# Patient Record
Sex: MALE | Race: WHITE | NOT HISPANIC OR LATINO | URBAN - METROPOLITAN AREA
[De-identification: names, ages, dates, MRNs, and addresses within clinical notes are randomized per-mention and may not be internally consistent; named-entity substitution may affect disease eponyms.]

---

## 2017-09-06 ENCOUNTER — EMERGENCY (EMERGENCY)
Facility: HOSPITAL | Age: 67
LOS: 1 days | Discharge: HOME | End: 2017-09-06
Attending: EMERGENCY MEDICINE | Admitting: EMERGENCY MEDICINE
Payer: MEDICAID

## 2017-09-06 VITALS
HEART RATE: 77 BPM | RESPIRATION RATE: 18 BRPM | SYSTOLIC BLOOD PRESSURE: 145 MMHG | DIASTOLIC BLOOD PRESSURE: 84 MMHG | TEMPERATURE: 98 F | OXYGEN SATURATION: 97 %

## 2017-09-06 DIAGNOSIS — Z88.0 ALLERGY STATUS TO PENICILLIN: ICD-10-CM

## 2017-09-06 DIAGNOSIS — Z88.2 ALLERGY STATUS TO SULFONAMIDES: ICD-10-CM

## 2017-09-06 DIAGNOSIS — R21 RASH AND OTHER NONSPECIFIC SKIN ERUPTION: ICD-10-CM

## 2017-09-06 DIAGNOSIS — L29.9 PRURITUS, UNSPECIFIED: ICD-10-CM

## 2017-09-06 DIAGNOSIS — E11.9 TYPE 2 DIABETES MELLITUS WITHOUT COMPLICATIONS: ICD-10-CM

## 2017-09-06 DIAGNOSIS — I10 ESSENTIAL (PRIMARY) HYPERTENSION: ICD-10-CM

## 2017-09-06 DIAGNOSIS — B20 HUMAN IMMUNODEFICIENCY VIRUS [HIV] DISEASE: ICD-10-CM

## 2017-09-06 PROCEDURE — 99283 EMERGENCY DEPT VISIT LOW MDM: CPT

## 2017-09-06 RX ORDER — FLUCONAZOLE 150 MG/1
1 TABLET ORAL
Qty: 3 | Refills: 0 | OUTPATIENT
Start: 2017-09-06 | End: 2017-09-27

## 2017-09-06 RX ORDER — DIPHENHYDRAMINE HCL 50 MG
1 CAPSULE ORAL
Qty: 20 | Refills: 0
Start: 2017-09-06 | End: 2017-09-12

## 2017-09-06 RX ORDER — FLUCONAZOLE 150 MG/1
1 TABLET ORAL
Qty: 3 | Refills: 0
Start: 2017-09-06 | End: 2017-09-28

## 2017-09-06 RX ORDER — KETOCONAZOLE 20 MG/G
1 AEROSOL, FOAM TOPICAL
Qty: 1 | Refills: 0
Start: 2017-09-06 | End: 2017-09-21

## 2017-09-06 RX ORDER — DIPHENHYDRAMINE HCL 50 MG
1 CAPSULE ORAL
Qty: 20 | Refills: 0 | OUTPATIENT
Start: 2017-09-06 | End: 2017-09-11

## 2017-09-06 RX ORDER — KETOCONAZOLE 20 MG/G
1 AEROSOL, FOAM TOPICAL
Qty: 1 | Refills: 0 | OUTPATIENT
Start: 2017-09-06 | End: 2017-09-20

## 2017-09-06 NOTE — ED ADULT TRIAGE NOTE - CHIEF COMPLAINT QUOTE
Pt has rash on head for a year, rash on his chest 3 months. States he took medication for hepatitis a YEAR ago and feels it might be from that.

## 2017-09-06 NOTE — ED PROVIDER NOTE - MEDICAL DECISION MAKING DETAILS
Will treat symptomatically for itching and also for inflammatory component vs infectious. Topical antifungal cream, oral antibiotics, prednisone for 4 days. Follow up with dermatology.

## 2017-09-06 NOTE — ED PROVIDER NOTE - SKIN, MLM
well demarcated erythemas rash over the left side of the neck radiating towards the chest. erythematous, dry, well demarcated rash from the occipital region extending down to the left lateral neck with some excoriated areas.

## 2017-09-06 NOTE — ED PROVIDER NOTE - OBJECTIVE STATEMENT
66 yo M with a hx of Hep C, DM, HTN, HIV presents with rash on the left side of the neck radiating toward the chest. Itchiness towards the night. States he has tried an ammonia cream, but has given no relief.  No fever, no chills. States he may have had fungal infection in the past. Has not seen dermatologist yet.

## 2018-11-02 NOTE — ED PROVIDER NOTE - CPE EDP MUSC NORM
Consent: Written consent was obtained and risks were reviewed including but not limited to scarring, infection, bleeding, scabbing, incomplete removal, nerve damage and allergy to anesthesia. X Size Of Lesion In Cm: 0 Render Post-Care Instructions In Note?: yes Bill [id number] For Specimen Handling/Conveyance To Laboratory?: no Cryotherapy Text: The wound bed was treated with cryotherapy after the biopsy was performed. Electrodesiccation And Curettage Text: The wound bed was treated with electrodesiccation and curettage after the biopsy was performed. Silver Nitrate Text: The wound bed was treated with silver nitrate after the biopsy was performed. Post-Care Instructions: I reviewed with the patient in detail post-care instructions. Patient is to keep the biopsy site dry overnight, and then apply bacitracin twice daily until healed. Patient may apply hydrogen peroxide soaks to remove any crusting. Anesthesia Volume In Cc: 0.5 Biopsy Method: Dermablade Electrodesiccation Text: The wound bed was treated with electrodesiccation after the biopsy was performed. Type Of Destruction Used: Curettage Anesthesia Type: 1% lidocaine with epinephrine and a 1:10 solution of 8.4% sodium bicarbonate Biopsy Type: H and E Hemostasis: Drysol Wound Care: Vaseline Notification Instructions: Patient will be notified of biopsy results. However, patient instructed to call the office if not contacted within 2 weeks. Depth Of Biopsy: dermis Dressing: bandage Curettage Text: The wound bed was treated with curettage after the biopsy was performed. Billing Type: Third-Party Bill Lab: [id number] Detail Level: Detailed Lab Facility: [id number] normal...

## 2019-05-09 ENCOUNTER — EMERGENCY (EMERGENCY)
Facility: HOSPITAL | Age: 69
LOS: 1 days | Discharge: HOME | End: 2019-05-09
Admitting: EMERGENCY MEDICINE
Payer: MEDICARE

## 2019-05-09 VITALS
RESPIRATION RATE: 18 BRPM | OXYGEN SATURATION: 98 % | HEART RATE: 36 BPM | DIASTOLIC BLOOD PRESSURE: 91 MMHG | SYSTOLIC BLOOD PRESSURE: 157 MMHG | TEMPERATURE: 99 F

## 2019-05-09 VITALS
SYSTOLIC BLOOD PRESSURE: 148 MMHG | RESPIRATION RATE: 18 BRPM | HEART RATE: 68 BPM | DIASTOLIC BLOOD PRESSURE: 88 MMHG | TEMPERATURE: 98 F | OXYGEN SATURATION: 99 %

## 2019-05-09 PROCEDURE — 93010 ELECTROCARDIOGRAM REPORT: CPT

## 2019-05-09 PROCEDURE — 99284 EMERGENCY DEPT VISIT MOD MDM: CPT | Mod: 25

## 2019-05-09 RX ORDER — NEOMYCIN/POLYMYXIN B/HYDROCORT
4 SUSPENSION, DROPS(FINAL DOSAGE FORM)(ML) OTIC (EAR) ONCE
Refills: 0 | Status: COMPLETED | OUTPATIENT
Start: 2019-05-09 | End: 2019-05-09

## 2019-05-09 RX ORDER — IBUPROFEN 200 MG
600 TABLET ORAL ONCE
Refills: 0 | Status: COMPLETED | OUTPATIENT
Start: 2019-05-09 | End: 2019-05-09

## 2019-05-09 RX ADMIN — Medication 600 MILLIGRAM(S): at 02:36

## 2019-05-09 RX ADMIN — Medication 4 DROP(S): at 02:39

## 2019-05-09 NOTE — ED PROVIDER NOTE - OBJECTIVE STATEMENT
69 year old male with h/o HIV, DM, HTN, presents with right ear pain x two weeks. worsening today. no swelling around ear. patient reports no recent swimming. no changes in hearing.  Denies sore throat, cough, SOB, CP, palpitations, wheezing, abdominal pain, N/V/D/C, change in urinary/bowel function, dysuria, hematuria, flank pain, malaise, rash, HA, and dizziness.  No recent travel or sick contact noted.

## 2019-05-09 NOTE — ED ADULT NURSE NOTE - CHPI ED NUR SYMPTOMS NEG
no bleeding gums/no loss of consciousness/no chills/no weakness/no fever/no syncope/no numbness/no nausea/no vomiting

## 2019-05-09 NOTE — ED PROVIDER NOTE - NS ED ROS FT
· CONSTITUTIONAL: no fever and no chills.  - HEENT: +ear pain  · CARDIOVASCULAR: normal rate and rhythm, no chest pain and no edema.  · RESPIRATORY: no chest pain, no cough, and no shortness of breath.  · GASTROINTESTINAL: no abdominal pain, no bloating, no constipation, no diarrhea, no nausea and no vomiting.  · MUSCULOSKELETAL: no back pain, no musculoskeletal pain, no neck pain, and no weakness.  · SKIN: no abrasions, no jaundice, no lesions, no pruritis, and no rashes.  · NEURO: no loss of consciousness, no gait abnormality, no headache, no sensory deficits, and no weakness.  · PSYCHIATRIC: no known mental health issues.

## 2019-05-09 NOTE — ED ADULT NURSE NOTE - OBJECTIVE STATEMENT
68 y/o male presents to ED c/o R ear pain x2 weeks. AOx3, hx of HTN and HIV. States has been taking ear wax solution without relief. Able to ambulate with steady gait, comes to ED for further eval. Denies tinnitus, CP, fevers, chills, dizziness, changes in vision.

## 2019-05-09 NOTE — ED PROVIDER NOTE - PHYSICAL EXAMINATION
VITAL SIGNS: I have reviewed nursing notes and confirm.  CONSTITUTIONAL: Well-developed; well-nourished; in no acute distress.  SKIN: Skin is warm and dry, no acute rash.  HEAD: Normocephalic; atraumatic.  EYES: PERRL, EOM intact; conjunctiva and sclera clear.  EARS:  right: no mastoid tenderness, no swelling, no cerumen impaction, canal with erythema and swelling, TM normal  left: canal and TM normal  NECK: Supple; non tender.  CARD: S1, S2 normal; no murmurs, gallops, or rubs. Regular rate and rhythm.  RESP: No wheezes, rales or rhonchi.  ABD: Normal bowel sounds; soft; non-distended; non-tender;  no palpable or pulsating mass; no hepatosplenomegaly.  EXT: Normal ROM. No clubbing, cyanosis or edema.  NEURO: Alert, oriented. Grossly unremarkable.  PSYCH: Cooperative, appropriate.

## 2019-05-09 NOTE — ED ADULT TRIAGE NOTE - CHIEF COMPLAINT QUOTE
patient c/o right ear pain for 2 weeks. louise states he has been told before he has irregular heart beat

## 2019-05-09 NOTE — ED PROVIDER NOTE - NSFOLLOWUPINSTRUCTIONS_ED_ALL_ED_FT
Use drops 4 drops in right ear three times a day. motrin with food for pain. return for worsening or changing symptoms

## 2019-05-10 ENCOUNTER — EMERGENCY (EMERGENCY)
Facility: HOSPITAL | Age: 69
LOS: 1 days | Discharge: HOME | End: 2019-05-10
Attending: EMERGENCY MEDICINE | Admitting: EMERGENCY MEDICINE
Payer: MEDICARE

## 2019-05-10 VITALS
TEMPERATURE: 98 F | RESPIRATION RATE: 16 BRPM | OXYGEN SATURATION: 97 % | SYSTOLIC BLOOD PRESSURE: 180 MMHG | DIASTOLIC BLOOD PRESSURE: 81 MMHG | HEART RATE: 45 BPM

## 2019-05-10 VITALS
HEART RATE: 60 BPM | TEMPERATURE: 98 F | OXYGEN SATURATION: 97 % | RESPIRATION RATE: 16 BRPM | SYSTOLIC BLOOD PRESSURE: 135 MMHG | DIASTOLIC BLOOD PRESSURE: 80 MMHG

## 2019-05-10 DIAGNOSIS — Z88.0 ALLERGY STATUS TO PENICILLIN: ICD-10-CM

## 2019-05-10 DIAGNOSIS — H66.91 OTITIS MEDIA, UNSPECIFIED, RIGHT EAR: ICD-10-CM

## 2019-05-10 DIAGNOSIS — H92.01 OTALGIA, RIGHT EAR: ICD-10-CM

## 2019-05-10 DIAGNOSIS — H60.501 UNSPECIFIED ACUTE NONINFECTIVE OTITIS EXTERNA, RIGHT EAR: ICD-10-CM

## 2019-05-10 DIAGNOSIS — I10 ESSENTIAL (PRIMARY) HYPERTENSION: ICD-10-CM

## 2019-05-10 DIAGNOSIS — E11.9 TYPE 2 DIABETES MELLITUS WITHOUT COMPLICATIONS: ICD-10-CM

## 2019-05-10 DIAGNOSIS — Z88.2 ALLERGY STATUS TO SULFONAMIDES: ICD-10-CM

## 2019-05-10 DIAGNOSIS — Z79.2 LONG TERM (CURRENT) USE OF ANTIBIOTICS: ICD-10-CM

## 2019-05-10 DIAGNOSIS — Z79.899 OTHER LONG TERM (CURRENT) DRUG THERAPY: ICD-10-CM

## 2019-05-10 DIAGNOSIS — B20 HUMAN IMMUNODEFICIENCY VIRUS [HIV] DISEASE: ICD-10-CM

## 2019-05-10 PROCEDURE — 99284 EMERGENCY DEPT VISIT MOD MDM: CPT

## 2019-05-10 PROCEDURE — 70480 CT ORBIT/EAR/FOSSA W/O DYE: CPT | Mod: 26

## 2019-05-10 RX ORDER — ACETAMINOPHEN 500 MG
2 TABLET ORAL
Qty: 60 | Refills: 0
Start: 2019-05-10

## 2019-05-10 RX ORDER — SACCHAROMYCES BOULARDII 250 MG
1 POWDER IN PACKET (EA) ORAL
Qty: 30 | Refills: 0
Start: 2019-05-10 | End: 2019-06-08

## 2019-05-10 RX ORDER — TRAMADOL HYDROCHLORIDE 50 MG/1
50 TABLET ORAL ONCE
Refills: 0 | Status: DISCONTINUED | OUTPATIENT
Start: 2019-05-10 | End: 2019-05-10

## 2019-05-10 RX ORDER — TRAMADOL HYDROCHLORIDE 50 MG/1
1 TABLET ORAL
Qty: 16 | Refills: 0
Start: 2019-05-10 | End: 2019-05-13

## 2019-05-10 RX ORDER — CIPROFLOXACIN LACTATE 400MG/40ML
750 VIAL (ML) INTRAVENOUS ONCE
Refills: 0 | Status: DISCONTINUED | OUTPATIENT
Start: 2019-05-10 | End: 2019-05-10

## 2019-05-10 RX ORDER — IBUPROFEN 200 MG
600 TABLET ORAL ONCE
Refills: 0 | Status: COMPLETED | OUTPATIENT
Start: 2019-05-10 | End: 2019-05-10

## 2019-05-10 RX ORDER — IBUPROFEN 200 MG
1 TABLET ORAL
Qty: 30 | Refills: 0
Start: 2019-05-10

## 2019-05-10 RX ADMIN — TRAMADOL HYDROCHLORIDE 50 MILLIGRAM(S): 50 TABLET ORAL at 10:19

## 2019-05-10 RX ADMIN — Medication 600 MILLIGRAM(S): at 10:19

## 2019-05-10 NOTE — ED PROVIDER NOTE - CONSTITUTIONAL, MLM
normal... Well appearing, well nourished, awake, alert, oriented to person, place, time/situation. Uncomfortable and moaning in agony holding his right ear.

## 2019-05-10 NOTE — ED PROVIDER NOTE - CARE PROVIDER_API CALL
Rosana Carnes)  Otolaryngology  [address] [place] [address]  Phone: [phone]  Fax: [phone]  Follow Up Time:     Alton Spaulding)  Ophthalmology; Pediatrics  [address] [place] [address]  Phone: [phone]  Fax: [phone]  Follow Up Time:

## 2019-05-10 NOTE — ED PROVIDER NOTE - OBJECTIVE STATEMENT
68 y/o Male with PMHx of HIV(Normal CD4 and undetected VL), DM, and HTN, presents to the ED c/o severe right ear pain starting last week. Reports worsening pain in the last hours. Pt was seen overnight and dx with Otitis externa and Rx antimicrobial ear drops to take TID. Pt notes he did not taken any pain medication. Denies fever and chills.  No other complaints.

## 2019-05-10 NOTE — ED PROVIDER NOTE - NSFOLLOWUPINSTRUCTIONS_ED_ALL_ED_FT
Please see you PMD Monday for a follow-up.  You should also see an ENT MD within a week.    You seem to have an inner and canal infection of the right hear. The left ear seems Ok to me.     There also might be some foreign material in the R ear canal. Once the swelling goes down this will need to be reassessed. It is too hard to tell today due to the swelling.     Please return for increased pain, fever or any worrisome symptoms. Take probiotics with antibiotics.    [facility] ([facility]) and [facility] both have a large number of ENT MDs.

## 2019-05-10 NOTE — ED PROVIDER NOTE - CLINICAL SUMMARY MEDICAL DECISION MAKING FREE TEXT BOX
Pt presents with apparent Otitis externa. Will give PO Levaquin , pain medication and check CT of temporal bones given severe degrees of pain. Pt presents with apparent Otitis externa. Will give PO Levaquin , pain medication and check CT of temporal bones given severe degree of pain.

## 2019-05-10 NOTE — ED PROVIDER NOTE - PROGRESS NOTE DETAILS
Patient's pain is much better, CT looks like R OM, L side ear dn look like OE clinically. Will have him take PO Levaquin and con to use drops. He has no hx of ear FB. will have him see his MD Monday. Will also give EMT nos for fu. canal open enough that wick is not needed.

## 2019-05-10 NOTE — ED PROVIDER NOTE - CARE PLAN
Principal Discharge DX:	Acute otitis media, unspecified otitis media type  Secondary Diagnosis:	Acute otitis externa, unspecified laterality, unspecified type

## 2019-05-10 NOTE — ED PROVIDER NOTE - ENMT, MLM
Airway patent. Right Ear: external canal is dermatitis, not erythematous, no exudates, difficult visual of TM, pearly white structure within canal, no mastoid tenderness. ?TM vs FB vs others.

## 2019-05-10 NOTE — ED ADULT NURSE NOTE - NSIMPLEMENTINTERV_GEN_ALL_ED
Implemented All Universal Safety Interventions:  [place] to call system. Call bell, personal items and telephone within reach. Instruct patient to call for assistance. Room bathroom lighting operational. Non-slip footwear when patient is off stretcher. Physically safe environment: no spills, clutter or unnecessary equipment. Stretcher in lowest position, wheels locked, appropriate side rails in place.

## 2019-05-13 DIAGNOSIS — Z88.0 ALLERGY STATUS TO PENICILLIN: ICD-10-CM

## 2019-05-13 DIAGNOSIS — H60.91 UNSPECIFIED OTITIS EXTERNA, RIGHT EAR: ICD-10-CM

## 2019-05-13 DIAGNOSIS — Z88.2 ALLERGY STATUS TO SULFONAMIDES: ICD-10-CM

## 2019-05-13 DIAGNOSIS — Z79.899 OTHER LONG TERM (CURRENT) DRUG THERAPY: ICD-10-CM

## 2019-05-13 DIAGNOSIS — H92.01 OTALGIA, RIGHT EAR: ICD-10-CM

## 2019-05-13 DIAGNOSIS — I10 ESSENTIAL (PRIMARY) HYPERTENSION: ICD-10-CM

## 2019-05-13 DIAGNOSIS — E11.9 TYPE 2 DIABETES MELLITUS WITHOUT COMPLICATIONS: ICD-10-CM

## 2019-05-13 DIAGNOSIS — Z79.2 LONG TERM (CURRENT) USE OF ANTIBIOTICS: ICD-10-CM

## 2019-05-16 ENCOUNTER — EMERGENCY (EMERGENCY)
Facility: HOSPITAL | Age: 69
LOS: 1 days | Discharge: HOME | End: 2019-05-16
Attending: EMERGENCY MEDICINE | Admitting: EMERGENCY MEDICINE
Payer: MEDICARE

## 2019-05-16 VITALS
TEMPERATURE: 99 F | RESPIRATION RATE: 17 BRPM | DIASTOLIC BLOOD PRESSURE: 76 MMHG | HEART RATE: 61 BPM | WEIGHT: 175.05 LBS | SYSTOLIC BLOOD PRESSURE: 125 MMHG

## 2019-05-16 VITALS
SYSTOLIC BLOOD PRESSURE: 112 MMHG | OXYGEN SATURATION: 99 % | HEART RATE: 62 BPM | RESPIRATION RATE: 16 BRPM | DIASTOLIC BLOOD PRESSURE: 51 MMHG | TEMPERATURE: 98 F

## 2019-05-16 DIAGNOSIS — Z88.2 ALLERGY STATUS TO SULFONAMIDES: ICD-10-CM

## 2019-05-16 DIAGNOSIS — Z76.0 ENCOUNTER FOR ISSUE OF REPEAT PRESCRIPTION: ICD-10-CM

## 2019-05-16 DIAGNOSIS — H66.92 OTITIS MEDIA, UNSPECIFIED, LEFT EAR: ICD-10-CM

## 2019-05-16 DIAGNOSIS — Z79.1 LONG TERM (CURRENT) USE OF NON-STEROIDAL ANTI-INFLAMMATORIES (NSAID): ICD-10-CM

## 2019-05-16 DIAGNOSIS — Z79.891 LONG TERM (CURRENT) USE OF OPIATE ANALGESIC: ICD-10-CM

## 2019-05-16 DIAGNOSIS — Z79.2 LONG TERM (CURRENT) USE OF ANTIBIOTICS: ICD-10-CM

## 2019-05-16 DIAGNOSIS — Z79.899 OTHER LONG TERM (CURRENT) DRUG THERAPY: ICD-10-CM

## 2019-05-16 DIAGNOSIS — Z88.0 ALLERGY STATUS TO PENICILLIN: ICD-10-CM

## 2019-05-16 PROCEDURE — 99283 EMERGENCY DEPT VISIT LOW MDM: CPT

## 2019-05-16 RX ORDER — CIPROFLOXACIN LACTATE 400MG/40ML
1 VIAL (ML) INTRAVENOUS
Qty: 14 | Refills: 0
Start: 2019-05-16 | End: 2019-05-22

## 2019-05-16 NOTE — ED PROVIDER NOTE - CLINICAL SUMMARY MEDICAL DECISION MAKING FREE TEXT BOX
Med refill requested for his ear infection, uses cipro eardrop at home but his Cirpo PO dose incompleted as it went missing, requesting for the refill. No systemic sx. No sx of maligant otitis media or externa. Med refill requested for his ear infection, uses cipro eardrop at home but his Cirpo PO dose incompleted as it went missing, requesting for the refill. No systemic sx. No sx of maligant otitis media or externa. ENT f/u needed.

## 2019-05-16 NOTE — ED ADULT TRIAGE NOTE - CHIEF COMPLAINT QUOTE
Pt requesting medication refill for ciprofloxacin. Pt states he was prescribed Cipro 750mg on 05/13/19 for right ear infection. Pt states "I took one pill but the bottle is empty. I don't know where the rest went". PT denies any ear pain at this time.

## 2019-05-16 NOTE — ED ADULT NURSE NOTE - NSIMPLEMENTINTERV_GEN_ALL_ED
Implemented All Universal Safety Interventions:  [place] to call system. Call bell, personal items and telephone within reach. Instruct patient to call for assistance. Room bathroom lighting operational. Non-slip footwear when patient is off stretcher. Physically safe environment: no spills, clutter or unnecessary equipment. Stretcher in lowest position, wheels locked, appropriate side rails in place.

## 2019-05-16 NOTE — ED PROVIDER NOTE - OBJECTIVE STATEMENT
69M hx of HIV, Hep C presenting with refill of Cipro 750mg BID x 7d for ear infection. Hx of pen allergy with lip swelling, was Rx for Cipro for ear infection. Per patient, he took only a dose and the rest of his meds went missing. Still endorsed to rt. ear pain and wanted to get the meds refilled. Otherwise, denies any CP, back pain or abd pain. No GI/. No fever endorsed, no abnormal discharge from ear, no loss of hearing. No pain with pulling the ear. Pt has ear drops/ Cipro at home but still wanted a PO med.

## 2019-05-16 NOTE — ED PROVIDER NOTE - NSFOLLOWUPINSTRUCTIONS_ED_ALL_ED_FT
-  your medication from the pharmacy.  - Take medication with Yogurt/ Probiotics to prevent diarrhea.   - Return to the ED with any worsening of the symptoms. - See an ENT specialist today. The walk-in clinic address is     [facility] and [facility]  Address:   [address] [place] [address] ,  [id number]    - Take medication with Yogurt/ Probiotics to prevent diarrhea.   - Return to the ED with any worsening of the symptoms.

## 2019-05-16 NOTE — ED PROVIDER NOTE - ATTENDING CONTRIBUTION TO CARE
69 y.o. male with hx HIV with c/o R ear pain, prev on cipro and requesting refill.  On exam T TM appears to be bulging into the canal, ?perforation, no fluid, normal external canal, no drainage or bleeding from canal. Recc referral to ENT for further management, no refill of meds given, no sign of infection on exam.

## 2019-07-14 ENCOUNTER — EMERGENCY (EMERGENCY)
Facility: HOSPITAL | Age: 69
LOS: 1 days | Discharge: HOME | End: 2019-07-14
Attending: EMERGENCY MEDICINE | Admitting: EMERGENCY MEDICINE
Payer: MEDICARE

## 2019-07-14 VITALS
OXYGEN SATURATION: 96 % | DIASTOLIC BLOOD PRESSURE: 96 MMHG | SYSTOLIC BLOOD PRESSURE: 147 MMHG | TEMPERATURE: 98 F | HEIGHT: 72 IN | HEART RATE: 81 BPM | RESPIRATION RATE: 15 BRPM | WEIGHT: 164.91 LBS

## 2019-07-14 DIAGNOSIS — K52.9 NONINFECTIVE GASTROENTERITIS AND COLITIS, UNSPECIFIED: ICD-10-CM

## 2019-07-14 DIAGNOSIS — R19.7 DIARRHEA, UNSPECIFIED: ICD-10-CM

## 2019-07-14 LAB — GLUCOSE BLDC GLUCOMTR-MCNC: 133 MG/DL — HIGH (ref 70–99)

## 2019-07-14 PROCEDURE — 99283 EMERGENCY DEPT VISIT LOW MDM: CPT

## 2019-07-14 RX ORDER — LOPERAMIDE HCL 2 MG
2 TABLET ORAL ONCE
Refills: 0 | Status: COMPLETED | OUTPATIENT
Start: 2019-07-14 | End: 2019-07-14

## 2019-07-14 RX ADMIN — Medication 2 MILLIGRAM(S): at 23:48

## 2019-07-14 NOTE — ED PROVIDER NOTE - OBJECTIVE STATEMENT
69 yom pw diarrhea.  pt states he's had chronic diarrhea for years, and takes loperamide daily, prescribed by his PMD but decided to not to take any for the last 2-3 days.  pt states he also had [employer] prior to discontinuing the loperamide.  when asked why, pt states he just wanted to step out of his comfort zone.  pt states vomited once today but has been tolerating PO since.  denies pain.  no fc.  vomiting resolved.  hx of hernia repair.  pt requesting loperamide in ED.  denies depression/si/hi/hallucination.

## 2019-07-14 NOTE — ED PROVIDER NOTE - PMH
Chronic diarrhea    DM (diabetes mellitus)    HIV (human immunodeficiency virus infection)    HTN (hypertension)

## 2019-07-14 NOTE — ED PROVIDER NOTE - CLINICAL SUMMARY MEDICAL DECISION MAKING FREE TEXT BOX
chronic diarrhea, did not take his prescribed meds, requesting loperamide, denies pain, tolerating PO, clinically not c/w sbo, chronic diarrhea, did not take his prescribed meds, requesting loperamide, denies pain, tolerating PO, clinically not c/w sbo, POC wnl.

## 2019-07-14 NOTE — ED PROVIDER NOTE - PHYSICAL EXAMINATION
CON: ao x 3, HENMT: clear oropharynx, soft neck, HEAD: atraumatic, GI: +BS, soft, nontender, no rebound, no guarding, SKIN: no rash, MSK: no deformities,

## 2019-07-14 NOTE — ED PROVIDER NOTE - NSFOLLOWUPINSTRUCTIONS_ED_ALL_ED_FT
Follow up with your primary care doctor   Take your current medications as prescribed  Return immediately for any new or worsening symptoms or any new concerns

## 2019-07-14 NOTE — ED ADULT NURSE NOTE - NSIMPLEMENTINTERV_GEN_ALL_ED
Implemented All Universal Safety Interventions:  [place] to call system. Call bell, personal items and telephone within reach. Instruct patient to call for assistance. Room bathroom lighting operational. Non-slip footwear when patient is off stretcher. Physically safe environment: no spills, clutter or unnecessary equipment. Stretcher in lowest position, wheels locked, appropriate side rails in place.

## 2019-07-14 NOTE — ED ADULT TRIAGE NOTE - CHIEF COMPLAINT QUOTE
Pt states "I think I have food poisoning." Pt reports nausea, vomiting and diarrhea X 1. Denies abdominal pain.

## 2019-07-16 ENCOUNTER — EMERGENCY (EMERGENCY)
Facility: HOSPITAL | Age: 69
LOS: 1 days | Discharge: HOME | End: 2019-07-16
Admitting: EMERGENCY MEDICINE

## 2019-07-16 PROBLEM — K52.9 NONINFECTIVE GASTROENTERITIS AND COLITIS, UNSPECIFIED: Chronic | Status: ACTIVE | Noted: 2019-07-14

## 2019-07-19 ENCOUNTER — INPATIENT (INPATIENT)
Facility: HOSPITAL | Age: 69
LOS: 0 days | Discharge: HOME | DRG: 303 | End: 2019-07-20
Attending: INTERNAL MEDICINE | Admitting: INTERNAL MEDICINE
Payer: MEDICARE

## 2019-07-19 VITALS
TEMPERATURE: 98 F | OXYGEN SATURATION: 100 % | DIASTOLIC BLOOD PRESSURE: 71 MMHG | SYSTOLIC BLOOD PRESSURE: 108 MMHG | RESPIRATION RATE: 18 BRPM | WEIGHT: 167.99 LBS | HEART RATE: 68 BPM | HEIGHT: 72 IN

## 2019-07-19 DIAGNOSIS — I25.10 ATHEROSCLEROTIC HEART DISEASE OF NATIVE CORONARY ARTERY WITHOUT ANGINA PECTORIS: ICD-10-CM

## 2019-07-19 DIAGNOSIS — I10 ESSENTIAL (PRIMARY) HYPERTENSION: ICD-10-CM

## 2019-07-19 DIAGNOSIS — B20 HUMAN IMMUNODEFICIENCY VIRUS [HIV] DISEASE: ICD-10-CM

## 2019-07-19 DIAGNOSIS — D69.6 THROMBOCYTOPENIA, UNSPECIFIED: ICD-10-CM

## 2019-07-19 LAB
ALBUMIN SERPL ELPH-MCNC: 3.9 G/DL — SIGNIFICANT CHANGE UP (ref 3.3–5)
ALP SERPL-CCNC: 55 U/L — SIGNIFICANT CHANGE UP (ref 40–120)
ALT FLD-CCNC: 15 U/L — SIGNIFICANT CHANGE UP (ref 10–45)
ANION GAP SERPL CALC-SCNC: 10 MMOL/L — SIGNIFICANT CHANGE UP (ref 5–17)
APTT BLD: 31.1 SEC — SIGNIFICANT CHANGE UP (ref 27.5–36.3)
AST SERPL-CCNC: 22 U/L — SIGNIFICANT CHANGE UP (ref 10–40)
BILIRUB SERPL-MCNC: 0.2 MG/DL — SIGNIFICANT CHANGE UP (ref 0.2–1.2)
BUN SERPL-MCNC: 29 MG/DL — HIGH (ref 7–23)
CALCIUM SERPL-MCNC: 9.2 MG/DL — SIGNIFICANT CHANGE UP (ref 8.4–10.5)
CHLORIDE SERPL-SCNC: 102 MMOL/L — SIGNIFICANT CHANGE UP (ref 96–108)
CHOLEST SERPL-MCNC: 121 MG/DL — SIGNIFICANT CHANGE UP (ref 10–199)
CK MB CFR SERPL CALC: 3.2 NG/ML — SIGNIFICANT CHANGE UP (ref 0–6.7)
CK MB CFR SERPL CALC: 3.6 NG/ML — SIGNIFICANT CHANGE UP (ref 0–6.7)
CK SERPL-CCNC: 94 U/L — SIGNIFICANT CHANGE UP (ref 30–200)
CK SERPL-CCNC: 96 U/L — SIGNIFICANT CHANGE UP (ref 30–200)
CLOSURE TME COLL+EPINEP BLD: 43 K/UL — LOW (ref 150–400)
CO2 SERPL-SCNC: 25 MMOL/L — SIGNIFICANT CHANGE UP (ref 22–31)
CREAT SERPL-MCNC: 1.05 MG/DL — SIGNIFICANT CHANGE UP (ref 0.5–1.3)
GLUCOSE SERPL-MCNC: 156 MG/DL — HIGH (ref 70–99)
HBA1C BLD-MCNC: 5.8 % — HIGH (ref 4–5.6)
HCT VFR BLD CALC: 40.3 % — SIGNIFICANT CHANGE UP (ref 39–50)
HDLC SERPL-MCNC: 42 MG/DL — SIGNIFICANT CHANGE UP
HGB BLD-MCNC: 13.8 G/DL — SIGNIFICANT CHANGE UP (ref 13–17)
INR BLD: 1.01 — SIGNIFICANT CHANGE UP (ref 0.88–1.16)
LIPID PNL WITH DIRECT LDL SERPL: 57 MG/DL — SIGNIFICANT CHANGE UP
MAGNESIUM SERPL-MCNC: 1.8 MG/DL — SIGNIFICANT CHANGE UP (ref 1.6–2.6)
MCHC RBC-ENTMCNC: 32.5 PG — SIGNIFICANT CHANGE UP (ref 27–34)
MCHC RBC-ENTMCNC: 34.2 GM/DL — SIGNIFICANT CHANGE UP (ref 32–36)
MCV RBC AUTO: 94.8 FL — SIGNIFICANT CHANGE UP (ref 80–100)
NRBC # BLD: 0 /100 WBCS — SIGNIFICANT CHANGE UP (ref 0–0)
PCP SPEC-MCNC: SIGNIFICANT CHANGE UP
PLATELET # BLD AUTO: 53 K/UL — LOW (ref 150–400)
POTASSIUM SERPL-MCNC: 4.3 MMOL/L — SIGNIFICANT CHANGE UP (ref 3.5–5.3)
POTASSIUM SERPL-SCNC: 4.3 MMOL/L — SIGNIFICANT CHANGE UP (ref 3.5–5.3)
PROT SERPL-MCNC: 7.6 G/DL — SIGNIFICANT CHANGE UP (ref 6–8.3)
PROTHROM AB SERPL-ACNC: 11.4 SEC — SIGNIFICANT CHANGE UP (ref 10–12.9)
RBC # BLD: 4.25 M/UL — SIGNIFICANT CHANGE UP (ref 4.2–5.8)
RBC # FLD: 11.9 % — SIGNIFICANT CHANGE UP (ref 10.3–14.5)
SODIUM SERPL-SCNC: 137 MMOL/L — SIGNIFICANT CHANGE UP (ref 135–145)
TOTAL CHOLESTEROL/HDL RATIO MEASUREMENT: 2.9 RATIO — LOW (ref 3.4–9.6)
TRIGL SERPL-MCNC: 111 MG/DL — SIGNIFICANT CHANGE UP (ref 10–149)
TROPONIN T SERPL-MCNC: <0.01 NG/ML — SIGNIFICANT CHANGE UP (ref 0–0.01)
WBC # BLD: 3.35 K/UL — LOW (ref 3.8–10.5)
WBC # FLD AUTO: 3.35 K/UL — LOW (ref 3.8–10.5)

## 2019-07-19 PROCEDURE — 93010 ELECTROCARDIOGRAM REPORT: CPT

## 2019-07-19 PROCEDURE — 99285 EMERGENCY DEPT VISIT HI MDM: CPT | Mod: 25

## 2019-07-19 PROCEDURE — 71045 X-RAY EXAM CHEST 1 VIEW: CPT | Mod: 26

## 2019-07-19 PROCEDURE — 75574 CT ANGIO HRT W/3D IMAGE: CPT | Mod: 26

## 2019-07-19 PROCEDURE — 99221 1ST HOSP IP/OBS SF/LOW 40: CPT | Mod: AI

## 2019-07-19 RX ORDER — ESCITALOPRAM OXALATE 10 MG/1
5 TABLET, FILM COATED ORAL DAILY
Refills: 0 | Status: DISCONTINUED | OUTPATIENT
Start: 2019-07-19 | End: 2019-07-20

## 2019-07-19 RX ORDER — SODIUM CHLORIDE 9 MG/ML
1000 INJECTION INTRAMUSCULAR; INTRAVENOUS; SUBCUTANEOUS
Refills: 0 | Status: DISCONTINUED | OUTPATIENT
Start: 2019-07-19 | End: 2019-07-19

## 2019-07-19 RX ORDER — METOPROLOL TARTRATE 50 MG
25 TABLET ORAL ONCE
Refills: 0 | Status: COMPLETED | OUTPATIENT
Start: 2019-07-19 | End: 2019-07-19

## 2019-07-19 RX ORDER — ASPIRIN/CALCIUM CARB/MAGNESIUM 324 MG
162 TABLET ORAL ONCE
Refills: 0 | Status: COMPLETED | OUTPATIENT
Start: 2019-07-19 | End: 2019-07-19

## 2019-07-19 RX ORDER — EMTRICITABINE AND TENOFOVIR DISOPROXIL FUMARATE 200; 300 MG/1; MG/1
1 TABLET, FILM COATED ORAL DAILY
Refills: 0 | Status: DISCONTINUED | OUTPATIENT
Start: 2019-07-19 | End: 2019-07-20

## 2019-07-19 RX ORDER — ASPIRIN/CALCIUM CARB/MAGNESIUM 324 MG
81 TABLET ORAL DAILY
Refills: 0 | Status: DISCONTINUED | OUTPATIENT
Start: 2019-07-20 | End: 2019-07-20

## 2019-07-19 RX ORDER — MAGNESIUM SULFATE 500 MG/ML
1 VIAL (ML) INJECTION ONCE
Refills: 0 | Status: COMPLETED | OUTPATIENT
Start: 2019-07-19 | End: 2019-07-19

## 2019-07-19 RX ADMIN — EMTRICITABINE AND TENOFOVIR DISOPROXIL FUMARATE 1 TABLET(S): 200; 300 TABLET, FILM COATED ORAL at 22:45

## 2019-07-19 RX ADMIN — Medication 162 MILLIGRAM(S): at 19:33

## 2019-07-19 RX ADMIN — ESCITALOPRAM OXALATE 5 MILLIGRAM(S): 10 TABLET, FILM COATED ORAL at 22:45

## 2019-07-19 RX ADMIN — Medication 80 MILLIGRAM(S): at 23:35

## 2019-07-19 RX ADMIN — Medication 25 MILLIGRAM(S): at 10:36

## 2019-07-19 RX ADMIN — Medication 100 GRAM(S): at 19:33

## 2019-07-19 RX ADMIN — SODIUM CHLORIDE 75 MILLILITER(S): 9 INJECTION INTRAMUSCULAR; INTRAVENOUS; SUBCUTANEOUS at 17:37

## 2019-07-19 NOTE — H&P ADULT - HISTORY OF PRESENT ILLNESS
70 y/o M, poor historian, former ETOH abuser (quit 25 years ago), former smoker (in his 20's), occasional Cocaine and Marijuana user (states last used "months ago") with PMHx of HTN, DM, HIV (unknown CD4, VL undetectable), Hep C (treated rola/ Harvoni 3-4 years ago), Prostate CA s/p radiation therapy, and Depression who presented to [facility] ED on 7/19/19 c/o 70 y/o M, Shellfish allergy (tolerated CCTA w/ contrast without allergic reaction), poor historian, former ETOH abuser (quit 25 years ago), former smoker (in his 20's), occasional Cocaine and Marijuana user (states last used "months ago") with PMHx of HTN, DM, HIV (unknown CD4, VL undetectable), Hep C (treated w/ Harvoni 3-4 years ago), Prostate CA s/p radiation therapy, and Depression who presented to [facility] ED on 7/19/19 c/o sudden onset, non-radiating, mid-sternal chest pressure, described as if "someone was sitting on his chest" with associated SOB, dizziness, and nausea which [place]. Pt states he has had a similar sensation like this before, but never as intense. Pt states the chest pressure sensation has subsided, but worried him enough to seek medical attention. Pt denies any recent cardiac w/u. He denies HA, syncope, decreased exercise tolerance, SOB, palpitations, fevers, chills, cough, dysuria, LE edema, or any other symptoms at this time. On arrival to ED, VS noted as T 98.1, HR 68, /71, RR 18, SpO2 100% on RA. Labs significant for Trops negative x 1, PLT 53. EKG showed SR @ 64BPM with T flattening in III and aVF, Q waves in V1-V2. CXR negative for acute process. CCTA revealed 1. The calcium score is severe at 1323 Agatston units, which is at the 90th percentile, adjusted for age, gender and race. 2.  Moderate to severe stenosis of mid LAD followed by an additional moderate stenosis 3.  Moderate OM2 stenosis 4.  Less than 50% stenosis of LM. 5.  Non obstructive coronary disease elsewhere. 6.  The LVEF appears mildly to moderately reduced with hypokinesis of the mid to apical septum. 7.  Dilated ascending aorta measuring 4.4cm x 4.3cm at the level of the main pulmonary artery. In ED, pt given Metoprolol Tartrate 25mg PO x 1. Pt is now admitted to [facility] for r/o ACS.

## 2019-07-19 NOTE — ED PROVIDER NOTE - CLINICAL SUMMARY MEDICAL DECISION MAKING FREE TEXT BOX
70 y/o M with HIV, former brief smoker, presents to the ED with concerning story of left sided chest pain and ACS. Patient currently without symptoms and looks well. Normal exam. EKG shows non-specific abnormalities. Pt is hemodynamically stable. Do not suspect PE or dissection. Plan for labs, troponin, CXR, EKG, and obtain CTA of coronary arteries to r/o obstructive coronary disease.

## 2019-07-19 NOTE — ED PROVIDER NOTE - PMH
Chronic diarrhea    DM (diabetes mellitus)    Hepatitis C    HIV (human immunodeficiency virus infection)    HTN (hypertension)

## 2019-07-19 NOTE — ED ADULT NURSE NOTE - CHIEF COMPLAINT QUOTE
[place] [employer] [place], resolved prior to arrival to ED.  EKG in progress.  Pt states "It felt like my heart was pounding."  PT denies SOB, Fevers, N/V/D, and Dizziness.  EMS admin 162 mg PO ASA.  Pt endorses Hx of HIV

## 2019-07-19 NOTE — H&P ADULT - NSHPLABSRESULTS_GEN_ALL_CORE
13.8   3.35  )-----------( 53       ( 19 Jul 2019 10:35 )             40.3   07-19    137  |  102  |  29<H>  ----------------------------<  156<H>  4.3   |  25  |  1.05    Ca    9.2      19 Jul 2019 10:35  Mg     1.8     07-19    TPro  7.6  /  Alb  3.9  /  TBili  0.2  /  DBili  x   /  AST  22  /  ALT  15  /  AlkPhos  55  07-19  CARDIAC MARKERS ( 19 Jul 2019 18:22 )  x     / <0.01 ng/mL / 96 U/L / x     / 3.6 ng/mL  CARDIAC MARKERS ( 19 Jul 2019 10:35 )  x     / <0.01 ng/mL / x     / x     / x

## 2019-07-19 NOTE — ED ADULT NURSE NOTE - CHPI ED NUR SYMPTOMS NEG
no nausea/no syncope/no vomiting/no fever/no congestion/no chest pain/no diaphoresis/no dizziness/no back pain

## 2019-07-19 NOTE — H&P ADULT - NSHPSOCIALHISTORY_GEN_ALL_CORE
Former tobacco smoker (in his 20s), former ETOH abuser (quit 25 years ago), recreational Cocaine and Marijuana user last used "months ago"

## 2019-07-19 NOTE — H&P ADULT - PROBLEM SELECTOR PLAN 1
Currently CP free and HD stable  - EKG w/ T flattening in III and aVF, Q waves in V1-V2  - Trop negative x 2, f/u 10PM Trops  - Echo ordered, f/u results  - Hold initiation of BB pending UTox results  - Continue Enalapril 2.5mg QD and ASA 81mg QD  - Pt consented for cardiac cath  - Heme consulted to evaluate thrombocytopenia prior to initiation of DAPT Currently CP free and HD stable  - EKG w/ T flattening in III and aVF, Q waves in V1-V2  - Trop negative x 2, f/u 10PM Trops  - Echo ordered, f/u results  - Hold initiation of BB pending UTox results  - Continue Enalapril 2.5mg QD and ASA 81mg QD. Started Pravastatin 80mg QHS  - Pt consented for cardiac cath  - Heme consulted to evaluate thrombocytopenia prior to initiation of DAPT

## 2019-07-19 NOTE — ED PROVIDER NOTE - OBJECTIVE STATEMENT
68 y/o M former brief smoker in 20's with PMHx of HIV and hepatitis C but no known cardiac history presents to the ED with complaints of acute onset of pressure-like feeling with non-radiating left sided chest discomfort and pain. Pt reports that the symptoms began at rest, 1hr PTA to ED while sitting on the train. Symptoms lasted 20-30 minutes, but resolved on their own. Pt reports feeling sweaty, and has had similar chest pain in the past, but never this severe. Denies lower extremity edema, calf pain, pleurisy, cough, fever, or SOB. Pt currently has no symptoms in the ED, and was given aspirin PTA.

## 2019-07-19 NOTE — H&P ADULT - NSICDXPASTMEDICALHX_GEN_ALL_CORE_FT
PAST MEDICAL HISTORY:  Chronic diarrhea     DM (diabetes mellitus)     Hepatitis C     HIV (human immunodeficiency virus infection)     HTN (hypertension)

## 2019-07-19 NOTE — ED ADULT NURSE NOTE - NSIMPLEMENTINTERV_GEN_ALL_ED
Implemented All Universal Safety Interventions:  [place] to call system. Call bell, personal items and telephone within reach. Instruct patient to call for assistance. Room bathroom lighting operational. Non-slip footwear when patient is off stretcher. Physically safe environment: no spills, clutter or unnecessary equipment. Stretcher in lowest position, wheels locked, appropriate side rails in place.

## 2019-07-19 NOTE — H&P ADULT - ASSESSMENT
70 y/o M, Shellfish allergy (tolerated CCTA w/ contrast without allergic reaction), poor historian, former ETOH abuser (quit 25 years ago), former smoker (in his 20's), occasional Cocaine and Marijuana user (states last used "months ago") with PMHx of HTN, DM, HIV (unknown CD4, VL undetectable), Hep C (treated w/ Harvoni 3-4 years ago), Prostate CA s/p radiation therapy, and Depression who presented to [facility] ED on 7/19/19 c/o sudden onset, non-radiating, mid-sternal chest pressure, described as if "someone was sitting on his chest" with associated SOB, dizziness, and nausea which [place]. Pt is now admitted to [facility] for r/o ACS.

## 2019-07-19 NOTE — H&P ADULT - BREASTS
Refill request received from pharmacy for glucagon injection and folic acid. Refill sent to the pharmacy. PSR:     Please call patient. She was suppose to be seen for a follow up around 9/29/17. Patient cancelled appts in between and was rescheduled for 6/5/18. Patient should be scheduled with SUE next available. not examined

## 2019-07-19 NOTE — ED ADULT NURSE NOTE - NSFALLRSKHARMRISK_ED_ALL_ED
Called patient, left message to call back to remind her to get her FLP and UA when she comes in for Endo appointment.  
Patient is overdue for UA and FLP ordered at [facility] with Dr. Stevenson. Has upcoming appointment 4/18/19 with endo.  
no

## 2019-07-19 NOTE — ED PROVIDER NOTE - MUSCULOSKELETAL, MLM
Spine appears normal, range of motion is not limited, no muscle or joint tenderness. No lower extremity edema or calf tenderness. Symmetric pulses present.

## 2019-07-19 NOTE — H&P ADULT - PROBLEM SELECTOR PLAN 2
PLT 53 on admission, previously 117 in 2016  - Heme consulted, f/u recs  - Abdominal US ordered, f/u recs  - B12, Folate, Platelet (Blue Top), COAGs, CD4, Viral Load ordered, f/u results

## 2019-07-19 NOTE — ED ADULT TRIAGE NOTE - CHIEF COMPLAINT QUOTE
Pt BIBA from [facility], resolved prior to arrival to ED.  EKG in progress.  Pt states "It felt like my heart was pounding."  PT denies SOB, Fevers, N/V/D, and Dizziness. [place], resolved prior to arrival to ED.  EKG in progress.  Pt states "It felt like my heart was pounding."  PT denies SOB, Fevers, N/V/D, and Dizziness.  EMS admin 162 mg PO ASA.  Pt endorses Hx of HIV

## 2019-07-19 NOTE — ED ADULT NURSE NOTE - OBJECTIVE STATEMENT
pt received sitting in bed, A&O x 3. hx HTN, DM, HIV, past smoker. pt endorses Lt sided chest pain with associated SOB and nausea this morning. Pt states symptoms have resolved prior to arrival. Pt describes pain as "pressure". Denies n/v/d,fever at this time. Pt states he is compliant with daily medications. Pt in NAD at this time, will continue to monitor.

## 2019-07-20 ENCOUNTER — TRANSCRIPTION ENCOUNTER (OUTPATIENT)
Age: 69
End: 2019-07-20

## 2019-07-20 VITALS — DIASTOLIC BLOOD PRESSURE: 70 MMHG | HEART RATE: 60 BPM | SYSTOLIC BLOOD PRESSURE: 130 MMHG | RESPIRATION RATE: 18 BRPM

## 2019-07-20 LAB
ANION GAP SERPL CALC-SCNC: 11 MMOL/L — SIGNIFICANT CHANGE UP (ref 5–17)
BASOPHILS # BLD AUTO: 0.03 K/UL — SIGNIFICANT CHANGE UP (ref 0–0.2)
BASOPHILS NFR BLD AUTO: 0.9 % — SIGNIFICANT CHANGE UP (ref 0–2)
BUN SERPL-MCNC: 26 MG/DL — HIGH (ref 7–23)
CALCIUM SERPL-MCNC: 9.1 MG/DL — SIGNIFICANT CHANGE UP (ref 8.4–10.5)
CHLORIDE SERPL-SCNC: 102 MMOL/L — SIGNIFICANT CHANGE UP (ref 96–108)
CO2 SERPL-SCNC: 24 MMOL/L — SIGNIFICANT CHANGE UP (ref 22–31)
CREAT SERPL-MCNC: 1.08 MG/DL — SIGNIFICANT CHANGE UP (ref 0.5–1.3)
EOSINOPHIL # BLD AUTO: 0.05 K/UL — SIGNIFICANT CHANGE UP (ref 0–0.5)
EOSINOPHIL NFR BLD AUTO: 1.7 % — SIGNIFICANT CHANGE UP (ref 0–6)
FOLATE SERPL-MCNC: 8.8 NG/ML — SIGNIFICANT CHANGE UP
GLUCOSE SERPL-MCNC: 160 MG/DL — HIGH (ref 70–99)
HCT VFR BLD CALC: 39.9 % — SIGNIFICANT CHANGE UP (ref 39–50)
HCV AB S/CO SERPL IA: 9.16 S/CO — SIGNIFICANT CHANGE UP
HCV AB SERPL-IMP: REACTIVE
HGB BLD-MCNC: 13.5 G/DL — SIGNIFICANT CHANGE UP (ref 13–17)
LYMPHOCYTES # BLD AUTO: 0.59 K/UL — LOW (ref 1–3.3)
LYMPHOCYTES # BLD AUTO: 20.2 % — SIGNIFICANT CHANGE UP (ref 13–44)
MAGNESIUM SERPL-MCNC: 2 MG/DL — SIGNIFICANT CHANGE UP (ref 1.6–2.6)
MCHC RBC-ENTMCNC: 32.4 PG — SIGNIFICANT CHANGE UP (ref 27–34)
MCHC RBC-ENTMCNC: 33.8 GM/DL — SIGNIFICANT CHANGE UP (ref 32–36)
MCV RBC AUTO: 95.7 FL — SIGNIFICANT CHANGE UP (ref 80–100)
MONOCYTES # BLD AUTO: 0.23 K/UL — SIGNIFICANT CHANGE UP (ref 0–0.9)
MONOCYTES NFR BLD AUTO: 7.9 % — SIGNIFICANT CHANGE UP (ref 2–14)
NEUTROPHILS # BLD AUTO: 2 K/UL — SIGNIFICANT CHANGE UP (ref 1.8–7.4)
NEUTROPHILS NFR BLD AUTO: 68.4 % — SIGNIFICANT CHANGE UP (ref 43–77)
PLATELET # BLD AUTO: 60 K/UL — LOW (ref 150–400)
POTASSIUM SERPL-MCNC: 4.2 MMOL/L — SIGNIFICANT CHANGE UP (ref 3.5–5.3)
POTASSIUM SERPL-SCNC: 4.2 MMOL/L — SIGNIFICANT CHANGE UP (ref 3.5–5.3)
RBC # BLD: 4.17 M/UL — LOW (ref 4.2–5.8)
RBC # FLD: 11.9 % — SIGNIFICANT CHANGE UP (ref 10.3–14.5)
SODIUM SERPL-SCNC: 137 MMOL/L — SIGNIFICANT CHANGE UP (ref 135–145)
VIT B12 SERPL-MCNC: >2000 PG/ML — HIGH (ref 232–1245)
WBC # BLD: 2.92 K/UL — LOW (ref 3.8–10.5)
WBC # FLD AUTO: 2.92 K/UL — LOW (ref 3.8–10.5)

## 2019-07-20 PROCEDURE — 87536 HIV-1 QUANT&REVRSE TRNSCRPJ: CPT

## 2019-07-20 PROCEDURE — 84484 ASSAY OF TROPONIN QUANT: CPT

## 2019-07-20 PROCEDURE — 86038 ANTINUCLEAR ANTIBODIES: CPT

## 2019-07-20 PROCEDURE — 80053 COMPREHEN METABOLIC PANEL: CPT

## 2019-07-20 PROCEDURE — 82607 VITAMIN B-12: CPT

## 2019-07-20 PROCEDURE — 80061 LIPID PANEL: CPT

## 2019-07-20 PROCEDURE — 82553 CREATINE MB FRACTION: CPT

## 2019-07-20 PROCEDURE — 85025 COMPLETE CBC W/AUTO DIFF WBC: CPT

## 2019-07-20 PROCEDURE — 93306 TTE W/DOPPLER COMPLETE: CPT

## 2019-07-20 PROCEDURE — 83036 HEMOGLOBIN GLYCOSYLATED A1C: CPT

## 2019-07-20 PROCEDURE — 85730 THROMBOPLASTIN TIME PARTIAL: CPT

## 2019-07-20 PROCEDURE — 80048 BASIC METABOLIC PNL TOTAL CA: CPT

## 2019-07-20 PROCEDURE — 85027 COMPLETE CBC AUTOMATED: CPT

## 2019-07-20 PROCEDURE — 75574 CT ANGIO HRT W/3D IMAGE: CPT

## 2019-07-20 PROCEDURE — 80307 DRUG TEST PRSMV CHEM ANLYZR: CPT

## 2019-07-20 PROCEDURE — 82746 ASSAY OF FOLIC ACID SERUM: CPT

## 2019-07-20 PROCEDURE — 93005 ELECTROCARDIOGRAM TRACING: CPT

## 2019-07-20 PROCEDURE — 86803 HEPATITIS C AB TEST: CPT

## 2019-07-20 PROCEDURE — 82550 ASSAY OF CK (CPK): CPT

## 2019-07-20 PROCEDURE — 99222 1ST HOSP IP/OBS MODERATE 55: CPT

## 2019-07-20 PROCEDURE — 93306 TTE W/DOPPLER COMPLETE: CPT | Mod: 26

## 2019-07-20 PROCEDURE — 99285 EMERGENCY DEPT VISIT HI MDM: CPT | Mod: 25

## 2019-07-20 PROCEDURE — 71045 X-RAY EXAM CHEST 1 VIEW: CPT

## 2019-07-20 PROCEDURE — 83735 ASSAY OF MAGNESIUM: CPT

## 2019-07-20 PROCEDURE — 87521 HEPATITIS C PROBE&RVRS TRNSC: CPT

## 2019-07-20 PROCEDURE — 36415 COLL VENOUS BLD VENIPUNCTURE: CPT

## 2019-07-20 PROCEDURE — 85610 PROTHROMBIN TIME: CPT

## 2019-07-20 RX ORDER — ASPIRIN/CALCIUM CARB/MAGNESIUM 324 MG
1 TABLET ORAL
Qty: 30 | Refills: 3
Start: 2019-07-20 | End: 2019-11-16

## 2019-07-20 RX ORDER — AMLODIPINE BESYLATE 2.5 MG/1
1 TABLET ORAL
Qty: 30 | Refills: 3
Start: 2019-07-20 | End: 2019-11-16

## 2019-07-20 RX ORDER — ISOSORBIDE MONONITRATE 60 MG/1
1 TABLET, EXTENDED RELEASE ORAL
Qty: 30 | Refills: 3
Start: 2019-07-20 | End: 2019-11-16

## 2019-07-20 RX ORDER — AMLODIPINE BESYLATE 2.5 MG/1
1 TABLET ORAL
Qty: 30 | Refills: 3
Start: 2019-07-20 | End: 2022-01-22

## 2019-07-20 RX ORDER — ISOSORBIDE MONONITRATE 60 MG/1
1 TABLET, EXTENDED RELEASE ORAL
Qty: 30 | Refills: 3
Start: 2019-07-20 | End: 2022-01-22

## 2019-07-20 RX ADMIN — Medication 2.5 MILLIGRAM(S): at 07:11

## 2019-07-20 RX ADMIN — EMTRICITABINE AND TENOFOVIR DISOPROXIL FUMARATE 1 TABLET(S): 200; 300 TABLET, FILM COATED ORAL at 12:25

## 2019-07-20 RX ADMIN — ESCITALOPRAM OXALATE 5 MILLIGRAM(S): 10 TABLET, FILM COATED ORAL at 12:25

## 2019-07-20 RX ADMIN — Medication 81 MILLIGRAM(S): at 12:26

## 2019-07-20 NOTE — DISCHARGE NOTE PROVIDER - CARE PROVIDER_API CALL
Joseph Ibarra)  Cardiovascular Disease  [facility], [address] [place] [address]  Phone: [phone]  Fax: [phone]  Follow Up Time:     Ronna Gay)  Medicine  [address] [place] [address], [address] [place] [address]  Phone: [phone]  Fax: [phone]  Follow Up Time:

## 2019-07-20 NOTE — CONSULT NOTE ADULT - SUBJECTIVE AND OBJECTIVE BOX
Hematology Oncology Consult Note (Dr. Villaseñor)    70 y/o M, Shellfish allergy (tolerated CCTA w/ contrast without allergic reaction), poor historian, former ETOH abuser (quit 25 years ago), former smoker (in his 20's), occasional Cocaine and Marijuana user (states last used "months ago") with PMHx of HTN, DM, HIV (unknown CD4, VL undetectable), Hep C (treated w/ Harvoni 3-4 years ago), Prostate CA s/p radiation therapy, and Depression who presented to [facility] ED on 7/19/19 c/o sudden onset, non-radiating, mid-sternal chest pressure, described as if "someone was sitting on his chest" with associated SOB, dizziness, and nausea which occurred while [place] subway train [place] this AM. Pt states he has had a similar sensation like this before, but never as intense. Pt states the chest pressure sensation has subsided, but worried him enough to seek medical attention. Pt denies any recent cardiac w/u. He denies HA, syncope, decreased exercise tolerance, SOB, palpitations, fevers, chills, cough, dysuria, LE edema, or any other symptoms at this time. On arrival to ED, VS noted as T 98.1, HR 68, /71, RR 18, SpO2 100% on RA. Labs significant for Trops negative x 1, PLT 53. EKG showed SR @ 64BPM with T flattening in III and aVF, Q waves in V1-V2. CXR negative for acute process. CCTA revealed 1. The calcium score is severe at 1323 Agatston units, which is at the 90th percentile, adjusted for age, gender and race. 2.  Moderate to severe stenosis of mid LAD followed by an additional moderate stenosis 3.  Moderate OM2 stenosis 4.  Less than 50% stenosis of LM. 5.  Non obstructive coronary disease elsewhere. 6.  The LVEF appears mildly to moderately reduced with hypokinesis of the mid to apical septum. 7.  Dilated ascending aorta measuring 4.4cm x 4.3cm at the level of the main pulmonary artery. In ED, pt given Metoprolol Tartrate 25mg PO x 1. Pt is now admitted to [facility] for r/o ACS.    PAST MEDICAL & SURGICAL HISTORY:  Hepatitis C  Chronic diarrhea  HTN (hypertension)  DM (diabetes mellitus)  HIV (human immunodeficiency virus infection)  No significant past surgical history      Allergies: Penicillin, Sulfa drugs       Medications:  aspirin enteric coated 81 milliGRAM(s) Oral daily  MEDICATIONS  (STANDING):  aspirin enteric coated 81 milliGRAM(s) Oral daily  emtricitabine 200 mG/tenofovir alafenamide 25 mG (DESCOVY) Tablet 1 Tablet(s) Oral daily  enalapril 2.5 milliGRAM(s) Oral daily  escitalopram 5 milliGRAM(s) Oral daily  pravastatin 80 milliGRAM(s) Oral at bedtime      Social History:   Social History (marital status, living situation, occupation, tobacco use, alcohol and drug use, and sexual history): Former tobacco smoker (in his 20s), former ETOH abuser (quit 25 years ago), recreational Cocaine and Marijuana user last used "months ago"	    FAMILY HISTORY:  No pertinent family history in first degree relatives      PHYSICAL EXAM:    T(F): 97.3 (07-20-19 @ 09:54), Max: 98.1 (07-19-19 @ 18:28)  HR: 60 (07-20-19 @ 12:26) (55 - 82)  BP: 130/70 (07-20-19 @ 12:26) (108/71 - 145/70)  RR: 18 (07-20-19 @ 12:26) (18 - 19)  SpO2: 98% (07-20-19 @ 08:30) (98% - 100%)  Wt(kg): --    Daily Height in cm: 182.88 (19 Jul 2019 18:28)    Daily     GEN: NAD   HEENT: AT/NC, EOMI, MM  NECK: Supple  CVS: +S1S2   LUNG: CTA B/L   GI: +BS, NT, ND  EXT: no c/c/e  NEURO: aaox3    Labs:                          13.5   2.92  )-----------( 60       ( 20 Jul 2019 07:07 )             39.9     CBC Full  -  ( 20 Jul 2019 07:07 )  WBC Count : 2.92 K/uL  RBC Count : 4.17 M/uL  Hemoglobin : 13.5 g/dL  Hematocrit : 39.9 %  Platelet Count - Automated : 60 K/uL  Mean Cell Volume : 95.7 fl  Mean Cell Hemoglobin : 32.4 pg  Mean Cell Hemoglobin Concentration : 33.8 gm/dL  Auto Neutrophil # : 2.00 K/uL  Auto Lymphocyte # : 0.59 K/uL  Auto Monocyte # : 0.23 K/uL  Auto Eosinophil # : 0.05 K/uL  Auto Basophil # : 0.03 K/uL  Auto Neutrophil % : 68.4 %  Auto Lymphocyte % : 20.2 %  Auto Monocyte % : 7.9 %  Auto Eosinophil % : 1.7 %  Auto Basophil % : 0.9 %    PT/INR - ( 19 Jul 2019 23:06 )   PT: 11.4 sec;   INR: 1.01          PTT - ( 19 Jul 2019 23:06 )  PTT:31.1 sec    07-20    137  |  102  |  26<H>  ----------------------------<  160<H>  4.2   |  24  |  1.08    Ca    9.1      20 Jul 2019 07:07  Mg     2.0     07-20    TPro  7.6  /  Alb  3.9  /  TBili  0.2  /  DBili  x   /  AST  22  /  ALT  15  /  AlkPhos  55  07-19 7/19 CXR  History: Chest pain shortness of breath    Impression:    Lungs clear. No infiltrate or pleural effusion. No pneumothorax.   Unremarkable cardiac silhouette. No acute bone abnormality.      7/20 TTE    Interpretation Summary  1. Normal left and right ventricular size and systolic function.2. No   significant valvular disease.  Procedure Details    7/19 CT Angio Cardiac    Impression:   1.  The calcium score is severe at 1323  Agatston units, which is at the   90th percentile, adjusted for age, gender and race.  2.  Moderate to severe stenosis of mid LAD followed by an additional   moderate stenosis   3.  Moderate OM2 stenosis   4.  Less than 50% stenosis of LM.  5.  Non obstructive coronary disease elsewhere.  6.  The LVEF appears mildly to moderately reduced with hypokinesis of the   mid to apical  septum.  7.  Dilated ascending aorta measuring 4.4cm x 4.3cm at the level of the   main pulmonary artery.  Please see separate radiology report for non-coronary findings.

## 2019-07-20 NOTE — CONSULT NOTE ADULT - ASSESSMENT
70 y/o M poor historian, former ETOH abuser and former smoker (in his 20's), occasional Cocaine and Marijuana user w/ Hx of HIV (unknown CD4, VL undetectable), Hep C (treated w/ Harvoni 3-4 years ago), Prostate CA s/p radiation therapy, and Depression, presented to ED w/ chest pain found to have worsening thrombocytopenia.     #Thrombocytopenia (+leukopenia)     -pt w/ acute on chronic low pt, previously noted thrombocytopenia in 2016  -will recommend obtaining CD4/VL levels and Hep C viral load  -patient denies any new changes in meds, HAART medications w/o significant risk of thrombocytopenia  -pt not on hep product or antibiotics  -complete nutritional w/u including B12/Folate along w/ autoimmune/inflammatory including ESTEBAN, ESR etc   -blue top - neg for pseudothrombocytopenia  -would obtain abdominal US to assess for liver disease and splenic consumption   -peripheral smear reviewed, few large platelets seen which may be consistent with a ITP - likely secondary given patients comorbidities   -close CBC f/u as outpatient and outpatient heme f/u, especially if on AC or Antiplt, would hold if plts <50K    to d/w Dr. Huerta

## 2019-07-20 NOTE — DISCHARGE NOTE PROVIDER - NSDCCPCAREPLAN_GEN_ALL_CORE_FT
PRINCIPAL DISCHARGE DIAGNOSIS  Diagnosis: CAD (coronary artery disease)  Assessment and Plan of Treatment: You came in to the hospital with chest pain. Your EKG and blood work showed no signs of  heart attack. The CT scan of your coronary arteries showed some signs of bloackages in the blood vessels of your heart. YOU MUST SCHEDULE AN APPOINTMENT TO SEE DR BEAR WITHIN 1 WEEK OF DISCHARGE TO SCHEDULE AN APPOINTMENT TO HAVE A CARDIAC ANGIOGRAM DONE TO DEFINITIVELY DIAGNOSE AND TREAT ANY POTENTIAL BLOCKAGES. YOU MUST STOP USING COCAINE! COCAINE WILL CAUSE YOU TO HAVE A HEART ATTACK AND DIE! We have started you on Aspirin 81mg daily, Pravastatin 80mg daily each night, Amlodipine 2.5mg daily, and Imdur 30mg daily to help treat this condition. PLEASE RETURN IMMEDIATELY TO THE EMERGENCY ROOM FOR ANY RECURRENT OR WORSENING CHEST PAIN!      SECONDARY DISCHARGE DIAGNOSES  Diagnosis: Thrombocytopenia  Assessment and Plan of Treatment: You have a low platelet count and must schedule an appointment to see Dr. Gay within 1-2 weeks for further workup of this condition.    Diagnosis: HTN (hypertension)  Assessment and Plan of Treatment: You have a history of elevated blood pressure and you should continue your blood pressure medications as prescribed.    Diagnosis: HIV (human immunodeficiency virus infection)  Assessment and Plan of Treatment: Please continue your medications as prescribed.

## 2019-07-20 NOTE — DISCHARGE NOTE NURSING/CASE MANAGEMENT/SOCIAL WORK - NSDCDPATPORTLINK_GEN_ALL_CORE
You can access the [facility] Patient Portal, offered by [facility], by registering with the following website: [email or web address]

## 2019-07-22 LAB
4/8 RATIO: 0.68 RATIO — LOW (ref 0.86–4.14)
ABS CD8: 311 /UL — SIGNIFICANT CHANGE UP (ref 90–775)
CD16+CD56+ CELLS NFR BLD: 8 % — SIGNIFICANT CHANGE UP (ref 7–27)
CD16+CD56+ CELLS NFR SPEC: 47 /UL — LOW (ref 80–426)
CD19 BLASTS SPEC-ACNC: 52 /UL — SIGNIFICANT CHANGE UP (ref 32–326)
CD19 BLASTS SPEC-ACNC: 8 % — SIGNIFICANT CHANGE UP (ref 4–18)
CD3 BLASTS SPEC-ACNC: 524 /UL — SIGNIFICANT CHANGE UP (ref 396–2024)
CD3 BLASTS SPEC-ACNC: 81 % — SIGNIFICANT CHANGE UP (ref 58–84)
CD4 %: 32 % — SIGNIFICANT CHANGE UP (ref 30–56)
CD8 %: 47 % — HIGH (ref 11–43)
HIV-1 VIRAL LOAD RESULT: ABNORMAL
HIV1 RNA # SERPL NAA+PROBE: SIGNIFICANT CHANGE UP
HIV1 RNA SER-IMP: SIGNIFICANT CHANGE UP
HIV1 RNA SERPL NAA+PROBE-ACNC: ABNORMAL
HIV1 RNA SERPL NAA+PROBE-LOG#: 4.79 — SIGNIFICANT CHANGE UP
T-CELL CD4 SUBSET PNL BLD: 211 /UL — LOW (ref 325–1251)

## 2019-07-23 LAB — ANA TITR SER: NEGATIVE — SIGNIFICANT CHANGE UP

## 2019-07-25 DIAGNOSIS — E11.9 TYPE 2 DIABETES MELLITUS WITHOUT COMPLICATIONS: ICD-10-CM

## 2019-07-25 DIAGNOSIS — Z85.46 PERSONAL HISTORY OF MALIGNANT NEOPLASM OF PROSTATE: ICD-10-CM

## 2019-07-25 DIAGNOSIS — I25.10 ATHEROSCLEROTIC HEART DISEASE OF NATIVE CORONARY ARTERY WITHOUT ANGINA PECTORIS: ICD-10-CM

## 2019-07-25 DIAGNOSIS — F32.9 MAJOR DEPRESSIVE DISORDER, SINGLE EPISODE, UNSPECIFIED: ICD-10-CM

## 2019-07-25 DIAGNOSIS — Z21 ASYMPTOMATIC HUMAN IMMUNODEFICIENCY VIRUS [HIV] INFECTION STATUS: ICD-10-CM

## 2019-07-25 DIAGNOSIS — F14.90 COCAINE USE, UNSPECIFIED, UNCOMPLICATED: ICD-10-CM

## 2019-07-25 DIAGNOSIS — D69.6 THROMBOCYTOPENIA, UNSPECIFIED: ICD-10-CM

## 2019-07-25 DIAGNOSIS — Z86.19 PERSONAL HISTORY OF OTHER INFECTIOUS AND PARASITIC DISEASES: ICD-10-CM

## 2019-07-25 DIAGNOSIS — I10 ESSENTIAL (PRIMARY) HYPERTENSION: ICD-10-CM

## 2019-07-25 DIAGNOSIS — R07.9 CHEST PAIN, UNSPECIFIED: ICD-10-CM

## 2019-11-29 ENCOUNTER — EMERGENCY (EMERGENCY)
Facility: HOSPITAL | Age: 69
LOS: 1 days | Discharge: HOME | End: 2019-11-29
Attending: EMERGENCY MEDICINE | Admitting: EMERGENCY MEDICINE
Payer: MEDICARE

## 2019-11-29 VITALS
OXYGEN SATURATION: 100 % | RESPIRATION RATE: 17 BRPM | DIASTOLIC BLOOD PRESSURE: 92 MMHG | WEIGHT: 160.06 LBS | HEART RATE: 85 BPM | SYSTOLIC BLOOD PRESSURE: 156 MMHG | TEMPERATURE: 98 F | HEIGHT: 72 IN

## 2019-11-29 VITALS
RESPIRATION RATE: 16 BRPM | HEART RATE: 65 BPM | SYSTOLIC BLOOD PRESSURE: 136 MMHG | DIASTOLIC BLOOD PRESSURE: 78 MMHG | OXYGEN SATURATION: 98 %

## 2019-11-29 PROBLEM — B19.20 UNSPECIFIED VIRAL HEPATITIS C WITHOUT HEPATIC COMA: Chronic | Status: ACTIVE | Noted: 2019-07-19

## 2019-11-29 PROCEDURE — 93010 ELECTROCARDIOGRAM REPORT: CPT

## 2019-11-29 PROCEDURE — 99284 EMERGENCY DEPT VISIT MOD MDM: CPT

## 2019-11-29 RX ORDER — TRAMADOL HYDROCHLORIDE 50 MG/1
1 TABLET ORAL
Qty: 12 | Refills: 0
Start: 2019-11-29

## 2019-11-29 RX ORDER — TRAMADOL HYDROCHLORIDE 50 MG/1
50 TABLET ORAL ONCE
Refills: 0 | Status: DISCONTINUED | OUTPATIENT
Start: 2019-11-29 | End: 2019-11-29

## 2019-11-29 RX ADMIN — TRAMADOL HYDROCHLORIDE 50 MILLIGRAM(S): 50 TABLET ORAL at 09:20

## 2019-11-29 NOTE — ED PROVIDER NOTE - CLINICAL SUMMARY MEDICAL DECISION MAKING FREE TEXT BOX
Patients exam is reassuring.  No cold extremity and good/storng pulses - equal b/l.  No clinical e/o stent failure.  Given w/u was done just yesterday and no acute changes in sxs we will not repeat w/u.  Will treat pain.

## 2019-11-29 NOTE — ED ADULT NURSE NOTE - OBJECTIVE STATEMENT
reports full workup at [facility] yesterday for arterial occlusive disease in lower extremities c/o continued left foot pain

## 2019-11-29 NOTE — ED ADULT TRIAGE NOTE - CHIEF COMPLAINT QUOTE
patient states he had multiple stent placed in april and has been having discomfort in location of stent in his leg.

## 2019-11-29 NOTE — ED PROVIDER NOTE - PATIENT PORTAL LINK FT
You can access the FollowMyHealth Patient Portal offered by [facility] by registering at the following website: [email or web address]. By joining [email or web address]’s FollowMyHealth portal, you will also be able to view your health information using other applications (apps) compatible with our system.

## 2019-11-29 NOTE — ED PROVIDER NOTE - NSFOLLOWUPINSTRUCTIONS_ED_ALL_ED_FT
-PLEASE FOLLOW-UP WITH YOUR VASCULAR DOCTOR ON MONDAY. .    -PLEASE GO TO YOUR PHARMACY TO FILL YOUR PRESCRIPTIONS.  PLEASE START TODAY AND USE AS DIRECTED.    -PLEASE RETURN TO THE ER IMMEDIATELY OR CALL 911 FOR ANY HIGH FEVER, TROUBLE BREATHING, VOMITING, SEVERE PAIN, OR ANY OTHER CONCERNS.

## 2019-11-29 NOTE — ED ADULT NURSE NOTE - TEMPLATE LIST FOR HEAD TO TOE ASSESSMENT
Gastrointestinal Esophagogastroduodenoscopy (EGD)/ Endoscopic Ultrasound(EUS)- Upper Exam Discharge Instructions    1. Call Dr. Ben Arias  for any problems or questions. 2. Contact the doctor's office for follow up appointment as directed. 3. Medication may cause drowsiness for several hours, therefore, do not drive or operate machinery for remainder of the day. 4. No alcohol today. 5. Ordinarily, you may resume regular diet and activity after exam unless otherwise specified by your physician. 6. For mild soreness in your throat you may use Cepacol throat lozenges or warm  salt-water gargles as needed. Any additional instructions: Take sucralfate for 2 weeks. Repeat EGD with EMR in 4 weeks. Dr. Ben Arias office will call you to schedule this. Instructions given to Yonny Oreillyks and other family members.
General

## 2019-11-29 NOTE — ED PROVIDER NOTE - OBJECTIVE STATEMENT
69 y o male with PMHx of HIV (undetectable, on HAART), prostate CA (in remission), borderline diabetes, HTN, HLD, and hx of peripheral arterial stents in bilateral LE and RUE, presents to ED with c/o ongoing LLE pain since his surgery in April 2019. States he has followed up with the his vascular surgeon but has not found a solution for the pain. Pt was at [facility] ED yesterday for similar complaints. States he was given medicine for the pain, had US and CT imaging done before being discharged. No acute changes in pain. Denies fevers, chills, edema, numbness, tingling, chest pain, SOB, or other sx. 69 y o male with PMHx of HIV (undetectable, on HAART), prostate CA (in remission), borderline diabetes, HTN, HLD, and hx of peripheral arterial stents in bilateral LE and RUE, presents to ED with c/o ongoing LLE pain since his surgery in April 2019. States he has followed up with the his vascular surgeon but has not found a solution for the pain. Pt was at [facility] ED yesterday for similar complaints. States he was given medicine for the pain, had US and CT imaging done before being discharged. No acute changes in pain. Denies fevers, chills, edema, numbness, tingling, chest pain, SOB, or other sx.    As per [facility] radiology results negative.

## 2019-12-03 DIAGNOSIS — F17.210 NICOTINE DEPENDENCE, CIGARETTES, UNCOMPLICATED: ICD-10-CM

## 2019-12-03 DIAGNOSIS — Z91.018 ALLERGY TO OTHER FOODS: ICD-10-CM

## 2019-12-03 DIAGNOSIS — Z79.899 OTHER LONG TERM (CURRENT) DRUG THERAPY: ICD-10-CM

## 2019-12-03 DIAGNOSIS — I10 ESSENTIAL (PRIMARY) HYPERTENSION: ICD-10-CM

## 2019-12-03 DIAGNOSIS — M79.662 PAIN IN LEFT LOWER LEG: ICD-10-CM

## 2019-12-03 DIAGNOSIS — Z88.0 ALLERGY STATUS TO PENICILLIN: ICD-10-CM

## 2019-12-03 DIAGNOSIS — Z88.2 ALLERGY STATUS TO SULFONAMIDES: ICD-10-CM

## 2020-05-01 ENCOUNTER — OUTPATIENT (OUTPATIENT)
Dept: OUTPATIENT SERVICES | Facility: HOSPITAL | Age: 70
LOS: 1 days | End: 2020-05-01
Payer: MEDICARE

## 2020-05-01 PROCEDURE — G9001: CPT

## 2020-05-28 DIAGNOSIS — Z71.89 OTHER SPECIFIED COUNSELING: ICD-10-CM

## 2021-07-03 ENCOUNTER — EMERGENCY (EMERGENCY)
Facility: HOSPITAL | Age: 71
LOS: 1 days | Discharge: HOME | End: 2021-07-03
Attending: EMERGENCY MEDICINE | Admitting: EMERGENCY MEDICINE
Payer: MEDICARE

## 2021-07-03 VITALS
HEART RATE: 76 BPM | TEMPERATURE: 98 F | DIASTOLIC BLOOD PRESSURE: 115 MMHG | WEIGHT: 179.9 LBS | SYSTOLIC BLOOD PRESSURE: 165 MMHG | OXYGEN SATURATION: 100 % | RESPIRATION RATE: 22 BRPM | HEIGHT: 72 IN

## 2021-07-03 VITALS
HEART RATE: 81 BPM | OXYGEN SATURATION: 96 % | SYSTOLIC BLOOD PRESSURE: 176 MMHG | RESPIRATION RATE: 18 BRPM | DIASTOLIC BLOOD PRESSURE: 97 MMHG | TEMPERATURE: 98 F

## 2021-07-03 DIAGNOSIS — E78.5 HYPERLIPIDEMIA, UNSPECIFIED: ICD-10-CM

## 2021-07-03 DIAGNOSIS — Z21 ASYMPTOMATIC HUMAN IMMUNODEFICIENCY VIRUS [HIV] INFECTION STATUS: ICD-10-CM

## 2021-07-03 DIAGNOSIS — Z91.013 ALLERGY TO SEAFOOD: ICD-10-CM

## 2021-07-03 DIAGNOSIS — C61 MALIGNANT NEOPLASM OF PROSTATE: ICD-10-CM

## 2021-07-03 DIAGNOSIS — Z86.16 PERSONAL HISTORY OF COVID-19: ICD-10-CM

## 2021-07-03 DIAGNOSIS — R05 COUGH: ICD-10-CM

## 2021-07-03 DIAGNOSIS — E11.51 TYPE 2 DIABETES MELLITUS WITH DIABETIC PERIPHERAL ANGIOPATHY WITHOUT GANGRENE: ICD-10-CM

## 2021-07-03 DIAGNOSIS — I10 ESSENTIAL (PRIMARY) HYPERTENSION: ICD-10-CM

## 2021-07-03 DIAGNOSIS — R06.02 SHORTNESS OF BREATH: ICD-10-CM

## 2021-07-03 DIAGNOSIS — F11.10 OPIOID ABUSE, UNCOMPLICATED: ICD-10-CM

## 2021-07-03 DIAGNOSIS — Z88.0 ALLERGY STATUS TO PENICILLIN: ICD-10-CM

## 2021-07-03 DIAGNOSIS — Z91.018 ALLERGY TO OTHER FOODS: ICD-10-CM

## 2021-07-03 DIAGNOSIS — Z86.19 PERSONAL HISTORY OF OTHER INFECTIOUS AND PARASITIC DISEASES: ICD-10-CM

## 2021-07-03 DIAGNOSIS — Z88.2 ALLERGY STATUS TO SULFONAMIDES: ICD-10-CM

## 2021-07-03 LAB
ALBUMIN SERPL ELPH-MCNC: 4.2 G/DL — SIGNIFICANT CHANGE UP (ref 3.3–5)
ALP SERPL-CCNC: 60 U/L — SIGNIFICANT CHANGE UP (ref 40–120)
ALT FLD-CCNC: 15 U/L — SIGNIFICANT CHANGE UP (ref 10–45)
ANION GAP SERPL CALC-SCNC: 12 MMOL/L — SIGNIFICANT CHANGE UP (ref 5–17)
APTT BLD: 33 SEC — SIGNIFICANT CHANGE UP (ref 27.5–35.5)
AST SERPL-CCNC: 25 U/L — SIGNIFICANT CHANGE UP (ref 10–40)
BASE EXCESS BLDV CALC-SCNC: 3 MMOL/L — SIGNIFICANT CHANGE UP (ref -2–3)
BASOPHILS # BLD AUTO: 0.01 K/UL — SIGNIFICANT CHANGE UP (ref 0–0.2)
BASOPHILS NFR BLD AUTO: 0.3 % — SIGNIFICANT CHANGE UP (ref 0–2)
BILIRUB SERPL-MCNC: 0.4 MG/DL — SIGNIFICANT CHANGE UP (ref 0.2–1.2)
BUN SERPL-MCNC: 17 MG/DL — SIGNIFICANT CHANGE UP (ref 7–23)
CA-I SERPL-SCNC: 1.17 MMOL/L — SIGNIFICANT CHANGE UP (ref 1.15–1.33)
CALCIUM SERPL-MCNC: 9.4 MG/DL — SIGNIFICANT CHANGE UP (ref 8.4–10.5)
CHLORIDE SERPL-SCNC: 102 MMOL/L — SIGNIFICANT CHANGE UP (ref 96–108)
CK SERPL-CCNC: 173 U/L — SIGNIFICANT CHANGE UP (ref 30–200)
CO2 BLDV-SCNC: 30.5 MMOL/L — HIGH (ref 22–26)
CO2 SERPL-SCNC: 24 MMOL/L — SIGNIFICANT CHANGE UP (ref 22–31)
CREAT SERPL-MCNC: 1.09 MG/DL — SIGNIFICANT CHANGE UP (ref 0.5–1.3)
EOSINOPHIL # BLD AUTO: 0.1 K/UL — SIGNIFICANT CHANGE UP (ref 0–0.5)
EOSINOPHIL NFR BLD AUTO: 2.8 % — SIGNIFICANT CHANGE UP (ref 0–6)
GAS PNL BLDV: 136 MMOL/L — SIGNIFICANT CHANGE UP (ref 136–145)
GAS PNL BLDV: SIGNIFICANT CHANGE UP
GAS PNL BLDV: SIGNIFICANT CHANGE UP
GLUCOSE SERPL-MCNC: 113 MG/DL — HIGH (ref 70–99)
HCO3 BLDV-SCNC: 29 MMOL/L — SIGNIFICANT CHANGE UP (ref 22–29)
HCT VFR BLD CALC: 39 % — SIGNIFICANT CHANGE UP (ref 39–50)
HGB BLD-MCNC: 12.7 G/DL — LOW (ref 13–17)
IMM GRANULOCYTES NFR BLD AUTO: 0.3 % — SIGNIFICANT CHANGE UP (ref 0–1.5)
INR BLD: 1.02 — SIGNIFICANT CHANGE UP (ref 0.88–1.16)
LYMPHOCYTES # BLD AUTO: 0.84 K/UL — LOW (ref 1–3.3)
LYMPHOCYTES # BLD AUTO: 23.3 % — SIGNIFICANT CHANGE UP (ref 13–44)
MCHC RBC-ENTMCNC: 29.1 PG — SIGNIFICANT CHANGE UP (ref 27–34)
MCHC RBC-ENTMCNC: 32.6 GM/DL — SIGNIFICANT CHANGE UP (ref 32–36)
MCV RBC AUTO: 89.2 FL — SIGNIFICANT CHANGE UP (ref 80–100)
MONOCYTES # BLD AUTO: 0.28 K/UL — SIGNIFICANT CHANGE UP (ref 0–0.9)
MONOCYTES NFR BLD AUTO: 7.8 % — SIGNIFICANT CHANGE UP (ref 2–14)
NEUTROPHILS # BLD AUTO: 2.37 K/UL — SIGNIFICANT CHANGE UP (ref 1.8–7.4)
NEUTROPHILS NFR BLD AUTO: 65.5 % — SIGNIFICANT CHANGE UP (ref 43–77)
NRBC # BLD: 0 /100 WBCS — SIGNIFICANT CHANGE UP (ref 0–0)
NT-PROBNP SERPL-SCNC: 1744 PG/ML — HIGH (ref 0–300)
PCO2 BLDV: 49 MMHG — SIGNIFICANT CHANGE UP (ref 42–55)
PH BLDV: 7.38 — SIGNIFICANT CHANGE UP (ref 7.32–7.43)
PLATELET # BLD AUTO: 124 K/UL — LOW (ref 150–400)
PO2 BLDV: 47 MMHG — SIGNIFICANT CHANGE UP
POTASSIUM BLDV-SCNC: 4.2 MMOL/L — SIGNIFICANT CHANGE UP (ref 3.5–5.1)
POTASSIUM SERPL-MCNC: 4.3 MMOL/L — SIGNIFICANT CHANGE UP (ref 3.5–5.3)
POTASSIUM SERPL-SCNC: 4.3 MMOL/L — SIGNIFICANT CHANGE UP (ref 3.5–5.3)
PROT SERPL-MCNC: 7.5 G/DL — SIGNIFICANT CHANGE UP (ref 6–8.3)
PROTHROM AB SERPL-ACNC: 12.2 SEC — SIGNIFICANT CHANGE UP (ref 10.6–13.6)
RBC # BLD: 4.37 M/UL — SIGNIFICANT CHANGE UP (ref 4.2–5.8)
RBC # FLD: 13.2 % — SIGNIFICANT CHANGE UP (ref 10.3–14.5)
SAO2 % BLDV: 79.3 % — SIGNIFICANT CHANGE UP
SODIUM SERPL-SCNC: 138 MMOL/L — SIGNIFICANT CHANGE UP (ref 135–145)
TROPONIN T SERPL-MCNC: 0.01 NG/ML — SIGNIFICANT CHANGE UP (ref 0–0.01)
WBC # BLD: 3.61 K/UL — LOW (ref 3.8–10.5)
WBC # FLD AUTO: 3.61 K/UL — LOW (ref 3.8–10.5)

## 2021-07-03 PROCEDURE — 85610 PROTHROMBIN TIME: CPT

## 2021-07-03 PROCEDURE — 82803 BLOOD GASES ANY COMBINATION: CPT

## 2021-07-03 PROCEDURE — 84484 ASSAY OF TROPONIN QUANT: CPT

## 2021-07-03 PROCEDURE — 36415 COLL VENOUS BLD VENIPUNCTURE: CPT

## 2021-07-03 PROCEDURE — 71275 CT ANGIOGRAPHY CHEST: CPT | Mod: 26,MG

## 2021-07-03 PROCEDURE — 80053 COMPREHEN METABOLIC PANEL: CPT

## 2021-07-03 PROCEDURE — 84132 ASSAY OF SERUM POTASSIUM: CPT

## 2021-07-03 PROCEDURE — G1004: CPT

## 2021-07-03 PROCEDURE — 99284 EMERGENCY DEPT VISIT MOD MDM: CPT | Mod: 25

## 2021-07-03 PROCEDURE — 93010 ELECTROCARDIOGRAM REPORT: CPT

## 2021-07-03 PROCEDURE — 93005 ELECTROCARDIOGRAM TRACING: CPT

## 2021-07-03 PROCEDURE — 94640 AIRWAY INHALATION TREATMENT: CPT

## 2021-07-03 PROCEDURE — 99284 EMERGENCY DEPT VISIT MOD MDM: CPT

## 2021-07-03 PROCEDURE — 84295 ASSAY OF SERUM SODIUM: CPT

## 2021-07-03 PROCEDURE — 82550 ASSAY OF CK (CPK): CPT

## 2021-07-03 PROCEDURE — 85730 THROMBOPLASTIN TIME PARTIAL: CPT

## 2021-07-03 PROCEDURE — 82330 ASSAY OF CALCIUM: CPT

## 2021-07-03 PROCEDURE — 71275 CT ANGIOGRAPHY CHEST: CPT

## 2021-07-03 PROCEDURE — 83880 ASSAY OF NATRIURETIC PEPTIDE: CPT

## 2021-07-03 PROCEDURE — 85025 COMPLETE CBC W/AUTO DIFF WBC: CPT

## 2021-07-03 RX ORDER — IPRATROPIUM/ALBUTEROL SULFATE 18-103MCG
3 AEROSOL WITH ADAPTER (GRAM) INHALATION ONCE
Refills: 0 | Status: COMPLETED | OUTPATIENT
Start: 2021-07-03 | End: 2021-07-03

## 2021-07-03 RX ADMIN — Medication 3 MILLILITER(S): at 15:57

## 2021-07-03 NOTE — ED PROVIDER NOTE - CLINICAL SUMMARY MEDICAL DECISION MAKING FREE TEXT BOX
71M PMH HIV (on HAART, unknown CD4, states last VL UD), HCV, prostate CA (remission), HTN, HLD, cocaine abuse, borderline DM, PAD w/ b/l LE and RUE stent, p/w SOB. Pt states he was feeling like usual self. ~1hr PTA pt was walking in store and began feeling SOB and decreased energy. Still feels SOB but improved. Has hx of similar intermittent episodes. Minimal cough x1mo. COVID+ ~6mos ago, did not require hospitalization, now s/p moderna x2. Also hx of cocaine abuse, currently in outpt rehab, last use 110d ago. Does use daily MJ - last smoked this morning, had no symptoms afterwards but states that it felt different than usual and is concerned it may have been laced w/ something. No other systemic symptoms. Very mild triage tachypnea resolved, other vitals wnl, exam as above.   ddx: unclear etiology. Possible PE vs. infectious vs. aspect of RAD/pneumonitis vs. less likely atypical ACS.   EKG, Labs, CTA, trial neb, reassess.

## 2021-07-03 NOTE — ED PROVIDER NOTE - OBJECTIVE STATEMENT
71M PMH HIV (on HAART, unknown CD4, states last VL UD), HCV, prostate CA (remission), HTN, HLD, cocaine abuse, borderline DM, PAD w/ b/l LE and RUE stent, p/w SOB. Pt states he was feeling like usual self. ~1hr PTA pt was walking in store and began feeling SOB and decreased energy. Still feels SOB but improved. Has hx of similar intermittent episodes. Minimal cough x1mo. COVID+ ~6mos ago, did not require hospitalization, now s/p moderna x2. Also hx of cocaine abuse, currently in outpt rehab, last use 110d ago. Does use daily MJ - last smoked this morning, had no symptoms afterwards but states that it felt different than usual and is concerned it may have been laced w/ something. No other systemic symptoms.   Denies associated CP, nausea, vomiting, diarrhea, lightheaded, diaphoresis, palpitations, rhinorrhea, black stool, bloody stool, LE pain, LE swelling, focal weakness/numbness, recent travel/immobilization, abd pain, urinary complaints, f/c. Unknown prior cardiac w/u.   Pt unsure of all his meds. On asa, unknown if other AC.

## 2021-07-03 NOTE — ED PROVIDER NOTE - PROGRESS NOTE DETAILS
Klepfish: wbc 3.6, hgb 12.7, bnp 1744. Other labs grossly wnl. pt now asymptomatic. Clinically no significant fluid overload (laying flat w/o any issues, no LE edema). CT pending.   Likely outpt f/u if no significant findings. Klepfish: CTA w/ no acute pathology, does show increasing size of aneurysm. asymptomatic. Clinically no indication for further emergent ED workup or hospitalization at this time. Comfortable for dc, outpt f/u.

## 2021-07-03 NOTE — ED ADULT NURSE NOTE - CHIEF COMPLAINT QUOTE
70 y/o male BIBEMS for evaluation of SOB today s/p smoking some "marijuana that might have been laced" Pt reports cardiac "stents" HIV, HCV, prostate CA.

## 2021-07-03 NOTE — ED PROVIDER NOTE - PATIENT PORTAL LINK FT
You can access the FollowMyHealth Patient Portal offered by [facility] by registering at the following website: [email or web address]. By joining [employer]’s FollowMyHealth portal, you will also be able to view your health information using other applications (apps) compatible with our system.

## 2021-07-03 NOTE — ED PROVIDER NOTE - PHYSICAL EXAMINATION
normal RLE DP pulse, decreased LLE DP pulse. pt unsure of his baseline. both LE feel same temp and appear well perfused.  good air entry b/l but has scattered coarse breath sounds

## 2021-07-03 NOTE — ED PROVIDER NOTE - NSFOLLOWUPINSTRUCTIONS_ED_ALL_ED_FT
Can take tylenol 650mg every 6hrs as needed for mild pain.    Return for fevers, persistent vomit, uncontrolled pain, worsening breathing, worsening lightheaded.    Follow up with primary doctor within 1-2 days.     Shortness of breath    Shortness of breath (dyspnea) means you have trouble breathing and could indicate a medical problem. Causes include lung disease, heart disease, low amount of red blood cells (anemia), poor physical fitness, being overweight, smoking, etc. Your health care provider today may not be able to find a cause for your shortness of breath after your exam. In this case, it is important to have a follow-up exam with your primary care physician as instructed. If medicines were prescribed, take them as directed for the full length of time directed. Refrain from tobacco products.    SEEK IMMEDIATE MEDICAL CARE IF YOU HAVE ANY OF THE FOLLOWING SYMPTOMS: worsening shortness of breath, chest pain, back pain, abdominal pain, fever, coughing up blood, lightheadedness/dizziness. Can take tylenol 650mg every 6hrs as needed for mild pain.    Return for fevers, persistent vomit, uncontrolled pain, worsening breathing, worsening lightheaded.    Follow up with primary doctor within 1-2 days.     Follow up with vascular surgeon for your aneurysm. Can call [phone] to schedule appointment.     Shortness of breath    Shortness of breath (dyspnea) means you have trouble breathing and could indicate a medical problem. Causes include lung disease, heart disease, low amount of red blood cells (anemia), poor physical fitness, being overweight, smoking, etc. Your health care provider today may not be able to find a cause for your shortness of breath after your exam. In this case, it is important to have a follow-up exam with your primary care physician as instructed. If medicines were prescribed, take them as directed for the full length of time directed. Refrain from tobacco products.    SEEK IMMEDIATE MEDICAL CARE IF YOU HAVE ANY OF THE FOLLOWING SYMPTOMS: worsening shortness of breath, chest pain, back pain, abdominal pain, fever, coughing up blood, lightheadedness/dizziness.

## 2021-07-03 NOTE — ED ADULT TRIAGE NOTE - CHIEF COMPLAINT QUOTE
72 y/o male BIBEMS for evaluation of SOB today s/p smoking some "marijuana that might have been laced" Pt reports cardiac "stents" HIV, HCV, prostate CA.

## 2021-07-03 NOTE — ED ADULT NURSE NOTE - OBJECTIVE STATEMENT
pt c/o of SOB x 2 hours ago. pt denies any chest pain or dizziness. pt stated that he smoked marijuana before this happened. pt in NAD. pt c/o of SOB x 2 hours ago. pt denies any chest pain or dizziness. pt stated that he smoked marijuana before this happened. pt in NAD. pt also admits taking cocaine PTA.

## 2021-07-18 ENCOUNTER — EMERGENCY (EMERGENCY)
Facility: HOSPITAL | Age: 71
LOS: 1 days | Discharge: HOME | End: 2021-07-18
Attending: EMERGENCY MEDICINE | Admitting: EMERGENCY MEDICINE
Payer: MEDICARE

## 2021-07-18 VITALS
RESPIRATION RATE: 17 BRPM | TEMPERATURE: 99 F | SYSTOLIC BLOOD PRESSURE: 169 MMHG | HEIGHT: 72 IN | DIASTOLIC BLOOD PRESSURE: 75 MMHG | HEART RATE: 88 BPM | OXYGEN SATURATION: 98 %

## 2021-07-18 DIAGNOSIS — R06.00 DYSPNEA, UNSPECIFIED: ICD-10-CM

## 2021-07-18 DIAGNOSIS — Z79.82 LONG TERM (CURRENT) USE OF ASPIRIN: ICD-10-CM

## 2021-07-18 DIAGNOSIS — R06.02 SHORTNESS OF BREATH: ICD-10-CM

## 2021-07-18 DIAGNOSIS — Z85.46 PERSONAL HISTORY OF MALIGNANT NEOPLASM OF PROSTATE: ICD-10-CM

## 2021-07-18 DIAGNOSIS — Z21 ASYMPTOMATIC HUMAN IMMUNODEFICIENCY VIRUS [HIV] INFECTION STATUS: ICD-10-CM

## 2021-07-18 DIAGNOSIS — R05 COUGH: ICD-10-CM

## 2021-07-18 DIAGNOSIS — Z88.0 ALLERGY STATUS TO PENICILLIN: ICD-10-CM

## 2021-07-18 DIAGNOSIS — E11.9 TYPE 2 DIABETES MELLITUS WITHOUT COMPLICATIONS: ICD-10-CM

## 2021-07-18 DIAGNOSIS — I10 ESSENTIAL (PRIMARY) HYPERTENSION: ICD-10-CM

## 2021-07-18 DIAGNOSIS — Z91.018 ALLERGY TO OTHER FOODS: ICD-10-CM

## 2021-07-18 DIAGNOSIS — Z91.013 ALLERGY TO SEAFOOD: ICD-10-CM

## 2021-07-18 DIAGNOSIS — Z86.19 PERSONAL HISTORY OF OTHER INFECTIOUS AND PARASITIC DISEASES: ICD-10-CM

## 2021-07-18 DIAGNOSIS — Z88.2 ALLERGY STATUS TO SULFONAMIDES: ICD-10-CM

## 2021-07-18 LAB
ALBUMIN SERPL ELPH-MCNC: 3.8 G/DL — SIGNIFICANT CHANGE UP (ref 3.3–5)
ALP SERPL-CCNC: 53 U/L — SIGNIFICANT CHANGE UP (ref 40–120)
ALT FLD-CCNC: 14 U/L — SIGNIFICANT CHANGE UP (ref 10–45)
AMPHET UR-MCNC: NEGATIVE — SIGNIFICANT CHANGE UP
ANION GAP SERPL CALC-SCNC: 7 MMOL/L — SIGNIFICANT CHANGE UP (ref 5–17)
APPEARANCE UR: CLEAR — SIGNIFICANT CHANGE UP
APTT BLD: 31.6 SEC — SIGNIFICANT CHANGE UP (ref 27.5–35.5)
AST SERPL-CCNC: 19 U/L — SIGNIFICANT CHANGE UP (ref 10–40)
BACTERIA # UR AUTO: PRESENT /HPF
BARBITURATES UR SCN-MCNC: NEGATIVE — SIGNIFICANT CHANGE UP
BASOPHILS # BLD AUTO: 0 K/UL — SIGNIFICANT CHANGE UP (ref 0–0.2)
BASOPHILS NFR BLD AUTO: 0 % — SIGNIFICANT CHANGE UP (ref 0–2)
BENZODIAZ UR-MCNC: NEGATIVE — SIGNIFICANT CHANGE UP
BILIRUB SERPL-MCNC: 0.2 MG/DL — SIGNIFICANT CHANGE UP (ref 0.2–1.2)
BILIRUB UR-MCNC: NEGATIVE — SIGNIFICANT CHANGE UP
BUN SERPL-MCNC: 24 MG/DL — HIGH (ref 7–23)
CALCIUM SERPL-MCNC: 9 MG/DL — SIGNIFICANT CHANGE UP (ref 8.4–10.5)
CHLORIDE SERPL-SCNC: 101 MMOL/L — SIGNIFICANT CHANGE UP (ref 96–108)
CO2 SERPL-SCNC: 28 MMOL/L — SIGNIFICANT CHANGE UP (ref 22–31)
COCAINE METAB.OTHER UR-MCNC: NEGATIVE — SIGNIFICANT CHANGE UP
COLOR SPEC: YELLOW — SIGNIFICANT CHANGE UP
CREAT SERPL-MCNC: 1.31 MG/DL — HIGH (ref 0.5–1.3)
DIFF PNL FLD: ABNORMAL
EOSINOPHIL # BLD AUTO: 0.06 K/UL — SIGNIFICANT CHANGE UP (ref 0–0.5)
EOSINOPHIL NFR BLD AUTO: 1.7 % — SIGNIFICANT CHANGE UP (ref 0–6)
EPI CELLS # UR: SIGNIFICANT CHANGE UP /HPF (ref 0–5)
GIANT PLATELETS BLD QL SMEAR: PRESENT — SIGNIFICANT CHANGE UP
GLUCOSE SERPL-MCNC: 234 MG/DL — HIGH (ref 70–99)
GLUCOSE UR QL: NEGATIVE — SIGNIFICANT CHANGE UP
HCT VFR BLD CALC: 37.3 % — LOW (ref 39–50)
HGB BLD-MCNC: 12.2 G/DL — LOW (ref 13–17)
INR BLD: 1.01 — SIGNIFICANT CHANGE UP (ref 0.88–1.16)
KETONES UR-MCNC: NEGATIVE — SIGNIFICANT CHANGE UP
LEUKOCYTE ESTERASE UR-ACNC: NEGATIVE — SIGNIFICANT CHANGE UP
LYMPHOCYTES # BLD AUTO: 0.37 K/UL — LOW (ref 1–3.3)
LYMPHOCYTES # BLD AUTO: 11.4 % — LOW (ref 13–44)
MANUAL SMEAR VERIFICATION: SIGNIFICANT CHANGE UP
MCHC RBC-ENTMCNC: 29.6 PG — SIGNIFICANT CHANGE UP (ref 27–34)
MCHC RBC-ENTMCNC: 32.7 GM/DL — SIGNIFICANT CHANGE UP (ref 32–36)
MCV RBC AUTO: 90.5 FL — SIGNIFICANT CHANGE UP (ref 80–100)
METHADONE UR-MCNC: NEGATIVE — SIGNIFICANT CHANGE UP
MONOCYTES # BLD AUTO: 0.17 K/UL — SIGNIFICANT CHANGE UP (ref 0–0.9)
MONOCYTES NFR BLD AUTO: 5.3 % — SIGNIFICANT CHANGE UP (ref 2–14)
NEUTROPHILS # BLD AUTO: 2.65 K/UL — SIGNIFICANT CHANGE UP (ref 1.8–7.4)
NEUTROPHILS NFR BLD AUTO: 81.6 % — HIGH (ref 43–77)
NITRITE UR-MCNC: NEGATIVE — SIGNIFICANT CHANGE UP
OPIATES UR-MCNC: NEGATIVE — SIGNIFICANT CHANGE UP
OVALOCYTES BLD QL SMEAR: SLIGHT — SIGNIFICANT CHANGE UP
PCP SPEC-MCNC: SIGNIFICANT CHANGE UP
PCP UR-MCNC: NEGATIVE — SIGNIFICANT CHANGE UP
PH UR: 6 — SIGNIFICANT CHANGE UP (ref 5–8)
PLAT MORPH BLD: NORMAL — SIGNIFICANT CHANGE UP
PLATELET # BLD AUTO: 85 K/UL — LOW (ref 150–400)
POTASSIUM SERPL-MCNC: 3.9 MMOL/L — SIGNIFICANT CHANGE UP (ref 3.5–5.3)
POTASSIUM SERPL-SCNC: 3.9 MMOL/L — SIGNIFICANT CHANGE UP (ref 3.5–5.3)
PROT SERPL-MCNC: 6.8 G/DL — SIGNIFICANT CHANGE UP (ref 6–8.3)
PROT UR-MCNC: ABNORMAL MG/DL
PROTHROM AB SERPL-ACNC: 12.1 SEC — SIGNIFICANT CHANGE UP (ref 10.6–13.6)
RBC # BLD: 4.12 M/UL — LOW (ref 4.2–5.8)
RBC # FLD: 13.5 % — SIGNIFICANT CHANGE UP (ref 10.3–14.5)
RBC BLD AUTO: ABNORMAL
RBC CASTS # UR COMP ASSIST: < 5 /HPF — SIGNIFICANT CHANGE UP
SODIUM SERPL-SCNC: 136 MMOL/L — SIGNIFICANT CHANGE UP (ref 135–145)
SP GR SPEC: 1.02 — SIGNIFICANT CHANGE UP (ref 1–1.03)
THC UR QL: NEGATIVE — SIGNIFICANT CHANGE UP
TROPONIN T SERPL-MCNC: 0.01 NG/ML — SIGNIFICANT CHANGE UP (ref 0–0.01)
TSH SERPL-MCNC: 3.36 UIU/ML — SIGNIFICANT CHANGE UP (ref 0.27–4.2)
UROBILINOGEN FLD QL: 0.2 E.U./DL — SIGNIFICANT CHANGE UP
WBC # BLD: 3.25 K/UL — LOW (ref 3.8–10.5)
WBC # FLD AUTO: 3.25 K/UL — LOW (ref 3.8–10.5)
WBC UR QL: < 5 /HPF — SIGNIFICANT CHANGE UP

## 2021-07-18 PROCEDURE — 84484 ASSAY OF TROPONIN QUANT: CPT

## 2021-07-18 PROCEDURE — 93010 ELECTROCARDIOGRAM REPORT: CPT

## 2021-07-18 PROCEDURE — 81001 URINALYSIS AUTO W/SCOPE: CPT

## 2021-07-18 PROCEDURE — 71046 X-RAY EXAM CHEST 2 VIEWS: CPT | Mod: 26

## 2021-07-18 PROCEDURE — 99284 EMERGENCY DEPT VISIT MOD MDM: CPT | Mod: 25

## 2021-07-18 PROCEDURE — 36415 COLL VENOUS BLD VENIPUNCTURE: CPT

## 2021-07-18 PROCEDURE — 85610 PROTHROMBIN TIME: CPT

## 2021-07-18 PROCEDURE — 93005 ELECTROCARDIOGRAM TRACING: CPT

## 2021-07-18 PROCEDURE — 80053 COMPREHEN METABOLIC PANEL: CPT

## 2021-07-18 PROCEDURE — 99285 EMERGENCY DEPT VISIT HI MDM: CPT | Mod: 25

## 2021-07-18 PROCEDURE — 85025 COMPLETE CBC W/AUTO DIFF WBC: CPT

## 2021-07-18 PROCEDURE — 80307 DRUG TEST PRSMV CHEM ANLYZR: CPT

## 2021-07-18 PROCEDURE — 85730 THROMBOPLASTIN TIME PARTIAL: CPT

## 2021-07-18 PROCEDURE — 84443 ASSAY THYROID STIM HORMONE: CPT

## 2021-07-18 PROCEDURE — 83880 ASSAY OF NATRIURETIC PEPTIDE: CPT

## 2021-07-18 PROCEDURE — 71046 X-RAY EXAM CHEST 2 VIEWS: CPT

## 2021-07-18 RX ORDER — SODIUM CHLORIDE 9 MG/ML
500 INJECTION INTRAMUSCULAR; INTRAVENOUS; SUBCUTANEOUS ONCE
Refills: 0 | Status: COMPLETED | OUTPATIENT
Start: 2021-07-18 | End: 2021-07-18

## 2021-07-18 RX ADMIN — SODIUM CHLORIDE 500 MILLILITER(S): 9 INJECTION INTRAMUSCULAR; INTRAVENOUS; SUBCUTANEOUS at 06:12

## 2021-07-18 NOTE — ED ADULT NURSE NOTE - OBJECTIVE STATEMENT
71Y Male A&0X3 C/O ROSADO "for many weeks". Pt reports SOB after walking one block. Enforces Chest pain and dizziness when changing position to quickly and bending over and dry cough. Reports Being admitted to the hospital 3 weeks ago for an aneurism and being discharged without a surgical procedure with instructions to see "a specialist". Pt has not yet seen provider do to being unable to get an appointment. Pt hs hx of cocaine abuse denies using today, HTN, HLD, HIV taking medications, and Prostate CA in remission post radiation treatment. Denies N/V/D, orthopnea (sleeps with one pillow at night), fever, chills, night sweats, nor bladder complaints. Ekg performed with NS. Pt noted to be hypertensive. Per Pt "I am annoyed when I can't do things like tie my shoes and it makes my blood pressure go up. I take all my medications". Pt unsure of Blood pressure medication taken at home. 71Y Male A&0X3 C/O ROSADO "for many weeks". Pt reports SOB after walking one block. Enforces Chest pain and dizziness when changing position to quickly and bending over. Dry cough starting an hour before arriving to the ED. Reports Being at [facility] 3 weeks ago for an aneurism and being discharged without a surgical procedure with instructions to see "a specialist". Pt has not yet seen provider do to being unable to get an appointment. Pt hs hx of cocaine abuse denies using today, HTN, HLD, HIV taking medications, and Prostate CA in remission post radiation treatment. Denies N/V/D, orthopnea (sleeps with one pillow at night), fever, chills, night sweats, nor bladder complaints. Ekg performed with NS. Pt noted to be hypertensive. Per Pt "I am annoyed when I can't do things like tie my shoes and it makes my blood pressure go up. I take all my medications". Pt unsure of Blood pressure medication taken at home.

## 2021-07-18 NOTE — ED PROVIDER NOTE - CLINICAL SUMMARY MEDICAL DECISION MAKING FREE TEXT BOX
SOB this morning, no resp distress, no hypoxia, no tachycardia, no tachypnea, speaking in full sentences, lungs clear. no chset pain currently, doubt dissection, doubt PE. c/o feeling weak  -check labs, ekg  -ivf  -CXR

## 2021-07-18 NOTE — ED PROVIDER NOTE - PATIENT PORTAL LINK FT
You can access the FollowMyHealth Patient Portal offered by [facility] by registering at the following website: [email or web address]. By joining [employer]’s FollowMyHealth portal, you will also be able to view your health information using other applications (apps) compatible with our system.

## 2021-07-18 NOTE — ED PROVIDER NOTE - NSFOLLOWUPINSTRUCTIONS_ED_ALL_ED_FT
Dyspnea    WHAT YOU NEED TO KNOW:    Dyspnea is breathing difficulty or discomfort. You may have labored, painful, or shallow breathing. You may feel breathless or short of breath. Dyspnea can occur during rest or with activity. You may have dyspnea for a short time, or it might become chronic. Dyspnea is often a symptom of a disease or condition.    DISCHARGE INSTRUCTIONS:    Return to the emergency department if:   •Your signs and symptoms are the same or worse within 24 hours of treatment.     •You have shaking chills or a fever over 102°F.     •You have new pain, pressure, or tightness in your chest.     •You have a new or worse cough or wheezing, or you cough up blood.    •You feel like you cannot get enough air.    •The skin over your ribs or on your neck sinks in when you breathe.     •You have a severe headache with vomiting and abdominal pain.     •You feel confused or dizzy.    Contact your healthcare provider or specialist if:   •You have questions or concerns about your condition or care.    Medicines:    •Medicines may be used to treat the cause of your dyspnea. Medicines may reduce swelling in your airway or decrease extra fluid from around your heart or lungs. Other medicines may be used to decrease anxiety and help you feel calm and relaxed.    •Take your medicine as directed. Contact your healthcare provider if you think your medicine is not helping or if you have side effects. Tell him or her if you are allergic to any medicine. Keep a list of the medicines, vitamins, and herbs you take. Include the amounts, and when and why you take them. Bring the list or the pill bottles to follow-up visits. Carry your medicine list with you in case of an emergency.    Manage long-term dyspnea:   •Create an action plan. You and your healthcare provider can work together to create a plan for how to handle episodes of dyspnea. The plan can include daily activities, treatment changes, and what to do if you have severe breathing problems.    •Lean forward on your elbows when you sit. This helps your lungs expand and may make it easier to breathe.    •Use pursed-lip breathing any time you feel short of breath. Breathe in through your nose and then slowly breathe out through your mouth with your lips slightly puckered. It should take you twice as long to breathe out as it did to breathe in.    •Do not smoke. Nicotine and other chemicals in cigarettes and cigars can cause lung damage and make it harder to breathe. Ask your healthcare provider for information if you currently smoke and need help to quit. E-cigarettes or smokeless tobacco still contain nicotine. Talk to your healthcare provider before you use these products.     •Reach or maintain a healthy weight. Your healthcare provider can help you create a safe weight loss plan if you are overweight.    •Exercise as directed. Exercise can help your lungs work more easily. Exercise can also help you lose weight if needed. Try to get at least 30 minutes of exercise most days of the week. Your healthcare provider can help you create an exercise plan that is safe for you.    Follow up with your healthcare provider or specialist as directed: Write down your questions so you remember to ask them during your visits.

## 2021-07-18 NOTE — ED ADULT NURSE NOTE - NSIMPLEMENTINTERV_GEN_ALL_ED
Implemented All Fall Risk Interventions:  [place] to call system. Call bell, personal items and telephone within reach. Instruct patient to call for assistance. Room bathroom lighting operational. Non-slip footwear when patient is off stretcher. Physically safe environment: no spills, clutter or unnecessary equipment. Stretcher in lowest position, wheels locked, appropriate side rails in place. Provide visual cue, wrist band, yellow gown, etc. Monitor gait and stability. Monitor for mental status changes and reorient to person, place, and time. Review medications for side effects contributing to fall risk. Reinforce activity limits and safety measures with patient and family.

## 2021-07-18 NOTE — ED PROVIDER NOTE - QRS
CT: Diverticulosis coli  Possible segmental perisigmoid fat stranding may be due to acute diverticulitis, although evaluation is limited    · Surgical soft diet  · LR discontinued  · Continue Cipro 400mg p o  q12h and metronidazole 500mg p o  q8H  · Trend vitals, WBCs, and exam findings/symptoms  · Leukocytisis fluctuating 23 on 2/1 to 18 on 2/2 24 5 on 2/3, 25 5 on 2/4  · Appears to change in sync with hgb and platelets  · Patient tolerated diet advancement well 92

## 2021-07-18 NOTE — ED PROVIDER NOTE - EKG ADDITIONAL QUESTION - PERFORMED INDEPENDENT VISUALIZATION
abnormality. He is Covid positive, not hypoxic on his baseline nasal cannula. His respiratory status is stable and baseline here. His traumatic work-up is unremarkable for any acute process. He is found to have stable and chronic compression fractures of C7, T1, and T2 these do not look changed based on radiology report. Also the patient is found to have worsening metastatic disease in his ischium, it appears he does have a history of metastatic cancer. I attempted to call the patient's family and emergency contacts, I did not receive a response and was unable to leave a voice message. I did give instructions to the ECF on discharge papers regarding the patient's chronic findings on CT. Otherwise I feel he is appropriate for discharge back to ECF. He is told to return if he has any worsening symptoms. All diagnostic, treatment, and disposition decisions were made by myself in conjunction with the advanced practice provider. For all further details of the patient's emergency department visit, please see the advanced practice provider's documentation. Comment: Please note this report has been produced using speech recognition software and may contain errors related to that system including errors in grammar, punctuation, and spelling, as well as words and phrases that may be inappropriate. If there are any questions or concerns please feel free to contact the dictating provider for clarification.          [facility]  11/30/20 7642 Yes

## 2021-07-18 NOTE — ED ADULT TRIAGE NOTE - SPO2 (%)
Hospitalist Admission NoteNAME: Brett Wilson. :  1959 MRN:  [id number] Date/Time:  12/3/2018 6:29 AM 
 
Patient PCP: None 
______________________________________________________________________ Given the patient's current clinical presentation, I have a high level of concern for decompensation if discharged from the emergency department. Complex decision making was performed, which includes reviewing the patient's available past medical records, laboratory results, and x-ray films. My assessment of this patient's clinical condition and my plan of care is as follows. Assessment / Plan: 
Colitis with Hematochezia, Abdominal Pain and leukocytosis Admit CLD for now IVF 
GI consult PPIs IV Levaquin and Flagyl Could be ischemic vs infectious Check stool studies if able to PRN Iv fentanyl for pain for now Monitor H&H 
CT A/P with Proximal infectious versus inflammatory colitis. No abscess. HTN (hypertension) Claims to be taken off meds in the past 
Will place PRN nitropaste Elevated BP could be due to pain and stress Monitor, add meds PRN Smoker Counseled to quit Pt deferred nicotine patch at this time Code Status: Full Code Surrogate Decision Maker: Wife DVT Prophylaxis: SCDs GI Prophylaxis: not indicated Baseline: functional  
  
Subjective: CHIEF COMPLAINT: abdominal pain and blood in stool HISTORY OF PRESENT ILLNESS:    
Barron Carrillo is a 61 y.o.  male who presents with abdominal pain and noticing blood in stool. As per patient, he is been noticing blood in stool for past couple of weeks and for past couple of days he started to have abdominal pain. Abdominal pain is in lower belly radiating to back, constant, 7/10 in intensity, cramping in nature. Pt also reported alternating contipation and diarrhea for past 2 weeks. Pt denies any fever, chills, nausea, vomiting, chest pain, cough or shortness of breath. In ED pt noted to have colitis on CT scan. We were asked to admit for work up and evaluation of the above problems. Past Medical History:  
Diagnosis Date  
 HTN (hypertension) 12/3/2018  Ill-defined condition   
 degenerative disc disease Past Surgical History:  
Procedure Laterality Date  HX GI    
 galbladder removal  
 
 
Social History Tobacco Use  Smoking status: Current Every Day Smoker Packs/day: 1.00  Smokeless tobacco: Never Used Substance Use Topics  Alcohol use: Yes Comment: rarely Family history: positive for HTN in the family Allergies Allergen Reactions  Aspirin Other (comments) Nose bleeds  Morphine Other (comments) Agitation Prior to Admission medications Not on File REVIEW OF SYSTEMS:    
I am not able to complete the review of systems because: The patient is intubated and sedated The patient has altered mental status due to his acute medical problems The patient has baseline aphasia from prior stroke(s) The patient has baseline dementia and is not reliable historian The patient is in acute medical distress and unable to provide information Total of 12 systems reviewed as follows:   
   POSITIVE= underlined text  Negative = text not underlined General:  fever, chills, sweats, generalized weakness, weight loss/gain,  
   loss of appetite Eyes:    blurred vision, eye pain, loss of vision, double vision ENT:    rhinorrhea, pharyngitis Respiratory:   cough, sputum production, SOB, YANG, wheezing, pleuritic pain  
Cardiology:   chest pain, palpitations, orthopnea, PND, edema, syncope Gastrointestinal:  abdominal pain , N/V, diarrhea, dysphagia, constipation, bleeding Genitourinary:  frequency, urgency, dysuria, hematuria, incontinence Muskuloskeletal :  arthralgia, myalgia, back pain Hematology:  easy bruising, nose or gum bleeding, lymphadenopathy Dermatological: rash, ulceration, pruritis, color change / jaundice Endocrine:   hot flashes or polydipsia Neurological:  headache, dizziness, confusion, focal weakness, paresthesia, Speech difficulties, memory loss, gait difficulty Psychological: Feelings of anxiety, depression, agitation Objective: VITALS:   
Visit Vitals BP (!) 142/99 Pulse 73 Temp 97.9 °F (36.6 °C) Resp 16 Ht 5' 11\" (1.803 m) Wt 96.6 kg (212 lb 15.4 oz) SpO2 90% BMI 29.70 kg/m² PHYSICAL EXAM: 
 
 
_______________________________________________________________________ Care Plan discussed with: 
  Comments Patient y Family RN y   
Care Manager Consultant:  [facility] ED physician  
_______________________________________________________________________ Expected  Disposition:  
Home with Family y HH/PT/OT/RN   
SNF/LTC   
[facility]   
________________________________________________________________________ TOTAL TIME:  55 Minutes Critical Care Provided     Minutes non procedure based Comments  
 y Reviewed previous records 98 >50% of visit spent in counseling and coordination of care y Discussion with patient and questions answered 
  
 
________________________________________________________________________ Signed: Virginia Sim MD 
 
Procedures: see electronic medical records for all procedures/Xrays and details which were not copied into this note but were reviewed prior to creation of Plan. LAB DATA REVIEWED:   
Recent Results (from the past 24 hour(s)) CBC WITH AUTOMATED DIFF Collection Time: 12/02/18  9:23 PM  
Result Value Ref Range WBC 13.7 (H) 4.1 - 11.1 K/uL  
 RBC 5.41 4.10 - 5.70 M/uL  
 HGB 16.6 12.1 - 17.0 g/dL HCT 50.0 36.6 - 50.3 % MCV 92.4 80.0 - 99.0 FL  
 MCH 30.7 26.0 - 34.0 PG  
 MCHC 33.2 30.0 - 36.5 g/dL  
 RDW 13.9 11.5 - 14.5 % PLATELET 760 128 - 669 K/uL MPV 9.9 8.9 - 12.9 FL  
 NRBC 0.0 0  WBC ABSOLUTE NRBC 0.00 0.00 - 0.01 K/uL NEUTROPHILS 65 32 - 75 % LYMPHOCYTES 22 12 - 49 % MONOCYTES 8 5 - 13 % EOSINOPHILS 4 0 - 7 % BASOPHILS 1 0 - 1 % IMMATURE GRANULOCYTES 0 0.0 - 0.5 % ABS. NEUTROPHILS 8.9 (H) 1.8 - 8.0 K/UL  
 ABS. LYMPHOCYTES 3.1 0.8 - 3.5 K/UL  
 ABS. MONOCYTES 1.1 (H) 0.0 - 1.0 K/UL  
 ABS. EOSINOPHILS 0.6 (H) 0.0 - 0.4 K/UL  
 ABS. BASOPHILS 0.1 0.0 - 0.1 K/UL  
 ABS. IMM. GRANS. 0.0 0.00 - 0.04 K/UL  
 DF AUTOMATED METABOLIC PANEL, COMPREHENSIVE Collection Time: 12/02/18  9:23 PM  
Result Value Ref Range Sodium 140 136 - 145 mmol/L Potassium 4.1 3.5 - 5.1 mmol/L Chloride 106 97 - 108 mmol/L  
 CO2 28 21 - 32 mmol/L Anion gap 6 5 - 15 mmol/L Glucose 89 65 - 100 mg/dL BUN 15 6 - 20 MG/DL Creatinine 1.02 0.70 - 1.30 MG/DL  
 BUN/Creatinine ratio 15 12 - 20 GFR est AA >60 >60 ml/min/1.73m2 GFR est non-AA >60 >60 ml/min/1.73m2 Calcium 8.8 8.5 - 10.1 MG/DL Bilirubin, total 0.5 0.2 - 1.0 MG/DL  
 ALT (SGPT) 31 12 - 78 U/L  
 AST (SGOT) 18 15 - 37 U/L Alk.  phosphatase 67 45 - 117 U/L  
 Protein, total 7.5 6.4 - 8.2 g/dL Albumin 3.7 3.5 - 5.0 g/dL Globulin 3.8 2.0 - 4.0 g/dL A-G Ratio 1.0 (L) 1.1 - 2.2 TYPE & SCREEN Collection Time: 12/02/18  9:23 PM  
Result Value Ref Range Crossmatch Expiration 12/05/2018 ABO/Rh(D) O POSITIVE Antibody screen NEG PROTHROMBIN TIME + INR Collection Time: 12/03/18  3:47 AM  
Result Value Ref Range INR 1.0 0.9 - 1.1 Prothrombin time 10.2 9.0 - 11.1 sec PTT Collection Time: 12/03/18  3:47 AM  
Result Value Ref Range aPTT 39.4 (H) 22.1 - 32.0 sec  
 aPTT, therapeutic range     58.0 - 77.0 SECS  
OCCULT BLOOD, STOOL Collection Time: 12/03/18  4:55 AM  
Result Value Ref Range Occult blood, stool POSITIVE (A) NEG    
LACTIC ACID Collection Time: 12/03/18  5:20 AM  
Result Value Ref Range  Lactic acid 0.5 0.4 - 2.0 MMOL/L

## 2021-07-18 NOTE — ED PROVIDER NOTE - OBJECTIVE STATEMENT
71M hx HIV (on HAART, states VL undetectable), hep C, htn, dm, prostates CA, c/o shortness of breath. some cough, no phlegm. pt states started about an hour prior to arrival.  states feeling tired.  pt was in ED 7/3 for similar symptoms. had CT of the chest which showed thoracic aortic aneurysm 4.7cm. pt states was told to f/u but he is having trouble getting an appointment.  no chest pain currently.

## 2021-07-28 ENCOUNTER — INPATIENT (INPATIENT)
Facility: HOSPITAL | Age: 71
LOS: 0 days | Discharge: HOME | DRG: 287 | End: 2021-07-29
Attending: INTERNAL MEDICINE | Admitting: INTERNAL MEDICINE
Payer: MEDICARE

## 2021-07-28 VITALS
OXYGEN SATURATION: 96 % | SYSTOLIC BLOOD PRESSURE: 177 MMHG | RESPIRATION RATE: 18 BRPM | TEMPERATURE: 98 F | HEIGHT: 72 IN | DIASTOLIC BLOOD PRESSURE: 102 MMHG | HEART RATE: 68 BPM | WEIGHT: 179.9 LBS

## 2021-07-28 DIAGNOSIS — I10 ESSENTIAL (PRIMARY) HYPERTENSION: ICD-10-CM

## 2021-07-28 DIAGNOSIS — Z95.9 PRESENCE OF CARDIAC AND VASCULAR IMPLANT AND GRAFT, UNSPECIFIED: Chronic | ICD-10-CM

## 2021-07-28 DIAGNOSIS — R07.9 CHEST PAIN, UNSPECIFIED: ICD-10-CM

## 2021-07-28 DIAGNOSIS — E11.9 TYPE 2 DIABETES MELLITUS WITHOUT COMPLICATIONS: ICD-10-CM

## 2021-07-28 DIAGNOSIS — D69.6 THROMBOCYTOPENIA, UNSPECIFIED: ICD-10-CM

## 2021-07-28 DIAGNOSIS — B20 HUMAN IMMUNODEFICIENCY VIRUS [HIV] DISEASE: ICD-10-CM

## 2021-07-28 DIAGNOSIS — Z29.9 ENCOUNTER FOR PROPHYLACTIC MEASURES, UNSPECIFIED: ICD-10-CM

## 2021-07-28 LAB
A1C WITH ESTIMATED AVERAGE GLUCOSE RESULT: 6.7 % — HIGH (ref 4–5.6)
AMPHET UR-MCNC: NEGATIVE — SIGNIFICANT CHANGE UP
ANION GAP SERPL CALC-SCNC: 12 MMOL/L — SIGNIFICANT CHANGE UP (ref 5–17)
APTT BLD: 30.1 SEC — SIGNIFICANT CHANGE UP (ref 27.5–35.5)
BARBITURATES UR SCN-MCNC: NEGATIVE — SIGNIFICANT CHANGE UP
BASOPHILS # BLD AUTO: 0.01 K/UL — SIGNIFICANT CHANGE UP (ref 0–0.2)
BASOPHILS NFR BLD AUTO: 0.2 % — SIGNIFICANT CHANGE UP (ref 0–2)
BENZODIAZ UR-MCNC: NEGATIVE — SIGNIFICANT CHANGE UP
BUN SERPL-MCNC: 16 MG/DL — SIGNIFICANT CHANGE UP (ref 7–23)
CALCIUM SERPL-MCNC: 9.2 MG/DL — SIGNIFICANT CHANGE UP (ref 8.4–10.5)
CHLORIDE SERPL-SCNC: 106 MMOL/L — SIGNIFICANT CHANGE UP (ref 96–108)
CHOLEST SERPL-MCNC: 115 MG/DL — SIGNIFICANT CHANGE UP
CO2 SERPL-SCNC: 23 MMOL/L — SIGNIFICANT CHANGE UP (ref 22–31)
COCAINE METAB.OTHER UR-MCNC: POSITIVE
CREAT SERPL-MCNC: 1.21 MG/DL — SIGNIFICANT CHANGE UP (ref 0.5–1.3)
EOSINOPHIL # BLD AUTO: 0.16 K/UL — SIGNIFICANT CHANGE UP (ref 0–0.5)
EOSINOPHIL NFR BLD AUTO: 3.1 % — SIGNIFICANT CHANGE UP (ref 0–6)
ESTIMATED AVERAGE GLUCOSE: 146 MG/DL — HIGH (ref 68–114)
GLUCOSE SERPL-MCNC: 161 MG/DL — HIGH (ref 70–99)
HCT VFR BLD CALC: 37.5 % — LOW (ref 39–50)
HDLC SERPL-MCNC: 44 MG/DL — SIGNIFICANT CHANGE UP
HGB BLD-MCNC: 12.9 G/DL — LOW (ref 13–17)
IMM GRANULOCYTES NFR BLD AUTO: 0.2 % — SIGNIFICANT CHANGE UP (ref 0–1.5)
INR BLD: 1.03 — SIGNIFICANT CHANGE UP (ref 0.88–1.16)
LIPID PNL WITH DIRECT LDL SERPL: 48 MG/DL — SIGNIFICANT CHANGE UP
LYMPHOCYTES # BLD AUTO: 1.01 K/UL — SIGNIFICANT CHANGE UP (ref 1–3.3)
LYMPHOCYTES # BLD AUTO: 19.3 % — SIGNIFICANT CHANGE UP (ref 13–44)
MCHC RBC-ENTMCNC: 30.1 PG — SIGNIFICANT CHANGE UP (ref 27–34)
MCHC RBC-ENTMCNC: 34.4 GM/DL — SIGNIFICANT CHANGE UP (ref 32–36)
MCV RBC AUTO: 87.4 FL — SIGNIFICANT CHANGE UP (ref 80–100)
METHADONE UR-MCNC: NEGATIVE — SIGNIFICANT CHANGE UP
MONOCYTES # BLD AUTO: 0.34 K/UL — SIGNIFICANT CHANGE UP (ref 0–0.9)
MONOCYTES NFR BLD AUTO: 6.5 % — SIGNIFICANT CHANGE UP (ref 2–14)
NEUTROPHILS # BLD AUTO: 3.7 K/UL — SIGNIFICANT CHANGE UP (ref 1.8–7.4)
NEUTROPHILS NFR BLD AUTO: 70.7 % — SIGNIFICANT CHANGE UP (ref 43–77)
NON HDL CHOLESTEROL: 71 MG/DL — SIGNIFICANT CHANGE UP
NRBC # BLD: 0 /100 WBCS — SIGNIFICANT CHANGE UP (ref 0–0)
OPIATES UR-MCNC: NEGATIVE — SIGNIFICANT CHANGE UP
PCP SPEC-MCNC: SIGNIFICANT CHANGE UP
PCP UR-MCNC: NEGATIVE — SIGNIFICANT CHANGE UP
PLATELET # BLD AUTO: 104 K/UL — LOW (ref 150–400)
POTASSIUM SERPL-MCNC: 3.6 MMOL/L — SIGNIFICANT CHANGE UP (ref 3.5–5.3)
POTASSIUM SERPL-SCNC: 3.6 MMOL/L — SIGNIFICANT CHANGE UP (ref 3.5–5.3)
PROTHROM AB SERPL-ACNC: 12.3 SEC — SIGNIFICANT CHANGE UP (ref 10.6–13.6)
RBC # BLD: 4.29 M/UL — SIGNIFICANT CHANGE UP (ref 4.2–5.8)
RBC # FLD: 13.6 % — SIGNIFICANT CHANGE UP (ref 10.3–14.5)
SARS-COV-2 RNA SPEC QL NAA+PROBE: NEGATIVE — SIGNIFICANT CHANGE UP
SODIUM SERPL-SCNC: 141 MMOL/L — SIGNIFICANT CHANGE UP (ref 135–145)
THC UR QL: NEGATIVE — SIGNIFICANT CHANGE UP
TRIGL SERPL-MCNC: 113 MG/DL — SIGNIFICANT CHANGE UP
TROPONIN T SERPL-MCNC: 0.01 NG/ML — SIGNIFICANT CHANGE UP (ref 0–0.01)
TROPONIN T SERPL-MCNC: <0.01 NG/ML — SIGNIFICANT CHANGE UP (ref 0–0.01)
WBC # BLD: 5.23 K/UL — SIGNIFICANT CHANGE UP (ref 3.8–10.5)
WBC # FLD AUTO: 5.23 K/UL — SIGNIFICANT CHANGE UP (ref 3.8–10.5)

## 2021-07-28 PROCEDURE — 99152 MOD SED SAME PHYS/QHP 5/>YRS: CPT

## 2021-07-28 PROCEDURE — 71045 X-RAY EXAM CHEST 1 VIEW: CPT | Mod: 26,59

## 2021-07-28 PROCEDURE — 99221 1ST HOSP IP/OBS SF/LOW 40: CPT

## 2021-07-28 PROCEDURE — 93010 ELECTROCARDIOGRAM REPORT: CPT | Mod: 59

## 2021-07-28 PROCEDURE — 93458 L HRT ARTERY/VENTRICLE ANGIO: CPT | Mod: 26

## 2021-07-28 PROCEDURE — 99223 1ST HOSP IP/OBS HIGH 75: CPT

## 2021-07-28 PROCEDURE — 93010 ELECTROCARDIOGRAM REPORT: CPT

## 2021-07-28 PROCEDURE — 71045 X-RAY EXAM CHEST 1 VIEW: CPT | Mod: 26

## 2021-07-28 PROCEDURE — 93306 TTE W/DOPPLER COMPLETE: CPT | Mod: 26

## 2021-07-28 PROCEDURE — 99291 CRITICAL CARE FIRST HOUR: CPT | Mod: 25

## 2021-07-28 RX ORDER — LANOLIN ALCOHOL/MO/W.PET/CERES
3 CREAM (GRAM) TOPICAL AT BEDTIME
Refills: 0 | Status: DISCONTINUED | OUTPATIENT
Start: 2021-07-28 | End: 2021-07-28

## 2021-07-28 RX ORDER — AMLODIPINE BESYLATE 2.5 MG/1
2.5 TABLET ORAL DAILY
Refills: 0 | Status: DISCONTINUED | OUTPATIENT
Start: 2021-07-29 | End: 2021-07-29

## 2021-07-28 RX ORDER — ATORVASTATIN CALCIUM 80 MG/1
20 TABLET, FILM COATED ORAL AT BEDTIME
Refills: 0 | Status: DISCONTINUED | OUTPATIENT
Start: 2021-07-28 | End: 2021-07-29

## 2021-07-28 RX ORDER — ESCITALOPRAM OXALATE 10 MG/1
5 TABLET, FILM COATED ORAL DAILY
Refills: 0 | Status: DISCONTINUED | OUTPATIENT
Start: 2021-07-28 | End: 2021-07-29

## 2021-07-28 RX ORDER — ASPIRIN/CALCIUM CARB/MAGNESIUM 324 MG
325 TABLET ORAL ONCE
Refills: 0 | Status: COMPLETED | OUTPATIENT
Start: 2021-07-28 | End: 2021-07-28

## 2021-07-28 RX ORDER — SODIUM CHLORIDE 9 MG/ML
500 INJECTION INTRAMUSCULAR; INTRAVENOUS; SUBCUTANEOUS
Refills: 0 | Status: DISCONTINUED | OUTPATIENT
Start: 2021-07-28 | End: 2021-07-29

## 2021-07-28 RX ORDER — SODIUM CHLORIDE 9 MG/ML
1000 INJECTION INTRAMUSCULAR; INTRAVENOUS; SUBCUTANEOUS
Refills: 0 | Status: DISCONTINUED | OUTPATIENT
Start: 2021-07-28 | End: 2021-07-28

## 2021-07-28 RX ORDER — EMTRICITABINE AND TENOFOVIR DISOPROXIL FUMARATE 200; 300 MG/1; MG/1
1 TABLET, FILM COATED ORAL DAILY
Refills: 0 | Status: DISCONTINUED | OUTPATIENT
Start: 2021-07-28 | End: 2021-07-29

## 2021-07-28 RX ORDER — CHLORHEXIDINE GLUCONATE 213 G/1000ML
1 SOLUTION TOPICAL ONCE
Refills: 0 | Status: DISCONTINUED | OUTPATIENT
Start: 2021-07-28 | End: 2021-07-29

## 2021-07-28 RX ORDER — ISOSORBIDE MONONITRATE 60 MG/1
30 TABLET, EXTENDED RELEASE ORAL ONCE
Refills: 0 | Status: COMPLETED | OUTPATIENT
Start: 2021-07-28 | End: 2021-07-28

## 2021-07-28 RX ORDER — DOLUTEGRAVIR SODIUM 25 MG/1
50 TABLET, FILM COATED ORAL DAILY
Refills: 0 | Status: DISCONTINUED | OUTPATIENT
Start: 2021-07-28 | End: 2021-07-29

## 2021-07-28 RX ORDER — AMLODIPINE BESYLATE 2.5 MG/1
2.5 TABLET ORAL ONCE
Refills: 0 | Status: COMPLETED | OUTPATIENT
Start: 2021-07-28 | End: 2021-07-28

## 2021-07-28 RX ORDER — LANOLIN ALCOHOL/MO/W.PET/CERES
5 CREAM (GRAM) TOPICAL AT BEDTIME
Refills: 0 | Status: DISCONTINUED | OUTPATIENT
Start: 2021-07-28 | End: 2021-07-29

## 2021-07-28 RX ORDER — ASPIRIN/CALCIUM CARB/MAGNESIUM 324 MG
81 TABLET ORAL DAILY
Refills: 0 | Status: DISCONTINUED | OUTPATIENT
Start: 2021-07-29 | End: 2021-07-29

## 2021-07-28 RX ORDER — ARIPIPRAZOLE 15 MG/1
2 TABLET ORAL DAILY
Refills: 0 | Status: DISCONTINUED | OUTPATIENT
Start: 2021-07-28 | End: 2021-07-29

## 2021-07-28 RX ORDER — ISOSORBIDE MONONITRATE 60 MG/1
30 TABLET, EXTENDED RELEASE ORAL DAILY
Refills: 0 | Status: DISCONTINUED | OUTPATIENT
Start: 2021-07-29 | End: 2021-07-29

## 2021-07-28 RX ORDER — NITROGLYCERIN 6.5 MG
0.4 CAPSULE, EXTENDED RELEASE ORAL
Refills: 0 | Status: DISCONTINUED | OUTPATIENT
Start: 2021-07-28 | End: 2021-07-29

## 2021-07-28 RX ORDER — POTASSIUM CHLORIDE 20 MEQ
40 PACKET (EA) ORAL ONCE
Refills: 0 | Status: COMPLETED | OUTPATIENT
Start: 2021-07-28 | End: 2021-07-28

## 2021-07-28 RX ORDER — NITROGLYCERIN 6.5 MG
1 CAPSULE, EXTENDED RELEASE ORAL ONCE
Refills: 0 | Status: COMPLETED | OUTPATIENT
Start: 2021-07-28 | End: 2021-07-28

## 2021-07-28 RX ADMIN — Medication 2.5 MILLIGRAM(S): at 08:03

## 2021-07-28 RX ADMIN — ARIPIPRAZOLE 2 MILLIGRAM(S): 15 TABLET ORAL at 11:51

## 2021-07-28 RX ADMIN — Medication 40 MILLIEQUIVALENT(S): at 13:05

## 2021-07-28 RX ADMIN — AMLODIPINE BESYLATE 2.5 MILLIGRAM(S): 2.5 TABLET ORAL at 08:02

## 2021-07-28 RX ADMIN — ISOSORBIDE MONONITRATE 30 MILLIGRAM(S): 60 TABLET, EXTENDED RELEASE ORAL at 08:03

## 2021-07-28 RX ADMIN — SODIUM CHLORIDE 75 MILLILITER(S): 9 INJECTION INTRAMUSCULAR; INTRAVENOUS; SUBCUTANEOUS at 21:23

## 2021-07-28 RX ADMIN — ISOSORBIDE MONONITRATE 30 MILLIGRAM(S): 60 TABLET, EXTENDED RELEASE ORAL at 08:02

## 2021-07-28 RX ADMIN — ATORVASTATIN CALCIUM 20 MILLIGRAM(S): 80 TABLET, FILM COATED ORAL at 22:23

## 2021-07-28 RX ADMIN — Medication 1 INCH(S): at 08:26

## 2021-07-28 RX ADMIN — Medication 325 MILLIGRAM(S): at 07:49

## 2021-07-28 RX ADMIN — Medication 5 MILLIGRAM(S): at 22:59

## 2021-07-28 RX ADMIN — EMTRICITABINE AND TENOFOVIR DISOPROXIL FUMARATE 1 TABLET(S): 200; 300 TABLET, FILM COATED ORAL at 11:50

## 2021-07-28 RX ADMIN — ESCITALOPRAM OXALATE 5 MILLIGRAM(S): 10 TABLET, FILM COATED ORAL at 11:50

## 2021-07-28 RX ADMIN — Medication 1 INCH(S): at 21:04

## 2021-07-28 RX ADMIN — Medication 0.4 MILLIGRAM(S): at 07:58

## 2021-07-28 RX ADMIN — DOLUTEGRAVIR SODIUM 50 MILLIGRAM(S): 25 TABLET, FILM COATED ORAL at 11:51

## 2021-07-28 RX ADMIN — SODIUM CHLORIDE 75 MILLILITER(S): 9 INJECTION INTRAMUSCULAR; INTRAVENOUS; SUBCUTANEOUS at 13:04

## 2021-07-28 NOTE — ED PROVIDER NOTE - PMH
CAD (coronary artery disease)    Chronic diarrhea    DM (diabetes mellitus)    Hepatitis C    HIV (human immunodeficiency virus infection)    HTN (hypertension)    PVD (peripheral vascular disease)    Thoracic aortic aneurysm

## 2021-07-28 NOTE — ED PROVIDER NOTE - OBJECTIVE STATEMENT
72 yo male h/o htn, dm, prostate ca, hiv (undetectable vl), pvd s/p stents bilat le, cad based on 2019 cta cardiac (1.  The calcium score is severe at 1323  Agatston units, which is at the 90th percentile, adjusted for age, gender and race. 2.  Moderate to severe stenosis of mid LAD followed by an additional moderate stenosis 3.  Moderate OM2 stenosis 4.  Less than 50% stenosis of LM. 5.  Non obstructive coronary disease elsewhere.  6.  The LVEF appears mildly to moderately reduced with hypokinesis of the mid to apical  septum.  7.  Dilated ascending aorta measuring 4.4cm x 4.3cm at the level of the main pulmonary artery.), TAA (4.7 cm on recent cta pe 7/3/21, prev 4.5 cm on cta 2019) c/o near syncope, high bp 180's, "feeling awful," cp and sob.  CP pressure like, now 3/10.  Sx started 2 h pta while pt was resting. + recent exertional cp/sob.  No recent stress or cardiac eval w his cardiologist, Dr Fontanez.  No radiation.  No abd pain, n/v.  + dry cough, no uri sx, no fever.  No le edema, h/o pe/dvt.  Pt denies ha.  Pt reports + cocaine abuse, last use 2 d ago, states he has similar cp several days after cocaine use in the past.

## 2021-07-28 NOTE — CONSULT NOTE ADULT - SUBJECTIVE AND OBJECTIVE BOX
Requesting Physician: Dr. Gomez     HISTORY OF PRESENT ILLNESS:   70 y/o M, Poor historian, former Vet, w/ a PMHx of current cocaine abuser, current Marijuana user, known CAD (LM, LAD and OM1 Dz on prior CCTA), HFrEF (EF 36% ECHO 7/28/21), known PAD (B/L LE and RUE stents), occasional ETOH user, HTN, DM, HIV (unknown CD4, VL undetectable), Hep C (s/p treatment), prostate CA (s/p radiation therapy), and depression who presented to the [facility] ED 7/28/21 with near syncope episode today around 5AM, with associated intermittent, mild substernal, non-radiating CP, lasting several minutes, resolves after ASA. Pt endorses CP with cocaine use. Last used on Monday. Pt with recent ED evaluations this month complaining of SOB/fatigue with subsequent discharges. This admission, pt found to have +Utox for cocaine. Pt loaded with ASA 325mg x 1, given his home Amlodipine 2.5mg PO, Enalapril 2.5mg PO, Imdur 30mg PO, NTG SL x 1 and Nitropaste 1" x 1. Pt is now admitted to 5U for ischemic work-up and cardiac cath with Dr. Casey. CTA PE protocol chest completed on admission revealed ascending aortic aneurysmal dilation of 4.7cm (increased from 4.3 on previous scan 07/2019). CT surgery consulted for evaluation. Patient denies SOB, wheezing, n/v/d/c, fevers or chills.    PAST MEDICAL & SURGICAL HISTORY:  HIV (human immunodeficiency virus infection)  DM (diabetes mellitus)  HTN (hypertension)  Chronic diarrhea  Hepatitis C  PVD (peripheral vascular disease)  CAD (coronary artery disease)  Thoracic aortic aneurysm (S/P arterial stent)    MEDICATIONS  (STANDING):  ARIPiprazole 2 milliGRAM(s) Oral daily  atorvastatin 20 milliGRAM(s) Oral at bedtime  chlorhexidine 4% Liquid 1 Application(s) Topical once  dolutegravir 50 milliGRAM(s) Oral daily  emtricitabine 200 mG/tenofovir alafenamide 25 mG (DESCOVY) Tablet 1 Tablet(s) Oral daily  escitalopram 5 milliGRAM(s) Oral daily    MEDICATIONS  (PRN):  nitroglycerin     SubLingual 0.4 milliGRAM(s) SubLingual every 5 minutes PRN Chest Pain    Allergies    Peaches (Rash)  penicillins (Rash)  Seafood (Rash)  strawberry (Rash)  sulfa drugs (Rash)    Intolerances    SOCIAL HISTORY:  Current cocaine and marijuana user. Occasional ETOH use.   Lives in stressful environment with roommate in [employer]    FAMILY HISTORY:  No pertinent family history in first degree relatives    Review of Systems:  CONSTITUTIONAL: Denies fevers / chills, sweats, fatigue, weight loss, weight gain                                       NEURO:  Denies parathesias, seizures, syncope, confusion                                                                                  EYES:  Denies blurry vision, discharge, pain, loss of vision                                                                                    ENMT:  Denies difficulty hearing, vertigo, dysphagia, epistaxis, recent dental work                                       CV:  Denies chest pain, palpitations, ROSADO, orthopnea                                                                                           RESPIRATORY:  Denies wWheezing, SOB, cough / sputum, hemoptysis                                                               GI:  Denies nausea, vomiting, diarrhea, constipation, melena                                                                          : Denies hematuria, dysuria, urgency, incontinence                                                                                          MUSKULOSKELETAL:  Denies arthritis, joint swelling, muscle weakness                                                             SKIN/BREAST:  Denies rash, itching, hair loss, masses                                                                                              PSYCH:  Denies depression, anxiety, suicidal ideation                                                                                                HEME/LYMPH:  Denies bruises easily, enlarged lymph nodes, tender lymph nodes                                          ENDOCRINE:  Denies cold intolerance, heat intolerance, polydipsia                                                                      Vital Signs Last 24 Hrs  T(C): 36.7 (28 Jul 2021 16:34), Max: 36.8 (28 Jul 2021 06:38)  T(F): 98 (28 Jul 2021 16:34), Max: 98.3 (28 Jul 2021 06:38)  HR: 73 (28 Jul 2021 16:34) (56 - 75)  BP: 148/81 (28 Jul 2021 16:34) (113/77 - 177/102)  BP(mean): 127 (28 Jul 2021 09:14) (127 - 127)  RR: 18 (28 Jul 2021 16:34) (16 - 18)  SpO2: 97% (28 Jul 2021 16:34) (94% - 100%)    Physical Exam (Need 8)  CONSTITUTIONAL:                                                                          WNL  NEURO:                                                                                             WNL                      EYES:                                                                                                  WNL  ENMT:                                                                                                WNL  CV:                                                                                                      WNL  RESPIRATORY:                                                                                  WNL  GI:                                                                                                       WNL  : MORALES + / -                                                                                 WNL  MUSKULOSKELETAL:                                                                       WNL  SKIN / BREAST:                                                                                 WNL                                                          LABS:                        12.9   5.23  )-----------( 104      ( 28 Jul 2021 07:38 )             37.5     07-28    141  |  106  |  16  ----------------------------<  161<H>  3.6   |  23  |  1.21    Ca    9.2      28 Jul 2021 07:38      PT/INR - ( 28 Jul 2021 09:01 )   PT: 12.3 sec;   INR: 1.03          PTT - ( 28 Jul 2021 09:01 )  PTT:30.1 sec    CARDIAC MARKERS ( 28 Jul 2021 12:25 )  x     / 0.01 ng/mL / x     / x     / x      CARDIAC MARKERS ( 28 Jul 2021 07:38 )  x     / <0.01 ng/mL / x     / x     / x        RADIOLOGY & ADDITIONAL STUDIES:  < from: CT Angio Chest PE Protocol w/ IV Cont (07.03.21 @ 17:32) >   There is aneurysmal dilatation of the ascending thoracic aorta which measures 4.7 cm at the level of the main pulmonary artery, previously measuring 4.3 cm in 7/2019.   < end of copied text > Requesting Physician: Dr. Gomez     HISTORY OF PRESENT ILLNESS:   72 y/o M, Poor historian, former Vet, w/ a PMHx of current cocaine abuser, current Marijuana user, known CAD (LM, LAD and OM1 Dz on prior CCTA), HFrEF (EF 36% ECHO 7/28/21), known PAD (B/L LE and RUE stents), occasional ETOH user, HTN, DM, HIV (unknown CD4, VL undetectable), Hep C (s/p treatment), prostate CA (s/p radiation therapy), and depression who presented to the [facility] ED 7/28/21 with near syncope episode today around 5AM, with associated intermittent, mild substernal, non-radiating CP, lasting several minutes, resolves after ASA. Pt endorses CP with cocaine use. Last used on Monday.  Additionally states his chest pain is brought on by exertion and states he walks "at least 5 miles a day". Pt with recent ED evaluations this month complaining of SOB/fatigue with subsequent discharges. This admission, pt found to have +Utox for cocaine. Pt loaded with ASA 325mg x 1, given his home Amlodipine 2.5mg PO, Enalapril 2.5mg PO, Imdur 30mg PO, NTG SL x 1 and Nitropaste 1" x 1. Pt is now admitted to 5U for ischemic work-up and cardiac cath with Dr. Casey. CTA PE protocol chest completed on admission revealed ascending aortic aneurysmal dilation of 4.7cm (increased from 4.3 on previous scan 07/2019). CT surgery consulted for evaluation. Patient denies SOB, wheezing, n/v/d/c, fevers or chills.    PAST MEDICAL & SURGICAL HISTORY:  HIV (human immunodeficiency virus infection)  DM (diabetes mellitus)  HTN (hypertension)  Chronic diarrhea  Hepatitis C  PVD (peripheral vascular disease)  CAD (coronary artery disease)  Thoracic aortic aneurysm (S/P arterial stent)    MEDICATIONS  (STANDING):  ARIPiprazole 2 milliGRAM(s) Oral daily  atorvastatin 20 milliGRAM(s) Oral at bedtime  chlorhexidine 4% Liquid 1 Application(s) Topical once  dolutegravir 50 milliGRAM(s) Oral daily  emtricitabine 200 mG/tenofovir alafenamide 25 mG (DESCOVY) Tablet 1 Tablet(s) Oral daily  escitalopram 5 milliGRAM(s) Oral daily    MEDICATIONS  (PRN):  nitroglycerin     SubLingual 0.4 milliGRAM(s) SubLingual every 5 minutes PRN Chest Pain    Allergies    Peaches (Rash)  penicillins (Rash)  Seafood (Rash)  strawberry (Rash)  sulfa drugs (Rash)    Intolerances    SOCIAL HISTORY:  Current cocaine and marijuana user. Occasional ETOH use.   Lives in stressful environment with roommate in [employer]    FAMILY HISTORY:  No pertinent family history in first degree relatives    Review of Systems:  CONSTITUTIONAL: Denies fevers / chills, sweats, fatigue, weight loss, weight gain                                       NEURO:  Denies parathesias, seizures, syncope, confusion                                                                                  EYES:  Denies blurry vision, discharge, pain, loss of vision                                                                                    ENMT:  Denies difficulty hearing, vertigo, dysphagia, epistaxis, recent dental work                                       CV:  Denies chest pain, palpitations, ROSADO, orthopnea                                                                                           RESPIRATORY:  Denies wWheezing, SOB, cough / sputum, hemoptysis                                                               GI:  Denies nausea, vomiting, diarrhea, constipation, melena                                                                          : Denies hematuria, dysuria, urgency, incontinence                                                                                          MUSKULOSKELETAL:  Denies arthritis, joint swelling, muscle weakness                                                             SKIN/BREAST:  Denies rash, itching, hair loss, masses                                                                                              PSYCH:  Denies depression, anxiety, suicidal ideation                                                                                                HEME/LYMPH:  Denies bruises easily, enlarged lymph nodes, tender lymph nodes                                          ENDOCRINE:  Denies cold intolerance, heat intolerance, polydipsia                                                                      Vital Signs Last 24 Hrs  T(C): 36.7 (28 Jul 2021 16:34), Max: 36.8 (28 Jul 2021 06:38)  T(F): 98 (28 Jul 2021 16:34), Max: 98.3 (28 Jul 2021 06:38)  HR: 73 (28 Jul 2021 16:34) (56 - 75)  BP: 148/81 (28 Jul 2021 16:34) (113/77 - 177/102)  BP(mean): 127 (28 Jul 2021 09:14) (127 - 127)  RR: 18 (28 Jul 2021 16:34) (16 - 18)  SpO2: 97% (28 Jul 2021 16:34) (94% - 100%)    PHYSICAL EXAM:  General: well appearing sitting up in bed in NAD   Neurological: AOx3. Motor skills grossly intact  Cardiovascular: Normal S1/S2. Regular rate/rhythm. + murmurs  Respiratory: Lungs CTA bilaterally. b/l fine crackles heard at the bases equally   Gastrointestinal: +BS in all 4 quadrants. Non-distended. Soft. Non-tender  Extremities: Strength 5/5 b/l upper/lower extremities. Sensation grossly intact upper/lower extremities. No edema. No calf tenderness.  Vascular: Radial 2+bilaterally, DP 2+ b/l  Incision Sites: radial site 2/2 to cath with radial band in place                                                           LABS:                        12.9   5.23  )-----------( 104      ( 28 Jul 2021 07:38 )             37.5     07-28    141  |  106  |  16  ----------------------------<  161<H>  3.6   |  23  |  1.21    Ca    9.2      28 Jul 2021 07:38      PT/INR - ( 28 Jul 2021 09:01 )   PT: 12.3 sec;   INR: 1.03     PTT - ( 28 Jul 2021 09:01 )  PTT:30.1 sec    CARDIAC MARKERS ( 28 Jul 2021 12:25 )  x     / 0.01 ng/mL / x     / x     / x      CARDIAC MARKERS ( 28 Jul 2021 07:38 )  x     / <0.01 ng/mL / x     / x     / x        RADIOLOGY & ADDITIONAL STUDIES:  < from: CT Angio Chest PE Protocol w/ IV Cont (07.03.21 @ 17:32) >   There is aneurysmal dilatation of the ascending thoracic aorta which measures 4.7 cm at the level of the main pulmonary artery, previously measuring 4.3 cm in 7/2019.   < end of copied text >

## 2021-07-28 NOTE — H&P ADULT - PROBLEM SELECTOR PLAN 6
F: Oral intake  E: Replete electrolytes as needed for K<4 and Mg<2  N: NPO    DVT PPX: held in setting of Catheterization    Dispo: Pending clinical progression    Case discussed with Dr Gomez

## 2021-07-28 NOTE — CONSULT NOTE ADULT - ASSESSMENT
70 y/o M, Poor historian, former Vet, w/ a PMHx of current cocaine abuser, current Marijuana user, known CAD (LM, LAD and OM1 Dz on prior CCTA), HFrEF (EF 36% ECHO 7/28/21), known PAD (B/L LE and RUE stents), occasional ETOH user, HTN, DM, HIV (unknown CD4, VL undetectable), Hep C (s/p treatment), prostate CA (s/p radiation therapy), and depression who presented to the [facility] ED 7/28/21 with near syncope episode today around 5AM, with associated intermittent, mild substernal, non-radiating CP, lasting several minutes, resolves after ASA. Pt endorses CP with cocaine use. Last used on Monday. Pt with recent ED evaluations this month complaining of SOB/fatigue with subsequent discharges. This admission, pt found to have +Utox for cocaine. Pt loaded with ASA 325mg x 1, given his home Amlodipine 2.5mg PO, Enalapril 2.5mg PO, Imdur 30mg PO, NTG SL x 1 and Nitropaste 1" x 1. Pt is now admitted to 5U for ischemic work-up and cardiac cath with Dr. Casey. CTA PE protocol chest completed on admission revealed ascending aortic aneurysmal dilation of 4.7cm (increased from 4.3 on previous scan 07/2019). CT surgery consulted for evaluation.    Plan:  Problem 1: Increasing ascending aortic aneurysm  -Increased in size from 4.3cm to 4.7cm on CT today  -Surveillance CT scans recommended and at a minimum outpatient follow-up  -Case to be discussed with Dr. Butts early in morning tomorrow     Problem 2: CAD  -pt admitted to , negative troponins  -Cocaine abuser, needs cocaine cessation counseling    -currently undergoing cardiac cath with cardiology team  -care per primary team    Problem 3: HTN  -resume home medications when appropriate  -care per primary team    Problem 4: HFrEF  -resume HF medications as per primary team  -care per primary team    Case to be discussed in full with Dr. Butts in AM. CT surgery will continue to follow and give further recommendations within 24 hours.  70 y/o M, Poor historian, former Vet, w/ a PMHx of current cocaine abuser, current Marijuana user, known CAD (LM, LAD and OM1 Dz on prior CCTA), HFrEF (EF 36% ECHO 7/28/21), known PAD (B/L LE and RUE stents), occasional ETOH user, HTN, DM, HIV (unknown CD4, VL undetectable), Hep C (s/p treatment), prostate CA (s/p radiation therapy), and depression who presented to the [facility] ED 7/28/21 with near syncope episode today around 5AM, with associated intermittent, mild substernal, non-radiating CP, lasting several minutes, resolves after ASA. Pt endorses CP with cocaine use. Additionally states his chest pain is brought on by exertion and states he walks "at least 5 miles a day".  Last used on Monday. Pt with recent ED evaluations this month complaining of SOB/fatigue with subsequent discharges. This admission, pt found to have +Utox for cocaine. Pt loaded with ASA 325mg x 1, given his home Amlodipine 2.5mg PO, Enalapril 2.5mg PO, Imdur 30mg PO, NTG SL x 1 and Nitropaste 1" x 1. Pt is now admitted to 5U for ischemic work-up and cardiac cath with Dr. Casey. CTA PE protocol chest completed on admission revealed ascending aortic aneurysmal dilation of 4.7cm (increased from 4.3 on previous scan 07/2019). CT surgery consulted for evaluation.    Plan:  Problem 1: Increasing ascending aortic aneurysm  -Increased in size from 4.3cm to 4.7cm on CT today  -Surveillance CT scans recommended and at a minimum outpatient follow-up  -Case to be discussed with Dr. Butts early in morning tomorrow     Problem 2: CAD  -pt admitted to , negative troponins  -Cocaine abuser, needs cocaine cessation counseling    -currently undergoing cardiac cath with cardiology team  -care per primary team    Problem 3: HTN  -resume home medications when appropriate  -care per primary team    Problem 4: HFrEF  -resume HF medications as per primary team  -care per primary team    Case to be discussed in full with Dr. Butts in AM. CT surgery will continue to follow and give further recommendations within 24 hours.  70 y/o M, Poor historian, former Vet, w/ a PMHx of current cocaine abuser, current Marijuana user, known CAD (LM, LAD and OM1 Dz on prior CCTA), HFrEF (EF 36% ECHO 7/28/21), known PAD (B/L LE and RUE stents), occasional ETOH user, HTN, DM, HIV (unknown CD4, VL undetectable), Hep C (s/p treatment), prostate CA (s/p radiation therapy), and depression who presented to the [facility] ED 7/28/21 with near syncope episode today around 5AM, with associated intermittent, mild substernal, non-radiating CP, lasting several minutes, resolves after ASA. Pt endorses CP with cocaine use. Additionally states his chest pain is brought on by exertion and states he walks "at least 5 miles a day".  Last used on Monday. Pt with recent ED evaluations this month complaining of SOB/fatigue with subsequent discharges. This admission, pt found to have +Utox for cocaine. Pt loaded with ASA 325mg x 1, given his home Amlodipine 2.5mg PO, Enalapril 2.5mg PO, Imdur 30mg PO, NTG SL x 1 and Nitropaste 1" x 1. Pt is now admitted to 5U for ischemic work-up and cardiac cath with Dr. Casey. CTA PE protocol chest completed on admission revealed ascending aortic aneurysmal dilation of 4.7cm (increased from 4.3 on previous scan 07/2019). CT surgery consulted for evaluation.    Plan:  Problem 1: Increasing ascending aortic aneurysm  -Increased in size from 4.3cm(2019) to 4.7cm on CT today  -Surveillance CT scans recommended with outpatient follow-up  - Please have patient follow up on 8/18/2021 at 1045am    [address] [place] [address] [place] [address], [address] [facility]    [phone]    Problem 2: CAD  -pt admitted to , negative troponins  -Cocaine abuser, needs cocaine cessation counseling       - please educate patient on risk of aortic dissection with cocaine use  -Cath completed yesterday with non obstructive CAD  -care per primary team    Problem 3: HTN  -resume home medications when appropriate  -care per primary team    Problem 4: HFrEF  -resume HF medications as per primary team  -care per primary team

## 2021-07-28 NOTE — H&P ADULT - NSHPSOCIALHISTORY_GEN_ALL_CORE
Lives in Hostel, shares room with someone who is giving him a lot of stress  Pt with no children, has a sister and bestfriend who help him out Lives in Hostel, shares room with someone who is giving him a lot of stress  Pt with no children, has a sister and best friend who help him out

## 2021-07-28 NOTE — CONSULT NOTE ADULT - SUBJECTIVE AND OBJECTIVE BOX
Hematology Consult Note    HPI:  70 y/o M, Poor historian, former Vet, current frequent Cocaine Abuser, frequent Marijuana user, Shellfish allergy (tolerated CCTA w/ contrast without allergic reaction), Known CAD with LM, LAD and OM1 Dz on prior CCTA), known PAD with B/L LE and RUE stent former, Occasional ETOH user, PMHx of HTN, DM, HIV (unknown CD4, VL undetectable), Hep C (treated w/ Harvoni 3-4 years ago), Prostate CA s/p radiation therapy, and Depression, last admitted in July 2019, found with abnormal CCTA revealing moderate LM and severe LAD disease, however, Cardiac Angiogram deferred in the setting of Thrombocytopenia.   Pt presenting to the [facility] ED 7/28/21 with c/o  near syncope episode today at around 5AM, with associated intermittent,  mild substernal, non-radiating CP, lasting several minutes, resolves after ASA. Pt states that he normally get these intermittent CP a few days after cocaine use, last used on Monday.  Of note: Pt was seen in ED on 7/3/21 and 7/18/21 with c/o SOB and fatigue, evaluated and discharge both times from the ED.    On arrival to ED, VS noted as T: 98.3  HR 68  /62 180 RR 18, SpO2 96% on RA. Labs significant for Trops negative x 1,  (previously 85 on admit 7/18/21) . EKG showed SR @ 76 bpm, occasional PVC's. Old Septal Infarct, no acute ischemic changes.  CXR negative for acute process.  UTOX Positive for Cocaine.  CCTA 7/2019:  Ca2+ 1323.  Moderate to severe stenosis of mid LAD followed by an additional moderate stenosis.    Moderate OM2 stenosis. Less than 50% stenosis of LM. Non-obstructive coronary disease elsewhere.  LVEF appears mildly to moderately reduced with hypokinesis of the mid to apical septum.  Dilated ascending aorta measuring 4.4cm x 4.3cm at the level of the main pulmonary artery.   ECHO 7/2019:  Normal LV and RV size and systolic function. No regional WMA. EF 56%. No significant Valvulopathies. No significant valvular disease. No Pum HTN, EF 56% (see full report).  In ED, pt loaded with ASA 325mg x 1, given his home Amlodipine 2.5mg PO, Enalapril 2.5mg PO, Imdur 30mg PO, NTG SL x 1 and Nitropaste 1" x 1.   Pt is now admitted to [email or web address] for ischemic work-up and is scheduled for left heart catheterization today with Dr. Casey.    (28 Jul 2021 09:14)    Hematology consulted for Thrombocytopenia.     Allergies    Peaches (Rash)  penicillins (Rash)  Seafood (Rash)  strawberry (Rash)  sulfa drugs (Rash)    Intolerances        MEDICATIONS  (STANDING):  ARIPiprazole 2 milliGRAM(s) Oral daily  atorvastatin 20 milliGRAM(s) Oral at bedtime  chlorhexidine 4% Liquid 1 Application(s) Topical once  dolutegravir 50 milliGRAM(s) Oral daily  emtricitabine 200 mG/tenofovir alafenamide 25 mG (DESCOVY) Tablet 1 Tablet(s) Oral daily  escitalopram 5 milliGRAM(s) Oral daily    MEDICATIONS  (PRN):  nitroglycerin     SubLingual 0.4 milliGRAM(s) SubLingual every 5 minutes PRN Chest Pain      PAST MEDICAL & SURGICAL HISTORY:  HIV (human immunodeficiency virus infection)    DM (diabetes mellitus)    HTN (hypertension)    Chronic diarrhea    Hepatitis C    PVD (peripheral vascular disease)    CAD (coronary artery disease)    Thoracic aortic aneurysm    S/P arterial stent  bilat LE        FAMILY HISTORY:  No pertinent family history in first degree relatives        SOCIAL HISTORY: No EtOH, no tobacco    REVIEW OF SYSTEMS:    CONSTITUTIONAL: No weakness, fevers or chills  EYES/ENT: No visual changes;  No vertigo or throat pain   NECK: No pain or stiffness  RESPIRATORY: No cough, wheezing, hemoptysis;  shortness of breath +  CARDIOVASCULAR: No chest pain or palpitations, Chest pressure +  GASTROINTESTINAL: No abdominal or epigastric pain. No nausea, vomiting, or hematemesis; No diarrhea or constipation. No melena or hematochezia.  GENITOURINARY: No dysuria, frequency or hematuria  NEUROLOGICAL: No numbness or weakness  SKIN: No itching, burning, rashes, or lesions   All other review of systems is negative unless indicated above.    Height (cm): 182.9 (07-28 @ 12:19)  Weight (kg): 81.6 (07-28 @ 12:19)  BMI (kg/m2): 24.4 (07-28 @ 12:19)  BSA (m2): 2.04 (07-28 @ 12:19)    T(F): 98 (07-28-21 @ 16:34), Max: 98.3 (07-28-21 @ 06:38)  HR: 73 (07-28-21 @ 16:34)  BP: 148/81 (07-28-21 @ 16:34)  RR: 18 (07-28-21 @ 16:34)  SpO2: 97% (07-28-21 @ 16:34)  Wt(kg): --    GENERAL: NAD, well-developed  HEAD:  Atraumatic, Normocephalic  EYES: EOMI  NECK: Supple  CHEST/LUNG: Non labored breathing  HEART: Regular rate and rhythm; S1S2  ABDOMEN: Soft, Nontender, Nondistended  EXTREMITIES:  No clubbing, cyanosis  NEUROLOGY: non-focal  SKIN: No rashes or lesions                          12.9   5.23  )-----------( 104      ( 28 Jul 2021 07:38 )             37.5       07-28    141  |  106  |  16  ----------------------------<  161<H>  3.6   |  23  |  1.21    Ca    9.2      28 Jul 2021 07:38

## 2021-07-28 NOTE — ED PROVIDER NOTE - PROGRESS NOTE DETAILS
ER Physician:  Carmen Director  CHEST XRAY INTERPRETATION: lungs clear, heart shadow normal, bony structures intact similar to 7/18/21 Pt w neg trop.  Pt felt improvement after ntg, now 0/10, ntp ordered.  Pt discussed w pa and Dr Gomez for admission; pt's cardiologist Dr Fontanez paged to discuss. No reply from Dr Fontanez; will admit to hospitalist cardiology. Per cardiology pa, pt for cath today.  She is coming to eval pt and decide meds prior to cath.

## 2021-07-28 NOTE — H&P ADULT - NSHPLABSRESULTS_GEN_ALL_CORE
12.9   5.23  )-----------( 104      ( 28 Jul 2021 07:38 )             37.5       07-28    141  |  106  |  16  ----------------------------<  161<H>  3.6   |  23  |  1.21    Ca    9.2      28 Jul 2021 07:38        PT/INR - ( 28 Jul 2021 09:01 )   PT: 12.3 sec;   INR: 1.03          PTT - ( 28 Jul 2021 09:01 )  PTT:30.1 sec    CARDIAC MARKERS ( 28 Jul 2021 07:38 )  x     / <0.01 ng/mL / x     / x     / x                EKG: 12.9   5.23  )-----------( 104      ( 28 Jul 2021 07:38 )             37.5       07-28    141  |  106  |  16  ----------------------------<  161<H>  3.6   |  23  |  1.21    Ca    9.2      28 Jul 2021 07:38        PT/INR - ( 28 Jul 2021 09:01 )   PT: 12.3 sec;   INR: 1.03          PTT - ( 28 Jul 2021 09:01 )  PTT:30.1 sec    CARDIAC MARKERS ( 28 Jul 2021 07:38 )  x     / <0.01 ng/mL / x     / x     / x

## 2021-07-28 NOTE — H&P ADULT - TIME BILLING
71M  current Cocaine Abuse, Marijuana abuse, Shellfish allergy (has tolerated prior contrast imaging without allergic reaction), Known CAD with moderate LM, severe LAD and OM1 Dz on prior CCTA 7/2019), known PAD with B/L LE and RUE stent, HTN, DM, HIV (on HAART), Hep C (s/p Harvoni tx), Prostate CA s/p radiation tx, who presents with near syncope and unstable angina.   Patient currently CP free with nitro paste in place. Exam stable with clear lungs, RRR, no MGR, no ROSMERY, A&Ox4. EKG 7/28 NSR with PVC, SMI of IA  Plan for:  s/p ASA load, DAPT load per IC team  Max dose atorva 20qHS due to descovy use  HIV HAART meds continued, HIV team alerted NTD as patient stable on regimen  Home amlodipine 2.5 daily, Imdur 30 daily, Enalapril 2.5 daily   Order HbA1c, TFTs, and FLP  Obtain TTE for cardiac structural assessment  CTSx aortic team consult given progressive thoracic aortic aneurysm  Hematology consulted, no acute issues will see electively on 7/29  Patient to be referred to cardiac rehab upon discharge for cardiac conditioning  Details discussed with the patient and cardiac care team  Yung Easley M.D.  Cardiology Attending

## 2021-07-28 NOTE — H&P ADULT - ASSESSMENT
EKG:					  ASA _____				Mallampati class: _________	            Anginal Class: _________    Sedation Plan:   ? None   ? Moderate    ?  Deep    ?  General Anesthesia   Patient Is Suitable Candidate For Sedation?     ? Yes   ? No   ? Not Applicable     Risks & benefits of procedure and sedation and risks and benefits for the alternative therapy have been explained to the patient in layman’s terms including but not limited to: allergic reaction, bleeding, infection, arrhythmia, respiratory compromise, renal and vascular compromise, limb damage, MI, CVA, emergent CABG/Vascular Surgery and death. Informed consent obtained and in chart.     72 y/o M, Poor historian, former Vet, current frequent Cocaine Abuser, frequent Marijuana user, Shellfish allergy (tolerated CCTA w/ contrast without allergic reaction), Known CAD with LM, LAD and OM1 Dz on prior CCTA), known PAD with B/L LE and RUE stent former, Occasional ETOH user, PMHx of HTN, DM, HIV (unknown CD4, VL undetectable), Hep C (treated w/ Harvoni 3-4 years ago), Prostate CA s/p radiation therapy, and Depression, last admitted in July 2019, found with abnormal CCTA revealing moderate LM and severe LAD disease, however, Cardiac Angiogram deferred in the setting of Thrombocytopenia.   Pt presenting to [facility] ED 7/28/21 with c/o  near syncope episode and Class III Anginal symptoms. Trops negative x 1 set, EKG non-ischemic. CXR unremarkable.   Pt with no further near syncopal episodes since admit, CP completely resolved since administration of ASA 325mg and his home medications.   Given pt's risk factors, current CCS Class III Anginal, and previous abnormal CCTA, pt is recommended for cardiac catheterization with possible intervention if clinically indicated with Dr. Casey.     EKG: SR @ 64bpm, occas PVC's old septal infarct, no acute ischemia seen  				  ASA:  III			Mallampati class: II	            Anginal Class: 3    Sedation Plan:  Moderate      Patient Is Suitable Candidate For Sedation: No     Risks & benefits of procedure and sedation and risks and benefits for the alternative therapy have been explained to the patient in layman’s terms including but not limited to: allergic reaction, bleeding, infection, arrhythmia, respiratory compromise, renal and vascular compromise, limb damage, MI, CVA, emergent CABG/Vascular Surgery and death. Informed consent obtained and in chart.

## 2021-07-28 NOTE — ED ADULT NURSE REASSESSMENT NOTE - NS ED NURSE REASSESS COMMENT FT1
received pt from RN. pt alert and oriented x3, respirations equal and unlabored. denies any pain at this time. continues to be on cardiac monitor.

## 2021-07-28 NOTE — H&P ADULT - PROBLEM SELECTOR PLAN 5
HIV (human immunodeficiency virus infection).  Plan:   CD4 count unknown, VL undetectable  c/w  Descovy and Tivicay as prescribed HIV (human immunodeficiency virus infection).  Plan:   -CD4 count unknown, VL undetectable  -c/w  Descovy and Tivicay as prescribed  -HIV team made aware, no need to see pt as he is currently on medications. HIV (human immunodeficiency virus infection).  Plan:   -CD4 count unknown, VL undetectable  -c/w  Descovy and Tivicay as prescribed  -HIV team made aware, no need to see pt as he is currently on medications.    #History of Hep C  -s/p Harvoni per pt, continue to monitor plts

## 2021-07-28 NOTE — H&P ADULT - ATTENDING COMMENTS
71M  current Cocaine Abuse, Marijuana abuse, Shellfish allergy (has tolerated prior contrast imaging without allergic reaction), Known CAD with moderate LM, severe LAD and OM1 Dz on prior CCTA 7/2019), known PAD with B/L LE and RUE stent, Thrombocytopenia, HTN, DM, HIV (on HAART), Hep C (s/p Harvoni tx), Prostate CA s/p radiation tx, who presents with near syncope and unstable angina in context of unrevascularized CAD.   Patient currently CP free with nitro paste in place. Exam stable with clear lungs, RRR, no MGR, no ROSMERY, A&Ox4. EKG 7/28 NSR with PVC, SMI of IA  Plan for:  s/p ASA load, DAPT load per IC team  Max dose atorva 20qHS due to descovy use  HIV HAART meds continued, HIV team alerted NTD as patient stable on regimen  Home amlodipine 2.5 daily, Imdur 30 daily, Enalapril 2.5 daily   Order HbA1c, TFTs, and FLP  Obtain TTE for cardiac structural assessment  CTSx aortic team consult given progressive thoracic aortic aneurysm  Hematology consulted, no acute issues will see electively on 7/29  Patient to be referred to cardiac rehab upon discharge for cardiac conditioning  Details discussed with the patient and cardiac care team  Yung Easley M.D.  Cardiology Attending 71M  current Cocaine Abuse, Marijuana abuse, Shellfish allergy (has tolerated prior contrast imaging without allergic reaction), Known CAD with moderate LM, severe LAD and OM1 Dz on prior CCTA 7/2019), known PAD with B/L LE and RUE stent, Thrombocytopenia, HTN, DM, HIV (on HAART), Hep C (s/p Harvoni tx), Prostate CA s/p radiation tx, who presents with near syncope and unstable angina in context of unrevascularized CAD.   Patient currently CP free with nitro paste in place. Exam stable with clear lungs, RRR, no MGR, no ROSMERY, A&Ox4. EKG 7/28 NSR with PVC, SMI of IA  Plan for:  s/p ASA load, DAPT load per IC team  Nitro paste in place for CP relief  Max dose atorva 20qHS due to descovy use  HIV HAART meds continued, HIV team alerted NTD as patient stable on regimen  Home amlodipine 2.5 daily, Imdur 30 daily, Enalapril 2.5 daily   Order HbA1c, TFTs, and FLP  Obtain TTE for cardiac structural assessment  CTSx aortic team consult given progressive thoracic aortic aneurysm  Hematology consulted, no acute issues will see electively on 7/29  Nutrition consult for cardiac diet reinforcement  SW consult for drug cessation resources  Patient to be referred to cardiac rehab upon discharge for cardiac conditioning  Details discussed with the patient and cardiac care team  Yung Easley M.D.  Cardiology Attending

## 2021-07-28 NOTE — ED PROVIDER NOTE - CLINICAL SUMMARY MEDICAL DECISION MAKING FREE TEXT BOX
Pt c/o near syncope, cp, sob x ~ 2 h today.  EKG w/o stemi.  Sx concerning for acs, poss htn urgency (bp 160's on my exam but reports higher w ems 180's), ? cocaine related vasospasm, no pe risks, + c/o dry cough w/o fever, lung cta - low concern for pna.  Pt w taa w slight increased size from 2019 but no c/o radiation, low concern for taa related cp/sob.  Plan labs, cxr, ekg, monitor; pt given his home bp meds (did not take this am); reassess.

## 2021-07-28 NOTE — H&P ADULT - PROBLEM SELECTOR PLAN 1
Pt presenting to EDwith c/o near syncope and CP this AM.  No further syncopal episodes since admit. CP resolved after home medications given.    -Currently CP free and HD stable, VSS  - EKG with SR @ 64 bpm, occasional PVC, old septal infarct  - Trop negative x 1, f/u Trops 12N   - Obtain ECHO for EF and structural  -Loaded with ASA 325mg PO x 1 in ED.  Given home medications:  Enalapril 2.5mg QD, Amlodipine 2.5mg, Imdur 30mg daily, NTG SL x1 and Nitropaste 1”     - c/w home meds as above.  Hold initiation of BB, Utox positive for cocaine   - c/w Pravastatin 80mg QHS  - Scheduled for LHC today with Dr. Casey.   Precath/Consented.  NPO. NS @ 75cc x 4 hours  - Heme consulted to evaluate thrombocytopenia prior to initiation of DAPT. Pt presenting to EDwith c/o near syncope and CP this AM.  No further syncopal episodes since admit. CP resolved after home medications given.    -Currently CP free and HD stable, VSS  -EKG with SR @ 64 bpm, occasional PVC, old septal infarct  -Trop negative x 1, f/u Trops 12N   -Obtain ECHO for EF and structural  -Loaded with ASA 325mg PO x 1 in ED.  Given home medications:  Enalapril 2.5mg QD, Amlodipine 2.5mg, Imdur 30mg daily, NTG SL x1 and Nitropaste 1”     -c/w home meds as above.  Hold initiation of BB, Utox positive for cocaine   -c/w Pravastatin 80mg QHS  -Scheduled for LHC today with Dr. Casey.   Precath/Consented.  NPO. NS @ 75cc x 4 hours  -Heme consulted to evaluate thrombocytopenia prior to initiation of DAPT. Pt presenting to EDwith c/o near syncope and CP this AM.  No further syncopal episodes since admit. CP resolved after home medications given.    -Currently CP free and HD stable, VSS  -EKG with SR @ 64 bpm, occasional PVC, old septal infarct  -Trop negative x 1, f/u Trops 12N   -Obtain ECHO for EF and structural  -Loaded with ASA 325mg PO x 1 in ED.  Given home medications:  Enalapril 2.5mg QD, Amlodipine 2.5mg, Imdur 30mg daily, NTG SL x1 and Nitropaste 1”     -c/w home meds as above.  Hold initiation of BB, Utox positive for cocaine   -on home Pravastatin 80mg QHS, non-formulary, no supply per Pharmacy, will try to bring in home med for pharmacy verification.   -Scheduled for LHC today with Dr. Casey.   Precath/Consented.  NPO. NS @ 75cc x 4 hours  -Heme consulted to evaluate thrombocytopenia prior to initiation of DAPT.

## 2021-07-28 NOTE — H&P ADULT - PROBLEM SELECTOR PLAN 3
Elevated /102 on admit  -now improved after antihypertensives given, now with /71, Asx  - Continue Enalapril 2.5mg QD, l 2.5mg QD, Amlodipine 2.5mg, Imdur 30mg daily, no BB given positive UTOX.

## 2021-07-28 NOTE — H&P ADULT - NSICDXPASTMEDICALHX_GEN_ALL_CORE_FT
PAST MEDICAL HISTORY:  CAD (coronary artery disease)     Chronic diarrhea     DM (diabetes mellitus)     Hepatitis C     HIV (human immunodeficiency virus infection)     HTN (hypertension)     PVD (peripheral vascular disease)     Thoracic aortic aneurysm

## 2021-07-28 NOTE — H&P ADULT - PROBLEM SELECTOR PLAN 2
Pt with known thrombocytopenia, likely 2/2 to Hep C  -Plts 104 on admission, previously 85 (7/18/21), 127 (7/3/21), as low 53 in 2019  -Hematology service consulted for DAPT therapy. F/U recs Pt with known Chronic thrombocytopenia, likely 2/2 to Hep C  -Plts 104 on admission, previously 85 (7/18/21), 127 (7/3/21), as low 53 in 2019  -Hematology service consulted, per  Dr. Snyder, plts appears stable, pt is cleared per Heme for DAPT. Will evaluate pt post cath.

## 2021-07-28 NOTE — CONSULT NOTE ADULT - ASSESSMENT
70 y/o M, Poor historian, former Vet, current frequent Cocaine Abuser, frequent Marijuana user, Shellfish allergy (tolerated CCTA w/ contrast without allergic reaction), Known CAD with LM, LAD and OM1 Dz on prior CCTA), known PAD with B/L LE and RUE stent former, Occasional ETOH user, PMHx of HTN, DM, HIV (unknown CD4, VL undetectable), Hep C (treated w/ Harvoni 3-4 years ago), Prostate CA s/p radiation therapy, and Depression, last admitted in July 2019, found with abnormal CCTA revealing moderate LM and severe LAD disease, however, Cardiac Angiogram deferred in the setting of Thrombocytopenia.   Pt presenting to the [facility] ED 7/28/21 with c/o  near syncope episode today at around 5AM, with associated intermittent,  mild substernal, non-radiating CP, lasting several minutes, resolves after ASA. Pt states that he normally get these intermittent CP a few days after cocaine use, last used on Monday.      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 No contraindication for DAPT with current counts.   - Patient found to have reduced EF on TTE in ED and is scheduled for Cardiac cath tonight      To JORGE/w Dr. Villaseñor

## 2021-07-28 NOTE — H&P ADULT - HISTORY OF PRESENT ILLNESS
Skeleton    70 y/o M, Poor historian, current frequent Cocaine Abuser, Marijuana user, Shellfish allergy (tolerated CCTA w/ contrast without allergic reaction), Known CAD with LM, LAD and OM1 Dz on prior CCTA), known PAD with B/L LE and RUE stent former, ETOH abuser (quit 25 years ago), with PMHx of HTN, DM, HIV (unknown CD4, VL undetectable), Hep C (treated w/ Harvoni 3-4 years ago), Prostate CA s/p radiation therapy, and Depression, last admitted in July 2019, found with abnormal CCTA revealing moderate LM and severe LAD disease, however, Cardiac Angiogram deferred in the setting to Thrombocytopenia.   Pt presenting to the [facility] ED 7/28/21 with c/o  near syncope episode, CP and SOB, sudden onset, non-radiating, mid-sternal chest pressure for 2 hours. Pt endorses similar CP in the past after cocaine use.  Think his BP high 180's.  Of note: Pt was seen in ED on 7/3/21 and 7/18/21 with c/o SOB and fatigue, evaluated and discharge both times from the ED.    On arrival to ED, VS noted as T: 98.3  HR 68  /62 180 RR 18, SpO2 96% on RA. Labs significant for Trops negative x 1,  (previously 85 on admit 7/18/21) . EKG showed SR @ 76 bpm, occasional PVC's.  ?? Old Septal Infarct.  CXR negative for acute process.   CCTA 7/2019:  Ca2+ 1323.  Moderate to severe stenosis of mid LAD followed by an additional moderate stenosis.    Moderate OM2 stenosis. Less than 50% stenosis of LM. Non-obstructive coronary disease elsewhere.  LVEF appears mildly to moderately reduced with hypokinesis of the mid to apical septum.  Dilated ascending aorta measuring 4.4cm x 4.3cm at the level of the main pulmonary artery.   ECHO 7/2019:  Normal LV and RV size and systolic function. No regional WMA. EF 56%. No significant Valvulopathies. No significant valvular disease. No Pum HTN, EF 56% (see full report)    In ED, pt loaded with ASA 325mg x 1, given his home Amlodipine 2.5mg PO, Enalapril 2.5mg PO, Imdur 30mg PO, NTG SL x 1 and Nitropaste 1" x 1.   Pt is now admitted to 5 [facility] for ischemic work-up and is scheduled for left heart catheterization today with Dr. Casey   72 y/o M, Poor historian, former Vet, current frequent Cocaine Abuser, frequent Marijuana user, Shellfish allergy (tolerated CCTA w/ contrast without allergic reaction), Known CAD with LM, LAD and OM1 Dz on prior CCTA), known PAD with B/L LE and RUE stent former, Occasional ETOH user, PMHx of HTN, DM, HIV (unknown CD4, VL undetectable), Hep C (treated w/ Harvoni 3-4 years ago), Prostate CA s/p radiation therapy, and Depression, last admitted in July 2019, found with abnormal CCTA revealing moderate LM and severe LAD disease, however, Cardiac Angiogram deferred in the setting of Thrombocytopenia.   Pt presenting to the [facility] ED 7/28/21 with c/o  near syncope episode today at around 5AM, he also has is now c/o intermittent,  mild substernal, non-radiating CP, last several minutes that resolves on its own.  Pt states that he get these intermittent CP a few days after cocaine use, last used on Monday.  180's.  Of note: Pt was seen in ED on 7/3/21 and 7/18/21 with c/o SOB and fatigue, evaluated and discharge both times from the ED.    On arrival to ED, VS noted as T: 98.3  HR 68  /62 180 RR 18, SpO2 96% on RA. Labs significant for Trops negative x 1,  (previously 85 on admit 7/18/21) . EKG showed SR @ 76 bpm, occasional PVC's.  ?? Old Septal Infarct.  CXR negative for acute process.  UTOX Positive for Cocaine.  CCTA 7/2019:  Ca2+ 1323.  Moderate to severe stenosis of mid LAD followed by an additional moderate stenosis.    Moderate OM2 stenosis. Less than 50% stenosis of LM. Non-obstructive coronary disease elsewhere.  LVEF appears mildly to moderately reduced with hypokinesis of the mid to apical septum.  Dilated ascending aorta measuring 4.4cm x 4.3cm at the level of the main pulmonary artery.   ECHO 7/2019:  Normal LV and RV size and systolic function. No regional WMA. EF 56%. No significant Valvulopathies. No significant valvular disease. No Pum HTN, EF 56% (see full report).  In ED, pt loaded with ASA 325mg x 1, given his home Amlodipine 2.5mg PO, Enalapril 2.5mg PO, Imdur 30mg PO, NTG SL x 1 and Nitropaste 1" x 1.   Pt is now admitted to [facility] for ischemic work-up and is scheduled for left heart catheterization today with Dr. Casey.    72 y/o M, Poor historian, former Vet, current frequent Cocaine Abuser, frequent Marijuana user, Shellfish allergy (tolerated CCTA w/ contrast without allergic reaction), Known CAD with LM, LAD and OM1 Dz on prior CCTA), known PAD with B/L LE and RUE stent former, Occasional ETOH user, PMHx of HTN, DM, HIV (unknown CD4, VL undetectable), Hep C (treated w/ Harvoni 3-4 years ago), Prostate CA s/p radiation therapy, and Depression, last admitted in July 2019, found with abnormal CCTA revealing moderate LM and severe LAD disease, however, Cardiac Angiogram deferred in the setting of Thrombocytopenia.   Pt presenting to the [facility] ED 7/28/21 with c/o  near syncope episode today at around 5AM, with associated intermittent,  mild substernal, non-radiating CP, lasting several minutes, resolves after ASA. Pt states that he normally get these intermittent CP a few days after cocaine use, last used on Monday.  Of note: Pt was seen in ED on 7/3/21 and 7/18/21 with c/o SOB and fatigue, evaluated and discharge both times from the ED.    On arrival to ED, VS noted as T: 98.3  HR 68  /62 180 RR 18, SpO2 96% on RA. Labs significant for Trops negative x 1,  (previously 85 on admit 7/18/21) . EKG showed SR @ 76 bpm, occasional PVC's. Old Septal Infarct, no acute ischemic changes.  CXR negative for acute process.  UTOX Positive for Cocaine.  CCTA 7/2019:  Ca2+ 1323.  Moderate to severe stenosis of mid LAD followed by an additional moderate stenosis.    Moderate OM2 stenosis. Less than 50% stenosis of LM. Non-obstructive coronary disease elsewhere.  LVEF appears mildly to moderately reduced with hypokinesis of the mid to apical septum.  Dilated ascending aorta measuring 4.4cm x 4.3cm at the level of the main pulmonary artery.   ECHO 7/2019:  Normal LV and RV size and systolic function. No regional WMA. EF 56%. No significant Valvulopathies. No significant valvular disease. No Pum HTN, EF 56% (see full report).  In ED, pt loaded with ASA 325mg x 1, given his home Amlodipine 2.5mg PO, Enalapril 2.5mg PO, Imdur 30mg PO, NTG SL x 1 and Nitropaste 1" x 1.   Pt is now admitted to [facility] for ischemic work-up and is scheduled for left heart catheterization today with Dr. Casey.    72 y/o M, Poor historian, former Vet, current frequent Cocaine Abuser, frequent Marijuana user, Shellfish allergy (tolerated CCTA w/ contrast without allergic reaction), Known CAD with LM, LAD and OM1 Dz on prior CCTA), newly diagnosed systolic CHF (EF 36% ECHO 7/28/21), known PAD with B/L LE and RUE stent former, Occasional ETOH user, PMHx of HTN, DM, HIV (unknown CD4, VL undetectable), Hep C (treated w/ Harvoni 3-4 years ago), Prostate CA s/p radiation therapy, and Depression, last admitted in July 2019, found with abnormal CCTA revealing moderate LM and severe LAD disease, however, Cardiac Angiogram deferred in the setting of Thrombocytopenia.   Pt presenting to the [facility] ED 7/28/21 with c/o  near syncope episode today at around 5AM, with associated intermittent,  mild substernal, non-radiating CP, lasting several minutes, resolves after ASA. Pt states that he normally get these intermittent CP a few days after cocaine use, last used on Monday.  Of note: Pt was seen in ED on 7/3/21 and 7/18/21 with c/o SOB and fatigue, evaluated and discharge both times from the ED.    On arrival to ED, VS noted as T: 98.3  HR 68  /62 180 RR 18, SpO2 96% on RA. Labs significant for Trops negative x 1,  (previously 85 on admit 7/18/21) . EKG showed SR @ 76 bpm, occasional PVC's. Old Septal Infarct, no acute ischemic changes.  CXR negative for acute process.  UTOX Positive for Cocaine.  CCTA 7/2019:  Ca2+ 1323.  Moderate to severe stenosis of mid LAD followed by an additional moderate stenosis.    Moderate OM2 stenosis. Less than 50% stenosis of LM. Non-obstructive coronary disease elsewhere.  LVEF appears mildly to moderately reduced with hypokinesis of the mid to apical septum.  Dilated ascending aorta measuring 4.4cm x 4.3cm at the level of the main pulmonary artery.   ECHO 7/2019:  Normal LV and RV size and systolic function. No regional WMA. EF 56%. No significant Valvulopathies. No significant valvular disease. No Pum HTN, EF 56% (see full report).  In ED, pt loaded with ASA 325mg x 1, given his home Amlodipine 2.5mg PO, Enalapril 2.5mg PO, Imdur 30mg PO, NTG SL x 1 and Nitropaste 1" x 1.   Pt is now admitted to 5 [facility] for ischemic work-up and is scheduled for left heart catheterization today with Dr. Casey.

## 2021-07-29 ENCOUNTER — TRANSCRIPTION ENCOUNTER (OUTPATIENT)
Age: 71
End: 2021-07-29

## 2021-07-29 VITALS — TEMPERATURE: 98 F

## 2021-07-29 PROBLEM — I25.10 ATHEROSCLEROTIC HEART DISEASE OF NATIVE CORONARY ARTERY WITHOUT ANGINA PECTORIS: Chronic | Status: ACTIVE | Noted: 2021-07-28

## 2021-07-29 PROBLEM — I71.2 THORACIC AORTIC ANEURYSM, WITHOUT RUPTURE: Chronic | Status: ACTIVE | Noted: 2021-07-28

## 2021-07-29 PROBLEM — Z00.00 ENCOUNTER FOR PREVENTIVE HEALTH EXAMINATION: Status: ACTIVE | Noted: 2021-07-29

## 2021-07-29 PROBLEM — I73.9 PERIPHERAL VASCULAR DISEASE, UNSPECIFIED: Chronic | Status: ACTIVE | Noted: 2021-07-28

## 2021-07-29 LAB
ANION GAP SERPL CALC-SCNC: 11 MMOL/L — SIGNIFICANT CHANGE UP (ref 5–17)
BUN SERPL-MCNC: 17 MG/DL — SIGNIFICANT CHANGE UP (ref 7–23)
CALCIUM SERPL-MCNC: 9.1 MG/DL — SIGNIFICANT CHANGE UP (ref 8.4–10.5)
CHLORIDE SERPL-SCNC: 108 MMOL/L — SIGNIFICANT CHANGE UP (ref 96–108)
CO2 SERPL-SCNC: 22 MMOL/L — SIGNIFICANT CHANGE UP (ref 22–31)
COVID-19 SPIKE DOMAIN AB INTERP: POSITIVE
COVID-19 SPIKE DOMAIN ANTIBODY RESULT: >250 U/ML — HIGH
CREAT SERPL-MCNC: 1.33 MG/DL — HIGH (ref 0.5–1.3)
GLUCOSE SERPL-MCNC: 129 MG/DL — HIGH (ref 70–99)
HCT VFR BLD CALC: 37.8 % — LOW (ref 39–50)
HGB BLD-MCNC: 12.5 G/DL — LOW (ref 13–17)
MAGNESIUM SERPL-MCNC: 1.5 MG/DL — LOW (ref 1.6–2.6)
MCHC RBC-ENTMCNC: 29.3 PG — SIGNIFICANT CHANGE UP (ref 27–34)
MCHC RBC-ENTMCNC: 33.1 GM/DL — SIGNIFICANT CHANGE UP (ref 32–36)
MCV RBC AUTO: 88.7 FL — SIGNIFICANT CHANGE UP (ref 80–100)
NRBC # BLD: 0 /100 WBCS — SIGNIFICANT CHANGE UP (ref 0–0)
PLATELET # BLD AUTO: 112 K/UL — LOW (ref 150–400)
POTASSIUM SERPL-MCNC: 4 MMOL/L — SIGNIFICANT CHANGE UP (ref 3.5–5.3)
POTASSIUM SERPL-SCNC: 4 MMOL/L — SIGNIFICANT CHANGE UP (ref 3.5–5.3)
RBC # BLD: 4.26 M/UL — SIGNIFICANT CHANGE UP (ref 4.2–5.8)
RBC # FLD: 13.7 % — SIGNIFICANT CHANGE UP (ref 10.3–14.5)
SARS-COV-2 IGG+IGM SERPL QL IA: >250 U/ML — HIGH
SARS-COV-2 IGG+IGM SERPL QL IA: POSITIVE
SODIUM SERPL-SCNC: 141 MMOL/L — SIGNIFICANT CHANGE UP (ref 135–145)
WBC # BLD: 4.62 K/UL — SIGNIFICANT CHANGE UP (ref 3.8–10.5)
WBC # FLD AUTO: 4.62 K/UL — SIGNIFICANT CHANGE UP (ref 3.8–10.5)

## 2021-07-29 PROCEDURE — 71045 X-RAY EXAM CHEST 1 VIEW: CPT

## 2021-07-29 PROCEDURE — 80061 LIPID PANEL: CPT

## 2021-07-29 PROCEDURE — 93005 ELECTROCARDIOGRAM TRACING: CPT

## 2021-07-29 PROCEDURE — 99223 1ST HOSP IP/OBS HIGH 75: CPT

## 2021-07-29 PROCEDURE — C1769: CPT

## 2021-07-29 PROCEDURE — 86769 SARS-COV-2 COVID-19 ANTIBODY: CPT

## 2021-07-29 PROCEDURE — 85025 COMPLETE CBC W/AUTO DIFF WBC: CPT

## 2021-07-29 PROCEDURE — 36415 COLL VENOUS BLD VENIPUNCTURE: CPT

## 2021-07-29 PROCEDURE — 99239 HOSP IP/OBS DSCHRG MGMT >30: CPT

## 2021-07-29 PROCEDURE — 85610 PROTHROMBIN TIME: CPT

## 2021-07-29 PROCEDURE — 80307 DRUG TEST PRSMV CHEM ANLYZR: CPT

## 2021-07-29 PROCEDURE — 83036 HEMOGLOBIN GLYCOSYLATED A1C: CPT

## 2021-07-29 PROCEDURE — 84484 ASSAY OF TROPONIN QUANT: CPT

## 2021-07-29 PROCEDURE — 85730 THROMBOPLASTIN TIME PARTIAL: CPT

## 2021-07-29 PROCEDURE — 99291 CRITICAL CARE FIRST HOUR: CPT

## 2021-07-29 PROCEDURE — 83735 ASSAY OF MAGNESIUM: CPT

## 2021-07-29 PROCEDURE — 85027 COMPLETE CBC AUTOMATED: CPT

## 2021-07-29 PROCEDURE — 93306 TTE W/DOPPLER COMPLETE: CPT

## 2021-07-29 PROCEDURE — 87635 SARS-COV-2 COVID-19 AMP PRB: CPT

## 2021-07-29 PROCEDURE — 80048 BASIC METABOLIC PNL TOTAL CA: CPT

## 2021-07-29 PROCEDURE — 83880 ASSAY OF NATRIURETIC PEPTIDE: CPT

## 2021-07-29 PROCEDURE — C1887: CPT

## 2021-07-29 PROCEDURE — C1894: CPT

## 2021-07-29 RX ORDER — GLUCAGON INJECTION, SOLUTION 0.5 MG/.1ML
1 INJECTION, SOLUTION SUBCUTANEOUS ONCE
Refills: 0 | Status: DISCONTINUED | OUTPATIENT
Start: 2021-07-29 | End: 2021-07-29

## 2021-07-29 RX ORDER — ARIPIPRAZOLE 15 MG/1
0 TABLET ORAL
Qty: 0 | Refills: 0 | DISCHARGE

## 2021-07-29 RX ORDER — INSULIN LISPRO 100/ML
VIAL (ML) SUBCUTANEOUS
Refills: 0 | Status: DISCONTINUED | OUTPATIENT
Start: 2021-07-29 | End: 2021-07-29

## 2021-07-29 RX ORDER — METOPROLOL TARTRATE 50 MG
1 TABLET ORAL
Qty: 30 | Refills: 0
Start: 2021-07-29 | End: 2021-08-27

## 2021-07-29 RX ORDER — DOLUTEGRAVIR SODIUM 25 MG/1
1 TABLET, FILM COATED ORAL
Qty: 0 | Refills: 0 | DISCHARGE
Start: 2021-07-29

## 2021-07-29 RX ORDER — METOPROLOL TARTRATE 50 MG
25 TABLET ORAL DAILY
Refills: 0 | Status: DISCONTINUED | OUTPATIENT
Start: 2021-07-29 | End: 2021-07-29

## 2021-07-29 RX ORDER — SODIUM CHLORIDE 9 MG/ML
1000 INJECTION, SOLUTION INTRAVENOUS
Refills: 0 | Status: DISCONTINUED | OUTPATIENT
Start: 2021-07-29 | End: 2021-07-29

## 2021-07-29 RX ORDER — METOPROLOL TARTRATE 50 MG
1 TABLET ORAL
Qty: 30 | Refills: 0
Start: 2021-07-29 | End: 2021-10-24

## 2021-07-29 RX ORDER — DOLUTEGRAVIR SODIUM 25 MG/1
0 TABLET, FILM COATED ORAL
Qty: 0 | Refills: 0 | DISCHARGE

## 2021-07-29 RX ORDER — DEXTROSE 50 % IN WATER 50 %
25 SYRINGE (ML) INTRAVENOUS ONCE
Refills: 0 | Status: DISCONTINUED | OUTPATIENT
Start: 2021-07-29 | End: 2021-07-29

## 2021-07-29 RX ORDER — MAGNESIUM SULFATE 500 MG/ML
2 VIAL (ML) INJECTION ONCE
Refills: 0 | Status: COMPLETED | OUTPATIENT
Start: 2021-07-29 | End: 2021-07-29

## 2021-07-29 RX ORDER — DEXTROSE 50 % IN WATER 50 %
12.5 SYRINGE (ML) INTRAVENOUS ONCE
Refills: 0 | Status: DISCONTINUED | OUTPATIENT
Start: 2021-07-29 | End: 2021-07-29

## 2021-07-29 RX ORDER — DEXTROSE 50 % IN WATER 50 %
15 SYRINGE (ML) INTRAVENOUS ONCE
Refills: 0 | Status: DISCONTINUED | OUTPATIENT
Start: 2021-07-29 | End: 2021-07-29

## 2021-07-29 RX ORDER — MAGNESIUM OXIDE 400 MG ORAL TABLET 241.3 MG
400 TABLET ORAL ONCE
Refills: 0 | Status: COMPLETED | OUTPATIENT
Start: 2021-07-29 | End: 2021-07-29

## 2021-07-29 RX ADMIN — ISOSORBIDE MONONITRATE 30 MILLIGRAM(S): 60 TABLET, EXTENDED RELEASE ORAL at 10:22

## 2021-07-29 RX ADMIN — EMTRICITABINE AND TENOFOVIR DISOPROXIL FUMARATE 1 TABLET(S): 200; 300 TABLET, FILM COATED ORAL at 10:22

## 2021-07-29 RX ADMIN — AMLODIPINE BESYLATE 2.5 MILLIGRAM(S): 2.5 TABLET ORAL at 06:25

## 2021-07-29 RX ADMIN — ESCITALOPRAM OXALATE 5 MILLIGRAM(S): 10 TABLET, FILM COATED ORAL at 10:22

## 2021-07-29 RX ADMIN — ARIPIPRAZOLE 2 MILLIGRAM(S): 15 TABLET ORAL at 10:22

## 2021-07-29 RX ADMIN — Medication 25 MILLIGRAM(S): at 09:08

## 2021-07-29 RX ADMIN — Medication 50 GRAM(S): at 09:08

## 2021-07-29 RX ADMIN — Medication 81 MILLIGRAM(S): at 10:23

## 2021-07-29 RX ADMIN — MAGNESIUM OXIDE 400 MG ORAL TABLET 400 MILLIGRAM(S): 241.3 TABLET ORAL at 09:08

## 2021-07-29 RX ADMIN — Medication 2.5 MILLIGRAM(S): at 06:25

## 2021-07-29 RX ADMIN — DOLUTEGRAVIR SODIUM 50 MILLIGRAM(S): 25 TABLET, FILM COATED ORAL at 10:23

## 2021-07-29 NOTE — DIETITIAN INITIAL EVALUATION ADULT. - ADD RECOMMEND
1. Monitor PO intake/appetite, GI distress, diet tolerance, labs, weights.  2. Honor pt food preferences as able.  3. MVI. 4. RD to remain available for additional nutrition interventions as needed.

## 2021-07-29 NOTE — DIETITIAN INITIAL EVALUATION ADULT. - PERSON TAUGHT/METHOD
Attempted CHF diet education - pt stated understanding however ? How Much to retain./verbal instruction/patient instructed/teach back - (Patient repeats in own words)

## 2021-07-29 NOTE — DISCHARGE NOTE PROVIDER - NSDCCPCAREPLAN_GEN_ALL_CORE_FT
PRINCIPAL DISCHARGE DIAGNOSIS  Diagnosis: Chest pain  Assessment and Plan of Treatment: - You currently use cocaine. Cocaine can cause vasoconstriction (narrowing of the arteries) and enhance clot formation. It is associated with cardiovascular disease and can increase your risk of having a heart attack. It can also cause chest pain, anxiety, shortness of breath and palpitations.   -  You came to the hospital with complaints of chest pain. Blood work was drawn and an EKG was performed and you DID NOT have a heart attack. You underwent a cardiac cath for which you were sen to have nonobstructive coronary artery disease.  In other words, you have some blood vessels that are blocked, but are not significant enough for intervention to be done. However, this places you at risk for heart disease.   - You are to continue Aspirin 81mg daily and Pravastatin 80mg.   - IT IS IMPORTANT THAT YOU STOP USING COCAINE AS IT PLACES AT YOU FOR INCREASED RISK FOR HEART DISEASE.   - Do NOT drive or operate hazardous machinery for 24 hours. Limit your physical activity for 24-48 hours. Do NOT engage in sports, heavy work or heavy lifting for 72 hours.   - You MAY shower BUT no TUB BATHS, HOT TUBS OR SWIMMING FOR 5 DAYS  - Your procedure was done through your right wrist. If you observe flank bleeding from the puncture site, it is an emergency. Please put direct pressure on the site and go directly to the ER. Bleeding under the skin may also occur and a small "black and blue" may be expected. If the area appears to be expanding or swelling around the puncture site, apply manual compression and go immediately to the nearest ER. If your arm/hand becomes cool or blue and/or you are unable to move it, this must be treated as an emergency, go directly to the nearest ER. Look for signs of infection in the wrist: fever, red streaking of the arm, obvious pus formation and pain.      SECONDARY DISCHARGE DIAGNOSES  Diagnosis: Hypertension  Assessment and Plan of Treatment:      PRINCIPAL DISCHARGE DIAGNOSIS  Diagnosis: Chest pain  Assessment and Plan of Treatment: - You currently use cocaine. Cocaine can cause vasoconstriction (narrowing of the arteries) and enhance clot formation. It is associated with cardiovascular disease and can increase your risk of having a heart attack. It can also cause chest pain, anxiety, shortness of breath and palpitations.   -  You came to the hospital with complaints of chest pain. Blood work was drawn and an EKG was performed and you DID NOT have a heart attack. You underwent a cardiac cath for which you were sen to have nonobstructive coronary artery disease.  In other words, you have some blood vessels that are blocked, but are not significant enough for intervention to be done. However, this places you at risk for heart disease.   - You are to continue Aspirin 81mg daily and Pravastatin 80mg.   - IT IS IMPORTANT THAT YOU STOP USING COCAINE AS IT PLACES AT YOU FOR INCREASED RISK FOR HEART DISEASE.   - Do NOT drive or operate hazardous machinery for 24 hours. Limit your physical activity for 24-48 hours. Do NOT engage in sports, heavy work or heavy lifting for 72 hours.   - You MAY shower BUT no TUB BATHS, HOT TUBS OR SWIMMING FOR 5 DAYS  - Your procedure was done through your right wrist. If you observe flank bleeding from the puncture site, it is an emergency. Please put direct pressure on the site and go directly to the ER. Bleeding under the skin may also occur and a small "black and blue" may be expected. If the area appears to be expanding or swelling around the puncture site, apply manual compression and go immediately to the nearest ER. If your arm/hand becomes cool or blue and/or you are unable to move it, this must be treated as an emergency, go directly to the nearest ER. Look for signs of infection in the wrist: fever, red streaking of the arm, obvious pus formation and pain.      SECONDARY DISCHARGE DIAGNOSES  Diagnosis: Hypertension  Assessment and Plan of Treatment: - You have a known history of hypertension. Hypertension, commonly called high blood pressure, is when the force of blood pumping through your arteries is too strong. Hypertension forces your heart to work harder to pump blood. Your arteries may become narrow or stiff. Having untreated or uncontrolled hypertension for a long period of time can cause heart attack, stroke, kidney disease, and other problems.   - Your kidney function was slightly elevated for which you are to STOP your home Enalapril as it can further worsen your kidney function and START Metoprolol Succinate 25mg daily. Please continue Imdur 30mg and Amlodipine 2.5mg daily.        PRINCIPAL DISCHARGE DIAGNOSIS  Diagnosis: Chest pain  Assessment and Plan of Treatment: - You currently use cocaine. Cocaine can cause vasoconstriction (narrowing of the arteries) and enhance clot formation. It is associated with cardiovascular disease and can increase your risk of having a heart attack. It can also cause chest pain, anxiety, shortness of breath and palpitations.   -  You came to the hospital with complaints of chest pain. Blood work was drawn and an EKG was performed and you DID NOT have a heart attack. You underwent a cardiac cath for which you were sen to have nonobstructive coronary artery disease.  In other words, you have some blood vessels that are blocked, but are not significant enough for intervention to be done. However, this places you at risk for heart disease.   - You are to continue Aspirin 81mg daily and Pravastatin 80mg.   - IT IS IMPORTANT THAT YOU STOP USING COCAINE AS IT PLACES AT YOU FOR INCREASED RISK FOR HEART DISEASE.   - Do NOT drive or operate hazardous machinery for 24 hours. Limit your physical activity for 24-48 hours. Do NOT engage in sports, heavy work or heavy lifting for 72 hours.   - You MAY shower BUT no TUB BATHS, HOT TUBS OR SWIMMING FOR 5 DAYS  - Your procedure was done through your right wrist. If you observe flank bleeding from the puncture site, it is an emergency. Please put direct pressure on the site and go directly to the ER. Bleeding under the skin may also occur and a small "black and blue" may be expected. If the area appears to be expanding or swelling around the puncture site, apply manual compression and go immediately to the nearest ER. If your arm/hand becomes cool or blue and/or you are unable to move it, this must be treated as an emergency, go directly to the nearest ER. Look for signs of infection in the wrist: fever, red streaking of the arm, obvious pus formation and pain.      SECONDARY DISCHARGE DIAGNOSES  Diagnosis: Hypertension  Assessment and Plan of Treatment: - You have a known history of hypertension. Hypertension, commonly called high blood pressure, is when the force of blood pumping through your arteries is too strong. Hypertension forces your heart to work harder to pump blood. Your arteries may become narrow or stiff. Having untreated or uncontrolled hypertension for a long period of time can cause heart attack, stroke, kidney disease, and other problems.   - Your kidney function was slightly elevated for which you are to STOP your home Enalapril as it can further worsen your kidney function and START Metoprolol Succinate 25mg daily. Please continue Imdur 30mg and Amlodipine 2.5mg daily.       Diagnosis: Aneurysm, ascending aorta  Assessment and Plan of Treatment: - You have a known history of an aortic aneurysm. An aneurysm is a bulge that forms in the wall of an artery. It happens when the artery wall weakens. Aneurysms anywhere in the body are dangerous because they can rupture and cause massive internal bleeding. An ascending aortic aneurysm is especially serious. A rupture in this part of the body can be life-threatening.  - Your aneurysm is stable, however it has INCREASED in size for which follow up CT scans are recommended.   - Please follow up with Dr. Butts on 8/18/21 at 1045AM.        PRINCIPAL DISCHARGE DIAGNOSIS  Diagnosis: Chest pain  Assessment and Plan of Treatment: - You currently use cocaine. Cocaine can cause vasoconstriction (narrowing of the arteries) and enhance clot formation. It is associated with cardiovascular disease and can increase your risk of having a heart attack. It can also cause chest pain, anxiety, shortness of breath and palpitations.   -  You came to the hospital with complaints of chest pain. Blood work was drawn and an EKG was performed and you DID NOT have a heart attack. You underwent a cardiac cath for which you were sen to have nonobstructive coronary artery disease.  In other words, you have some blood vessels that are blocked, but are not significant enough for intervention to be done. However, this places you at risk for heart disease.   - You are to continue Aspirin 81mg daily and Pravastatin 80mg.   - IT IS IMPORTANT THAT YOU STOP USING COCAINE AS IT PLACES AT YOU FOR INCREASED RISK FOR HEART DISEASE.   - Do NOT drive or operate hazardous machinery for 24 hours. Limit your physical activity for 24-48 hours. Do NOT engage in sports, heavy work or heavy lifting for 72 hours.   - You MAY shower BUT no TUB BATHS, HOT TUBS OR SWIMMING FOR 5 DAYS  - Your procedure was done through your right wrist. If you observe flank bleeding from the puncture site, it is an emergency. Please put direct pressure on the site and go directly to the ER. Bleeding under the skin may also occur and a small "black and blue" may be expected. If the area appears to be expanding or swelling around the puncture site, apply manual compression and go immediately to the nearest ER. If your arm/hand becomes cool or blue and/or you are unable to move it, this must be treated as an emergency, go directly to the nearest ER. Look for signs of infection in the wrist: fever, red streaking of the arm, obvious pus formation and pain.      SECONDARY DISCHARGE DIAGNOSES  Diagnosis: Hypertension  Assessment and Plan of Treatment: - You have a known history of hypertension. Hypertension, commonly called high blood pressure, is when the force of blood pumping through your arteries is too strong. Hypertension forces your heart to work harder to pump blood. Your arteries may become narrow or stiff. Having untreated or uncontrolled hypertension for a long period of time can cause heart attack, stroke, kidney disease, and other problems.   -You are to START Metoprolol Succinate 25mg daily. Please continue Imdur 30mg, Vasotec 2.5mg  and Amlodipine 2.5mg daily.       Diagnosis: Aneurysm, ascending aorta  Assessment and Plan of Treatment: - You have a known history of an aortic aneurysm. An aneurysm is a bulge that forms in the wall of an artery. It happens when the artery wall weakens. Aneurysms anywhere in the body are dangerous because they can rupture and cause massive internal bleeding. An ascending aortic aneurysm is especially serious. A rupture in this part of the body can be life-threatening.  - Your aneurysm is stable, however it has INCREASED in size for which follow up CT scans are recommended.   - Please follow up with Dr. Butts on 8/18/21 at 1045AM.

## 2021-07-29 NOTE — DISCHARGE NOTE PROVIDER - NSDCMRMEDTOKEN_GEN_ALL_CORE_FT
Abilify 2 mg oral tablet: orally once a day  amLODIPine 2.5 mg oral tablet: 1 tab(s) orally once a day   aspirin 81 mg oral delayed release tablet: 1 tab(s) orally once a day  Descovy 200 mg-25 mg oral tablet: 1 tab(s) orally once a day  isosorbide mononitrate 30 mg oral tablet, extended release: 1 tab(s) orally once a day   Lexapro 5 mg oral tablet:   pravastatin 80 mg oral tablet: 1 tab(s) orally once a day (at bedtime)  Tivicay 50 mg oral tablet: 1 tab(s) orally once a day  traMADol 50 mg oral tablet: 1 tab(s) orally every 6 hours, As Needed - for moderate pain MDD:4   Abilify 2 mg oral tablet: orally once a day  amLODIPine 2.5 mg oral tablet: 1 tab(s) orally once a day   aspirin 81 mg oral delayed release tablet: 1 tab(s) orally once a day  Descovy 200 mg-25 mg oral tablet: 1 tab(s) orally once a day  isosorbide mononitrate 30 mg oral tablet, extended release: 1 tab(s) orally once a day   Lexapro 5 mg oral tablet:   metoprolol succinate 25 mg oral tablet, extended release: 1 tab(s) orally once a day  pravastatin 80 mg oral tablet: 1 tab(s) orally once a day (at bedtime)  Tivicay 50 mg oral tablet: 1 tab(s) orally once a day  traMADol 50 mg oral tablet: 1 tab(s) orally every 6 hours, As Needed - for moderate pain MDD:4   Abilify 2 mg oral tablet: orally once a day  amLODIPine 2.5 mg oral tablet: 1 tab(s) orally once a day   aspirin 81 mg oral delayed release tablet: 1 tab(s) orally once a day  Descovy 200 mg-25 mg oral tablet: 1 tab(s) orally once a day  isosorbide mononitrate 30 mg oral tablet, extended release: 1 tab(s) orally once a day   Lexapro 5 mg oral tablet:   metoprolol succinate 25 mg oral tablet, extended release: 1 tab(s) orally once a day  pravastatin 80 mg oral tablet: 1 tab(s) orally once a day (at bedtime)  Tivicay 50 mg oral tablet: 1 tab(s) orally once a day  traMADol 50 mg oral tablet: 1 tab(s) orally every 6 hours, As Needed - for moderate pain MDD:4  Vasotec 2.5 mg oral tablet: 1 tab(s) orally once a day

## 2021-07-29 NOTE — DISCHARGE NOTE PROVIDER - NSDCFUADDINST_GEN_ALL_CORE_FT
- Do NOT drive or operate hazardous machinery for 24 hours. Limit your physical activity for 24-48 hours. Do NOT engage in sports, heavy work or heavy lifting for 72 hours.   - You MAY shower BUT no TUB BATHS, HOT TUBS OR SWIMMING FOR 5 DAYS  - Your procedure was done through your right wrist. If you observe flank bleeding from the puncture site, it is an emergency. Please put direct pressure on the site and go directly to the ER. Bleeding under the skin may also occur and a small "black and blue" may be expected. If the area appears to be expanding or swelling around the puncture site, apply manual compression and go immediately to the nearest ER. If your arm/hand becomes cool or blue and/or you are unable to move it, this must be treated as an emergency, go directly to the nearest ER. Look for signs of infection in the wrist: fever, red streaking of the arm, obvious pus formation and pain.

## 2021-07-29 NOTE — DISCHARGE NOTE NURSING/CASE MANAGEMENT/SOCIAL WORK - PATIENT PORTAL LINK FT
You can access the FollowMyHealth Patient Portal offered by [facility] by registering at the following website: [email or web address]. By joining [employer]’s FollowMyHealth portal, you will also be able to view your health information using other applications (apps) compatible with our system.

## 2021-07-29 NOTE — DIETITIAN INITIAL EVALUATION ADULT. - PROBLEM SELECTOR PLAN 2
Pt with known Chronic thrombocytopenia, likely 2/2 to Hep C  -Plts 104 on admission, previously 85 (7/18/21), 127 (7/3/21), as low 53 in 2019  -Hematology service consulted, per  Dr. Snyder, plts appears stable, pt is cleared per Heme for DAPT. Will evaluate pt post cath.

## 2021-07-29 NOTE — DIETITIAN INITIAL EVALUATION ADULT. - OTHER CALCULATIONS
current body wt used for energy calculations as pt falls within % IBW; adjusted for age, CHF/HIV - fluids per team

## 2021-07-29 NOTE — DIETITIAN INITIAL EVALUATION ADULT. - PROBLEM SELECTOR PLAN 1
Pt presenting to EDwith c/o near syncope and CP this AM.  No further syncopal episodes since admit. CP resolved after home medications given.    -Currently CP free and HD stable, VSS  -EKG with SR @ 64 bpm, occasional PVC, old septal infarct  -Trop negative x 1, f/u Trops 12N   -Obtain ECHO for EF and structural  -Loaded with ASA 325mg PO x 1 in ED.  Given home medications:  Enalapril 2.5mg QD, Amlodipine 2.5mg, Imdur 30mg daily, NTG SL x1 and Nitropaste 1”     -c/w home meds as above.  Hold initiation of BB, Utox positive for cocaine   -on home Pravastatin 80mg QHS, non-formulary, no supply per Pharmacy, will try to bring in home med for pharmacy verification.   -Scheduled for LHC today with Dr. Casey.   Precath/Consented.  NPO. NS @ 75cc x 4 hours  -Heme consulted to evaluate thrombocytopenia prior to initiation of DAPT.

## 2021-07-29 NOTE — DISCHARGE NOTE PROVIDER - CARE PROVIDERS DIRECT ADDRESSES
,[email or web address],DirectAddress_Unknown ,DirectAddress_Unknown,DirectAddress_Unknown

## 2021-07-29 NOTE — DISCHARGE NOTE PROVIDER - PROVIDER TOKENS
PROVIDER:[TOKEN:[[id number]],FOLLOWUP:[1 month]] PROVIDER:[TOKEN:[[id number]],FOLLOWUP:[1 month]],FREE:[LAST:[CT Surgery],PHONE:[phone]],FAX:[(   )    -],ADDRESS:[[address] [place] [address]],SCHEDULEDAPPT:[08/18/2021],SCHEDULEDAPPTTIME:[10:45 AM]] FREE:[LAST:[CT Surgery],PHONE:[phone]:[(   )    -],ADDRESS:[[address]  [address] [place] [address]],SCHEDULEDAPPT:[08/18/2021],SCHEDULEDAPPTTIME:[10:45 AM]],PROVIDER:[TOKEN:[[id number]],FOLLOWUP:[1 week]]

## 2021-07-29 NOTE — DIETITIAN INITIAL EVALUATION ADULT. - OTHER INFO
70 y/o M, current frequent Cocaine Abuser, frequent Marijuana user, CAD with LM, LAD and OM1 Dz, known PAD with B/L LE and RUE stent former, Occasional ETOH user, HTN, DM, HIV (unknown CD4, VL undetectable), Hep C, Prostate CA s/p radiation therapy, and Depression, last admitted in July 2019, found with abnormal CCTA revealing moderate LM and severe LAD disease, however, Cardiac Angiogram deferred in the setting of Thrombocytopenia.  Pt presenting 7/28 with c/o  near syncope episode and Class III Anginal symptoms. Trops negative x 1 set, EKG non-ischemic. CXR unremarkable. Given risk factors, current CCS Class III Anginal, and previous abnormal CCTA, pt is recommended for cardiac catheterization with possible intervention if clinically indicated. CTA PE protocol chest completed on admission revealed ascending aortic aneurysmal dilation of 4.7cm (increased from 4.3 on previous scan 07/2019). CT surgery consulted for evaluation.    Pt visited on 5UR. Noted as poor historian and providing much conflicting info. Unclear wt hx, reports  pounds + daily wts and being wt stable within 1-2pounds. However also reports 20 pound wt loss 2/2 changing diet to eat just 1meal/day which he feels is healthy. Assume not accurate as pt reports eating family size bag of chips and cheezits often. Also likes cheese. Pt seems to be aware to some extent these foods are high in sodium. Question if pt lost 20 pounds and since has been stable. No overt s/s of wasting/loss noted. Will cont to monitor wts. No issues chewing/swallowing. Allergic to seafood/shellfish, peaches and strawberries. No pain. No edema/pressure ulcers noted at this time, Kishor 11. +BM7/28.   Please see Below for RD Recs, paged team.

## 2021-07-29 NOTE — DISCHARGE NOTE PROVIDER - HOSPITAL COURSE
72 y/o M, Poor historian, former Vet, current frequent Cocaine Abuser, frequent Marijuana user, Shellfish allergy (tolerated CCTA w/ contrast without allergic reaction), Known CAD with LM, LAD and OM1 Dz on prior CCTA), newly diagnosed systolic CHF (EF 36% ECHO 7/28/21), known PAD with B/L LE and RUE stent former, Occasional ETOH user, PMHx of HTN, DM, HIV (unknown CD4, VL undetectable), Hep C (treated w/ Harvoni 3-4 years ago), Prostate CA s/p radiation therapy, and Depression, last admitted in July 2019, found with abnormal CCTA revealing moderate LM and severe LAD disease, however, Cardiac Angiogram deferred in the setting of Thrombocytopenia. presented to ED 7/28/21 w/near syncope a/w mid-sternal CP. Of note: Pt was seen in ED on 7/3/21 and 7/18/21 with c/o SOB and fatigue, evaluated and discharge both times from the ED.    IN ED, VS: T: 98.3  HR 68  /62 180 RR 18, SpO2 96% on RA. Labs s/f trop neg x 1.   (previously 85 on admit 7/18/21) . EKG showed SR @ 76 bpm, occasional PVC's. Old Septal Infarct, no acute ischemic changes.  CXR negative for acute process.  UTOX Positive for Cocaine.  CCTA 7/2019:  Ca2+ 1323.  Moderate to severe stenosis of mid LAD followed by an additional moderate stenosis.    Moderate OM2 stenosis. Less than 50% stenosis of LM. Non-obstructive coronary disease elsewhere.  LVEF appears mildly to moderately reduced with hypokinesis of the mid to apical septum.  Dilated ascending aorta measuring 4.4cm x 4.3cm at the level of the main pulmonary artery.   ECHO 7/2019:  Normal LV and RV size and systolic function. No regional WMA. EF 56%. No significant Valvulopathies. No significant valvular disease. No Pum HTN, EF 56% (see full report).  In ED, pt loaded with ASA 325mg x 1, given his home Amlodipine 2.5mg PO, Enalapril 2.5mg PO, Imdur 30mg PO, NTG SL x 1 and Nitropaste 1" x 1.   Admitted to [facility] for ischemic work-up and is scheduled for left heart catheterization with Dr. Casey.     Patient now s/p Cardiac cath 7/28/21: mLAD 30%, LCx mild atherosclerosis, RCA mild atheroscloerosis.    Pt seen and examined at bedside this morning. Pt comfortable, denies any CP, SOB, dizziness, or palpitations. VSS, right radial access sites stable, no bleeding or hematoma noted, pulse 2 + b/l. AM labs stable. Patient has been given appropriate discharge instructions including medication regimen, access site management and follow up. Prescriptions have been e-prescribed to patient's preferred pharmacy. Follow up with Dr. Ibarra within one month of discharge from the hospital.     DC Meds:    70 y/o M, Poor historian, former Vet, current frequent Cocaine Abuser, frequent Marijuana user, Shellfish allergy (tolerated CCTA w/ contrast without allergic reaction), Known CAD with LM, LAD and OM1 Dz on prior CCTA), newly diagnosed systolic CHF (EF 36% ECHO 7/28/21), known PAD with B/L LE and RUE stent former, Occasional ETOH user, PMHx of HTN, DM, HIV (unknown CD4, VL undetectable), Hep C (treated w/ Harvoni 3-4 years ago), Prostate CA s/p radiation therapy, and Depression, last admitted in July 2019, found with abnormal CCTA revealing moderate LM and severe LAD disease, however, Cardiac Angiogram deferred in the setting of Thrombocytopenia. presented to ED 7/28/21 w/near syncope a/w mid-sternal CP. Of note: Pt was seen in ED on 7/3/21 and 7/18/21 with c/o SOB and fatigue, evaluated and discharge both times from the ED.    IN ED, VS: T: 98.3  HR 68  /62 180 RR 18, SpO2 96% on RA. Labs s/f trop neg x 1.   (previously 85 on admit 7/18/21) . EKG showed SR @ 76 bpm, occasional PVC's. Old Septal Infarct, no acute ischemic changes.  CXR negative for acute process.  UTOX Positive for Cocaine.  CCTA 7/2019:  Ca2+ 1323.  Moderate to severe stenosis of mid LAD followed by an additional moderate stenosis.    Moderate OM2 stenosis. Less than 50% stenosis of LM. Non-obstructive coronary disease elsewhere.  LVEF appears mildly to moderately reduced with hypokinesis of the mid to apical septum.  Dilated ascending aorta measuring 4.4cm x 4.3cm at the level of the main pulmonary artery.   ECHO 7/2019:  Normal LV and RV size and systolic function. No regional WMA. EF 56%. No significant Valvulopathies. No significant valvular disease. No Pum HTN, EF 56% (see full report).  In ED, pt loaded with ASA 325mg x 1, given his home Amlodipine 2.5mg PO, Enalapril 2.5mg PO, Imdur 30mg PO, NTG SL x 1 and Nitropaste 1" x 1.   Admitted to [facility] for ischemic work-up and is scheduled for left heart catheterization with Dr. Casey.     Patient now s/p Cardiac cath 7/28/21: mLAD 30%, LCx mild atherosclerosis, RCA mild atheroscloerosis.    Pt seen and examined at bedside this morning. Pt comfortable, denies any CP, SOB, dizziness, or palpitations. VSS, right radial access sites stable, no bleeding or hematoma noted, pulse 2 + b/l. AM labs stable. Patient has been given appropriate discharge instructions including medication regimen, access site management and follow up. Prescriptions have been e-prescribed to patient's preferred pharmacy. Follow up with Dr. Ibarra within one month of discharge from the hospital.     DC Meds:     - You have a known history of hypertension. Hypertension, commonly called high blood pressure, is when the force of blood pumping through your arteries is too strong. Hypertension forces your heart to work harder to pump blood. Your arteries may become narrow or stiff. Having untreated or uncontrolled hypertension for a long period of time can cause heart attack, stroke, kidney disease, and other problems.   - Your kidney function was slightly elevated for which you are to STOP your home Enalapril as it can further worsen your kidney function and START Metoprolol       Be sure to follow a low salt diet. If you have been prescribed antihypertensive medications to control your blood pressure, be sure to take them every day as prescribed and do not miss any doses, the medications do not work if they are not taken consistently. Follow up with your Primary Care Doctor and have your Blood Pressure checked. 72 y/o M, Poor historian, former Vet, current frequent Cocaine Abuser, frequent Marijuana user, Shellfish allergy (tolerated CCTA w/ contrast without allergic reaction), Known CAD with LM, LAD and OM1 Dz on prior CCTA), newly diagnosed systolic CHF (EF 36% ECHO 7/28/21), known PAD with B/L LE and RUE stent former, Occasional ETOH user, PMHx of HTN, DM, HIV (unknown CD4, VL undetectable), Hep C (treated w/ Harvoni 3-4 years ago), Prostate CA s/p radiation therapy, and Depression, last admitted in July 2019, found with abnormal CCTA revealing moderate LM and severe LAD disease, however, Cardiac Angiogram deferred in the setting of Thrombocytopenia. presented to ED 7/28/21 w/near syncope a/w mid-sternal CP. Of note: Pt was seen in ED on 7/3/21 and 7/18/21 with c/o SOB and fatigue, evaluated and discharge both times from the ED.    IN ED, VS: T: 98.3  HR 68  /62 180 RR 18, SpO2 96% on RA. Labs s/f trop neg x 1.   (previously 85 on admit 7/18/21) . EKG showed SR @ 76 bpm, occasional PVC's. Old Septal Infarct, no acute ischemic changes.  CXR negative for acute process.  UTOX Positive for Cocaine.  CCTA 7/2019:  Ca2+ 1323.  Moderate to severe stenosis of mid LAD followed by an additional moderate stenosis.    Moderate OM2 stenosis. Less than 50% stenosis of LM. Non-obstructive coronary disease elsewhere.  LVEF appears mildly to moderately reduced with hypokinesis of the mid to apical septum.  Dilated ascending aorta measuring 4.4cm x 4.3cm at the level of the main pulmonary artery.   ECHO 7/2019:  Normal LV and RV size and systolic function. No regional WMA. EF 56%. No significant Valvulopathies. No significant valvular disease. No Pum HTN, EF 56% (see full report).  In ED, pt loaded with ASA 325mg x 1, given his home Amlodipine 2.5mg PO, Enalapril 2.5mg PO, Imdur 30mg PO, NTG SL x 1 and Nitropaste 1" x 1.   Admitted to [facility] for ischemic work-up and is scheduled for left heart catheterization with Dr. Casey.     Patient now s/p Cardiac cath 7/28/21: mLAD 30%, LCx mild atherosclerosis, RCA mild atheroscloerosis.    Pt seen and examined at bedside this morning. Pt comfortable, denies any CP, SOB, dizziness, or palpitations. VSS, right radial access sites stable, no bleeding or hematoma noted, pulse 2 + b/l. AM labs stable. Patient has been given appropriate discharge instructions including medication regimen, access site management and follow up. Prescriptions have been e-prescribed to patient's preferred pharmacy. Follow up with Dr. Ibarra within one month of discharge from the hospital.     DC Meds:        70 y/o M, Poor historian, former Vet, current frequent Cocaine Abuser, frequent Marijuana user, Shellfish allergy (tolerated CCTA w/ contrast without allergic reaction), Known CAD with LM, LAD and OM1 Dz on prior CCTA), newly diagnosed systolic CHF (EF 36% ECHO 7/28/21), known PAD with B/L LE and RUE stent former, Occasional ETOH user, PMHx of HTN, DM, HIV (unknown CD4, VL undetectable), Hep C (treated w/ Harvoni 3-4 years ago), Prostate CA s/p radiation therapy, and Depression, last admitted in July 2019, found with abnormal CCTA revealing moderate LM and severe LAD disease, however, Cardiac Angiogram deferred in the setting of Thrombocytopenia. presented to ED 7/28/21 w/near syncope a/w mid-sternal CP. Of note: Pt was seen in ED on 7/3/21 and 7/18/21 with c/o SOB and fatigue, evaluated and discharge both times from the ED.    IN ED, VS: T: 98.3  HR 68  /62 180 RR 18, SpO2 96% on RA. Labs s/f trop neg x 1.   (previously 85 on admit 7/18/21) . EKG showed SR @ 76 bpm, occasional PVC's. Old Septal Infarct, no acute ischemic changes.  CXR negative for acute process.  UTOX Positive for Cocaine.  CCTA 7/2019:  Ca2+ 1323.  Moderate to severe stenosis of mid LAD followed by an additional moderate stenosis.    Moderate OM2 stenosis. Less than 50% stenosis of LM. Non-obstructive coronary disease elsewhere.  LVEF appears mildly to moderately reduced with hypokinesis of the mid to apical septum.  Dilated ascending aorta measuring 4.4cm x 4.3cm at the level of the main pulmonary artery.   ECHO 7/2019:  Normal LV and RV size and systolic function. No regional WMA. EF 56%. No significant Valvulopathies. No significant valvular disease. No Pum HTN, EF 56% (see full report).  In ED, pt loaded with ASA 325mg x 1, given his home Amlodipine 2.5mg PO, Enalapril 2.5mg PO, Imdur 30mg PO, NTG SL x 1 and Nitropaste 1" x 1.   Admitted to [facility] for ischemic work-up and is scheduled for left heart catheterization with Dr. Casey.     Patient now s/p Cardiac cath 7/28/21: mLAD 30%, LCx mild atherosclerosis, RCA mild atheroscloerosis.    Pt seen and examined at bedside this morning. Pt comfortable, denies any CP, SOB, dizziness, or palpitations. VSS, right radial access sites stable, no bleeding or hematoma noted, pulse 2 + b/l. AM labs stable. Patient has been given appropriate discharge instructions including medication regimen, access site management and follow up. Prescriptions have been e-prescribed to patient's preferred pharmacy. Follow up with Dr. Ibarra within one month of discharge from the hospital.     DC Meds:     Amlodipine 2.5mg  ASA 81mg  Imdur 30mg  Metoprolol Succinate 25mg  Pravastatin 80mg 70 y/o M, Poor historian, former Vet, current frequent Cocaine Abuser, frequent Marijuana user, Shellfish allergy (tolerated CCTA w/ contrast without allergic reaction), Known CAD with LM, LAD and OM1 Dz on prior CCTA), newly diagnosed systolic CHF (EF 36% ECHO 7/28/21), known PAD with B/L LE and RUE stent former, Occasional ETOH user, PMHx of HTN, DM, HIV (unknown CD4, VL undetectable), Hep C (treated w/ Harvoni 3-4 years ago), Prostate CA s/p radiation therapy, and Depression, last admitted in July 2019, found with abnormal CCTA revealing moderate LM and severe LAD disease, however, Cardiac Angiogram deferred in the setting of Thrombocytopenia. presented to ED 7/28/21 w/near syncope a/w mid-sternal CP. Of note: Pt was seen in ED on 7/3/21 and 7/18/21 with c/o SOB and fatigue, evaluated and discharge both times from the ED.    IN ED, VS: T: 98.3  HR 68  /62 180 RR 18, SpO2 96% on RA. Labs s/f trop neg x 1.   (previously 85 on admit 7/18/21) . EKG showed SR @ 76 bpm, occasional PVC's. Old Septal Infarct, no acute ischemic changes.  CXR negative for acute process.  UTOX Positive for Cocaine.  CCTA 7/2019:  Ca2+ 1323.  Moderate to severe stenosis of mid LAD followed by an additional moderate stenosis.    Moderate OM2 stenosis. Less than 50% stenosis of LM. Non-obstructive coronary disease elsewhere.  LVEF appears mildly to moderately reduced with hypokinesis of the mid to apical septum.  Dilated ascending aorta measuring 4.4cm x 4.3cm at the level of the main pulmonary artery.   ECHO 7/2019:  Normal LV and RV size and systolic function. No regional WMA. EF 56%. No significant Valvulopathies. No significant valvular disease. No Pum HTN, EF 56% (see full report).  In ED, pt loaded with ASA 325mg x 1, given his home Amlodipine 2.5mg PO, Enalapril 2.5mg PO, Imdur 30mg PO, NTG SL x 1 and Nitropaste 1" x 1.   Admitted to [facility] for ischemic work-up and is scheduled for left heart catheterization with Dr. Casey.     Patient now s/p Cardiac cath 7/28/21: mLAD 30%, LCx mild atherosclerosis, RCA mild atheroscloerosis.    Pt seen and examined at bedside this morning. Pt comfortable, denies any CP, SOB, dizziness, or palpitations. VSS, right radial access sites stable, no bleeding or hematoma noted, pulse 2 + b/l. AM labs stable. Patient has been given appropriate discharge instructions including medication regimen, access site management and follow up. Prescriptions have been e-prescribed to patient's preferred pharmacy. Follow up with Dr. Ibarra within one month of discharge from the hospital.     DC Meds:     Amlodipine 2.5mg  ASA 81mg  Imdur 30mg  Metoprolol Succinate 25mg  Pravastatin 80mg    70 y/o M, Poor historian, former Vet, current frequent Cocaine Abuser, frequent Marijuana user, Shellfish allergy (tolerated CCTA w/ contrast without allergic reaction), Known CAD with LM, LAD and OM1 Dz on prior CCTA), newly diagnosed systolic CHF (EF 36% ECHO 7/28/21), known PAD with B/L LE and RUE stent former, Occasional ETOH user, PMHx of HTN, DM, HIV (unknown CD4, VL undetectable), Hep C (treated w/ Harvoni 3-4 years ago), Prostate CA s/p radiation therapy, and Depression, last admitted in July 2019, found with abnormal CCTA revealing moderate LM and severe LAD disease, however, Cardiac Angiogram deferred in the setting of Thrombocytopenia. presented to ED 7/28/21 w/near syncope a/w mid-sternal CP. Of note: Pt was seen in ED on 7/3/21 and 7/18/21 with c/o SOB and fatigue, evaluated and discharge both times from the ED.    IN ED, VS: T: 98.3  HR 68  /62 180 RR 18, SpO2 96% on RA. Labs s/f trop neg x 1.   (previously 85 on admit 7/18/21) . EKG showed SR @ 76 bpm, occasional PVC's. Old Septal Infarct, no acute ischemic changes.  CXR negative for acute process.  UTOX Positive for Cocaine.  CCTA 7/2019:  Ca2+ 1323.  Moderate to severe stenosis of mid LAD followed by an additional moderate stenosis.    Moderate OM2 stenosis. Less than 50% stenosis of LM. Non-obstructive coronary disease elsewhere.  LVEF appears mildly to moderately reduced with hypokinesis of the mid to apical septum.  Dilated ascending aorta measuring 4.4cm x 4.3cm at the level of the main pulmonary artery.   ECHO 7/2019:  Normal LV and RV size and systolic function. No regional WMA. EF 56%. No significant Valvulopathies. No significant valvular disease. No Pum HTN, EF 56% (see full report).  In ED, pt loaded with ASA 325mg x 1, given his home Amlodipine 2.5mg PO, Enalapril 2.5mg PO, Imdur 30mg PO, NTG SL x 1 and Nitropaste 1" x 1.   Admitted to [facility] for ischemic work-up and is scheduled for left heart catheterization with Dr. Casey.     Patient now s/p Cardiac cath 7/28/21: mLAD 30%, LCx mild atherosclerosis, RCA mild atheroscloerosis.    Pt seen and examined at bedside this morning. Pt comfortable, denies any CP, SOB, dizziness, or palpitations. VSS, right radial access sites stable, no bleeding or hematoma noted, pulse 2 + b/l. AM labs stable. Patient has been given appropriate discharge instructions including medication regimen, access site management and follow up. Prescriptions have been e-prescribed to patient's preferred pharmacy. As per patient will follow up with Dr. Fontanez within a week of discharge from the hospital.     Amlodipine 2.5mg  ASA 81mg  Imdur 30mg  Metoprolol Succinate 25mg  Pravastatin 80mg   Vasotec 2.5mg

## 2021-07-29 NOTE — DIETITIAN INITIAL EVALUATION ADULT. - PERTINENT MEDS FT
dolutegravir  emtricitabine 200 mG/tenofovir alafenamide 25 mG (DESCOVY) Tablet  nacl 0.9%  corrective sliding scale insulin   norvasc  IMDUR   TOPROL XL  Lipitor   Lexapro

## 2021-07-29 NOTE — DISCHARGE NOTE PROVIDER - CARE PROVIDER_API CALL
Joseph Ibarra)  Cardiovascular Disease  [address] [facility], [address] [place] [address]  Phone: [phone]  Fax: [phone]  Follow Up Time: 1 month   Joseph Ibarra)  Cardiovascular Disease  [address] [facility], 3rd [place] [address]  Phone: [phone]  Fax: [phone]  Follow Up Time: 1 month    CT Surgery,   [address]  [address] [place] [address]  Phone: [phone]  Fax: (   )    -  Scheduled Appointment: 08/18/2021 10:45 AM   CT Surgery,   [address] [place] [address]-  [address] [place] [address], [address] [place] [address]  Phone: [phone]  Fax: (   )    -  Scheduled Appointment: 08/18/2021 10:45 AM    JONATHAN STANLEY  Interventional Cardiology  [address] [place] [address] [address] [place] [address]  Phone: [phone]  Fax: [phone]  Follow Up Time: 1 week

## 2021-07-29 NOTE — DIETITIAN INITIAL EVALUATION ADULT. - PROBLEM SELECTOR PLAN 5
HIV (human immunodeficiency virus infection).  Plan:   -CD4 count unknown, VL undetectable  -c/w  Descovy and Tivicay as prescribed  -HIV team made aware, no need to see pt as he is currently on medications.    #History of Hep C  -s/p Harvoni per pt, continue to monitor plts

## 2021-08-03 DIAGNOSIS — Z87.891 PERSONAL HISTORY OF NICOTINE DEPENDENCE: ICD-10-CM

## 2021-08-03 DIAGNOSIS — Z88.2 ALLERGY STATUS TO SULFONAMIDES: ICD-10-CM

## 2021-08-03 DIAGNOSIS — I50.22 CHRONIC SYSTOLIC (CONGESTIVE) HEART FAILURE: ICD-10-CM

## 2021-08-03 DIAGNOSIS — Z92.3 PERSONAL HISTORY OF IRRADIATION: ICD-10-CM

## 2021-08-03 DIAGNOSIS — D69.59 OTHER SECONDARY THROMBOCYTOPENIA: ICD-10-CM

## 2021-08-03 DIAGNOSIS — Z79.899 OTHER LONG TERM (CURRENT) DRUG THERAPY: ICD-10-CM

## 2021-08-03 DIAGNOSIS — F32.9 MAJOR DEPRESSIVE DISORDER, SINGLE EPISODE, UNSPECIFIED: ICD-10-CM

## 2021-08-03 DIAGNOSIS — Z86.19 PERSONAL HISTORY OF OTHER INFECTIOUS AND PARASITIC DISEASES: ICD-10-CM

## 2021-08-03 DIAGNOSIS — I24.9 ACUTE ISCHEMIC HEART DISEASE, UNSPECIFIED: ICD-10-CM

## 2021-08-03 DIAGNOSIS — Z95.820 PERIPHERAL VASCULAR ANGIOPLASTY STATUS WITH IMPLANTS AND GRAFTS: ICD-10-CM

## 2021-08-03 DIAGNOSIS — I11.0 HYPERTENSIVE HEART DISEASE WITH HEART FAILURE: ICD-10-CM

## 2021-08-03 DIAGNOSIS — Z88.0 ALLERGY STATUS TO PENICILLIN: ICD-10-CM

## 2021-08-03 DIAGNOSIS — Z21 ASYMPTOMATIC HUMAN IMMUNODEFICIENCY VIRUS [HIV] INFECTION STATUS: ICD-10-CM

## 2021-08-03 DIAGNOSIS — Z91.018 ALLERGY TO OTHER FOODS: ICD-10-CM

## 2021-08-03 DIAGNOSIS — Z79.82 LONG TERM (CURRENT) USE OF ASPIRIN: ICD-10-CM

## 2021-08-03 DIAGNOSIS — I71.2 THORACIC AORTIC ANEURYSM, WITHOUT RUPTURE: ICD-10-CM

## 2021-08-03 DIAGNOSIS — I25.110 ATHEROSCLEROTIC HEART DISEASE OF NATIVE CORONARY ARTERY WITH UNSTABLE ANGINA PECTORIS: ICD-10-CM

## 2021-08-03 DIAGNOSIS — F14.10 COCAINE ABUSE, UNCOMPLICATED: ICD-10-CM

## 2021-08-03 DIAGNOSIS — Z91.013 ALLERGY TO SEAFOOD: ICD-10-CM

## 2021-08-03 DIAGNOSIS — E11.51 TYPE 2 DIABETES MELLITUS WITH DIABETIC PERIPHERAL ANGIOPATHY WITHOUT GANGRENE: ICD-10-CM

## 2021-08-03 DIAGNOSIS — Z85.46 PERSONAL HISTORY OF MALIGNANT NEOPLASM OF PROSTATE: ICD-10-CM

## 2021-08-12 PROBLEM — I71.2 ANEURYSM, ASCENDING AORTA: Status: ACTIVE | Noted: 2021-08-12

## 2021-08-12 PROBLEM — Z87.898 HISTORY OF MARIJUANA USE: Status: ACTIVE | Noted: 2021-08-12

## 2021-08-12 PROBLEM — Z86.59 HISTORY OF DEPRESSION: Status: RESOLVED | Noted: 2021-08-12 | Resolved: 2021-08-12

## 2021-08-12 PROBLEM — Z86.39 HISTORY OF DIABETES MELLITUS: Status: RESOLVED | Noted: 2021-08-12 | Resolved: 2021-08-12

## 2021-08-12 PROBLEM — B20 HIV (HUMAN IMMUNODEFICIENCY VIRUS INFECTION): Status: RESOLVED | Noted: 2021-08-12 | Resolved: 2021-08-12

## 2021-08-12 PROBLEM — I25.10 NONOBSTRUCTIVE ATHEROSCLEROSIS OF CORONARY ARTERY: Status: RESOLVED | Noted: 2021-08-12 | Resolved: 2021-08-12

## 2021-08-12 PROBLEM — I50.20 SYSTOLIC CHF: Status: ACTIVE | Noted: 2021-08-12

## 2021-08-12 PROBLEM — Z86.79 HISTORY OF PERIPHERAL ARTERIAL DISEASE: Status: RESOLVED | Noted: 2021-08-12 | Resolved: 2021-08-12

## 2021-08-12 PROBLEM — U07.1 COVID-19: Status: RESOLVED | Noted: 2021-08-12 | Resolved: 2021-08-12

## 2021-08-12 PROBLEM — Z85.46 HISTORY OF MALIGNANT NEOPLASM OF PROSTATE: Status: RESOLVED | Noted: 2021-08-12 | Resolved: 2021-08-12

## 2021-08-12 PROBLEM — Z86.19 HISTORY OF HEPATITIS C VIRUS INFECTION: Status: RESOLVED | Noted: 2021-08-12 | Resolved: 2021-08-12

## 2021-08-12 PROBLEM — F14.90 COCAINE USE: Status: ACTIVE | Noted: 2021-08-12

## 2021-08-12 RX ORDER — ISOSORBIDE MONONITRATE 30 MG/1
30 TABLET, EXTENDED RELEASE ORAL DAILY
Refills: 0 | Status: ACTIVE | COMMUNITY

## 2021-08-12 RX ORDER — ASPIRIN ENTERIC COATED TABLETS 81 MG 81 MG/1
81 TABLET, DELAYED RELEASE ORAL DAILY
Refills: 0 | Status: ACTIVE | COMMUNITY

## 2021-08-12 RX ORDER — EMTRICITABINE AND TENOFOVIR ALAFENAMIDE 200; 25 MG/1; MG/1
200-25 TABLET ORAL DAILY
Refills: 0 | Status: ACTIVE | COMMUNITY

## 2021-08-12 RX ORDER — METOPROLOL SUCCINATE 25 MG/1
25 TABLET, EXTENDED RELEASE ORAL DAILY
Refills: 0 | Status: ACTIVE | COMMUNITY

## 2021-08-12 RX ORDER — DOLUTEGRAVIR SODIUM 50 MG/1
50 TABLET, FILM COATED ORAL DAILY
Refills: 0 | Status: ACTIVE | COMMUNITY

## 2021-08-12 RX ORDER — AMLODIPINE BESYLATE 2.5 MG/1
2.5 TABLET ORAL DAILY
Refills: 0 | Status: ACTIVE | COMMUNITY

## 2021-08-12 RX ORDER — PRAVASTATIN SODIUM 80 MG/1
80 TABLET ORAL DAILY
Refills: 0 | Status: ACTIVE | COMMUNITY

## 2021-08-12 RX ORDER — ESCITALOPRAM OXALATE 5 MG/1
5 TABLET ORAL DAILY
Refills: 0 | Status: ACTIVE | COMMUNITY

## 2021-08-12 RX ORDER — ENALAPRIL MALEATE 2.5 MG/1
2.5 TABLET ORAL DAILY
Refills: 0 | Status: ACTIVE | COMMUNITY

## 2021-08-12 RX ORDER — TRAMADOL HYDROCHLORIDE 50 MG/1
50 TABLET, COATED ORAL EVERY 6 HOURS
Refills: 0 | Status: ACTIVE | COMMUNITY

## 2021-08-13 ENCOUNTER — EMERGENCY (EMERGENCY)
Facility: HOSPITAL | Age: 71
LOS: 1 days | Discharge: HOME | End: 2021-08-13
Attending: EMERGENCY MEDICINE | Admitting: EMERGENCY MEDICINE
Payer: MEDICARE

## 2021-08-13 VITALS
WEIGHT: 179.9 LBS | DIASTOLIC BLOOD PRESSURE: 81 MMHG | TEMPERATURE: 98 F | OXYGEN SATURATION: 98 % | HEIGHT: 72 IN | SYSTOLIC BLOOD PRESSURE: 130 MMHG | RESPIRATION RATE: 18 BRPM | HEART RATE: 67 BPM

## 2021-08-13 VITALS
SYSTOLIC BLOOD PRESSURE: 139 MMHG | HEART RATE: 64 BPM | DIASTOLIC BLOOD PRESSURE: 85 MMHG | RESPIRATION RATE: 16 BRPM | TEMPERATURE: 98 F | OXYGEN SATURATION: 99 %

## 2021-08-13 DIAGNOSIS — Z95.9 PRESENCE OF CARDIAC AND VASCULAR IMPLANT AND GRAFT, UNSPECIFIED: Chronic | ICD-10-CM

## 2021-08-13 DIAGNOSIS — Z86.19 PERSONAL HISTORY OF OTHER INFECTIOUS AND PARASITIC DISEASES: ICD-10-CM

## 2021-08-13 DIAGNOSIS — Z88.2 ALLERGY STATUS TO SULFONAMIDES: ICD-10-CM

## 2021-08-13 DIAGNOSIS — R53.1 WEAKNESS: ICD-10-CM

## 2021-08-13 DIAGNOSIS — Z21 ASYMPTOMATIC HUMAN IMMUNODEFICIENCY VIRUS [HIV] INFECTION STATUS: ICD-10-CM

## 2021-08-13 DIAGNOSIS — E11.9 TYPE 2 DIABETES MELLITUS WITHOUT COMPLICATIONS: ICD-10-CM

## 2021-08-13 DIAGNOSIS — Z88.0 ALLERGY STATUS TO PENICILLIN: ICD-10-CM

## 2021-08-13 DIAGNOSIS — Z91.013 ALLERGY TO SEAFOOD: ICD-10-CM

## 2021-08-13 DIAGNOSIS — Z91.018 ALLERGY TO OTHER FOODS: ICD-10-CM

## 2021-08-13 DIAGNOSIS — Z79.82 LONG TERM (CURRENT) USE OF ASPIRIN: ICD-10-CM

## 2021-08-13 DIAGNOSIS — I10 ESSENTIAL (PRIMARY) HYPERTENSION: ICD-10-CM

## 2021-08-13 DIAGNOSIS — Z87.898 PERSONAL HISTORY OF OTHER SPECIFIED CONDITIONS: ICD-10-CM

## 2021-08-13 DIAGNOSIS — Z95.9 PRESENCE OF CARDIAC AND VASCULAR IMPLANT AND GRAFT, UNSPECIFIED: ICD-10-CM

## 2021-08-13 DIAGNOSIS — C61 MALIGNANT NEOPLASM OF PROSTATE: ICD-10-CM

## 2021-08-13 DIAGNOSIS — F19.10 OTHER PSYCHOACTIVE SUBSTANCE ABUSE, UNCOMPLICATED: ICD-10-CM

## 2021-08-13 DIAGNOSIS — Z86.79 PERSONAL HISTORY OF OTHER DISEASES OF THE CIRCULATORY SYSTEM: ICD-10-CM

## 2021-08-13 DIAGNOSIS — Z95.5 PRESENCE OF CORONARY ANGIOPLASTY IMPLANT AND GRAFT: ICD-10-CM

## 2021-08-13 DIAGNOSIS — I25.10 ATHEROSCLEROTIC HEART DISEASE OF NATIVE CORONARY ARTERY WITHOUT ANGINA PECTORIS: ICD-10-CM

## 2021-08-13 LAB
ALBUMIN SERPL ELPH-MCNC: 3.9 G/DL — SIGNIFICANT CHANGE UP (ref 3.3–5)
ALP SERPL-CCNC: 57 U/L — SIGNIFICANT CHANGE UP (ref 40–120)
ALT FLD-CCNC: 23 U/L — SIGNIFICANT CHANGE UP (ref 10–45)
ANION GAP SERPL CALC-SCNC: 10 MMOL/L — SIGNIFICANT CHANGE UP (ref 5–17)
APTT BLD: 32 SEC — SIGNIFICANT CHANGE UP (ref 27.5–35.5)
AST SERPL-CCNC: 30 U/L — SIGNIFICANT CHANGE UP (ref 10–40)
BASOPHILS # BLD AUTO: 0.02 K/UL — SIGNIFICANT CHANGE UP (ref 0–0.2)
BASOPHILS NFR BLD AUTO: 0.5 % — SIGNIFICANT CHANGE UP (ref 0–2)
BILIRUB SERPL-MCNC: 0.5 MG/DL — SIGNIFICANT CHANGE UP (ref 0.2–1.2)
BUN SERPL-MCNC: 16 MG/DL — SIGNIFICANT CHANGE UP (ref 7–23)
CALCIUM SERPL-MCNC: 9.3 MG/DL — SIGNIFICANT CHANGE UP (ref 8.4–10.5)
CHLORIDE SERPL-SCNC: 105 MMOL/L — SIGNIFICANT CHANGE UP (ref 96–108)
CK MB CFR SERPL CALC: 8.7 NG/ML — HIGH (ref 0–6.7)
CK SERPL-CCNC: 270 U/L — HIGH (ref 30–200)
CO2 SERPL-SCNC: 25 MMOL/L — SIGNIFICANT CHANGE UP (ref 22–31)
CREAT SERPL-MCNC: 1.22 MG/DL — SIGNIFICANT CHANGE UP (ref 0.5–1.3)
EOSINOPHIL # BLD AUTO: 0.09 K/UL — SIGNIFICANT CHANGE UP (ref 0–0.5)
EOSINOPHIL NFR BLD AUTO: 2.3 % — SIGNIFICANT CHANGE UP (ref 0–6)
GLUCOSE SERPL-MCNC: 112 MG/DL — HIGH (ref 70–99)
HCT VFR BLD CALC: 37.5 % — LOW (ref 39–50)
HGB BLD-MCNC: 12.9 G/DL — LOW (ref 13–17)
IMM GRANULOCYTES NFR BLD AUTO: 0.3 % — SIGNIFICANT CHANGE UP (ref 0–1.5)
INR BLD: 1 — SIGNIFICANT CHANGE UP (ref 0.88–1.16)
LYMPHOCYTES # BLD AUTO: 0.9 K/UL — LOW (ref 1–3.3)
LYMPHOCYTES # BLD AUTO: 22.7 % — SIGNIFICANT CHANGE UP (ref 13–44)
MCHC RBC-ENTMCNC: 30.6 PG — SIGNIFICANT CHANGE UP (ref 27–34)
MCHC RBC-ENTMCNC: 34.4 GM/DL — SIGNIFICANT CHANGE UP (ref 32–36)
MCV RBC AUTO: 88.9 FL — SIGNIFICANT CHANGE UP (ref 80–100)
MONOCYTES # BLD AUTO: 0.44 K/UL — SIGNIFICANT CHANGE UP (ref 0–0.9)
MONOCYTES NFR BLD AUTO: 11.1 % — SIGNIFICANT CHANGE UP (ref 2–14)
NEUTROPHILS # BLD AUTO: 2.51 K/UL — SIGNIFICANT CHANGE UP (ref 1.8–7.4)
NEUTROPHILS NFR BLD AUTO: 63.1 % — SIGNIFICANT CHANGE UP (ref 43–77)
NRBC # BLD: 0 /100 WBCS — SIGNIFICANT CHANGE UP (ref 0–0)
PLATELET # BLD AUTO: 133 K/UL — LOW (ref 150–400)
POTASSIUM SERPL-MCNC: 3.7 MMOL/L — SIGNIFICANT CHANGE UP (ref 3.5–5.3)
POTASSIUM SERPL-SCNC: 3.7 MMOL/L — SIGNIFICANT CHANGE UP (ref 3.5–5.3)
PROT SERPL-MCNC: 7 G/DL — SIGNIFICANT CHANGE UP (ref 6–8.3)
PROTHROM AB SERPL-ACNC: 12 SEC — SIGNIFICANT CHANGE UP (ref 10.6–13.6)
RBC # BLD: 4.22 M/UL — SIGNIFICANT CHANGE UP (ref 4.2–5.8)
RBC # FLD: 13.4 % — SIGNIFICANT CHANGE UP (ref 10.3–14.5)
SARS-COV-2 RNA SPEC QL NAA+PROBE: SIGNIFICANT CHANGE UP
SODIUM SERPL-SCNC: 140 MMOL/L — SIGNIFICANT CHANGE UP (ref 135–145)
TROPONIN T SERPL-MCNC: 0.01 NG/ML — SIGNIFICANT CHANGE UP (ref 0–0.01)
WBC # BLD: 3.97 K/UL — SIGNIFICANT CHANGE UP (ref 3.8–10.5)
WBC # FLD AUTO: 3.97 K/UL — SIGNIFICANT CHANGE UP (ref 3.8–10.5)

## 2021-08-13 PROCEDURE — 82962 GLUCOSE BLOOD TEST: CPT

## 2021-08-13 PROCEDURE — 93010 ELECTROCARDIOGRAM REPORT: CPT

## 2021-08-13 PROCEDURE — 93005 ELECTROCARDIOGRAM TRACING: CPT

## 2021-08-13 PROCEDURE — 71045 X-RAY EXAM CHEST 1 VIEW: CPT | Mod: 26

## 2021-08-13 PROCEDURE — 80053 COMPREHEN METABOLIC PANEL: CPT

## 2021-08-13 PROCEDURE — 82553 CREATINE MB FRACTION: CPT

## 2021-08-13 PROCEDURE — 99285 EMERGENCY DEPT VISIT HI MDM: CPT

## 2021-08-13 PROCEDURE — 36415 COLL VENOUS BLD VENIPUNCTURE: CPT

## 2021-08-13 PROCEDURE — 82550 ASSAY OF CK (CPK): CPT

## 2021-08-13 PROCEDURE — 84484 ASSAY OF TROPONIN QUANT: CPT

## 2021-08-13 PROCEDURE — 99284 EMERGENCY DEPT VISIT MOD MDM: CPT | Mod: 25

## 2021-08-13 PROCEDURE — 87635 SARS-COV-2 COVID-19 AMP PRB: CPT

## 2021-08-13 PROCEDURE — 85610 PROTHROMBIN TIME: CPT

## 2021-08-13 PROCEDURE — 71045 X-RAY EXAM CHEST 1 VIEW: CPT

## 2021-08-13 PROCEDURE — 85730 THROMBOPLASTIN TIME PARTIAL: CPT

## 2021-08-13 PROCEDURE — 85025 COMPLETE CBC W/AUTO DIFF WBC: CPT

## 2021-08-13 RX ORDER — SODIUM CHLORIDE 9 MG/ML
500 INJECTION INTRAMUSCULAR; INTRAVENOUS; SUBCUTANEOUS ONCE
Refills: 0 | Status: COMPLETED | OUTPATIENT
Start: 2021-08-13 | End: 2021-08-13

## 2021-08-13 RX ADMIN — SODIUM CHLORIDE 500 MILLILITER(S): 9 INJECTION INTRAMUSCULAR; INTRAVENOUS; SUBCUTANEOUS at 08:59

## 2021-08-13 NOTE — ED PROVIDER NOTE - PHYSICAL EXAMINATION
GEN: Well appearing, well developed, awake, alert, oriented to person, place, time/situation and in no apparent distress. NTAF  ENT: Airway patent, Nasal mucosa clear. Mouth with normal mucosa.  EYES: Clear bilaterally. PERRL, EOMI  RESPIRATORY: Breathing comfortably with normal RR. No W/C/R, no hypoxia or resp distress.  CARDIAC: Regular rate and rhythm, no M/R/G  ABDOMEN: Soft, nontender, +bowel sounds, no rebound, rigidity, or guarding.  MSK: Range of motion is not limited, no deformities noted.  NEURO: Alert and oriented x 3. Cn 2-12 intact. Strength 5/5 and sensation intact in all 4 extremities. no pronator drift. FTN normal. Gait normal.   SKIN: Skin normal color for race, warm, dry and intact. No evidence of rash.  PSYCH: Alert and oriented to person, place, time/situation. normal mood and affect. no apparent risk to self or others.

## 2021-08-13 NOTE — ED PROVIDER NOTE - PATIENT PORTAL LINK FT
You can access the FollowMyHealth Patient Portal offered by [facility] by registering at the following website: [email or web address]. By joining [employer]’s FollowMyHealth portal, you will also be able to view your health information using other applications (apps) compatible with our system.

## 2021-08-13 NOTE — ED PROVIDER NOTE - NSFOLLOWUPINSTRUCTIONS_ED_ALL_ED_FT
Please follow up with your primary care physician. If you have any problem getting an appointment this week, please call the ED Referral Coordinator at [phone].  Return to the Emergency Department if you have any new or worsening symptoms, or for any other concerns. Please read below for further information.      Weakness    WHAT YOU NEED TO KNOW:    Weakness is a loss of muscle strength. It may be caused by brain, nerve, or muscle problems. Physical and mental conditions such as heart problems, pregnancy, dehydration, or depression may also cause weakness. Reactions to certain drugs can cause weakness. Parts of your body may become weak if you need to wear a cast or splint or have been on bed rest for a long time.    DISCHARGE INSTRUCTIONS:    Call 911 for any of the following:   •You have any of the following signs of a stroke: ?Numbness or drooping on one side of your face       ?Weakness in an arm or leg      ?Confusion or difficulty speaking      ?Dizziness, a severe headache, or vision loss      •You lose feeling in your weakened body area.      •You have electric shock-like feelings down your arms and legs when you flex or move your neck.      •You have sudden or increased trouble speaking, swallowing, or breathing.      Return to the emergency department if:   •You have severe pain in your back, arms, or legs that worsens.      •You have sudden or worsened muscle weakness or loss of movement.      •You are not able to control when you urinate or have a bowel movement.      Contact your healthcare provider if:   •You feel depressed or anxious.       •You have questions or concerns about your condition or care.       Manage weakness:   •Use assistive devices as directed. These help protect you from injury. Examples include a walker or cane. Have someone install handrails in your home. These will help you get out of a bathtub or stand up from a toilet. Use a shower chair so you can sit while you shower. Sit down on the toilet or another chair to dry off and put on your clothes. Get help going up and down stairs if your legs are weak.       •Go to physical or occupational therapy if directed. A physical therapist can teach you exercises to help strengthen weak muscles. An occupational therapist can show you ways to do your daily activities more easily. For example, light forks and spoons can be easier to use if you have hand weakness. You may also learn ways to organize your household items so you are not moving heavy items.      •Balance rest with exercise. Exercise can help increase your muscle strength and energy. Do not exercise for long periods at a time. Take breaks often to rest. Too much exercise can cause muscle strain or make you more tired. Ask your healthcare provider how much exercise is right for you.      •Eat a variety of healthy foods. Too much or too little food may cause weakness or tiredness. Ask your healthcare provider what a healthy amount of food is for you. Healthy foods include fruits, vegetables, whole-grain breads, low-fat dairy products, lean meats and fish, nuts, and cooked beans.      •Do not smoke. Nicotine and other chemicals in cigarettes and cigars can make your symptoms worse, and can cause lung damage. Ask your healthcare provider for information if you currently smoke and need help to quit. E-cigarettes or smokeless tobacco still contain nicotine. Talk to your healthcare provider before you use these products.       •Do not use caffeine, alcohol, or illegal drugs. These may cause muscle twitching, which could lead to worsened weakness.       Follow up with your healthcare provider as directed: Write down your questions so you remember to ask them during your visits.      Substance Abuse    Chemical dependency is an addiction to drugs or alcohol. It is characterized by the repeated behavior of seeking out and using drugs and alcohol despite harmful consequences to the health and safety of oneself and others. Using drugs in a manner that brought you to an Emergency Room suggests you may have an drug abuse problem. Seek help at a drug addiction center.    SEEK IMMEDIATE MEDICAL CARE IF YOU HAVE ANY OF THE FOLLOWING SYMPTOMS: chest pain, shortness of breath, change in mental status, thoughts about hurting killing yourself, thoughts about hurting or killing somebody else, hallucinations, or worsening depression.

## 2021-08-13 NOTE — ED ADULT NURSE NOTE - OBJECTIVE STATEMENT
pt presents to ED for fall and weakness.  pt states he had "trip and fall" on the sidewalk just prior to arrival.  pt denies head injury. pt states landed on hands.  pt denies injury from the fall.   pt also reports generalized weakness this morning.  states weakness started 1 hour prior to the fall.  pt reports using cocaine last night.   full ROM all extremities.

## 2021-08-13 NOTE — ED ADULT TRIAGE NOTE - CHIEF COMPLAINT QUOTE
BIBEMS c/o weakness and fall this morning. denies head injury or LOC. reports using cocaine last night. had moderna vaccine x 2. hx HIV, hep C, prostate CA, MI.

## 2021-08-13 NOTE — ED ADULT NURSE NOTE - NSIMPLEMENTINTERV_GEN_ALL_ED
Implemented All Fall Risk Interventions:  [place] to call system. Call bell, personal items and telephone within reach. Instruct patient to call for assistance. Room bathroom lighting operational. Non-slip footwear when patient is off stretcher. Physically safe environment: no spills, clutter or unnecessary equipment. Stretcher in lowest position, wheels locked, appropriate side rails in place. Provide visual cue, wrist band, yellow gown, etc. Monitor gait and stability. Monitor for mental status changes and reorient to person, place, and time. Review medications for side effects contributing to fall risk. Reinforce activity limits and safety measures with patient and family.

## 2021-08-13 NOTE — ED PROVIDER NOTE - OBJECTIVE STATEMENT
71M, Poor historian, former Vet, current frequent Cocaine Abuser, frequent Marijuana user, Shellfish allergy (tolerated CCTA w/ contrast without allergic reaction), Known CAD with LM, LAD and OM1 Dz on prior CCTA), known PAD with B/L LE and RUE stent former, Occasional ETOH user, PMHx of HTN, DM, HIV (unknown CD4, VL undetectable), Hep C (treated w/ Harvoni 3-4 years ago), Prostate CA s/p radiation therapy, and Depression who p/w c/o feeling generally weak with a mechanical fall, states he tripped and landed on outstretched hands, no head trauma, no LOC, no HA or neck pain, no dizziness, no cp/sob, no n/t/w in ext. Pt further states he feels alone and like he "can't meet anybody" and "nobody wants to socialize anymore" due to the pandemic. He denies SI/HI or hallucinations. Admits to doing cocaine bc it makes him "feel good."

## 2021-08-13 NOTE — ED PROVIDER NOTE - CLINICAL SUMMARY MEDICAL DECISION MAKING FREE TEXT BOX
71M, Poor historian, former Vet, current frequent Cocaine Abuser, frequent Marijuana user, Shellfish allergy (tolerated CCTA w/ contrast without allergic reaction), Known CAD with LM, LAD and OM1 Dz on prior CCTA), known PAD with B/L LE and RUE stent former, Occasional ETOH user, PMHx of HTN, DM, HIV (unknown CD4, VL undetectable), Hep C (treated w/ Harvoni 3-4 years ago), Prostate CA s/p radiation therapy, and Depression who p/w c/o feeling generally weak with a mechanical fall, states he tripped and landed on outstretched hands, no head trauma, no LOC, no HA or neck pain, no dizziness, no cp/sob, no n/t/w in ext. Pt further states he feels alone and like he "can't meet anybody" and "nobody wants to socialize anymore" due to the pandemic. He denies SI/HI or hallucinations. Admits to doing cocaine bc it makes him "feel good."    On exam, VSS, pt well-appearing with 5/5 strength and no focal neuro deficits. No evidence of trauma to palms or elsewhere, EKG no stemi, will check labs and hydrate. No indication for additional imaging at this time 71M, Poor historian, former Vet, current frequent Cocaine Abuser, frequent Marijuana user, Shellfish allergy (tolerated CCTA w/ contrast without allergic reaction), Known CAD with LM, LAD and OM1 Dz on prior CCTA), known PAD with B/L LE and RUE stent former, Occasional ETOH user, PMHx of HTN, DM, HIV (unknown CD4, VL undetectable), Hep C (treated w/ Harvoni 3-4 years ago), Prostate CA s/p radiation therapy, and Depression who p/w c/o feeling generally weak with a mechanical fall, states he tripped and landed on outstretched hands, no head trauma, no LOC, no HA or neck pain, no dizziness, no cp/sob, no n/t/w in ext. Pt further states he feels alone and like he "can't meet anybody" and "nobody wants to socialize anymore" due to the pandemic. He denies SI/HI or hallucinations. Admits to doing cocaine bc it makes him "feel good."    On exam, VSS, pt well-appearing with 5/5 strength and no focal neuro deficits. No evidence of trauma to palms or elsewhere, pt not suicidal or psychotic, no indiction for emergent psych eval/workup, EKG no stemi, will check labs and hydrate. No indication for additional imaging at this time. Pt hydrated and feels better. Labs with no emergent findings. Slight CK elevated likely due to mild dehydration and cocaine use. Pt advised to seek help for substance abuse issues, resources offered and return precautions discussed.   Stable for DC.

## 2021-08-18 ENCOUNTER — APPOINTMENT (OUTPATIENT)
Dept: CARDIOTHORACIC SURGERY | Facility: CLINIC | Age: 71
End: 2021-08-18

## 2021-08-18 DIAGNOSIS — Z86.79 PERSONAL HISTORY OF OTHER DISEASES OF THE CIRCULATORY SYSTEM: ICD-10-CM

## 2021-08-18 DIAGNOSIS — Z85.46 PERSONAL HISTORY OF MALIGNANT NEOPLASM OF PROSTATE: ICD-10-CM

## 2021-08-18 DIAGNOSIS — U07.1 COVID-19: ICD-10-CM

## 2021-08-18 DIAGNOSIS — I71.2 THORACIC AORTIC ANEURYSM, W/OUT RUPTURE: ICD-10-CM

## 2021-08-18 DIAGNOSIS — Z86.39 PERSONAL HISTORY OF OTHER ENDOCRINE, NUTRITIONAL AND METABOLIC DISEASE: ICD-10-CM

## 2021-08-18 DIAGNOSIS — Z86.59 PERSONAL HISTORY OF OTHER MENTAL AND BEHAVIORAL DISORDERS: ICD-10-CM

## 2021-08-18 DIAGNOSIS — Z87.898 PERSONAL HISTORY OF OTHER SPECIFIED CONDITIONS: ICD-10-CM

## 2021-08-18 DIAGNOSIS — Z86.19 PERSONAL HISTORY OF OTHER INFECTIOUS AND PARASITIC DISEASES: ICD-10-CM

## 2021-08-18 DIAGNOSIS — I25.10 ATHEROSCLEROTIC HEART DISEASE OF NATIVE CORONARY ARTERY W/OUT ANGINA PECTORIS: ICD-10-CM

## 2021-08-18 DIAGNOSIS — I50.20 UNSPECIFIED SYSTOLIC (CONGESTIVE) HEART FAILURE: ICD-10-CM

## 2021-08-18 DIAGNOSIS — F14.90 COCAINE USE, UNSPECIFIED, UNCOMPLICATED: ICD-10-CM

## 2021-08-18 DIAGNOSIS — B20 HUMAN IMMUNODEFICIENCY VIRUS [HIV] DISEASE: ICD-10-CM

## 2021-08-19 NOTE — HISTORY OF PRESENT ILLNESS
[FreeTextEntry1] : 71 year old male, current  cocaine and marijuana use with a past medical history of non-obstructive coronary artery disease, newly diagnosed systolic CHF (EF 36% ECHO 7/28/21), known PAD with B/L LE and RUE stent former, HTN, DM, HIV (unknown CD4, VL undetectable), Hep C (treated w/ Harvoni 3-4 years ago), Prostate CA s/p radiation therapy, and Depression, who presents for evaluation and management of an ascending aortic aneurysm. \par \par Patient was recently hospitalized on 7/28 for a near syncopal episode accompanied with chest pain.Cardiac cath revealed mLAD 30%, LCx mild artheroscleosis, RCA mild atherosclerosis. He was discharged home on 7/29/21. \par \par \par CTA chest 7/3/21: \par There is aneurysmal dilatation of the ascending thoracic aorta which measures 4.7 cm at the level of the main pulmonary artery, previously measuring 4.3 cm in 7/2019. Negative for stenosis of the proximal aspect of the great vessels. Evaluation of the osseous structures demonstrates no destructive lesion.\par \par \par Echocardiogram 7/28/21: \par \par  1. Moderate symmetric left ventricular hypertrophy.\par  2. Moderately reduced left ventricular systolic function.\par  3. Regional wall motion abnormalities consistent with ischemic heart disease.\par  4. Normal right ventricular size.\par  5. Reduced right ventricular systolic function.\par  6. Normal atria.\par  7. No significant valvular disease.\par  8. Dilated aortic root.\par  9. Dilated ascending aorta.\par 10. The aortic root measures 3.90 cm at level of the sinuses of Valsalva (normal 3.1-3.7 cm for men, 2.7-3.3 cm for women). The proximal ascending aorta measures 4.70 cm (normal 2.6-3.4 cm for men, 2.3-3.1 cm for women).\par 11. There was insufficient tricuspid regurgitation detected from which to calculate pulmonary artery systolic pressure.\par 12. No pericardial effusion.

## 2021-08-19 NOTE — DATA REVIEWED
[FreeTextEntry1] : \par Cardiac cath 7/28/21:  revealed mLAD 30%, LCx mild atherosclerosis, RCA mild atherosclerosis\par \par \par Echocardiogram 7/28/21: \par \par  1. Moderate symmetric left ventricular hypertrophy.\par  2. Moderately reduced left ventricular systolic function.\par  3. Regional wall motion abnormalities consistent with ischemic heart disease.\par  4. Normal right ventricular size.\par  5. Reduced right ventricular systolic function.\par  6. Normal atria.\par  7. No significant valvular disease.\par  8. Dilated aortic root.\par  9. Dilated ascending aorta.\par 10. The aortic root measures 3.90 cm at level of the sinuses of Valsalva (normal 3.1-3.7 cm for men, 2.7-3.3 cm for women). The proximal ascending aorta measures 4.70 cm (normal 2.6-3.4 cm for men, 2.3-3.1 cm for women).\par 11. There was insufficient tricuspid regurgitation detected from which to calculate pulmonary artery systolic pressure.\par 12. No pericardial effusion.\par \par CTA chest 7/3/21: \par \par Evaluation of the lower cervical region demonstrates the visualized thyroid gland is normal in appearance. There is bilateral gynecomastia. Top normal heart size. Coronary arterial calcification. Negative for calcification of the aortic valve. Negative for mitral annular calcification. Negative for intraluminal thrombus within the left atrial appendage. There no marcelino central or segmental pulmonary embolus with adequate opacification of the pulmonary artery and its segmental branches. Evaluation of the lung parenchyma demonstrates bibasilar atelectasis. No pulmonary consolidation or mass. Left vertebral artery arises directly from the aorta. Evaluation upper abdomen demonstrates suggestion of enlargement of the liver and spleen. Adrenal adenomatous hyperplasia. Small hiatal hernia. Distal esophagitis. There is aneurysmal dilatation of the ascending thoracic aorta which measures 4.7 cm at the level of the main pulmonary artery, previously measuring 4.3 cm in 7/2019. Negative for stenosis of the proximal aspect of the great vessels. Evaluation of the osseous structures demonstrates no destructive lesion.\par

## 2021-08-20 ENCOUNTER — EMERGENCY (EMERGENCY)
Facility: HOSPITAL | Age: 71
LOS: 1 days | Discharge: HOME | End: 2021-08-20
Attending: EMERGENCY MEDICINE | Admitting: EMERGENCY MEDICINE
Payer: MEDICARE

## 2021-08-20 VITALS
SYSTOLIC BLOOD PRESSURE: 170 MMHG | TEMPERATURE: 98 F | DIASTOLIC BLOOD PRESSURE: 98 MMHG | RESPIRATION RATE: 17 BRPM | HEART RATE: 62 BPM

## 2021-08-20 VITALS
HEIGHT: 72 IN | HEART RATE: 65 BPM | DIASTOLIC BLOOD PRESSURE: 91 MMHG | SYSTOLIC BLOOD PRESSURE: 171 MMHG | TEMPERATURE: 99 F | OXYGEN SATURATION: 96 % | RESPIRATION RATE: 18 BRPM | WEIGHT: 179.9 LBS

## 2021-08-20 DIAGNOSIS — Z91.018 ALLERGY TO OTHER FOODS: ICD-10-CM

## 2021-08-20 DIAGNOSIS — R06.02 SHORTNESS OF BREATH: ICD-10-CM

## 2021-08-20 DIAGNOSIS — E11.9 TYPE 2 DIABETES MELLITUS WITHOUT COMPLICATIONS: ICD-10-CM

## 2021-08-20 DIAGNOSIS — Z86.19 PERSONAL HISTORY OF OTHER INFECTIOUS AND PARASITIC DISEASES: ICD-10-CM

## 2021-08-20 DIAGNOSIS — M79.606 PAIN IN LEG, UNSPECIFIED: ICD-10-CM

## 2021-08-20 DIAGNOSIS — Z87.898 PERSONAL HISTORY OF OTHER SPECIFIED CONDITIONS: ICD-10-CM

## 2021-08-20 DIAGNOSIS — F17.200 NICOTINE DEPENDENCE, UNSPECIFIED, UNCOMPLICATED: ICD-10-CM

## 2021-08-20 DIAGNOSIS — Z86.79 PERSONAL HISTORY OF OTHER DISEASES OF THE CIRCULATORY SYSTEM: ICD-10-CM

## 2021-08-20 DIAGNOSIS — Z21 ASYMPTOMATIC HUMAN IMMUNODEFICIENCY VIRUS [HIV] INFECTION STATUS: ICD-10-CM

## 2021-08-20 DIAGNOSIS — Z88.2 ALLERGY STATUS TO SULFONAMIDES: ICD-10-CM

## 2021-08-20 DIAGNOSIS — I10 ESSENTIAL (PRIMARY) HYPERTENSION: ICD-10-CM

## 2021-08-20 DIAGNOSIS — Z88.0 ALLERGY STATUS TO PENICILLIN: ICD-10-CM

## 2021-08-20 DIAGNOSIS — Z91.013 ALLERGY TO SEAFOOD: ICD-10-CM

## 2021-08-20 DIAGNOSIS — E86.0 DEHYDRATION: ICD-10-CM

## 2021-08-20 DIAGNOSIS — Z85.46 PERSONAL HISTORY OF MALIGNANT NEOPLASM OF PROSTATE: ICD-10-CM

## 2021-08-20 DIAGNOSIS — Z95.9 PRESENCE OF CARDIAC AND VASCULAR IMPLANT AND GRAFT, UNSPECIFIED: Chronic | ICD-10-CM

## 2021-08-20 DIAGNOSIS — R79.89 OTHER SPECIFIED ABNORMAL FINDINGS OF BLOOD CHEMISTRY: ICD-10-CM

## 2021-08-20 DIAGNOSIS — Z79.82 LONG TERM (CURRENT) USE OF ASPIRIN: ICD-10-CM

## 2021-08-20 DIAGNOSIS — I25.10 ATHEROSCLEROTIC HEART DISEASE OF NATIVE CORONARY ARTERY WITHOUT ANGINA PECTORIS: ICD-10-CM

## 2021-08-20 LAB
ALBUMIN SERPL ELPH-MCNC: 3.6 G/DL — SIGNIFICANT CHANGE UP (ref 3.3–5)
ALP SERPL-CCNC: 55 U/L — SIGNIFICANT CHANGE UP (ref 40–120)
ALT FLD-CCNC: 18 U/L — SIGNIFICANT CHANGE UP (ref 10–45)
ANION GAP SERPL CALC-SCNC: 6 MMOL/L — SIGNIFICANT CHANGE UP (ref 5–17)
APPEARANCE UR: CLEAR — SIGNIFICANT CHANGE UP
AST SERPL-CCNC: 21 U/L — SIGNIFICANT CHANGE UP (ref 10–40)
BACTERIA # UR AUTO: PRESENT /HPF
BASE EXCESS BLDV CALC-SCNC: 1.2 MMOL/L — SIGNIFICANT CHANGE UP (ref -2–3)
BASOPHILS # BLD AUTO: 0.02 K/UL — SIGNIFICANT CHANGE UP (ref 0–0.2)
BASOPHILS NFR BLD AUTO: 0.6 % — SIGNIFICANT CHANGE UP (ref 0–2)
BILIRUB SERPL-MCNC: 0.2 MG/DL — SIGNIFICANT CHANGE UP (ref 0.2–1.2)
BILIRUB UR-MCNC: NEGATIVE — SIGNIFICANT CHANGE UP
BUN SERPL-MCNC: 24 MG/DL — HIGH (ref 7–23)
CALCIUM SERPL-MCNC: 8.8 MG/DL — SIGNIFICANT CHANGE UP (ref 8.4–10.5)
CHLORIDE SERPL-SCNC: 109 MMOL/L — HIGH (ref 96–108)
CO2 BLDV-SCNC: 30.4 MMOL/L — HIGH (ref 22–26)
CO2 SERPL-SCNC: 28 MMOL/L — SIGNIFICANT CHANGE UP (ref 22–31)
COLOR SPEC: YELLOW — SIGNIFICANT CHANGE UP
CREAT SERPL-MCNC: 1.38 MG/DL — HIGH (ref 0.5–1.3)
DIFF PNL FLD: ABNORMAL
EOSINOPHIL # BLD AUTO: 0.09 K/UL — SIGNIFICANT CHANGE UP (ref 0–0.5)
EOSINOPHIL NFR BLD AUTO: 2.9 % — SIGNIFICANT CHANGE UP (ref 0–6)
EPI CELLS # UR: SIGNIFICANT CHANGE UP /HPF (ref 0–5)
GAS PNL BLDV: SIGNIFICANT CHANGE UP
GLUCOSE SERPL-MCNC: 147 MG/DL — HIGH (ref 70–99)
GLUCOSE UR QL: NEGATIVE — SIGNIFICANT CHANGE UP
HCO3 BLDV-SCNC: 29 MMOL/L — SIGNIFICANT CHANGE UP (ref 22–29)
HCT VFR BLD CALC: 34.5 % — LOW (ref 39–50)
HGB BLD-MCNC: 11.6 G/DL — LOW (ref 13–17)
IMM GRANULOCYTES NFR BLD AUTO: 0.3 % — SIGNIFICANT CHANGE UP (ref 0–1.5)
KETONES UR-MCNC: NEGATIVE — SIGNIFICANT CHANGE UP
LEUKOCYTE ESTERASE UR-ACNC: NEGATIVE — SIGNIFICANT CHANGE UP
LYMPHOCYTES # BLD AUTO: 0.79 K/UL — LOW (ref 1–3.3)
LYMPHOCYTES # BLD AUTO: 25.4 % — SIGNIFICANT CHANGE UP (ref 13–44)
MAGNESIUM SERPL-MCNC: 1.8 MG/DL — SIGNIFICANT CHANGE UP (ref 1.6–2.6)
MCHC RBC-ENTMCNC: 30.7 PG — SIGNIFICANT CHANGE UP (ref 27–34)
MCHC RBC-ENTMCNC: 33.6 GM/DL — SIGNIFICANT CHANGE UP (ref 32–36)
MCV RBC AUTO: 91.3 FL — SIGNIFICANT CHANGE UP (ref 80–100)
MONOCYTES # BLD AUTO: 0.39 K/UL — SIGNIFICANT CHANGE UP (ref 0–0.9)
MONOCYTES NFR BLD AUTO: 12.5 % — SIGNIFICANT CHANGE UP (ref 2–14)
NEUTROPHILS # BLD AUTO: 1.81 K/UL — SIGNIFICANT CHANGE UP (ref 1.8–7.4)
NEUTROPHILS NFR BLD AUTO: 58.3 % — SIGNIFICANT CHANGE UP (ref 43–77)
NITRITE UR-MCNC: NEGATIVE — SIGNIFICANT CHANGE UP
NRBC # BLD: 0 /100 WBCS — SIGNIFICANT CHANGE UP (ref 0–0)
NT-PROBNP SERPL-SCNC: 1197 PG/ML — HIGH (ref 0–300)
PCO2 BLDV: 57 MMHG — HIGH (ref 42–55)
PH BLDV: 7.31 — LOW (ref 7.32–7.43)
PH UR: 6 — SIGNIFICANT CHANGE UP (ref 5–8)
PLATELET # BLD AUTO: 90 K/UL — LOW (ref 150–400)
PO2 BLDV: 58 MMHG — SIGNIFICANT CHANGE UP
POTASSIUM SERPL-MCNC: 4.2 MMOL/L — SIGNIFICANT CHANGE UP (ref 3.5–5.3)
POTASSIUM SERPL-SCNC: 4.2 MMOL/L — SIGNIFICANT CHANGE UP (ref 3.5–5.3)
PROT SERPL-MCNC: 6.6 G/DL — SIGNIFICANT CHANGE UP (ref 6–8.3)
PROT UR-MCNC: 30 MG/DL
RBC # BLD: 3.78 M/UL — LOW (ref 4.2–5.8)
RBC # FLD: 13.7 % — SIGNIFICANT CHANGE UP (ref 10.3–14.5)
RBC CASTS # UR COMP ASSIST: < 5 /HPF — SIGNIFICANT CHANGE UP
SAO2 % BLDV: 87.2 % — SIGNIFICANT CHANGE UP
SARS-COV-2 RNA SPEC QL NAA+PROBE: SIGNIFICANT CHANGE UP
SODIUM SERPL-SCNC: 143 MMOL/L — SIGNIFICANT CHANGE UP (ref 135–145)
SP GR SPEC: 1.02 — SIGNIFICANT CHANGE UP (ref 1–1.03)
TROPONIN T SERPL-MCNC: 0.01 NG/ML — SIGNIFICANT CHANGE UP (ref 0–0.01)
TROPONIN T SERPL-MCNC: 0.01 NG/ML — SIGNIFICANT CHANGE UP (ref 0–0.01)
UROBILINOGEN FLD QL: 0.2 E.U./DL — SIGNIFICANT CHANGE UP
WBC # BLD: 3.11 K/UL — LOW (ref 3.8–10.5)
WBC # FLD AUTO: 3.11 K/UL — LOW (ref 3.8–10.5)
WBC UR QL: < 5 /HPF — SIGNIFICANT CHANGE UP

## 2021-08-20 PROCEDURE — 82803 BLOOD GASES ANY COMBINATION: CPT

## 2021-08-20 PROCEDURE — 99285 EMERGENCY DEPT VISIT HI MDM: CPT

## 2021-08-20 PROCEDURE — 36415 COLL VENOUS BLD VENIPUNCTURE: CPT

## 2021-08-20 PROCEDURE — 71046 X-RAY EXAM CHEST 2 VIEWS: CPT | Mod: 26

## 2021-08-20 PROCEDURE — 93010 ELECTROCARDIOGRAM REPORT: CPT

## 2021-08-20 PROCEDURE — 80053 COMPREHEN METABOLIC PANEL: CPT

## 2021-08-20 PROCEDURE — 85025 COMPLETE CBC W/AUTO DIFF WBC: CPT

## 2021-08-20 PROCEDURE — 99284 EMERGENCY DEPT VISIT MOD MDM: CPT | Mod: 25

## 2021-08-20 PROCEDURE — 71046 X-RAY EXAM CHEST 2 VIEWS: CPT

## 2021-08-20 PROCEDURE — 83880 ASSAY OF NATRIURETIC PEPTIDE: CPT

## 2021-08-20 PROCEDURE — 81001 URINALYSIS AUTO W/SCOPE: CPT

## 2021-08-20 PROCEDURE — 93005 ELECTROCARDIOGRAM TRACING: CPT

## 2021-08-20 PROCEDURE — 87635 SARS-COV-2 COVID-19 AMP PRB: CPT

## 2021-08-20 PROCEDURE — 84484 ASSAY OF TROPONIN QUANT: CPT

## 2021-08-20 PROCEDURE — 83735 ASSAY OF MAGNESIUM: CPT

## 2021-08-20 RX ORDER — AMLODIPINE BESYLATE 2.5 MG/1
2.5 TABLET ORAL ONCE
Refills: 0 | Status: COMPLETED | OUTPATIENT
Start: 2021-08-20 | End: 2021-08-20

## 2021-08-20 RX ORDER — SODIUM CHLORIDE 9 MG/ML
1000 INJECTION INTRAMUSCULAR; INTRAVENOUS; SUBCUTANEOUS ONCE
Refills: 0 | Status: COMPLETED | OUTPATIENT
Start: 2021-08-20 | End: 2021-08-20

## 2021-08-20 RX ORDER — IBUPROFEN 200 MG
600 TABLET ORAL ONCE
Refills: 0 | Status: COMPLETED | OUTPATIENT
Start: 2021-08-20 | End: 2021-08-20

## 2021-08-20 RX ADMIN — SODIUM CHLORIDE 1000 MILLILITER(S): 9 INJECTION INTRAMUSCULAR; INTRAVENOUS; SUBCUTANEOUS at 04:03

## 2021-08-20 RX ADMIN — AMLODIPINE BESYLATE 2.5 MILLIGRAM(S): 2.5 TABLET ORAL at 05:37

## 2021-08-20 RX ADMIN — Medication 600 MILLIGRAM(S): at 02:52

## 2021-08-20 NOTE — ED PROVIDER NOTE - CARE PLAN
1 Principal Discharge DX:	Leg pain   Principal Discharge DX:	Leg pain  Secondary Diagnosis:	Dehydration

## 2021-08-20 NOTE — ED PROVIDER NOTE - CLINICAL SUMMARY MEDICAL DECISION MAKING FREE TEXT BOX
Patient presents to ED with concern for chronic lower extremity discomfort, requesting motrin.  Of note, triage note also describes SOB which patient states is chronic.  He is in no resp distress on arrival to ED, speaking comfortably with clear lungs on ausculation.  Labs, CXR reviewed and WNL.  Patient denies new extremity injury and is ambulating in ED without difficulty.  He is aware of all results as well as recommendation for prompt PCP Follow up.  Patient is advised to follow up with their PCP in 1-2 days without fail.  Patient instructed to return to ED immediately should their symptoms worsen or if there is any concern prior to the recommended PCP follow up.  Patient is aware of plan and verbalizes their understanding.  Will discharge home at this time. Patient presents to ED with concern for chronic lower extremity discomfort, requesting motrin.  Of note, triage note also describes SOB which patient states is chronic.  He is in no resp distress on arrival to ED, speaking comfortably with clear lungs on ausculation.  Labs, CXR reviewed.  Cr noted to be elevated from prior - suspect pre renal/dehydration.  Given clear costophrenic angles and no clinical concern for HF - small 500 cc bolus is given in ED (recent ECHO noted, no further fluid given).  Trop negative x2.  Patient well appearing and requesting discharge.  He is feeling improved following requested motrin.  Patient denies new extremity injury and is ambulating in ED without difficulty.  He is aware of all results as well as recommendation for prompt PCP Follow up.  Patient is advised to follow up with their PCP in 1-2 days without fail.  Patient instructed to return to ED immediately should their symptoms worsen or if there is any concern prior to the recommended PCP follow up.  Patient is aware of plan and verbalizes their understanding.  Will discharge home at this time. Patient presents to ED with concern for chronic lower extremity discomfort, requesting motrin.  Of note, triage note also describes SOB which patient states is chronic.  He is in no resp distress on arrival to ED, speaking comfortably with clear lungs on ausculation.  Labs, CXR reviewed.  Cr noted to be elevated from prior - suspect pre renal/dehydration.  Given clear costophrenic angles and no clinical concern for HF - small 250 cc bolus is given in ED (recent ECHO noted, no further fluid given).  Trop negative x2.  Patient well appearing and requesting discharge.  He is feeling improved following requested motrin.  Patient denies new extremity injury and is ambulating in ED without difficulty.  He is aware of all results as well as recommendation for prompt PCP Follow up.  Patient is advised to follow up with their PCP in 1-2 days without fail.  Patient instructed to return to ED immediately should their symptoms worsen or if there is any concern prior to the recommended PCP follow up.  Patient is aware of plan and verbalizes their understanding.  Will discharge home at this time.

## 2021-08-20 NOTE — ED PROVIDER NOTE - OBJECTIVE STATEMENT
71 year old male, poor historian, former Vet, current frequent Cocaine abuser, frequent Marijuana user, shellfish allergy (tolerated CCTA w/ contrast without allergic reaction), known CAD with LM, LAD and OM1 Dz on prior CCTA), newly diagnosed systolic CHF (EF 36% ECHO 7/28/21), known PAD with B/L LE and RUE stent former, occasional ETOH user, PMHx of HTN, DM, HIV (unknown CD4, VL undetectable), Hep C (treated w/ Harvoni 3-4 years ago), prostate CA s/p radiation therapy, and Depression presents to ED with concern for shortness of breath this evening. 71 year old male, poor historian, former Vet, current frequent Cocaine abuser, frequent Marijuana user, shellfish allergy (tolerated CCTA w/ contrast without allergic reaction), known CAD with LM, LAD and OM1 Dz on prior CCTA), newly diagnosed systolic CHF (EF 36% ECHO 7/28/21), known PAD with B/L LE and RUE stent former, occasional ETOH user, PMHx of HTN, DM, HIV (unknown CD4, VL undetectable), Hep C (treated w/ Harvoni 3-4 years ago), prostate CA s/p radiation therapy, and Depression presents to ED with request for motrin for his ongoing LE discomfort.  Patient states his "gait instability" is secondary to chronic lower extremity pain.  He notes he took motrin this morning, however is requesting a dose this evening.  Upon further questioning, given triage note of SOB - patient states his shortness of breath is chronic and when asked in triage if he had shortness of breath, he said yes.  Of note, patient is speaking comfortably in full sentences on arrival to ED and without increased work of breathing.  Patient denies fever, chills, chest pain, abdominal pain, nausea, emesis, changes to bowel movements, peripheral edema, rashes, known sick contacts or any new acute complaints or concerns outside of request for pain medication.  No new injury to extremities.

## 2021-08-20 NOTE — ED ADULT NURSE REASSESSMENT NOTE - NS ED NURSE REASSESS COMMENT FT1
pt is resting comfortably on the stretcher. denies SOB, fever, cough, or chest pain. pt is able to talk in full sentences, currently, eating sandwiches.

## 2021-08-20 NOTE — ED PROVIDER NOTE - PHYSICAL EXAMINATION
VITAL SIGNS: I have reviewed nursing notes and confirm.  CONSTITUTIONAL: Non toxic; in no acute distress.   SKIN:  Warm and dry, no acute rash.   HEAD:  Normocephalic, atraumatic.  EYES: PERRL.  EOM intact; conjunctiva and sclera clear.  ENT: No nasal discharge; airway clear.   NECK: Supple; non tender.  CARD: S1, S2 normal; no murmurs, gallops, or rubs. Regular rate and rhythm.   RESP:  Clear to auscultation b/l, no wheezes, rales or rhonchi.  Speaking comfortably in full sentences.    ABD: Normal bowel sounds; soft; non-distended; non-tender; no guarding/rebound.  EXT: Normal ROM. No clubbing, cyanosis or edema. 2+ pulses to b/l ue/le.  Normal gait.    NEURO: Alert, oriented, grossly unremarkable.  5/5 strength x 4 extremities against gravity and external force.  No drift x 4 extremities.  Sensation intact and symmetric x 4 extremities.  No facial asymmetry.    PSYCH: Cooperative, mood and affect appropriate.

## 2021-08-20 NOTE — ED ADULT TRIAGE NOTE - CHIEF COMPLAINT QUOTE
Pt presents ambulatory via EMS with c/o "SOB and difficulty walking" x 2 hours. Denies any chest pain.

## 2021-08-20 NOTE — ED ADULT NURSE NOTE - OBJECTIVE STATEMENT
received A&Ox3, ambulatory 71years old male pt with c/o of " I have pain in my lower leg and I have shortness of breathe." currently pt is able to talk in full sentences, no labored breathing noted. this is a chronic issue for the pt for hx of CHF. pt reports he felt cramps in the legs and requesting Motrin. no bilateral lower leg swelling noted. pt denies fever, cough, nausea, vomiting, diarrhea. no sweating. no chest pain. will continue to assess.

## 2021-08-20 NOTE — ED PROVIDER NOTE - NSFOLLOWUPINSTRUCTIONS_ED_ALL_ED_FT
Please follow up with your primary physician in 1-2 days for re evaluation.  Please return to ER immediately should your symptoms worsen or if you have any concern prior to this recommended follow up.          Leg Pain    WHAT YOU NEED TO KNOW:    Leg pain may be caused by a variety of health conditions. Your tests did not show any broken bones or blood clots.    DISCHARGE INSTRUCTIONS:    Return to the emergency department if:   •You have a fever.      •Your leg starts to swell.      •Your leg pain gets worse.      •You have numbness or tingling in your leg or toes.      •You cannot put any weight on or move your leg.      Contact your healthcare provider if:   •Your pain does not decrease, even after treatment.      •You have questions or concerns about your condition or care.      Medicines:   •NSAIDs, such as ibuprofen, help decrease swelling, pain, and fever. This medicine is available with or without a doctor's order. NSAIDs can cause stomach bleeding or kidney problems in certain people. If you take blood thinner medicine, always ask your healthcare provider if NSAIDs are safe for you. Always read the medicine label and follow directions.      •Take your medicine as directed. Contact your healthcare provider if you think your medicine is not helping or if you have side effects. Tell him of her if you are allergic to any medicine. Keep a list of the medicines, vitamins, and herbs you take. Include the amounts, and when and why you take them. Bring the list or the pill bottles to follow-up visits. Carry your medicine list with you in case of an emergency.      Follow up with your healthcare provider as directed: You may need more tests to find the cause of your leg pain. You may need to see an orthopedic specialist or a physical therapist. Write down your questions so you remember to ask them during your visits.    Manage your leg pain:   •Rest your injured leg so that it can heal. You may need an immobilizer, brace, or splint to limit the movement of your leg. You may need to avoid putting any weight on your leg for at least 48 hours. Return to normal activities as directed.      •Ice the injury for 20 minutes every 4 hours for up to 24 hours, or as directed. Use an ice pack, or put crushed ice in a plastic bag. Cover it with a towel to protect your skin. Ice helps prevent tissue damage and decreases swelling and pain.      •Elevate your injured leg above the level of your heart as often as you can. This will help decrease swelling and pain. If possible, prop your leg on pillows or blankets to keep the area elevated comfortably.       •Use assistive devices as directed. You may need to use a cane or crutches. Assistive devices help decrease pain and pressure on your leg when you walk. Ask your healthcare provider for more information about assistive devices and how to use them correctly.      •Maintain a healthy weight. Extra body weight can cause pressure and pain in your hip, knee, and ankle joints. Ask your healthcare provider how much you should weigh. Ask him to help you create a weight loss plan if you are overweight.         © Copyright [employer] 2021           back to top                          © Copyright [employer] 2021

## 2021-08-20 NOTE — ED PROVIDER NOTE - PATIENT PORTAL LINK FT
You can access the FollowMyHealth Patient Portal offered by [facility] by registering at the following website: [email or web address]. By joining [employer]’s FollowMyHealth portal, you will also be able to view your health information using other applications (apps) compatible with our system.

## 2021-08-20 NOTE — ED PROVIDER NOTE - NS ED ROS FT
Constitutional: No fever or chills.   Eyes: No pain, blurry vision, or discharge.  ENMT: No hearing changes, pain, discharge or infections. No neck pain or stiffness.  Cardiac: No chest pain or edema.   Respiratory: + Chronic SOB.  No cough or respiratory distress.   GI: No nausea, vomiting, diarrhea or abdominal pain.  : No dysuria, frequency or burning.  MS: + Chronic LE pain.  No muscle weakness, joint pain or back pain.  Neuro: No headache or weakness. No LOC.  Skin: No skin rash.   Endocrine: No history of thyroid disease or diabetes.  Except as documented in the HPI, all other systems are negative.

## 2021-08-25 ENCOUNTER — APPOINTMENT (OUTPATIENT)
Dept: CARDIOTHORACIC SURGERY | Facility: CLINIC | Age: 71
End: 2021-08-25

## 2021-08-25 ENCOUNTER — NON-APPOINTMENT (OUTPATIENT)
Age: 71
End: 2021-08-25

## 2021-09-07 ENCOUNTER — INPATIENT (INPATIENT)
Facility: HOSPITAL | Age: 71
LOS: 0 days | Discharge: LEFT BEFORE DISCHARGE | DRG: 92 | End: 2021-09-07
Attending: PSYCHIATRY & NEUROLOGY | Admitting: PSYCHIATRY & NEUROLOGY
Payer: MEDICARE

## 2021-09-07 VITALS
DIASTOLIC BLOOD PRESSURE: 103 MMHG | HEIGHT: 72 IN | TEMPERATURE: 98 F | HEART RATE: 82 BPM | SYSTOLIC BLOOD PRESSURE: 184 MMHG | RESPIRATION RATE: 17 BRPM | WEIGHT: 192.02 LBS | OXYGEN SATURATION: 96 %

## 2021-09-07 VITALS
OXYGEN SATURATION: 96 % | DIASTOLIC BLOOD PRESSURE: 92 MMHG | RESPIRATION RATE: 18 BRPM | SYSTOLIC BLOOD PRESSURE: 171 MMHG | HEART RATE: 68 BPM

## 2021-09-07 DIAGNOSIS — Z95.9 PRESENCE OF CARDIAC AND VASCULAR IMPLANT AND GRAFT, UNSPECIFIED: Chronic | ICD-10-CM

## 2021-09-07 LAB
A1C WITH ESTIMATED AVERAGE GLUCOSE RESULT: 6.6 % — HIGH (ref 4–5.6)
ALBUMIN SERPL ELPH-MCNC: 3.8 G/DL — SIGNIFICANT CHANGE UP (ref 3.3–5)
ALP SERPL-CCNC: 54 U/L — SIGNIFICANT CHANGE UP (ref 40–120)
ALT FLD-CCNC: 20 U/L — SIGNIFICANT CHANGE UP (ref 10–45)
AMPHET UR-MCNC: NEGATIVE — SIGNIFICANT CHANGE UP
ANION GAP SERPL CALC-SCNC: 9 MMOL/L — SIGNIFICANT CHANGE UP (ref 5–17)
APPEARANCE UR: CLEAR — SIGNIFICANT CHANGE UP
APTT BLD: 26.7 SEC — LOW (ref 27.5–35.5)
AST SERPL-CCNC: 27 U/L — SIGNIFICANT CHANGE UP (ref 10–40)
BACTERIA # UR AUTO: PRESENT /HPF
BARBITURATES UR SCN-MCNC: NEGATIVE — SIGNIFICANT CHANGE UP
BASE EXCESS BLDV CALC-SCNC: -1.3 MMOL/L — SIGNIFICANT CHANGE UP (ref -2–3)
BASOPHILS # BLD AUTO: 0.02 K/UL — SIGNIFICANT CHANGE UP (ref 0–0.2)
BASOPHILS NFR BLD AUTO: 0.5 % — SIGNIFICANT CHANGE UP (ref 0–2)
BENZODIAZ UR-MCNC: NEGATIVE — SIGNIFICANT CHANGE UP
BILIRUB SERPL-MCNC: 0.3 MG/DL — SIGNIFICANT CHANGE UP (ref 0.2–1.2)
BILIRUB UR-MCNC: NEGATIVE — SIGNIFICANT CHANGE UP
BUN SERPL-MCNC: 25 MG/DL — HIGH (ref 7–23)
CALCIUM SERPL-MCNC: 8.9 MG/DL — SIGNIFICANT CHANGE UP (ref 8.4–10.5)
CHLORIDE SERPL-SCNC: 105 MMOL/L — SIGNIFICANT CHANGE UP (ref 96–108)
CHOLEST SERPL-MCNC: 120 MG/DL — SIGNIFICANT CHANGE UP
CO2 BLDV-SCNC: 26.2 MMOL/L — HIGH (ref 22–26)
CO2 SERPL-SCNC: 24 MMOL/L — SIGNIFICANT CHANGE UP (ref 22–31)
COCAINE METAB.OTHER UR-MCNC: POSITIVE
COLOR SPEC: YELLOW — SIGNIFICANT CHANGE UP
COMMENT - URINE: SIGNIFICANT CHANGE UP
CREAT ?TM UR-MCNC: 87 MG/DL — SIGNIFICANT CHANGE UP
CREAT SERPL-MCNC: 1.27 MG/DL — SIGNIFICANT CHANGE UP (ref 0.5–1.3)
CREAT SERPL-MCNC: 1.53 MG/DL — HIGH (ref 0.5–1.3)
DIFF PNL FLD: ABNORMAL
EOSINOPHIL # BLD AUTO: 0.13 K/UL — SIGNIFICANT CHANGE UP (ref 0–0.5)
EOSINOPHIL NFR BLD AUTO: 3.2 % — SIGNIFICANT CHANGE UP (ref 0–6)
EPI CELLS # UR: ABNORMAL /HPF (ref 0–5)
ESTIMATED AVERAGE GLUCOSE: 143 MG/DL — HIGH (ref 68–114)
ETHANOL SERPL-MCNC: <10 MG/DL — SIGNIFICANT CHANGE UP (ref 0–10)
GLUCOSE SERPL-MCNC: 154 MG/DL — HIGH (ref 70–99)
GLUCOSE UR QL: NEGATIVE — SIGNIFICANT CHANGE UP
GRAN CASTS # UR COMP ASSIST: ABNORMAL /LPF
HCO3 BLDV-SCNC: 25 MMOL/L — SIGNIFICANT CHANGE UP (ref 22–29)
HCT VFR BLD CALC: 37.1 % — LOW (ref 39–50)
HDLC SERPL-MCNC: 46 MG/DL — SIGNIFICANT CHANGE UP
HGB BLD-MCNC: 12.8 G/DL — LOW (ref 13–17)
IMM GRANULOCYTES NFR BLD AUTO: 0.2 % — SIGNIFICANT CHANGE UP (ref 0–1.5)
INR BLD: 0.9 — SIGNIFICANT CHANGE UP (ref 0.88–1.16)
KETONES UR-MCNC: NEGATIVE — SIGNIFICANT CHANGE UP
LEUKOCYTE ESTERASE UR-ACNC: NEGATIVE — SIGNIFICANT CHANGE UP
LIPID PNL WITH DIRECT LDL SERPL: 57 MG/DL — SIGNIFICANT CHANGE UP
LYMPHOCYTES # BLD AUTO: 0.68 K/UL — LOW (ref 1–3.3)
LYMPHOCYTES # BLD AUTO: 17 % — SIGNIFICANT CHANGE UP (ref 13–44)
MCHC RBC-ENTMCNC: 31.2 PG — SIGNIFICANT CHANGE UP (ref 27–34)
MCHC RBC-ENTMCNC: 34.5 GM/DL — SIGNIFICANT CHANGE UP (ref 32–36)
MCV RBC AUTO: 90.5 FL — SIGNIFICANT CHANGE UP (ref 80–100)
METHADONE UR-MCNC: NEGATIVE — SIGNIFICANT CHANGE UP
MONOCYTES # BLD AUTO: 0.46 K/UL — SIGNIFICANT CHANGE UP (ref 0–0.9)
MONOCYTES NFR BLD AUTO: 11.5 % — SIGNIFICANT CHANGE UP (ref 2–14)
NEUTROPHILS # BLD AUTO: 2.71 K/UL — SIGNIFICANT CHANGE UP (ref 1.8–7.4)
NEUTROPHILS NFR BLD AUTO: 67.6 % — SIGNIFICANT CHANGE UP (ref 43–77)
NITRITE UR-MCNC: NEGATIVE — SIGNIFICANT CHANGE UP
NON HDL CHOLESTEROL: 74 MG/DL — SIGNIFICANT CHANGE UP
NRBC # BLD: 0 /100 WBCS — SIGNIFICANT CHANGE UP (ref 0–0)
OPIATES UR-MCNC: NEGATIVE — SIGNIFICANT CHANGE UP
PCO2 BLDV: 46 MMHG — SIGNIFICANT CHANGE UP (ref 42–55)
PCP SPEC-MCNC: SIGNIFICANT CHANGE UP
PCP UR-MCNC: NEGATIVE — SIGNIFICANT CHANGE UP
PH BLDV: 7.34 — SIGNIFICANT CHANGE UP (ref 7.32–7.43)
PH UR: 6.5 — SIGNIFICANT CHANGE UP (ref 5–8)
PLATELET # BLD AUTO: 115 K/UL — LOW (ref 150–400)
PO2 BLDV: 62 MMHG — HIGH (ref 25–45)
POTASSIUM SERPL-MCNC: 4.5 MMOL/L — SIGNIFICANT CHANGE UP (ref 3.5–5.3)
POTASSIUM SERPL-SCNC: 4.5 MMOL/L — SIGNIFICANT CHANGE UP (ref 3.5–5.3)
PROT SERPL-MCNC: 7.2 G/DL — SIGNIFICANT CHANGE UP (ref 6–8.3)
PROT UR-MCNC: 100 MG/DL
PROTHROM AB SERPL-ACNC: 10.9 SEC — SIGNIFICANT CHANGE UP (ref 10.6–13.6)
RBC # BLD: 4.1 M/UL — LOW (ref 4.2–5.8)
RBC # FLD: 13.5 % — SIGNIFICANT CHANGE UP (ref 10.3–14.5)
RBC CASTS # UR COMP ASSIST: < 5 /HPF — SIGNIFICANT CHANGE UP
SAO2 % BLDV: 92.1 % — HIGH (ref 67–88)
SARS-COV-2 RNA SPEC QL NAA+PROBE: NEGATIVE — SIGNIFICANT CHANGE UP
SODIUM SERPL-SCNC: 138 MMOL/L — SIGNIFICANT CHANGE UP (ref 135–145)
SODIUM SERPL-SCNC: 139 MMOL/L — SIGNIFICANT CHANGE UP (ref 135–145)
SODIUM UR-SCNC: 107 MMOL/L — SIGNIFICANT CHANGE UP
SP GR SPEC: 1.01 — SIGNIFICANT CHANGE UP (ref 1–1.03)
THC UR QL: NEGATIVE — SIGNIFICANT CHANGE UP
TRIGL SERPL-MCNC: 84 MG/DL — SIGNIFICANT CHANGE UP
TROPONIN T SERPL-MCNC: 0.01 NG/ML — SIGNIFICANT CHANGE UP (ref 0–0.01)
TSH SERPL-MCNC: 2.28 UIU/ML — SIGNIFICANT CHANGE UP (ref 0.27–4.2)
URATE CRY FLD QL MICRO: ABNORMAL /HPF
UROBILINOGEN FLD QL: 0.2 E.U./DL — SIGNIFICANT CHANGE UP
WBC # BLD: 4.01 K/UL — SIGNIFICANT CHANGE UP (ref 3.8–10.5)
WBC # FLD AUTO: 4.01 K/UL — SIGNIFICANT CHANGE UP (ref 3.8–10.5)
WBC UR QL: < 5 /HPF — SIGNIFICANT CHANGE UP

## 2021-09-07 PROCEDURE — 97161 PT EVAL LOW COMPLEX 20 MIN: CPT

## 2021-09-07 PROCEDURE — 94640 AIRWAY INHALATION TREATMENT: CPT

## 2021-09-07 PROCEDURE — 87635 SARS-COV-2 COVID-19 AMP PRB: CPT

## 2021-09-07 PROCEDURE — 82962 GLUCOSE BLOOD TEST: CPT

## 2021-09-07 PROCEDURE — 86360 T CELL ABSOLUTE COUNT/RATIO: CPT

## 2021-09-07 PROCEDURE — 70498 CT ANGIOGRAPHY NECK: CPT | Mod: MA

## 2021-09-07 PROCEDURE — 93010 ELECTROCARDIOGRAM REPORT: CPT

## 2021-09-07 PROCEDURE — 0042T: CPT

## 2021-09-07 PROCEDURE — 82803 BLOOD GASES ANY COMBINATION: CPT

## 2021-09-07 PROCEDURE — 80053 COMPREHEN METABOLIC PANEL: CPT

## 2021-09-07 PROCEDURE — 85610 PROTHROMBIN TIME: CPT

## 2021-09-07 PROCEDURE — 71045 X-RAY EXAM CHEST 1 VIEW: CPT | Mod: 26

## 2021-09-07 PROCEDURE — 70450 CT HEAD/BRAIN W/O DYE: CPT | Mod: 26,59,MA

## 2021-09-07 PROCEDURE — 86355 B CELLS TOTAL COUNT: CPT

## 2021-09-07 PROCEDURE — 81001 URINALYSIS AUTO W/SCOPE: CPT

## 2021-09-07 PROCEDURE — 84295 ASSAY OF SERUM SODIUM: CPT

## 2021-09-07 PROCEDURE — 99291 CRITICAL CARE FIRST HOUR: CPT

## 2021-09-07 PROCEDURE — 70496 CT ANGIOGRAPHY HEAD: CPT | Mod: MA

## 2021-09-07 PROCEDURE — 80061 LIPID PANEL: CPT

## 2021-09-07 PROCEDURE — 83036 HEMOGLOBIN GLYCOSYLATED A1C: CPT

## 2021-09-07 PROCEDURE — 82570 ASSAY OF URINE CREATININE: CPT

## 2021-09-07 PROCEDURE — 86357 NK CELLS TOTAL COUNT: CPT

## 2021-09-07 PROCEDURE — 86359 T CELLS TOTAL COUNT: CPT

## 2021-09-07 PROCEDURE — 80307 DRUG TEST PRSMV CHEM ANLYZR: CPT

## 2021-09-07 PROCEDURE — 87536 HIV-1 QUANT&REVRSE TRNSCRPJ: CPT

## 2021-09-07 PROCEDURE — 70498 CT ANGIOGRAPHY NECK: CPT | Mod: 26,MA

## 2021-09-07 PROCEDURE — 71045 X-RAY EXAM CHEST 1 VIEW: CPT

## 2021-09-07 PROCEDURE — 93005 ELECTROCARDIOGRAM TRACING: CPT

## 2021-09-07 PROCEDURE — 85025 COMPLETE CBC W/AUTO DIFF WBC: CPT

## 2021-09-07 PROCEDURE — 70450 CT HEAD/BRAIN W/O DYE: CPT | Mod: MA

## 2021-09-07 PROCEDURE — 70496 CT ANGIOGRAPHY HEAD: CPT | Mod: 26,MA

## 2021-09-07 PROCEDURE — 85730 THROMBOPLASTIN TIME PARTIAL: CPT

## 2021-09-07 PROCEDURE — 82565 ASSAY OF CREATININE: CPT

## 2021-09-07 PROCEDURE — G0378: CPT

## 2021-09-07 PROCEDURE — 84443 ASSAY THYROID STIM HORMONE: CPT

## 2021-09-07 PROCEDURE — 36415 COLL VENOUS BLD VENIPUNCTURE: CPT

## 2021-09-07 PROCEDURE — 92523 SPEECH SOUND LANG COMPREHEN: CPT

## 2021-09-07 PROCEDURE — 84484 ASSAY OF TROPONIN QUANT: CPT

## 2021-09-07 PROCEDURE — 99223 1ST HOSP IP/OBS HIGH 75: CPT

## 2021-09-07 PROCEDURE — 84300 ASSAY OF URINE SODIUM: CPT

## 2021-09-07 RX ORDER — ASPIRIN/CALCIUM CARB/MAGNESIUM 324 MG
81 TABLET ORAL DAILY
Refills: 0 | Status: DISCONTINUED | OUTPATIENT
Start: 2021-09-07 | End: 2021-09-07

## 2021-09-07 RX ORDER — ISOSORBIDE MONONITRATE 60 MG/1
30 TABLET, EXTENDED RELEASE ORAL DAILY
Refills: 0 | Status: DISCONTINUED | OUTPATIENT
Start: 2021-09-07 | End: 2021-09-07

## 2021-09-07 RX ORDER — METOPROLOL TARTRATE 50 MG
25 TABLET ORAL DAILY
Refills: 0 | Status: DISCONTINUED | OUTPATIENT
Start: 2021-09-07 | End: 2021-09-07

## 2021-09-07 RX ORDER — EMTRICITABINE AND TENOFOVIR DISOPROXIL FUMARATE 200; 300 MG/1; MG/1
1 TABLET, FILM COATED ORAL DAILY
Refills: 0 | Status: DISCONTINUED | OUTPATIENT
Start: 2021-09-07 | End: 2021-09-07

## 2021-09-07 RX ORDER — SODIUM CHLORIDE 9 MG/ML
1000 INJECTION INTRAMUSCULAR; INTRAVENOUS; SUBCUTANEOUS ONCE
Refills: 0 | Status: COMPLETED | OUTPATIENT
Start: 2021-09-07 | End: 2021-09-07

## 2021-09-07 RX ORDER — CLOPIDOGREL BISULFATE 75 MG/1
75 TABLET, FILM COATED ORAL DAILY
Refills: 0 | Status: DISCONTINUED | OUTPATIENT
Start: 2021-09-07 | End: 2021-09-07

## 2021-09-07 RX ORDER — IPRATROPIUM/ALBUTEROL SULFATE 18-103MCG
3 AEROSOL WITH ADAPTER (GRAM) INHALATION ONCE
Refills: 0 | Status: DISCONTINUED | OUTPATIENT
Start: 2021-09-07 | End: 2021-09-07

## 2021-09-07 RX ORDER — ENOXAPARIN SODIUM 100 MG/ML
40 INJECTION SUBCUTANEOUS EVERY 24 HOURS
Refills: 0 | Status: DISCONTINUED | OUTPATIENT
Start: 2021-09-07 | End: 2021-09-07

## 2021-09-07 RX ORDER — INFLUENZA VIRUS VACCINE 15; 15; 15; 15 UG/.5ML; UG/.5ML; UG/.5ML; UG/.5ML
0.7 SUSPENSION INTRAMUSCULAR ONCE
Refills: 0 | Status: DISCONTINUED | OUTPATIENT
Start: 2021-09-07 | End: 2021-09-07

## 2021-09-07 RX ORDER — ARIPIPRAZOLE 15 MG/1
2 TABLET ORAL DAILY
Refills: 0 | Status: DISCONTINUED | OUTPATIENT
Start: 2021-09-07 | End: 2021-09-07

## 2021-09-07 RX ORDER — IPRATROPIUM/ALBUTEROL SULFATE 18-103MCG
3 AEROSOL WITH ADAPTER (GRAM) INHALATION ONCE
Refills: 0 | Status: COMPLETED | OUTPATIENT
Start: 2021-09-07 | End: 2021-09-07

## 2021-09-07 RX ORDER — DOLUTEGRAVIR SODIUM 25 MG/1
50 TABLET, FILM COATED ORAL DAILY
Refills: 0 | Status: DISCONTINUED | OUTPATIENT
Start: 2021-09-07 | End: 2021-09-07

## 2021-09-07 RX ORDER — ESCITALOPRAM OXALATE 10 MG/1
5 TABLET, FILM COATED ORAL DAILY
Refills: 0 | Status: DISCONTINUED | OUTPATIENT
Start: 2021-09-07 | End: 2021-09-07

## 2021-09-07 RX ORDER — ATORVASTATIN CALCIUM 80 MG/1
80 TABLET, FILM COATED ORAL AT BEDTIME
Refills: 0 | Status: DISCONTINUED | OUTPATIENT
Start: 2021-09-07 | End: 2021-09-07

## 2021-09-07 RX ORDER — INFLUENZA VIRUS VACCINE 15; 15; 15; 15 UG/.5ML; UG/.5ML; UG/.5ML; UG/.5ML
0.5 SUSPENSION INTRAMUSCULAR ONCE
Refills: 0 | Status: DISCONTINUED | OUTPATIENT
Start: 2021-09-07 | End: 2021-09-07

## 2021-09-07 RX ADMIN — ESCITALOPRAM OXALATE 5 MILLIGRAM(S): 10 TABLET, FILM COATED ORAL at 13:02

## 2021-09-07 RX ADMIN — Medication 25 MILLIGRAM(S): at 06:26

## 2021-09-07 RX ADMIN — Medication 81 MILLIGRAM(S): at 06:05

## 2021-09-07 RX ADMIN — ARIPIPRAZOLE 2 MILLIGRAM(S): 15 TABLET ORAL at 13:02

## 2021-09-07 RX ADMIN — ISOSORBIDE MONONITRATE 30 MILLIGRAM(S): 60 TABLET, EXTENDED RELEASE ORAL at 13:05

## 2021-09-07 RX ADMIN — EMTRICITABINE AND TENOFOVIR DISOPROXIL FUMARATE 1 TABLET(S): 200; 300 TABLET, FILM COATED ORAL at 13:01

## 2021-09-07 RX ADMIN — CLOPIDOGREL BISULFATE 75 MILLIGRAM(S): 75 TABLET, FILM COATED ORAL at 06:05

## 2021-09-07 RX ADMIN — SODIUM CHLORIDE 250 MILLILITER(S): 9 INJECTION INTRAMUSCULAR; INTRAVENOUS; SUBCUTANEOUS at 13:37

## 2021-09-07 RX ADMIN — Medication 3 MILLILITER(S): at 19:12

## 2021-09-07 RX ADMIN — ENOXAPARIN SODIUM 40 MILLIGRAM(S): 100 INJECTION SUBCUTANEOUS at 06:26

## 2021-09-07 RX ADMIN — DOLUTEGRAVIR SODIUM 50 MILLIGRAM(S): 25 TABLET, FILM COATED ORAL at 13:02

## 2021-09-07 NOTE — ED ADULT NURSE REASSESSMENT NOTE - NS ED NURSE REASSESS COMMENT FT1
stroke PA called and states to administer ASA 81 mg and Plavix 75mg p.o. now, before pt. goes upstairs, times adjusted, pt. medicated prior to t/p to floor

## 2021-09-07 NOTE — ED ADULT NURSE NOTE - NSPATIENTFLAG_GEN_A_ER
INPATIENT CARDIOLOGY FOLLOW-UP    Name: Paula Stubbs    Age: 80 y.o. Date of Admission: 2/24/2021  4:31 AM    Date of Service: 3/8/2021    Chief Complaint: Follow-up for paroxysmal atrial fibrillation, solving acute superimposed upon chronic hypoxic respiratory secondary to COVID-19 pneumonia, acute superimposed upon chronic diastolic heart failure, hypertension, hypernatremia, rheumatoid arthritis, obstructive sleep apnea    Interim History: The patient presently remains compensated from a cardiovascular standpoint with no recurrent atrial arrhythmias and electrocardiographic evidence of stable conduction intervals following adjustment of her flecainide dosing. She remains extremely debilitated with persistent hypernatremia and an improving prerenal azotemia. Persistent systolic hypertension is noted. Review of Systems: The remainder of a complete multisystem review including consitutional, central nervous, respiratory, circulatory, gastrointestinal, genitourinary, endocrinologic, hematologic, musculoskeletal and psychiatric are negative.     Problem List:  Patient Active Problem List   Diagnosis    Elevated CA-125    Hiatal hernia    Hypothyroidism    Diverticular disease    Skin cancer of lip    Rheumatoid arthritis ([facility] [address]    Diastolic dysfunction    Paresthesia of lower extremity    Lumbar radicular pain    Hyponatremia    Anemia    Multiple closed fractures of ribs of both sides with routine healing    Thoracic spinal stenosis with mass at T6-T7    Thoracic spine tumor    Thoracic spine fracture (HCC)    Atrial fibrillation with RVR (HCC)    A-fib (HCC)    Obstructive sleep apnea syndrome    Essential hypertension    Adjustment disorder with anxious mood    Laceration of lower limb    Palpitations    Fatigue    Closed fracture of patella    Delayed gastric emptying    HAP (hospital-acquired pneumonia)    Pain in both lower legs    Bimalleolar fracture, left, closed, initial encounter    Osteoporosis    Non-pressure chronic ulcer of other part of right lower leg with fat layer exposed ([address]    Bimalleolar fracture    Patellar fracture    Acute respiratory failure with hypoxia (HCC)    COVID-19    Sore throat    Depression    Oral yeast infection    Acute on chronic respiratory failure with hypoxia (HCC)    History of 2019 novel coronavirus disease (COVID-19)    Mild protein-calorie malnutrition ([address]    Apathy    Encounter for hospice care discussion       Allergies:   Allergies   Allergen Reactions    Amoxicillin      GI ill effects       Current Medications:  Current Facility-Administered Medications   Medication Dose Route Frequency Provider Last Rate Last Admin    [Held by provider] furosemide (LASIX) tablet 20 mg  20 mg Oral Daily Aniya Nolan MD        flecainide (TAMBOCOR) tablet 75 mg  75 mg Oral BID Esther Nolasco MD   75 mg at 03/07/21 2025    metoprolol (LOPRESSOR) injection 5 mg  5 mg Intravenous Once Aniya Nolan MD        metoprolol tartrate (LOPRESSOR) tablet 50 mg  50 mg Oral BID Martha Walker MD   50 mg at 03/07/21 2028    predniSONE (DELTASONE) tablet 20 mg  20 mg Oral Daily Yasemin Underwood MD   20 mg at 03/07/21 9833    white petrolatum ointment   Topical BID Jessica Wagoner MD   Given at 03/07/21 1741    budesonide (PULMICORT) nebulizer suspension 500 mcg  500 mcg Nebulization BID Yasemin Underwood MD   500 mcg at 03/07/21 2000    Arformoterol Tartrate (BROVANA) nebulizer solution 15 mcg  15 mcg Nebulization BID Yasemin Underwood MD   15 mcg at 03/07/21 2000    polyethylene glycol (GLYCOLAX) packet 17 g  17 g Oral Daily Ankit Aragon MD   17 g at 03/06/21 0856    nystatin (MYCOSTATIN) 426898 UNIT/ML suspension 500,000 Units  5 mL Oral 4x Daily Charles Oscar APRN - CNP   500,000 Units at 03/07/21 2025    magic (miracle) mouthwash  5 mL Swish & Spit 4x Daily PRN FRAN Blanc - CNP   5 mL at 03/06/21 2155    triamcinolone acetonide (KENALOG) 0.1 % paste   Mouth/Throat TID Dali Valdez,    Given at 03/07/21 2025    albuterol sulfate  (90 Base) MCG/ACT inhaler 2 puff  2 puff Inhalation 4x Daily PRN Garo, DO        celecoxib (CELEBREX) capsule 200 mg  200 mg Oral Daily [facility], [facility]   200 mg at 03/07/21 0955    docusate sodium (COLACE) capsule 100 mg  100 mg Oral Daily [facility]   100 mg at 03/06/21 0755    DULoxetine (CYMBALTA) extended release capsule 40 mg  40 mg Oral Daily [facility], DO   40 mg at 03/07/21 0956    ferrous sulfate (IRON 325) tablet 325 mg  325 mg Oral Every Other Day [facility], DO   325 mg at 03/06/21 0755    cetirizine (ZYRTEC) tablet 10 mg  10 mg Oral Daily [facility]   10 mg at 36/40/15 4454    folic acid (FOLVITE) tablet 1 mg  1 mg Oral Daily [facility], DO   1 mg at 03/07/21 0745    ascorbic acid (VITAMIN C) tablet 500 mg  500 mg Oral Daily [facility]   500 mg at 03/07/21 0746    gabapentin (NEURONTIN) capsule 100 mg  100 mg Oral BID [facility], DO   100 mg at 03/07/21 2025    leucovorin calcium (WELLCOVORIN) tablet 25 mg  25 mg Oral Weekly [facility]   25 mg at 03/02/21 0816    levothyroxine (SYNTHROID) tablet 100 mcg  100 mcg Oral Daily [facility]   100 mcg at 03/08/21 0607    lidocaine 4 % external patch 1 patch  1 patch Transdermal Daily [facility], DO   1 patch at 03/04/21 0856    methotrexate (RHEUMATREX) chemo tablet 5 mg  5 mg Oral Weekly [facility], DO   5 mg at 03/01/21 1151    pantoprazole (PROTONIX) tablet 40 mg  40 mg Oral BID AC [facility] Pr[facility], DO   40 mg at 03/08/21 0607    rivaroxaban (XARELTO) tablet 15 mg  15 mg Oral Daily with breakfast [facility] Pr[facility]h, DO   15 mg at 03/07/21 0746    thiamine tablet 100 mg  100 mg Oral Daily [facility] DO Prasanth   100 mg at 03/07/21 0745    vitamin B-12 (CYANOCOBALAMIN) tablet 1,000 mcg  1,000 mcg Oral Daily [facility], DO   1,000 mcg at 03/07/21 0746    vitamin D (CHOLECALCIFEROL) tablet 2,000 Units  2,000 Units Oral Daily [facility], DO   2,000 Units at 03/07/21 0756    zinc sulfate (ZINCATE) capsule 50 mg  50 mg Oral Daily [facility], DO   50 mg at 03/07/21 0746    sodium chloride flush 0.9 % injection 10 mL  10 mL Intravenous 2 times per day [facility], DO   10 mL at 03/07/21 2026    sodium chloride flush 0.9 % injection 10 mL  10 mL Intravenous PRN [facility], DO   10 mL at 03/02/21 0017    acetaminophen (TYLENOL) tablet 650 mg  650 mg Oral Q6H PRN [facility], DO   650 mg at 02/28/21 9654    Or    acetaminophen (TYLENOL) suppository 650 mg  650 mg Rectal Q6H PRN [facility], DO        famotidine (PEPCID) tablet 20 mg  20 mg Oral Nightly [facility], DO   20 mg at 03/07/21 2025    valsartan (DIOVAN) tablet 160 mg  160 mg Oral Daily [facility], DO   160 mg at 03/07/21 0956    methotrexate (RHEUMATREX) chemo tablet 10 mg  10 mg Oral Weekly [facility], DO   10 mg at 03/01/21 2040    perflutren lipid microspheres (DEFINITY) injection 1.65 mg  1.5 mL Intravenous ONCE PRN Armin Grande MD             Physical Exam:  BP (!) 178/93   Pulse 67   Temp 97.7 °F (36.5 °C) (Oral)   Resp 18   Ht 5' (1.524 m)   Wt 140 lb 8 oz (63.7 kg)   SpO2 99%   BMI 27.44 kg/m²   Weight change: Wt Readings from Last 3 Encounters:   03/08/21 140 lb 8 oz (63.7 kg)   02/16/21 142 lb 14.4 oz (64.8 kg)   11/25/20 128 lb (58.1 kg)     The patient is awake, alert and in no discomfort or distress. He remains debilitated and chronically ill. No gross musculoskeletal deformity is present. No significant skin or nail changes are present. Gross examination of head, eyes, nose and throat are negative. Jugular venous pressure is normal and no carotid bruits are present.  Normal respiratory effort is noted with no accessory muscle usage present. Lung fields demonstrate slightly coarse breath sounds to ascultation. Cardiac examination is notable for a regular rate and rhythm with no palpable thrill. No gallop rhythm or cardiac murmur are identified. A benign abdominal examination is present with no masses or organomegaly. Intact pulses are present throughout all extremities and no peripheral edema is present. No focal neurologic deficits are present. Intake/Output:    Intake/Output Summary (Last 24 hours) at 3/8/2021 0739  Last data filed at 3/7/2021 1706  Gross per 24 hour   Intake 789 ml   Output 100 ml   Net 689 ml     No intake/output data recorded. Laboratory Tests:  Lab Results   Component Value Date    CREATININE 0.6 03/08/2021    BUN 33 (H) 03/08/2021     (H) 03/08/2021    K 4.4 03/08/2021     (H) 03/08/2021    CO2 28 03/08/2021     No results for input(s): CKTOTAL, CKMB in the last 72 hours.     Invalid input(s): TROPONONI  Lab Results   Component Value Date     (H) 06/03/2011     Lab Results   Component Value Date    WBC 14.4 03/08/2021    RBC 2.65 03/08/2021    HGB 8.6 03/08/2021    HCT 29.9 03/08/2021    .8 03/08/2021    MCH 32.5 03/08/2021    MCHC 28.8 03/08/2021    RDW 15.9 03/08/2021     03/08/2021    MPV 10.9 03/08/2021     Recent Labs     03/05/21  1630 03/07/21  1054 03/08/21  0625   ALKPHOS 88 76 75   ALT 20 17 17   AST 22 22 22   PROT 6.2* 5.5* 5.3*   BILITOT 0.4 0.3 0.3   LABALBU 3.3* 2.8* 2.8*     Lab Results   Component Value Date    MG 1.9 03/06/2021     Lab Results   Component Value Date    PROTIME 12.2 03/01/2021    PROTIME 15.8 05/12/2014    INR 1.1 03/01/2021     Lab Results   Component Value Date    TSH 4.870 01/06/2021     No components found for: CHLPL  Lab Results   Component Value Date    TRIG 140 10/22/2019    TRIG 129 06/10/2019    TRIG 82 03/22/2016     Lab Results   Component Value Date    HDL 44 10/22/2019    HDL 49 06/10/2019    HDL 53 03/22/2016     Lab Results   Component Value Date    LDLCALC 110 (H) 10/22/2019    LDLCALC 99 06/10/2019    LDLCALC 106 (H) 03/22/2016       Cardiac Tests:  ECG: A resting electrocardiogram reviewed at the time of evaluation demonstrates evidence of sinus rhythm with nonspecific ST changes and no significant change of conduction intervals  Telemetry findings reviewed: sinus rhythm, no new tachy/bradyarrhythmias overnight      ASSESSMENT / PLAN: On a clinical basis, the patient remains compensated from a cardiovascular standpoint and has maintained sinus rhythm with no change of conduction intervals following adjustment of her flecainide dosing. She remains debilitated with an improving prerenal azotemia and persistent mild hypernatremia following withholding of diuretics and with present evidence of systolic hypertension. Additional management will largely be deferred to primary care with ongoing arrhythmia monitoring and present plans of maintenance of her existing flecainide dosage with necessary follow-up with her primary cardiologist, Rosalio Barrow and electrophysiologist, Valeria Valdez to be arranged at the time of discharge. Note: This report was completed utilizing computer voice recognition software. Every effort has been made to ensure accuracy, however; inadvertent computerized transcription errors may be present. Ignacia Whitaker.  [facility], [address] [place] [address] [facility] (Discharge Huddle; Vulnerable Patient)

## 2021-09-07 NOTE — H&P ADULT - NSHPSOCIALHISTORY_GEN_ALL_CORE
Not alcoholic  Not a smoker  Smoke marijuana only  Cocaine abuse   Lives in a rehabilitation center for drug abuse rehabilitation

## 2021-09-07 NOTE — OCCUPATIONAL THERAPY INITIAL EVALUATION ADULT - DIAGNOSIS, OT EVAL
Pt presents with decreased standing balance impacting safe transfers and independence/safety with ADL's.

## 2021-09-07 NOTE — ED ADULT NURSE REASSESSMENT NOTE - NS ED NURSE REASSESS COMMENT FT1
initial admit orders received for admit to stroke service, stroke resident at bedside speaking with pt. regarding admit, pt. utd on current poc, with understanding verbalized

## 2021-09-07 NOTE — H&P ADULT - ASSESSMENT
This 71y old male, has multiple stroke risk factors. He presented with abnormal gait started more than 4.5 h before coming to ED. His imaging did not show a hemorrhagic or ischemic stroke, nor a large vessel occlusion. However, he still may have a posterior circulation stroke which is usually difficult to be seen on CT head.     Neuro  #CVA workup  - continue aspirin 81mg and Plavix 75mg daily  - continue atorvastatin 80mg daily  - q4hr stroke neuro checks and vitals  - obtain MRI Brain without contrast  - Stroke Code HCT Results: No bleed, no demarcated infarction   - Stroke Code CTA Results: Unremarkable examination with no significant stenosis, dissection, or occlusion.  - Stroke education  - PT/OT/Speech/Nutrition Evaluate and Treat.  - BP goal SBP < 220 DBP <110  - Aspiration/fall/swallowing precautions as appropriate    Cards  #HTN  - permissive hypertension, Goal -180  - hold home blood pressure medication for now  - obtain TTE with bubble  - Stroke Code EKG Results: no ST elevation     #HLD  - high dose statin as above in CVA  - LDL results: 48 on July 28, 2021    Pulm  - call provider if SPO2 < 94%    GI  #Nutrition/Fluids/Electrolytes   - replete K<4 and Mg <2  - Diet: regular   - IVF: none    Renal  - Daily   - I&O  - Oral hydration       Infectious Disease  - Stroke Code CXR results: No signs of pneumonia     Endocrine  #DM  - A1C results: 6.7 on July 28, 2021  - ISS    - TSH results: 3.36 on July 18, 2021    DVT Prophylaxis  - lovenox sq for DVT prophylaxis   - SCDs for DVT prophylaxis       IDR Goals: Goals reviewed at interdisciplinary rounds with case management, social work, physical therapy, occupational therapy, and speech language pathology.   Please see specific therapy  notes for in depth goals.       Discussed daily hospital plans and goals with patient     Discussed with Neurology Attending

## 2021-09-07 NOTE — ED PROVIDER NOTE - HIV CLINIC
Patient: Ely De La Garza    Procedure Summary     Date:  02/14/19 Room / Location:   [facility] / [facility]    Anesthesia Start:  0726 Anesthesia Stop:      Procedures:       BILATERAL THIGH  LIFT WITH LIPOSUCTION (Bilateral )      LIPOSUCTION ABDOMINAL (N/A Abdomen) Diagnosis:      Surgeon:  Enrique Leroy MD Provider:  Glen Galarza MD    Anesthesia Type:  general ASA Status:  3          Anesthesia Type: general  Last vitals  BP   126/70   Temp   97.1   Pulse   106   Resp   16     SpO2   92%     Post Anesthesia Care and Evaluation    Patient location during evaluation: PACU  Patient participation: complete - patient participated  Level of consciousness: awake  Pain score: 0  Pain management: adequate  Airway patency: patent  Anesthetic complications: No anesthetic complications  PONV Status: none  Cardiovascular status: acceptable and stable  Respiratory status: nasal cannula, unassisted, acceptable and spontaneous ventilation  Hydration status: acceptable       Yes

## 2021-09-07 NOTE — ED PROVIDER NOTE - PHYSICAL EXAMINATION
VITAL SIGNS: I have reviewed nursing notes and confirm.  CONSTITUTIONAL: Non toxic; in no acute distress.   SKIN:  Warm and dry, no acute rash.   HEAD:  Normocephalic, atraumatic.  EYES: PERRL.  EOM intact; conjunctiva and sclera clear.  ENT: No nasal discharge; airway clear.   NECK: Supple; non tender.  CARD: S1, S2 normal; no murmurs, gallops, or rubs. Regular rate and rhythm.   RESP:  Clear to auscultation b/l, no wheezes, rales or rhonchi.  ABD: Normal bowel sounds; soft; non-distended; non-tender; no guarding/rebound.  EXT: Normal ROM. No clubbing, cyanosis or edema. 2+ pulses to b/l ue/le.  NEURO: + Ambulatory with shuffling gait.  Alert, oriented, grossly unremarkable.  5/5 strength x 4 extremities against gravity and external force.  No drift x 4 extremities.  Sensation intact and symmetric x 4 extremities.  No facial asymmetry. PSYCH: Cooperative, mood and affect appropriate.

## 2021-09-07 NOTE — H&P ADULT - NSHPREVIEWOFSYSTEMS_GEN_ALL_CORE
Constitutional: No fever, weight loss or fatigue  Eyes: No eye pain, visual disturbances, or discharge  ENMT:  No difficulty hearing, tinnitus, vertigo; No sinus or throat pain  Neck: No pain or stiffness  Respiratory: No cough, wheezing, chills or hemoptysis  Cardiovascular: No chest pain, palpitations, shortness of breath, dizziness or leg swelling  Gastrointestinal: No abdominal pain. No nausea, vomiting or hematemesis; No diarrhea or constipation. Nohematochezia.  Genitourinary: No dysuria, frequency, hematuria or incontinence  Neurological: As per HPI  Skin: No itching, burning, rashes or lesions   Endocrine: No heat or cold intolerance; No hair loss  Musculoskeletal: No joint pain or swelling; No muscle, back or extremity pain  Psychiatric: No depression, anxiety, mood swings or difficulty sleeping  Heme/Lymph: No easy bruising or bleeding gums

## 2021-09-07 NOTE — SPEECH LANGUAGE PATHOLOGY EVALUATION - COMMENTS
Pt denied any changes to his speech-language. He explained, " I'm very aware of my speech. I give talks all the time. I'm in the process of taking [place] too. I pay close attention to their vernacular... It's very important that we communicate properly. There's too much miscommunication." No further f/u is warranted at this time. This service is s/o. Reconsult PRN.

## 2021-09-07 NOTE — CHART NOTE - NSCHARTNOTEFT_GEN_A_CORE
Patient removed IV and requesting to leave. Patient educated on the risks of leaving AMA. Patient verbalized understanding of risks and states "there is no way to convince me, I want to sleep in my bed - maybe I will be back tomorrow". Patient signed AMA forms and escorted to exit.

## 2021-09-07 NOTE — ED ADULT TRIAGE NOTE - ARRIVAL INFO ADDITIONAL COMMENTS
Patient reports dizziness while 'resting'. Patient reports that he was attempting to reach the hospital for additional concerns. Patient reports calling EMS because of "off-balance" with work.

## 2021-09-07 NOTE — DIETITIAN INITIAL EVALUATION ADULT. - ORAL INTAKE PTA/DIET HISTORY
reports following a low sodium, low sugar diet, monitoring CHO intake. Reports eating more vegetables.

## 2021-09-07 NOTE — OCCUPATIONAL THERAPY INITIAL EVALUATION ADULT - LIVES WITH, PROFILE
Pt reports being a rehab center alone with a shared bathroom. At baseline, Pt reports being ind with ADL's and not requiring the use of AE./alone

## 2021-09-07 NOTE — ED ADULT NURSE REASSESSMENT NOTE - NS ED NURSE REASSESS COMMENT FT1
pt. back from radiology, accompanied by RN and Neuro team, nad or change in condition noted, refer to stroke flowsheet

## 2021-09-07 NOTE — ED PROVIDER NOTE - NS ED ROS FT
Constitutional: No fever or chills.   Eyes: No pain, blurry vision, or discharge.  ENMT: No hearing changes, pain, discharge or infections. No neck pain or stiffness.  Cardiac: No chest pain, + chronic SOB, no edema.   Respiratory: No cough or respiratory distress. No hemoptysis. No history of asthma or RAD.  GI: No nausea, vomiting, diarrhea or abdominal pain.  : No dysuria, frequency or burning.  MS: No myalgia, muscle weakness, joint pain or back pain.  Neuro: + Unsteady gait.  No headache, ? left weakness. No LOC.  Skin: No skin rash.   Endocrine: No history of thyroid disease or diabetes.  Except as documented in the HPI, all other systems are negative.

## 2021-09-07 NOTE — CONSULT NOTE ADULT - ASSESSMENT
This 71y old male PMH CAD (LM, LAD, KYLE), CHFrEF (EF 36%), HIV, PAD, HTN, DM2, prostate CA s/p radiation, depression presented with abnormal gait started more than 4.5 h before coming to ED, concerning posterior CVA.    #CVA r/o  #HTN  #ascending aortic aneurysm  #HIV  #BRENNAN   #HLD  #DM  #depression   This 71y old male PMH CAD (LM, LAD, KYLE), CHFrEF (EF 36%), HIV, PAD, HTN, DM2, prostate CA s/p radiation, depression presented with abnormal gait started more than 4.5 h before coming to ED, concerning posterior CVA.    #CVA r/o  #HTN  #ascending aortic aneurysm  #HIV  #BRENNAN   #HLD  #DM  #depression    Recommendations:  -continue ASA, plavix  -continue home antidepressants  -given toprol this a.m. - recommend holding beta blocker in setting of +utox for cocaine  -obtain med rec from pharmacy to evaluate further BP meds -- avoid BB and continue imdur for now   -resume HIV meds   -f/u CD4 count and viral load  -f/u urine lytes; UA concerning +hematuria with underlying HIV may be component of nephritic syndrome; however, given baseline Cr 1.09 on prior admission likely prerenal; would f/u with urine lytes given EF 35% - encourage PO hydration; give fluids given per primary team, would recommend close monitoring of volume status   -will need outpatient CT surgery f/u for aortic aneurysm

## 2021-09-07 NOTE — DIETITIAN INITIAL EVALUATION ADULT. - OTHER INFO
72 y/o M, Poor historian, former Vet, current frequent Cocaine Abuser, frequent Marijuana user, Shellfish allergy (tolerated CCTA w/ contrast without allergic reaction), Known CAD with LM, LAD and OM1 Dz on prior CCTA), newly diagnosed systolic CHF (EF 36% ECHO 7/28/21), known PAD with B/L LE and RUE stent former, Occasional ETOH user, PMHx of HTN, DM, HIV (unknown CD4, VL undetectable), Hep C (treated w/ Harvoni 3-4 years ago), Prostate CA s/p radiation therapy, and Depression. presented with abnormal gait started more than 4.5 h before coming to ED. Adm for r/o stroke. CTH negative. CTA negative.    On assessment, pt see sitting up in chair in room. Pt reports UBW to be 180 lbs, current adm wt of 192 lbs, indicates wt change of 12 lbs/6.6%. Pt denied changes in appetite and PO intake PTA. At current adm, reports good PO, ate 75% of meal. He reports that this year he has been practicing mindful eating, and portion control. Reports eating till he is satiated and not until he is "overstuffed". Encouraged pt to continue following a low Na, CST CHO diet, and adherence to dietary modifications. No cultural, ethnic, Bahai food preferences noted. Confirmed food allergy to shellfish, peaches, strawberries, denies allergy to all seafoods, RD to make changes in EMR. Pt denies pain/discomfort at this time. Denied n/v/d/c. Reports last BM 9/7. RD to follow per protocol.

## 2021-09-07 NOTE — H&P ADULT - HISTORY OF PRESENT ILLNESS
70 y/o M, Poor historian, former Vet, current frequent Cocaine Abuser, frequent Marijuana user, Shellfish allergy (tolerated CCTA w/ contrast without allergic reaction), Known CAD with LM, LAD and OM1 Dz on prior CCTA), newly diagnosed systolic CHF (EF 36% ECHO 7/28/21), known PAD with B/L LE and RUE stent former, Occasional ETOH user, PMHx of HTN, DM, HIV (unknown CD4, VL undetectable), Hep C (treated w/ Harvoni 3-4 years ago), Prostate CA s/p radiation therapy, and Depression, last admitted in July 28, 2021 with a complain of near syncope episode   Today Mr. Francis presented to ED with feeling abnormal with shortness of breath, and his gait is wobbly. He went to sleep at midnight and woke up around 2am this morning with this feeling. (last known normal 12:00pm). On presentation he came by himself from his living place at a rehab center at [address] [place] [address]. He did not have nausea, vomiting, headache, blurry vision, double vision, difficulty speaking, or weakness (he mentioned some difficulty walking because of sciatica pain in the Lt leg).   According to him, he last used cocaine was two days ago  He did not use Marijuana recently

## 2021-09-07 NOTE — DIETITIAN INITIAL EVALUATION ADULT. - ADD RECOMMEND
1. Continue with current diet 2. Rec change to CST CHO diet pending POCT, BG q6hrs 3. Monitor %PO intake 4. Diet edu reinforcement prn 5. Monitor BMP, CBC, BG q6hrs, lytes, replete prn

## 2021-09-07 NOTE — PATIENT PROFILE ADULT - FALL HARM RISK TYPE OF ASSESSMENT
Telephone Encounter by Shameka Melo CMA at 10/26/18 08:26 AM     Author:  Shameka Melo CMA Service:  (none) Author Type:  Certified Medical Assistant     Filed:  10/26/18 08:28 AM Encounter Date:  10/25/2018 Status:  Signed     :  Shameka Melo CMA (Certified Medical Assistant)            Last Visit[HS1.1T]  4/3/18 upcoming 11/26/18[HS1.1M]    Last Refill[HS1.1T]  TRULICITY 0.75 MG/0.5ML SOPN 4 mL 3 2/7/2018  No   Sig: INJECT 0.75MG INTO THE SKIN ONCE WEEKLY[HS1.1C]            Medication has been pended in encounter for provider approval.[HS1.1T]        Revision History        User Key Date/Time User Provider Type Action    > HS1.1 10/26/18 08:28 AM Shameka Melo CMA Certified Medical Assistant Sign    C - Copied, M - Manual, T - Template            
admission

## 2021-09-07 NOTE — SPEECH LANGUAGE PATHOLOGY EVALUATION - SLP DIAGNOSIS
Pt p/w intact receptive and expressive language skills on the conversational level c/w self-reports of current baseline level of communication skills. No therapeutic intervention is required.

## 2021-09-07 NOTE — PHYSICAL THERAPY INITIAL EVALUATION ADULT - PERTINENT HX OF CURRENT PROBLEM, REHAB EVAL
This 71y old male, has multiple stroke risk factors. He presented with abnormal gait started more than 4.5 h before coming to ED. His imaging did not show a hemorrhagic or ischemic stroke, nor a large vessel occlusion. However, he still may have a posterior circulation stroke which is usually difficult to be seen on CT head. 71M presented to ED with feeling abnormal with shortness of breath, and his gait is wobbly. He went to sleep at midnight and woke up around 2am this morning with this feeling. (last known normal 12:00pm). Imaging did not show a hemorrhagic or ischemic stroke, nor a large vessel occlusion. However, he still may have a posterior circulation stroke which is usually difficult to be seen on CT head.

## 2021-09-07 NOTE — PROGRESS NOTE ADULT - SUBJECTIVE AND OBJECTIVE BOX
Neurology Stroke Progress Note    HPI:   72 y/o M, Poor historian, former Vet, current frequent Cocaine Abuser, frequent Marijuana user, Shellfish allergy (tolerated CCTA w/ contrast without allergic reaction), Known CAD with LM, LAD and OM1 Dz on prior CCTA), newly diagnosed systolic CHF (EF 36% ECHO 21), known PAD with B/L LE and RUE stent former, Occasional ETOH user, PMHx of HTN, DM, HIV (unknown CD4, VL undetectable), Hep C (treated w/ Harvoni 3-4 years ago), Prostate CA s/p radiation therapy, and Depression, last admitted in 2021 with a complain of near syncope episode   Today Mr. Francis presented to ED with feeling abnormal with shortness of breath, and his gait is wobbly. He went to sleep at midnight and woke up around 2am this morning with this feeling. (last known normal 12:00pm). On presentation he came by himself from his living place at a rehab center at [address] [place] [address]. He did not have nausea, vomiting, headache, blurry vision, double vision, difficulty speaking, or weakness (he mentioned some difficulty walking because of sciatica pain in the Lt leg).   According to him, he last used cocaine was two days ago  He did not use Marijuana recently    INTERVAL HPI/OVERNIGHT EVENTS:  Patient seen and examined at the bedside with attending. He is laying in bed, awake and AxOx3, without any complaints. No acute events over night. He mentioned he smoked cocaine yesterday and 2 days ago and 2 weeks ago. he never injected te cocaine     MEDICATIONS  (STANDING):  ARIPiprazole 2 milliGRAM(s) Oral daily  aspirin enteric coated 81 milliGRAM(s) Oral daily  atorvastatin 80 milliGRAM(s) Oral at bedtime  clopidogrel Tablet 75 milliGRAM(s) Oral daily  dolutegravir 50 milliGRAM(s) Oral daily  emtricitabine 200 mG/tenofovir alafenamide 25 mG (DESCOVY) Tablet 1 Tablet(s) Oral daily  enoxaparin Injectable 40 milliGRAM(s) SubCutaneous every 24 hours  escitalopram 5 milliGRAM(s) Oral daily  influenza  Vaccine (HIGH DOSE) 0.7 milliLiter(s) IntraMuscular once  isosorbide   mononitrate ER Tablet (IMDUR) 30 milliGRAM(s) Oral daily  metoprolol succinate ER 25 milliGRAM(s) Oral daily  sodium chloride 0.9% Bolus 1000 milliLiter(s) IV Bolus once    MEDICATIONS  (PRN):      Allergies    Peaches (Rash)  penicillins (Rash)  Seafood (Rash)  shellfish (Unknown)  strawberry (Rash)  sulfa drugs (Rash)    Intolerances        Vital Signs Last 24 Hrs  T(C): 36.7 (07 Sep 2021 09:34), Max: 36.7 (07 Sep 2021 04:37)  T(F): 98.1 (07 Sep 2021 09:34), Max: 98.1 (07 Sep 2021 04:37)  HR: 60 (07 Sep 2021 13:05) (60 - 87)  BP: 154/91 (07 Sep 2021 13:05) (151/96 - 184/103)  BP(mean): 116 (07 Sep 2021 13:05) (116 - 131)  RR: 18 (07 Sep 2021 13:05) (17 - 19)  SpO2: 97% (07 Sep 2021 13:05) (96% - 99%)    Constitutional:  Sitting comfortably in NAD.  Psychiatric: calm, normal affect, no overt anxiety or internal preoccupation  Ears, Nose, Throat: no abnormalities, mucus membranes moist  Neck: supple, no lymphadenopathy or nodules palpable  Cardiovascular: regular rate and rhythm, normal S1/S2, no murmurs   Chest: Clear to bases. 	  Abdomen: soft, non-tender, no hepatosplenomegaly   Extremities: no edema, clubbing or cyanosis  Skin: no rash or neurocutaneous signs     Cognitive:  Orientation, language, memory and knowledge screens intact.      Cranial Nerves:  II: Full to confrontation. III/IV/VI: PERRL EOMF No nystagmus  V1V2V3: Symmetric, VII: Face appears symmetric VIII: Normal to screening, IX/X: Palate Elevates Symmetrical  XI: Trapezius Symmetric  XII: Tongue midline  Motor:  Power: 5/5 throughout, tone: normal x 4 limbs, no tremor   Sensation:  Intact to light touch. Intact to pinprick/temperature and vibration.  Coordination/Gait:  Finger-nose-finger intact, normal rapid-alternating movements.  Fine motor normal with normal rapid finger taps and heel-toe tapping   wide based gait, tandem abnormal  Reflexes:  DTR: absent in all 4 limbs except the Lt knee +2, no clonus  Plantar responses: Down on the Rt and up on the Lt    NIHSS: 0    LABS:                        12.8   4.01  )-----------( 115      ( 07 Sep 2021 04:38 )             37.1         139  |  x   |  x   ----------------------------<  x   x    |  x   |  1.27    Ca    8.9      07 Sep 2021 04:38    TPro  7.2  /  Alb  3.8  /  TBili  0.3  /  DBili  x   /  AST  27  /  ALT  20  /  AlkPhos  54      PT/INR - ( 07 Sep 2021 04:38 )   PT: 10.9 sec;   INR: 0.90          PTT - ( 07 Sep 2021 04:38 )  PTT:26.7 sec  Urinalysis Basic - ( 07 Sep 2021 05:19 )    Color: Yellow / Appearance: Clear / S.015 / pH: x  Gluc: x / Ketone: NEGATIVE  / Bili: Negative / Urobili: 0.2 E.U./dL   Blood: x / Protein: 100 mg/dL / Nitrite: NEGATIVE   Leuk Esterase: NEGATIVE / RBC: < 5 /HPF / WBC < 5 /HPF   Sq Epi: x / Non Sq Epi: 5-10 /HPF / Bacteria: Present /HPF        RADIOLOGY & ADDITIONAL TESTS:  < from: CT Angio Neck w/ IV Cont (21 @ 04:52) >  IMPRESSION: Unremarkable examination with no significant stenosis, dissection, or occlusion.    < end of copied text >  < from: CT Perfusion w/ Maps w/ IV Cont (21 @ 04:46) >  FINDINGS: The CT perfusion study demonstrates no perfusion abnormality. There is no mismatch volume.    < end of copied text >

## 2021-09-07 NOTE — H&P ADULT - NSHPPHYSICALEXAM_GEN_ALL_CORE
General:  Constitutional:  Sitting comfortably in NAD.  Psychiatric: calm, normal affect, no overt anxiety or internal preoccupation  Ears, Nose, Throat: no abnormalities, mucus membranes moist  Neck: supple, no lymphadenopathy or nodules palpable  Cardiovascular: regular rate and rhythm, normal S1/S2, no murmurs   Chest: Clear to bases. 	  Abdomen: soft, non-tender, no hepatosplenomegaly   Extremities: no edema, clubbing or cyanosis  Skin: no rash or neurocutaneous signs     Cognitive:  Orientation, language, memory and knowledge screens intact.      Cranial Nerves:  II: Full to confrontation. III/IV/VI: PERRL EOMF No nystagmus  V1V2V3: Symmetric, VII: Face appears symmetric VIII: Normal to screening, IX/X: Palate Elevates Symmetrical  XI: Trapezius Symmetric  XII: Tongue midline  Motor:  Power: 5/5 throughout, tone: normal x 4 limbs, no tremor   Sensation:  Intact to light touch. Intact to pinprick/temperature and vibration.  Coordination/Gait:  Finger-nose-finger intact, normal rapid-alternating movements.  Fine motor normal with normal rapid finger taps and heel-toe tapping   wide based gait, tandem abnormal  Reflexes:  DTR: absent in all 4 limbs except the Lt knee +2, no clonus  Plantar responses: Down on the Rt and up on the Lt General:  Constitutional:  Sitting comfortably in NAD.  Psychiatric: calm, normal affect, no overt anxiety or internal preoccupation  Ears, Nose, Throat: no abnormalities, mucus membranes moist  Neck: supple, no lymphadenopathy or nodules palpable  Cardiovascular: regular rate and rhythm, normal S1/S2, no murmurs   Chest: Clear to bases. 	  Abdomen: soft, non-tender, no hepatosplenomegaly   Extremities: no edema, clubbing or cyanosis  Skin: no rash or neurocutaneous signs     Cognitive:  Orientation, language, memory and knowledge screens intact.      Cranial Nerves:  II: Full to confrontation. III/IV/VI: PERRL EOMF No nystagmus  V1V2V3: Symmetric, VII: Face appears symmetric VIII: Normal to screening, IX/X: Palate Elevates Symmetrical  XI: Trapezius Symmetric  XII: Tongue midline  Motor:  Power: 5/5 throughout, tone: normal x 4 limbs, no tremor   Sensation:  Intact to light touch. Intact to pinprick/temperature and vibration.  Coordination/Gait:  Finger-nose-finger intact, normal rapid-alternating movements.  Fine motor normal with normal rapid finger taps and heel-toe tapping   wide based gait, tandem abnormal  Reflexes:  DTR: absent in all 4 limbs except the Lt knee +2, no clonus  Plantar responses: Down on the Rt and up on the Lt    NIHSS: 0  pre hospital mRS: 0

## 2021-09-07 NOTE — PROGRESS NOTE ADULT - ASSESSMENT
This 71y old male, has multiple stroke risk factors. He presented with abnormal gait started more than 4.5 h before coming to ED. CTH did not show a hemorrhagic or ischemic stroke, nor a large vessel occlusion on CTP. However, he still may have a posterior circulation stroke which is usually difficult to be seen on CT head. ptn is admited under neurovascular for further evaluations. Pending MRI and TTE afterwards if indicated.     Neuro  #CVA workup  - continue aspirin 81mg and Plavix 75mg daily  - continue atorvastatin 80mg daily  - q4hr stroke neuro checks and vitals  - obtain MRI Brain without contrast  - Stroke Code HCT Results: No bleed, no demarcated infarction   - Stroke Code CTA Results: Unremarkable examination with no significant stenosis, dissection, or occlusion.  - Stroke education  - PT/OT/Speech/Nutrition Evaluate and Treat.  - BP goal SBP < 220 DBP <110  - Aspiration/fall/swallowing precautions as appropriate    Cards  #HTN  - permissive hypertension, Goal -180  - hold home blood pressure medication for now  - obtain TTE with bubble  - Stroke Code EKG Results: no ST elevation     #HLD  - high dose statin as above in CVA  - LDL results: 48 on July 28, 2021    Pulm  - call provider if SPO2 < 94%    GI  #Nutrition/Fluids/Electrolytes   - replete K<4 and Mg <2  - Diet: regular   - IVF: none    Renal  - Daily   - I&O  - Oral hydration       Infectious Disease  - Stroke Code CXR results: No signs of pneumonia     Endocrine  #DM  - A1C results: 6.7 on July 28, 2021  - ISS    - TSH results: 3.36 on July 18, 2021    DVT Prophylaxis  - lovenox sq for DVT prophylaxis   - SCDs for DVT prophylaxis       IDR Goals: Goals reviewed at interdisciplinary rounds with case management, social work, physical therapy, occupational therapy, and speech language pathology.   Please see specific therapy  notes for in depth goals.       Discussed daily hospital plans and goals with patient     Discussed with Neurology Attending     This 71y old male, has multiple stroke risk factors. He presented with abnormal gait started more than 4.5 h before coming to ED. CTH did not show a hemorrhagic or ischemic stroke, nor a large vessel occlusion on CTP. However, he still may have a posterior circulation stroke which is usually difficult to be seen on CT head. ptn is admited under neurovascular for further evaluations. Pending MRI and TTE afterwards if indicated. Labs also shows a possibility of underlying nephritic syndrome which we would be addressed.    Neuro  #CVA workup  - continue aspirin 81mg and Plavix 75mg daily  - continue atorvastatin 80mg daily  - q4hr stroke neuro checks and vitals  - obtain MRI Brain without contrast  - Stroke Code HCT Results: No bleed, no demarcated infarction   - Stroke Code CTA Results: Unremarkable examination with no significant stenosis, dissection, or occlusion.  - Stroke education  - PT/OT/Speech/Nutrition Evaluate and Treat.  - BP goal SBP < 220 DBP <110  - Aspiration/fall/swallowing precautions as appropriate    Cards  #HTN  - permissive hypertension, Goal -180  - hold home blood pressure medication for now  - obtain TTE with bubble  - Stroke Code EKG Results: no ST elevation     #HLD  - high dose statin as above in CVA  - LDL results: 48 on July 28, 2021    Pulm  - call provider if SPO2 < 94%    GI  #Nutrition/Fluids/Electrolytes   - replete K<4 and Mg <2  - Diet: regular   - IVF: 1L N/S once.     Renal, possible nephritic syndrome:   - Hematuria +.    - I&O BID.   - Oral hydration.   - IVF NS 1 L once.   - BUN 25, Crt 1.53.   - Outptn follow up with nephrologist.   - U/A, urine lytes Pending.     Infectious Disease  - Stroke Code CXR results: No signs of pneumonia   - HIV meds including Descovy and Dolutegravir restarted. He hasn't taken them for a week.   - [id number] and HIV viral load requested.     Endocrine  #DM  - A1C results: 6.7 on July 28, 2021  - ISS    - TSH results: 3.36 on July 18, 2021    DVT Prophylaxis  - Lovenox sq for DVT prophylaxis   - SCDs for DVT prophylaxis     Patient gave us verbal consent to discuss about his hospitalization with his rehab.   IDR Goals: Goals reviewed at interdisciplinary rounds with case management, social work, physical therapy, occupational therapy, and speech language pathology and Internal medicine.   Please see specific therapy  notes for in depth goals.       Discussed daily hospital plans and goals with patient   Discussed with Neurology Attending Dr. Guajardo.

## 2021-09-07 NOTE — ED PROVIDER NOTE - OBJECTIVE STATEMENT
71 year old male, poor historian, former Vet, current frequent Cocaine abuser, frequent Marijuana user, shellfish allergy (tolerated CCTA w/ contrast without allergic reaction), known CAD with LM, LAD and OM1 Dz on prior CCTA), newly diagnosed systolic CHF (EF 36% ECHO 7/28/21), known PAD with B/L LE and RUE stent former, occasional ETOH user, PMHx of HTN, DM, HIV (unknown CD4, VL undetectable), Hep C (treated w/ Harvoni 3-4 years ago), prostate CA s/p radiation therapy, and Depression presents to ED with concern for gait instability x 3 hours today.  Per prior charts, patient with chronic LE pain and has complained of instability to his gait due to this discomfort.  He is ambulatory on arrival to ED with assistance.  He also admits to ongoing SOB, chronic and unchanged per patient.  Patient denies associated fever, chills, headache, visual changes, chest pain, abdominal pain, nausea, emesis, changes to bowel movements, peripheral edema, calf pain/tenderness, recent travel, known sick contacts or any additional acute complaints or concerns at this time.

## 2021-09-07 NOTE — ED ADULT NURSE NOTE - OBJECTIVE STATEMENT
· HPI Objective Statement: 71 year old male, poor historian, former Vet, current frequent Cocaine abuser, frequent Marijuana user, shellfish allergy (tolerated CCTA w/ contrast without allergic reaction), known CAD with LM, LAD and OM1 Dz on prior CCTA), newly diagnosed systolic CHF (EF 36% ECHO 7/28/21), known PAD with B/L LE and RUE stent former, occasional ETOH user, PMHx of HTN, DM, HIV (unknown CD4, VL undetectable), Hep C (treated w/ Harvoni 3-4 years ago), prostate CA s/p radiation therapy, and Depression presents to ED with concern for gait instability x 3 hours today.  Per prior charts, patient with chronic LE pain and has complained of instability to his gait due to this discomfort.  He is ambulatory on arrival to ED with assistance.  He also admits to ongoing SOB, chronic and unchanged per patient.  Patient denies associated fever, chills, headache, visual changes, chest pain, abdominal pain, nausea, emesis, changes to bowel movements, peripheral edema, calf pain/tenderness, recent travel, known sick contacts or any additional acute complaints or concerns at this time.

## 2021-09-07 NOTE — ED PROVIDER NOTE - CLINICAL SUMMARY MEDICAL DECISION MAKING FREE TEXT BOX
Patient in ED w concern for unsteady gait that he states began 3 hours prior to arrival in ED.  Patient also notes ongoing shortness of breath, noted to be chronic.  Patient ambulatory in ED, though does have shuffling type gait.  Given patient difficult historian and gait changes, code stroke is called on patient's arrival to ED and stroke team in ED to evaluate. Patient in ED w concern for unsteady gait that he states began 3 hours prior to arrival in ED.  Patient also notes ongoing shortness of breath, noted to be chronic.  Patient ambulatory in ED, though does have shuffling type gait.  Given patient difficult historian and gait changes, code stroke is called on patient's arrival to ED and stroke team in ED to evaluate.      Update @ 0511 - Stroke team requesting admission to neuro team for close monitoring and further eval.  Stroke team to place orders, no further ED recs at this time.  Will admit to stroke team under Dr. Guajardo per neuro/stroke request.

## 2021-09-07 NOTE — OCCUPATIONAL THERAPY INITIAL EVALUATION ADULT - PERTINENT HX OF CURRENT PROBLEM, REHAB EVAL
71M presented to ED with feeling abnormal with shortness of breath, and his gait is wobbly. He went to sleep at midnight and woke up around 2am this morning with this feeling. (last known normal 12:00pm). Imaging did not show a hemorrhagic or ischemic stroke, nor a large vessel occlusion. However, he still may have a posterior circulation stroke which is usually difficult to be seen on CT head. Admitted to [facility] for further workup.

## 2021-09-07 NOTE — CONSULT NOTE ADULT - SUBJECTIVE AND OBJECTIVE BOX
Patient is a 71y old  Male who presents with a chief complaint of Unsteady gait (07 Sep 2021 13:11)      INTERVAL HPI/OVERNIGHT EVENTS:    Review of Systems: 12 point review of systems otherwise negative    MEDICATIONS  (STANDING):  ARIPiprazole 2 milliGRAM(s) Oral daily  aspirin enteric coated 81 milliGRAM(s) Oral daily  atorvastatin 80 milliGRAM(s) Oral at bedtime  clopidogrel Tablet 75 milliGRAM(s) Oral daily  dolutegravir 50 milliGRAM(s) Oral daily  emtricitabine 200 mG/tenofovir alafenamide 25 mG (DESCOVY) Tablet 1 Tablet(s) Oral daily  enoxaparin Injectable 40 milliGRAM(s) SubCutaneous every 24 hours  escitalopram 5 milliGRAM(s) Oral daily  influenza  Vaccine (HIGH DOSE) 0.7 milliLiter(s) IntraMuscular once  isosorbide   mononitrate ER Tablet (IMDUR) 30 milliGRAM(s) Oral daily  metoprolol succinate ER 25 milliGRAM(s) Oral daily    MEDICATIONS  (PRN):      Allergies    Peaches (Rash)  penicillins (Rash)  Seafood (Rash)  shellfish (Unknown)  strawberry (Rash)  sulfa drugs (Rash)    Intolerances          Vital Signs Last 24 Hrs  T(C): 36.7 (07 Sep 2021 09:34), Max: 36.7 (07 Sep 2021 04:37)  T(F): 98.1 (07 Sep 2021 09:34), Max: 98.1 (07 Sep 2021 04:37)  HR: 60 (07 Sep 2021 13:05) (60 - 87)  BP: 154/91 (07 Sep 2021 13:05) (151/96 - 184/103)  BP(mean): 116 (07 Sep 2021 13:05) (116 - 131)  RR: 18 (07 Sep 2021 13:05) (17 - 19)  SpO2: 97% (07 Sep 2021 13:05) (96% - 99%)    POCT Blood Glucose.: 148 mg/dL (07 Sep 2021 04:25)        Physical Exam:    Daily Height in cm: 182.88 (07 Sep 2021 04:37)    Daily   General:  Well appearing, NAD, not cachetic  HEENT:  Nonicteric, PERRLA  CV:  RRR, no murmur, no JVD  Lungs:  CTA B/L, no wheezes, rales, rhonchi  Abdomen:  Soft, non-tender, no distended, positive BS, no hepatosplenomegaly  Extremities:  2+ pulses, no c/c, no edema  Skin:  Warm and dry, no rashes  :  No kaufman  Neuro:  AAOx3, non-focal, CN II-XII grossly intact  No Restraints    LABS:                        12.8   4.01  )-----------( 115      ( 07 Sep 2021 04:38 )             37.1         139  |  x   |  x   ----------------------------<  x   x    |  x   |  1.27    Ca    8.9      07 Sep 2021 04:38    TPro  7.2  /  Alb  3.8  /  TBili  0.3  /  DBili  x   /  AST  27  /  ALT  20  /  AlkPhos  54  09-07    PT/INR - ( 07 Sep 2021 04:38 )   PT: 10.9 sec;   INR: 0.90          PTT - ( 07 Sep 2021 04:38 )  PTT:26.7 sec  Urinalysis Basic - ( 07 Sep 2021 05:19 )    Color: Yellow / Appearance: Clear / S.015 / pH: x  Gluc: x / Ketone: NEGATIVE  / Bili: Negative / Urobili: 0.2 E.U./dL   Blood: x / Protein: 100 mg/dL / Nitrite: NEGATIVE   Leuk Esterase: NEGATIVE / RBC: < 5 /HPF / WBC < 5 /HPF   Sq Epi: x / Non Sq Epi: 5-10 /HPF / Bacteria: Present /HPF          RADIOLOGY & ADDITIONAL TESTS:  reviewed        Patient is a 71y old  Male who presents with a chief complaint of Unsteady gait (07 Sep 2021 13:11)      HPI:    HPI:   70 y/o M, Poor historian, former Vet, current frequent Cocaine Abuser, frequent Marijuana user, Shellfish allergy (tolerated CCTA w/ contrast without allergic reaction), Known CAD with LM, LAD and OM1 Dz on prior CCTA), newly diagnosed systolic CHF (EF 36% ECHO 21), known PAD with B/L LE and RUE stent former, Occasional ETOH user, PMHx of HTN, DM, HIV (unknown CD4, VL undetectable), Hep C (treated w/ Harvoni 3-4 years ago), Prostate CA s/p radiation therapy, and Depression, last admitted in 2021 with a complain of near syncope episode   Today Mr. Francis presented to ED with feeling abnormal with shortness of breath, and his gait is wobbly. He went to sleep at midnight and woke up around 2am this morning with this feeling. (last known normal 12:00pm). On presentation he came by himself from his living place at a rehab center at [address] [place] [address]. He did not have nausea, vomiting, headache, blurry vision, double vision, difficulty speaking, or weakness (he mentioned some difficulty walking because of sciatica pain in the Lt leg).   According to him, he last used cocaine was two days ago  He did not use Marijuana recently.    Patient examined at bedside this morning - reports he feels "better". has been urinating normally. denies any chest pain/sob/fever/chills. Denies any dysuria/hematuria.    Review of Systems: 12 point review of systems otherwise negative    MEDICATIONS  (STANDING):  ARIPiprazole 2 milliGRAM(s) Oral daily  aspirin enteric coated 81 milliGRAM(s) Oral daily  atorvastatin 80 milliGRAM(s) Oral at bedtime  clopidogrel Tablet 75 milliGRAM(s) Oral daily  dolutegravir 50 milliGRAM(s) Oral daily  emtricitabine 200 mG/tenofovir alafenamide 25 mG (DESCOVY) Tablet 1 Tablet(s) Oral daily  enoxaparin Injectable 40 milliGRAM(s) SubCutaneous every 24 hours  escitalopram 5 milliGRAM(s) Oral daily  influenza  Vaccine (HIGH DOSE) 0.7 milliLiter(s) IntraMuscular once  isosorbide   mononitrate ER Tablet (IMDUR) 30 milliGRAM(s) Oral daily  metoprolol succinate ER 25 milliGRAM(s) Oral daily    MEDICATIONS  (PRN):      Allergies    Peaches (Rash)  penicillins (Rash)  Seafood (Rash)  shellfish (Unknown)  strawberry (Rash)  sulfa drugs (Rash)    Intolerances          Vital Signs Last 24 Hrs  T(C): 36.7 (07 Sep 2021 09:34), Max: 36.7 (07 Sep 2021 04:37)  T(F): 98.1 (07 Sep 2021 09:34), Max: 98.1 (07 Sep 2021 04:37)  HR: 60 (07 Sep 2021 13:05) (60 - 87)  BP: 154/91 (07 Sep 2021 13:05) (151/96 - 184/103)  BP(mean): 116 (07 Sep 2021 13:05) (116 - 131)  RR: 18 (07 Sep 2021 13:05) (17 - 19)  SpO2: 97% (07 Sep 2021 13:05) (96% - 99%)    POCT Blood Glucose.: 148 mg/dL (07 Sep 2021 04:25)        Physical Exam:    Daily Height in cm: 182.88 (07 Sep 2021 04:37)    Daily   General:  Well appearing, NAD, not cachetic  HEENT:  Nonicteric, PERRLA  CV:  RRR, no murmur, no JVD  Lungs:  CTA B/L, no wheezes, rales, rhonchi  Abdomen:  Soft, non-tender, no distended, positive BS, no hepatosplenomegaly  Extremities:  2+ pulses, no c/c, no edema  Skin:  Warm and dry, no rashes  :  No kaufman  Neuro:  AAOx3, non-focal, CN II-XII grossly intact  No Restraints    LABS:                        12.8   4.01  )-----------( 115      ( 07 Sep 2021 04:38 )             37.1     09-07    139  |  x   |  x   ----------------------------<  x   x    |  x   |  1.27    Ca    8.9      07 Sep 2021 04:38    TPro  7.2  /  Alb  3.8  /  TBili  0.3  /  DBili  x   /  AST  27  /  ALT  20  /  AlkPhos  54  09-07    PT/INR - ( 07 Sep 2021 04:38 )   PT: 10.9 sec;   INR: 0.90          PTT - ( 07 Sep 2021 04:38 )  PTT:26.7 sec  Urinalysis Basic - ( 07 Sep 2021 05:19 )    Color: Yellow / Appearance: Clear / S.015 / pH: x  Gluc: x / Ketone: NEGATIVE  / Bili: Negative / Urobili: 0.2 E.U./dL   Blood: x / Protein: 100 mg/dL / Nitrite: NEGATIVE   Leuk Esterase: NEGATIVE / RBC: < 5 /HPF / WBC < 5 /HPF   Sq Epi: x / Non Sq Epi: 5-10 /HPF / Bacteria: Present /HPF          RADIOLOGY & ADDITIONAL TESTS:  reviewed

## 2021-09-07 NOTE — ED PROVIDER NOTE - CARE PLAN
1 Principal Discharge DX:	Unsteady gait   Principal Discharge DX:	Unsteady gait  Secondary Diagnosis:	Acute renal insufficiency

## 2021-09-07 NOTE — DIETITIAN INITIAL EVALUATION ADULT. - OTHER CALCULATIONS
ABW used to calculate energy needs due to pt's current body weight within % IBW (107%). Needs adjusted for older age and wt. Increased needs 2/2 HIV+

## 2021-09-08 LAB
4/8 RATIO: 0.31 RATIO — LOW (ref 0.86–4.14)
ABS CD8: 303 /UL — SIGNIFICANT CHANGE UP (ref 90–775)
CD16+CD56+ CELLS NFR BLD: 11 % — SIGNIFICANT CHANGE UP (ref 7–27)
CD16+CD56+ CELLS NFR SPEC: 58 /UL — LOW (ref 80–426)
CD19 BLASTS SPEC-ACNC: 10 % — SIGNIFICANT CHANGE UP (ref 4–18)
CD19 BLASTS SPEC-ACNC: 54 /UL — SIGNIFICANT CHANGE UP (ref 32–326)
CD3 BLASTS SPEC-ACNC: 405 /UL — SIGNIFICANT CHANGE UP (ref 396–2024)
CD3 BLASTS SPEC-ACNC: 76 % — SIGNIFICANT CHANGE UP (ref 58–84)
CD4 %: 17 % — LOW (ref 30–56)
CD8 %: 55 % — HIGH (ref 11–43)
T-CELL CD4 SUBSET PNL BLD: 95 /UL — LOW (ref 325–1251)

## 2021-09-09 LAB
HIV-1 VIRAL LOAD RESULT: ABNORMAL
HIV1 RNA # SERPL NAA+PROBE: ABNORMAL COPIES/ML
HIV1 RNA SER-IMP: SIGNIFICANT CHANGE UP
HIV1 RNA SERPL NAA+PROBE-ACNC: ABNORMAL
HIV1 RNA SERPL NAA+PROBE-LOG#: ABNORMAL LG COP/ML

## 2021-09-16 NOTE — ED PROVIDER NOTE - SIGN-OUT TIME
Validate Previous Accession (Can Hide Previous Accession In Settings Tab): No Complex Repair And Split-Thickness Skin Graft Text: The defect edges were debeveled with a #15 scalpel blade.  The primary defect was closed partially with a complex linear closure.  Given the location of the defect, shape of the defect and the proximity to free margins a split thickness skin graft was deemed most appropriate to repair the remaining defect.  The graft was trimmed to fit the size of the remaining defect.  The graft was then placed in the primary defect, oriented appropriately, and sutured into place. Suturegard Body: The suture ends were repeatedly re-tightened and re-clamped to achieve the desired tissue expansion. Partial Purse String (Intermediate) Text: Given the location of the defect and the characteristics of the surrounding skin an intermediate purse string closure was deemed most appropriate.  Undermining was performed circumferentially around the surgical defect.  A purse string suture was then placed and tightened. Wound tension of the circular defect prevented complete closure of the wound. Render Post-Care Instructions In Note?: yes W Plasty Text: The lesion was extirpated to the level of the fat with a #15 scalpel blade.  Given the location of the defect, shape of the defect and the proximity to free margins a W-plasty was deemed most appropriate for repair.  Using a sterile surgical marker, the appropriate transposition arms of the W-plasty were drawn incorporating the defect and placing the expected incisions within the relaxed skin tension lines where possible.    The area thus outlined was incised deep to adipose tissue with a #15 scalpel blade.  The skin margins were undermined to an appropriate distance in all directions utilizing iris scissors.  The opposing transposition arms were then transposed into place in opposite direction and anchored with interrupted buried subcutaneous sutures. Star Wedge Flap Text: The defect edges were debeveled with a #15 scalpel blade.  Given the location of the defect, shape of the defect and the proximity to free margins a star wedge flap was deemed most appropriate.  Using a sterile surgical marker, an appropriate rotation flap was drawn incorporating the defect and placing the expected incisions within the relaxed skin tension lines where possible. The area thus outlined was incised deep to adipose tissue with a #15 scalpel blade.  The skin margins were undermined to an appropriate distance in all directions utilizing iris scissors. Complex Repair And Bilobe Flap Text: The defect edges were debeveled with a #15 scalpel blade.  The primary defect was closed partially with a complex linear closure.  Given the location of the remaining defect, shape of the defect and the proximity to free margins a bilobe flap was deemed most appropriate for complete closure of the defect.  Using a sterile surgical marker, an appropriate advancement flap was drawn incorporating the defect and placing the expected incisions within the relaxed skin tension lines where possible.    The area thus outlined was incised deep to adipose tissue with a #15 scalpel blade.  The skin margins were undermined to an appropriate distance in all directions utilizing iris scissors. Positioning (Leave Blank If You Do Not Want): The patient was placed in a comfortable position exposing the surgical site. Modified Advancement Flap Text: The defect edges were debeveled with a #15 scalpel blade.  Given the location of the defect, shape of the defect and the proximity to free margins a modified advancement flap was deemed most appropriate.  Using a sterile surgical marker, an appropriate advancement flap was drawn incorporating the defect and placing the expected incisions within the relaxed skin tension lines where possible.    The area thus outlined was incised deep to adipose tissue with a #15 scalpel blade.  The skin margins were undermined to an appropriate distance in all directions utilizing iris scissors. Complex Repair And Transposition Flap Text: The defect edges were debeveled with a #15 scalpel blade.  The primary defect was closed partially with a complex linear closure.  Given the location of the remaining defect, shape of the defect and the proximity to free margins a transposition flap was deemed most appropriate for complete closure of the defect.  Using a sterile surgical marker, an appropriate advancement flap was drawn incorporating the defect and placing the expected incisions within the relaxed skin tension lines where possible.    The area thus outlined was incised deep to adipose tissue with a #15 scalpel blade.  The skin margins were undermined to an appropriate distance in all directions utilizing iris scissors. Where Do You Want The Question To Include Opioid Counseling Located?: Case Summary Tab Complex Repair And Modified Advancement Flap Text: The defect edges were debeveled with a #15 scalpel blade.  The primary defect was closed partially with a complex linear closure.  Given the location of the remaining defect, shape of the defect and the proximity to free margins a modified advancement flap was deemed most appropriate for complete closure of the defect.  Using a sterile surgical marker, an appropriate advancement flap was drawn incorporating the defect and placing the expected incisions within the relaxed skin tension lines where possible.    The area thus outlined was incised deep to adipose tissue with a #15 scalpel blade.  The skin margins were undermined to an appropriate distance in all directions utilizing iris scissors. Primary Defect Width (In Cm): 0 Crescentic Advancement Flap Text: The defect edges were debeveled with a #15 scalpel blade.  Given the location of the defect and the proximity to free margins a crescentic advancement flap was deemed most appropriate.  Using a sterile surgical marker, the appropriate advancement flap was drawn incorporating the defect and placing the expected incisions within the relaxed skin tension lines where possible.    The area thus outlined was incised deep to adipose tissue with a #15 scalpel blade.  The skin margins were undermined to an appropriate distance in all directions utilizing iris scissors. Skin Substitute Text: The defect edges were debeveled with a #15 scalpel blade.  Given the location of the defect, shape of the defect and the proximity to free margins a skin substitute graft was deemed most appropriate.  The graft material was trimmed to fit the size of the defect. The graft was then placed in the primary defect and oriented appropriately. No Repair - Repaired With Adjacent Surgical Defect Text (Leave Blank If You Do Not Want): After the excision the defect was repaired concurrently with another surgical defect which was in close approximation. Partial Purse String (Simple) Text: Given the location of the defect and the characteristics of the surrounding skin a simple purse string closure was deemed most appropriate.  Undermining was performed circumferentially around the surgical defect.  A purse string suture was then placed and tightened. Wound tension of the circular defect prevented complete closure of the wound. Hatchet Flap Text: The defect edges were debeveled with a #15 scalpel blade.  Given the location of the defect, shape of the defect and the proximity to free margins a hatchet flap was deemed most appropriate.  Using a sterile surgical marker, an appropriate hatchet flap was drawn incorporating the defect and placing the expected incisions within the relaxed skin tension lines where possible.    The area thus outlined was incised deep to adipose tissue with a #15 scalpel blade.  The skin margins were undermined to an appropriate distance in all directions utilizing iris scissors. Mucosal Advancement Flap Text: Given the location of the defect, shape of the defect and the proximity to free margins a mucosal advancement flap was deemed most appropriate. Incisions were made with a 15 blade scalpel in the appropriate fashion along the cutaneous vermilion border and the mucosal lip. The remaining actinically damaged mucosal tissue was excised.  The mucosal advancement flap was then elevated to the gingival sulcus with care taken to preserve the neurovascular structures and advanced into the primary defect. Care was taken to ensure that precise realignment of the vermilion border was achieved. Graft Donor Site Bandage (Optional-Leave Blank If You Don't Want In Note): Steri-strips and a pressure bandage were applied to the donor site. Island Pedicle Flap-Requiring Vessel Identification Text: The defect edges were debeveled with a #15 scalpel blade.  Given the location of the defect, shape of the defect and the proximity to free margins an island pedicle advancement flap was deemed most appropriate.  Using a sterile surgical marker, an appropriate advancement flap was drawn, based on the axial vessel mentioned above, incorporating the defect, outlining the appropriate donor tissue and placing the expected incisions within the relaxed skin tension lines where possible.    The area thus outlined was incised deep to adipose tissue with a #15 scalpel blade.  The skin margins were undermined to an appropriate distance in all directions around the primary defect and laterally outward around the island pedicle utilizing iris scissors.  There was minimal undermining beneath the pedicle flap. Hemigard Postcare Instructions: The HEMIGARD strips are to remain completely dry for at least 5-7 days. Complex Repair And Double M Plasty Text: The defect edges were debeveled with a #15 scalpel blade.  The primary defect was closed partially with a complex linear closure.  Given the location of the remaining defect, shape of the defect and the proximity to free margins a double M plasty was deemed most appropriate for complete closure of the defect.  Using a sterile surgical marker, an appropriate advancement flap was drawn incorporating the defect and placing the expected incisions within the relaxed skin tension lines where possible.    The area thus outlined was incised deep to adipose tissue with a #15 scalpel blade.  The skin margins were undermined to an appropriate distance in all directions utilizing iris scissors. Epidermal Closure: simple interrupted Chonodrocutaneous Helical Advancement Flap Text: The defect edges were debeveled with a #15 scalpel blade.  Given the location of the defect and the proximity to free margins a chondrocutaneous helical advancement flap was deemed most appropriate.  Using a sterile surgical marker, the appropriate advancement flap was drawn incorporating the defect and placing the expected incisions within the relaxed skin tension lines where possible.    The area thus outlined was incised deep to adipose tissue with a #15 scalpel blade.  The skin margins were undermined to an appropriate distance in all directions utilizing iris scissors. Zygomaticofacial Flap Text: Given the location of the defect, shape of the defect and the proximity to free margins a zygomaticofacial flap was deemed most appropriate for repair.  Using a sterile surgical marker, the appropriate flap was drawn incorporating the defect and placing the expected incisions within the relaxed skin tension lines where possible. The area thus outlined was incised deep to adipose tissue with a #15 scalpel blade with preservation of a vascular pedicle.  The skin margins were undermined to an appropriate distance in all directions utilizing iris scissors.  The flap was then placed into the defect and anchored with interrupted buried subcutaneous sutures. Complex Repair And Rhombic Flap Text: The defect edges were debeveled with a #15 scalpel blade.  The primary defect was closed partially with a complex linear closure.  Given the location of the remaining defect, shape of the defect and the proximity to free margins a rhombic flap was deemed most appropriate for complete closure of the defect.  Using a sterile surgical marker, an appropriate advancement flap was drawn incorporating the defect and placing the expected incisions within the relaxed skin tension lines where possible.    The area thus outlined was incised deep to adipose tissue with a #15 scalpel blade.  The skin margins were undermined to an appropriate distance in all directions utilizing iris scissors. 18-Jul-2021 07:00 Ftsg Text: The defect edges were debeveled with a #15 scalpel blade.  Given the location of the defect, shape of the defect and the proximity to free margins a full thickness skin graft was deemed most appropriate.  Using a sterile surgical marker, the primary defect shape was transferred to the donor site. The area thus outlined was incised deep to adipose tissue with a #15 scalpel blade.  The harvested graft was then trimmed of adipose tissue until only dermis and epidermis was left.  The skin margins of the secondary defect were undermined to an appropriate distance in all directions utilizing iris scissors.  The secondary defect was closed with interrupted buried subcutaneous sutures.  The skin edges were then re-apposed with running  sutures.  The skin graft was then placed in the primary defect and oriented appropriately. H Plasty Text: Given the location of the defect, shape of the defect and the proximity to free margins a H-plasty was deemed most appropriate for repair.  Using a sterile surgical marker, the appropriate advancement arms of the H-plasty were drawn incorporating the defect and placing the expected incisions within the relaxed skin tension lines where possible. The area thus outlined was incised deep to adipose tissue with a #15 scalpel blade. The skin margins were undermined to an appropriate distance in all directions utilizing iris scissors.  The opposing advancement arms were then advanced into place in opposite direction and anchored with interrupted buried subcutaneous sutures. V-Y Flap Text: The defect edges were debeveled with a #15 scalpel blade.  Given the location of the defect, shape of the defect and the proximity to free margins a V-Y flap was deemed most appropriate.  Using a sterile surgical marker, an appropriate advancement flap was drawn incorporating the defect and placing the expected incisions within the relaxed skin tension lines where possible.    The area thus outlined was incised deep to adipose tissue with a #15 scalpel blade.  The skin margins were undermined to an appropriate distance in all directions utilizing iris scissors. Repair Type: Complex Dorsal Nasal Flap Text: The defect edges were debeveled with a #15 scalpel blade.  Given the location of the defect and the proximity to free margins a dorsal nasal flap was deemed most appropriate.  Using a sterile surgical marker, an appropriate dorsal nasal flap was drawn around the defect.    The area thus outlined was incised deep to adipose tissue with a #15 scalpel blade.  The skin margins were undermined to an appropriate distance in all directions utilizing iris scissors. Double O-Z Plasty Text: The defect edges were debeveled with a #15 scalpel blade.  Given the location of the defect, shape of the defect and the proximity to free margins a Double O-Z plasty (double transposition flap) was deemed most appropriate.  Using a sterile surgical marker, the appropriate transposition flaps were drawn incorporating the defect and placing the expected incisions within the relaxed skin tension lines where possible. The area thus outlined was incised deep to adipose tissue with a #15 scalpel blade.  The skin margins were undermined to an appropriate distance in all directions utilizing iris scissors.  Hemostasis was achieved with electrocautery.  The flaps were then transposed into place, one clockwise and the other counterclockwise, and anchored with interrupted buried subcutaneous sutures. O-Z Flap Text: The defect edges were debeveled with a #15 scalpel blade.  Given the location of the defect, shape of the defect and the proximity to free margins an O-Z flap was deemed most appropriate.  Using a sterile surgical marker, an appropriate transposition flap was drawn incorporating the defect and placing the expected incisions within the relaxed skin tension lines where possible. The area thus outlined was incised deep to adipose tissue with a #15 scalpel blade.  The skin margins were undermined to an appropriate distance in all directions utilizing iris scissors. Paramedian Forehead Flap Text: A decision was made to reconstruct the defect utilizing an interpolation axial flap and a staged reconstruction.  A telfa template was made of the defect.  This telfa template was then used to outline the paramedian forehead pedicle flap.  The donor area for the pedicle flap was then injected with anesthesia.  The flap was excised through the skin and subcutaneous tissue down to the layer of the underlying musculature.  The pedicle flap was carefully excised within this deep plane to maintain its blood supply.  The edges of the donor site were undermined.   The donor site was closed in a primary fashion.  The pedicle was then rotated into position and sutured.  Once the tube was sutured into place, adequate blood supply was confirmed with blanching and refill.  The pedicle was then wrapped with xeroform gauze and dressed appropriately with a telfa and gauze bandage to ensure continued blood supply and protect the attached pedicle. Complex Repair And O-L Flap Text: The defect edges were debeveled with a #15 scalpel blade.  The primary defect was closed partially with a complex linear closure.  Given the location of the remaining defect, shape of the defect and the proximity to free margins an O-L flap was deemed most appropriate for complete closure of the defect.  Using a sterile surgical marker, an appropriate flap was drawn incorporating the defect and placing the expected incisions within the relaxed skin tension lines where possible.    The area thus outlined was incised deep to adipose tissue with a #15 scalpel blade.  The skin margins were undermined to an appropriate distance in all directions utilizing iris scissors. Split-Thickness Skin Graft Text: The defect edges were debeveled with a #15 scalpel blade.  Given the location of the defect, shape of the defect and the proximity to free margins a split thickness skin graft was deemed most appropriate.  Using a sterile surgical marker, the primary defect shape was transferred to the donor site. The split thickness graft was then harvested.  The skin graft was then placed in the primary defect and oriented appropriately. Anesthesia Type: 1% lidocaine with epinephrine Pre-Excision Curettage Text (Leave Blank If You Do Not Want): Prior to drawing the surgical margin the visible lesion was removed with electrodesiccation and curettage to clearly define the lesion size. Helical Rim Advancement Flap Text: The defect edges were debeveled with a #15 blade scalpel.  Given the location of the defect and the proximity to free margins (helical rim) a double helical rim advancement flap was deemed most appropriate.  Using a sterile surgical marker, the appropriate advancement flaps were drawn incorporating the defect and placing the expected incisions between the helical rim and antihelix where possible.  The area thus outlined was incised through and through with a #15 scalpel blade.  With a skin hook and iris scissors, the flaps were gently and sharply undermined and freed up. Melolabial Interpolation Flap Text: A decision was made to reconstruct the defect utilizing an interpolation axial flap and a staged reconstruction.  A telfa template was made of the defect.  This telfa template was then used to outline the melolabial interpolation flap.  The donor area for the pedicle flap was then injected with anesthesia.  The flap was excised through the skin and subcutaneous tissue down to the layer of the underlying musculature.  The pedicle flap was carefully excised within this deep plane to maintain its blood supply.  The edges of the donor site were undermined.   The donor site was closed in a primary fashion.  The pedicle was then rotated into position and sutured.  Once the tube was sutured into place, adequate blood supply was confirmed with blanching and refill.  The pedicle was then wrapped with xeroform gauze and dressed appropriately with a telfa and gauze bandage to ensure continued blood supply and protect the attached pedicle. Hemigard Intro: Due to skin fragility and wound tension, it was decided to use HEMIGARD adhesive retention suture devices to permit a linear closure. The skin was cleaned and dried for a 6cm distance away from the wound. Excessive hair, if present, was removed to allow for adhesion. Fusiform Excision Additional Text (Leave Blank If You Do Not Want): The margin was drawn around the clinically apparent lesion.  A fusiform shape was then drawn on the skin incorporating the lesion and margins.  Incisions were then made along these lines to the appropriate tissue plane and the lesion was extirpated. Complex Repair And Ftsg Text: The defect edges were debeveled with a #15 scalpel blade.  The primary defect was closed partially with a complex linear closure.  Given the location of the defect, shape of the defect and the proximity to free margins a full thickness skin graft was deemed most appropriate to repair the remaining defect.  The graft was trimmed to fit the size of the remaining defect.  The graft was then placed in the primary defect, oriented appropriately, and sutured into place. Intermediate / Complex Repair - Final Wound Length In Cm: 3 Wound Care: Petrolatum Complex Repair And Dermal Autograft Text: The defect edges were debeveled with a #15 scalpel blade.  The primary defect was closed partially with a complex linear closure.  Given the location of the defect, shape of the defect and the proximity to free margins an dermal autograft was deemed most appropriate to repair the remaining defect.  The graft was trimmed to fit the size of the remaining defect.  The graft was then placed in the primary defect, oriented appropriately, and sutured into place. Cheek-To-Nose Interpolation Flap Text: A decision was made to reconstruct the defect utilizing an interpolation axial flap and a staged reconstruction.  A telfa template was made of the defect.  This telfa template was then used to outline the Cheek-To-Nose Interpolation flap.  The donor area for the pedicle flap was then injected with anesthesia.  The flap was excised through the skin and subcutaneous tissue down to the layer of the underlying musculature.  The interpolation flap was carefully excised within this deep plane to maintain its blood supply.  The edges of the donor site were undermined.   The donor site was closed in a primary fashion.  The pedicle was then rotated into position and sutured.  Once the tube was sutured into place, adequate blood supply was confirmed with blanching and refill.  The pedicle was then wrapped with xeroform gauze and dressed appropriately with a telfa and gauze bandage to ensure continued blood supply and protect the attached pedicle. Dermal Autograft Text: The defect edges were debeveled with a #15 scalpel blade.  Given the location of the defect, shape of the defect and the proximity to free margins a dermal autograft was deemed most appropriate.  Using a sterile surgical marker, the primary defect shape was transferred to the donor site. The area thus outlined was incised deep to adipose tissue with a #15 scalpel blade.  The harvested graft was then trimmed of adipose and epidermal tissue until only dermis was left.  The skin graft was then placed in the primary defect and oriented appropriately. Suturegard Intro: Intraoperative tissue expansion was performed, utilizing the SUTUREGARD device, in order to reduce wound tension. Transposition Flap Text: The defect edges were debeveled with a #15 scalpel blade.  Given the location of the defect and the proximity to free margins a transposition flap was deemed most appropriate.  Using a sterile surgical marker, an appropriate transposition flap was drawn incorporating the defect.    The area thus outlined was incised deep to adipose tissue with a #15 scalpel blade.  The skin margins were undermined to an appropriate distance in all directions utilizing iris scissors. Rhombic Flap Text: The defect edges were debeveled with a #15 scalpel blade.  Given the location of the defect and the proximity to free margins a rhombic flap was deemed most appropriate.  Using a sterile surgical marker, an appropriate rhombic flap was drawn incorporating the defect.    The area thus outlined was incised deep to adipose tissue with a #15 scalpel blade.  The skin margins were undermined to an appropriate distance in all directions utilizing iris scissors. Bilateral Helical Rim Advancement Flap Text: The defect edges were debeveled with a #15 blade scalpel.  Given the location of the defect and the proximity to free margins (helical rim) a bilateral helical rim advancement flap was deemed most appropriate.  Using a sterile surgical marker, the appropriate advancement flaps were drawn incorporating the defect and placing the expected incisions between the helical rim and antihelix where possible.  The area thus outlined was incised through and through with a #15 scalpel blade.  With a skin hook and iris scissors, the flaps were gently and sharply undermined and freed up. Trilobed Flap Text: The defect edges were debeveled with a #15 scalpel blade.  Given the location of the defect and the proximity to free margins a trilobed flap was deemed most appropriate.  Using a sterile surgical marker, an appropriate trilobed flap drawn around the defect.    The area thus outlined was incised deep to adipose tissue with a #15 scalpel blade.  The skin margins were undermined to an appropriate distance in all directions utilizing iris scissors. Peng Advancement Flap Text: The defect edges were debeveled with a #15 scalpel blade.  Given the location of the defect, shape of the defect and the proximity to free margins a Peng advancement flap was deemed most appropriate.  Using a sterile surgical marker, an appropriate advancement flap was drawn incorporating the defect and placing the expected incisions within the relaxed skin tension lines where possible. The area thus outlined was incised deep to adipose tissue with a #15 scalpel blade.  The skin margins were undermined to an appropriate distance in all directions utilizing iris scissors. Composite Graft Text: The defect edges were debeveled with a #15 scalpel blade.  Given the location of the defect, shape of the defect, the proximity to free margins and the fact the defect was full thickness a composite graft was deemed most appropriate.  The defect was outline and then transferred to the donor site.  A full thickness graft was then excised from the donor site. The graft was then placed in the primary defect, oriented appropriately and then sutured into place.  The secondary defect was then repaired using a primary closure. Complex Repair And W Plasty Text: The defect edges were debeveled with a #15 scalpel blade.  The primary defect was closed partially with a complex linear closure.  Given the location of the remaining defect, shape of the defect and the proximity to free margins a W plasty was deemed most appropriate for complete closure of the defect.  Using a sterile surgical marker, an appropriate advancement flap was drawn incorporating the defect and placing the expected incisions within the relaxed skin tension lines where possible.    The area thus outlined was incised deep to adipose tissue with a #15 scalpel blade.  The skin margins were undermined to an appropriate distance in all directions utilizing iris scissors. Mustarde Flap Text: The defect edges were debeveled with a #15 scalpel blade.  Given the size, depth and location of the defect and the proximity to free margins a Mustarde flap was deemed most appropriate.  Using a sterile surgical marker, an appropriate flap was drawn incorporating the defect. The area thus outlined was incised with a #15 scalpel blade.  The skin margins were undermined to an appropriate distance in all directions utilizing iris scissors. Anesthesia Volume In Cc: 6 Curvilinear Excision Additional Text (Leave Blank If You Do Not Want): The margin was drawn around the clinically apparent lesion.  A curvilinear shape was then drawn on the skin incorporating the lesion and margins.  Incisions were then made along these lines to the appropriate tissue plane and the lesion was extirpated. Rhomboid Transposition Flap Text: The defect edges were debeveled with a #15 scalpel blade.  Given the location of the defect and the proximity to free margins a rhomboid transposition flap was deemed most appropriate.  Using a sterile surgical marker, an appropriate rhomboid flap was drawn incorporating the defect.    The area thus outlined was incised deep to adipose tissue with a #15 scalpel blade.  The skin margins were undermined to an appropriate distance in all directions utilizing iris scissors. Bilobed Transposition Flap Text: The defect edges were debeveled with a #15 scalpel blade.  Given the location of the defect and the proximity to free margins a bilobed transposition flap was deemed most appropriate.  Using a sterile surgical marker, an appropriate bilobe flap drawn around the defect.    The area thus outlined was incised deep to adipose tissue with a #15 scalpel blade.  The skin margins were undermined to an appropriate distance in all directions utilizing iris scissors. Suturegard Retention Suture: 2-0 Nylon Alar Island Pedicle Flap Text: The defect edges were debeveled with a #15 scalpel blade.  Given the location of the defect, shape of the defect and the proximity to the alar rim an island pedicle advancement flap was deemed most appropriate.  Using a sterile surgical marker, an appropriate advancement flap was drawn incorporating the defect, outlining the appropriate donor tissue and placing the expected incisions within the nasal ala running parallel to the alar rim. The area thus outlined was incised with a #15 scalpel blade.  The skin margins were undermined minimally to an appropriate distance in all directions around the primary defect and laterally outward around the island pedicle utilizing iris scissors.  There was minimal undermining beneath the pedicle flap. Complex Repair And Double Advancement Flap Text: The defect edges were debeveled with a #15 scalpel blade.  The primary defect was closed partially with a complex linear closure.  Given the location of the remaining defect, shape of the defect and the proximity to free margins a double advancement flap was deemed most appropriate for complete closure of the defect.  Using a sterile surgical marker, an appropriate advancement flap was drawn incorporating the defect and placing the expected incisions within the relaxed skin tension lines where possible.    The area thus outlined was incised deep to adipose tissue with a #15 scalpel blade.  The skin margins were undermined to an appropriate distance in all directions utilizing iris scissors. Ear Star Wedge Flap Text: The defect edges were debeveled with a #15 blade scalpel.  Given the location of the defect and the proximity to free margins (helical rim) an ear star wedge flap was deemed most appropriate.  Using a sterile surgical marker, the appropriate flap was drawn incorporating the defect and placing the expected incisions between the helical rim and antihelix where possible.  The area thus outlined was incised through and through with a #15 scalpel blade. Burow's Advancement Flap Text: The defect edges were debeveled with a #15 scalpel blade.  Given the location of the defect and the proximity to free margins a Burow's advancement flap was deemed most appropriate.  Using a sterile surgical marker, the appropriate advancement flap was drawn incorporating the defect and placing the expected incisions within the relaxed skin tension lines where possible.    The area thus outlined was incised deep to adipose tissue with a #15 scalpel blade.  The skin margins were undermined to an appropriate distance in all directions utilizing iris scissors. Complex Repair And O-T Advancement Flap Text: The defect edges were debeveled with a #15 scalpel blade.  The primary defect was closed partially with a complex linear closure.  Given the location of the remaining defect, shape of the defect and the proximity to free margins an O-T advancement flap was deemed most appropriate for complete closure of the defect.  Using a sterile surgical marker, an appropriate advancement flap was drawn incorporating the defect and placing the expected incisions within the relaxed skin tension lines where possible.    The area thus outlined was incised deep to adipose tissue with a #15 scalpel blade.  The skin margins were undermined to an appropriate distance in all directions utilizing iris scissors. Mastoid Interpolation Flap Text: A decision was made to reconstruct the defect utilizing an interpolation axial flap and a staged reconstruction.  A telfa template was made of the defect.  This telfa template was then used to outline the mastoid interpolation flap.  The donor area for the pedicle flap was then injected with anesthesia.  The flap was excised through the skin and subcutaneous tissue down to the layer of the underlying musculature.  The pedicle flap was carefully excised within this deep plane to maintain its blood supply.  The edges of the donor site were undermined.   The donor site was closed in a primary fashion.  The pedicle was then rotated into position and sutured.  Once the tube was sutured into place, adequate blood supply was confirmed with blanching and refill.  The pedicle was then wrapped with xeroform gauze and dressed appropriately with a telfa and gauze bandage to ensure continued blood supply and protect the attached pedicle. Nasal Turnover Hinge Flap Text: The defect edges were debeveled with a #15 scalpel blade.  Given the size, depth, location of the defect and the defect being full thickness a nasal turnover hinge flap was deemed most appropriate.  Using a sterile surgical marker, an appropriate hinge flap was drawn incorporating the defect. The area thus outlined was incised with a #15 scalpel blade. The flap was designed to recreate the nasal mucosal lining and the alar rim. The skin margins were undermined to an appropriate distance in all directions utilizing iris scissors. Double M-Plasty Intermediate Repair Preamble Text (Leave Blank If You Do Not Want): Undermining was performed with blunt dissection. Undermining Type: Entire Wound Crescentic Complex Repair Preamble Text (Leave Blank If You Do Not Want): Extensive wide undermining was performed. Island Pedicle Flap With Canthal Suspension Text: The defect edges were debeveled with a #15 scalpel blade.  Given the location of the defect, shape of the defect and the proximity to free margins an island pedicle advancement flap was deemed most appropriate.  Using a sterile surgical marker, an appropriate advancement flap was drawn incorporating the defect, outlining the appropriate donor tissue and placing the expected incisions within the relaxed skin tension lines where possible. The area thus outlined was incised deep to adipose tissue with a #15 scalpel blade.  The skin margins were undermined to an appropriate distance in all directions around the primary defect and laterally outward around the island pedicle utilizing iris scissors.  There was minimal undermining beneath the pedicle flap. A suspension suture was placed in the canthal tendon to prevent tension and prevent ectropion. Muscle Hinge Flap Text: The defect edges were debeveled with a #15 scalpel blade.  Given the size, depth and location of the defect and the proximity to free margins a muscle hinge flap was deemed most appropriate.  Using a sterile surgical marker, an appropriate hinge flap was drawn incorporating the defect. The area thus outlined was incised with a #15 scalpel blade.  The skin margins were undermined to an appropriate distance in all directions utilizing iris scissors. Complex Repair And Burow's Graft Text: The defect edges were debeveled with a #15 scalpel blade.  The primary defect was closed partially with a complex linear closure.  Given the location of the defect, shape of the defect, the proximity to free margins and the presence of a standing cone deformity a Burow's graft was deemed most appropriate to repair the remaining defect.  The graft was trimmed to fit the size of the remaining defect.  The graft was then placed in the primary defect, oriented appropriately, and sutured into place. Suture Removal: 14 days Anesthesia Volume In Cc: 3.5 Nostril Rim Text: The closure involved the nostril rim. Scalpel Size: 15 blade Cartilage Graft Text: The defect edges were debeveled with a #15 scalpel blade.  Given the location of the defect, shape of the defect, the fact the defect involved a full thickness cartilage defect a cartilage graft was deemed most appropriate.  An appropriate donor site was identified, cleansed, and anesthetized. The cartilage graft was then harvested and transferred to the recipient site, oriented appropriately and then sutured into place.  The secondary defect was then repaired using a primary closure. Orbicularis Oris Muscle Flap Text: The defect edges were debeveled with a #15 scalpel blade.  Given that the defect affected the competency of the oral sphincter an orbicularis oris muscle flap was deemed most appropriate to restore this competency and normal muscle function.  Using a sterile surgical marker, an appropriate flap was drawn incorporating the defect. The area thus outlined was incised with a #15 scalpel blade. Excision Depth: adipose tissue Epidermal Sutures: 5-0 Nylon Purse String (Intermediate) Text: Given the location of the defect and the characteristics of the surrounding skin a purse string intermediate closure was deemed most appropriate.  Undermining was performed circumfirentially around the surgical defect.  A purse string suture was then placed and tightened. Double Island Pedicle Flap Text: The defect edges were debeveled with a #15 scalpel blade.  Given the location of the defect, shape of the defect and the proximity to free margins a double island pedicle advancement flap was deemed most appropriate.  Using a sterile surgical marker, an appropriate advancement flap was drawn incorporating the defect, outlining the appropriate donor tissue and placing the expected incisions within the relaxed skin tension lines where possible.    The area thus outlined was incised deep to adipose tissue with a #15 scalpel blade.  The skin margins were undermined to an appropriate distance in all directions around the primary defect and laterally outward around the island pedicle utilizing iris scissors.  There was minimal undermining beneath the pedicle flap. Billing Type: Third-Party Bill Banner Transposition Flap Text: The defect edges were debeveled with a #15 scalpel blade.  Given the location of the defect and the proximity to free margins a Banner transposition flap was deemed most appropriate.  Using a sterile surgical marker, an appropriate flap drawn around the defect. The area thus outlined was incised deep to adipose tissue with a #15 scalpel blade.  The skin margins were undermined to an appropriate distance in all directions utilizing iris scissors. Helical Rim Text: The closure involved the helical rim. Excision Method: Elliptical Path Notes (To The Dermatopathologist): Please check margins. Marked at the lateral tip. Saucerization Excision Additional Text (Leave Blank If You Do Not Want): The margin was drawn around the clinically apparent lesion.  Incisions were then made along these lines, in a tangential fashion, to the appropriate tissue plane and the lesion was extirpated. Double O-Z Flap Text: The defect edges were debeveled with a #15 scalpel blade.  Given the location of the defect, shape of the defect and the proximity to free margins a Double O-Z flap was deemed most appropriate.  Using a sterile surgical marker, an appropriate transposition flap was drawn incorporating the defect and placing the expected incisions within the relaxed skin tension lines where possible. The area thus outlined was incised deep to adipose tissue with a #15 scalpel blade.  The skin margins were undermined to an appropriate distance in all directions utilizing iris scissors. V-Y Plasty Text: The defect edges were debeveled with a #15 scalpel blade.  Given the location of the defect, shape of the defect and the proximity to free margins an V-Y advancement flap was deemed most appropriate.  Using a sterile surgical marker, an appropriate advancement flap was drawn incorporating the defect and placing the expected incisions within the relaxed skin tension lines where possible.    The area thus outlined was incised deep to adipose tissue with a #15 scalpel blade.  The skin margins were undermined to an appropriate distance in all directions utilizing iris scissors. Vermilion Border Text: The closure involved the vermilion border. Hemostasis: Electrocautery Burow's Graft Text: The defect edges were debeveled with a #15 scalpel blade.  Given the location of the defect, shape of the defect, the proximity to free margins and the presence of a standing cone deformity a Burow's skin graft was deemed most appropriate. The standing cone was removed and this tissue was then trimmed to the shape of the primary defect. The adipose tissue was also removed until only dermis and epidermis were left.  The skin margins of the secondary defect were undermined to an appropriate distance in all directions utilizing iris scissors.  The secondary defect was closed with interrupted buried subcutaneous sutures.  The skin edges were then re-apposed with running  sutures.  The skin graft was then placed in the primary defect and oriented appropriately. Estimated Blood Loss (Cc): minimal Complex Repair And V-Y Plasty Text: The defect edges were debeveled with a #15 scalpel blade.  The primary defect was closed partially with a complex linear closure.  Given the location of the remaining defect, shape of the defect and the proximity to free margins a V-Y plasty was deemed most appropriate for complete closure of the defect.  Using a sterile surgical marker, an appropriate advancement flap was drawn incorporating the defect and placing the expected incisions within the relaxed skin tension lines where possible.    The area thus outlined was incised deep to adipose tissue with a #15 scalpel blade.  The skin margins were undermined to an appropriate distance in all directions utilizing iris scissors. Distance Of Undermining In Cm (Required): 1.3 Retention Suture Bite Size: 3 mm Keystone Flap Text: The defect edges were debeveled with a #15 scalpel blade.  Given the location of the defect, shape of the defect a keystone flap was deemed most appropriate.  Using a sterile surgical marker, an appropriate keystone flap was drawn incorporating the defect, outlining the appropriate donor tissue and placing the expected incisions within the relaxed skin tension lines where possible. The area thus outlined was incised deep to adipose tissue with a #15 scalpel blade.  The skin margins were undermined to an appropriate distance in all directions around the primary defect and laterally outward around the flap utilizing iris scissors. Consent was obtained from the patient. The risks and benefits to therapy were discussed in detail. Specifically, the risks of infection, scarring, bleeding, prolonged wound healing, incomplete removal, allergy to anesthesia, nerve injury and recurrence were addressed. Prior to the procedure, the treatment site was clearly identified and confirmed by the patient. All components of Universal Protocol/PAUSE Rule completed. O-L Flap Text: The defect edges were debeveled with a #15 scalpel blade.  Given the location of the defect, shape of the defect and the proximity to free margins an O-L flap was deemed most appropriate.  Using a sterile surgical marker, an appropriate advancement flap was drawn incorporating the defect and placing the expected incisions within the relaxed skin tension lines where possible.    The area thus outlined was incised deep to adipose tissue with a #15 scalpel blade.  The skin margins were undermined to an appropriate distance in all directions utilizing iris scissors. Repair Performed By Another Provider Text (Leave Blank If You Do Not Want): After the tissue was excised the defect was repaired by another provider. Eye Clamp Note Details: An eye clamp was used during the procedure. Retention Suture Text: Retention sutures were placed to support the closure and prevent dehiscence. Advancement Flap (Double) Text: The defect edges were debeveled with a #15 scalpel blade.  Given the location of the defect and the proximity to free margins a double advancement flap was deemed most appropriate.  Using a sterile surgical marker, the appropriate advancement flaps were drawn incorporating the defect and placing the expected incisions within the relaxed skin tension lines where possible.    The area thus outlined was incised deep to adipose tissue with a #15 scalpel blade.  The skin margins were undermined to an appropriate distance in all directions utilizing iris scissors. Posterior Auricular Interpolation Flap Text: A decision was made to reconstruct the defect utilizing an interpolation axial flap and a staged reconstruction.  A telfa template was made of the defect.  This telfa template was then used to outline the posterior auricular interpolation flap.  The donor area for the pedicle flap was then injected with anesthesia.  The flap was excised through the skin and subcutaneous tissue down to the layer of the underlying musculature.  The pedicle flap was carefully excised within this deep plane to maintain its blood supply.  The edges of the donor site were undermined.   The donor site was closed in a primary fashion.  The pedicle was then rotated into position and sutured.  Once the tube was sutured into place, adequate blood supply was confirmed with blanching and refill.  The pedicle was then wrapped with xeroform gauze and dressed appropriately with a telfa and gauze bandage to ensure continued blood supply and protect the attached pedicle. Information: Selecting Yes will display possible errors in your note based on the variables you have selected. This validation is only offered as a suggestion for you. PLEASE NOTE THAT THE VALIDATION TEXT WILL BE REMOVED WHEN YOU FINALIZE YOUR NOTE. IF YOU WANT TO FAX A PRELIMINARY NOTE YOU WILL NEED TO TOGGLE THIS TO 'NO' IF YOU DO NOT WANT IT IN YOUR FAXED NOTE. Complex Repair And Dorsal Nasal Flap Text: The defect edges were debeveled with a #15 scalpel blade.  The primary defect was closed partially with a complex linear closure.  Given the location of the remaining defect, shape of the defect and the proximity to free margins a dorsal nasal flap was deemed most appropriate for complete closure of the defect.  Using a sterile surgical marker, an appropriate flap was drawn incorporating the defect and placing the expected incisions within the relaxed skin tension lines where possible.    The area thus outlined was incised deep to adipose tissue with a #15 scalpel blade.  The skin margins were undermined to an appropriate distance in all directions utilizing iris scissors. Advancement-Rotation Flap Text: The defect edges were debeveled with a #15 scalpel blade.  Given the location of the defect, shape of the defect and the proximity to free margins an advancement-rotation flap was deemed most appropriate.  Using a sterile surgical marker, an appropriate flap was drawn incorporating the defect and placing the expected incisions within the relaxed skin tension lines where possible. The area thus outlined was incised deep to adipose tissue with a #15 scalpel blade.  The skin margins were undermined to an appropriate distance in all directions utilizing iris scissors. Bilobed Flap Text: The defect edges were debeveled with a #15 scalpel blade.  Given the location of the defect and the proximity to free margins a bilobe flap was deemed most appropriate.  Using a sterile surgical marker, an appropriate bilobe flap drawn around the defect.    The area thus outlined was incised deep to adipose tissue with a #15 scalpel blade.  The skin margins were undermined to an appropriate distance in all directions utilizing iris scissors. Melolabial Transposition Flap Text: The defect edges were debeveled with a #15 scalpel blade.  Given the location of the defect and the proximity to free margins a melolabial flap was deemed most appropriate.  Using a sterile surgical marker, an appropriate melolabial transposition flap was drawn incorporating the defect.    The area thus outlined was incised deep to adipose tissue with a #15 scalpel blade.  The skin margins were undermined to an appropriate distance in all directions utilizing iris scissors. Perilesional Excision Additional Text (Leave Blank If You Do Not Want): The margin was drawn around the clinically apparent lesion. Incisions were then made along these lines to the appropriate tissue plane and the lesion was extirpated. Size Of Margin In Cm: 0.3 Complex Repair And Epidermal Autograft Text: The defect edges were debeveled with a #15 scalpel blade.  The primary defect was closed partially with a complex linear closure.  Given the location of the defect, shape of the defect and the proximity to free margins an epidermal autograft was deemed most appropriate to repair the remaining defect.  The graft was trimmed to fit the size of the remaining defect.  The graft was then placed in the primary defect, oriented appropriately, and sutured into place. Epidermal Autograft Text: The defect edges were debeveled with a #15 scalpel blade.  Given the location of the defect, shape of the defect and the proximity to free margins an epidermal autograft was deemed most appropriate.  Using a sterile surgical marker, the primary defect shape was transferred to the donor site. The epidermal graft was then harvested.  The skin graft was then placed in the primary defect and oriented appropriately. Purse String (Simple) Text: Given the location of the defect and the characteristics of the surrounding skin a purse string simple closure was deemed most appropriate.  Undermining was performed circumferentially around the surgical defect.  A purse string suture was then placed and tightened. Complex Repair And Xenograft Text: The defect edges were debeveled with a #15 scalpel blade.  The primary defect was closed partially with a complex linear closure.  Given the location of the defect, shape of the defect and the proximity to free margins a xenograft was deemed most appropriate to repair the remaining defect.  The graft was trimmed to fit the size of the remaining defect.  The graft was then placed in the primary defect, oriented appropriately, and sutured into place. Width Of Defect Perpendicular To Closure In Cm (Required): 1.1 O-T Advancement Flap Text: The defect edges were debeveled with a #15 scalpel blade.  Given the location of the defect, shape of the defect and the proximity to free margins an O-T advancement flap was deemed most appropriate.  Using a sterile surgical marker, an appropriate advancement flap was drawn incorporating the defect and placing the expected incisions within the relaxed skin tension lines where possible.    The area thus outlined was incised deep to adipose tissue with a #15 scalpel blade.  The skin margins were undermined to an appropriate distance in all directions utilizing iris scissors. Island Pedicle Flap Text: The defect edges were debeveled with a #15 scalpel blade.  Given the location of the defect, shape of the defect and the proximity to free margins an island pedicle advancement flap was deemed most appropriate.  Using a sterile surgical marker, an appropriate advancement flap was drawn incorporating the defect, outlining the appropriate donor tissue and placing the expected incisions within the relaxed skin tension lines where possible.    The area thus outlined was incised deep to adipose tissue with a #15 scalpel blade.  The skin margins were undermined to an appropriate distance in all directions around the primary defect and laterally outward around the island pedicle utilizing iris scissors.  There was minimal undermining beneath the pedicle flap. Lip Wedge Excision Repair Text: Given the location of the defect and the proximity to free margins a full thickness wedge repair was deemed most appropriate.  Using a sterile surgical marker, the appropriate repair was drawn incorporating the defect and placing the expected incisions perpendicular to the vermilion border.  The vermilion border was also meticulously outlined to ensure appropriate reapproximation during the repair.  The area thus outlined was incised through and through with a #15 scalpel blade.  The muscularis and dermis were reaproximated with deep sutures following hemostasis. Care was taken to realign the vermilion border before proceeding with the superficial closure.  Once the vermilion was realigned the superfical and mucosal closure was finished. Complex Repair And Melolabial Flap Text: The defect edges were debeveled with a #15 scalpel blade.  The primary defect was closed partially with a complex linear closure.  Given the location of the remaining defect, shape of the defect and the proximity to free margins a melolabial flap was deemed most appropriate for complete closure of the defect.  Using a sterile surgical marker, an appropriate advancement flap was drawn incorporating the defect and placing the expected incisions within the relaxed skin tension lines where possible.    The area thus outlined was incised deep to adipose tissue with a #15 scalpel blade.  The skin margins were undermined to an appropriate distance in all directions utilizing iris scissors. Complex Repair And Skin Substitute Graft Text: The defect edges were debeveled with a #15 scalpel blade.  The primary defect was closed partially with a complex linear closure.  Given the location of the remaining defect, shape of the defect and the proximity to free margins a skin substitute graft was deemed most appropriate to repair the remaining defect.  The graft was trimmed to fit the size of the remaining defect.  The graft was then placed in the primary defect, oriented appropriately, and sutured into place. Deep Sutures: 3-0 PGA Dressing: dry sterile dressing Complex Repair And Tissue Cultured Epidermal Autograft Text: The defect edges were debeveled with a #15 scalpel blade.  The primary defect was closed partially with a complex linear closure.  Given the location of the defect, shape of the defect and the proximity to free margins an tissue cultured epidermal autograft was deemed most appropriate to repair the remaining defect.  The graft was trimmed to fit the size of the remaining defect.  The graft was then placed in the primary defect, oriented appropriately, and sutured into place. Rotation Flap Text: The defect edges were debeveled with a #15 scalpel blade.  Given the location of the defect, shape of the defect and the proximity to free margins a rotation flap was deemed most appropriate.  Using a sterile surgical marker, an appropriate rotation flap was drawn incorporating the defect and placing the expected incisions within the relaxed skin tension lines where possible.    The area thus outlined was incised deep to adipose tissue with a #15 scalpel blade.  The skin margins were undermined to an appropriate distance in all directions utilizing iris scissors. Complex Repair And Single Advancement Flap Text: The defect edges were debeveled with a #15 scalpel blade.  The primary defect was closed partially with a complex linear closure.  Given the location of the remaining defect, shape of the defect and the proximity to free margins a single advancement flap was deemed most appropriate for complete closure of the defect.  Using a sterile surgical marker, an appropriate advancement flap was drawn incorporating the defect and placing the expected incisions within the relaxed skin tension lines where possible.    The area thus outlined was incised deep to adipose tissue with a #15 scalpel blade.  The skin margins were undermined to an appropriate distance in all directions utilizing iris scissors. X Size Of Lesion In Cm (Optional): 1 Complex Repair And M Plasty Text: The defect edges were debeveled with a #15 scalpel blade.  The primary defect was closed partially with a complex linear closure.  Given the location of the remaining defect, shape of the defect and the proximity to free margins an M plasty was deemed most appropriate for complete closure of the defect.  Using a sterile surgical marker, an appropriate advancement flap was drawn incorporating the defect and placing the expected incisions within the relaxed skin tension lines where possible.    The area thus outlined was incised deep to adipose tissue with a #15 scalpel blade.  The skin margins were undermined to an appropriate distance in all directions utilizing iris scissors. O-T Plasty Text: The defect edges were debeveled with a #15 scalpel blade.  Given the location of the defect, shape of the defect and the proximity to free margins an O-T plasty was deemed most appropriate.  Using a sterile surgical marker, an appropriate O-T plasty was drawn incorporating the defect and placing the expected incisions within the relaxed skin tension lines where possible.    The area thus outlined was incised deep to adipose tissue with a #15 scalpel blade.  The skin margins were undermined to an appropriate distance in all directions utilizing iris scissors. Detail Level: Detailed Xenograft Text: The defect edges were debeveled with a #15 scalpel blade.  Given the location of the defect, shape of the defect and the proximity to free margins a xenograft was deemed most appropriate.  The graft was then trimmed to fit the size of the defect.  The graft was then placed in the primary defect and oriented appropriately. Staged Advancement Flap Text: The defect edges were debeveled with a #15 scalpel blade.  Given the location of the defect, shape of the defect and the proximity to free margins a staged advancement flap was deemed most appropriate.  Using a sterile surgical marker, an appropriate advancement flap was drawn incorporating the defect and placing the expected incisions within the relaxed skin tension lines where possible. The area thus outlined was incised deep to adipose tissue with a #15 scalpel blade.  The skin margins were undermined to an appropriate distance in all directions utilizing iris scissors. Complex Repair And Z Plasty Text: The defect edges were debeveled with a #15 scalpel blade.  The primary defect was closed partially with a complex linear closure.  Given the location of the remaining defect, shape of the defect and the proximity to free margins a Z plasty was deemed most appropriate for complete closure of the defect.  Using a sterile surgical marker, an appropriate advancement flap was drawn incorporating the defect and placing the expected incisions within the relaxed skin tension lines where possible.    The area thus outlined was incised deep to adipose tissue with a #15 scalpel blade.  The skin margins were undermined to an appropriate distance in all directions utilizing iris scissors. Debridement Text: The wound edges were debrided prior to proceeding with the closure to facilitate wound healing. Mercedes Flap Text: The defect edges were debeveled with a #15 scalpel blade.  Given the location of the defect, shape of the defect and the proximity to free margins a Mercedes flap was deemed most appropriate.  Using a sterile surgical marker, an appropriate advancement flap was drawn incorporating the defect and placing the expected incisions within the relaxed skin tension lines where possible. The area thus outlined was incised deep to adipose tissue with a #15 scalpel blade.  The skin margins were undermined to an appropriate distance in all directions utilizing iris scissors. Surgeon (Optional): JAY JAY Home Suture Removal Text: Patient was provided a home suture removal kit and will remove their sutures at home.  If they have any questions or difficulties they will call the office. Bi-Rhombic Flap Text: The defect edges were debeveled with a #15 scalpel blade.  Given the location of the defect and the proximity to free margins a bi-rhombic flap was deemed most appropriate.  Using a sterile surgical marker, an appropriate rhombic flap was drawn incorporating the defect. The area thus outlined was incised deep to adipose tissue with a #15 scalpel blade.  The skin margins were undermined to an appropriate distance in all directions utilizing iris scissors. Complex Repair And A-T Advancement Flap Text: The defect edges were debeveled with a #15 scalpel blade.  The primary defect was closed partially with a complex linear closure.  Given the location of the remaining defect, shape of the defect and the proximity to free margins an A-T advancement flap was deemed most appropriate for complete closure of the defect.  Using a sterile surgical marker, an appropriate advancement flap was drawn incorporating the defect and placing the expected incisions within the relaxed skin tension lines where possible.    The area thus outlined was incised deep to adipose tissue with a #15 scalpel blade.  The skin margins were undermined to an appropriate distance in all directions utilizing iris scissors. Excisional Biopsy Additional Text (Leave Blank If You Do Not Want): The margin was drawn around the clinically apparent lesion. An elliptical shape was then drawn on the skin incorporating the lesion and margins.  Incisions were then made along these lines to the appropriate tissue plane and the lesion was extirpated. Elliptical Excision Additional Text (Leave Blank If You Do Not Want): The margin was drawn around the clinically apparent lesion.  An elliptical shape was then drawn on the skin incorporating the lesion and margins.  Incisions were then made along these lines to the appropriate tissue plane and the lesion was extirpated. Length To Time In Minutes Device Was In Place: 10 Interpolation Flap Text: A decision was made to reconstruct the defect utilizing an interpolation axial flap and a staged reconstruction.  A telfa template was made of the defect.  This telfa template was then used to outline the interpolation flap.  The donor area for the pedicle flap was then injected with anesthesia.  The flap was excised through the skin and subcutaneous tissue down to the layer of the underlying musculature.  The interpolation flap was carefully excised within this deep plane to maintain its blood supply.  The edges of the donor site were undermined.   The donor site was closed in a primary fashion.  The pedicle was then rotated into position and sutured.  Once the tube was sutured into place, adequate blood supply was confirmed with blanching and refill.  The pedicle was then wrapped with xeroform gauze and dressed appropriately with a telfa and gauze bandage to ensure continued blood supply and protect the attached pedicle. Size Of Lesion In Cm: 0.5 Nasalis-Muscle-Based Myocutaneous Island Pedicle Flap Text: Using a #15 blade, an incision was made around the donor flap to the level of the nasalis muscle. Wide lateral undermining was then performed in both the subcutaneous plane above the nasalis muscle, and in a submuscular plane just above periosteum. This allowed the formation of a free nasalis muscle axial pedicle (based on the angular artery) which was still attached to the actual cutaneous flap, increasing its mobility and vascular viability. Hemostasis was obtained with pinpoint electrocoagulation. The flap was mobilized into position and the pivotal anchor points positioned and stabilized with buried interrupted sutures. Subcutaneous and dermal tissues were closed in a multilayered fashion with sutures. Tissue redundancies were excised, and the epidermal edges were apposed without significant tension and sutured with sutures. Spiral Flap Text: The defect edges were debeveled with a #15 scalpel blade.  Given the location of the defect, shape of the defect and the proximity to free margins a spiral flap was deemed most appropriate.  Using a sterile surgical marker, an appropriate rotation flap was drawn incorporating the defect and placing the expected incisions within the relaxed skin tension lines where possible. The area thus outlined was incised deep to adipose tissue with a #15 scalpel blade.  The skin margins were undermined to an appropriate distance in all directions utilizing iris scissors. A-T Advancement Flap Text: The defect edges were debeveled with a #15 scalpel blade.  Given the location of the defect, shape of the defect and the proximity to free margins an A-T advancement flap was deemed most appropriate.  Using a sterile surgical marker, an appropriate advancement flap was drawn incorporating the defect and placing the expected incisions within the relaxed skin tension lines where possible.    The area thus outlined was incised deep to adipose tissue with a #15 scalpel blade.  The skin margins were undermined to an appropriate distance in all directions utilizing iris scissors. Cheek Interpolation Flap Text: A decision was made to reconstruct the defect utilizing an interpolation axial flap and a staged reconstruction.  A telfa template was made of the defect.  This telfa template was then used to outline the Cheek Interpolation flap.  The donor area for the pedicle flap was then injected with anesthesia.  The flap was excised through the skin and subcutaneous tissue down to the layer of the underlying musculature.  The interpolation flap was carefully excised within this deep plane to maintain its blood supply.  The edges of the donor site were undermined.   The donor site was closed in a primary fashion.  The pedicle was then rotated into position and sutured.  Once the tube was sutured into place, adequate blood supply was confirmed with blanching and refill.  The pedicle was then wrapped with xeroform gauze and dressed appropriately with a telfa and gauze bandage to ensure continued blood supply and protect the attached pedicle. Complex Repair And Rotation Flap Text: The defect edges were debeveled with a #15 scalpel blade.  The primary defect was closed partially with a complex linear closure.  Given the location of the remaining defect, shape of the defect and the proximity to free margins a rotation flap was deemed most appropriate for complete closure of the defect.  Using a sterile surgical marker, an appropriate advancement flap was drawn incorporating the defect and placing the expected incisions within the relaxed skin tension lines where possible.    The area thus outlined was incised deep to adipose tissue with a #15 scalpel blade.  The skin margins were undermined to an appropriate distance in all directions utilizing iris scissors. Z Plasty Text: The lesion was extirpated to the level of the fat with a #15 scalpel blade.  Given the location of the defect, shape of the defect and the proximity to free margins a Z-plasty was deemed most appropriate for repair.  Using a sterile surgical marker, the appropriate transposition arms of the Z-plasty were drawn incorporating the defect and placing the expected incisions within the relaxed skin tension lines where possible.    The area thus outlined was incised deep to adipose tissue with a #15 scalpel blade.  The skin margins were undermined to an appropriate distance in all directions utilizing iris scissors.  The opposing transposition arms were then transposed into place in opposite direction and anchored with interrupted buried subcutaneous sutures. Slit Excision Additional Text (Leave Blank If You Do Not Want): A linear line was drawn on the skin overlying the lesion. An incision was made slowly until the lesion was visualized.  Once visualized, the lesion was removed with blunt dissection. Post-Care Instructions: I reviewed with the patient in detail post-care instructions. Patient is not to engage in any heavy lifting, exercise, or swimming for the next 14 days. Should the patient develop any fevers, chills, bleeding, severe pain patient will contact the office immediately. O-Z Plasty Text: The defect edges were debeveled with a #15 scalpel blade.  Given the location of the defect, shape of the defect and the proximity to free margins an O-Z plasty (double transposition flap) was deemed most appropriate.  Using a sterile surgical marker, the appropriate transposition flaps were drawn incorporating the defect and placing the expected incisions within the relaxed skin tension lines where possible.    The area thus outlined was incised deep to adipose tissue with a #15 scalpel blade.  The skin margins were undermined to an appropriate distance in all directions utilizing iris scissors.  Hemostasis was achieved with electrocautery.  The flaps were then transposed into place, one clockwise and the other counterclockwise, and anchored with interrupted buried subcutaneous sutures. Tissue Cultured Epidermal Autograft Text: The defect edges were debeveled with a #15 scalpel blade.  Given the location of the defect, shape of the defect and the proximity to free margins a tissue cultured epidermal autograft was deemed most appropriate.  The graft was then trimmed to fit the size of the defect.  The graft was then placed in the primary defect and oriented appropriately. Advancement Flap (Single) Text: The defect edges were debeveled with a #15 scalpel blade.  Given the location of the defect and the proximity to free margins a single advancement flap was deemed most appropriate.  Using a sterile surgical marker, an appropriate advancement flap was drawn incorporating the defect and placing the expected incisions within the relaxed skin tension lines where possible.    The area thus outlined was incised deep to adipose tissue with a #15 scalpel blade.  The skin margins were undermined to an appropriate distance in all directions utilizing iris scissors.

## 2021-09-20 DIAGNOSIS — I50.22 CHRONIC SYSTOLIC (CONGESTIVE) HEART FAILURE: ICD-10-CM

## 2021-09-20 DIAGNOSIS — I11.0 HYPERTENSIVE HEART DISEASE WITH HEART FAILURE: ICD-10-CM

## 2021-09-20 DIAGNOSIS — Z86.19 PERSONAL HISTORY OF OTHER INFECTIOUS AND PARASITIC DISEASES: ICD-10-CM

## 2021-09-20 DIAGNOSIS — Z91.018 ALLERGY TO OTHER FOODS: ICD-10-CM

## 2021-09-20 DIAGNOSIS — F32.9 MAJOR DEPRESSIVE DISORDER, SINGLE EPISODE, UNSPECIFIED: ICD-10-CM

## 2021-09-20 DIAGNOSIS — Z92.3 PERSONAL HISTORY OF IRRADIATION: ICD-10-CM

## 2021-09-20 DIAGNOSIS — Z88.2 ALLERGY STATUS TO SULFONAMIDES: ICD-10-CM

## 2021-09-20 DIAGNOSIS — Z95.820 PERIPHERAL VASCULAR ANGIOPLASTY STATUS WITH IMPLANTS AND GRAFTS: ICD-10-CM

## 2021-09-20 DIAGNOSIS — I25.10 ATHEROSCLEROTIC HEART DISEASE OF NATIVE CORONARY ARTERY WITHOUT ANGINA PECTORIS: ICD-10-CM

## 2021-09-20 DIAGNOSIS — Z91.013 ALLERGY TO SEAFOOD: ICD-10-CM

## 2021-09-20 DIAGNOSIS — I70.209 UNSPECIFIED ATHEROSCLEROSIS OF NATIVE ARTERIES OF EXTREMITIES, UNSPECIFIED EXTREMITY: ICD-10-CM

## 2021-09-20 DIAGNOSIS — E78.5 HYPERLIPIDEMIA, UNSPECIFIED: ICD-10-CM

## 2021-09-20 DIAGNOSIS — Z21 ASYMPTOMATIC HUMAN IMMUNODEFICIENCY VIRUS [HIV] INFECTION STATUS: ICD-10-CM

## 2021-09-20 DIAGNOSIS — I71.4 ABDOMINAL AORTIC ANEURYSM, WITHOUT RUPTURE: ICD-10-CM

## 2021-09-20 DIAGNOSIS — Z79.899 OTHER LONG TERM (CURRENT) DRUG THERAPY: ICD-10-CM

## 2021-09-20 DIAGNOSIS — Z79.82 LONG TERM (CURRENT) USE OF ASPIRIN: ICD-10-CM

## 2021-09-20 DIAGNOSIS — R26.81 UNSTEADINESS ON FEET: ICD-10-CM

## 2021-09-20 DIAGNOSIS — F14.10 COCAINE ABUSE, UNCOMPLICATED: ICD-10-CM

## 2021-09-20 DIAGNOSIS — N17.9 ACUTE KIDNEY FAILURE, UNSPECIFIED: ICD-10-CM

## 2021-09-20 DIAGNOSIS — R31.9 HEMATURIA, UNSPECIFIED: ICD-10-CM

## 2021-09-20 DIAGNOSIS — F12.90 CANNABIS USE, UNSPECIFIED, UNCOMPLICATED: ICD-10-CM

## 2021-09-20 DIAGNOSIS — Z88.0 ALLERGY STATUS TO PENICILLIN: ICD-10-CM

## 2021-09-20 DIAGNOSIS — Z85.46 PERSONAL HISTORY OF MALIGNANT NEOPLASM OF PROSTATE: ICD-10-CM

## 2021-09-20 DIAGNOSIS — E11.51 TYPE 2 DIABETES MELLITUS WITH DIABETIC PERIPHERAL ANGIOPATHY WITHOUT GANGRENE: ICD-10-CM

## 2021-09-25 ENCOUNTER — INPATIENT (INPATIENT)
Facility: HOSPITAL | Age: 71
LOS: 1 days | Discharge: HOME | DRG: 312 | End: 2021-09-27
Attending: INTERNAL MEDICINE | Admitting: INTERNAL MEDICINE
Payer: MEDICARE

## 2021-09-25 VITALS
SYSTOLIC BLOOD PRESSURE: 168 MMHG | TEMPERATURE: 98 F | HEIGHT: 72 IN | WEIGHT: 179.9 LBS | RESPIRATION RATE: 20 BRPM | OXYGEN SATURATION: 96 % | HEART RATE: 88 BPM | DIASTOLIC BLOOD PRESSURE: 78 MMHG

## 2021-09-25 DIAGNOSIS — F32.9 MAJOR DEPRESSIVE DISORDER, SINGLE EPISODE, UNSPECIFIED: ICD-10-CM

## 2021-09-25 DIAGNOSIS — I10 ESSENTIAL (PRIMARY) HYPERTENSION: ICD-10-CM

## 2021-09-25 DIAGNOSIS — R55 SYNCOPE AND COLLAPSE: ICD-10-CM

## 2021-09-25 DIAGNOSIS — I50.22 CHRONIC SYSTOLIC (CONGESTIVE) HEART FAILURE: ICD-10-CM

## 2021-09-25 DIAGNOSIS — B19.20 UNSPECIFIED VIRAL HEPATITIS C WITHOUT HEPATIC COMA: ICD-10-CM

## 2021-09-25 DIAGNOSIS — E11.9 TYPE 2 DIABETES MELLITUS WITHOUT COMPLICATIONS: ICD-10-CM

## 2021-09-25 DIAGNOSIS — R93.2 ABNORMAL FINDINGS ON DIAGNOSTIC IMAGING OF LIVER AND BILIARY TRACT: ICD-10-CM

## 2021-09-25 DIAGNOSIS — Z95.9 PRESENCE OF CARDIAC AND VASCULAR IMPLANT AND GRAFT, UNSPECIFIED: Chronic | ICD-10-CM

## 2021-09-25 DIAGNOSIS — N18.9 CHRONIC KIDNEY DISEASE, UNSPECIFIED: ICD-10-CM

## 2021-09-25 DIAGNOSIS — I71.2 THORACIC AORTIC ANEURYSM, WITHOUT RUPTURE: ICD-10-CM

## 2021-09-25 DIAGNOSIS — K62.5 HEMORRHAGE OF ANUS AND RECTUM: ICD-10-CM

## 2021-09-25 DIAGNOSIS — E78.5 HYPERLIPIDEMIA, UNSPECIFIED: ICD-10-CM

## 2021-09-25 DIAGNOSIS — F19.10 OTHER PSYCHOACTIVE SUBSTANCE ABUSE, UNCOMPLICATED: ICD-10-CM

## 2021-09-25 DIAGNOSIS — B20 HUMAN IMMUNODEFICIENCY VIRUS [HIV] DISEASE: ICD-10-CM

## 2021-09-25 DIAGNOSIS — I25.10 ATHEROSCLEROTIC HEART DISEASE OF NATIVE CORONARY ARTERY WITHOUT ANGINA PECTORIS: ICD-10-CM

## 2021-09-25 LAB
ALBUMIN SERPL ELPH-MCNC: 4 G/DL — SIGNIFICANT CHANGE UP (ref 3.3–5)
ALP SERPL-CCNC: 63 U/L — SIGNIFICANT CHANGE UP (ref 40–120)
ALT FLD-CCNC: 22 U/L — SIGNIFICANT CHANGE UP (ref 10–45)
AMPHET UR-MCNC: NEGATIVE — SIGNIFICANT CHANGE UP
ANION GAP SERPL CALC-SCNC: 10 MMOL/L — SIGNIFICANT CHANGE UP (ref 5–17)
APTT BLD: 31.8 SEC — SIGNIFICANT CHANGE UP (ref 27.5–35.5)
AST SERPL-CCNC: 30 U/L — SIGNIFICANT CHANGE UP (ref 10–40)
BARBITURATES UR SCN-MCNC: NEGATIVE — SIGNIFICANT CHANGE UP
BASOPHILS # BLD AUTO: 0.01 K/UL — SIGNIFICANT CHANGE UP (ref 0–0.2)
BASOPHILS NFR BLD AUTO: 0.2 % — SIGNIFICANT CHANGE UP (ref 0–2)
BENZODIAZ UR-MCNC: NEGATIVE — SIGNIFICANT CHANGE UP
BILIRUB SERPL-MCNC: 0.3 MG/DL — SIGNIFICANT CHANGE UP (ref 0.2–1.2)
BLD GP AB SCN SERPL QL: NEGATIVE — SIGNIFICANT CHANGE UP
BUN SERPL-MCNC: 19 MG/DL — SIGNIFICANT CHANGE UP (ref 7–23)
CALCIUM SERPL-MCNC: 9.4 MG/DL — SIGNIFICANT CHANGE UP (ref 8.4–10.5)
CHLORIDE SERPL-SCNC: 104 MMOL/L — SIGNIFICANT CHANGE UP (ref 96–108)
CK MB CFR SERPL CALC: 7.6 NG/ML — HIGH (ref 0–6.7)
CK SERPL-CCNC: 210 U/L — HIGH (ref 30–200)
CO2 SERPL-SCNC: 26 MMOL/L — SIGNIFICANT CHANGE UP (ref 22–31)
COCAINE METAB.OTHER UR-MCNC: NEGATIVE — SIGNIFICANT CHANGE UP
CREAT SERPL-MCNC: 1.47 MG/DL — HIGH (ref 0.5–1.3)
EOSINOPHIL # BLD AUTO: 0.05 K/UL — SIGNIFICANT CHANGE UP (ref 0–0.5)
EOSINOPHIL NFR BLD AUTO: 1.2 % — SIGNIFICANT CHANGE UP (ref 0–6)
GLUCOSE SERPL-MCNC: 154 MG/DL — HIGH (ref 70–99)
HCT VFR BLD CALC: 37.1 % — LOW (ref 39–50)
HGB BLD-MCNC: 12.7 G/DL — LOW (ref 13–17)
IMM GRANULOCYTES NFR BLD AUTO: 0.2 % — SIGNIFICANT CHANGE UP (ref 0–1.5)
INR BLD: 0.95 — SIGNIFICANT CHANGE UP (ref 0.88–1.16)
LYMPHOCYTES # BLD AUTO: 0.73 K/UL — LOW (ref 1–3.3)
LYMPHOCYTES # BLD AUTO: 18.2 % — SIGNIFICANT CHANGE UP (ref 13–44)
MCHC RBC-ENTMCNC: 30.9 PG — SIGNIFICANT CHANGE UP (ref 27–34)
MCHC RBC-ENTMCNC: 34.2 GM/DL — SIGNIFICANT CHANGE UP (ref 32–36)
MCV RBC AUTO: 90.3 FL — SIGNIFICANT CHANGE UP (ref 80–100)
METHADONE UR-MCNC: NEGATIVE — SIGNIFICANT CHANGE UP
MONOCYTES # BLD AUTO: 0.25 K/UL — SIGNIFICANT CHANGE UP (ref 0–0.9)
MONOCYTES NFR BLD AUTO: 6.2 % — SIGNIFICANT CHANGE UP (ref 2–14)
NEUTROPHILS # BLD AUTO: 2.97 K/UL — SIGNIFICANT CHANGE UP (ref 1.8–7.4)
NEUTROPHILS NFR BLD AUTO: 74 % — SIGNIFICANT CHANGE UP (ref 43–77)
NRBC # BLD: 0 /100 WBCS — SIGNIFICANT CHANGE UP (ref 0–0)
OB PNL STL: NEGATIVE — SIGNIFICANT CHANGE UP
OPIATES UR-MCNC: NEGATIVE — SIGNIFICANT CHANGE UP
PCP SPEC-MCNC: SIGNIFICANT CHANGE UP
PCP UR-MCNC: NEGATIVE — SIGNIFICANT CHANGE UP
PLATELET # BLD AUTO: 127 K/UL — LOW (ref 150–400)
POTASSIUM SERPL-MCNC: 3.7 MMOL/L — SIGNIFICANT CHANGE UP (ref 3.5–5.3)
POTASSIUM SERPL-SCNC: 3.7 MMOL/L — SIGNIFICANT CHANGE UP (ref 3.5–5.3)
PROT SERPL-MCNC: 7.3 G/DL — SIGNIFICANT CHANGE UP (ref 6–8.3)
PROTHROM AB SERPL-ACNC: 11.4 SEC — SIGNIFICANT CHANGE UP (ref 10.6–13.6)
RBC # BLD: 4.11 M/UL — LOW (ref 4.2–5.8)
RBC # FLD: 12.9 % — SIGNIFICANT CHANGE UP (ref 10.3–14.5)
RH IG SCN BLD-IMP: POSITIVE — SIGNIFICANT CHANGE UP
SARS-COV-2 RNA SPEC QL NAA+PROBE: NEGATIVE — SIGNIFICANT CHANGE UP
SODIUM SERPL-SCNC: 140 MMOL/L — SIGNIFICANT CHANGE UP (ref 135–145)
THC UR QL: NEGATIVE — SIGNIFICANT CHANGE UP
TROPONIN T SERPL-MCNC: 0.01 NG/ML — SIGNIFICANT CHANGE UP (ref 0–0.01)
TROPONIN T SERPL-MCNC: 0.01 NG/ML — SIGNIFICANT CHANGE UP (ref 0–0.01)
WBC # BLD: 4.02 K/UL — SIGNIFICANT CHANGE UP (ref 3.8–10.5)
WBC # FLD AUTO: 4.02 K/UL — SIGNIFICANT CHANGE UP (ref 3.8–10.5)

## 2021-09-25 PROCEDURE — 71275 CT ANGIOGRAPHY CHEST: CPT | Mod: 26,MA

## 2021-09-25 PROCEDURE — 70450 CT HEAD/BRAIN W/O DYE: CPT | Mod: 26,MA

## 2021-09-25 PROCEDURE — 93010 ELECTROCARDIOGRAM REPORT: CPT | Mod: 76

## 2021-09-25 PROCEDURE — 74174 CTA ABD&PLVS W/CONTRAST: CPT | Mod: 26,MA

## 2021-09-25 PROCEDURE — 71045 X-RAY EXAM CHEST 1 VIEW: CPT | Mod: 26

## 2021-09-25 PROCEDURE — 99291 CRITICAL CARE FIRST HOUR: CPT

## 2021-09-25 RX ORDER — INFLUENZA VIRUS VACCINE 15; 15; 15; 15 UG/.5ML; UG/.5ML; UG/.5ML; UG/.5ML
0.7 SUSPENSION INTRAMUSCULAR ONCE
Refills: 0 | Status: DISCONTINUED | OUTPATIENT
Start: 2021-09-25 | End: 2021-09-27

## 2021-09-25 RX ORDER — DEXTROSE 50 % IN WATER 50 %
25 SYRINGE (ML) INTRAVENOUS ONCE
Refills: 0 | Status: DISCONTINUED | OUTPATIENT
Start: 2021-09-25 | End: 2021-09-27

## 2021-09-25 RX ORDER — AMLODIPINE BESYLATE 2.5 MG/1
1 TABLET ORAL
Qty: 0 | Refills: 0 | DISCHARGE

## 2021-09-25 RX ORDER — TRAMADOL HYDROCHLORIDE 50 MG/1
50 TABLET ORAL EVERY 6 HOURS
Refills: 0 | Status: DISCONTINUED | OUTPATIENT
Start: 2021-09-25 | End: 2021-09-27

## 2021-09-25 RX ORDER — DOLUTEGRAVIR SODIUM 25 MG/1
50 TABLET, FILM COATED ORAL DAILY
Refills: 0 | Status: DISCONTINUED | OUTPATIENT
Start: 2021-09-25 | End: 2021-09-27

## 2021-09-25 RX ORDER — SODIUM CHLORIDE 9 MG/ML
1000 INJECTION, SOLUTION INTRAVENOUS
Refills: 0 | Status: DISCONTINUED | OUTPATIENT
Start: 2021-09-25 | End: 2021-09-27

## 2021-09-25 RX ORDER — GLUCAGON INJECTION, SOLUTION 0.5 MG/.1ML
1 INJECTION, SOLUTION SUBCUTANEOUS ONCE
Refills: 0 | Status: DISCONTINUED | OUTPATIENT
Start: 2021-09-25 | End: 2021-09-27

## 2021-09-25 RX ORDER — ARIPIPRAZOLE 15 MG/1
2 TABLET ORAL DAILY
Refills: 0 | Status: DISCONTINUED | OUTPATIENT
Start: 2021-09-25 | End: 2021-09-27

## 2021-09-25 RX ORDER — DEXTROSE 50 % IN WATER 50 %
12.5 SYRINGE (ML) INTRAVENOUS ONCE
Refills: 0 | Status: DISCONTINUED | OUTPATIENT
Start: 2021-09-25 | End: 2021-09-27

## 2021-09-25 RX ORDER — HEPARIN SODIUM 5000 [USP'U]/ML
5000 INJECTION INTRAVENOUS; SUBCUTANEOUS EVERY 8 HOURS
Refills: 0 | Status: DISCONTINUED | OUTPATIENT
Start: 2021-09-25 | End: 2021-09-27

## 2021-09-25 RX ORDER — HYDRALAZINE HCL 50 MG
10 TABLET ORAL ONCE
Refills: 0 | Status: COMPLETED | OUTPATIENT
Start: 2021-09-25 | End: 2021-09-25

## 2021-09-25 RX ORDER — INFLUENZA VIRUS VACCINE 15; 15; 15; 15 UG/.5ML; UG/.5ML; UG/.5ML; UG/.5ML
0.5 SUSPENSION INTRAMUSCULAR ONCE
Refills: 0 | Status: DISCONTINUED | OUTPATIENT
Start: 2021-09-25 | End: 2021-09-25

## 2021-09-25 RX ORDER — DEXTROSE 50 % IN WATER 50 %
15 SYRINGE (ML) INTRAVENOUS ONCE
Refills: 0 | Status: DISCONTINUED | OUTPATIENT
Start: 2021-09-25 | End: 2021-09-27

## 2021-09-25 RX ORDER — SODIUM CHLORIDE 9 MG/ML
1000 INJECTION INTRAMUSCULAR; INTRAVENOUS; SUBCUTANEOUS ONCE
Refills: 0 | Status: COMPLETED | OUTPATIENT
Start: 2021-09-25 | End: 2021-09-25

## 2021-09-25 RX ORDER — ISOSORBIDE MONONITRATE 60 MG/1
30 TABLET, EXTENDED RELEASE ORAL ONCE
Refills: 0 | Status: COMPLETED | OUTPATIENT
Start: 2021-09-25 | End: 2021-09-25

## 2021-09-25 RX ORDER — ESCITALOPRAM OXALATE 10 MG/1
5 TABLET, FILM COATED ORAL DAILY
Refills: 0 | Status: DISCONTINUED | OUTPATIENT
Start: 2021-09-25 | End: 2021-09-27

## 2021-09-25 RX ORDER — ASPIRIN/CALCIUM CARB/MAGNESIUM 324 MG
81 TABLET ORAL DAILY
Refills: 0 | Status: DISCONTINUED | OUTPATIENT
Start: 2021-09-25 | End: 2021-09-27

## 2021-09-25 RX ORDER — AMLODIPINE BESYLATE 2.5 MG/1
2.5 TABLET ORAL DAILY
Refills: 0 | Status: DISCONTINUED | OUTPATIENT
Start: 2021-09-25 | End: 2021-09-27

## 2021-09-25 RX ORDER — EMTRICITABINE AND TENOFOVIR DISOPROXIL FUMARATE 200; 300 MG/1; MG/1
1 TABLET, FILM COATED ORAL DAILY
Refills: 0 | Status: DISCONTINUED | OUTPATIENT
Start: 2021-09-25 | End: 2021-09-27

## 2021-09-25 RX ORDER — ISOSORBIDE MONONITRATE 60 MG/1
30 TABLET, EXTENDED RELEASE ORAL DAILY
Refills: 0 | Status: DISCONTINUED | OUTPATIENT
Start: 2021-09-25 | End: 2021-09-27

## 2021-09-25 RX ORDER — ESCITALOPRAM OXALATE 10 MG/1
0 TABLET, FILM COATED ORAL
Qty: 0 | Refills: 0 | DISCHARGE

## 2021-09-25 RX ORDER — INSULIN LISPRO 100/ML
VIAL (ML) SUBCUTANEOUS
Refills: 0 | Status: DISCONTINUED | OUTPATIENT
Start: 2021-09-25 | End: 2021-09-27

## 2021-09-25 RX ORDER — AMLODIPINE BESYLATE 2.5 MG/1
2.5 TABLET ORAL ONCE
Refills: 0 | Status: COMPLETED | OUTPATIENT
Start: 2021-09-25 | End: 2021-09-25

## 2021-09-25 RX ORDER — METOPROLOL TARTRATE 50 MG
25 TABLET ORAL DAILY
Refills: 0 | Status: DISCONTINUED | OUTPATIENT
Start: 2021-09-25 | End: 2021-09-26

## 2021-09-25 RX ORDER — ASPIRIN/CALCIUM CARB/MAGNESIUM 324 MG
325 TABLET ORAL ONCE
Refills: 0 | Status: COMPLETED | OUTPATIENT
Start: 2021-09-25 | End: 2021-09-25

## 2021-09-25 RX ORDER — ISOSORBIDE MONONITRATE 60 MG/1
1 TABLET, EXTENDED RELEASE ORAL
Qty: 0 | Refills: 0 | DISCHARGE

## 2021-09-25 RX ADMIN — SODIUM CHLORIDE 1000 MILLILITER(S): 9 INJECTION INTRAMUSCULAR; INTRAVENOUS; SUBCUTANEOUS at 16:56

## 2021-09-25 RX ADMIN — ISOSORBIDE MONONITRATE 30 MILLIGRAM(S): 60 TABLET, EXTENDED RELEASE ORAL at 16:12

## 2021-09-25 RX ADMIN — AMLODIPINE BESYLATE 2.5 MILLIGRAM(S): 2.5 TABLET ORAL at 16:12

## 2021-09-25 RX ADMIN — Medication 325 MILLIGRAM(S): at 15:47

## 2021-09-25 RX ADMIN — Medication 10 MILLIGRAM(S): at 15:46

## 2021-09-25 NOTE — H&P ADULT - PROBLEM SELECTOR PLAN 8
Incidental finding on CTA Abd/pelvis: Abnormal appearance of the gallbladder, acute or chronic cholecystitis in the differential. Sonographic evaluation is considered. Mild pancreatic and biliary ductal dilatation, correlate with LFTs and if indicated with hepatobiliary/pancreas MRCP/MRI.  --afebrile without leukocytosis, LFTs within normal limits, will obtain RUQ US. Incidental finding on CTA Abd/pelvis: Abnormal appearance of the gallbladder, acute or chronic cholecystitis in the differential. Sonographic evaluation is considered. Mild pancreatic and biliary ductal dilatation, correlate with LFTs and if indicated with hepatobiliary/pancreas MRCP/MRI.  --afebrile without leukocytosis, LFTs within normal limits, tender to palpitation of RUQ.  --will keep NPO after midnight and obtain RUQ US.

## 2021-09-25 NOTE — ED ADULT TRIAGE NOTE - OTHER COMPLAINTS
biba from street, pt reports syncopal episode this morning while on toilet, pt reports some bright red blood in the stool, c/o mild abd pain.  denies sob, cp.

## 2021-09-25 NOTE — ED ADULT NURSE NOTE - OBJECTIVE STATEMENT
Pt presents to ED c/o syncope. States "its a full moon and the full moon never treats me well. I peed, pooped, threw up and passed out all at once". BIBEMS. 12 lead EKG done. Noted to have EKG changes, upgraded to  Director, at bedside. Continuous cardiac/pulse oximetry monitoring initiated. IV access obtained. Labs drawn, sent to lab. A&Ox4, speaking in clear full sentences.

## 2021-09-25 NOTE — H&P ADULT - MS EXT PE MLT D E PC
Anesthetic History     PONV          Review of Systems / Medical History  Patient summary reviewed and pertinent labs reviewed    Pulmonary        Sleep apnea: CPAP           Neuro/Psych   Within defined limits           Cardiovascular                  Exercise tolerance: >4 METS  Comments: Denies CP, SOB or changes in functional status  Nml stress in 2015   GI/Hepatic/Renal         Renal disease: stones       Endo/Other        Morbid obesity     Other Findings            Physical Exam    Airway  Mallampati: III  TM Distance: 4 - 6 cm  Neck ROM: normal range of motion   Mouth opening: Normal     Cardiovascular    Rhythm: regular  Rate: normal         Dental    Dentition: Caps/crowns and Bridges     Pulmonary  Breath sounds clear to auscultation               Abdominal  GI exam deferred       Other Findings            Anesthetic Plan    ASA: 2  Anesthesia type: general          Induction: Intravenous and RSI  Anesthetic plan and risks discussed with: Patient
normal/no pedal edema

## 2021-09-25 NOTE — H&P ADULT - PROBLEM SELECTOR PLAN 2
clinically euvolemic, will continue home medications: Enalapril 2.5mg PO daily, Imdur ER 30mg PO daily, strict I/Os and daily weights.    ***Recent Echo 7/28/21 revealed moderate LVH, moderate to severe LV systolic dysfunction, EF:35-40%. Hypokinesis of the mid anterolateral, basal anterolateral, basal anterior, mid anterior, basal anteroseptum and mid anteroseptum segments. Trace MR/TR. Aortic root is dilated measuring 3.90 cm. The proximal ascending aorta is dilated measuring 4.70 cm.

## 2021-09-25 NOTE — ED PROVIDER NOTE - PHYSICAL EXAMINATION
VITAL SIGNS: I have reviewed nursing notes and confirm.  CONSTITUTIONAL: Well-developed; well-nourished; in no acute distress.   SKIN:  warm and dry, no acute rash.   HEAD:  normocephalic, atraumatic.  EYES: PERRL, EOM intact; conjunctiva and sclera clear.  ENT: No nasal discharge; airway clear.   NECK: Supple; non tender.  CARD: S1, S2 normal; no murmurs, gallops, or rubs. Regular rate and rhythm.   RESP:  Clear to auscultation b/l, no wheezes, rales or rhonchi.  ABD: Normal bowel sounds; soft; non-distended; mild epigastric and luq ttp, cindy brown stool w/o brbpr  EXT: Normal ROM. No clubbing, cyanosis or edema. 2+ pulses to b/l ue/le. no ttp bilat le  NEURO: Alert, oriented, grossly unremarkable  PSYCH: Cooperative, mood and affect appropriate.

## 2021-09-25 NOTE — H&P ADULT - PROBLEM SELECTOR PLAN 11
-s/p Harvoni treatment ~4 yrs ago per pt, continue to monitor platelets. -s/p Harvoni treatment ~4 yrs ago per pt, continue to monitor platelets.    ***Depression--patient admits to feeling depressed/extremely lonely and is requesting to speak to psychiatrist this admission. Pt denies SI or HI ideations. Will continue Lexapro 5mg PO daily and consult psych in AM.

## 2021-09-25 NOTE — ED PROVIDER NOTE - CLINICAL SUMMARY MEDICAL DECISION MAKING FREE TEXT BOX
Pt c/o syncope after urination/defecation/emesis.  Pt also notes ongoing cp x months w/o sig change today but feels similar to cp prior to his mi.  Pt's ekg w st elevation v1-3 but similar to prior - ? acs vs chronic ekg changes from prior cardiac event.   BP high ? htn urgency.  No ha or neuro sx to suggest neuro related syncope.   Plan labs, cardiac monitor, stat card consult.  Pt also c/o brbpr w/o blood on cindy, no tachycardia or hypotension to suggest severe gi bleed. + h/o lgi but unsure why.  Plan labs, monitor for repeat episodes. Pt c/o syncope after urination/defecation/emesis.  Pt also notes ongoing cp x months w/o sig change today but feels similar to cp prior to his mi.  Pt's ekg w st elevation v1-3 but similar to prior - ? acs vs chronic ekg changes from prior cardiac event.   BP high ? htn urgency.  No ha or neuro sx to suggest neuro related syncope.  HPI suggestive of vagal.  Plan labs, cardiac monitor, stat card consult.  Pt also c/o brbpr w/o blood on cindy, no tachycardia or hypotension to suggest severe gi bleed. + h/o lgi but unsure why.  Plan labs, monitor for repeat episodes.

## 2021-09-25 NOTE — ED PROVIDER NOTE - PROGRESS NOTE DETAILS
Eval by cardiology - they are not concerned about stemi and feel ekg is unchanged; pt does not need stemi code.  Will cont to monitor, await troponin. Cardiology consulted [id number] for ekg w st elevation, syncope, h/o cad w mult risks but ekg similar to prior ekg w/o change on repeat so stemi code held until card eval.    On review of chart, pt w admit 7/21:   Cardiac cath 7/28/21: mLAD 30%, LCx mild atherosclerosis, RCA  mild atheroscloerosis.  CTA showed increased aortic aneurysmal dilation 4.3 -> 4.7, eval by CT surg w plan for surveillance ct's.  Cardiology in ed evaluating pt now.  Repeat ekg unchanged from initial. Trop neg.  Ct head neg.  BP improved after meds.  Hgb nl, guaiac neg. Cr elevated but had prior elevations. CTA chest/abd prelim read mp dissection, aortic aneurysm 4.7 unchanged from prior 7/21 cta, no pe, utox neg.  Cardiology paged to discuss results and dispo for syncopal episode. Trop neg.  Ct head neg.  BP improved after meds.  Hgb nl, guaiac neg. Cr elevated but had prior elevations. CTA chest/abd prelim read mp dissection, aortic aneurysm 4.7 unchanged from prior 7/21 cta, no pe, utox neg.  Cardiology paged to discuss results and dispo for syncopal episode. Pt signed out to Dr Ramirez pending final reads and cardiology eval/dispo. CT final w/o pe, dissection, + abnl gb findings but clinically afebrile, nl wbc, no c/o abd pain or ttp on exam, nl lft's so no current concern for cholecystitis.  Pt discussed w card fellow - will admit for cardiac monitoring and serial enzymes.

## 2021-09-25 NOTE — H&P ADULT - NSHPSOCIALHISTORY_GEN_ALL_CORE
Illicit drugs: admits to marijuana and Cocaine abuse   ETOH/Tobacco: denies Illicit drugs: admits to marijuana 1 week ago and Cocaine 3 days ago  ETOH: 3-4 iona's weekly  Tobacco: denies

## 2021-09-25 NOTE — H&P ADULT - PROBLEM SELECTOR PLAN 3
chest pain free, EKG revealed NSR@86BPM with nonspecific ST changes in V1-V3 (unchanged from prior EKG). Cardiac enzymes negative x 1 set. Will F/U CE@10PM.  --s/p recent diagnostic cath@[facility] 7/28/21 which revealed normal LM, 30% mLAD lesion, luminal irregularities of LCx/RCA.  --continue ASA/BB/Statin.

## 2021-09-25 NOTE — ED PROVIDER NOTE - NSFOLLOWUPINSTRUCTIONS_ED_ALL_ED_FT
Syncope  Rectal bleeding  Hypertension    Your blood pressure reading was high today; take your medication EVERY day.  Follow up with your pmd and cardiologist.    Return for chest pain, difficulty breathing, palpitations, severe headache, change in behavior, change in vision/speech/gait, numbness or weakness in extremities, vomiting, any other concerns - these could be due to poorly controlled, very high blood pressure.     You did not have evidence of blood in your stool today; return for increased bleeding, abdominal pain, fever, any other concerns.  Follow up with your gastroenterologist.    Syncope    Syncope is when you temporarily lose consciousness, also called fainting or passing out. It is caused by a sudden decrease in blood flow to the brain. Even though most causes of syncope are not dangerous, syncope can possibly be a sign of a serious medical problem. Signs that you may be about to faint include feeling dizzy, lightheaded, nausea, visual changes, or cold/clammy skin. Do not drive, operate heavy machinery, or play sports until your health care provider says it is okay.    SEEK IMMEDIATE MEDICAL CARE IF YOU HAVE ANY OF THE FOLLOWING SYMPTOMS: severe headache, pain in your chest/abdomen/back, bleeding from your mouth or rectum, palpitations, shortness of breath, pain with breathing, seizure, confusion, or trouble walking.    Hypertension    Hypertension, commonly called high blood pressure, is when the force of blood pumping through your arteries is too strong. Hypertension forces your heart to work harder to pump blood. Your arteries may become narrow or stiff. Having untreated or uncontrolled hypertension for a long period of time can cause heart attack, stroke, kidney disease, and other problems. If started on a medication, take exactly as prescribed by your health care professional. Maintain a healthy lifestyle and follow up with your primary care physician.    SEEK IMMEDIATE MEDICAL CARE IF YOU HAVE ANY OF THE FOLLOWING SYMPTOMS: severe headache, confusion, chest pain, abdominal pain, vomiting, or shortness of breath.      Rectal Bleeding    WHAT YOU NEED TO KNOW:    Rectal bleeding can be caused by constipation, hemorrhoids, or anal fissures. It may also be caused by polyps, tumors, or medical conditions, such as colitis or diverticulitis.    DISCHARGE INSTRUCTIONS:    Medicines:   •Pain medicine: You may be given medicine to take away or decrease pain. Do not wait until the pain is severe before you take your medicine.      •Iron supplement: Iron helps your body make more red blood cells.       •Steroids: This medicine decreases inflammation in your rectum. It may be applied as a cream, ointment, or lotion.      •Take your medicine as directed. Contact your healthcare provider if you think your medicine is not helping or if you have side effects. Tell him of her if you are allergic to any medicine. Keep a list of the medicines, vitamins, and herbs you take. Include the amounts, and when and why you take them. Bring the list or the pill bottles to follow-up visits. Carry your medicine list with you in case of an emergency.      Follow up with your doctor as directed: Write down your questions so you remember to ask them during your visits.     Drink liquids as directed: Ask your healthcare provider how much liquid to drink each day and which liquids are best for you. This will help prevent dehydration and constipation.    Contact your healthcare provider if:   •You have a fever.      •Your rectal bleeding stopped for a time, but has started again.       •You have nausea.      •You have cold, sweaty, pale skin.      •You have changes in your bowel movements, such as diarrhea.      •You have questions or concerns about your condition or care.      Return to the emergency department if:   •You are breathing faster than usual.      •You are dizzy, lightheaded, or feel faint.      •You are confused or cannot think clearly.      •You urinate less than usual or not at all.      •Your rectal bleeding is constant or heavy.      •You have severe abdominal pain or cramping.

## 2021-09-25 NOTE — ED ADULT NURSE NOTE - TEMPLATE LIST FOR HEAD TO TOE ASSESSMENT
How Severe Is Your Skin Lesion?: moderate
Have Your Skin Lesions Been Treated?: not been treated
Is This A New Presentation, Or A Follow-Up?: Skin Lesions
VIEW ALL

## 2021-09-25 NOTE — H&P ADULT - PROBLEM SELECTOR PLAN 7
patient reports an episode of BRBPR during BM this morning.  --hemodynamically stable, H/H 12.7/37.1 (unchanged from baseline).  --Fecal occult negative.  --will continue to monitor closely and maintain active type and screen. patient reports an episode of BRBPR during BM this morning. Admits to hx of hemorrhoids, most recent C-scope 3 yrs ago was normal.  --hemodynamically stable, H/H 12.7/37.1 (unchanged from baseline).  --Fecal occult negative.  --will continue to monitor closely and maintain active type and screen.

## 2021-09-25 NOTE — H&P ADULT - PROBLEM SELECTOR PLAN 5
BP 160s-170s/80s upon arrival to ED, received  Hydralazine 10mg IV x 1 dose, Amlodipine 2.5mg PO x 1 dose and Imdur ER 30mg PO x 1 dose.  --will continue HOME medications: Metoprolol Succinate 25mg PO daily, Lisinopril 2.5mg PO daily and Norvasc 2.5mg PO daily.

## 2021-09-25 NOTE — PATIENT PROFILE ADULT - THREATENED BY WHOM
Neighbor: Pt has filed a complaint with housing unit/  (Radha Duran) pt is scheduled to move in 2 weeks per report

## 2021-09-25 NOTE — ED PROVIDER NOTE - OBJECTIVE STATEMENT
70 yo male former [employer], current frequent Cocaine Abuser, frequent Marijuana user, Shellfish allergy (tolerated CCTA w/ contrast without allergic reaction), Known CAD with LM, LAD and OM1 Dz on prior CCTA), newly diagnosed systolic CHF (EF 36% ECHO 7/28/21), known PAD with B/L LE and RUE stent former, Occasional ETOH user, PMHx of HTN, DM, HIV (unknown CD4, VL undetectable), Hep C (treated w/ Harvoni 3-4 years ago), Prostate CA s/p radiation therapy, and Depression, last admitted in July 28, 2021 with a complain of near syncope episode w neg cath, cta w aortic aneurysm w slightly increased size being managed medically c/o syncope.  Pt states he started to feel overwhelmed in his small apt, went the bathroom to escape, urinated, defecated and vomited x 1 and then passed out.  Pt noted blood in bowl.  + h/o rectal bleeding but unsure of etiology.  Reports nl colonoscopy ~ 3 yr ago.  Pt takes asa, plavix.  Pt denies other bleeding.  Pt notes sscp w/o radiation and associated w some mild sob x months, no sig change today.  Pain similar to pain w his mi.  No le swelling.  No cough/uri sx.  No abd pain, rectal pain, diarrhea.  No ha, change in vision/speech/gait, numbness or weakness in ext.  Pt is a vague historian.  Last cocaine last wk, no recent etoh, no other drugs.  Pt reports not taking his bp meds x several days. 70 yo male former [employer], current frequent Cocaine Abuser, frequent Marijuana user, Shellfish allergy (tolerated CCTA w/ contrast without allergic reaction), Known CAD with LM, LAD and OM1 Dz on prior CCTA), newly diagnosed systolic CHF (EF 36% ECHO 7/28/21), known PAD with B/L LE and RUE stent former, Occasional ETOH user, PMHx of HTN, DM, HIV (unknown CD4, VL undetectable), Hep C (treated w/ Harvoni 3-4 years ago), Prostate CA s/p radiation therapy, and Depression, last admitted in July 28, 2021 with a complain of near syncope episode w neg cath, cta w aortic aneurysm w slightly increased size being managed medically c/o syncope.  Pt states he started to feel overwhelmed in his small apt when he woke at 6a, went the bathroom to escape, urinated, defecated and vomited x 1 and then passed out.  Pt noted blood in bowl.  + h/o rectal bleeding but unsure of etiology.  Reports nl colonoscopy ~ 3 yr ago.  Pt takes asa, plavix.  Pt denies other bleeding.  Pt notes sscp w/o radiation and associated w some mild sob x months, no sig change today.  Pain similar to pain w his mi.  No le swelling.  No cough/uri sx.  No abd pain, rectal pain, diarrhea.  No ha, change in vision/speech/gait, numbness or weakness in ext.  Pt is a vague historian.  Last cocaine last wk, no recent etoh, no other drugs.  Pt reports not taking his bp meds x several days.

## 2021-09-25 NOTE — H&P ADULT - HISTORY OF PRESENT ILLNESS
71 yr old M, former [employer], current cocaine/marijuanna/ETOH abuse, Shellfish allergy (tolerated CCTA w/ contrast without allergic reaction), PMHx of HTN, hyperlipidemia, DM, Stage III CKD (baseline Cr 1.3-1.5 noted on prior admissions), HIV, Hep C treated with Harvoni ~4 yrs ago, prostate CA s/p radiation, depression, nonobstructive CAD (by dx cath@[facility] 7/28/21 which revealed normal LM, 30% mLAD lesion, luminal irregularities of LCx/RCA),  HFrEF (EF:36% by Echo 7/28/21), known ascending aortic aneurysm PAD with  bilateral LE peripheral angioplasty, who presents to [facility] ED 9/25/21 s/p syncopal episode. Patient reports symptom onset was this morning while he was sitting on the toilet, experiencing mild abdominal pain while having a BM, noted bright red blood in the stool and shortly afterwards passed out. Patient denies any preceding N/V, diaphoresis, palpitations, dizziness, PND, orthopnea, LE edema, recent travel or sick contacts.     In ED, BP: 168/78, HR: 80s, RR:20, Temp: 98.2F, O2 sat: 96% RA. EKG revealed NSR@86BPM with nonspecific ST changes in V1-V3 (unchanged from prior EKG). CXR unremarkable. CT head without acute findings.    Labs notable for: Cardiac enzymes negative x 1 set, H/H 12.7/37.1, Platelets 127, K 3.7, BUN/Cr 19/1.47, Lipase and LFTs within normal limits. Stool occult negative. Utox unremarkable.      CT Angio Chest/Abd/Pelvis negative for PE/dissection, stable 4.7cm ascending aortic aneurysm. Abnormal appearance of the gallbladder, acute or chronic cholecystitis in the differential. Sonographic evaluation is considered. Mild pancreatic and biliary ductal dilatation, correlate with LFTs and if indicated with hepatobiliary/pancreas MRCP/MRI.    Patient treated with 1 liter IVF bolus, ASA 325mg PO x 1 dose, Hydralazine 10mg IV x 1 dose, Amlodipine 2.5mg PO x 1 dose and Imdur ER 30mg PO x 1 dose.    Patient currently stable, admitted to cardiac telemetry to R/O cardiac origin of syncope.      71 yr old M, former [employer], current cocaine/marijuanna/ETOH abuse, PMHx of HTN, hyperlipidemia, DM, Stage III CKD (baseline Cr 1.3-1.5 noted on prior admissions), HIV, Hep C treated with Harvoni ~4 yrs ago, prostate CA s/p radiation, depression, nonobstructive CAD (by dx cath@[facility] 7/28/21 which revealed normal LM, 30% mLAD lesion, luminal irregularities of LCx/RCA),  HFrEF (EF:36% by Echo 7/28/21), known ascending aortic aneurysm, PAD, recent admission to [facility] 9/7/21 with abnormal gait being worked up by neuro for CVA (CT head negative, recommended for MRI brain but left AMA) who now returns to [facility] ED 9/25/21 s/p syncopal episode. Patient reports symptom onset was this morning while he was sitting on the toilet, experiencing mild abdominal pain while having a BM, noted bright red blood in the stool and shortly afterwards passed out. Patient denies any preceding N/V, diaphoresis, palpitations, dizziness, PND, orthopnea, LE edema, recent travel or sick contacts.     In ED, BP: 168/78, HR: 80s, RR:20, Temp: 98.2F, O2 sat: 96% RA. EKG revealed NSR@86BPM with nonspecific ST changes in V1-V3 (unchanged from prior EKG). CXR unremarkable. CT head without acute findings.    Labs notable for: Cardiac enzymes negative x 1 set, H/H 12.7/37.1, Platelets 127, K 3.7, BUN/Cr 19/1.47, Lipase and LFTs within normal limits. Stool occult negative. Utox unremarkable.      CT Angio Chest/Abd/Pelvis negative for PE/dissection, stable 4.7cm ascending aortic aneurysm. Abnormal appearance of the gallbladder, acute or chronic cholecystitis in the differential. Sonographic evaluation is considered. Mild pancreatic and biliary ductal dilatation, correlate with LFTs and if indicated with hepatobiliary/pancreas MRCP/MRI.    Patient treated with 1 liter IVF bolus, ASA 325mg PO x 1 dose, Hydralazine 10mg IV x 1 dose, Amlodipine 2.5mg PO x 1 dose and Imdur ER 30mg PO x 1 dose.    Patient currently stable, admitted to cardiac telemetry to R/O cardiac origin of syncope.      71 yr old M, former [place], [facility] re[facility] resident, current cocaine/marijuanna/ETOH abuse, PMHx of HTN, hyperlipidemia, DM, Stage III CKD (baseline Cr 1.3-1.5 noted on prior admissions), HIV, Hep C treated with Harvoni ~4 yrs ago, prostate CA s/p radiation, depression, nonobstructive CAD (by dx cath@[facility] 7/28/21 which revealed normal LM, 30% mLAD lesion, luminal irregularities of LCx/RCA),  HFrEF (EF:36% by Echo 7/28/21), known ascending aortic aneurysm, PAD, recent admission to [facility] 9/7/21 with abnormal gait being worked up by neuro for CVA (CT head negative, recommended for MRI brain but left AMA) who now returns to [facility] ED 9/25/21 s/p syncopal episode. Patient reports symptom onset was this morning while he was sitting on the toilet, he had sudden onset nausea followed by abdominal discomfort and vomiting after having a BM. Shortly afterwards patient passed out, when he stood up he noticed bright red blood covering stool. Patient denies any preceding N/V, diaphoresis, palpitations, dizziness, PND, orthopnea, LE edema, recent travel or sick contacts.     In ED, BP: 168/78, HR: 80s, RR:20, Temp: 98.2F, O2 sat: 96% RA. EKG revealed NSR@86BPM with nonspecific ST changes in V1-V3 (unchanged from prior EKG). CXR unremarkable. CT head without acute findings. Labs notable for: Cardiac enzymes negative x 1 set, H/H 12.7/37.1, Platelets 127, K 3.7, BUN/Cr 19/1.47, Lipase and LFTs within normal limits. Stool occult negative. Utox unremarkable.      CT Angio Chest/Abd/Pelvis negative for PE/dissection, stable 4.7cm ascending aortic aneurysm. Abnormal appearance of the gallbladder, acute or chronic cholecystitis in the differential. Sonographic evaluation is considered. Mild pancreatic and biliary ductal dilatation, correlate with LFTs and if indicated with hepatobiliary/pancreas MRCP/MRI.    Patient treated with 1 liter IVF bolus, ASA 325mg PO x 1 dose, Hydralazine 10mg IV x 1 dose, Amlodipine 2.5mg PO x 1 dose and Imdur ER 30mg PO x 1 dose.    Patient currently stable, admitted to cardiac telemetry to R/O cardiac origin of syncope.

## 2021-09-25 NOTE — PATIENT PROFILE ADULT - NSPROHMSYMPCOND_GEN_A_NUR
prostate cancer, pt is seeing a psychiatrist (was on psyche unit in [facility] for 4 days due to depression)./behavioral health/cancer/sleep

## 2021-09-25 NOTE — ED PROVIDER NOTE - CARE PLAN
Principal Discharge DX:	Syncope   1 Principal Discharge DX:	Syncope  Secondary Diagnosis:	Rectal bleeding  Secondary Diagnosis:	HTN (hypertension)

## 2021-09-25 NOTE — ED PROVIDER NOTE - INPATIENT RESIDENT/ACP NOTIFIED DATE
Quality 130: Documentation Of Current Medications In The Medical Record: Current Medications Documented Quality 226: Preventive Care And Screening: Tobacco Use: Screening And Cessation Intervention: Patient screened for tobacco use and is an ex/non-smoker Detail Level: Detailed Quality 431: Preventive Care And Screening: Unhealthy Alcohol Use - Screening: Patient screened for unhealthy alcohol use using a single question and scores less than 2 times per year Quality 111:Pneumonia Vaccination Status For Older Adults: Pneumococcal Vaccination Previously Received 25-Sep-2021 18:35

## 2021-09-25 NOTE — H&P ADULT - PROBLEM SELECTOR PLAN 12
continue FS's and ICS PRN    N: DASH with consistent carb, 1 liter fluid restriction  E: Maintain K>4.0 and Mg >2.0  VTE: Heparin subcut  Code: Full continue FS's and ICS PRN    N: NPO while awaiting Abdominal US  E: Maintain K>4.0 and Mg >2.0  VTE: Heparin subcut  Code: Full

## 2021-09-25 NOTE — H&P ADULT - PROBLEM SELECTOR PLAN 1
Quality 130: Documentation Of Current Medications In The Medical Record: Current Medications Documented currently asymptomatic, VSS, EKG revealed NSR@86BPM with nonspecific ST changes in V1-V3 (unchanged from prior EKG). CT head without acute findings. Likely vasovagal in setting of BM.  --CT Angio Chest/Abd/Pelvis negative for PE/dissection, stable 4.7cm ascending aortic aneurysm.   --Cardiac enzymes negative x 1 set, will F/U CE@10PM.  --will obtain orthostatics.  --continue cardiac telemetry.

## 2021-09-25 NOTE — PATIENT PROFILE ADULT - VISION (WITH CORRECTIVE LENSES IF THE PATIENT USUALLY WEARS THEM):
for reading only/Partially impaired: cannot see medication labels or newsprint, but can see obstacles in path, and the surrounding layout; can count fingers at arm's length

## 2021-09-25 NOTE — H&P ADULT - PROBLEM SELECTOR PLAN 9
BUN/Cr 19/1.47, baseline Cr 1.3-1.5 from prior admissions.  --will obtain UA and urine electrolytes.  --avoid nephrotoxic agents.

## 2021-09-25 NOTE — H&P ADULT - ASSESSMENT
71 yr old M, known cocaine/marijuanna/ETOH abuse, PMHx of HTN, hyperlipidemia, DM, Stage III CKD, HIV, Hep C, prostate CA s/p radiation, depression, nonobstructive CAD, HFrEF (EF:36%), known ascending aortic aneurysm, PAD who presents to [facility] ED 9/25/21 s/p syncopal episode in setting of abdominal discomfort/BM, admitted to cardiac telemetry to R/O cardiac origin of syncope.

## 2021-09-26 DIAGNOSIS — F14.10 COCAINE ABUSE, UNCOMPLICATED: ICD-10-CM

## 2021-09-26 LAB
A1C WITH ESTIMATED AVERAGE GLUCOSE RESULT: 6.4 % — HIGH (ref 4–5.6)
ANION GAP SERPL CALC-SCNC: 11 MMOL/L — SIGNIFICANT CHANGE UP (ref 5–17)
BUN SERPL-MCNC: 24 MG/DL — HIGH (ref 7–23)
CALCIUM SERPL-MCNC: 8.9 MG/DL — SIGNIFICANT CHANGE UP (ref 8.4–10.5)
CHLORIDE SERPL-SCNC: 107 MMOL/L — SIGNIFICANT CHANGE UP (ref 96–108)
CHOLEST SERPL-MCNC: 119 MG/DL — SIGNIFICANT CHANGE UP
CK MB CFR SERPL CALC: 6.7 NG/ML — SIGNIFICANT CHANGE UP (ref 0–6.7)
CK SERPL-CCNC: 187 U/L — SIGNIFICANT CHANGE UP (ref 30–200)
CO2 SERPL-SCNC: 24 MMOL/L — SIGNIFICANT CHANGE UP (ref 22–31)
COVID-19 SPIKE DOMAIN AB INTERP: POSITIVE
COVID-19 SPIKE DOMAIN ANTIBODY RESULT: >250 U/ML — HIGH
CREAT SERPL-MCNC: 1.38 MG/DL — HIGH (ref 0.5–1.3)
ESTIMATED AVERAGE GLUCOSE: 137 MG/DL — HIGH (ref 68–114)
GLUCOSE BLDC GLUCOMTR-MCNC: 116 MG/DL — HIGH (ref 70–99)
GLUCOSE BLDC GLUCOMTR-MCNC: 122 MG/DL — HIGH (ref 70–99)
GLUCOSE BLDC GLUCOMTR-MCNC: 122 MG/DL — HIGH (ref 70–99)
GLUCOSE BLDC GLUCOMTR-MCNC: 98 MG/DL — SIGNIFICANT CHANGE UP (ref 70–99)
GLUCOSE SERPL-MCNC: 126 MG/DL — HIGH (ref 70–99)
HCT VFR BLD CALC: 35.1 % — LOW (ref 39–50)
HDLC SERPL-MCNC: 43 MG/DL — SIGNIFICANT CHANGE UP
HGB BLD-MCNC: 11.8 G/DL — LOW (ref 13–17)
LIPID PNL WITH DIRECT LDL SERPL: 60 MG/DL — SIGNIFICANT CHANGE UP
MAGNESIUM SERPL-MCNC: 1.6 MG/DL — SIGNIFICANT CHANGE UP (ref 1.6–2.6)
MCHC RBC-ENTMCNC: 30.7 PG — SIGNIFICANT CHANGE UP (ref 27–34)
MCHC RBC-ENTMCNC: 33.6 GM/DL — SIGNIFICANT CHANGE UP (ref 32–36)
MCV RBC AUTO: 91.4 FL — SIGNIFICANT CHANGE UP (ref 80–100)
NON HDL CHOLESTEROL: 76 MG/DL — SIGNIFICANT CHANGE UP
NRBC # BLD: 0 /100 WBCS — SIGNIFICANT CHANGE UP (ref 0–0)
PLATELET # BLD AUTO: 120 K/UL — LOW (ref 150–400)
POTASSIUM SERPL-MCNC: 3.6 MMOL/L — SIGNIFICANT CHANGE UP (ref 3.5–5.3)
POTASSIUM SERPL-SCNC: 3.6 MMOL/L — SIGNIFICANT CHANGE UP (ref 3.5–5.3)
RBC # BLD: 3.84 M/UL — LOW (ref 4.2–5.8)
RBC # FLD: 13.2 % — SIGNIFICANT CHANGE UP (ref 10.3–14.5)
SARS-COV-2 IGG+IGM SERPL QL IA: >250 U/ML — HIGH
SARS-COV-2 IGG+IGM SERPL QL IA: POSITIVE
SODIUM SERPL-SCNC: 142 MMOL/L — SIGNIFICANT CHANGE UP (ref 135–145)
TRIGL SERPL-MCNC: 79 MG/DL — SIGNIFICANT CHANGE UP
TROPONIN T SERPL-MCNC: 0.01 NG/ML — SIGNIFICANT CHANGE UP (ref 0–0.01)
WBC # BLD: 4.25 K/UL — SIGNIFICANT CHANGE UP (ref 3.8–10.5)
WBC # FLD AUTO: 4.25 K/UL — SIGNIFICANT CHANGE UP (ref 3.8–10.5)

## 2021-09-26 PROCEDURE — 76700 US EXAM ABDOM COMPLETE: CPT | Mod: 26

## 2021-09-26 PROCEDURE — 99221 1ST HOSP IP/OBS SF/LOW 40: CPT

## 2021-09-26 PROCEDURE — 99222 1ST HOSP IP/OBS MODERATE 55: CPT

## 2021-09-26 RX ORDER — ATORVASTATIN CALCIUM 80 MG/1
20 TABLET, FILM COATED ORAL AT BEDTIME
Refills: 0 | Status: DISCONTINUED | OUTPATIENT
Start: 2021-09-26 | End: 2021-09-27

## 2021-09-26 RX ORDER — POTASSIUM CHLORIDE 20 MEQ
20 PACKET (EA) ORAL ONCE
Refills: 0 | Status: COMPLETED | OUTPATIENT
Start: 2021-09-26 | End: 2021-09-26

## 2021-09-26 RX ORDER — MAGNESIUM OXIDE 400 MG ORAL TABLET 241.3 MG
800 TABLET ORAL ONCE
Refills: 0 | Status: COMPLETED | OUTPATIENT
Start: 2021-09-26 | End: 2021-09-26

## 2021-09-26 RX ORDER — CARVEDILOL PHOSPHATE 80 MG/1
3.12 CAPSULE, EXTENDED RELEASE ORAL EVERY 12 HOURS
Refills: 0 | Status: DISCONTINUED | OUTPATIENT
Start: 2021-09-26 | End: 2021-09-27

## 2021-09-26 RX ADMIN — HEPARIN SODIUM 5000 UNIT(S): 5000 INJECTION INTRAVENOUS; SUBCUTANEOUS at 16:45

## 2021-09-26 RX ADMIN — ESCITALOPRAM OXALATE 5 MILLIGRAM(S): 10 TABLET, FILM COATED ORAL at 12:27

## 2021-09-26 RX ADMIN — CARVEDILOL PHOSPHATE 3.12 MILLIGRAM(S): 80 CAPSULE, EXTENDED RELEASE ORAL at 16:46

## 2021-09-26 RX ADMIN — ISOSORBIDE MONONITRATE 30 MILLIGRAM(S): 60 TABLET, EXTENDED RELEASE ORAL at 12:27

## 2021-09-26 RX ADMIN — MAGNESIUM OXIDE 400 MG ORAL TABLET 800 MILLIGRAM(S): 241.3 TABLET ORAL at 12:26

## 2021-09-26 RX ADMIN — TRAMADOL HYDROCHLORIDE 50 MILLIGRAM(S): 50 TABLET ORAL at 14:24

## 2021-09-26 RX ADMIN — HEPARIN SODIUM 5000 UNIT(S): 5000 INJECTION INTRAVENOUS; SUBCUTANEOUS at 21:04

## 2021-09-26 RX ADMIN — Medication 2.5 MILLIGRAM(S): at 05:13

## 2021-09-26 RX ADMIN — DOLUTEGRAVIR SODIUM 50 MILLIGRAM(S): 25 TABLET, FILM COATED ORAL at 12:26

## 2021-09-26 RX ADMIN — HEPARIN SODIUM 5000 UNIT(S): 5000 INJECTION INTRAVENOUS; SUBCUTANEOUS at 05:13

## 2021-09-26 RX ADMIN — ATORVASTATIN CALCIUM 20 MILLIGRAM(S): 80 TABLET, FILM COATED ORAL at 23:25

## 2021-09-26 RX ADMIN — EMTRICITABINE AND TENOFOVIR DISOPROXIL FUMARATE 1 TABLET(S): 200; 300 TABLET, FILM COATED ORAL at 12:27

## 2021-09-26 RX ADMIN — Medication 20 MILLIEQUIVALENT(S): at 12:26

## 2021-09-26 RX ADMIN — TRAMADOL HYDROCHLORIDE 50 MILLIGRAM(S): 50 TABLET ORAL at 15:24

## 2021-09-26 RX ADMIN — ARIPIPRAZOLE 2 MILLIGRAM(S): 15 TABLET ORAL at 12:26

## 2021-09-26 RX ADMIN — Medication 25 MILLIGRAM(S): at 05:12

## 2021-09-26 RX ADMIN — Medication 81 MILLIGRAM(S): at 12:27

## 2021-09-26 RX ADMIN — Medication 30 MILLILITER(S): at 05:12

## 2021-09-26 RX ADMIN — AMLODIPINE BESYLATE 2.5 MILLIGRAM(S): 2.5 TABLET ORAL at 05:12

## 2021-09-26 NOTE — PROGRESS NOTE ADULT - PROBLEM SELECTOR PLAN 1
currently asymptomatic, VSS, EKG revealed NSR@86BPM with nonspecific ST changes in V1-V3 (unchanged from prior EKG). CT head without acute findings. Likely vasovagal in setting of BM.  --CT Angio Chest/Abd/Pelvis negative for PE/dissection, stable 4.7cm ascending aortic aneurysm.   --CE neg X 3  --orthostatics negative  --continue cardiac telemetry.  - o/n tele 9/25: 12 beats NSVT; will start BB currently asymptomatic, VSS, EKG revealed NSR@86BPM with nonspecific ST changes in V1-V3 (unchanged from prior EKG). CT head without acute findings. Likely vasovagal in setting of BM.  --CT Angio Chest/Abd/Pelvis negative for PE/dissection, stable 4.7cm ascending aortic aneurysm.   --CE neg X 3  --orthostatics negative  --continue cardiac telemetry.  - o/n tele 9/25: 12 beats NSVT; home BB toprol 25 mg daily changed to coreg 3.125 mg BID 2/2  cocaine user

## 2021-09-26 NOTE — BH CONSULTATION LIAISON ASSESSMENT NOTE - NSSUICPROTFACT_PSY_ALL_CORE
Responsibility to children, family, or others/Identifies reasons for living/Supportive social network of family or friends/Fear of death or the actual act of killing self/Cultural, spiritual and/or moral attitudes against suicide/Positive therapeutic relationships/Ability to cope with stress/Frustration tolerance

## 2021-09-26 NOTE — PROGRESS NOTE ADULT - ASSESSMENT
71 yr old M, known cocaine/marijuanna/ETOH abuse, PMHx of HTN, hyperlipidemia, DM, Stage III CKD, HIV, Hep C, prostate CA s/p radiation, depression, nonobstructive CAD, HFrEF (EF:36%), known ascending aortic aneurysm, PAD who presents to [facility] ED 9/25/21 s/p syncopal episode in setting of abdominal discomfort/BM, admitted to cardiac telemetry to R/O cardiac origin of syncope.

## 2021-09-26 NOTE — BH CONSULTATION LIAISON ASSESSMENT NOTE - OTHER PAST PSYCHIATRIC HISTORY (INCLUDE DETAILS REGARDING ONSET, COURSE OF ILLNESS, INPATIENT/OUTPATIENT TREATMENT)
One prior hospx at [facility] x 5 days for depressed mood and passive SI with no plan/intent. Reported resolutio of depression on the current regimen and with sobriety.  Has a therapist, Kyaleigh Chery, at [facility] for positive change and is also looking for a more permanent psychiatrist (currently is dispensed the meds through the [facility]).

## 2021-09-26 NOTE — BH CONSULTATION LIAISON ASSESSMENT NOTE - SUMMARY
71 yr old M, non combat [employer] [place], [facility] resident, PPH depression with one prior hospx at [facility], no SAs, current some days cocaine/marijuanna/ETOH user, PMHx of HTN, hyperlipidemia, DM, Stage III CKD (baseline Cr 1.3-1.5 noted on prior admissions), HIV, Hep C treated with Harvoni ~4 yrs ago, prostate CA s/p radiation, depression, nonobstructive CAD (by dx cath@[facility] 7/28/21 which revealed normal LM, 30% mLAD lesion, luminal irregularities of LCx/RCA),  HFrEF (EF:36% by Echo 7/28/21), known ascending aortic aneurysm, PAD, recent admission to [facility] 9/7/21 with abnormal gait being worked up by neuro for CVA (CT head negative, recommended for MRI brain but left AMA) who now returns to [facility] ED 9/25/21 s/p syncopal episode. Psychiatry consulted per pt request, pt reported sx of depressed mood but no SI/HI/psychosis. On evaluation, pt well related and free from any sx of mood disorder or psychosis, adamantly denies SI/HI. Appears to have requested the consult for supportive therapy as his friend is currently traveling and pt felt lonely.    Plan:  cleared psychiatrically  please clarify the medication doses with his clinic ([facility])  Psychiatry signing off  Please call with qns

## 2021-09-26 NOTE — BH CONSULTATION LIAISON ASSESSMENT NOTE - HPI (INCLUDE ILLNESS QUALITY, SEVERITY, DURATION, TIMING, CONTEXT, MODIFYING FACTORS, ASSOCIATED SIGNS AND SYMPTOMS)
71 yr old M, non combat [employer] [place], [facility] resident, PPH depression with one prior hospx at [facility], no SAs, current some days cocaine/marijuanna/ETOH user, PMHx of HTN, hyperlipidemia, DM, Stage III CKD (baseline Cr 1.3-1.5 noted on prior admissions), HIV, Hep C treated with Harvoni ~4 yrs ago, prostate CA s/p radiation, depression, nonobstructive CAD (by dx cath@[facility] 7/28/21 which revealed normal LM, 30% mLAD lesion, luminal irregularities of LCx/RCA),  HFrEF (EF:36% by Echo 7/28/21), known ascending aortic aneurysm, PAD, recent admission to [facility] 9/7/21 with abnormal gait being worked up by neuro for CVA (CT head negative, recommended for MRI brain but left AMA) who now returns to [facility] ED 9/25/21 s/p syncopal episode. Psychiatry consulted per pt request, pt reported sx of depressed mood but no SI/HI/psychosis.    On evaluation, pt found in NAD in his bed, was somewhat hard of hearing. Remained cooperative, charming, pleasant, responding with "yes, colt" and "thank you, colt" to many of the questions. 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, PPH depression with one prior hospx at [facility], no SAs, current some days cocaine/marijuanna/ETOH user, PMHx of HTN, hyperlipidemia, DM, Stage III CKD (baseline Cr 1.3-1.5 noted on prior admissions), HIV, Hep C treated with Harvoni ~4 yrs ago, prostate CA s/p radiation, depression, nonobstructive CAD (by dx cath@[facility] 7/28/21 which revealed normal LM, 30% mLAD lesion, luminal irregularities of LCx/RCA),  HFrEF (EF:36% by Echo 7/28/21), known ascending aortic aneurysm, PAD, recent admission to [facility] 9/7/21 with abnormal gait being worked up by neuro for CVA (CT head negative, recommended for MRI brain but left AMA) who now returns to [facility] ED 9/25/21 s/p syncopal episode. Psychiatry consulted per pt request, pt reported sx of depressed mood but no SI/HI/psychosis.    On evaluation, pt found in NAD in his bed, was somewhat hard of hearing. Remained cooperative, charming, pleasant, responding with "yes, colt" and "thank you, colt" to many of the questions. Reported feeling lonely and stressed out about the state of his health over the past several years as well as the number of visits to the ED (on chart review, pt with various medical complaints and presented to this ED on the following dates: 7/18, 7/28, 8/13, 8/20, 9/7, 9/25). Did not have any acute psychiatric concerns or complaints to offer and reported being well connected to the current program and shared various successes in being able to drastically cut down on the substance use. On clarification of why the pt wanted to see psychiatry, he stated that "I wanted to talk to someone professional because my friend is in [place] and I can't call him but now I am fine". No SI/HI/AVH reported.   Reports numerous social supports and protective factors and overall is in good spirits despite the limitations posed by his physical health issues.

## 2021-09-26 NOTE — PROGRESS NOTE ADULT - PROBLEM SELECTOR PLAN 7
patient reports an episode of BRBPR during BM this morning. Admits to hx of hemorrhoids, most recent C-scope 3 yrs ago was normal.  --hemodynamically stable, H/H 12.7/37.1 (unchanged from baseline).  --Fecal occult negative.  --will continue to monitor closely and maintain active type and screen. (last sent 9/25)
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
## 2021-09-26 NOTE — PROGRESS NOTE ADULT - PROBLEM SELECTOR PROBLEM 10
[facility]  options .  CD Recommendations: No indications of CD issues.  LUX Mendez, [facility]           Treatment Plan    Client's Name: Sultana Bradshaw  YOB: 1979 April 26, 2021    [facility]. Patient has assisted in the development of these goals and has agreed to this treatment plan, as shown in session documentation. We will formally review these goals more formally at our next scheduled treatment plan review    Marco Vines, [facility]  July 11, 2019   HIV disease

## 2021-09-26 NOTE — PROGRESS NOTE ADULT - PROBLEM SELECTOR PLAN 11
-s/p Harvoni treatment ~4 yrs ago per pt, continue to monitor platelets.    ***Depression--patient admits to feeling depressed/extremely lonely and is requesting to speak to psychiatrist this admission. Pt denies SI or HI ideations. Will continue Lexapro 5mg PO daily and consult psych in AM. -s/p Harvoni treatment ~4 yrs ago per pt, continue to monitor platelets.    ***Depression--patient admits to feeling depressed/extremely lonely and is requesting to speak to psychiatrist this admission. Pt denies SI or HI ideations. Will continue Lexapro 5mg PO daily  - psych consulted; f/u recs

## 2021-09-26 NOTE — PROGRESS NOTE ADULT - PROBLEM SELECTOR PLAN 2
clinically euvolemic, will continue home medications: Enalapril 2.5mg PO daily, Imdur ER 30mg PO daily, strict I/Os and daily weights.    ***Recent Echo 7/28/21 revealed moderate LVH, moderate to severe LV systolic dysfunction, EF:35-40%. Hypokinesis of the mid anterolateral, basal anterolateral, basal anterior, mid anterior, basal anteroseptum and mid anteroseptum segments. Trace MR/TR. Aortic root is dilated measuring 3.90 cm. The proximal ascending aorta is dilated measuring 4.70 cm. clinically euvolemic, will continue home medications: Enalapril 2.5mg PO daily, Imdur ER 30mg PO daily, Coreg 3.125 mg BID, strict I/Os and daily weights.    ***Recent Echo 7/28/21 revealed moderate LVH, moderate to severe LV systolic dysfunction, EF:35-40%. Hypokinesis of the mid anterolateral, basal anterolateral, basal anterior, mid anterior, basal anteroseptum and mid anteroseptum segments. Trace MR/TR. Aortic root is dilated measuring 3.90 cm. The proximal ascending aorta is dilated measuring 4.70 cm.

## 2021-09-26 NOTE — BH CONSULTATION LIAISON ASSESSMENT NOTE - RISK ASSESSMENT
Low risk and pt appears to be in good spirits, denies sx of depression, has social supports and is engaged with the mental health clinic, and is hopeful about the upcoming change in his living situation.  Risks factors: chronic health problems, h/o depression, psychosocial stressors, intermittent substance use

## 2021-09-26 NOTE — BH CONSULTATION LIAISON ASSESSMENT NOTE - NSBHCHARTREVIEWVS_PSY_A_CORE FT
Vital Signs Last 24 Hrs  T(C): 36.2 (26 Sep 2021 09:46), Max: 36.9 (26 Sep 2021 06:12)  T(F): 97.2 (26 Sep 2021 09:46), Max: 98.5 (26 Sep 2021 06:12)  HR: 66 (26 Sep 2021 08:27) (62 - 88)  BP: 148/81 (26 Sep 2021 08:27) (131/75 - 175/104)  BP(mean): 105 (26 Sep 2021 08:27) (93 - 105)  RR: 18 (26 Sep 2021 08:27) (18 - 20)  SpO2: 96% (26 Sep 2021 08:27) (96% - 98%)

## 2021-09-26 NOTE — PROGRESS NOTE ADULT - PROBLEM SELECTOR PLAN 8
Incidental finding on CTA Abd/pelvis: Abnormal appearance of the gallbladder, acute or chronic cholecystitis in the differential. Sonographic evaluation is considered. Mild pancreatic and biliary ductal dilatation, correlate with LFTs and if indicated with hepatobiliary/pancreas MRCP/MRI.  --afebrile without leukocytosis, LFTs within normal limits, tender to palpitation of RUQ.  --RUQ US ordered. f/u

## 2021-09-26 NOTE — BH CONSULTATION LIAISON ASSESSMENT NOTE - ADDITIONAL PSYCHIATRIC MEDICATIONS
Reported receiving his psych medications through [facility] for positive change and is given a short supply for several days at a time.  States he is on lexapro 5 mg daily and Abilify low dose (is not sure)

## 2021-09-26 NOTE — BH CONSULTATION LIAISON ASSESSMENT NOTE - CURRENT MEDICATION
MEDICATIONS  (STANDING):  amLODIPine   Tablet 2.5 milliGRAM(s) Oral daily  ARIPiprazole 2 milliGRAM(s) Oral daily  aspirin enteric coated 81 milliGRAM(s) Oral daily  carvedilol 3.125 milliGRAM(s) Oral every 12 hours  dextrose 40% Gel 15 Gram(s) Oral once  dextrose 5%. 1000 milliLiter(s) (50 mL/Hr) IV Continuous <Continuous>  dextrose 5%. 1000 milliLiter(s) (100 mL/Hr) IV Continuous <Continuous>  dextrose 50% Injectable 25 Gram(s) IV Push once  dextrose 50% Injectable 12.5 Gram(s) IV Push once  dextrose 50% Injectable 25 Gram(s) IV Push once  dolutegravir 50 milliGRAM(s) Oral daily  emtricitabine 200 mG/tenofovir alafenamide 25 mG (DESCOVY) Tablet 1 Tablet(s) Oral daily  enalapril 2.5 milliGRAM(s) Oral daily  escitalopram 5 milliGRAM(s) Oral daily  glucagon  Injectable 1 milliGRAM(s) IntraMuscular once  heparin   Injectable 5000 Unit(s) SubCutaneous every 8 hours  influenza  Vaccine (HIGH DOSE) 0.7 milliLiter(s) IntraMuscular once  insulin lispro (ADMELOG) corrective regimen sliding scale   SubCutaneous Before meals and at bedtime  isosorbide   mononitrate ER Tablet (IMDUR) 30 milliGRAM(s) Oral daily    MEDICATIONS  (PRN):  aluminum hydroxide/magnesium hydroxide/simethicone Suspension 30 milliLiter(s) Oral every 4 hours PRN Dyspepsia  traMADol 50 milliGRAM(s) Oral every 6 hours PRN Moderate Pain (4 - 6)

## 2021-09-26 NOTE — BH CONSULTATION LIAISON ASSESSMENT NOTE - NSICDXBHSECONDARYDX_PSY_ALL_CORE
Syncope   R55  Chronic systolic congestive heart failure   I50.22  Hypertension   I10  Ascending aortic aneurysm   I71.2  Hyperlipidemia   E78.5  Abnormal CT scan, gallbladder   R93.2  HIV disease   B20  Hepatitis C   B19.20  Chronic kidney disease (CKD)   N18.9  Diabetes   E11.9  Nonobstructive atherosclerosis of coronary artery   I25.10  Drug abuse   F19.10  Bright red blood per rectum   K62.5  Depression   F32.9  Mild cocaine use disorder   F14.10

## 2021-09-26 NOTE — PROGRESS NOTE ADULT - PROBLEM SELECTOR PLAN 5
BP 160s-170s/80s upon arrival to ED, received  Hydralazine 10mg IV x 1 dose, Amlodipine 2.5mg PO x 1 dose and Imdur ER 30mg PO x 1 dose.  --will continue HOME medications: Metoprolol Succinate 25mg PO daily, Lisinopril 2.5mg PO daily and Norvasc 2.5mg PO daily. BP 160s-170s/80s upon arrival to ED, received  Hydralazine 10mg IV x 1 dose, Amlodipine 2.5mg PO x 1 dose and Imdur ER 30mg PO x 1 dose.  --will continue HOME medications:, Lisinopril 2.5mg PO daily and Norvasc 2.5mg PO daily.  - Toprol 25 mg daily changed to coreg 3.125 mg BID

## 2021-09-26 NOTE — PROGRESS NOTE ADULT - SUBJECTIVE AND OBJECTIVE BOX
Interventional Cardiology PA Adult Progress Note    CC: syncope  Subjective Assessment: Pt. seen and examined at bedside today am. Pt. comfortable, denies any chest pain, SOB, dizziness, palpitation, N/V, LE edema, PND/orthopnea.     ROS neg except as per subjective HPI.   	  MEDICATIONS:  amLODIPine   Tablet 2.5 milliGRAM(s) Oral daily  enalapril 2.5 milliGRAM(s) Oral daily  isosorbide   mononitrate ER Tablet (IMDUR) 30 milliGRAM(s) Oral daily  metoprolol succinate ER 25 milliGRAM(s) Oral daily  dolutegravir 50 milliGRAM(s) Oral daily  emtricitabine 200 mG/tenofovir alafenamide 25 mG (DESCOVY) Tablet 1 Tablet(s) Oral daily  ARIPiprazole 2 milliGRAM(s) Oral daily  escitalopram 5 milliGRAM(s) Oral daily  traMADol 50 milliGRAM(s) Oral every 6 hours PRN  aluminum hydroxide/magnesium hydroxide/simethicone Suspension 30 milliLiter(s) Oral every 4 hours PRN  dextrose 40% Gel 15 Gram(s) Oral once  dextrose 50% Injectable 25 Gram(s) IV Push once  dextrose 50% Injectable 12.5 Gram(s) IV Push once  dextrose 50% Injectable 25 Gram(s) IV Push once  glucagon  Injectable 1 milliGRAM(s) IntraMuscular once  insulin lispro (ADMELOG) corrective regimen sliding scale   SubCutaneous Before meals and at bedtime    aspirin enteric coated 81 milliGRAM(s) Oral daily  dextrose 5%. 1000 milliLiter(s) IV Continuous <Continuous>  dextrose 5%. 1000 milliLiter(s) IV Continuous <Continuous>  heparin   Injectable 5000 Unit(s) SubCutaneous every 8 hours  influenza  Vaccine (HIGH DOSE) 0.7 milliLiter(s) IntraMuscular once  magnesium oxide 800 milliGRAM(s) Oral once  potassium chloride    Tablet ER 20 milliEquivalent(s) Oral once      	    [PHYSICAL EXAM:  TELEMETRY:  T(C): 36.9 (09-26-21 @ 06:12), Max: 36.9 (09-26-21 @ 06:12)  HR: 66 (09-26-21 @ 08:27) (62 - 88)  BP: 148/81 (09-26-21 @ 08:27) (131/75 - 175/104)  RR: 18 (09-26-21 @ 08:27) (18 - 20)  SpO2: 96% (09-26-21 @ 08:27) (96% - 98%)  Wt(kg): --  I&O's Summary    26 Sep 2021 07:01  -  26 Sep 2021 09:12  --------------------------------------------------------  IN: 0 mL / OUT: 200 mL / NET: -200 mL      Height (cm): 182.9 (09-25 @ 20:23)  Weight (kg): 81.6 (09-25 @ 20:23)  BMI (kg/m2): 24.4 (09-25 @ 20:23)  BSA (m2): 2.04 (09-25 @ 20:23)  Kulkarni:  Central/PICC/Mid Line:                                         Gen: NAD  neck: no JVD  CV: RRR; s1 and s2 positive, no murmurs noted  Pulm: CTA B/L; no w/r/r  GI: soft; NT/ND  extremities: no clubbing, cyanosis and edema noted b/l  Neuro: AO X 3, no focal deficits noted     	    ECG:  	  RADIOLOGY:   DIAGNOSTIC TESTING:  [ ] Echocardiogram:  [ ]  Catheterization:  [ ] Stress Test:    [ ] JAN  OTHER: 	    LABS:	 	  CARDIAC MARKERS:                                  11.8   4.25  )-----------( 120      ( 26 Sep 2021 06:42 )             35.1     09-26    142  |  107  |  24<H>  ----------------------------<  126<H>  3.6   |  24  |  1.38<H>    Ca    8.9      26 Sep 2021 07:26  Mg     1.6     09-26    TPro  7.3  /  Alb  4.0  /  TBili  0.3  /  DBili  x   /  AST  30  /  ALT  22  /  AlkPhos  63  09-25    proBNP:   Lipid Profile:   HgA1c:   TSH:   PT/INR - ( 25 Sep 2021 15:46 )   PT: 11.4 sec;   INR: 0.95          PTT - ( 25 Sep 2021 15:46 )  PTT:31.8 sec    ASSESSMENT/PLAN: 	        DVT ppx:  Dispo:

## 2021-09-27 ENCOUNTER — TRANSCRIPTION ENCOUNTER (OUTPATIENT)
Age: 71
End: 2021-09-27

## 2021-09-27 VITALS
DIASTOLIC BLOOD PRESSURE: 85 MMHG | OXYGEN SATURATION: 95 % | RESPIRATION RATE: 18 BRPM | SYSTOLIC BLOOD PRESSURE: 152 MMHG | HEART RATE: 63 BPM

## 2021-09-27 LAB
ALBUMIN SERPL ELPH-MCNC: 3.6 G/DL — SIGNIFICANT CHANGE UP (ref 3.3–5)
ALP SERPL-CCNC: 51 U/L — SIGNIFICANT CHANGE UP (ref 40–120)
ALT FLD-CCNC: 16 U/L — SIGNIFICANT CHANGE UP (ref 10–45)
ANION GAP SERPL CALC-SCNC: 6 MMOL/L — SIGNIFICANT CHANGE UP (ref 5–17)
AST SERPL-CCNC: 21 U/L — SIGNIFICANT CHANGE UP (ref 10–40)
BILIRUB SERPL-MCNC: 0.4 MG/DL — SIGNIFICANT CHANGE UP (ref 0.2–1.2)
BUN SERPL-MCNC: 30 MG/DL — HIGH (ref 7–23)
CALCIUM SERPL-MCNC: 9.4 MG/DL — SIGNIFICANT CHANGE UP (ref 8.4–10.5)
CHLORIDE SERPL-SCNC: 107 MMOL/L — SIGNIFICANT CHANGE UP (ref 96–108)
CO2 SERPL-SCNC: 27 MMOL/L — SIGNIFICANT CHANGE UP (ref 22–31)
CREAT SERPL-MCNC: 1.43 MG/DL — HIGH (ref 0.5–1.3)
GLUCOSE BLDC GLUCOMTR-MCNC: 145 MG/DL — HIGH (ref 70–99)
GLUCOSE SERPL-MCNC: 136 MG/DL — HIGH (ref 70–99)
HCT VFR BLD CALC: 36 % — LOW (ref 39–50)
HGB BLD-MCNC: 12.2 G/DL — LOW (ref 13–17)
INR BLD: 0.96 — SIGNIFICANT CHANGE UP (ref 0.88–1.16)
MAGNESIUM SERPL-MCNC: 1.8 MG/DL — SIGNIFICANT CHANGE UP (ref 1.6–2.6)
MCHC RBC-ENTMCNC: 31.2 PG — SIGNIFICANT CHANGE UP (ref 27–34)
MCHC RBC-ENTMCNC: 33.9 GM/DL — SIGNIFICANT CHANGE UP (ref 32–36)
MCV RBC AUTO: 92.1 FL — SIGNIFICANT CHANGE UP (ref 80–100)
MELD SCORE WITH DIALYSIS: 20 POINTS — SIGNIFICANT CHANGE UP
MELD SCORE WITHOUT DIALYSIS: 10 POINTS — SIGNIFICANT CHANGE UP
NRBC # BLD: 0 /100 WBCS — SIGNIFICANT CHANGE UP (ref 0–0)
PLATELET # BLD AUTO: 115 K/UL — LOW (ref 150–400)
POTASSIUM SERPL-MCNC: 4 MMOL/L — SIGNIFICANT CHANGE UP (ref 3.5–5.3)
POTASSIUM SERPL-SCNC: 4 MMOL/L — SIGNIFICANT CHANGE UP (ref 3.5–5.3)
PROT SERPL-MCNC: 6.7 G/DL — SIGNIFICANT CHANGE UP (ref 6–8.3)
PROTHROM AB SERPL-ACNC: 11.5 SEC — SIGNIFICANT CHANGE UP (ref 10.6–13.6)
RBC # BLD: 3.91 M/UL — LOW (ref 4.2–5.8)
RBC # FLD: 13.3 % — SIGNIFICANT CHANGE UP (ref 10.3–14.5)
SODIUM SERPL-SCNC: 140 MMOL/L — SIGNIFICANT CHANGE UP (ref 135–145)
WBC # BLD: 2.86 K/UL — LOW (ref 3.8–10.5)
WBC # FLD AUTO: 2.86 K/UL — LOW (ref 3.8–10.5)

## 2021-09-27 PROCEDURE — 82272 OCCULT BLD FECES 1-3 TESTS: CPT

## 2021-09-27 PROCEDURE — 99291 CRITICAL CARE FIRST HOUR: CPT | Mod: 25

## 2021-09-27 PROCEDURE — 74174 CTA ABD&PLVS W/CONTRAST: CPT | Mod: MA

## 2021-09-27 PROCEDURE — 93005 ELECTROCARDIOGRAM TRACING: CPT

## 2021-09-27 PROCEDURE — 71275 CT ANGIOGRAPHY CHEST: CPT | Mod: MA

## 2021-09-27 PROCEDURE — 83735 ASSAY OF MAGNESIUM: CPT

## 2021-09-27 PROCEDURE — 76700 US EXAM ABDOM COMPLETE: CPT

## 2021-09-27 PROCEDURE — 87635 SARS-COV-2 COVID-19 AMP PRB: CPT

## 2021-09-27 PROCEDURE — 93306 TTE W/DOPPLER COMPLETE: CPT

## 2021-09-27 PROCEDURE — G0378: CPT

## 2021-09-27 PROCEDURE — 99239 HOSP IP/OBS DSCHRG MGMT >30: CPT

## 2021-09-27 PROCEDURE — 80053 COMPREHEN METABOLIC PANEL: CPT

## 2021-09-27 PROCEDURE — 83690 ASSAY OF LIPASE: CPT

## 2021-09-27 PROCEDURE — 86901 BLOOD TYPING SEROLOGIC RH(D): CPT

## 2021-09-27 PROCEDURE — 96374 THER/PROPH/DIAG INJ IV PUSH: CPT

## 2021-09-27 PROCEDURE — 85025 COMPLETE CBC W/AUTO DIFF WBC: CPT

## 2021-09-27 PROCEDURE — 80048 BASIC METABOLIC PNL TOTAL CA: CPT

## 2021-09-27 PROCEDURE — 82553 CREATINE MB FRACTION: CPT

## 2021-09-27 PROCEDURE — 80061 LIPID PANEL: CPT

## 2021-09-27 PROCEDURE — 80307 DRUG TEST PRSMV CHEM ANLYZR: CPT

## 2021-09-27 PROCEDURE — 36415 COLL VENOUS BLD VENIPUNCTURE: CPT

## 2021-09-27 PROCEDURE — 85027 COMPLETE CBC AUTOMATED: CPT

## 2021-09-27 PROCEDURE — 85730 THROMBOPLASTIN TIME PARTIAL: CPT

## 2021-09-27 PROCEDURE — 70450 CT HEAD/BRAIN W/O DYE: CPT | Mod: MA

## 2021-09-27 PROCEDURE — 86769 SARS-COV-2 COVID-19 ANTIBODY: CPT

## 2021-09-27 PROCEDURE — 84484 ASSAY OF TROPONIN QUANT: CPT

## 2021-09-27 PROCEDURE — 82962 GLUCOSE BLOOD TEST: CPT

## 2021-09-27 PROCEDURE — 86850 RBC ANTIBODY SCREEN: CPT

## 2021-09-27 PROCEDURE — 93306 TTE W/DOPPLER COMPLETE: CPT | Mod: 26

## 2021-09-27 PROCEDURE — 85610 PROTHROMBIN TIME: CPT

## 2021-09-27 PROCEDURE — 82550 ASSAY OF CK (CPK): CPT

## 2021-09-27 PROCEDURE — 83036 HEMOGLOBIN GLYCOSYLATED A1C: CPT

## 2021-09-27 PROCEDURE — 71045 X-RAY EXAM CHEST 1 VIEW: CPT

## 2021-09-27 PROCEDURE — 86900 BLOOD TYPING SEROLOGIC ABO: CPT

## 2021-09-27 RX ORDER — METOPROLOL TARTRATE 50 MG
1 TABLET ORAL
Qty: 0 | Refills: 0 | DISCHARGE

## 2021-09-27 RX ORDER — CARVEDILOL PHOSPHATE 80 MG/1
1 CAPSULE, EXTENDED RELEASE ORAL
Qty: 60 | Refills: 0
Start: 2021-09-27 | End: 2021-10-26

## 2021-09-27 RX ORDER — CARVEDILOL PHOSPHATE 80 MG/1
6.25 CAPSULE, EXTENDED RELEASE ORAL EVERY 12 HOURS
Refills: 0 | Status: DISCONTINUED | OUTPATIENT
Start: 2021-09-27 | End: 2021-09-27

## 2021-09-27 RX ORDER — CARVEDILOL PHOSPHATE 80 MG/1
3.12 CAPSULE, EXTENDED RELEASE ORAL ONCE
Refills: 0 | Status: COMPLETED | OUTPATIENT
Start: 2021-09-27 | End: 2021-09-27

## 2021-09-27 RX ADMIN — Medication 2.5 MILLIGRAM(S): at 05:47

## 2021-09-27 RX ADMIN — ESCITALOPRAM OXALATE 5 MILLIGRAM(S): 10 TABLET, FILM COATED ORAL at 12:19

## 2021-09-27 RX ADMIN — CARVEDILOL PHOSPHATE 3.12 MILLIGRAM(S): 80 CAPSULE, EXTENDED RELEASE ORAL at 05:47

## 2021-09-27 RX ADMIN — HEPARIN SODIUM 5000 UNIT(S): 5000 INJECTION INTRAVENOUS; SUBCUTANEOUS at 05:48

## 2021-09-27 RX ADMIN — CARVEDILOL PHOSPHATE 3.12 MILLIGRAM(S): 80 CAPSULE, EXTENDED RELEASE ORAL at 10:09

## 2021-09-27 RX ADMIN — EMTRICITABINE AND TENOFOVIR DISOPROXIL FUMARATE 1 TABLET(S): 200; 300 TABLET, FILM COATED ORAL at 12:19

## 2021-09-27 RX ADMIN — DOLUTEGRAVIR SODIUM 50 MILLIGRAM(S): 25 TABLET, FILM COATED ORAL at 12:19

## 2021-09-27 RX ADMIN — Medication 81 MILLIGRAM(S): at 12:18

## 2021-09-27 RX ADMIN — ISOSORBIDE MONONITRATE 30 MILLIGRAM(S): 60 TABLET, EXTENDED RELEASE ORAL at 12:19

## 2021-09-27 RX ADMIN — AMLODIPINE BESYLATE 2.5 MILLIGRAM(S): 2.5 TABLET ORAL at 05:47

## 2021-09-27 RX ADMIN — ARIPIPRAZOLE 2 MILLIGRAM(S): 15 TABLET ORAL at 12:19

## 2021-09-27 NOTE — DISCHARGE NOTE NURSING/CASE MANAGEMENT/SOCIAL WORK - PATIENT PORTAL LINK FT
You can access the FollowMyHealth Patient Portal offered by [facility] by registering at the following website: [email or web address]. By joining [facility]’s FollowMyHealth portal, you will also be able to view your health information using other applications (apps) compatible with our system.

## 2021-09-27 NOTE — DISCHARGE NOTE PROVIDER - CARE PROVIDERS DIRECT ADDRESSES
,[email or web address]

## 2021-09-27 NOTE — DISCHARGE NOTE PROVIDER - NSDCMRMEDTOKEN_GEN_ALL_CORE_FT
Abilify 2 mg oral tablet: orally once a day  amLODIPine 2.5 mg oral tablet: 1 tab(s) orally once a day   aspirin 81 mg oral delayed release tablet: 1 tab(s) orally once a day  Descovy 200 mg-25 mg oral tablet: 1 tab(s) orally once a day  isosorbide mononitrate 30 mg oral tablet, extended release: 1 tab(s) orally once a day   Lexapro 5 mg oral tablet: 1 tab(s) orally once a day  Metoprolol Succinate ER 25 mg oral tablet, extended release: 1 tab(s) orally once a day  pravastatin 80 mg oral tablet: 1 tab(s) orally once a day (at bedtime)  Tivicay 50 mg oral tablet: 1 tab(s) orally once a day  traMADol 50 mg oral tablet: 1 tab(s) orally every 6 hours, As Needed - for moderate pain MDD:4  Vasotec 2.5 mg oral tablet: 1 tab(s) orally once a day   Abilify 2 mg oral tablet: orally once a day  amLODIPine 2.5 mg oral tablet: 1 tab(s) orally once a day   aspirin 81 mg oral delayed release tablet: 1 tab(s) orally once a day  carvedilol 6.25 mg oral tablet: 1 tab(s) orally every 12 hours  Descovy 200 mg-25 mg oral tablet: 1 tab(s) orally once a day  isosorbide mononitrate 30 mg oral tablet, extended release: 1 tab(s) orally once a day   Lexapro 5 mg oral tablet: 1 tab(s) orally once a day  pravastatin 80 mg oral tablet: 1 tab(s) orally once a day (at bedtime)  Tivicay 50 mg oral tablet: 1 tab(s) orally once a day  traMADol 50 mg oral tablet: 1 tab(s) orally every 6 hours, As Needed - for moderate pain MDD:4  Vasotec 2.5 mg oral tablet: 1 tab(s) orally once a day

## 2021-09-27 NOTE — CONSULT NOTE ADULT - ASSESSMENT
71yM w/polysubstance abuse, HTN, HLD, DM, CKD, HIV, HCV s/p Harvoni, prostate CA s/p RTx, depression, CAD, HFrEF, aortic aneurysm, PAD, unsteady gait, presenting after a syncopal episode, reporting bright red blood per rectum and incidentally found to have CBD and PD dilation.    Abnormal imaging - Patient incidentally found to have dilation of the CBD and PD on CT. Initial CT with concern for acute cholecystitis, however, no pain, fever, or leukocytosis. Follow-up US without pericholecystic fluid, stones, or Alfred's sign. CBD of 13mm, PD ~6mm without definitive etiology seen on CT or US. Given double-duct sign, concern for ampullary lesion.  - LTs normal, no evidence of obstruction  - Obtain MRCP  - Discussed outpatient endoscopic evaluation if warranted based on imaging, to which patient is amenable    BRBPR - Patient reports occasional BRBPR. Reports normal colonoscopy 3 years ago and known history of hemorrhoids, and history of radiation therapy to prostate. 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  - Hgb stable without any further episodes  - Maintain active T&S, large bore IV access  - Transfusion threshold per primary team  - Outpatient follow-up    Recommendations discussed with primary team  Case discussed with attending physician

## 2021-09-27 NOTE — DISCHARGE NOTE PROVIDER - CARE PROVIDER_API CALL
Caroline Alexandra  [address] [place] [address]  Phone: [phone]  Fax: (   )    -  Established Patient  Follow Up Time: 2 weeks    Chadd Christianson J  GASTROENTEROLOGY  [address] [address]  Phone: [phone]  Fax: [phone]  Follow Up Time: 1 week

## 2021-09-27 NOTE — DISCHARGE NOTE PROVIDER - HOSPITAL COURSE
71 yr old M, former [place], [facility] re[facility] resident, current cocaine/marijuanna/ETOH abuse, PMHx of HTN, hyperlipidemia, DM, Stage III CKD (baseline Cr 1.3-1.5 noted on prior admissions), HIV, Hep C treated with Harvoni ~4 yrs ago, prostate CA s/p radiation, depression, nonobstructive CAD (by dx cath@[facility] 7/28/21 which revealed normal LM, 30% mLAD lesion, luminal irregularities of LCx/RCA),  HFrEF (EF:36% by Echo 7/28/21), known ascending aortic aneurysm, PAD, recent admission to [facility] 9/7/21 with abnormal gait being worked up by neuro for CVA (CT head negative, recommended for MRI brain but left AMA) who now returns to [facility] ED 9/25/21 s/p syncopal episode. Patient reports symptom onset was this morning while he was sitting on the toilet, he had sudden onset nausea followed by abdominal discomfort and vomiting after having a BM. Shortly afterwards patient passed out, when he stood up he noticed bright red blood covering stool. Patient denies any preceding N/V, diaphoresis, palpitations, dizziness, PND, orthopnea, LE edema, recent travel or sick contacts.     In ED, BP: 168/78, HR: 80s, RR:20, Temp: 98.2F, O2 sat: 96% RA. EKG revealed NSR@86BPM with nonspecific ST changes in V1-V3 (unchanged from prior EKG). CXR unremarkable. CT head without acute findings. Labs notable for: Cardiac enzymes negative x 1 set, H/H 12.7/37.1, Platelets 127, K 3.7, BUN/Cr 19/1.47, Lipase and LFTs within normal limits. Stool occult negative. Utox unremarkable.      CT Angio Chest/Abd/Pelvis negative for PE/dissection, stable 4.7cm ascending aortic aneurysm. Abnormal appearance of the gallbladder, acute or chronic cholecystitis in the differential. Sonographic evaluation is considered. Mild pancreatic and biliary ductal dilatation, correlate with LFTs and if indicated with hepatobiliary/pancreas MRCP/MRI.    Patient treated with 1 liter IVF bolus, ASA 325mg PO x 1 dose, Hydralazine 10mg IV x 1 dose, Amlodipine 2.5mg PO x 1 dose and Imdur ER 30mg PO x 1 dose.    Patient currently stable, admitted to cardiac telemetry to R/O cardiac origin of syncope. 71 yr old M, former [place], [facility] rehab resident, current cocaine/marijuanna/ETOH abuse, PMHx of HTN, hyperlipidemia, DM, Stage III CKD (baseline Cr 1.3-1.5 noted on prior admissions), HIV, Hep C treated with Harvoni ~4 yrs ago, prostate CA s/p radiation, depression, nonobstructive CAD (by dx cath@[facility] 7/28/21 which revealed normal LM, 30% mLAD lesion, luminal irregularities of LCx/RCA),  HFrEF (EF:36% by Echo 7/28/21), known ascending aortic aneurysm, PAD, recent admission to [facility] 9/7/21 with abnormal gait being worked up by neuro for CVA (CT head negative, recommended for MRI brain but left AMA) who now returns to [facility] ED 9/25/21 s/p syncopal episode. Patient reports symptom onset was this morning while he was sitting on the toilet, he had sudden onset nausea followed by abdominal discomfort and vomiting after having a BM. Shortly afterwards patient passed out, when he stood up he noticed bright red blood covering stool. Patient denies any preceding N/V, diaphoresis, palpitations, dizziness, PND, orthopnea, LE edema, recent travel or sick contacts.     In ED, BP: 168/78, HR: 80s, RR:20, Temp: 98.2F, O2 sat: 96% RA. EKG revealed NSR@86BPM with nonspecific ST changes in V1-V3 (unchanged from prior EKG). CXR unremarkable. CT head without acute findings. Labs notable for: Cardiac enzymes negative x 1 set, H/H 12.7/37.1, Platelets 127, K 3.7, BUN/Cr 19/1.47, Lipase and LFTs within normal limits. Stool occult negative. Utox unremarkable. CT Angio Chest/Abd/Pelvis negative for PE/dissection, stable 4.7cm ascending aortic aneurysm. Abnormal appearance of the gallbladder, acute or chronic cholecystitis in the differential. Sonographic evaluation is considered. Mild pancreatic and biliary ductal dilatation, correlate with LFTs and if indicated with hepatobiliary/pancreas MRCP/MRI. Patient treated with 1 liter IVF bolus, ASA 325mg PO x 1 dose, Hydralazine 10mg IV x 1 dose, Amlodipine 2.5mg PO x 1 dose and Imdur ER 30mg PO x 1 dose.    Patient currently stable, admitted to cardiac telemetry to R/O cardiac origin of syncope. 71 yr old M, former [place], [facility] re[facility] resident, current cocaine/marijuanna/ETOH abuse, PMHx of HTN, hyperlipidemia, DM, Stage III CKD (baseline Cr 1.3-1.5 noted on prior admissions), HIV, Hep C treated with Harvoni ~4 yrs ago, prostate CA s/p radiation, depression, nonobstructive CAD (by dx cath@[facility] 7/28/21 which revealed normal LM, 30% mLAD lesion, luminal irregularities of LCx/RCA),  HFrEF (EF:36% by Echo 7/28/21), known ascending aortic aneurysm, PAD, recent admission to [facility] 9/7/21 with abnormal gait being worked up by neuro for CVA (CT head negative, recommended for MRI brain but left AMA) who now returns to [facility] ED 9/25/21 s/p syncopal episode. Patient reports symptom onset was this morning while he was sitting on the toilet, he had sudden onset nausea followed by abdominal discomfort and vomiting after having a BM. Shortly afterwards patient passed out, when he stood up he noticed bright red blood covering stool. Patient denies any preceding N/V, diaphoresis, palpitations, dizziness, PND, orthopnea, LE edema, recent travel or sick contacts.     In ED, BP: 168/78, HR: 80s, RR:20, Temp: 98.2F, O2 sat: 96% RA. EKG revealed NSR@86BPM with nonspecific ST changes in V1-V3 (unchanged from prior EKG). CXR unremarkable. CT head without acute findings. Labs notable for: Cardiac enzymes negative x 1 set, H/H 12.7/37.1, Platelets 127, K 3.7, BUN/Cr 19/1.47, Lipase and LFTs within normal limits. Stool occult negative. Utox unremarkable. CT Angio Chest/Abd/Pelvis negative for PE/dissection, stable 4.7cm ascending aortic aneurysm. Abnormal appearance of the gallbladder, acute or chronic cholecystitis in the differential. Sonographic evaluation is considered. Mild pancreatic and biliary ductal dilatation, correlate with LFTs and if indicated with hepatobiliary/pancreas MRCP/MRI. Patient treated with 1 liter IVF bolus, ASA 325mg PO x 1 dose, Hydralazine 10mg IV x 1 dose, Amlodipine 2.5mg PO x 1 dose and Imdur ER 30mg PO x 1 dose.    Patient currently stable, admitted to cardiac telemetry to R/O cardiac origin of syncope. Trop negative x 3. On tele, pt found to have intermittent NSVT episodes 7-12 beats. Home Metoprolol was D/c'ed given Hx cocaine use, and started Coreg 3.125mg BID, was uptitated to 6.25mg BID on day of discharge. 71 yr old M, former [place], [facility] re[facility] resident, current cocaine/marijuanna/ETOH abuse, PMHx of HTN, hyperlipidemia, DM, Stage III CKD (baseline Cr 1.3-1.5 noted on prior admissions), HIV, Hep C treated with Harvoni ~4 yrs ago, prostate CA s/p radiation, depression, nonobstructive CAD (by dx cath@[facility] 7/28/21 which revealed normal LM, 30% mLAD lesion, luminal irregularities of LCx/RCA), chronic HFrEF (EF 36% by Echo 7/28/21), known ascending aortic aneurysm, PAD, recent admission to [facility] 9/7/21 with abnormal gait being worked up by neuro for CVA (CT head negative, recommended for MRI brain but left AMA) who now returns to [facility] ED 9/25/21 s/p syncopal episode. Patient reports symptom onset after waking in morning while he was sitting on the toilet, he felt SOB/palpitations and had sudden onset of nausea followed by abdominal discomfort and vomiting after urinated and having BM. Shortly afterwards patient passed out and woke up on the floor, when he stood up he noticed bright red blood streaks in stool. In ED, BP: 168/78, HR: 80s, RR:20, Temp: 98.2F, O2 sat: 96% RA. EKG revealed NSR@86BPM with nonspecific ST changes in V1-V3 (unchanged from prior EKG). CXR unremarkable. CT head without acute findings. Labs notable for: H/H 12.7/37.1, BUN/Cr 19/1.47, Lipase and LFTs within normal limits. Stool occult negative. Utox negative. CT Angio Chest/Abd/Pelvis negative for PE/dissection, stable 4.7cm ascending aortic aneurysm. Abnormal appearance of the gallbladder, acute or chronic cholecystitis in the differential. Sonographic evaluation is considered. Mild pancreatic and biliary ductal dilatation, correlate with LFTs and if indicated with hepatobiliary/pancreas MRCP/MRI. Patient treated with 1 liter IVF bolus, ASA 325mg PO x 1 dose, Hydralazine 10mg IV x 1 dose, Amlodipine 2.5mg PO x 1 dose and Imdur ER 30mg PO x 1 dose. Admitted to cardiac telemetry to R/O cardiac origin of syncope. Trop negative x 3. Orthostatic negative. On tele, pt found to have intermittent NSVT episodes 7-12 beats. Home Metoprolol was D/c'ed given Hx cocaine use, and started Coreg 3.125mg BID, was uptitated to 6.25mg BID on day of discharge. ECHO     Of note, given abnormal CT Abd, underwent follow-up US noted dilatation of common bile duct of 13mm. No pericholecystic fluid. Negative sonographic Alfred's sign. No cholelithiasis. Increased right renal parenchymal echogenicity consistent with medical renal disease. Given double-duct sign, concern for ampullary lesion. LFTs normal, no evidence of obstruction. GI was rec MRCP for further eval, but pt prefer to f/u with GI as outpatient and perform as outpatient.     On the day of discharge, the patient was seen and examined. Symptoms improved. Vital signs are stable. Labs and imaging reviewed. Patient is medically optimized and hemodynamically stable. Return precautions discussed, medication teach back done, and importance of physician followup emphasized. The patient verbalized understanding. 71 yr old M, former [place], [facility] rehab resident, current cocaine/marijuanna/ETOH abuse, PMHx of HTN, hyperlipidemia, DM, Stage III CKD (baseline Cr 1.3-1.5 noted on prior admissions), HIV, Hep C treated with Harvoni ~4 yrs ago, prostate CA s/p radiation, depression, nonobstructive CAD (by dx cath@[facility] 7/28/21 which revealed normal LM, 30% mLAD lesion, luminal irregularities of LCx/RCA), chronic HFrEF (EF 36% by Echo 7/28/21), known ascending aortic aneurysm, PAD, recent admission to [facility] 9/7/21 with abnormal gait being worked up by neuro for CVA (CT head negative, recommended for MRI brain but left AMA) who now returns to [facility] ED 9/25/21 s/p syncopal episode. Patient reports symptom onset after waking in morning while he was sitting on the toilet, he felt SOB/palpitations and had sudden onset of nausea followed by abdominal discomfort and vomiting after urinated and having BM. Shortly afterwards patient passed out and woke up on the floor, when he stood up he noticed bright red blood streaks in stool. In ED, BP: 168/78, HR: 80s, RR:20, Temp: 98.2F, O2 sat: 96% RA. EKG revealed NSR@86BPM with nonspecific ST changes in V1-V3 (unchanged from prior EKG). CXR unremarkable. CT head without acute findings. Labs notable for: H/H 12.7/37.1, BUN/Cr 19/1.47, Lipase and LFTs within normal limits. Stool occult negative. Utox negative. CT Angio Chest/Abd/Pelvis negative for PE/dissection, stable 4.7cm ascending aortic aneurysm. Abnormal appearance of the gallbladder, acute or chronic cholecystitis in the differential. Sonographic evaluation is considered. Mild pancreatic and biliary ductal dilatation, correlate with LFTs and if indicated with hepatobiliary/pancreas MRCP/MRI. Patient treated with 1 liter IVF bolus, ASA 325mg PO x 1 dose, Hydralazine 10mg IV x 1 dose, Amlodipine 2.5mg PO x 1 dose and Imdur ER 30mg PO x 1 dose. Admitted to cardiac telemetry to R/O cardiac origin of syncope. Trop negative x 3. Orthostatic negative. On tele, pt found to have intermittent NSVT episodes 7-12 beats. Home Metoprolol was D/c'ed given Hx cocaine use, and started Coreg 3.125mg BID, was uptitated to 6.25mg BID on day of discharge. Repeat ECHO 9/27 unchanged from prior, EF 35-40%.    Of note, given abnormal CT Abd, underwent follow-up US noted dilatation of common bile duct of 13mm. No pericholecystic fluid. Negative sonographic Alfred's sign. No cholelithiasis. Increased right renal parenchymal echogenicity consistent with medical renal disease. Given double-duct sign, concern for ampullary lesion. LFTs normal, no evidence of obstruction. GI was rec MRCP for further eval, but pt prefer to f/u with GI as outpatient and perform as outpatient.     On the day of discharge, the patient was seen and examined. Symptoms improved. Vital signs are stable. Labs and imaging reviewed. Patient is medically optimized and hemodynamically stable. Return precautions discussed, medication teach back done, and importance of physician followup emphasized. The patient verbalized understanding.

## 2021-09-27 NOTE — DISCHARGE NOTE PROVIDER - PROVIDER TOKENS
FREE:[LAST:[Ibrahima],FIRST:[Caroline],PHONE:[phone]],FAX:[(   )    -],ADDRESS:[[address] [place] [address]],FOLLOWUP:[2 weeks],ESTABLISHEDPATIENT:[T]],PROVIDER:[TOKEN:[[id number]],FOLLOWUP:[1 week]]

## 2021-09-27 NOTE — DISCHARGE NOTE PROVIDER - NSDCCPCAREPLAN_GEN_ALL_CORE_FT
PRINCIPAL DISCHARGE DIAGNOSIS  Diagnosis: Syncope  Assessment and Plan of Treatment:       SECONDARY DISCHARGE DIAGNOSES  Diagnosis: Abnormal CT scan, gallbladder  Assessment and Plan of Treatment: You had an incidental finding of abnormal gallbladder appearance on CT Scan. You had an Abdominal Ultrasound that showed a dilated common bile duct to 13mm. You were evaluated by the Gastroenterologist and is recommended to have a MRCP performed, which is a specialized MRI that looks at the gallbladder, pancreas and liver. You prefer to have this done as outpatient. Follow up with the Gastroenterologist in 1-2 weeks for further evaluation.    Diagnosis: Rectal bleeding  Assessment and Plan of Treatment: You had blood streaked on stool prior to hospital admission. Your stool was negative for blood when tested in the hospital. Follow up with the Gastroenterologist in 1-2 weeks for further evaluation.    Diagnosis: Chronic systolic congestive heart failure  Assessment and Plan of Treatment: You have a history of congestive heart failure, where your heart muscle is weak (ejection fraction is 35-40%). One of your medications Metoprolol to treat heart failure and high blood pressure was STOPPED as it can have interactions with cocaine use. For this reason, you were switched and STARTED on Coreg 6.25mg twice a day.    Diagnosis: HTN (hypertension)  Assessment and Plan of Treatment: One of your medications Metoprolol to treat heart failure and high blood pressure was STOPPED as it can have interactions with cocaine use. For this reason, you were switched and STARTED on Coreg 6.25mg twice a day.     PRINCIPAL DISCHARGE DIAGNOSIS  Diagnosis: Syncope  Assessment and Plan of Treatment: You came into the hospital for an episode of passing out, likely due to vasovagal syncope. People can have vasovagal syncope if they: Have stress from fear or pain (for example, because they are injured or have blood taken for tests); Stand for too long or are over-tired or overheated; Have an unusual reaction to urinating, coughing, or other body functions. To prevent recurrent episodes: try to eat meals 3 times a day and keep hydrated. Rising to standing slowly by sitting for few minutes first, then stand up after. Follow up with your PMD/cardiologist in 1-2 weeks of hospital discharge.      SECONDARY DISCHARGE DIAGNOSES  Diagnosis: Abnormal CT scan, gallbladder  Assessment and Plan of Treatment: You had an incidental finding of abnormal gallbladder appearance on CT Scan. You had an Abdominal Ultrasound that showed a dilated common bile duct to 13mm. You were evaluated by the Gastroenterologist and is recommended to have a MRCP performed, which is a specialized MRI that looks at the gallbladder, pancreas and liver. You prefer to have this done as outpatient. Follow up with the Gastroenterologist in 1-2 weeks for further evaluation.    Diagnosis: Rectal bleeding  Assessment and Plan of Treatment: You had blood streaked on stool prior to hospital admission. Your stool was negative for blood when tested in the hospital. Follow up with the Gastroenterologist in 1-2 weeks for further evaluation.    Diagnosis: Chronic systolic congestive heart failure  Assessment and Plan of Treatment: You have a history of congestive heart failure, where your heart muscle is weak (ejection fraction is 35-40%). One of your medications Metoprolol to treat heart failure and high blood pressure was STOPPED as it can have interactions with cocaine use. For this reason, you were switched and STARTED on Coreg 6.25mg twice a day.    Diagnosis: HTN (hypertension)  Assessment and Plan of Treatment: One of your medications Metoprolol to treat heart failure and high blood pressure was STOPPED as it can have interactions with cocaine use. For this reason, you were switched and STARTED on Coreg 6.25mg twice a day.

## 2021-09-27 NOTE — CONSULT NOTE ADULT - SUBJECTIVE AND OBJECTIVE BOX
GASTROENTEROLOGY CONSULT NOTE  HPI:  71 yr old M, former , [facility] rehab resident, current cocaine/marijuanna/ETOH abuse, PMHx of HTN, hyperlipidemia, DM, Stage III CKD (baseline Cr 1.3-1.5 noted on prior admissions), HIV, Hep C treated with Harvoni ~4 yrs ago, prostate CA s/p radiation, depression, nonobstructive CAD (by dx cath@[facility] 7/28/21 which revealed normal LM, 30% mLAD lesion, luminal irregularities of LCx/RCA),  HFrEF (EF:36% by Echo 7/28/21), known ascending aortic aneurysm, PAD, recent admission to [facility] 9/7/21 with abnormal gait being worked up by neuro for CVA (CT head negative, recommended for MRI brain but left AMA) who now returns to [facility] ED 9/25/21 s/p syncopal episode. Patient reports symptom onset was this morning while he was sitting on the toilet, he had sudden onset nausea followed by abdominal discomfort and vomiting after having a BM. Shortly afterwards patient passed out, when he stood up he noticed bright red blood covering stool. Patient denies any preceding N/V, diaphoresis, palpitations, dizziness, PND, orthopnea, LE edema, recent travel or sick contacts.     In ED, BP: 168/78, HR: 80s, RR:20, Temp: 98.2F, O2 sat: 96% RA. EKG revealed NSR@86BPM with nonspecific ST changes in V1-V3 (unchanged from prior EKG). CXR unremarkable. CT head without acute findings. Labs notable for: Cardiac enzymes negative x 1 set, H/H 12.7/37.1, Platelets 127, K 3.7, BUN/Cr 19/1.47, Lipase and LFTs within normal limits. Stool occult negative. Utox unremarkable.      CT Angio Chest/Abd/Pelvis negative for PE/dissection, stable 4.7cm ascending aortic aneurysm. Abnormal appearance of the gallbladder, acute or chronic cholecystitis in the differential. Sonographic evaluation is considered. Mild pancreatic and biliary ductal dilatation, correlate with LFTs and if indicated with hepatobiliary/pancreas MRCP/MRI.    Patient treated with 1 liter IVF bolus, ASA 325mg PO x 1 dose, Hydralazine 10mg IV x 1 dose, Amlodipine 2.5mg PO x 1 dose and Imdur ER 30mg PO x 1 dose.    Patient currently stable, admitted to cardiac telemetry to R/O cardiac origin of syncope.      (25 Sep 2021 20:23)    Allergies    Peaches (Rash)  penicillins (Rash)  shellfish (Unknown)  strawberry (Rash)  sulfa drugs (Rash)    Intolerances      Home Medications:  Abilify 2 mg oral tablet: orally once a day (25 Sep 2021 22:50)  Descovy 200 mg-25 mg oral tablet: 1 tab(s) orally once a day (25 Sep 2021 22:50)  Lexapro 5 mg oral tablet: 1 tab(s) orally once a day (25 Sep 2021 22:50)  Metoprolol Succinate ER 25 mg oral tablet, extended release: 1 tab(s) orally once a day (25 Sep 2021 22:50)  Tivicay 50 mg oral tablet: 1 tab(s) orally once a day (25 Sep 2021 22:50)    MEDICATIONS:  MEDICATIONS  (STANDING):  amLODIPine   Tablet 2.5 milliGRAM(s) Oral daily  ARIPiprazole 2 milliGRAM(s) Oral daily  aspirin enteric coated 81 milliGRAM(s) Oral daily  atorvastatin 20 milliGRAM(s) Oral at bedtime  carvedilol 6.25 milliGRAM(s) Oral every 12 hours  carvedilol 3.125 milliGRAM(s) Oral once  dextrose 40% Gel 15 Gram(s) Oral once  dextrose 5%. 1000 milliLiter(s) (50 mL/Hr) IV Continuous <Continuous>  dextrose 5%. 1000 milliLiter(s) (100 mL/Hr) IV Continuous <Continuous>  dextrose 50% Injectable 25 Gram(s) IV Push once  dextrose 50% Injectable 12.5 Gram(s) IV Push once  dextrose 50% Injectable 25 Gram(s) IV Push once  dolutegravir 50 milliGRAM(s) Oral daily  emtricitabine 200 mG/tenofovir alafenamide 25 mG (DESCOVY) Tablet 1 Tablet(s) Oral daily  enalapril 2.5 milliGRAM(s) Oral daily  escitalopram 5 milliGRAM(s) Oral daily  glucagon  Injectable 1 milliGRAM(s) IntraMuscular once  heparin   Injectable 5000 Unit(s) SubCutaneous every 8 hours  influenza  Vaccine (HIGH DOSE) 0.7 milliLiter(s) IntraMuscular once  insulin lispro (ADMELOG) corrective regimen sliding scale   SubCutaneous Before meals and at bedtime  isosorbide   mononitrate ER Tablet (IMDUR) 30 milliGRAM(s) Oral daily    MEDICATIONS  (PRN):  aluminum hydroxide/magnesium hydroxide/simethicone Suspension 30 milliLiter(s) Oral every 4 hours PRN Dyspepsia  traMADol 50 milliGRAM(s) Oral every 6 hours PRN Moderate Pain (4 - 6)    PAST MEDICAL & SURGICAL HISTORY:  HIV (human immunodeficiency virus infection)    DM (diabetes mellitus)    HTN (hypertension)    Chronic diarrhea    Hepatitis C    PVD (peripheral vascular disease)    CAD (coronary artery disease)    Thoracic aortic aneurysm    S/P arterial stent  bilat LE      FAMILY HISTORY:  No pertinent family history in first degree relatives      SOCIAL HISTORY:  As above    REVIEW OF SYSTEMS:  CONSTITUTIONAL: No weakness, fevers or chills  HEENT: No visual changes; No vertigo or throat pain   NECK: No pain or stiffness  RESPIRATORY: No cough, wheezing, hemoptysis; No shortness of breath  CARDIOVASCULAR: No chest pain or palpitations  GASTROINTESTINAL: As above  GENITOURINARY: No dysuria, frequency or hematuria  NEUROLOGICAL: No numbness or weakness  SKIN: No itching, burning, rashes, or lesions   All other 10 review of systems is negative unless indicated above.    Vital Signs Last 24 Hrs  T(C): 37.3 (27 Sep 2021 09:30), Max: 37.3 (27 Sep 2021 09:30)  T(F): 99.2 (27 Sep 2021 09:30), Max: 99.2 (27 Sep 2021 09:30)  HR: 64 (27 Sep 2021 08:23) (57 - 74)  BP: 159/80 (27 Sep 2021 08:23) (122/67 - 159/80)  BP(mean): 112 (27 Sep 2021 08:23) (89 - 115)  RR: 18 (27 Sep 2021 08:23) (18 - 18)  SpO2: 94% (27 Sep 2021 08:23) (94% - 96%)    09-26 @ 07:01  -  09-27 @ 07:00  --------------------------------------------------------  IN: 300 mL / OUT: 1575 mL / NET: -1275 mL    09-27 @ 07:01  -  09-27 @ 10:08  --------------------------------------------------------  IN: 0 mL / OUT: 200 mL / NET: -200 mL        PHYSICAL EXAM:    General: No acute distress  Eyes: Anicteric sclerae, moist conjunctivae  HENT: Moist mucous membranes  Neck: Trachea midline, supple  Lungs: Normal respiratory effort, no intercostal retractions  Cardiovascular: RRR  Abdomen: Soft, non-tender non-distended; Normal bowel sounds; No rebound or guarding  Extremities: Normal range of motion, No clubbing, cyanosis or edema  Neurological: Alert and oriented x3  Skin: Warm and dry. No obvious rash    LABS:                        12.2   2.86  )-----------( 115      ( 27 Sep 2021 07:41 )             36.0     09-27    140  |  107  |  30<H>  ----------------------------<  136<H>  4.0   |  27  |  1.43<H>    Ca    9.4      27 Sep 2021 07:41  Mg     1.8     09-27    TPro  6.7  /  Alb  3.6  /  TBili  0.4  /  DBili  x   /  AST  21  /  ALT  16  /  AlkPhos  51  09-27        PT/INR - ( 27 Sep 2021 07:41 )   PT: 11.5 sec;   INR: 0.96          PTT - ( 25 Sep 2021 15:46 )  PTT:31.8 sec    RADIOLOGY & ADDITIONAL STUDIES:     Reviewed

## 2021-09-27 NOTE — DISCHARGE NOTE PROVIDER - NSDCFUADDAPPT_GEN_ALL_CORE_FT
1. Follow up with your cardiologist Dr Wade within 1-2 weeks after hospital discharge. Call the office to make an appointment. Bring your discharge paperwork with you to the appointment.

## 2021-10-01 DIAGNOSIS — F12.10 CANNABIS ABUSE, UNCOMPLICATED: ICD-10-CM

## 2021-10-01 DIAGNOSIS — E78.5 HYPERLIPIDEMIA, UNSPECIFIED: ICD-10-CM

## 2021-10-01 DIAGNOSIS — Z91.013 ALLERGY TO SEAFOOD: ICD-10-CM

## 2021-10-01 DIAGNOSIS — Z92.3 PERSONAL HISTORY OF IRRADIATION: ICD-10-CM

## 2021-10-01 DIAGNOSIS — Z85.46 PERSONAL HISTORY OF MALIGNANT NEOPLASM OF PROSTATE: ICD-10-CM

## 2021-10-01 DIAGNOSIS — Z79.899 OTHER LONG TERM (CURRENT) DRUG THERAPY: ICD-10-CM

## 2021-10-01 DIAGNOSIS — I25.10 ATHEROSCLEROTIC HEART DISEASE OF NATIVE CORONARY ARTERY WITHOUT ANGINA PECTORIS: ICD-10-CM

## 2021-10-01 DIAGNOSIS — Z88.2 ALLERGY STATUS TO SULFONAMIDES: ICD-10-CM

## 2021-10-01 DIAGNOSIS — Z88.0 ALLERGY STATUS TO PENICILLIN: ICD-10-CM

## 2021-10-01 DIAGNOSIS — Z98.890 OTHER SPECIFIED POSTPROCEDURAL STATES: ICD-10-CM

## 2021-10-01 DIAGNOSIS — B20 HUMAN IMMUNODEFICIENCY VIRUS [HIV] DISEASE: ICD-10-CM

## 2021-10-01 DIAGNOSIS — F14.10 COCAINE ABUSE, UNCOMPLICATED: ICD-10-CM

## 2021-10-01 DIAGNOSIS — F32.9 MAJOR DEPRESSIVE DISORDER, SINGLE EPISODE, UNSPECIFIED: ICD-10-CM

## 2021-10-01 DIAGNOSIS — Z91.018 ALLERGY TO OTHER FOODS: ICD-10-CM

## 2021-10-01 DIAGNOSIS — R55 SYNCOPE AND COLLAPSE: ICD-10-CM

## 2021-10-01 DIAGNOSIS — I50.22 CHRONIC SYSTOLIC (CONGESTIVE) HEART FAILURE: ICD-10-CM

## 2021-10-01 DIAGNOSIS — I13.0 HYPERTENSIVE HEART AND CHRONIC KIDNEY DISEASE WITH HEART FAILURE AND STAGE 1 THROUGH STAGE 4 CHRONIC KIDNEY DISEASE, OR UNSPECIFIED CHRONIC KIDNEY DISEASE: ICD-10-CM

## 2021-10-01 DIAGNOSIS — Z95.820 PERIPHERAL VASCULAR ANGIOPLASTY STATUS WITH IMPLANTS AND GRAFTS: ICD-10-CM

## 2021-10-01 DIAGNOSIS — I71.2 THORACIC AORTIC ANEURYSM, WITHOUT RUPTURE: ICD-10-CM

## 2021-10-01 DIAGNOSIS — K83.8 OTHER SPECIFIED DISEASES OF BILIARY TRACT: ICD-10-CM

## 2021-10-01 DIAGNOSIS — F10.10 ALCOHOL ABUSE, UNCOMPLICATED: ICD-10-CM

## 2021-10-01 DIAGNOSIS — N18.30 CHRONIC KIDNEY DISEASE, STAGE 3 UNSPECIFIED: ICD-10-CM

## 2021-10-01 DIAGNOSIS — Z20.822 CONTACT WITH AND (SUSPECTED) EXPOSURE TO COVID-19: ICD-10-CM

## 2021-10-01 DIAGNOSIS — E11.22 TYPE 2 DIABETES MELLITUS WITH DIABETIC CHRONIC KIDNEY DISEASE: ICD-10-CM

## 2021-10-01 DIAGNOSIS — Z79.82 LONG TERM (CURRENT) USE OF ASPIRIN: ICD-10-CM

## 2021-10-01 DIAGNOSIS — Z86.19 PERSONAL HISTORY OF OTHER INFECTIOUS AND PARASITIC DISEASES: ICD-10-CM

## 2021-10-01 DIAGNOSIS — I47.2 VENTRICULAR TACHYCARDIA: ICD-10-CM

## 2021-10-01 DIAGNOSIS — E11.51 TYPE 2 DIABETES MELLITUS WITH DIABETIC PERIPHERAL ANGIOPATHY WITHOUT GANGRENE: ICD-10-CM

## 2021-10-05 ENCOUNTER — EMERGENCY (EMERGENCY)
Facility: HOSPITAL | Age: 71
LOS: 1 days | Discharge: LEFT BEFORE DISCHARGE | End: 2021-10-05
Attending: EMERGENCY MEDICINE | Admitting: EMERGENCY MEDICINE
Payer: COMMERCIAL

## 2021-10-05 VITALS
RESPIRATION RATE: 18 BRPM | SYSTOLIC BLOOD PRESSURE: 145 MMHG | OXYGEN SATURATION: 95 % | TEMPERATURE: 99 F | HEART RATE: 75 BPM | WEIGHT: 181 LBS | HEIGHT: 72 IN | DIASTOLIC BLOOD PRESSURE: 88 MMHG

## 2021-10-05 DIAGNOSIS — Z53.29 PROCEDURE AND TREATMENT NOT CARRIED OUT BECAUSE OF PATIENT'S DECISION FOR OTHER REASONS: ICD-10-CM

## 2021-10-05 DIAGNOSIS — Z79.811 LONG TERM (CURRENT) USE OF AROMATASE INHIBITORS: ICD-10-CM

## 2021-10-05 DIAGNOSIS — Z95.9 PRESENCE OF CARDIAC AND VASCULAR IMPLANT AND GRAFT, UNSPECIFIED: Chronic | ICD-10-CM

## 2021-10-05 DIAGNOSIS — Z91.013 ALLERGY TO SEAFOOD: ICD-10-CM

## 2021-10-05 DIAGNOSIS — E78.5 HYPERLIPIDEMIA, UNSPECIFIED: ICD-10-CM

## 2021-10-05 DIAGNOSIS — Z21 ASYMPTOMATIC HUMAN IMMUNODEFICIENCY VIRUS [HIV] INFECTION STATUS: ICD-10-CM

## 2021-10-05 DIAGNOSIS — Z79.83 LONG TERM (CURRENT) USE OF BISPHOSPHONATES: ICD-10-CM

## 2021-10-05 DIAGNOSIS — I50.9 HEART FAILURE, UNSPECIFIED: ICD-10-CM

## 2021-10-05 DIAGNOSIS — F32.A DEPRESSION, UNSPECIFIED: ICD-10-CM

## 2021-10-05 DIAGNOSIS — E11.22 TYPE 2 DIABETES MELLITUS WITH DIABETIC CHRONIC KIDNEY DISEASE: ICD-10-CM

## 2021-10-05 DIAGNOSIS — Z87.898 PERSONAL HISTORY OF OTHER SPECIFIED CONDITIONS: ICD-10-CM

## 2021-10-05 DIAGNOSIS — S09.90XA UNSPECIFIED INJURY OF HEAD, INITIAL ENCOUNTER: ICD-10-CM

## 2021-10-05 DIAGNOSIS — Z88.0 ALLERGY STATUS TO PENICILLIN: ICD-10-CM

## 2021-10-05 DIAGNOSIS — I13.0 HYPERTENSIVE HEART AND CHRONIC KIDNEY DISEASE WITH HEART FAILURE AND STAGE 1 THROUGH STAGE 4 CHRONIC KIDNEY DISEASE, OR UNSPECIFIED CHRONIC KIDNEY DISEASE: ICD-10-CM

## 2021-10-05 DIAGNOSIS — I25.10 ATHEROSCLEROTIC HEART DISEASE OF NATIVE CORONARY ARTERY WITHOUT ANGINA PECTORIS: ICD-10-CM

## 2021-10-05 DIAGNOSIS — N18.30 CHRONIC KIDNEY DISEASE, STAGE 3 UNSPECIFIED: ICD-10-CM

## 2021-10-05 DIAGNOSIS — Z91.018 ALLERGY TO OTHER FOODS: ICD-10-CM

## 2021-10-05 DIAGNOSIS — Z88.2 ALLERGY STATUS TO SULFONAMIDES: ICD-10-CM
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
## 2021-10-05 DIAGNOSIS — Z86.73 PERSONAL HISTORY OF TRANSIENT ISCHEMIC ATTACK (TIA), AND CEREBRAL INFARCTION WITHOUT RESIDUAL DEFICITS: ICD-10-CM

## 2021-10-05 PROCEDURE — 99284 EMERGENCY DEPT VISIT MOD MDM: CPT | Mod: 25

## 2021-10-05 PROCEDURE — 99285 EMERGENCY DEPT VISIT HI MDM: CPT

## 2021-10-05 RX ORDER — ACETAMINOPHEN 500 MG
650 TABLET ORAL ONCE
Refills: 0 | Status: COMPLETED | OUTPATIENT
Start: 2021-10-05 | End: 2021-10-05

## 2021-10-05 RX ADMIN — Medication 650 MILLIGRAM(S): at 20:01

## 2021-10-05 NOTE — ED ADULT NURSE NOTE - OBJECTIVE STATEMENT
Pt presents reporting he was riding an escalator this afternoon when his sweater got caught in the mechanism causing him to fall backwards. Pt reports he hit his head, denies LOC. Pt reports he is on an anticoagulant, uncertain which one. Pt presents ambulating steadily. Pt reports diffuse body aches, denies focal points of pain. Pt reports using cocaine yesterday.

## 2021-10-05 NOTE — ED PROVIDER NOTE - PHYSICAL EXAMINATION
slightly unsteady but stable unassisted gait, pt states this is his baseline  No spinal ttp, neck FROM. Strength 5/5. No bony ttp, FROM all extremities. Normal equal distal pulses.

## 2021-10-05 NOTE — ED ADULT NURSE REASSESSMENT NOTE - NS ED NURSE REASSESS COMMENT FT1
unable to locate pt. x multiple attempts, for t/p to radiology, Dr. Aponte notified, will take out as elopement

## 2021-10-05 NOTE — ED PROVIDER NOTE - CLINICAL SUMMARY MEDICAL DECISION MAKING FREE TEXT BOX
71M, , [facility] rehab resident, [facility] cocaine/marijuanna/ETOH abuse, HTN, HLD, DM, Stage III CKD (baseline Cr 1.3-1.5 noted on prior admissions), HIV, HCV tx w/ Harvoni ~4 yrs ago, prostate CA s/p radiation, depression, nonobstructive CAD (by dx cath@[facility] 7/28/21 which revealed normal LM, 30% mLAD lesion, luminal irregularities of LCx/RCA), chronic HFrEF (EF 36% by Echo 7/28/21), known ascending aortic aneurysm, PAD, p/w fall. Pt states he was feeling like usual self, going up an escalator, states his sweater got caught and he ended up falling backwards onto escalator, unsure how many steps. C/o pain to "entire" posterior aspect of body. No other systemic symptoms. On asa, no other AC.   Vitals wnl, exam as above.  ddx: Mechanical fall, low suspicion for significant trauma  CT, XR, reassess.

## 2021-10-05 NOTE — ED PROVIDER NOTE - OBJECTIVE STATEMENT
71M, , [facility] rehab resident, [facility] cocaine/marijuanna/ETOH abuse, HTN, HLD, DM, Stage III CKD (baseline Cr 1.3-1.5 noted on prior admissions), HIV, HCV tx w/ Harvoni ~4 yrs ago, prostate CA s/p radiation, depression, nonobstructive CAD (by dx cath@[facility] 7/28/21 which revealed normal LM, 30% mLAD lesion, luminal irregularities of LCx/RCA), chronic HFrEF (EF 36% by Echo 7/28/21), known ascending aortic aneurysm, PAD, p/w fall. Pt states he was feeling like usual self, going up an escalator, states his sweater got caught and he ended up falling backwards onto escalator, unsure how many steps. C/o pain to "entire" posterior aspect of body. No other systemic symptoms. On asa, no other AC.     Denies any preceding symptoms prior to fall. Denies LOC, vision changes, focal weakness, focal numbness, SOB, CP, HA, abd pain, nausea, vomiting, diarrhea, black stool, bloody stool, urinary complaints, URI symptoms. Denies other MSK pain. Admits to cocaine yesterday, no etoh/drugs today.     Recent admission to [facility] 9/7/21 for abnormal gait being worked up by neuro for CVA (CT head negative, recommended for MRI brain but left AMA).  Recent admission [facility] and dc'd 9/27/21: For syncope, BRBPR.

## 2021-10-05 NOTE — ED ADULT NURSE NOTE - THROAT
Message   Recorded as Task   Date: 06/02/2017 02:08 PM, Created By: Rhiannon Rojas   Task Name: 4. Patient Message   Assigned To: Nancy Vilchis   Regarding Patient: MELY BURK, Status: Active   Comment:    Rhiannon Rojas - 02 Jun 2017 2:08 PM     Patient Message to Provider  \"REASON FOR CALL: Pt not able to make appt for today she wants to know if she can come tommorrow she needs a TB reading.     CALLER'S RELATIONSHIP TO PATIENT: Self  IF OTHER, NAME AND RELATIONSHIP: n/a    BEST NUMBER TO BE CONTACTED: [phone]  ALTERNATIVE PHONE NUMBER: n/a    Turnaround time given to caller:  \"\"THIS MESSAGE WILL BE SENT TO YOUR PROVIDER, THE CLINICAL TEAM WILL RETURN YOUR CALL AS SOON AS THEY HAVE REVIEWED YOUR MESSAGE\"\"    READ BACK MESSAGE TO PATIENT\"   JOSE BLAKELY - 02 Jun 2017 3:43 PM     UNDELEGATED TASK   JOSE BLAKELY - 02 Jun 2017 3:44 PM     TASK REASSIGNED: Previously Assigned To JOSE BLAKELY     Unable to leave message as mailbox is full.    Too late to have PPD read; she will have to either wait 2 weeks for another PPD to be placed or have QuantiFERON-TB gold blood work. Order placed for quant gold.     Plan   1. QUANTIFERON TB GOLD; Status:Active; Requested for:Before next appointment;     Signatures   Electronically signed by : REGINE LUND; Jun 2 2017  3:48PM CST    
asymptomatic

## 2021-10-05 NOTE — ED ADULT TRIAGE NOTE - TEMPERATURE IN FAHRENHEIT (DEGREES F)
Problem: Ineffective Coping  Goal: Participates in unit activities  Interventions:  - Provide therapeutic environment   - Provide required programming   - Redirect inappropriate behaviors    Outcome: Progressing 98.6

## 2021-10-05 NOTE — ED ADULT TRIAGE NOTE - OTHER COMPLAINTS
on escalator sweater got caught and slid down escalator 8steps, endorsing head injury and back pain, on blood thinner but cannot recall which. No LOC. Refused ccollar. Ambulatory with steady gait.

## 2021-10-26 NOTE — OCCUPATIONAL THERAPY INITIAL EVALUATION ADULT - HEALTH SCREEN CRITERIA
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 Continue Latanoprost QHS OS hx of high IOP.  2014, no pain yes

## 2021-10-29 ENCOUNTER — APPOINTMENT (OUTPATIENT)
Dept: GASTROENTEROLOGY | Facility: CLINIC | Age: 71
End: 2021-10-29

## 2021-11-04 ENCOUNTER — EMERGENCY (EMERGENCY)
Facility: HOSPITAL | Age: 71
LOS: 1 days | Discharge: HOME | End: 2021-11-04
Attending: EMERGENCY MEDICINE | Admitting: EMERGENCY MEDICINE
Payer: MEDICARE

## 2021-11-04 VITALS
HEIGHT: 72 IN | TEMPERATURE: 98 F | WEIGHT: 179.9 LBS | SYSTOLIC BLOOD PRESSURE: 145 MMHG | HEART RATE: 72 BPM | OXYGEN SATURATION: 97 % | DIASTOLIC BLOOD PRESSURE: 95 MMHG | RESPIRATION RATE: 18 BRPM

## 2021-11-04 DIAGNOSIS — Z95.9 PRESENCE OF CARDIAC AND VASCULAR IMPLANT AND GRAFT, UNSPECIFIED: Chronic | ICD-10-CM

## 2021-11-04 DIAGNOSIS — I49.8 OTHER SPECIFIED CARDIAC ARRHYTHMIAS: ICD-10-CM

## 2021-11-04 DIAGNOSIS — Z88.0 ALLERGY STATUS TO PENICILLIN: ICD-10-CM

## 2021-11-04 DIAGNOSIS — Z88.6 ALLERGY STATUS TO ANALGESIC AGENT: ICD-10-CM

## 2021-11-04 DIAGNOSIS — Z91.013 ALLERGY TO SEAFOOD: ICD-10-CM

## 2021-11-04 DIAGNOSIS — E78.5 HYPERLIPIDEMIA, UNSPECIFIED: ICD-10-CM

## 2021-11-04 DIAGNOSIS — I25.2 OLD MYOCARDIAL INFARCTION: ICD-10-CM

## 2021-11-04 DIAGNOSIS — I25.10 ATHEROSCLEROTIC HEART DISEASE OF NATIVE CORONARY ARTERY WITHOUT ANGINA PECTORIS: ICD-10-CM

## 2021-11-04 DIAGNOSIS — Z91.018 ALLERGY TO OTHER FOODS: ICD-10-CM

## 2021-11-04 DIAGNOSIS — Z21 ASYMPTOMATIC HUMAN IMMUNODEFICIENCY VIRUS [HIV] INFECTION STATUS: ICD-10-CM

## 2021-11-04 DIAGNOSIS — I10 ESSENTIAL (PRIMARY) HYPERTENSION: ICD-10-CM

## 2021-11-04 DIAGNOSIS — E11.9 TYPE 2 DIABETES MELLITUS WITHOUT COMPLICATIONS: ICD-10-CM

## 2021-11-04 DIAGNOSIS — Z88.2 ALLERGY STATUS TO SULFONAMIDES: ICD-10-CM

## 2021-11-04 DIAGNOSIS — Z20.822 CONTACT WITH AND (SUSPECTED) EXPOSURE TO COVID-19: ICD-10-CM

## 2021-11-04 DIAGNOSIS — R55 SYNCOPE AND COLLAPSE: ICD-10-CM

## 2021-11-04 LAB
ALBUMIN SERPL ELPH-MCNC: 4 G/DL — SIGNIFICANT CHANGE UP (ref 3.3–5)
ALP SERPL-CCNC: 58 U/L — SIGNIFICANT CHANGE UP (ref 40–120)
ALT FLD-CCNC: 27 U/L — SIGNIFICANT CHANGE UP (ref 10–45)
AMPHET UR-MCNC: NEGATIVE — SIGNIFICANT CHANGE UP
ANION GAP SERPL CALC-SCNC: 7 MMOL/L — SIGNIFICANT CHANGE UP (ref 5–17)
AST SERPL-CCNC: 35 U/L — SIGNIFICANT CHANGE UP (ref 10–40)
BARBITURATES UR SCN-MCNC: NEGATIVE — SIGNIFICANT CHANGE UP
BASOPHILS # BLD AUTO: 0.01 K/UL — SIGNIFICANT CHANGE UP (ref 0–0.2)
BASOPHILS NFR BLD AUTO: 0.3 % — SIGNIFICANT CHANGE UP (ref 0–2)
BENZODIAZ UR-MCNC: NEGATIVE — SIGNIFICANT CHANGE UP
BILIRUB SERPL-MCNC: 0.3 MG/DL — SIGNIFICANT CHANGE UP (ref 0.2–1.2)
BUN SERPL-MCNC: 19 MG/DL — SIGNIFICANT CHANGE UP (ref 7–23)
CALCIUM SERPL-MCNC: 9.2 MG/DL — SIGNIFICANT CHANGE UP (ref 8.4–10.5)
CHLORIDE SERPL-SCNC: 108 MMOL/L — SIGNIFICANT CHANGE UP (ref 96–108)
CK MB CFR SERPL CALC: 14.1 NG/ML — HIGH (ref 0–6.7)
CK SERPL-CCNC: 376 U/L — HIGH (ref 30–200)
CO2 SERPL-SCNC: 28 MMOL/L — SIGNIFICANT CHANGE UP (ref 22–31)
COCAINE METAB.OTHER UR-MCNC: POSITIVE
CREAT SERPL-MCNC: 1.45 MG/DL — HIGH (ref 0.5–1.3)
EOSINOPHIL # BLD AUTO: 0.1 K/UL — SIGNIFICANT CHANGE UP (ref 0–0.5)
EOSINOPHIL NFR BLD AUTO: 2.8 % — SIGNIFICANT CHANGE UP (ref 0–6)
ETHANOL SERPL-MCNC: <10 MG/DL — SIGNIFICANT CHANGE UP (ref 0–10)
GLUCOSE SERPL-MCNC: 123 MG/DL — HIGH (ref 70–99)
HCT VFR BLD CALC: 38 % — LOW (ref 39–50)
HGB BLD-MCNC: 12.9 G/DL — LOW (ref 13–17)
IMM GRANULOCYTES NFR BLD AUTO: 0.3 % — SIGNIFICANT CHANGE UP (ref 0–1.5)
LYMPHOCYTES # BLD AUTO: 1.11 K/UL — SIGNIFICANT CHANGE UP (ref 1–3.3)
LYMPHOCYTES # BLD AUTO: 30.7 % — SIGNIFICANT CHANGE UP (ref 13–44)
MAGNESIUM SERPL-MCNC: 1.7 MG/DL — SIGNIFICANT CHANGE UP (ref 1.6–2.6)
MCHC RBC-ENTMCNC: 31.5 PG — SIGNIFICANT CHANGE UP (ref 27–34)
MCHC RBC-ENTMCNC: 33.9 GM/DL — SIGNIFICANT CHANGE UP (ref 32–36)
MCV RBC AUTO: 92.9 FL — SIGNIFICANT CHANGE UP (ref 80–100)
METHADONE UR-MCNC: NEGATIVE — SIGNIFICANT CHANGE UP
MONOCYTES # BLD AUTO: 0.3 K/UL — SIGNIFICANT CHANGE UP (ref 0–0.9)
MONOCYTES NFR BLD AUTO: 8.3 % — SIGNIFICANT CHANGE UP (ref 2–14)
NEUTROPHILS # BLD AUTO: 2.09 K/UL — SIGNIFICANT CHANGE UP (ref 1.8–7.4)
NEUTROPHILS NFR BLD AUTO: 57.6 % — SIGNIFICANT CHANGE UP (ref 43–77)
NRBC # BLD: 0 /100 WBCS — SIGNIFICANT CHANGE UP (ref 0–0)
OPIATES UR-MCNC: NEGATIVE — SIGNIFICANT CHANGE UP
PCP SPEC-MCNC: SIGNIFICANT CHANGE UP
PCP UR-MCNC: NEGATIVE — SIGNIFICANT CHANGE UP
PLATELET # BLD AUTO: 120 K/UL — LOW (ref 150–400)
POTASSIUM SERPL-MCNC: 4.2 MMOL/L — SIGNIFICANT CHANGE UP (ref 3.5–5.3)
POTASSIUM SERPL-SCNC: 4.2 MMOL/L — SIGNIFICANT CHANGE UP (ref 3.5–5.3)
PROT SERPL-MCNC: 7.1 G/DL — SIGNIFICANT CHANGE UP (ref 6–8.3)
RBC # BLD: 4.09 M/UL — LOW (ref 4.2–5.8)
RBC # FLD: 12.4 % — SIGNIFICANT CHANGE UP (ref 10.3–14.5)
SARS-COV-2 RNA SPEC QL NAA+PROBE: NEGATIVE — SIGNIFICANT CHANGE UP
SODIUM SERPL-SCNC: 143 MMOL/L — SIGNIFICANT CHANGE UP (ref 135–145)
THC UR QL: NEGATIVE — SIGNIFICANT CHANGE UP
TROPONIN T SERPL-MCNC: 0.01 NG/ML — SIGNIFICANT CHANGE UP (ref 0–0.01)
WBC # BLD: 3.62 K/UL — LOW (ref 3.8–10.5)
WBC # FLD AUTO: 3.62 K/UL — LOW (ref 3.8–10.5)

## 2021-11-04 PROCEDURE — 85025 COMPLETE CBC W/AUTO DIFF WBC: CPT

## 2021-11-04 PROCEDURE — 99284 EMERGENCY DEPT VISIT MOD MDM: CPT | Mod: 25

## 2021-11-04 PROCEDURE — 80053 COMPREHEN METABOLIC PANEL: CPT

## 2021-11-04 PROCEDURE — G1004: CPT

## 2021-11-04 PROCEDURE — 70450 CT HEAD/BRAIN W/O DYE: CPT | Mod: MG

## 2021-11-04 PROCEDURE — 93005 ELECTROCARDIOGRAM TRACING: CPT

## 2021-11-04 PROCEDURE — 82550 ASSAY OF CK (CPK): CPT

## 2021-11-04 PROCEDURE — 84484 ASSAY OF TROPONIN QUANT: CPT

## 2021-11-04 PROCEDURE — 99285 EMERGENCY DEPT VISIT HI MDM: CPT | Mod: 25

## 2021-11-04 PROCEDURE — 36415 COLL VENOUS BLD VENIPUNCTURE: CPT

## 2021-11-04 PROCEDURE — 80307 DRUG TEST PRSMV CHEM ANLYZR: CPT

## 2021-11-04 PROCEDURE — 87635 SARS-COV-2 COVID-19 AMP PRB: CPT

## 2021-11-04 PROCEDURE — 71045 X-RAY EXAM CHEST 1 VIEW: CPT | Mod: 26

## 2021-11-04 PROCEDURE — 83735 ASSAY OF MAGNESIUM: CPT

## 2021-11-04 PROCEDURE — 71045 X-RAY EXAM CHEST 1 VIEW: CPT

## 2021-11-04 PROCEDURE — 93010 ELECTROCARDIOGRAM REPORT: CPT | Mod: 76

## 2021-11-04 PROCEDURE — 70450 CT HEAD/BRAIN W/O DYE: CPT | Mod: 26,MG

## 2021-11-04 PROCEDURE — 82553 CREATINE MB FRACTION: CPT

## 2021-11-04 NOTE — ED PROVIDER NOTE - PHYSICAL EXAMINATION
CONSTITUTIONAL: not in any distress, clean, and well groomed.   SKIN: Normal color and turgor.    HEAD: NC/AT.  EYES: Conjunctiva clear. EOMI. PERRL.    ENT: Airway clear. Normal voice.   RESPIRATORY:  Normal work of breathing. Lungs CTAB.  CARDIOVASCULAR:  RRR, S1S2. No M/R/G.      GI:  Abdomen soft, nontender.    MSK: Neck supple.  No lower extremity edema or calf tenderness.  No joint swelling or ROM limitation.  NEURO: Alert and oriented; CN II-XII grossly intact. Speech clear. 5/5 strength in all extremities.  Good balance. Steady gait. CONSTITUTIONAL: not in any distress; clean and well groomed.   SKIN: Normal color and turgor.    HEAD: NC/AT.  EYES: Conjunctiva clear. EOMI. PERRL.    ENT: Airway clear. Normal voice.   RESPIRATORY:  Normal work of breathing. Lungs CTAB.  CARDIOVASCULAR:  RRR, S1S2. No M/R/G.      GI:  Abdomen soft, nontender.    MSK: Neck supple.  No lower extremity edema or calf tenderness.  No joint swelling or ROM limitation.  NEURO: Alert and oriented; CN II-XII grossly intact. Speech clear. 5/5 strength in all extremities.  Good balance. Steady gait. CONSTITUTIONAL: not in any distress; clean and well groomed.   SKIN: Normal color and turgor.    HEAD: NC/AT.  EYES: Conjunctiva clear. EOMI. PERRL.    ENT: Airway clear. Normal voice.   RESPIRATORY:  Normal work of breathing. Lungs CTAB.  CARDIOVASCULAR:  RRR, S1S2. No M/R/G.  Strong radial pulses, DP pulses bilat, palpable PT pulses bilat.    GI:  Abdomen soft, nontender.    MSK: Neck supple.  No lower extremity edema or calf tenderness.  No joint swelling or ROM limitation. Feet WWP.   NEURO: Alert and oriented; CN II-XII grossly intact. Speech clear. 5/5 strength in all extremities.  Good balance. Steady gait.

## 2021-11-04 NOTE — ED PROVIDER NOTE - NSFOLLOWUPINSTRUCTIONS_ED_ALL_ED_FT
Stop using cocaine.    Follow up with primary care physician 1-3 days.  Return to the Emergency Department if you have any new or worsening symptoms, or if you have any concerns.  =======================    Syncope    WHAT YOU NEED TO KNOW:    Syncope is also called fainting or passing out. Syncope is a sudden, temporary loss of consciousness, followed by a fall from a standing or sitting position. Syncope ranges from not serious to a sign of a more serious condition that needs to be treated. You can control some health conditions that cause syncope. Your healthcare providers can help you create a plan to manage syncope and prevent episodes.    DISCHARGE INSTRUCTIONS:    Seek care immediately if:   •You are bleeding because you hit your head when you fainted.       •You suddenly have double vision, difficulty speaking, numbness, and cannot move your arms or legs.      •You have chest pain and trouble breathing.      •You vomit blood or material that looks like coffee grounds.      •You see blood in your bowel movement.      Contact your healthcare provider if:   •You have new or worsening symptoms.      •You have another syncope episode.      •You have a headache, fast heartbeat, or feel too dizzy to stand up.      •You have questions or concerns about your condition or care.      Medicines:   •Medicines may be needed to help your heart pump strongly and regularly. Your healthcare provider may also make changes to any medicines that are causing syncope.       •Take your medicine as directed. Contact your healthcare provider if you think your medicine is not helping or if you have side effects. Tell him or her if you are allergic to any medicine. Keep a list of the medicines, vitamins, and herbs you take. Include the amounts, and when and why you take them. Bring the list or the pill bottles to follow-up visits. Carry your medicine list with you in case of an emergency.      Follow up with your doctor as directed: Write down your questions so you remember to ask them during your visits.     Manage syncope:   •Keep a record of your syncope episodes. Include your symptoms and your activity before and after the episode. The record can help your healthcare provider find the cause of your syncope and help you manage episodes.      •Sit or lie down when needed. This includes when you feel dizzy, your throat is getting tight, and your vision changes. Raise your legs above the level of your heart.      •Take slow, deep breaths if you start to breathe faster with anxiety or fear. This can help decrease dizziness and the feeling that you might faint.       •Check your blood pressure often. This is important if you take medicine to lower your blood pressure. Check your blood pressure when you are lying down and when you are standing. Ask how often to check during the day. Keep a record of your blood pressure numbers. Your healthcare provider may use the record to help plan your treatment.  How to take a Blood Pressure           Prevent a syncope episode:   •Move slowly and let yourself get used to one position before you move to another position. This is very important when you change from a lying or sitting position to a standing position. Take some deep breaths before you stand up from a lying position. Stand up slowly. Sudden movements may cause a fainting spell. Sit on the side of the bed or couch for a few minutes before you stand up. If you are on bedrest, try to be upright for about 2 hours each day, or as directed. Do not lock your legs if you are standing for a long period of time. Move your legs and bend your knees to keep blood flowing.      •Follow your healthcare provider's recommendations. Your provider may recommend that you drink more liquids to prevent dehydration. You may also need to have more salt to keep your blood pressure from dropping too low and causing syncope. Your provider will tell you how much liquid and sodium to have each day. He or she will also tell you how much physical activity is safe for you. This will depend on what is causing your syncope.      •Watch for signs of low blood sugar. These include hunger, nervousness, sweating, and fast or fluttery heartbeats. Talk with your healthcare provider about ways to keep your blood sugar level steady.      •Do not strain if you are constipated. You may faint if you strain to have a bowel movement. Walking is the best way to get your bowels moving. Eat foods high in fiber to make it easier to have a bowel movement. Good examples are high-fiber cereals, beans, vegetables, and whole-grain breads. Prune juice may help make bowel movements softer.      •Be careful in hot weather. Heat can cause a syncope episode. Limit activity done outside on hot days. Physical activity in hot weather can lead to dehydration. This can cause an episode.

## 2021-11-04 NOTE — ED ADULT TRIAGE NOTE - CHIEF COMPLAINT QUOTE
Pt BIBA from home after syncopal episode when standing up from chair. Pt states this happens to him monthly, denies hitting head, takes eliquis. Denies active CP or SOB, endorses +light headedness. Hx of HTN.

## 2021-11-04 NOTE — ED ADULT NURSE NOTE - NSIMPLEMENTINTERV_GEN_ALL_ED
Implemented All Fall Risk Interventions:  [place] to call system. Call bell, personal items and telephone within reach. Instruct patient to call for assistance. Room bathroom lighting operational. Non-slip footwear when patient is off stretcher. Physically safe environment: no spills, clutter or unnecessary equipment. Stretcher in lowest position, wheels locked, appropriate side rails in place. Provide visual cue, wrist band, yellow gown, etc. Monitor gait and stability. Monitor for mental status changes and reorient to person, place, and time. Review medications for side effects contributing to fall risk. Reinforce activity limits and safety measures with patient and family.

## 2021-11-04 NOTE — ED PROVIDER NOTE - PATIENT PORTAL LINK FT
You can access the FollowMyHealth Patient Portal offered by [facility] by registering at the following website: [email or web address]. By joining [employer]’s FollowMyHealth portal, you will also be able to view your health information using other applications (apps) compatible with our system.

## 2021-11-04 NOTE — ED PROVIDER NOTE - NSICDXPASTSURGICALHX_GEN_ALL_CORE_FT
PAST SURGICAL HISTORY:  S/P arterial stent bilat LE       PAST SURGICAL HISTORY:  S/P arterial stent bilat LE

## 2021-11-04 NOTE — ED PROVIDER NOTE - OBJECTIVE STATEMENT
70 y/o M pt with PMHx of HIV (undetectable viral load, unknown CD4), HTN, borderline HLD, PAD with LE stents, hx of drug abuse, and MI (no stents, no hx of bypass) presents to ED c/o syncope. He states he was in his room at his [facility] today, about 15min after eating dinner. He got up from his bed to walk to the bathroom, but as soon as he stood up, he felt lightheaded x 3 seconds before everything went black. His LOC was for about 2min, and pt relates looking at the clock just before the episode happened, then checked the time when he woke up on the floor. He does not think he hit his head, and has no headache. Pt doesn't think he was injured when he fell. He reports this happens to him about once monthly, often while he's out on the street. He goes to the hospital for this sometimes, but tonight he came just because he thought it was the smart thing to do. Pt currently feels "just fatigued" and dissatisfied with [facility] situation [facility] the [facility]. He denies associated chest pain, palpitations, shortness of breath, bleeding, bruising, or melena.     Has a dry cough for several months that hasn't changed.   Takes Eliquis for PAD, Tivicay and Descovy for HIV (compliant), and a BP med he doesn't know the name of.   Former smoker, no alcohol or other drug use.   Fully vaccinated for covid-19. 72 y/o M pt with PMHx of HIV (undetectable viral load, unknown CD4), HTN, borderline HLD, PAD with LE stents, hx of drug abuse, and MI (no stents, no hx of bypass) presents to ED c/o syncope. He states he was in his room at his [facility] today, about 15min after eating dinner. He got up from his bed to walk to the bathroom, but as soon as he stood up, he felt lightheaded x 3 seconds before everything went black. His LOC was for about 2min, and pt relates looking at the clock just before the episode happened, then checked the time when he woke up on the floor. He does not think he hit his head, and has no headache. Pt doesn't think he was injured when he fell. He reports this happens to him about once monthly, often while he's out on the street. He goes to the hospital for this sometimes, but tonight he came just because he thought it was the smart thing to do.  Admits to using cocaine (smoked) yesterday, none today.  Says he uses cocaine about every other day.  Pt currently feels "just fatigued" and dissatisfied with his living situation at the [facility]. He denies associated chest pain, palpitations, shortness of breath, bleeding, bruising, or melena.     Has a dry cough for several months that hasn't changed.   Takes Eliquis for PAD, Tivicay and Descovy for HIV (compliant), and a BP med he doesn't know the name of.   Former smoker, no alcohol or other drug use.   Fully vaccinated for covid-19. 72 y/o M pt with PMHx of HIV (undetectable viral load, unknown CD4), HTN, borderline HLD, PAD with LE stents, hx of drug abuse, and MI (no stents, no hx of bypass) presents to ED c/o syncope. He states he was in his room at his [facility] today, about 15min after eating dinner. He got up from his bed to walk to the bathroom, but as soon as he stood up, he felt lightheaded x 3 seconds before everything went black. His LOC was for about 2min, and pt relates looking at the clock just before the episode happened, then checked the time when he woke up on the floor. He does not think he hit his head, and has no headache. Pt doesn't think he was injured when he fell. He reports this happens to him about once monthly, often while he's out on the street. He goes to the hospital for this sometimes, but tonight he came just because he thought it was the smart thing to do.  Admits to using cocaine (smoked) yesterday, none today.  Says he uses cocaine about every other day.  Pt currently feels "just fatigued" and dissatisfied with his living situation at the [facility]. He denies associated chest pain, palpitations, shortness of breath, bleeding, bruising, or melena. Cardiac cath in July 2021 showed nonobstructive CAD.  Echo in late Sept showed LVEF 35-40% with inferior hypokinesis.     Has a dry cough for several months that hasn't changed.   Takes Eliquis for PAD, Tivicay and Descovy for HIV (compliant), and a BP med he doesn't know the name of.   Former smoker, no alcohol or other drug use.   Fully vaccinated for covid-19. 70 y/o M pt with PMHx of HIV (undetectable viral load, unknown CD4), HTN, borderline HLD, PAD with LE stents, hx of drug abuse, and MI (no stents, no hx of bypass) presents to ED c/o syncope. He states he was in his room at his [facility] today, about 15min after eating dinner. He got up from his bed to walk to the bathroom, but as soon as he stood up, he felt lightheaded x 3 seconds before everything went black. He says he was "out for about 2 minutes"; states "I looked at the clock". He does not think he hit his head, and has no headache. Pt doesn't think he sustained any injuries when he fell. He reports this happens to him about once monthly, often while he's out on the street. He goes to the hospital for this "sometimes", but tonight he came just because he thought "it was the smart thing to do."  Admits to using cocaine (smoked), last used yesterday, none today.  Says he uses cocaine about every other day.  Pt currently feels "just fatigued" and dissatisfied with his [facility]. He denies associated chest pain, palpitations, shortness of breath, bleeding, bruising, or melena. Cardiac cath in July 2021 showed nonobstructive CAD.  Echo in late Sept showed LVEF 35-40% with inferior hypokinesis.     Has a dry cough for several months that hasn't changed.   Takes Eliquis for PAD, Tivicay and Descovy for HIV (compliant), and a BP med he doesn't know the name of.   Former smoker, no alcohol or other drug use.   Fully vaccinated for covid-19.

## 2021-11-04 NOTE — ED PROVIDER NOTE - NSICDXPASTMEDICALHX_GEN_ALL_CORE_FT
PAST MEDICAL HISTORY:  CAD (coronary artery disease)     Chronic diarrhea     DM (diabetes mellitus)     Hepatitis C     HIV (human immunodeficiency virus infection)     HTN (hypertension)     PVD (peripheral vascular disease)     Thoracic aortic aneurysm        PAST MEDICAL HISTORY:  CAD (coronary artery disease)     Chronic diarrhea     DM (diabetes mellitus)     Hepatitis C     HIV (human immunodeficiency virus infection)     HTN (hypertension)     PVD (peripheral vascular disease)     Thoracic aortic aneurysm

## 2021-11-04 NOTE — ED PROVIDER NOTE - CLINICAL SUMMARY MEDICAL DECISION MAKING FREE TEXT BOX
Pt  with hx of syncopal episodes, likely orthostatic.  On AC for PAD, head CT neg.  Frequent cocaine use though had no CP or palpitations, and not currently intoxicated. HD stable.  Recent cath with nonobstructive disease, mildly reduced EF.  EKG without dynamic changes and trop neg x 2.

## 2021-11-04 NOTE — ED ADULT NURSE NOTE - OBJECTIVE STATEMENT
patient alert and oriented x4, stated he was smoking cocaine all day yesterday and he fell. Denies dizziness. Patient able to move extremity w/o difficulty.

## 2021-11-05 VITALS
DIASTOLIC BLOOD PRESSURE: 82 MMHG | TEMPERATURE: 98 F | SYSTOLIC BLOOD PRESSURE: 155 MMHG | OXYGEN SATURATION: 98 % | RESPIRATION RATE: 16 BRPM | HEART RATE: 70 BPM

## 2021-11-05 LAB
CK MB CFR SERPL CALC: 12 NG/ML — HIGH (ref 0–6.7)
CK SERPL-CCNC: 320 U/L — HIGH (ref 30–200)
TROPONIN T SERPL-MCNC: <0.01 NG/ML — SIGNIFICANT CHANGE UP (ref 0–0.01)

## 2021-11-06 ENCOUNTER — INPATIENT (INPATIENT)
Facility: HOSPITAL | Age: 71
LOS: 2 days | Discharge: HOME | DRG: 312 | End: 2021-11-09
Attending: INTERNAL MEDICINE | Admitting: INTERNAL MEDICINE
Payer: MEDICARE

## 2021-11-06 VITALS
HEART RATE: 84 BPM | SYSTOLIC BLOOD PRESSURE: 137 MMHG | HEIGHT: 72 IN | DIASTOLIC BLOOD PRESSURE: 70 MMHG | OXYGEN SATURATION: 98 % | TEMPERATURE: 98 F | RESPIRATION RATE: 18 BRPM

## 2021-11-06 DIAGNOSIS — I25.10 ATHEROSCLEROTIC HEART DISEASE OF NATIVE CORONARY ARTERY WITHOUT ANGINA PECTORIS: ICD-10-CM

## 2021-11-06 DIAGNOSIS — E78.5 HYPERLIPIDEMIA, UNSPECIFIED: ICD-10-CM

## 2021-11-06 DIAGNOSIS — I50.22 CHRONIC SYSTOLIC (CONGESTIVE) HEART FAILURE: ICD-10-CM

## 2021-11-06 DIAGNOSIS — F14.10 COCAINE ABUSE, UNCOMPLICATED: ICD-10-CM

## 2021-11-06 DIAGNOSIS — F32.9 MAJOR DEPRESSIVE DISORDER, SINGLE EPISODE, UNSPECIFIED: ICD-10-CM

## 2021-11-06 DIAGNOSIS — Z95.9 PRESENCE OF CARDIAC AND VASCULAR IMPLANT AND GRAFT, UNSPECIFIED: Chronic | ICD-10-CM

## 2021-11-06 DIAGNOSIS — R55 SYNCOPE AND COLLAPSE: ICD-10-CM

## 2021-11-06 DIAGNOSIS — I10 ESSENTIAL (PRIMARY) HYPERTENSION: ICD-10-CM

## 2021-11-06 DIAGNOSIS — I71.2 THORACIC AORTIC ANEURYSM, WITHOUT RUPTURE: ICD-10-CM

## 2021-11-06 DIAGNOSIS — B20 HUMAN IMMUNODEFICIENCY VIRUS [HIV] DISEASE: ICD-10-CM

## 2021-11-06 DIAGNOSIS — I42.8 OTHER CARDIOMYOPATHIES: ICD-10-CM

## 2021-11-06 DIAGNOSIS — F19.10 OTHER PSYCHOACTIVE SUBSTANCE ABUSE, UNCOMPLICATED: ICD-10-CM

## 2021-11-06 DIAGNOSIS — N18.30 CHRONIC KIDNEY DISEASE, STAGE 3 UNSPECIFIED: ICD-10-CM

## 2021-11-06 LAB
ALBUMIN SERPL ELPH-MCNC: 3.2 G/DL — LOW (ref 3.4–5)
ALP SERPL-CCNC: 56 U/L — SIGNIFICANT CHANGE UP (ref 40–120)
ALT FLD-CCNC: 32 U/L — SIGNIFICANT CHANGE UP (ref 12–42)
AMPHET UR-MCNC: NEGATIVE — SIGNIFICANT CHANGE UP
ANION GAP SERPL CALC-SCNC: 10 MMOL/L — SIGNIFICANT CHANGE UP (ref 9–16)
AST SERPL-CCNC: 30 U/L — SIGNIFICANT CHANGE UP (ref 15–37)
BARBITURATES UR SCN-MCNC: NEGATIVE — SIGNIFICANT CHANGE UP
BASOPHILS # BLD AUTO: 0.01 K/UL — SIGNIFICANT CHANGE UP (ref 0–0.2)
BASOPHILS NFR BLD AUTO: 0.3 % — SIGNIFICANT CHANGE UP (ref 0–2)
BENZODIAZ UR-MCNC: NEGATIVE — SIGNIFICANT CHANGE UP
BILIRUB SERPL-MCNC: 0.4 MG/DL — SIGNIFICANT CHANGE UP (ref 0.2–1.2)
BUN SERPL-MCNC: 28 MG/DL — HIGH (ref 7–23)
CALCIUM SERPL-MCNC: 8.8 MG/DL — SIGNIFICANT CHANGE UP (ref 8.5–10.5)
CHLORIDE SERPL-SCNC: 107 MMOL/L — SIGNIFICANT CHANGE UP (ref 96–108)
CO2 SERPL-SCNC: 26 MMOL/L — SIGNIFICANT CHANGE UP (ref 22–31)
COCAINE METAB.OTHER UR-MCNC: POSITIVE
CREAT SERPL-MCNC: 1.43 MG/DL — HIGH (ref 0.5–1.3)
D DIMER BLD IA.RAPID-MCNC: 240 NG/ML DDU — HIGH
EOSINOPHIL # BLD AUTO: 0.07 K/UL — SIGNIFICANT CHANGE UP (ref 0–0.5)
EOSINOPHIL NFR BLD AUTO: 2 % — SIGNIFICANT CHANGE UP (ref 0–6)
GLUCOSE SERPL-MCNC: 124 MG/DL — HIGH (ref 70–99)
HCT VFR BLD CALC: 35.1 % — LOW (ref 39–50)
HGB BLD-MCNC: 12.3 G/DL — LOW (ref 13–17)
IMM GRANULOCYTES NFR BLD AUTO: 0.3 % — SIGNIFICANT CHANGE UP (ref 0–1.5)
LYMPHOCYTES # BLD AUTO: 0.73 K/UL — LOW (ref 1–3.3)
LYMPHOCYTES # BLD AUTO: 20.6 % — SIGNIFICANT CHANGE UP (ref 13–44)
MAGNESIUM SERPL-MCNC: 1.5 MG/DL — LOW (ref 1.6–2.6)
MCHC RBC-ENTMCNC: 32.4 PG — SIGNIFICANT CHANGE UP (ref 27–34)
MCHC RBC-ENTMCNC: 35 GM/DL — SIGNIFICANT CHANGE UP (ref 32–36)
MCV RBC AUTO: 92.4 FL — SIGNIFICANT CHANGE UP (ref 80–100)
METHADONE UR-MCNC: NEGATIVE — SIGNIFICANT CHANGE UP
MONOCYTES # BLD AUTO: 0.25 K/UL — SIGNIFICANT CHANGE UP (ref 0–0.9)
MONOCYTES NFR BLD AUTO: 7.1 % — SIGNIFICANT CHANGE UP (ref 2–14)
NEUTROPHILS # BLD AUTO: 2.47 K/UL — SIGNIFICANT CHANGE UP (ref 1.8–7.4)
NEUTROPHILS NFR BLD AUTO: 69.7 % — SIGNIFICANT CHANGE UP (ref 43–77)
NRBC # BLD: 0 /100 WBCS — SIGNIFICANT CHANGE UP (ref 0–0)
NT-PROBNP SERPL-SCNC: 655 PG/ML — HIGH
OPIATES UR-MCNC: NEGATIVE — SIGNIFICANT CHANGE UP
PCP SPEC-MCNC: SIGNIFICANT CHANGE UP
PCP UR-MCNC: NEGATIVE — SIGNIFICANT CHANGE UP
PLATELET # BLD AUTO: 105 K/UL — LOW (ref 150–400)
POTASSIUM SERPL-MCNC: 3.9 MMOL/L — SIGNIFICANT CHANGE UP (ref 3.5–5.3)
POTASSIUM SERPL-SCNC: 3.9 MMOL/L — SIGNIFICANT CHANGE UP (ref 3.5–5.3)
PROT SERPL-MCNC: 6.5 G/DL — SIGNIFICANT CHANGE UP (ref 6.4–8.2)
RBC # BLD: 3.8 M/UL — LOW (ref 4.2–5.8)
RBC # FLD: 12.6 % — SIGNIFICANT CHANGE UP (ref 10.3–14.5)
SARS-COV-2 RNA SPEC QL NAA+PROBE: SIGNIFICANT CHANGE UP
SODIUM SERPL-SCNC: 143 MMOL/L — SIGNIFICANT CHANGE UP (ref 132–145)
THC UR QL: NEGATIVE — SIGNIFICANT CHANGE UP
TROPONIN I, HIGH SENSITIVITY RESULT: 27.9 NG/L — SIGNIFICANT CHANGE UP
TROPONIN I, HIGH SENSITIVITY RESULT: 28.9 NG/L — SIGNIFICANT CHANGE UP
WBC # BLD: 3.54 K/UL — LOW (ref 3.8–10.5)
WBC # FLD AUTO: 3.54 K/UL — LOW (ref 3.8–10.5)

## 2021-11-06 PROCEDURE — 71045 X-RAY EXAM CHEST 1 VIEW: CPT | Mod: 26

## 2021-11-06 PROCEDURE — 99285 EMERGENCY DEPT VISIT HI MDM: CPT

## 2021-11-06 PROCEDURE — 70450 CT HEAD/BRAIN W/O DYE: CPT | Mod: 26

## 2021-11-06 PROCEDURE — 71275 CT ANGIOGRAPHY CHEST: CPT | Mod: 26

## 2021-11-06 PROCEDURE — 93010 ELECTROCARDIOGRAM REPORT: CPT | Mod: 76

## 2021-11-06 RX ORDER — CARVEDILOL PHOSPHATE 80 MG/1
6.25 CAPSULE, EXTENDED RELEASE ORAL EVERY 12 HOURS
Refills: 0 | Status: DISCONTINUED | OUTPATIENT
Start: 2021-11-06 | End: 2021-11-09

## 2021-11-06 RX ORDER — ISOSORBIDE MONONITRATE 60 MG/1
30 TABLET, EXTENDED RELEASE ORAL DAILY
Refills: 0 | Status: DISCONTINUED | OUTPATIENT
Start: 2021-11-06 | End: 2021-11-09

## 2021-11-06 RX ORDER — LANOLIN ALCOHOL/MO/W.PET/CERES
5 CREAM (GRAM) TOPICAL AT BEDTIME
Refills: 0 | Status: DISCONTINUED | OUTPATIENT
Start: 2021-11-06 | End: 2021-11-09

## 2021-11-06 RX ORDER — TRAMADOL HYDROCHLORIDE 50 MG/1
50 TABLET ORAL EVERY 6 HOURS
Refills: 0 | Status: DISCONTINUED | OUTPATIENT
Start: 2021-11-06 | End: 2021-11-09

## 2021-11-06 RX ORDER — AMLODIPINE BESYLATE 2.5 MG/1
2.5 TABLET ORAL DAILY
Refills: 0 | Status: DISCONTINUED | OUTPATIENT
Start: 2021-11-06 | End: 2021-11-09

## 2021-11-06 RX ORDER — ESCITALOPRAM OXALATE 10 MG/1
5 TABLET, FILM COATED ORAL DAILY
Refills: 0 | Status: DISCONTINUED | OUTPATIENT
Start: 2021-11-06 | End: 2021-11-07

## 2021-11-06 RX ORDER — ENOXAPARIN SODIUM 100 MG/ML
40 INJECTION SUBCUTANEOUS DAILY
Refills: 0 | Status: DISCONTINUED | OUTPATIENT
Start: 2021-11-06 | End: 2021-11-09

## 2021-11-06 RX ORDER — MAGNESIUM SULFATE 500 MG/ML
1 VIAL (ML) INJECTION ONCE
Refills: 0 | Status: COMPLETED | OUTPATIENT
Start: 2021-11-06 | End: 2021-11-06

## 2021-11-06 RX ORDER — EMTRICITABINE AND TENOFOVIR DISOPROXIL FUMARATE 200; 300 MG/1; MG/1
1 TABLET, FILM COATED ORAL DAILY
Refills: 0 | Status: DISCONTINUED | OUTPATIENT
Start: 2021-11-06 | End: 2021-11-09

## 2021-11-06 RX ORDER — DOLUTEGRAVIR SODIUM 25 MG/1
50 TABLET, FILM COATED ORAL DAILY
Refills: 0 | Status: DISCONTINUED | OUTPATIENT
Start: 2021-11-06 | End: 2021-11-09

## 2021-11-06 RX ORDER — ASPIRIN/CALCIUM CARB/MAGNESIUM 324 MG
81 TABLET ORAL DAILY
Refills: 0 | Status: DISCONTINUED | OUTPATIENT
Start: 2021-11-06 | End: 2021-11-09

## 2021-11-06 RX ORDER — ARIPIPRAZOLE 15 MG/1
2 TABLET ORAL DAILY
Refills: 0 | Status: DISCONTINUED | OUTPATIENT
Start: 2021-11-06 | End: 2021-11-09

## 2021-11-06 RX ORDER — ASPIRIN/CALCIUM CARB/MAGNESIUM 324 MG
324 TABLET ORAL ONCE
Refills: 0 | Status: COMPLETED | OUTPATIENT
Start: 2021-11-06 | End: 2021-11-06

## 2021-11-06 RX ORDER — ATORVASTATIN CALCIUM 80 MG/1
20 TABLET, FILM COATED ORAL AT BEDTIME
Refills: 0 | Status: DISCONTINUED | OUTPATIENT
Start: 2021-11-06 | End: 2021-11-09

## 2021-11-06 RX ADMIN — Medication 5 MILLIGRAM(S): at 22:50

## 2021-11-06 RX ADMIN — ATORVASTATIN CALCIUM 20 MILLIGRAM(S): 80 TABLET, FILM COATED ORAL at 22:37

## 2021-11-06 RX ADMIN — ENOXAPARIN SODIUM 40 MILLIGRAM(S): 100 INJECTION SUBCUTANEOUS at 22:40

## 2021-11-06 RX ADMIN — Medication 100 GRAM(S): at 17:31

## 2021-11-06 RX ADMIN — Medication 324 MILLIGRAM(S): at 12:35

## 2021-11-06 NOTE — H&P ADULT - PROBLEM SELECTOR PLAN 10
will continue HOME MEDS: Abilify 2 mg PO daily and Lexapro 5 mg PO daily.   --Patient states he has been feeling depressed and alone, has thought about suicide but does not have plan in place. Patient requesting to speak to psychiatrist. Will obtain psych consult in AM.      N: DASH   E: Maintain K>4.0 and Mg >2.0  VTE: Lovenox subcut  Code: Full.

## 2021-11-06 NOTE — ED BEHAVIORAL HEALTH NOTE - BEHAVIORAL HEALTH NOTE
SW was consulted to the ED by provider to talk to PT about [facility].  SW performed SBIRT assessment and PT declined brief intervention and referral to treatment because PT already has services in place.  SW explained [facility] program to PT.  [facility] referral made on PT behalf.  PT was made aware that SW unable to assist with housing transfer and was advice to call [facility] directly to make that request.  Team was made aware and SW made available for further assistance.

## 2021-11-06 NOTE — H&P ADULT - PROBLEM SELECTOR PLAN 7
BUN/Cr 28/1.43, baseline Cr 1.3-1.5 from prior admissions.  --will continue to monitor closely.  --avoid nephrotoxic agents.

## 2021-11-06 NOTE — ED ADULT TRIAGE NOTE - CHIEF COMPLAINT QUOTE
states he had a syncopal episode which was followed by midsternal cp PTA - last use of cocaine was wednesday- - was given 324 mg of asa by EMS

## 2021-11-06 NOTE — ED ADULT NURSE REASSESSMENT NOTE - NS ED NURSE REASSESS COMMENT FT1
break coverage for rn baltazar, placed on cardiac monitor , vital signs as charted , sitting comfortably in bed watching cartoons on tv , awaiting further medical review pt aware

## 2021-11-06 NOTE — ED PROVIDER NOTE - CHIEF COMPLAINT
The patient is a 71y Male complaining of  The patient is a 71y Male complaining of syncope and chest pain.

## 2021-11-06 NOTE — H&P ADULT - PROBLEM SELECTOR PLAN 3
chest pain free, EKG revealed NSR@71BPM with TWI in lateral leads. CE negative x 2 sets.      --s/p recent diagnostic cath@[facility] 7/28/21 which revealed normal LM, 30% mLAD lesion, luminal irregularities of LCx/RCA.  --continue ASA/BB/Statin.

## 2021-11-06 NOTE — ED PROVIDER NOTE - CLINICAL SUMMARY MEDICAL DECISION MAKING FREE TEXT BOX
Pt with CAD, PAD, HTN, HLD who presents with syncope and exertional chest pain.  HEART score = 5.  EKG & Troponin WNL.  BNP mildly elevated.  CTA negative for PE & has stable appearance of known thoracic ascending aorta.  CT head pending.  Discussed case with Cardiology (Dr. Mustafa) who agreed with the need for Telemetry admission/further observation.  Hx of cocaine abuse and patient having concerns about his living situation, so may need a SW consult during admission.

## 2021-11-06 NOTE — H&P ADULT - BACK
Hx of HTN on amlodipine and valasartan last admission.  - c/w home amlodipine 10mg, currently normotensive No deformity or limitation of movement

## 2021-11-06 NOTE — H&P ADULT - HISTORY OF PRESENT ILLNESS
71 yr old M, former [place], [facility] resident [facility] (unsatisfied with his living situation), current cocaine/marijuanna/ETOH abuse (UTOX POSITIVE 11/6 and 11/4), PMHx of HTN, hyperlipidemia, DM, Stage III CKD (baseline Cr 1.3-1.5 noted on prior admissions), HIV (undetectable viral load), Hep C s/p Harvoni ~4 yrs ago, prostate CA s/p radiation, depression, NICM (EF 35-40%), nonobstructive CAD (cath@[facility] 7/28/21 which revealed normal LM, 30% mLAD lesion, luminal irregularities of LCx/RCA),  known ascending aortic aneurysm 4.6cm, PAD, admission every 2 weeks for "syncope", last admitted to [facility] 9/25-9/27/21,     Patient now returns to [facility] ED 11/6/21 s/p unwitnessed "syncopal episode" while walking in park. Pt also endorsing chest pressure with ambulation for months. In ED, BP: 137/70 , HR: 80s, RR:18, Temp: 98.1 F, O2 sat: 98% RA. EKG revealed NSR@71BPM with TWI in lateral leads. CXR no acute findings. CT chest PE protocol: negative for PE with Stable aneurysmal dilatation of ascending aorta 4.6 cm. CT head negative. Labs notable for: UTOX positive for cocaine 11/6 and 11/4, Trop High sensitivity negative x 2 sets, , Ddimer 240, Cr 1.43, Mg 1.5, COVID negative. Patient treated with Aspirin 324mg PO x 1 dose and Mg 1g IV x 1 dose.  Patient now transferred to [facility] for further syncope work and tele monitoring.    71 yr old M, former [place], [facility] resident [facility] (unsatisfied with his living situation), current cocaine/marijuanna/ETOH abuse (UTOX POSITIVE 11/6 and 11/4), PMHx of HTN, hyperlipidemia, diet controlled DM, Stage III CKD (baseline Cr 1.3-1.5 noted on prior admissions), HIV (undetectable viral load), Hep C s/p Harvoni ~4 yrs ago, prostate CA s/p radiation, depression, NICM (EF 35-40% by Echo 9/2021), nonobstructive CAD (cath@[facility] 7/28/21 which revealed normal LM, 30% mLAD lesion, luminal irregularities of LCx/RCA),  known ascending aortic aneurysm 4.6cm, PAD, admission every 2 weeks for "syncope", last admitted to [facility] 9/25-9/27/21.    Patient now returns to [facility] ED 11/6/21 s/p unwitnessed "syncopal episode" while walking in park this morning. Patient reports after walking for ~ 1 hour he felt sudden onset of nonradiating substernal chest pressure, followed by lightheadedness and fall. Patient believes he had LOC for ~2 minutes until he was able to finally seek help from bystanders. Patient denies any N/V, diaphoresis, palpitations, PND, orthopnea, LE edema, recent travel or sick contacts.     In ED, BP: 137/70 , HR: 80s, RR:18, Temp: 98.1 F, O2 sat: 98% RA. EKG revealed NSR@71BPM with TWI in lateral leads. CXR no acute findings. CT chest PE protocol: negative for PE with Stable aneurysmal dilatation of ascending aorta 4.6 cm. CT head negative. Labs notable for: UTOX positive for cocaine 11/6 and 11/4, Trop High sensitivity negative x 2 sets, , Ddimer 240, Cr 1.43, Mg 1.5, COVID negative. Patient treated with Aspirin 324mg PO x 1 dose and Mg 1g IV x 1 dose.  Patient now transferred to [facility] for further syncope work and tele monitoring.

## 2021-11-06 NOTE — H&P ADULT - PROBLEM SELECTOR PLAN 2
clinically euvolemic, will continue home medications: Coreg 6.25mg PO q 12hrs, Enalapril 2.5mg PO daily, Imdur ER 30mg PO daily, strict I/Os and daily weights.    ***Recent Echo 9/27/21 revealed mild concentric LVH, moderately reduced EF:35-40% with regional WMA. There is basal to mid inferior wall severe hypokinesis. Basal to mid anterolateral wall is not well visualized. The proximal ascending aorta is dilated measuring 4.70 cm.

## 2021-11-06 NOTE — ED PROVIDER NOTE - CARE PLAN
1 Principal Discharge DX:	Syncope  Secondary Diagnosis:	Chest pain   Principal Discharge DX:	Syncope  Secondary Diagnosis:	Chest pain  Secondary Diagnosis:	Cocaine abuse

## 2021-11-06 NOTE — H&P ADULT - PROBLEM SELECTOR PLAN 1
currently asymptomatic, VSS, EKG revealed NSR@71BPM with TWI in lateral leads. CT head without acute findings.   --CT Angio Chest negative for PE/dissection, stable 4.6cm ascending aortic aneurysm.   --Cardiac enzymes (high sensitivity Troponin) negative x 2 sets, F/U in AM.  --will obtain orthostatics.  --continue cardiac telemetry and consider event monitor.

## 2021-11-06 NOTE — H&P ADULT - NSHPADDITIONALINFOADULT_GEN_ALL_CORE
Attending Addendum  Patient seen and examined on 11/7/21. Please see my addendum to 11/7 progress note.   ABD

## 2021-11-06 NOTE — H&P ADULT - PROBLEM SELECTOR PLAN 6
continue home medication: on Pravastatin 80mg PO qhs@home, will change to TIC Atorvastatin 20mg PO qhs.

## 2021-11-06 NOTE — H&P ADULT - NSHPSOCIALHISTORY_GEN_ALL_CORE
Illicit drugs: admits to marijuana 1 week ago and Cocaine 3 days ago  ETOH: 3-4 iona's weekly  Tobacco: denies

## 2021-11-06 NOTE — H&P ADULT - PROBLEM SELECTOR PLAN 5
stable, will continue HOME medications: Coreg 6.25mg PO q 12hrs, Lisinopril 2.5mg PO daily and Norvasc 2.5mg PO daily.

## 2021-11-06 NOTE — ED ADULT NURSE NOTE - NSIMPLEMENTINTERV_GEN_ALL_ED
Implemented All Fall Risk Interventions:  [place] to call system. Call bell, personal items and telephone within reach. Instruct patient to call for assistance. Room bathroom lighting operational. Non-slip footwear when patient is off stretcher. Physically safe environment: no spills, clutter or unnecessary equipment. Stretcher in lowest position, wheels locked, appropriate side rails in place. Provide visual cue, wrist band, yellow gown, etc. Monitor gait and stability. Monitor for mental status changes and reorient to person, place, and time. Review medications for side effects contributing to fall risk. Reinforce activity limits and safety measures with patient and family.

## 2021-11-06 NOTE — H&P ADULT - ASSESSMENT
71 yr old M, former [place], current cocaine/marijuanna/ETOH abuse (UTOX POSITIVE 11/6 and 11/4), PMHx of HTN, hyperlipidemia, DM, Stage III CKD, HIV, Hep C s/p Harvoni ~4 yrs ago, prostate CA s/p radiation, depression, NICM (EF 35-40%), nonobstructive CAD (by cath 7/2021) who presented to [facility] 11/6/21 s/p syncopal episode,  transferred to [facility] for further syncope work and tele monitoring.    71 yr old M, former [place], current cocaine/marijuanna/ETOH abuse (UTOX POSITIVE 11/6 and 11/4), PMHx of HTN, hyperlipidemia, diet controlled DM, Stage III CKD, HIV, Hep C s/p Harvoni ~4 yrs ago, prostate CA s/p radiation, depression, NICM (EF 35-40%), nonobstructive CAD (by cath 7/2021) who presented to [facility] 11/6/21 s/p syncopal episode,  transferred to [facility] for further syncope work and tele monitoring.

## 2021-11-06 NOTE — ED PROVIDER NOTE - OBJECTIVE STATEMENT
He has a PMHx of HIV (undetectable viral load, unknown CD4), HTN, borderline HLD, PAD with LE stents, hx of drug abuse (cocaine), and previous MI.  He states he went for a walk today and suddenly felt dizzy and lightheaded and then had a syncopal episode.  It was unwitnessed, but when he woke up, he asked bystanders to call 911.  Vitals were stable and he was brought here by EMS without any incidents.  He was reportedly AA&Ox3.  No seizure activity reported and he was not post-ictal.  He denies any head injury and denies a headache at this time.  He is not on any anticoagulation (but he apparently has been on Eliquis in the past). He states he has had syncopal episodes like this very frequently in the past (almost on a monthly basis), and he was admitted to [facility] in September of this year for a similar episode.  He admits to cocaine abuse, but he denies any use since Wednesday, and he is in fact going to outpatient rehab Mon-Th at this time.  He had a cardiac cath in July 2021 which showed CAD but no need for intervention, and his last echo in September showed an EF of 35-40% with inferior hypokinesis.  He has a known ascending aortic aneurysm which has been stable.  He also states he has been having exertional chest pain/pressure for the past several weeks, relieved with rest, and accompanied by fatigue and shortness of breath.  No cough.  No F/C.  Fully immunized vs COVID.  Currently denies any CP or SOB.

## 2021-11-06 NOTE — ED ADULT NURSE NOTE - OBJECTIVE STATEMENT
Pt BIBA from street s/p syncopal episode in street. A&Ox4 in ED reports generalized fatigue, regular cocaine user.

## 2021-11-07 LAB
A1C WITH ESTIMATED AVERAGE GLUCOSE RESULT: 6.1 % — HIGH (ref 4–5.6)
ANION GAP SERPL CALC-SCNC: 9 MMOL/L — SIGNIFICANT CHANGE UP (ref 5–17)
BUN SERPL-MCNC: 27 MG/DL — HIGH (ref 7–23)
CALCIUM SERPL-MCNC: 8.7 MG/DL — SIGNIFICANT CHANGE UP (ref 8.4–10.5)
CHLORIDE SERPL-SCNC: 106 MMOL/L — SIGNIFICANT CHANGE UP (ref 96–108)
CHOLEST SERPL-MCNC: 110 MG/DL — SIGNIFICANT CHANGE UP
CO2 SERPL-SCNC: 24 MMOL/L — SIGNIFICANT CHANGE UP (ref 22–31)
COVID-19 NUCLEOCAPSID GAM AB INTERP: POSITIVE
COVID-19 NUCLEOCAPSID TOTAL GAM ANTIBODY RESULT: 10.8 INDEX — HIGH
COVID-19 SPIKE DOMAIN AB INTERP: POSITIVE
COVID-19 SPIKE DOMAIN ANTIBODY RESULT: >250 U/ML — HIGH
CREAT SERPL-MCNC: 1.42 MG/DL — HIGH (ref 0.5–1.3)
ESTIMATED AVERAGE GLUCOSE: 128 MG/DL — HIGH (ref 68–114)
GLUCOSE SERPL-MCNC: 159 MG/DL — HIGH (ref 70–99)
HDLC SERPL-MCNC: 47 MG/DL — SIGNIFICANT CHANGE UP
LIPID PNL WITH DIRECT LDL SERPL: 45 MG/DL — SIGNIFICANT CHANGE UP
MAGNESIUM SERPL-MCNC: 1.8 MG/DL — SIGNIFICANT CHANGE UP (ref 1.6–2.6)
NON HDL CHOLESTEROL: 63 MG/DL — SIGNIFICANT CHANGE UP
POTASSIUM SERPL-MCNC: 4.1 MMOL/L — SIGNIFICANT CHANGE UP (ref 3.5–5.3)
POTASSIUM SERPL-SCNC: 4.1 MMOL/L — SIGNIFICANT CHANGE UP (ref 3.5–5.3)
SARS-COV-2 IGG+IGM SERPL QL IA: 10.8 INDEX — HIGH
SARS-COV-2 IGG+IGM SERPL QL IA: >250 U/ML — HIGH
SARS-COV-2 IGG+IGM SERPL QL IA: POSITIVE
SARS-COV-2 IGG+IGM SERPL QL IA: POSITIVE
SODIUM SERPL-SCNC: 139 MMOL/L — SIGNIFICANT CHANGE UP (ref 135–145)
TRIGL SERPL-MCNC: 89 MG/DL — SIGNIFICANT CHANGE UP
TROPONIN T SERPL-MCNC: 0.01 NG/ML — SIGNIFICANT CHANGE UP (ref 0–0.01)

## 2021-11-07 PROCEDURE — 99233 SBSQ HOSP IP/OBS HIGH 50: CPT

## 2021-11-07 RX ORDER — FOLIC ACID 0.8 MG
1 TABLET ORAL DAILY
Refills: 0 | Status: DISCONTINUED | OUTPATIENT
Start: 2021-11-07 | End: 2021-11-09

## 2021-11-07 RX ORDER — MAGNESIUM OXIDE 400 MG ORAL TABLET 241.3 MG
400 TABLET ORAL ONCE
Refills: 0 | Status: COMPLETED | OUTPATIENT
Start: 2021-11-07 | End: 2021-11-07

## 2021-11-07 RX ORDER — INFLUENZA VIRUS VACCINE 15; 15; 15; 15 UG/.5ML; UG/.5ML; UG/.5ML; UG/.5ML
0.7 SUSPENSION INTRAMUSCULAR ONCE
Refills: 0 | Status: DISCONTINUED | OUTPATIENT
Start: 2021-11-07 | End: 2021-11-09

## 2021-11-07 RX ORDER — THIAMINE MONONITRATE (VIT B1) 100 MG
100 TABLET ORAL DAILY
Refills: 0 | Status: COMPLETED | OUTPATIENT
Start: 2021-11-07 | End: 2021-11-09

## 2021-11-07 RX ORDER — ESCITALOPRAM OXALATE 10 MG/1
10 TABLET, FILM COATED ORAL DAILY
Refills: 0 | Status: DISCONTINUED | OUTPATIENT
Start: 2021-11-08 | End: 2021-11-09

## 2021-11-07 RX ADMIN — Medication 100 MILLIGRAM(S): at 12:32

## 2021-11-07 RX ADMIN — MAGNESIUM OXIDE 400 MG ORAL TABLET 400 MILLIGRAM(S): 241.3 TABLET ORAL at 10:09

## 2021-11-07 RX ADMIN — ATORVASTATIN CALCIUM 20 MILLIGRAM(S): 80 TABLET, FILM COATED ORAL at 21:08

## 2021-11-07 RX ADMIN — ARIPIPRAZOLE 2 MILLIGRAM(S): 15 TABLET ORAL at 03:47

## 2021-11-07 RX ADMIN — Medication 1 TABLET(S): at 12:33

## 2021-11-07 RX ADMIN — CARVEDILOL PHOSPHATE 6.25 MILLIGRAM(S): 80 CAPSULE, EXTENDED RELEASE ORAL at 17:41

## 2021-11-07 RX ADMIN — Medication 81 MILLIGRAM(S): at 12:32

## 2021-11-07 RX ADMIN — CARVEDILOL PHOSPHATE 6.25 MILLIGRAM(S): 80 CAPSULE, EXTENDED RELEASE ORAL at 06:53

## 2021-11-07 RX ADMIN — Medication 5 MILLIGRAM(S): at 21:08

## 2021-11-07 RX ADMIN — TRAMADOL HYDROCHLORIDE 50 MILLIGRAM(S): 50 TABLET ORAL at 23:55

## 2021-11-07 RX ADMIN — AMLODIPINE BESYLATE 2.5 MILLIGRAM(S): 2.5 TABLET ORAL at 06:52

## 2021-11-07 RX ADMIN — Medication 1 MILLIGRAM(S): at 12:33

## 2021-11-07 RX ADMIN — ESCITALOPRAM OXALATE 5 MILLIGRAM(S): 10 TABLET, FILM COATED ORAL at 03:42

## 2021-11-07 RX ADMIN — EMTRICITABINE AND TENOFOVIR DISOPROXIL FUMARATE 1 TABLET(S): 200; 300 TABLET, FILM COATED ORAL at 03:47

## 2021-11-07 RX ADMIN — Medication 2.5 MILLIGRAM(S): at 06:53

## 2021-11-07 RX ADMIN — ENOXAPARIN SODIUM 40 MILLIGRAM(S): 100 INJECTION SUBCUTANEOUS at 12:32

## 2021-11-07 RX ADMIN — DOLUTEGRAVIR SODIUM 50 MILLIGRAM(S): 25 TABLET, FILM COATED ORAL at 12:37

## 2021-11-07 RX ADMIN — ISOSORBIDE MONONITRATE 30 MILLIGRAM(S): 60 TABLET, EXTENDED RELEASE ORAL at 12:41

## 2021-11-07 NOTE — BH CONSULTATION LIAISON ASSESSMENT NOTE - SUMMARY
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 Psychiatry consulted with concern for depression, passive SI, and patient requested to speak with psychiatry.    Patient attributed his intermittent mood, occasional hopelessness, and intermittent passive SI to his housing situation and his lack of close supports. He denied any active SI, denied plan/intent, but was treatment-seeking and interested in continuing to titrate his outpatient medications for mood. He was unclear and vague about his current outpatient care, but would like to connect to a psychotherapist and other outpatient providers. Overall impression is cocaine use disorder, rule out substance-induced mood disorder/symptoms, rule out MDD, rule out secondary gain of hospital stays.    RECOMMENDATIONS:  --Maintain on 1:1 for safety, will likely be taken off on Monday pending longitudinal assessments  --Increase Lexapro to 10mg daily for mood  --Continue Abilify 2mg daily for mood augmentation  --Will attempt to obtain collateral again  --Likely will not need psychiatric admission but will continue to follow at this time

## 2021-11-07 NOTE — BH CONSULTATION LIAISON ASSESSMENT NOTE - NSBHCHARTREVIEWVS_PSY_A_CORE FT
Vital Signs Last 24 Hrs  T(C): 36.8 (07 Nov 2021 12:30), Max: 36.9 (07 Nov 2021 09:02)  T(F): 98.2 (07 Nov 2021 12:30), Max: 98.4 (07 Nov 2021 09:02)  HR: 70 (07 Nov 2021 12:36) (58 - 72)  BP: 134/74 (07 Nov 2021 12:36) (120/78 - 154/87)  BP(mean): 114 (07 Nov 2021 05:13) (97 - 114)  RR: 18 (07 Nov 2021 12:36) (16 - 18)  SpO2: 95% (07 Nov 2021 12:36) (95% - 99%)

## 2021-11-07 NOTE — BH CONSULTATION LIAISON ASSESSMENT NOTE - HPI (INCLUDE ILLNESS QUALITY, SEVERITY, DURATION, TIMING, CONTEXT, MODIFYING FACTORS, ASSOCIATED SIGNS AND SYMPTOMS)
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 Psychiatry consulted with concern for depression, passive SI, and patient requested to speak with psychiatry.    Upon evaluation, patient was calm, cooperative, linear, somewhat overinclusive, injustice gathering. He started by saying "I'm living in a six by six room", continued to describe his living situation in the [facility] including sharing a bathroom and the drug use in his building. He attributed his current low mood mostly to these living conditions. Patient said that he has decreased his own substance use to only using cocaine about once every 3 months. He noted that he also has intermittently low moods because he feels that he is getting older and being "left out of life". When asked about SI, patient said he was "undecided" but had no plan or intent to harm himself at this time. He also denied HI, AH, paranoid thoughts. He said "I feel a little hopeless so I'm just not sure."    Patient said he has been consistently taking Lexapro 5mg and Abilify 2mg daily but said he was "between psychiatrists" and vague about who has been prescribing these medications. He was amenable to optimizing these medications and being connected to outpatient care, however.     He refused to provide collateral contacts. "My best friend is in [place], I don't want to bother him".

## 2021-11-07 NOTE — PROGRESS NOTE ADULT - ASSESSMENT
71 yr old M, former [place], current cocaine/marijuanna/ETOH abuse (UTOX POSITIVE 11/6 and 11/4), PMHx of HTN, hyperlipidemia, diet controlled DM, Stage III CKD, HIV, Hep C s/p Harvoni ~4 yrs ago, prostate CA s/p radiation, depression, NICM (EF 35-40%), nonobstructive CAD (by cath 7/2021) who presented to [facility] 11/6/21 s/p syncopal episode,  transferred to [facility] for further syncope work and tele monitoring.

## 2021-11-07 NOTE — PATIENT PROFILE ADULT - NSPRESCRUSEDDRG_GEN_A_NUR
cocaine, marihuana  and crystal meth per pt report. Last date of use reported for cocaine last week/Yes

## 2021-11-07 NOTE — PROGRESS NOTE ADULT - PROBLEM SELECTOR PLAN 10
will continue HOME MEDS: Abilify 2 mg PO daily and Lexapro 5 mg PO daily.   --Patient states he has been feeling depressed and alone, has thought about suicide but does not have plan in place. Constant observation.  Psych consulted, f/u recs.       N: DASH   E: Maintain K>4.0 and Mg >2.0  VTE: Lovenox subcut  Code: Full.

## 2021-11-07 NOTE — BH CONSULTATION LIAISON ASSESSMENT NOTE - RISK ASSESSMENT
Chronic risk factors include poor coping skills, history of mood symptoms. Acute risk factors include intermittent hopelessness and lack of supports in [place]. Mitigating factors include future-oriented, treatment-seeking, some supports present in life, no active SI, no plan/intent. Overall patient is low chronic and low acute risk of suicide.

## 2021-11-07 NOTE — PROGRESS NOTE ADULT - PROBLEM SELECTOR PLAN 2
clinically euvolemic  - Recent Echo 9/27/21 revealed mild concentric LVH, moderately reduced EF:35-40% with regional WMA. There is basal to mid inferior wall severe hypokinesis. Basal to mid anterolateral wall is not well visualized. The proximal ascending aorta is dilated measuring 4.70 cm.  - continue home medications: Coreg 6.25mg PO q 12hrs, Enalapril 2.5mg PO daily, Imdur ER 30mg PO daily,   - Core measures, strict I/Os and daily weights.

## 2021-11-07 NOTE — BH CONSULTATION LIAISON ASSESSMENT NOTE - NSBHMSEPERCEPT_PSY_A_CORE
[de-identified] : f/u for check of face, body eruptions;  was hospitalized again with fever last week; \par using fluticasone ointment on body for eczema, with + relief;\par still c/o crusting, pain on scalp, neck;  was recently Dx with zoster; \par 
No abnormalities

## 2021-11-07 NOTE — PROGRESS NOTE ADULT - PROBLEM SELECTOR PLAN 1
currently asymptomatic, VSS, EKG revealed NSR@71BPM with TWI in lateral leads. CT head without acute findings.   --CT Angio Chest negative for PE/dissection, stable 4.6cm ascending aortic aneurysm.   --Cardiac enzymes (high sensitivity Troponin) negative x 2 sets, CE @ [facility] negative.   --will obtain orthostatics.  --continue cardiac Tele.  Showing NSVT  --EP consult in AM to discuss event monitor.

## 2021-11-07 NOTE — BH CONSULTATION LIAISON ASSESSMENT NOTE - CURRENT MEDICATION
MEDICATIONS  (STANDING):  amLODIPine   Tablet 2.5 milliGRAM(s) Oral daily  ARIPiprazole 2 milliGRAM(s) Oral daily  aspirin enteric coated 81 milliGRAM(s) Oral daily  atorvastatin 20 milliGRAM(s) Oral at bedtime  carvedilol 6.25 milliGRAM(s) Oral every 12 hours  dolutegravir 50 milliGRAM(s) Oral daily  emtricitabine 200 mG/tenofovir alafenamide 25 mG (DESCOVY) Tablet 1 Tablet(s) Oral daily  enalapril 2.5 milliGRAM(s) Oral daily  enoxaparin Injectable 40 milliGRAM(s) SubCutaneous daily  escitalopram 5 milliGRAM(s) Oral daily  folic acid 1 milliGRAM(s) Oral daily  influenza  Vaccine (HIGH DOSE) 0.7 milliLiter(s) IntraMuscular once  isosorbide   mononitrate ER Tablet (IMDUR) 30 milliGRAM(s) Oral daily  multivitamin 1 Tablet(s) Oral daily  thiamine 100 milliGRAM(s) Oral daily    MEDICATIONS  (PRN):  melatonin 5 milliGRAM(s) Oral at bedtime PRN Insomnia  traMADol 50 milliGRAM(s) Oral every 6 hours PRN Moderate Pain (4 - 6)

## 2021-11-07 NOTE — PATIENT PROFILE ADULT - FALL HARM RISK
[facility]  FAMILY MEDICINE RESIDENCY PROGRAM   OFFICE PROGRESS NOTE  DATE OF VISIT : 6/7/2021         Chief Complaint :   Chief Complaint   Patient presents with    Check-Up       HPI:   Trena Rowe comes to clinic today for     F/U of chronic problem(s) and new or recent complaint of weight gain and dyspnea     Chronic problems reviewed today include:     Hypertension, Hyperlipidemia, COPD, Obesity, Coronary artery disease, Osteoarthritis and frequent falls and memory loss, and atrial fibrillation on warfarin    Current status of this/these condition(s):  stable    Tolerating meds: Yes, but unclear as to dosing and whether she takes them all. Additional history: Recently discharged from SNF after admission for weakness and frequent falls. Underwent PT and sent home, but since has had some weight gain and dyspnea. She may be missing some prescriptions and there were other changes in her medications. Warfarin was decreased, but restarted higher doses after INR was low at home. Was getting prepackaged meds, but with changes, she is again confused with the changes and thinks that she may be missing some meds. Today, she feels pretty well. She comes in with a walker, but complains that she has to rock her self a bit to stand up. And she uses her arms for most of it. She then develops some pain in her chest wall upper back and shoulders. She did fall again last week, but there were no significant injuries. Her falls generally relate to weakness in her legs. She did have therapy at the rehab center, and then also some home therapy. She agrees that exercises were reviewed at home, but that she does not really do them. At home, she has a lift chair that she uses most of the time. She continues to have urinary frequency, and occasionally incontinence. She denies true chest pain, but does have the chest wall pain as mentioned above.   Her dyspnea occurs when she walks to the bathroom or to the kitchen. She does admit to having decreased energy levels, and there is some underlying depression as well. This is about the same as previously and has not significantly changed despite attempts at treatment. Some of this relates to her relationship with her , but some of it also relates to her general home situation and her health problems. She has some confusion about her medications. She does get pill packs from the pharmacy, but has to separate her Synthroid out and then occasionally is missing pills. Objective:    VS:  Blood pressure 118/77, pulse 59, temperature 97.7 °F (36.5 °C), temperature source Temporal, resp. rate 18, height 5' 6\" (1.676 m), weight 249 lb (112.9 kg), SpO2 98 %, not currently breastfeeding. General Appearance: Obese. Awake and alert. Oriented x3. Neck: Supple, symmetrical, trachea midline. No JVD. Thyroid smooth, not enlarged. Chest wall/Lung: Clear to auscultation bilaterally,  respirations unlabored. No rhonchi/wheezing/rales  Heart[de-identified]  Irregular rate and rhythm, S1and S2 normal, GII/VI ASHLEY. Extremities:  Extremities normal, atraumatic, no cyanosis. trace edema. Skin: Skin color, texture, turgor normal, no rashes or lesions. Some healing scars on the lower extremities  Musculoskeletal: Decreased strength in all 4 extremities, especially lower extremities. Neurologic:    Alert, oriented. Walks with a walker, slightly flexed and with slow and small steps. Psychiatric: has a normal mood and affect. Behavior is normal.     I independently reviewed the following information:  lab reports and nursing home notes    1. Alzheimer's dementia without behavioral disturbance, unspecified timing of dementia onset ([facility]  2. Pure hypercholesterolemia  3. Mixed stress and urge urinary incontinence  4. Chronic anticoagulation  -     POCT INR  5.  Class 2 severe obesity due to excess calories with serious comorbidity and body mass index (BMI) of 39.0 to 39.9 in adult [facility])  6. Moderate episode of recurrent major depressive disorder ([facility]  7. Frequent falls  8. Hypothyroidism, unspecified type  9. Chronic diastolic (congestive) heart failure (HCC)  -     BRAIN NATRIURETIC PEPTIDE; Future  10. Permanent atrial fibrillation ([facility]  11. Essential hypertension  -     Basic Metabolic Panel; Future  12. Shortness of breath  -     BRAIN NATRIURETIC PEPTIDE; Future      Additional Plan: Her INR today is 2.1, so her warfarin dose will remain 8 mg daily. She will have another INR done at her house in 1 week. I suggested that she make want to have the the aid help her with her medications at home. She does have an aide coming in several days a week. Her other medications will remain the same. I did obtain some laboratory testing today and we will notify her of those results. I think the most important thing for her is to continue to use the walker when walking. But she must also begin to do more exercises for her legs. She loses strength very rapidly, and this is the most common reason for her falls. We have put her through physical therapy both inpatient and outpatient, but then she does not continue to do exercises at home. She tells me that she will make it is attempt to do so from now on. I did have her speak with the  today again to help make arrangements for her pill packs. She expressed a lot of frustration and depression with her home situation and her marriage. On this date 6/7/2021 I have spent 38 minutes reviewing previous notes, test results and face to face with the patient discussing the diagnosis and importance of compliance with the treatment plan as well as documenting on the day of the visit. RTO in in 1 month(s) or sooner for any persistent, new, or worsening symptoms. Please see Patient Instructions for further counseling and information given.        Advised to be adherent to the treatment plans discussed today, and please call with any questions or concerns, letting the office know of any reasons that the plans may not be followed. The risks of untreated conditions include worsening illness, injury, disability, and possibly, death. Please call if symptoms change in any way, worsen, or fail to completely resolve, as this could necessitate a change to treatment plans. Patient and/or caregiver expressed understanding. Indications and proper use of medication(s) reviewed. Potential side-effects and risks of medication(s) also explained. Patient and/or caregiver was instructed to call if any new symptoms develop prior to next visit. Health risk factors discussed and addressed.     Signed by : Charisse Apgar, MD, M.D. recent fall/other

## 2021-11-07 NOTE — PROGRESS NOTE ADULT - PROBLEM SELECTOR PLAN 9
- Utox positive for cocaine on 11/4 and 11/6.  - [facility] protocol for ETOH abuse.

## 2021-11-07 NOTE — BH CONSULTATION LIAISON ASSESSMENT NOTE - NSICDXBHSECONDARYDX_PSY_ALL_CORE
Syncope   R55  Hypertension   I10  Hyperlipidemia   E78.5  HIV disease   B20  Stage 3 chronic kidney disease   N18.30  Ascending aortic aneurysm   I71.2  Drug abuse   F19.10  Chronic systolic congestive heart failure   I50.22  Nonobstructive atherosclerosis of coronary artery   I25.10  Depression, major   F32.9

## 2021-11-07 NOTE — PROGRESS NOTE ADULT - PROBLEM SELECTOR PLAN 7
baseline Cr 1.3-1.5 from prior admissions.  Bun/Crt remains at baseline.  Today 27/1.42  --will continue to monitor closely.  --avoid nephrotoxic agents.

## 2021-11-08 LAB
ANION GAP SERPL CALC-SCNC: 9 MMOL/L — SIGNIFICANT CHANGE UP (ref 5–17)
BUN SERPL-MCNC: 32 MG/DL — HIGH (ref 7–23)
CALCIUM SERPL-MCNC: 8.5 MG/DL — SIGNIFICANT CHANGE UP (ref 8.4–10.5)
CHLORIDE SERPL-SCNC: 110 MMOL/L — HIGH (ref 96–108)
CO2 SERPL-SCNC: 24 MMOL/L — SIGNIFICANT CHANGE UP (ref 22–31)
CREAT SERPL-MCNC: 1.27 MG/DL — SIGNIFICANT CHANGE UP (ref 0.5–1.3)
GLUCOSE SERPL-MCNC: 140 MG/DL — HIGH (ref 70–99)
HCT VFR BLD CALC: 36.7 % — LOW (ref 39–50)
HGB BLD-MCNC: 11.9 G/DL — LOW (ref 13–17)
MAGNESIUM SERPL-MCNC: 1.7 MG/DL — SIGNIFICANT CHANGE UP (ref 1.6–2.6)
MCHC RBC-ENTMCNC: 30.7 PG — SIGNIFICANT CHANGE UP (ref 27–34)
MCHC RBC-ENTMCNC: 32.4 GM/DL — SIGNIFICANT CHANGE UP (ref 32–36)
MCV RBC AUTO: 94.6 FL — SIGNIFICANT CHANGE UP (ref 80–100)
NRBC # BLD: 0 /100 WBCS — SIGNIFICANT CHANGE UP (ref 0–0)
PLATELET # BLD AUTO: 99 K/UL — LOW (ref 150–400)
POTASSIUM SERPL-MCNC: 3.9 MMOL/L — SIGNIFICANT CHANGE UP (ref 3.5–5.3)
POTASSIUM SERPL-SCNC: 3.9 MMOL/L — SIGNIFICANT CHANGE UP (ref 3.5–5.3)
RBC # BLD: 3.88 M/UL — LOW (ref 4.2–5.8)
RBC # FLD: 12.6 % — SIGNIFICANT CHANGE UP (ref 10.3–14.5)
SODIUM SERPL-SCNC: 143 MMOL/L — SIGNIFICANT CHANGE UP (ref 135–145)
WBC # BLD: 2.81 K/UL — LOW (ref 3.8–10.5)
WBC # FLD AUTO: 2.81 K/UL — LOW (ref 3.8–10.5)

## 2021-11-08 PROCEDURE — 99233 SBSQ HOSP IP/OBS HIGH 50: CPT

## 2021-11-08 RX ADMIN — TRAMADOL HYDROCHLORIDE 50 MILLIGRAM(S): 50 TABLET ORAL at 08:57

## 2021-11-08 RX ADMIN — TRAMADOL HYDROCHLORIDE 50 MILLIGRAM(S): 50 TABLET ORAL at 07:57

## 2021-11-08 RX ADMIN — DOLUTEGRAVIR SODIUM 50 MILLIGRAM(S): 25 TABLET, FILM COATED ORAL at 11:34

## 2021-11-08 RX ADMIN — ENOXAPARIN SODIUM 40 MILLIGRAM(S): 100 INJECTION SUBCUTANEOUS at 11:34

## 2021-11-08 RX ADMIN — ISOSORBIDE MONONITRATE 30 MILLIGRAM(S): 60 TABLET, EXTENDED RELEASE ORAL at 11:33

## 2021-11-08 RX ADMIN — Medication 1 TABLET(S): at 11:34

## 2021-11-08 RX ADMIN — ATORVASTATIN CALCIUM 20 MILLIGRAM(S): 80 TABLET, FILM COATED ORAL at 21:09

## 2021-11-08 RX ADMIN — CARVEDILOL PHOSPHATE 6.25 MILLIGRAM(S): 80 CAPSULE, EXTENDED RELEASE ORAL at 05:03

## 2021-11-08 RX ADMIN — ARIPIPRAZOLE 2 MILLIGRAM(S): 15 TABLET ORAL at 11:33

## 2021-11-08 RX ADMIN — Medication 1 MILLIGRAM(S): at 22:27

## 2021-11-08 RX ADMIN — ESCITALOPRAM OXALATE 10 MILLIGRAM(S): 10 TABLET, FILM COATED ORAL at 11:33

## 2021-11-08 RX ADMIN — Medication 5 MILLIGRAM(S): at 21:10

## 2021-11-08 RX ADMIN — EMTRICITABINE AND TENOFOVIR DISOPROXIL FUMARATE 1 TABLET(S): 200; 300 TABLET, FILM COATED ORAL at 03:11

## 2021-11-08 RX ADMIN — Medication 1 MILLIGRAM(S): at 00:51

## 2021-11-08 RX ADMIN — CARVEDILOL PHOSPHATE 6.25 MILLIGRAM(S): 80 CAPSULE, EXTENDED RELEASE ORAL at 17:58

## 2021-11-08 RX ADMIN — TRAMADOL HYDROCHLORIDE 50 MILLIGRAM(S): 50 TABLET ORAL at 01:00

## 2021-11-08 RX ADMIN — AMLODIPINE BESYLATE 2.5 MILLIGRAM(S): 2.5 TABLET ORAL at 05:03

## 2021-11-08 RX ADMIN — Medication 100 MILLIGRAM(S): at 11:34

## 2021-11-08 RX ADMIN — Medication 81 MILLIGRAM(S): at 11:33

## 2021-11-08 RX ADMIN — Medication 2.5 MILLIGRAM(S): at 05:03

## 2021-11-08 RX ADMIN — Medication 1 MILLIGRAM(S): at 11:33

## 2021-11-08 NOTE — PHYSICAL THERAPY INITIAL EVALUATION ADULT - PERTINENT HX OF CURRENT PROBLEM, REHAB EVAL
71 yr old M, former [place], [facility] resident [place] (unsatisfied with his living situation), current cocaine/marijuanna/ETOH abuse (UTOX POSITIVE 11/6 and 11/4), PMHx of HTN, hyperlipidemia, diet controlled DM, Stage III CKD (baseline Cr 1.3-1.5 noted on prior admissions), HIV (undetectable viral load), Hep C s/p Harvoni ~4 yrs ago, prostate CA s/p radiation, depression, NICM (EF 35-40% by Echo 9/2021), nonobstructive CAD, admitted with syncoope 71 yr old M, former [place], [facility] resident [place] (unsatisfied with his living situation), current cocaine/marijuanna/ETOH abuse (UTOX POSITIVE 11/6 and 11/4), PMHx of HTN, hyperlipidemia, diet controlled DM, Stage III CKD (baseline Cr 1.3-1.5 noted on prior admissions), HIV (undetectable viral load), Hep C s/p Harvoni ~4 yrs ago, prostate CA s/p radiation, depression, NICM (EF 35-40% by Echo 9/2021), nonobstructive CAD, admitted with syncoope. CTH neg for acute infarct

## 2021-11-08 NOTE — PROGRESS NOTE ADULT - PROBLEM SELECTOR PLAN 5
stable, will continue HOME medications: Coreg 6.25mg PO q 12hrs, Lisinopril 2.5mg PO daily and Norvasc 2.5mg PO daily.
stable, will continue HOME medications: Coreg 6.25mg PO q 12hrs, Lisinopril 2.5mg PO daily and Norvasc 2.5mg PO daily.

## 2021-11-08 NOTE — PROGRESS NOTE ADULT - PROBLEM SELECTOR PLAN 7
baseline Cr 1.3-1.5 from prior admissions.  Bun/Crt remains at baseline.  Today 27/1.42  --will continue to monitor closely.  --avoid nephrotoxic agents. baseline Cr 1.3-1.5 from prior admissions.  Bun/Crt remains at baseline.  27/1.42 -> 32/1.27.  --will continue to monitor closely.  --avoid nephrotoxic agents.

## 2021-11-08 NOTE — BH CONSULTATION LIAISON PROGRESS NOTE - NSBHFUPINTERVALCCFT_PSY_A_CORE
Patient seen at bedside. Reports that his mood has improved after coming to the hospital. States that living at the [facility] has been very stressful to him due to his living situation and exposure to drug use inside the building. However he feels hopeful that he will move into a new apartment soon. He reports that he is working with his SW to finish his paperwork (has [facility]). currently denies any mood symptoms. Denies any SI/Hi and presents future oriented. He has been following up with an outpatient psychiatrist and then a PCP at [facility] but had to move his care due to his recent move. Agrees to accept outpatient referrals.

## 2021-11-08 NOTE — PROGRESS NOTE ADULT - PROBLEM SELECTOR PLAN 1
currently asymptomatic, VSS, EKG revealed NSR@71BPM with TWI in lateral leads. CT head without acute findings.   --CT Angio Chest negative for PE/dissection, stable 4.6cm ascending aortic aneurysm.   --Cardiac enzymes (high sensitivity Troponin) negative x 2 sets, CE @ [facility] negative.   --will obtain orthostatics.  --continue cardiac Tele.  Showing NSVT  --EP consult in AM to discuss event monitor. Reports was walking on street, felt increased stress due to living situation, felt chest pressure and lightheadedness then fell to the ground w/ possible LOC x 1-2 mins. Currently asymptomatic, VSS, EKG revealed NSR@71BPM with TWI in lateral leads. CT head without acute findings.   --CT Angio Chest negative for PE/dissection, stable 4.6cm ascending aortic aneurysm.   --Cardiac enzymes (high sensitivity Troponin) negative x 2 sets, CE @ [facility] negative.   - s/p recent diagnostic cath @[facility] 7/28/21 which revealed normal LM, 30% mLAD lesion, luminal irregularities of LCx/RCA.  --Orthostatics negative  --continue cardiac Tele.  SR 60-80s, occasional PVCs  --EP consulted. Given recurrent syncope episodes and multiple hospitalizations, plan for [employer] event monitor after discharge from hospital 11/8

## 2021-11-08 NOTE — PROGRESS NOTE ADULT - PROBLEM SELECTOR PLAN 4
stable 4.6cm ascending aortic aneurysm by CTA 11/6/21.  --will continue BP control.
stable 4.6cm ascending aortic aneurysm by CTA 11/6/21.  --will continue BP control.

## 2021-11-08 NOTE — BH CONSULTATION LIAISON PROGRESS NOTE - NSBHCHARTREVIEWVS_PSY_A_CORE FT
Vital Signs Last 24 Hrs  T(C): 36.8 (08 Nov 2021 10:19), Max: 37 (08 Nov 2021 07:34)  T(F): 98.2 (08 Nov 2021 10:19), Max: 98.6 (08 Nov 2021 07:34)  HR: 58 (08 Nov 2021 11:38) (58 - 72)  BP: 124/78 (08 Nov 2021 11:38) (123/73 - 167/83)  BP(mean): --  RR: 18 (08 Nov 2021 11:38) (17 - 18)  SpO2: 95% (08 Nov 2021 11:38) (95% - 95%)

## 2021-11-08 NOTE — PROGRESS NOTE ADULT - PROBLEM SELECTOR PLAN 6
continue home medication: on Pravastatin 80mg PO qhs@home, will change to TIC Atorvastatin 20mg PO qhs.
continue home medication: on Pravastatin 80mg PO qhs@home, will change to TIC Atorvastatin 20mg PO qhs.

## 2021-11-08 NOTE — PROGRESS NOTE ADULT - PROBLEM SELECTOR PLAN 10
will continue HOME MEDS: Abilify 2 mg PO daily and Lexapro 5 mg PO daily.   --Patient states he has been feeling depressed and alone, has thought about suicide but does not have plan in place. Constant observation.  Psych consulted, f/u recs.       N: DASH   E: Maintain K>4.0 and Mg >2.0  VTE: Lovenox subcut  Code: Full. will continue HOME MEDS: Abilify 2 mg PO daily and Lexapro 5 mg PO daily.   --Patient states he has been feeling depressed and alone, has thought about suicide but does not have plan in place.   - Psych consulted. D/c'ed 1:1 observation per Psych re-eval.    - Per psych recs continue home Abilify 2mg daily and increased lexapro to 10mg QD. Pt can f/u w/ psych as outpatient    N: DASH   E: Maintain K>4.0 and Mg >2.0  VTE: Lovenox subcut  Code: Full  Dispo: plan for DC in AM

## 2021-11-08 NOTE — PROGRESS NOTE ADULT - PROBLEM SELECTOR PLAN 9
- Utox positive for cocaine on 11/4 and 11/6.  - [facility] protocol for ETOH abuse. - Utox positive for cocaine on 11/4 and 11/6. Reports last used cocaine 11/3  - [facility] protocol for ETOH abuse.

## 2021-11-08 NOTE — PROGRESS NOTE ADULT - ATTENDING COMMENTS
Patient seen and examined. Case discussed with ACP.     70yo M polysubstance abuse (per pt no current etoh, 90% reduced cocaine use), HTN, HL, DM, CKD, HIV, Hep C, depression, NICM last EF 35-40% and non-obs CAD in July who presents with recurrent syncope and chest pain.     Has had prior admissions for syncope. Per pt chest pain has been ongoing for years and no change in symptoms. Syncope has been occuring twice a month. Orthostatic VS negative. No events on tele.     -tele monitoring  -appreciate psych recs  -discuss with EP re: extended monitor placement as recurrent syncopal episodes  -will need outpt follow-up with his outpatient cardiologist, PCP and GI as recommended during prior admission  -continue aspirin, statin, carvedilol, ACEI, imdur  - , dispo planning  -substance abuse counseling     Heidi Mustafa MD
Patient seen and examined. Case discussed with ACP.     72yo M polysubstance abuse (per pt no current etoh, 90% reduced cocaine use), HTN, HL, DM, CKD, HIV, Hep C, depression, NICM last EF 35-40% and non-obs CAD in July who presents with recurrent syncope and chest pain.     Has had prior admissions for syncope. Per pt chest pain has been ongoing for years and no change in symptoms. Syncope has been occuring twice a month.     -tele monitoring  -check orthostatic VS  -does not appear volume overloaded on exam  -review prior neuro work-up  -if above unrevealing, discuss with EP re: extended monitor placement  -psych consult for depression  -BRITNEY Mustafa MD

## 2021-11-08 NOTE — PROGRESS NOTE ADULT - ASSESSMENT
71 yr old M, former [place], current cocaine/marijuanna/ETOH abuse (UTOX POSITIVE 11/6 and 11/4), PMHx of HTN, hyperlipidemia, diet controlled DM, Stage III CKD, HIV, Hep C s/p Harvoni ~4 yrs ago, prostate CA s/p radiation, depression, NICM (EF 35-40%), nonobstructive CAD (by cath 7/2021) who presented to [facility] 11/6/21 s/p syncopal episode,  transferred to [facility] for further syncope work and tele monitoring.    71 yr old M, former [place], current cocaine/marijuanna/ETOH abuse (UTOX POSITIVE 11/6 and 11/4), PMHx of HTN, hyperlipidemia, diet controlled DM, Stage III CKD, HIV, Hep C s/p Harvoni ~4 yrs ago, prostate CA s/p radiation, depression, NICM (EF 35-40%), non-obstructive CAD (by cath 7/2021), recurrent syncope, who presented to [facility] 11/6/21 s/p unwitnessed syncopal episode while walking on street. Transferred to [facility] for further syncope work and tele monitoring.

## 2021-11-08 NOTE — PROGRESS NOTE ADULT - PROBLEM SELECTOR PLAN 3
chest pain free, EKG revealed NSR@71BPM with TWI in lateral leads. CE negative x 3.   --s/p recent diagnostic cath@[facility] 7/28/21 which revealed normal LM, 30% mLAD lesion, luminal irregularities of LCx/RCA.  --continue ASA/BB/Statin.
chest pain free, EKG revealed NSR@71BPM with TWI in lateral leads. CE negative x 3.   --s/p recent diagnostic cath@[facility] 7/28/21 which revealed normal LM, 30% mLAD lesion, luminal irregularities of LCx/RCA.  --continue ASA/BB/Statin.

## 2021-11-08 NOTE — BH CONSULTATION LIAISON PROGRESS NOTE - NSBHASSESSMENTFT_PSY_ALL_CORE
71-year-old man, , domiciled in [place], retired puppet-maker, PPH depression, anxiety, history of cocaine use, prior outpatient psychiatric treatment, intermittently adherent, PMH HTN, HLD, DM2, stage III CKD, HIV, Hep C, prostate cancer, CAD, AAA, multiple medical admissions every few weeks for possible syncopal episodes, last admitted to [facility] on medicine for 2 days in Sept 2021, now admitted again for unwitnessed syncopal episode while [place] the park. Psychiatry consulted with concern for depression, passive SI, and patient requested to speak with psychiatry. Patient currently denies any mood sx, anxiety, Si/HI and presents future oriented:  Plan:  -no need for 1:1 observation  -continue lexapro 10mg , abilify 2mg for mood sx  -please provide the pt with the following referral information  •	[facility]  o	[email or web address]  o	Three locations  ?	[address], [place] [address]  Phone: [phone]  ?	[address] [facility] [address] [place], [place] [address]  Phone: [phone]  ?	[address] [facility] [address] [place] [address]  Phone: [phone]  o	Medicare, Medicaid, most private insurance and sliding scale    •	[facility]  o	[email or web address]  o	Comprehensive addiction treatment services, including psychopharm, psychotherapy, drug testing, as well as eating disorder treatment, and specialized treatment for LGBTQ, Restorationist [place] populations  o	Two locations  ?	[address] [facility] [address] [phone]  ?	[address] [place] [address] [phone]    •	[facility] [place]  o	[email or web address]  o	Comprehensive addiction treatment services, including psychopharm, psychotherapy, opiod treatment program  o	[address] [place] [address]  [phone]  o	Medicare, Medicaid, most private insurance (NOT [employer])

## 2021-11-08 NOTE — PROGRESS NOTE ADULT - PROBLEM SELECTOR PLAN 2
clinically euvolemic  - Recent Echo 9/27/21 revealed mild concentric LVH, moderately reduced EF:35-40% with regional WMA. There is basal to mid inferior wall severe hypokinesis. Basal to mid anterolateral wall is not well visualized. The proximal ascending aorta is dilated measuring 4.70 cm.  - continue home medications: Coreg 6.25mg PO q 12hrs, Enalapril 2.5mg PO daily, Imdur ER 30mg PO daily,   - Core measures, strict I/Os and daily weights. clinically euvolemic  - Recent Echo 9/27/21 revealed mild concentric LVH, moderately reduced EF: 35-40% with regional WMA. There is basal to mid inferior wall severe hypokinesis. Basal to mid anterolateral wall is not well visualized. The proximal ascending aorta is dilated measuring 4.70 cm.  - continue home medications: Coreg 6.25mg PO q 12hrs, Enalapril 2.5mg PO daily, Imdur ER 30mg PO daily,   - Core measures, strict I/Os and daily weights.

## 2021-11-08 NOTE — PROGRESS NOTE ADULT - SUBJECTIVE AND OBJECTIVE BOX
Interventional Cardiology PA Adult Progress Note    Subjective Assessment: Patient seen and examined at bedside, feeling well with some residual fatigue.  denies chest pain.  Does have some suicidal ideation without plan  ROS negative except per HPI and subjective  	  MEDICATIONS:  amLODIPine   Tablet 2.5 milliGRAM(s) Oral daily  carvedilol 6.25 milliGRAM(s) Oral every 12 hours  enalapril 2.5 milliGRAM(s) Oral daily  isosorbide   mononitrate ER Tablet (IMDUR) 30 milliGRAM(s) Oral daily  dolutegravir 50 milliGRAM(s) Oral daily  emtricitabine 200 mG/tenofovir alafenamide 25 mG (DESCOVY) Tablet 1 Tablet(s) Oral wisam  ARIPiprazole 2 milliGRAM(s) Oral daily  escitalopram 5 milliGRAM(s) Oral daily  melatonin 5 milliGRAM(s) Oral at bedtime PRN  traMADol 50 milliGRAM(s) Oral every 6 hours PRN  atorvastatin 20 milliGRAM(s) Oral at bedtime  aspirin enteric coated 81 milliGRAM(s) Oral daily  enoxaparin Injectable 40 milliGRAM(s) SubCutaneous daily  influenza  Vaccine (HIGH DOSE) 0.7 milliLiter(s) IntraMuscular once    [PHYSICAL EXAM:  TELEMETRY:  T(C): 36.9 (11-07-21 @ 09:02), Max: 36.9 (11-07-21 @ 09:02)  HR: 72 (11-07-21 @ 08:57) (58 - 84)  BP: 135/84 (11-07-21 @ 08:57) (120/78 - 154/87)  RR: 18 (11-07-21 @ 08:57) (16 - 18)  SpO2: 95% (11-07-21 @ 08:57) (95% - 99%)  I&O's Summary    07 Nov 2021 07:01  -  07 Nov 2021 11:27  --------------------------------------------------------  IN: 120 mL / OUT: 0 mL / NET: 120 mL      Height (cm): 182.9 (11-06 @ 21:47)  Weight (kg): 82.3 (11-06 @ 21:47)  BMI (kg/m2): 24.6 (11-06 @ 21:47)  BSA (m2): 2.04 (11-06 @ 21:47)  Kulkarni:  Central/PICC/Mid Line:                                         Appearance: Normal	  HEENT:   Normal oral mucosa, PERRL, EOMI	  Neck: Supple, - JVD; Carotid Bruit   Cardiovascular: Normal S1 S2, No JVD, No murmurs,   Respiratory: Lungs clear to auscultation//No Rales, Rhonchi, Wheezing	  Gastrointestinal:  Soft, Non-tender, + BS	  Skin: No rashes, No ecchymoses, No cyanosis  Extremities: Normal range of motion, No clubbing, cyanosis or edema  Vascular: Peripheral pulses palpable 2+ bilaterally  Neurologic: Non-focal  Psychiatry: A & O x 3, Mood & affect appropriate        LABS:	 	  CARDIAC MARKERS:                        12.3   3.54  )-----------( 105      ( 06 Nov 2021 12:19 )             35.1     11-07    139  |  106  |  27<H>  ----------------------------<  159<H>  4.1   |  24  |  1.42<H>    Ca    8.7      07 Nov 2021 08:44  Mg     1.8     11-07    TPro  6.5  /  Alb  3.2<L>  /  TBili  0.4  /  DBili  x   /  AST  30  /  ALT  32  /  AlkPhos  56  11-06    proBNP: Serum Pro-Brain Natriuretic Peptide: 655 pg/mL (11-06 @ 12:19)    ASSESSMENT/PLAN: 	        DVT ppx:  Dispo:    
CARDIOLOGY NP PROGRESS NOTE    Subjective: Pt seen and examined at bedside. Reports feeling well and much better since hospitalization. Denies chest pain, sob, lightheadedness, dizziness, palpitations, fever, chills.  Remainder ROS otherwise negative.    Overnight Events: None    TELEMETRY: SR 60-80s           VITAL SIGNS:  T(C): 36.8 (11-08-21 @ 13:21), Max: 37 (11-08-21 @ 07:34)  HR: 68 (11-08-21 @ 16:52) (58 - 69)  BP: 138/86 (11-08-21 @ 16:52) (123/73 - 167/83)  RR: 18 (11-08-21 @ 16:52) (17 - 18)  SpO2: 95% (11-08-21 @ 16:52) (95% - 95%)  Wt(kg): --    I&O's Summary    07 Nov 2021 07:01  -  08 Nov 2021 07:00  --------------------------------------------------------  IN: 620 mL / OUT: 1150 mL / NET: -530 mL    08 Nov 2021 07:01  -  08 Nov 2021 17:05  --------------------------------------------------------  IN: 410 mL / OUT: 720 mL / NET: -310 mL          PHYSICAL EXAM:    Appearance: Normal	  HEENT: Normal oral mucosa   Neck: Supple, - JVD   Cardiovascular: Normal S1 S2, No JVD, No murmurs,   Respiratory: Lungs clear to auscultation//No Rales, Rhonchi, Wheezing	  Gastrointestinal:  Soft, Non-tender, + BS	  Skin: No rashes, No ecchymoses, No cyanosis  Extremities: Normal range of motion, No clubbing, cyanosis or edema  Vascular: Peripheral pulses palpable 2+ bilaterally  Neurologic: Non-focal  Psychiatry: A & O x 3, Mood & affect appropriate          LABS:                          11.9   2.81  )-----------( 99       ( 08 Nov 2021 07:06 )             36.7                              11-08    143  |  110<H>  |  32<H>  ----------------------------<  140<H>  3.9   |  24  |  1.27    Ca    8.5      08 Nov 2021 07:06  Mg     1.7     11-08                                CAPILLARY BLOOD GLUCOSE        CARDIAC MARKERS ( 07 Nov 2021 08:46 )  x     / 0.01 ng/mL / x     / x     / x              Allergies:  Peaches (Rash)  penicillins (Rash)  shellfish (Unknown)  strawberry (Rash)  sulfa drugs (Rash)    MEDICATIONS  (STANDING):  amLODIPine   Tablet 2.5 milliGRAM(s) Oral daily  ARIPiprazole 2 milliGRAM(s) Oral daily  aspirin enteric coated 81 milliGRAM(s) Oral daily  atorvastatin 20 milliGRAM(s) Oral at bedtime  carvedilol 6.25 milliGRAM(s) Oral every 12 hours  dolutegravir 50 milliGRAM(s) Oral daily  emtricitabine 200 mG/tenofovir alafenamide 25 mG (DESCOVY) Tablet 1 Tablet(s) Oral daily  enalapril 2.5 milliGRAM(s) Oral daily  enoxaparin Injectable 40 milliGRAM(s) SubCutaneous daily  escitalopram 10 milliGRAM(s) Oral daily  folic acid 1 milliGRAM(s) Oral daily  influenza  Vaccine (HIGH DOSE) 0.7 milliLiter(s) IntraMuscular once  isosorbide   mononitrate ER Tablet (IMDUR) 30 milliGRAM(s) Oral daily  multivitamin 1 Tablet(s) Oral daily  thiamine 100 milliGRAM(s) Oral daily    MEDICATIONS  (PRN):  melatonin 5 milliGRAM(s) Oral at bedtime PRN Insomnia  traMADol 50 milliGRAM(s) Oral every 6 hours PRN Moderate Pain (4 - 6)        DIAGNOSTIC TESTS:

## 2021-11-08 NOTE — CONSULT NOTE ADULT - ASSESSMENT
71 yr old M, former [place], [facility] resident SRO (unsatisfied with his living situation), current cocaine/marijuanna/ETOH abuse (UTOX POSITIVE 11/6 and 11/4), PMHx of HTN, hyperlipidemia, diet controlled DM, Stage III CKD (baseline Cr 1.3-1.5 noted on prior admissions), HIV (undetectable viral load), Hep C s/p Harvoni ~4 yrs ago, prostate CA s/p radiation, depression, NICM (EF 35-40% by Echo 9/2021), nonobstructive CAD (cath@[facility] 7/28/21 which revealed normal LM, 30% mLAD lesion, luminal irregularities of LCx/RCA),  known ascending aortic aneurysm 4.7cm as per CT 7/2021 (never f/up with CTS Dr Butts), PAD, admission every 2 weeks for "syncope", last admitted to [facility] 9/25-9/27/21, who  presented to [facility] ED 11/6/21 s/p unwitnessed "syncopal episode" [place] the park.    Tele reviewed, no ectopy, no pauses     71 yr old M, former [place], [facility] resident SRO (unsatisfied with his living situation), current cocaine/marijuanna/ETOH abuse (UTOX POSITIVE 11/6 and 11/4), PMHx of HTN, hyperlipidemia, diet controlled DM, Stage III CKD (baseline Cr 1.3-1.5 noted on prior admissions), HIV (undetectable viral load), Hep C s/p Harvoni ~4 yrs ago, prostate CA s/p radiation, depression, NICM (EF 35-40% by Echo 9/2021), nonobstructive CAD ([email or web address]@[facility] 7/28/21 which revealed normal LM, 30% mLAD lesion, luminal irregularities of LCx/RCA),  known ascending aortic aneurysm 4.7cm as per CT 7/2021 (never f/up with CTS Dr Butts), PAD, admission every 2 weeks for "syncope", last admitted to [facility] 9/25-9/27/21, who  presented to [facility] ED 11/6/21 s/p unwitnessed "syncopal episode" while walking in the park. Tele with SR and NSVT on 11/7 and 11/8.          71 yr old M, former [place], [facility] resident SR[employer] (unsatisfied with his living situation), current cocaine/marijuanna/ETOH abuse (UTOX POSITIVE 11/6 and 11/4), PMHx of HTN, hyperlipidemia, diet controlled DM, Stage III CKD (baseline Cr 1.3-1.5 noted on prior admissions), HIV (undetectable viral load), Hep C s/p Harvoni ~4 yrs ago, prostate CA s/p radiation, depression, NICM (EF 35-40% by Echo 9/2021), nonobstructive CAD ([email or web address]@[facility] 7/28/21 which revealed normal LM, 30% mLAD lesion, luminal irregularities of LCx/RCA),  known ascending aortic aneurysm 4.7cm as per CT 7/2021 (never f/up with CTS Dr Butts), PAD, admission every 2 weeks for "syncope", last admitted to [facility] 9/25-9/27/21, who  presented to [facility] ED 11/6/21 s/p unwitnessed "syncopal episode" while walking in the park. Tele with SR and NSVT on 11/7 and 11/6.          71 yr old M, former [place], [facility] resident SR[facility] (unsatisfied with his living situation), current cocaine/marijuanna/ETOH abuse (UTOX POSITIVE 11/6 and 11/4), PMHx of HTN, hyperlipidemia, diet controlled DM, Stage III CKD (baseline Cr 1.3-1.5 noted on prior admissions), HIV (undetectable viral load), Hep C s/p Harvoni ~4 yrs ago, prostate CA s/p radiation, depression, NICM (EF 35-40% by Echo 9/2021), nonobstructive CAD (cath@[facility] 7/28/21 which revealed normal LM, 30% mLAD lesion, luminal irregularities of LCx/RCA),  known ascending aortic aneurysm 4.7cm as per CT 7/2021 (never f/up with CTS Dr Butts), PAD, admission every 2 weeks for "syncope", last admitted to [facility] 9/25-9/27/21, who  presented to [facility] ED 11/6/21 s/p unwitnessed "syncopal episode" while [place] the park. Tele with SR and 6 beats NSVT on 11/7 and 11/6.     recurrent syncope with hx of  polysubstance abuse   patient is not on BB secondary to active cocaine use  no acute EP issues   plan for discharged in am and after discharge to get Zio patch and follow up in EP clinic          71 yr old M, former [place], [facility] resident SR[employer] (unsatisfied with his living situation), current cocaine/marijuanna/ETOH abuse (UTOX POSITIVE 11/6 and 11/4), PMHx of HTN, hyperlipidemia, diet controlled DM, Stage III CKD (baseline Cr 1.3-1.5 noted on prior admissions), HIV (undetectable viral load), Hep C s/p Harvoni ~4 yrs ago, prostate CA s/p radiation, depression, NICM (EF 35-40% by Echo 9/2021 unchanged from 7/21), nonobstructive CAD ([email or web address]@[facility] 7/28/21 which revealed normal LM, 30% mLAD lesion, luminal irregularities of LCx/RCA),  known ascending aortic aneurysm 4.7cm as per CT 7/2021 (never f/up with CTS Dr Butts), PAD, admission every 2 weeks for "syncope", last admitted to [facility] 9/25-9/27/21, who  presented to [facility] ED 11/6/21 s/p unwitnessed "syncopal episode" while [place] the park. Tele with SR and 6 beats NSVT on 11/7 and 11/6.     recurrent syncope with hx of  polysubstance abuse  patient is not on BB secondary to active cocaine use  no acute EP issues   plan for discharged in am and after discharge to get Zio patch and follow up in EP clinic          71 yr old M, former [place], [facility] resident SRO (unsatisfied with his living situation), current cocaine/marijuanna/ETOH abuse (UTOX POSITIVE 11/6 and 11/4), PMHx of HTN, hyperlipidemia, diet controlled DM, Stage III CKD (baseline Cr 1.3-1.5 noted on prior admissions), HIV (undetectable viral load), Hep C s/p Harvoni ~4 yrs ago, prostate CA s/p radiation, depression, NICM (EF 35-40% by Echo 9/2021 unchanged from 7/21), nonobstructive CAD ([email or web address]@[facility] 7/28/21 which revealed normal LM, 30% mLAD lesion, luminal irregularities of LCx/RCA),  known ascending aortic aneurysm 4.7cm as per CT 7/2021 (never f/up with CTS Dr Butts), PAD, admission every 2 weeks for "syncope", last admitted to [facility] 9/25-9/27/21, who  presented to [facility] ED 11/6/21 s/p unwitnessed "syncopal episode" while walking [place] the park. Tele with SR and 6 beats NSVT on 11/7 and 11/6.     recurrent syncope with hx of  polysubstance abuse  few beats NSVT - patient is on coreg 6.25mg BID   no acute EP issues   plan for discharged in am and after discharge to get Zio patch and follow up in EP clinic          71 yr old M, former [place], [facility] resident SR[employer] (unsatisfied with his living situation), current cocaine/marijuanna/ETOH abuse (UTOX POSITIVE 11/6 and 11/4), PMHx of HTN, hyperlipidemia, diet controlled DM, Stage III CKD (baseline Cr 1.3-1.5 noted on prior admissions), HIV (undetectable viral load), Hep C s/p Harvoni ~4 yrs ago, prostate CA s/p radiation, depression, NICM (EF 35-40% by Echo 9/2021 unchanged from 7/21), nonobstructive CAD ([email or web address]@[facility] 7/28/21 which revealed normal LM, 30% mLAD lesion, luminal irregularities of LCx/RCA),  known ascending aortic aneurysm 4.7cm as per CT 7/2021 (never f/up with CTS Dr Butts), PAD, admission every 2 weeks for "syncope", last admitted to [facility] 9/25-9/27/21, who  presented to [facility] ED 11/6/21 s/p unwitnessed "syncopal episode" while walking [place] the park. Tele with SR and 6 beats NSVT on 11/7 and 11/6.     recurrent syncope with hx of  polysubstance abuse  few beats NSVT - patient is on coreg 6.25mg BID   no acute EP issues   plan for discharge in am and after discharge to get Zio patch and follow up in EP clinic -  plan discussed with 5U NP and patient

## 2021-11-08 NOTE — PROGRESS NOTE ADULT - PROBLEM SELECTOR PLAN 8
continue HOME HAART therapy: Descovy and Tivicay as prescribed.
continue HOME HAART therapy: Descovy and Tivicay as prescribed.

## 2021-11-08 NOTE — CONSULT NOTE ADULT - SUBJECTIVE AND OBJECTIVE BOX
HPI:  71 yr old M, former [place], [facility] resident [facility] (unsatisfied with his living situation), current cocaine/marijuanna/ETOH abuse (UTOX POSITIVE 11/6 and 11/4), PMHx of HTN, hyperlipidemia, diet controlled DM, Stage III CKD (baseline Cr 1.3-1.5 noted on prior admissions), HIV (undetectable viral load), Hep C s/p Harvoni ~4 yrs ago, prostate CA s/p radiation, depression, NICM (EF 35-40% by Echo 9/2021), nonobstructive CAD (cath@[facility] 7/28/21 which revealed normal LM, 30% mLAD lesion, luminal irregularities of LCx/RCA),  known ascending aortic aneurysm 4.7cm as per CT 7/2021 (never f/up with CTS Dr Butts), PAD, admission every 2 weeks for "syncope", last admitted to [facility] 9/25-9/27/21, who  presented to [facility] ED 11/6/21 s/p unwitnessed "syncopal episode" while walking in park this morning. Patient reports after walking for ~ 1 hour he felt sudden onset of nonradiating substernal chest pressure, followed by lightheadedness and fall. Patient believes he had LOC for ~2 minutes until he was able to finally seek help from bystanders. Patient denies any N/V, diaphoresis, palpitations, PND, orthopnea, LE edema, recent travel or sick contacts.     In ED, BP: 137/70 , HR: 80s, RR:18, Temp: 98.1 F, O2 sat: 98% RA. EKG revealed NSR@71BPM with TWI in lateral leads. CXR no acute findings. CT chest PE protocol: negative for PE with Stable aneurysmal dilatation of ascending aorta 4.6 cm. CT head negative. Labs notable for: UTOX positive for cocaine 11/6 and 11/4, Trop High sensitivity negative x 2 sets, , Ddimer 240, Cr 1.43, Mg 1.5, COVID negative. Patient treated with Aspirin 324mg PO x 1 dose and Mg 1g IV x 1 dose.  Patient was ransferred to [facility] 5[facility]  for further syncope work and tele monitoring.   )      PAST MEDICAL & SURGICAL HISTORY:  HIV (human immunodeficiency virus infection)    DM (diabetes mellitus)    HTN (hypertension)    Chronic diarrhea    Hepatitis C    PVD (peripheral vascular disease)    CAD (coronary artery disease)    Thoracic aortic aneurysm    S/P arterial stent  bilat LE        No pertinent family history in first degree relatives    No pertinent family history in first degree relatives        FAMILY HISTORY:  No pertinent family history in first degree relatives    No pertinent family history in first degree relatives        Social History:  Smoking denies  Drugs cocaine - last use last Wed 11/321  ETOH denies       Home Medications:   * Patient Currently Takes Medications as of 27-Sep-2021 12:06 documented in Structured Notes  · 	carvedilol 6.25 mg oral tablet: 1 tab(s) orally every 12 hours  · 	Vasotec 2.5 mg oral tablet: 1 tab(s) orally once a day  · 	Tivicay 50 mg oral tablet: 1 tab(s) orally once a day  · 	traMADol 50 mg oral tablet: 1 tab(s) orally every 6 hours, As Needed - for moderate pain MDD:4  · 	amLODIPine 2.5 mg oral tablet: 1 tab(s) orally once a day   · 	isosorbide mononitrate 30 mg oral tablet, extended release: 1 tab(s) orally once a day   · 	aspirin 81 mg oral delayed release tablet: 1 tab(s) orally once a day  · 	pravastatin 80 mg oral tablet: 1 tab(s) orally once a day (at bedtime)  · 	Descovy 200 mg-25 mg oral tablet: 1 tab(s) orally once a day  · 	Abilify 2 mg oral tablet: orally once a day  · 	Lexapro 5 mg oral tablet: 1 tab(s) orally once a day    .    Inpatient Medications:   amLODIPine   Tablet 2.5 milliGRAM(s) Oral daily  ARIPiprazole 2 milliGRAM(s) Oral daily  aspirin enteric coated 81 milliGRAM(s) Oral daily  atorvastatin 20 milliGRAM(s) Oral at bedtime  carvedilol 6.25 milliGRAM(s) Oral every 12 hours  dolutegravir 50 milliGRAM(s) Oral daily  emtricitabine 200 mG/tenofovir alafenamide 25 mG (DESCOVY) Tablet 1 Tablet(s) Oral daily  enalapril 2.5 milliGRAM(s) Oral daily  enoxaparin Injectable 40 milliGRAM(s) SubCutaneous daily  escitalopram 10 milliGRAM(s) Oral daily  folic acid 1 milliGRAM(s) Oral daily  influenza  Vaccine (HIGH DOSE) 0.7 milliLiter(s) IntraMuscular once  isosorbide   mononitrate ER Tablet (IMDUR) 30 milliGRAM(s) Oral daily  melatonin 5 milliGRAM(s) Oral at bedtime PRN  multivitamin 1 Tablet(s) Oral daily  thiamine 100 milliGRAM(s) Oral daily  traMADol 50 milliGRAM(s) Oral every 6 hours PRN    ALLERGIES:   Peaches (Rash)  penicillins (Rash)  shellfish (Unknown)  strawberry (Rash)  sulfa drugs (Rash)      ROS:   CONSTITUTIONAL: Denies fever, chills, weight loss, fatigue  HEENT: Pt denies  RESPIRATORY: Denies cough, wheezing, hemoptysis, shortness of Breath  CARDIOVASCULAR: see HPI  GASTROINTESTINAL: Pt denies  NEUROLOGICAL: Pt denies  MSK:  SKIN: Pt denies   PSYCHIATRIC: Pt denies  HEME/LYMPH: Pt denies    PHYSICAL:  T(C): 36.8 (11-08-21 @ 13:21), Max: 37 (11-08-21 @ 07:34)  HR: 58 (11-08-21 @ 11:38) (58 - 72)  BP: 124/78 (11-08-21 @ 11:38) (123/73 - 167/83)  RR: 18 (11-08-21 @ 11:38) (17 - 18)  SpO2: 95% (11-08-21 @ 11:38) (95% - 95%)    Exam:  Constitutional: NAD  HEENT: Normocephalic atraumatic, PERRLA, EOMI  Cardiovascular: Normal S1 S2, No JVD, No murmurs, No edema  Respiratory: Lungs clear to auscultation	bilaterally. No wheezing, rhonchi, rales  Gastrointestinal:  Soft, NT/ND, + BS	  Neurologic: A&O x3, No deficit noted  Psychiatric: appropraite mood and affect   Skin: WWP, no edema,   Ext: no edema, pulses intact 1+      LABS:  CBC:                       11.9   2.81  )-----------( 99       ( 08 Nov 2021 07:06 )             36.7       Chemistry: 11-08    143  |  110<H>  |  32<H>  ----------------------------<  140<H>  3.9   |  24  |  1.27    Ca    8.5      08 Nov 2021 07:06  Mg     1.7     11-08        Troponin: CARDIAC MARKERS ( 07 Nov 2021 08:46 )  x     / 0.01 ng/mL / x     / x     / x            LFTs:       EKG: on admission  revealed NSR@71BPM with TWI in lateral leads.    Telemetry: ST with PVC's    ECHO: Echo:        LVSF:        EF:        RVSF:        LA:        RA:        Mitral Valve:        Aortic Valve:       Tricuspid Valve:        Pulmonic Valve:        Pericardium:     Prior EP procedures:    Cath / stress / Cardiac CTa:    Physician Assistant Assessment/ Plan:         HPI:  71 yr old M, former [place], [facility] resident [facility] (unsatisfied with his living situation), current cocaine/marijuanna/ETOH abuse (UTOX POSITIVE 11/6 and 11/4), PMHx of HTN, hyperlipidemia, diet controlled DM, Stage III CKD (baseline Cr 1.3-1.5 noted on prior admissions), HIV (undetectable viral load), Hep C s/p Harvoni ~4 yrs ago, prostate CA s/p radiation, depression, NICM (EF 35-40% by Echo 9/2021), nonobstructive CAD (cath@[facility] 7/28/21 which revealed normal LM, 30% mLAD lesion, luminal irregularities of LCx/RCA),  known ascending aortic aneurysm 4.7cm as per CT 7/2021 (never f/up with CTS Dr Butts), PAD, admission every 2 weeks for "syncope", last admitted to [facility] 9/25-9/27/21, who  presented to [facility] ED 11/6/21 s/p unwitnessed "syncopal episode" while walking in park this morning. Patient reports after walking for ~ 1 hour he felt sudden onset of nonradiating substernal chest pressure, followed by lightheadedness and fall. Patient believes he had LOC for ~2 minutes until he was able to finally seek help from bystanders. Patient denies any N/V, diaphoresis, palpitations, PND, orthopnea, LE edema, recent travel or sick contacts.     In ED, BP: 137/70 , HR: 80s, RR:18, Temp: 98.1 F, O2 sat: 98% RA. EKG revealed NSR@71BPM with TWI in lateral leads. CXR no acute findings. CT chest PE protocol: negative for PE with Stable aneurysmal dilatation of ascending aorta 4.6 cm. CT head negative. Labs notable for: UTOX positive for cocaine 11/6 and 11/4, Trop High sensitivity negative x 2 sets, , Ddimer 240, Cr 1.43, Mg 1.5, COVID negative. Patient treated with Aspirin 324mg PO x 1 dose and Mg 1g IV x 1 dose.  Patient was ransferred to [facility]  for further syncope work and tele monitoring. EP was called 11/8 for loop vs monitor recorder.      PAST MEDICAL & SURGICAL HISTORY:  HIV (human immunodeficiency virus infection)    DM (diabetes mellitus)    HTN (hypertension)    Chronic diarrhea    Hepatitis C    PVD (peripheral vascular disease)    CAD (coronary artery disease)    Thoracic aortic aneurysm    S/P arterial stent  bilat LE        No pertinent family history in first degree relatives    No pertinent family history in first degree relatives        FAMILY HISTORY:  No pertinent family history in first degree relatives    No pertinent family history in first degree relatives        Social History:  Smoking denies  Drugs cocaine - last use last Wed 11/321  ETOH denies       Home Medications:   * Patient Currently Takes Medications as of 27-Sep-2021 12:06 documented in Structured Notes  · 	carvedilol 6.25 mg oral tablet: 1 tab(s) orally every 12 hours  · 	Vasotec 2.5 mg oral tablet: 1 tab(s) orally once a day  · 	Tivicay 50 mg oral tablet: 1 tab(s) orally once a day  · 	traMADol 50 mg oral tablet: 1 tab(s) orally every 6 hours, As Needed - for moderate pain MDD:4  · 	amLODIPine 2.5 mg oral tablet: 1 tab(s) orally once a day   · 	isosorbide mononitrate 30 mg oral tablet, extended release: 1 tab(s) orally once a day   · 	aspirin 81 mg oral delayed release tablet: 1 tab(s) orally once a day  · 	pravastatin 80 mg oral tablet: 1 tab(s) orally once a day (at bedtime)  · 	Descovy 200 mg-25 mg oral tablet: 1 tab(s) orally once a day  · 	Abilify 2 mg oral tablet: orally once a day  · 	Lexapro 5 mg oral tablet: 1 tab(s) orally once a day    .    Inpatient Medications:   amLODIPine   Tablet 2.5 milliGRAM(s) Oral daily  ARIPiprazole 2 milliGRAM(s) Oral daily  aspirin enteric coated 81 milliGRAM(s) Oral daily  atorvastatin 20 milliGRAM(s) Oral at bedtime  carvedilol 6.25 milliGRAM(s) Oral every 12 hours  dolutegravir 50 milliGRAM(s) Oral daily  emtricitabine 200 mG/tenofovir alafenamide 25 mG (DESCOVY) Tablet 1 Tablet(s) Oral daily  enalapril 2.5 milliGRAM(s) Oral daily  enoxaparin Injectable 40 milliGRAM(s) SubCutaneous daily  escitalopram 10 milliGRAM(s) Oral daily  folic acid 1 milliGRAM(s) Oral daily  influenza  Vaccine (HIGH DOSE) 0.7 milliLiter(s) IntraMuscular once  isosorbide   mononitrate ER Tablet (IMDUR) 30 milliGRAM(s) Oral daily  melatonin 5 milliGRAM(s) Oral at bedtime PRN  multivitamin 1 Tablet(s) Oral daily  thiamine 100 milliGRAM(s) Oral daily  traMADol 50 milliGRAM(s) Oral every 6 hours PRN    ALLERGIES:   Peaches (Rash)  penicillins (Rash)  shellfish (Unknown)  strawberry (Rash)  sulfa drugs (Rash)      ROS:   CONSTITUTIONAL: Denies fever, chills, weight loss, fatigue  HEENT: Pt denies  RESPIRATORY: Denies cough, wheezing, hemoptysis, shortness of Breath  CARDIOVASCULAR: see HPI  GASTROINTESTINAL: Pt denies  NEUROLOGICAL: Pt denies  MSK:  SKIN: Pt denies   PSYCHIATRIC: Pt denies  HEME/LYMPH: Pt denies    PHYSICAL:  T(C): 36.8 (11-08-21 @ 13:21), Max: 37 (11-08-21 @ 07:34)  HR: 58 (11-08-21 @ 11:38) (58 - 72)  BP: 124/78 (11-08-21 @ 11:38) (123/73 - 167/83)  RR: 18 (11-08-21 @ 11:38) (17 - 18)  SpO2: 95% (11-08-21 @ 11:38) (95% - 95%)    Exam:  Constitutional: NAD  HEENT: Normocephalic atraumatic, PERRLA, EOMI  Cardiovascular: Normal S1 S2, No JVD, No murmurs, No edema  Respiratory: Lungs clear to auscultation	bilaterally. No wheezing, rhonchi, rales  Gastrointestinal:  Soft, NT/ND, + BS	  Neurologic: A&O x3, No deficit noted  Psychiatric: appropraite mood and affect   Skin: WWP, no edema,   Ext: no edema, pulses intact 1+      LABS:  CBC:                       11.9   2.81  )-----------( 99       ( 08 Nov 2021 07:06 )             36.7       Chemistry: 11-08    143  |  110<H>  |  32<H>  ----------------------------<  140<H>  3.9   |  24  |  1.27    Ca    8.5      08 Nov 2021 07:06  Mg     1.7     11-08        Troponin: CARDIAC MARKERS ( 07 Nov 2021 08:46 )  x     / 0.01 ng/mL / x     / x     / x            LFTs:       EKG: on admission  revealed NSR@71BPM with TWI in lateral leads.    Telemetry: ST with PVC's    ECHO:     9/2021:   CONCLUSIONS:   1. Mild mitral regurgitation.   2. No other significant valvular disease.   3. The right ventricle is normal in size. Right ventricular systolic function is normal.   4. There is mild concentric left ventricular hypertrophy. Left ventricular systolic function is moderately reduced with a calculated ejection fraction of 35-40% with regional wall motion abnormalities. There is basal to mid inferior wall severe hypokinesis. Basal to mid anterolateral wall is not well visualized.   5. No pericardial effusion.   6. The proximal ascending aorta is dilated measuring 4.70 cm.   7. Compared to the previous TTE performed on 7/28/2021, there have been no significant interval changes.        Prior EP procedures: none                 HPI:  71 yr old M, former [place], [facility] resident [facility] (unsatisfied with his living situation), current cocaine/marijuanna/ETOH abuse (UTOX POSITIVE 11/6 and 11/4), PMHx of HTN, hyperlipidemia, diet controlled DM, Stage III CKD (baseline Cr 1.3-1.5 noted on prior admissions), HIV (undetectable viral load), Hep C s/p Harvoni ~4 yrs ago, prostate CA s/p radiation, depression, NICM (EF 35-40% by Echo 9/2021), nonobstructive CAD (cath@[facility] 7/28/21 which revealed normal LM, 30% mLAD lesion, luminal irregularities of LCx/RCA),  known ascending aortic aneurysm 4.7cm as per CT 7/2021 (never f/up with CTS Dr Butts), PAD, admission every 2 weeks for "syncope", last admitted to [facility] 9/25-9/27/21, who  presented to [facility] ED 11/6/21 s/p unwitnessed "syncopal episode" while walking in park this morning. Patient reports after walking for ~ 1 hour he felt sudden onset of nonradiating substernal chest pressure, followed by lightheadedness and fall. Patient believes he had LOC for ~2 minutes until he was able to finally seek help from bystanders. Patient denies any N/V, diaphoresis, palpitations, PND, orthopnea, LE edema, recent travel or sick contacts.     In ED, BP: 137/70 , HR: 80s, RR:18, Temp: 98.1 F, O2 sat: 98% RA. EKG revealed NSR@71BPM with TWI in lateral leads. CXR no acute findings. CT chest PE protocol: negative for PE with Stable aneurysmal dilatation of ascending aorta 4.6 cm. CT head negative. Labs notable for: UTOX positive for cocaine 11/6 and 11/4, Trop High sensitivity negative x 2 sets, , Ddimer 240, Cr 1.43, Mg 1.5, COVID negative. Patient treated with Aspirin 324mg PO x 1 dose and Mg 1g IV x 1 dose.  Patient was ransferred to [facility] 5  for further syncope work and tele monitoring. EP was called 11/8 for loop vs event monitor.    PAST MEDICAL & SURGICAL HISTORY:  HIV (human immunodeficiency virus infection)    DM (diabetes mellitus)    HTN (hypertension)    Chronic diarrhea    Hepatitis C    PVD (peripheral vascular disease)    CAD (coronary artery disease)    Thoracic aortic aneurysm    S/P arterial stent  bilat LE        No pertinent family history in first degree relatives    No pertinent family history in first degree relatives        FAMILY HISTORY:  No pertinent family history in first degree relatives    No pertinent family history in first degree relatives        Social History:  Smoking denies  Drugs cocaine - last use last Wed 11/321  ETOH denies       Home Medications:   * Patient Currently Takes Medications as of 27-Sep-2021 12:06 documented in Structured Notes  · 	carvedilol 6.25 mg oral tablet: 1 tab(s) orally every 12 hours  · 	Vasotec 2.5 mg oral tablet: 1 tab(s) orally once a day  · 	Tivicay 50 mg oral tablet: 1 tab(s) orally once a day  · 	traMADol 50 mg oral tablet: 1 tab(s) orally every 6 hours, As Needed - for moderate pain MDD:4  · 	amLODIPine 2.5 mg oral tablet: 1 tab(s) orally once a day   · 	isosorbide mononitrate 30 mg oral tablet, extended release: 1 tab(s) orally once a day   · 	aspirin 81 mg oral delayed release tablet: 1 tab(s) orally once a day  · 	pravastatin 80 mg oral tablet: 1 tab(s) orally once a day (at bedtime)  · 	Descovy 200 mg-25 mg oral tablet: 1 tab(s) orally once a day  · 	Abilify 2 mg oral tablet: orally once a day  · 	Lexapro 5 mg oral tablet: 1 tab(s) orally once a day    .    Inpatient Medications:   amLODIPine   Tablet 2.5 milliGRAM(s) Oral daily  ARIPiprazole 2 milliGRAM(s) Oral daily  aspirin enteric coated 81 milliGRAM(s) Oral daily  atorvastatin 20 milliGRAM(s) Oral at bedtime  carvedilol 6.25 milliGRAM(s) Oral every 12 hours  dolutegravir 50 milliGRAM(s) Oral daily  emtricitabine 200 mG/tenofovir alafenamide 25 mG (DESCOVY) Tablet 1 Tablet(s) Oral daily  enalapril 2.5 milliGRAM(s) Oral daily  enoxaparin Injectable 40 milliGRAM(s) SubCutaneous daily  escitalopram 10 milliGRAM(s) Oral daily  folic acid 1 milliGRAM(s) Oral daily  influenza  Vaccine (HIGH DOSE) 0.7 milliLiter(s) IntraMuscular once  isosorbide   mononitrate ER Tablet (IMDUR) 30 milliGRAM(s) Oral daily  melatonin 5 milliGRAM(s) Oral at bedtime PRN  multivitamin 1 Tablet(s) Oral daily  thiamine 100 milliGRAM(s) Oral daily  traMADol 50 milliGRAM(s) Oral every 6 hours PRN    ALLERGIES:   Peaches (Rash)  penicillins (Rash)  shellfish (Unknown)  strawberry (Rash)  sulfa drugs (Rash)      ROS:   CONSTITUTIONAL: Denies fever, chills, weight loss, fatigue  HEENT: Pt denies  RESPIRATORY: Denies cough, wheezing, hemoptysis, shortness of Breath  CARDIOVASCULAR: see HPI  GASTROINTESTINAL: Pt denies  NEUROLOGICAL: Pt denies  MSK:  SKIN: Pt denies   PSYCHIATRIC: Pt denies  HEME/LYMPH: Pt denies    PHYSICAL:  T(C): 36.8 (11-08-21 @ 13:21), Max: 37 (11-08-21 @ 07:34)  HR: 58 (11-08-21 @ 11:38) (58 - 72)  BP: 124/78 (11-08-21 @ 11:38) (123/73 - 167/83)  RR: 18 (11-08-21 @ 11:38) (17 - 18)  SpO2: 95% (11-08-21 @ 11:38) (95% - 95%)    Exam:  Constitutional: NAD  HEENT: Normocephalic atraumatic, PERRLA, EOMI  Cardiovascular: Normal S1 S2, No JVD, No murmurs, No edema  Respiratory: Lungs clear to auscultation	bilaterally. No wheezing, rhonchi, rales  Gastrointestinal:  Soft, NT/ND, + BS	  Neurologic: A&O x3, No deficit noted  Psychiatric: appropraite mood and affect   Skin: WWP, no edema,   Ext: no edema, pulses intact 1+      LABS:  CBC:                       11.9   2.81  )-----------( 99       ( 08 Nov 2021 07:06 )             36.7       Chemistry: 11-08    143  |  110<H>  |  32<H>  ----------------------------<  140<H>  3.9   |  24  |  1.27    Ca    8.5      08 Nov 2021 07:06  Mg     1.7     11-08        Troponin: CARDIAC MARKERS ( 07 Nov 2021 08:46 )  x     / 0.01 ng/mL / x     / x     / x            LFTs:       EKG: on admission  revealed NSR@71BPM with TWI in lateral leads.    Telemetry: ST with PVC's    ECHO:     9/2021:   CONCLUSIONS:   1. Mild mitral regurgitation.   2. No other significant valvular disease.   3. The right ventricle is normal in size. Right ventricular systolic function is normal.   4. There is mild concentric left ventricular hypertrophy. Left ventricular systolic function is moderately reduced with a calculated ejection fraction of 35-40% with regional wall motion abnormalities. There is basal to mid inferior wall severe hypokinesis. Basal to mid anterolateral wall is not well visualized.   5. No pericardial effusion.   6. The proximal ascending aorta is dilated measuring 4.70 cm.   7. Compared to the previous TTE performed on 7/28/2021, there have been no significant interval changes.        Prior EP procedures: none                 HPI:  71 yr old M, former [place], [facility] resident [facility] (unsatisfied with his living situation), current cocaine/marijuanna/ETOH abuse (UTOX POSITIVE 11/6 and 11/4), PMHx of HTN, hyperlipidemia, diet controlled DM, Stage III CKD (baseline Cr 1.3-1.5 noted on prior admissions), HIV (undetectable viral load), Hep C s/p Harvoni ~4 yrs ago, prostate CA s/p radiation, depression, NICM (EF 35-40% by Echo 9/2021), nonobstructive CAD (cath@[facility] 7/28/21 which revealed normal LM, 30% mLAD lesion, luminal irregularities of LCx/RCA),  known ascending aortic aneurysm 4.7cm as per CT 7/2021 (never f/up with CTS Dr Butts), PAD, admission every 2 weeks for "syncope", last admitted to [facility] 9/25-9/27/21, who  presented to [facility] ED 11/6/21 s/p unwitnessed "syncopal episode" while walking in park.  Patient reports after walking for ~ 1 hour he felt sudden onset of nonradiating substernal chest pressure, followed by lightheadedness and fall. Patient believes he had LOC for ~2 minutes until he was able to finally seek help from bystanders. Patient denies any N/V, diaphoresis, palpitations, PND, orthopnea, LE edema, recent travel or sick contacts.     In ED, BP: 137/70 , HR: 80s, RR:18, Temp: 98.1 F, O2 sat: 98% RA. EKG revealed NSR@71BPM with TWI in lateral leads. CXR no acute findings. CT chest PE protocol: negative for PE with Stable aneurysmal dilatation of ascending aorta 4.6 cm. CT head negative. Labs notable for: UTOX positive for cocaine 11/6 and 11/4, Trop High sensitivity negative x 2 sets, , Ddimer 240, Cr 1.43, Mg 1.5, COVID negative. Patient treated with Aspirin 324mg PO x 1 dose and Mg 1g IV x 1 dose.  Patient was ransferred to [facility]  for further syncope work and tele monitoring. EP was called 11/8 for loop vs event monitor.    PAST MEDICAL & SURGICAL HISTORY:  HIV (human immunodeficiency virus infection)    DM (diabetes mellitus)    HTN (hypertension)    Chronic diarrhea    Hepatitis C    PVD (peripheral vascular disease)    CAD (coronary artery disease)    Thoracic aortic aneurysm    S/P arterial stent  bilat LE        No pertinent family history in first degree relatives    No pertinent family history in first degree relatives        FAMILY HISTORY:  No pertinent family history in first degree relatives    No pertinent family history in first degree relatives        Social History:  Smoking denies  Drugs cocaine - last use last Wed 11/321  ETOH denies       Home Medications:   * Patient Currently Takes Medications as of 27-Sep-2021 12:06 documented in Structured Notes  · 	carvedilol 6.25 mg oral tablet: 1 tab(s) orally every 12 hours  · 	Vasotec 2.5 mg oral tablet: 1 tab(s) orally once a day  · 	Tivicay 50 mg oral tablet: 1 tab(s) orally once a day  · 	traMADol 50 mg oral tablet: 1 tab(s) orally every 6 hours, As Needed - for moderate pain MDD:4  · 	amLODIPine 2.5 mg oral tablet: 1 tab(s) orally once a day   · 	isosorbide mononitrate 30 mg oral tablet, extended release: 1 tab(s) orally once a day   · 	aspirin 81 mg oral delayed release tablet: 1 tab(s) orally once a day  · 	pravastatin 80 mg oral tablet: 1 tab(s) orally once a day (at bedtime)  · 	Descovy 200 mg-25 mg oral tablet: 1 tab(s) orally once a day  · 	Abilify 2 mg oral tablet: orally once a day  · 	Lexapro 5 mg oral tablet: 1 tab(s) orally once a day    .    Inpatient Medications:   amLODIPine   Tablet 2.5 milliGRAM(s) Oral daily  ARIPiprazole 2 milliGRAM(s) Oral daily  aspirin enteric coated 81 milliGRAM(s) Oral daily  atorvastatin 20 milliGRAM(s) Oral at bedtime  carvedilol 6.25 milliGRAM(s) Oral every 12 hours  dolutegravir 50 milliGRAM(s) Oral daily  emtricitabine 200 mG/tenofovir alafenamide 25 mG (DESCOVY) Tablet 1 Tablet(s) Oral daily  enalapril 2.5 milliGRAM(s) Oral daily  enoxaparin Injectable 40 milliGRAM(s) SubCutaneous daily  escitalopram 10 milliGRAM(s) Oral daily  folic acid 1 milliGRAM(s) Oral daily  influenza  Vaccine (HIGH DOSE) 0.7 milliLiter(s) IntraMuscular once  isosorbide   mononitrate ER Tablet (IMDUR) 30 milliGRAM(s) Oral daily  melatonin 5 milliGRAM(s) Oral at bedtime PRN  multivitamin 1 Tablet(s) Oral daily  thiamine 100 milliGRAM(s) Oral daily  traMADol 50 milliGRAM(s) Oral every 6 hours PRN    ALLERGIES:   Peaches (Rash)  penicillins (Rash)  shellfish (Unknown)  strawberry (Rash)  sulfa drugs (Rash)      ROS:   CONSTITUTIONAL: Denies fever, chills, weight loss, fatigue  HEENT: Pt denies  RESPIRATORY: Denies cough, wheezing, hemoptysis, shortness of Breath  CARDIOVASCULAR: see HPI  GASTROINTESTINAL: Pt denies  NEUROLOGICAL: Pt denies  MSK:  SKIN: Pt denies   PSYCHIATRIC: Pt denies  HEME/LYMPH: Pt denies    PHYSICAL:  T(C): 36.8 (11-08-21 @ 13:21), Max: 37 (11-08-21 @ 07:34)  HR: 58 (11-08-21 @ 11:38) (58 - 72)  BP: 124/78 (11-08-21 @ 11:38) (123/73 - 167/83)  RR: 18 (11-08-21 @ 11:38) (17 - 18)  SpO2: 95% (11-08-21 @ 11:38) (95% - 95%)    Exam:  Constitutional: NAD  HEENT: Normocephalic atraumatic, PERRLA, EOMI  Cardiovascular: Normal S1 S2, No JVD, No murmurs, No edema  Respiratory: Lungs clear to auscultation	bilaterally. No wheezing, rhonchi, rales  Gastrointestinal:  Soft, NT/ND, + BS	  Neurologic: A&O x3, No deficit noted  Psychiatric: appropraite mood and affect   Skin: WWP, no edema,   Ext: no edema, pulses intact 1+      LABS:  CBC:                       11.9   2.81  )-----------( 99       ( 08 Nov 2021 07:06 )             36.7       Chemistry: 11-08    143  |  110<H>  |  32<H>  ----------------------------<  140<H>  3.9   |  24  |  1.27    Ca    8.5      08 Nov 2021 07:06  Mg     1.7     11-08        Troponin: CARDIAC MARKERS ( 07 Nov 2021 08:46 )  x     / 0.01 ng/mL / x     / x     / x            EKG: on admission  revealed NSR@71BPM with TWI in lateral leads.    Telemetry:  NSR  with PVC's    ECHO:     9/2021:   CONCLUSIONS:   1. Mild mitral regurgitation.   2. No other significant valvular disease.   3. The right ventricle is normal in size. Right ventricular systolic function is normal.   4. There is mild concentric left ventricular hypertrophy. Left ventricular systolic function is moderately reduced with a calculated ejection fraction of 35-40% with regional wall motion abnormalities. There is basal to mid inferior wall severe hypokinesis. Basal to mid anterolateral wall is not well visualized.   5. No pericardial effusion.   6. The proximal ascending aorta is dilated measuring 4.70 cm.   7. Compared to the previous TTE performed on 7/28/2021, there have been no significant interval changes.        Prior EP procedures: none                 HPI:  71 yr old M, former [place], [facility] resident SR[facility] (unsatisfied with his living situation), current cocaine/marijuanna/ETOH abuse (UTOX POSITIVE 11/6 and 11/4), PMHx of HTN, hyperlipidemia, diet controlled DM, Stage III CKD (baseline Cr 1.3-1.5 noted on prior admissions), HIV (undetectable viral load), Hep C s/p Harvoni ~4 yrs ago, prostate CA s/p radiation, depression, NICM (EF 35-40% by Echo 9/2021), nonobstructive CAD (cath@[facility] 7/28/21 which revealed normal LM, 30% mLAD lesion, luminal irregularities of LCx/RCA),  known ascending aortic aneurysm 4.7cm as per CT 7/2021 (never f/up with CTS Dr Butts), PAD, admission every 2 weeks for "syncope", last admitted to [facility] 9/25-9/27/21, who  presented to [facility] ED 11/6/21 s/p unwitnessed "syncopal episode" while walking in park.  Patient reports after walking for ~ 1 hour he felt sudden onset of nonradiating substernal chest pressure, followed by lightheadedness and fall. Patient believes he had LOC for ~2 minutes until he was able to finally seek help from bystanders. Patient denies any N/V, diaphoresis, palpitations, PND, orthopnea, LE edema, recent travel or sick contacts.     In ED, BP: 137/70 , HR: 80s, RR:18, Temp: 98.1 F, O2 sat: 98% RA. EKG revealed NSR@71BPM with TWI in lateral leads. CXR no acute findings. CT chest PE protocol: negative for PE with Stable aneurysmal dilatation of ascending aorta 4.6 cm. CT head negative. Labs notable for: UTOX positive for cocaine 11/6 and 11/4, Trop High sensitivity negative x 2 sets, , Ddimer 240, Cr 1.43, Mg 1.5, COVID negative. Patient treated with Aspirin 324mg PO x 1 dose and Mg 1g IV x 1 dose.  Patient was ransferred to [facility] 5U  for further syncope work and tele monitoring. Tele with SR and NSVT on 11/7 and 11/8.  11EP was called 11/8 for loop vs event monitor.    PAST MEDICAL & SURGICAL HISTORY:  HIV (human immunodeficiency virus infection)    DM (diabetes mellitus)    HTN (hypertension)    Chronic diarrhea    Hepatitis C    PVD (peripheral vascular disease)    CAD (coronary artery disease)    Thoracic aortic aneurysm    S/P arterial stent  bilat LE        No pertinent family history in first degree relatives    No pertinent family history in first degree relatives        FAMILY HISTORY:  No pertinent family history in first degree relatives    No pertinent family history in first degree relatives        Social History:  Smoking denies  Drugs cocaine - last use last Wed 11/321  ETOH denies       Home Medications:   * Patient Currently Takes Medications as of 27-Sep-2021 12:06 documented in Structured Notes  · 	carvedilol 6.25 mg oral tablet: 1 tab(s) orally every 12 hours  · 	Vasotec 2.5 mg oral tablet: 1 tab(s) orally once a day  · 	Tivicay 50 mg oral tablet: 1 tab(s) orally once a day  · 	traMADol 50 mg oral tablet: 1 tab(s) orally every 6 hours, As Needed - for moderate pain MDD:4  · 	amLODIPine 2.5 mg oral tablet: 1 tab(s) orally once a day   · 	isosorbide mononitrate 30 mg oral tablet, extended release: 1 tab(s) orally once a day   · 	aspirin 81 mg oral delayed release tablet: 1 tab(s) orally once a day  · 	pravastatin 80 mg oral tablet: 1 tab(s) orally once a day (at bedtime)  · 	Descovy 200 mg-25 mg oral tablet: 1 tab(s) orally once a day  · 	Abilify 2 mg oral tablet: orally once a day  · 	Lexapro 5 mg oral tablet: 1 tab(s) orally once a day    .    Inpatient Medications:   amLODIPine   Tablet 2.5 milliGRAM(s) Oral daily  ARIPiprazole 2 milliGRAM(s) Oral daily  aspirin enteric coated 81 milliGRAM(s) Oral daily  atorvastatin 20 milliGRAM(s) Oral at bedtime  carvedilol 6.25 milliGRAM(s) Oral every 12 hours  dolutegravir 50 milliGRAM(s) Oral daily  emtricitabine 200 mG/tenofovir alafenamide 25 mG (DESCOVY) Tablet 1 Tablet(s) Oral daily  enalapril 2.5 milliGRAM(s) Oral daily  enoxaparin Injectable 40 milliGRAM(s) SubCutaneous daily  escitalopram 10 milliGRAM(s) Oral daily  folic acid 1 milliGRAM(s) Oral daily  influenza  Vaccine (HIGH DOSE) 0.7 milliLiter(s) IntraMuscular once  isosorbide   mononitrate ER Tablet (IMDUR) 30 milliGRAM(s) Oral daily  melatonin 5 milliGRAM(s) Oral at bedtime PRN  multivitamin 1 Tablet(s) Oral daily  thiamine 100 milliGRAM(s) Oral daily  traMADol 50 milliGRAM(s) Oral every 6 hours PRN    ALLERGIES:   Peaches (Rash)  penicillins (Rash)  shellfish (Unknown)  strawberry (Rash)  sulfa drugs (Rash)      ROS:   CONSTITUTIONAL: Denies fever, chills, weight loss, fatigue  HEENT: Pt denies  RESPIRATORY: Denies cough, wheezing, hemoptysis, shortness of Breath  CARDIOVASCULAR: see HPI  GASTROINTESTINAL: Pt denies  NEUROLOGICAL: Pt denies  MSK:  SKIN: Pt denies   PSYCHIATRIC: Pt denies  HEME/LYMPH: Pt denies    PHYSICAL:  T(C): 36.8 (11-08-21 @ 13:21), Max: 37 (11-08-21 @ 07:34)  HR: 58 (11-08-21 @ 11:38) (58 - 72)  BP: 124/78 (11-08-21 @ 11:38) (123/73 - 167/83)  RR: 18 (11-08-21 @ 11:38) (17 - 18)  SpO2: 95% (11-08-21 @ 11:38) (95% - 95%)    Exam:  Constitutional: NAD  HEENT: Normocephalic atraumatic, PERRLA, EOMI  Cardiovascular: Normal S1 S2, No JVD, No murmurs, No edema  Respiratory: Lungs clear to auscultation	bilaterally. No wheezing, rhonchi, rales  Gastrointestinal:  Soft, NT/ND, + BS	  Neurologic: A&O x3, No deficit noted  Psychiatric: appropraite mood and affect   Skin: WWP, no edema,   Ext: no edema, pulses intact 1+      LABS:  CBC:                       11.9   2.81  )-----------( 99       ( 08 Nov 2021 07:06 )             36.7       Chemistry: 11-08    143  |  110<H>  |  32<H>  ----------------------------<  140<H>  3.9   |  24  |  1.27    Ca    8.5      08 Nov 2021 07:06  Mg     1.7     11-08        Troponin: CARDIAC MARKERS ( 07 Nov 2021 08:46 )  x     / 0.01 ng/mL / x     / x     / x            EKG: on admission  revealed NSR@71BPM with TWI in lateral leads.    Telemetry:  NSR  with PVC's    ECHO:     9/2021:   CONCLUSIONS:   1. Mild mitral regurgitation.   2. No other significant valvular disease.   3. The right ventricle is normal in size. Right ventricular systolic function is normal.   4. There is mild concentric left ventricular hypertrophy. Left ventricular systolic function is moderately reduced with a calculated ejection fraction of 35-40% with regional wall motion abnormalities. There is basal to mid inferior wall severe hypokinesis. Basal to mid anterolateral wall is not well visualized.   5. No pericardial effusion.   6. The proximal ascending aorta is dilated measuring 4.70 cm.   7. Compared to the previous TTE performed on 7/28/2021, there have been no significant interval changes.        Prior EP procedures: none                 HPI:  71 yr old M, former [place], [facility] resident SR[facility] (unsatisfied with his living situation), current cocaine/marijuanna/ETOH abuse (UTOX POSITIVE 11/6 and 11/4), PMHx of HTN, hyperlipidemia, diet controlled DM, Stage III CKD (baseline Cr 1.3-1.5 noted on prior admissions), HIV (undetectable viral load), Hep C s/p Harvoni ~4 yrs ago, prostate CA s/p radiation, depression, NICM (EF 35-40% by Echo 9/2021), nonobstructive CAD (cath@[facility] 7/28/21 which revealed normal LM, 30% mLAD lesion, luminal irregularities of LCx/RCA),  known ascending aortic aneurysm 4.7cm as per CT 7/2021 (never f/up with CTS Dr Butts), PAD, admission every 2 weeks for "syncope", last admitted to [facility] 9/25-9/27/21, who  presented to [facility] ED 11/6/21 s/p unwitnessed "syncopal episode" while walking [place] park.  Patient reports after walking for ~ 1 hour he felt sudden onset of nonradiating substernal chest pressure, followed by lightheadedness and fall. Patient believes he had LOC for ~2 minutes until he was able to finally seek help from bystanders. Patient denies any N/V, diaphoresis, palpitations, PND, orthopnea, LE edema, recent travel or sick contacts.     In ED, BP: 137/70 , HR: 80s, RR:18, Temp: 98.1 F, O2 sat: 98% RA. EKG revealed NSR@71BPM with TWI in lateral leads. CXR no acute findings. CT chest PE protocol: negative for PE with Stable aneurysmal dilatation of ascending aorta 4.6 cm. CT head negative. Labs notable for: UTOX positive for cocaine 11/6 and 11/4, Trop High sensitivity negative x 2 sets, , Ddimer 240, Cr 1.43, Mg 1.5, COVID negative. Patient treated with Aspirin 324mg PO x 1 dose and Mg 1g IV x 1 dose.  Patient was ransferred to [facility] 5U  for further syncope work and tele monitoring. Tele with SR and NSVT on 11/7 and 11/6.  11EP was called 11/8 for loop vs event monitor.    PAST MEDICAL & SURGICAL HISTORY:  HIV (human immunodeficiency virus infection)    DM (diabetes mellitus)    HTN (hypertension)    Chronic diarrhea    Hepatitis C    PVD (peripheral vascular disease)    CAD (coronary artery disease)    Thoracic aortic aneurysm    S/P arterial stent  bilat LE        No pertinent family history in first degree relatives    No pertinent family history in first degree relatives        FAMILY HISTORY:  No pertinent family history in first degree relatives    No pertinent family history in first degree relatives        Social History:  Smoking denies  Drugs cocaine - last use last Wed 11/321  ETOH denies       Home Medications:   * Patient Currently Takes Medications as of 27-Sep-2021 12:06 documented in Structured Notes  · 	carvedilol 6.25 mg oral tablet: 1 tab(s) orally every 12 hours  · 	Vasotec 2.5 mg oral tablet: 1 tab(s) orally once a day  · 	Tivicay 50 mg oral tablet: 1 tab(s) orally once a day  · 	traMADol 50 mg oral tablet: 1 tab(s) orally every 6 hours, As Needed - for moderate pain MDD:4  · 	amLODIPine 2.5 mg oral tablet: 1 tab(s) orally once a day   · 	isosorbide mononitrate 30 mg oral tablet, extended release: 1 tab(s) orally once a day   · 	aspirin 81 mg oral delayed release tablet: 1 tab(s) orally once a day  · 	pravastatin 80 mg oral tablet: 1 tab(s) orally once a day (at bedtime)  · 	Descovy 200 mg-25 mg oral tablet: 1 tab(s) orally once a day  · 	Abilify 2 mg oral tablet: orally once a day  · 	Lexapro 5 mg oral tablet: 1 tab(s) orally once a day    .    Inpatient Medications:   amLODIPine   Tablet 2.5 milliGRAM(s) Oral daily  ARIPiprazole 2 milliGRAM(s) Oral daily  aspirin enteric coated 81 milliGRAM(s) Oral daily  atorvastatin 20 milliGRAM(s) Oral at bedtime  carvedilol 6.25 milliGRAM(s) Oral every 12 hours  dolutegravir 50 milliGRAM(s) Oral daily  emtricitabine 200 mG/tenofovir alafenamide 25 mG (DESCOVY) Tablet 1 Tablet(s) Oral daily  enalapril 2.5 milliGRAM(s) Oral daily  enoxaparin Injectable 40 milliGRAM(s) SubCutaneous daily  escitalopram 10 milliGRAM(s) Oral daily  folic acid 1 milliGRAM(s) Oral daily  influenza  Vaccine (HIGH DOSE) 0.7 milliLiter(s) IntraMuscular once  isosorbide   mononitrate ER Tablet (IMDUR) 30 milliGRAM(s) Oral daily  melatonin 5 milliGRAM(s) Oral at bedtime PRN  multivitamin 1 Tablet(s) Oral daily  thiamine 100 milliGRAM(s) Oral daily  traMADol 50 milliGRAM(s) Oral every 6 hours PRN    ALLERGIES:   Peaches (Rash)  penicillins (Rash)  shellfish (Unknown)  strawberry (Rash)  sulfa drugs (Rash)      ROS:   CONSTITUTIONAL: Denies fever, chills, weight loss, fatigue  HEENT: Pt denies  RESPIRATORY: Denies cough, wheezing, hemoptysis, shortness of Breath  CARDIOVASCULAR: see HPI  GASTROINTESTINAL: Pt denies  NEUROLOGICAL: Pt denies  MSK:  SKIN: Pt denies   PSYCHIATRIC: Pt denies  HEME/LYMPH: Pt denies    PHYSICAL:  T(C): 36.8 (11-08-21 @ 13:21), Max: 37 (11-08-21 @ 07:34)  HR: 58 (11-08-21 @ 11:38) (58 - 72)  BP: 124/78 (11-08-21 @ 11:38) (123/73 - 167/83)  RR: 18 (11-08-21 @ 11:38) (17 - 18)  SpO2: 95% (11-08-21 @ 11:38) (95% - 95%)    Exam:  Constitutional: NAD  HEENT: Normocephalic atraumatic, PERRLA, EOMI  Cardiovascular: Normal S1 S2, No JVD, No murmurs, No edema  Respiratory: Lungs clear to auscultation	bilaterally. No wheezing, rhonchi, rales  Gastrointestinal:  Soft, NT/ND, + BS	  Neurologic: A&O x3, No deficit noted  Psychiatric: appropraite mood and affect   Skin: WWP, no edema,   Ext: no edema, pulses intact 1+      LABS:  CBC:                       11.9   2.81  )-----------( 99       ( 08 Nov 2021 07:06 )             36.7       Chemistry: 11-08    143  |  110<H>  |  32<H>  ----------------------------<  140<H>  3.9   |  24  |  1.27    Ca    8.5      08 Nov 2021 07:06  Mg     1.7     11-08        Troponin: CARDIAC MARKERS ( 07 Nov 2021 08:46 )  x     / 0.01 ng/mL / x     / x     / x            EKG: on admission  revealed NSR@71BPM with TWI in lateral leads.    Telemetry:  NSR  with NSVT 11/7 and 11/6    ECHO:     9/2021:   CONCLUSIONS:   1. Mild mitral regurgitation.   2. No other significant valvular disease.   3. The right ventricle is normal in size. Right ventricular systolic function is normal.   4. There is mild concentric left ventricular hypertrophy. Left ventricular systolic function is moderately reduced with a calculated ejection fraction of 35-40% with regional wall motion abnormalities. There is basal to mid inferior wall severe hypokinesis. Basal to mid anterolateral wall is not well visualized.   5. No pericardial effusion.   6. The proximal ascending aorta is dilated measuring 4.70 cm.   7. Compared to the previous TTE performed on 7/28/2021, there have been no significant interval changes.        Prior EP procedures: none                 HPI:  71 yr old M, former [place], [facility] resident [facility] (unsatisfied with his living situation), current cocaine/marijuanna/ETOH abuse (UTOX POSITIVE 11/6 and 11/4), PMHx of HTN, hyperlipidemia, diet controlled DM, Stage III CKD (baseline Cr 1.3-1.5 noted on prior admissions), HIV (undetectable viral load), Hep C s/p Harvoni ~4 yrs ago, prostate CA s/p radiation, depression, NICM (EF 35-40% by Echo 9/2021), nonobstructive CAD (cath@[facility] 7/28/21 which revealed normal LM, 30% mLAD lesion, luminal irregularities of LCx/RCA),  known ascending aortic aneurysm 4.7cm as per CT 7/2021 (never f/up with CTS Dr Butts), PAD, admission every 2 weeks for "syncope", last admitted to [facility] 9/25-9/27/21, who  presented to [facility] ED 11/6/21 s/p unwitnessed "syncopal episode" while walking [place] park.  Patient reports after walking for ~ 1 hour he felt sudden onset of nonradiating substernal chest pressure, followed by lightheadedness and fall. Patient believes he had LOC for ~2 minutes until he was able to finally seek help from bystanders. Patient denies any N/V, diaphoresis, palpitations, PND, orthopnea, LE edema, recent travel or sick contacts.     In ED, BP: 137/70 , HR: 80s, RR:18, Temp: 98.1 F, O2 sat: 98% RA. EKG revealed NSR@71BPM with TWI in lateral leads. CXR no acute findings. CT chest PE protocol: negative for PE with Stable aneurysmal dilatation of ascending aorta 4.6 cm. CT head negative. Labs notable for: UTOX positive for cocaine 11/6 and 11/4, Trop High sensitivity negative x 2 sets, , Ddimer 240, Cr 1.43, Mg 1.5, COVID negative. Patient treated with Aspirin 324mg PO x 1 dose and Mg 1g IV x 1 dose.  Patient was ransferred to [facility] 5U  for further syncope work and tele monitoring. Tele with SR and NSVT on 11/7 and 11/6. EP was called 11/8 for loop vs event monitor.    PAST MEDICAL & SURGICAL HISTORY:  HIV (human immunodeficiency virus infection)    DM (diabetes mellitus)    HTN (hypertension)    Chronic diarrhea    Hepatitis C    PVD (peripheral vascular disease)    CAD (coronary artery disease)    Thoracic aortic aneurysm    S/P arterial stent  bilat LE        No pertinent family history in first degree relatives    No pertinent family history in first degree relatives        FAMILY HISTORY:  No pertinent family history in first degree relatives    No pertinent family history in first degree relatives        Social History:  Smoking denies  Drugs cocaine - last use last Wed 11/321  ETOH denies       Home Medications:   * Patient Currently Takes Medications as of 27-Sep-2021 12:06 documented in Structured Notes  · 	carvedilol 6.25 mg oral tablet: 1 tab(s) orally every 12 hours  · 	Vasotec 2.5 mg oral tablet: 1 tab(s) orally once a day  · 	Tivicay 50 mg oral tablet: 1 tab(s) orally once a day  · 	traMADol 50 mg oral tablet: 1 tab(s) orally every 6 hours, As Needed - for moderate pain MDD:4  · 	amLODIPine 2.5 mg oral tablet: 1 tab(s) orally once a day   · 	isosorbide mononitrate 30 mg oral tablet, extended release: 1 tab(s) orally once a day   · 	aspirin 81 mg oral delayed release tablet: 1 tab(s) orally once a day  · 	pravastatin 80 mg oral tablet: 1 tab(s) orally once a day (at bedtime)  · 	Descovy 200 mg-25 mg oral tablet: 1 tab(s) orally once a day  · 	Abilify 2 mg oral tablet: orally once a day  · 	Lexapro 5 mg oral tablet: 1 tab(s) orally once a day    .    Inpatient Medications:   amLODIPine   Tablet 2.5 milliGRAM(s) Oral daily  ARIPiprazole 2 milliGRAM(s) Oral daily  aspirin enteric coated 81 milliGRAM(s) Oral daily  atorvastatin 20 milliGRAM(s) Oral at bedtime  carvedilol 6.25 milliGRAM(s) Oral every 12 hours  dolutegravir 50 milliGRAM(s) Oral daily  emtricitabine 200 mG/tenofovir alafenamide 25 mG (DESCOVY) Tablet 1 Tablet(s) Oral daily  enalapril 2.5 milliGRAM(s) Oral daily  enoxaparin Injectable 40 milliGRAM(s) SubCutaneous daily  escitalopram 10 milliGRAM(s) Oral daily  folic acid 1 milliGRAM(s) Oral daily  influenza  Vaccine (HIGH DOSE) 0.7 milliLiter(s) IntraMuscular once  isosorbide   mononitrate ER Tablet (IMDUR) 30 milliGRAM(s) Oral daily  melatonin 5 milliGRAM(s) Oral at bedtime PRN  multivitamin 1 Tablet(s) Oral daily  thiamine 100 milliGRAM(s) Oral daily  traMADol 50 milliGRAM(s) Oral every 6 hours PRN    ALLERGIES:   Peaches (Rash)  penicillins (Rash)  shellfish (Unknown)  strawberry (Rash)  sulfa drugs (Rash)      ROS:   CONSTITUTIONAL: Denies fever, chills, weight loss, fatigue  HEENT: Pt denies  RESPIRATORY: Denies cough, wheezing, hemoptysis, shortness of Breath  CARDIOVASCULAR: see HPI  GASTROINTESTINAL: Pt denies  NEUROLOGICAL: Pt denies  MSK:  SKIN: Pt denies   PSYCHIATRIC: Pt denies  HEME/LYMPH: Pt denies    PHYSICAL:  T(C): 36.8 (11-08-21 @ 13:21), Max: 37 (11-08-21 @ 07:34)  HR: 58 (11-08-21 @ 11:38) (58 - 72)  BP: 124/78 (11-08-21 @ 11:38) (123/73 - 167/83)  RR: 18 (11-08-21 @ 11:38) (17 - 18)  SpO2: 95% (11-08-21 @ 11:38) (95% - 95%)    Exam:  Constitutional: NAD  HEENT: Normocephalic atraumatic, PERRLA, EOMI  Cardiovascular: Normal S1 S2, No JVD, No murmurs, No edema  Respiratory: Lungs clear to auscultation	bilaterally. No wheezing, rhonchi, rales  Gastrointestinal:  Soft, NT/ND, + BS	  Neurologic: A&O x3, No deficit noted  Psychiatric: appropraite mood and affect   Skin: WWP, no edema,   Ext: no edema, pulses intact 1+      LABS:  CBC:                       11.9   2.81  )-----------( 99       ( 08 Nov 2021 07:06 )             36.7       Chemistry: 11-08    143  |  110<H>  |  32<H>  ----------------------------<  140<H>  3.9   |  24  |  1.27    Ca    8.5      08 Nov 2021 07:06  Mg     1.7     11-08        Troponin: CARDIAC MARKERS ( 07 Nov 2021 08:46 )  x     / 0.01 ng/mL / x     / x     / x            EKG: on admission  revealed NSR@71BPM with TWI in lateral leads.    Telemetry:  NSR  with NSVT 11/7 and 11/6    ECHO:     9/2021:   CONCLUSIONS:   1. Mild mitral regurgitation.   2. No other significant valvular disease.   3. The right ventricle is normal in size. Right ventricular systolic function is normal.   4. There is mild concentric left ventricular hypertrophy. Left ventricular systolic function is moderately reduced with a calculated ejection fraction of 35-40% with regional wall motion abnormalities. There is basal to mid inferior wall severe hypokinesis. Basal to mid anterolateral wall is not well visualized.   5. No pericardial effusion.   6. The proximal ascending aorta is dilated measuring 4.70 cm.   7. Compared to the previous TTE performed on 7/28/2021, there have been no significant interval changes.        Prior EP procedures: none                 HPI:  71 yr old M, former [place], [facility] resident [facility] (unsatisfied with his living situation), current cocaine/marijuanna/ETOH abuse (UTOX POSITIVE 11/6 and 11/4), PMHx of HTN, hyperlipidemia, diet controlled DM, Stage III CKD (baseline Cr 1.3-1.5 noted on prior admissions), HIV (undetectable viral load), Hep C s/p Harvoni ~4 yrs ago, prostate CA s/p radiation, depression, NICM (EF 35-40% by Echo 9/2021), nonobstructive CAD (cath@[facility] 7/28/21 which revealed normal LM, 30% mLAD lesion, luminal irregularities of LCx/RCA),  known ascending aortic aneurysm 4.7cm as per CT 7/2021 (never f/up with CTS Dr Butts), PAD, admission every 2 weeks for "syncope", last admitted to [facility] 9/25-9/27/21, who  presented to [facility] ED 11/6/21 s/p unwitnessed "syncopal episode" while walking [place] park.  Patient reports after walking for ~ 1 hour he felt sudden onset of nonradiating substernal chest pressure, followed by lightheadedness and fall. Patient believes he had LOC for ~2 minutes until he was able to finally seek help from bystanders. Patient denies any N/V, diaphoresis, palpitations, PND, orthopnea, LE edema, recent travel or sick contacts.     In ED, BP: 137/70 , HR: 80s, RR:18, Temp: 98.1 F, O2 sat: 98% RA. EKG revealed NSR@71BPM with TWI in lateral leads. CXR no acute findings. CT chest PE protocol: negative for PE with Stable aneurysmal dilatation of ascending aorta 4.6 cm. CT head negative. Labs notable for: UTOX positive for cocaine 11/6 and 11/4, Trop High sensitivity negative x 2 sets, , Ddimer 240, Cr 1.43, Mg 1.5, COVID negative. Patient treated with Aspirin 324mg PO x 1 dose and Mg 1g IV x 1 dose.  Patient was ransferred to [facility] 5U  for further syncope work and tele monitoring. Tele with SR and NSVT on 11/7 and 11/6. EP was called 11/8 for loop vs event monitor.    PAST MEDICAL & SURGICAL HISTORY:  HIV (human immunodeficiency virus infection)    DM (diabetes mellitus)    HTN (hypertension)    Chronic diarrhea    Hepatitis C    PVD (peripheral vascular disease)    CAD (coronary artery disease)    Thoracic aortic aneurysm    S/P arterial stent  bilat LE        No pertinent family history in first degree relatives    No pertinent family history in first degree relatives        FAMILY HISTORY:  No pertinent family history in first degree relatives    No pertinent family history in first degree relatives        Social History:  Smoking denies  Drugs cocaine - last use last Wed 11/321  drinks  3-4 iona weekly       Home Medications:   * Patient Currently Takes Medications as of 27-Sep-2021 12:06 documented in Structured Notes  · 	carvedilol 6.25 mg oral tablet: 1 tab(s) orally every 12 hours  · 	Vasotec 2.5 mg oral tablet: 1 tab(s) orally once a day  · 	Tivicay 50 mg oral tablet: 1 tab(s) orally once a day  · 	traMADol 50 mg oral tablet: 1 tab(s) orally every 6 hours, As Needed - for moderate pain MDD:4  · 	amLODIPine 2.5 mg oral tablet: 1 tab(s) orally once a day   · 	isosorbide mononitrate 30 mg oral tablet, extended release: 1 tab(s) orally once a day   · 	aspirin 81 mg oral delayed release tablet: 1 tab(s) orally once a day  · 	pravastatin 80 mg oral tablet: 1 tab(s) orally once a day (at bedtime)  · 	Descovy 200 mg-25 mg oral tablet: 1 tab(s) orally once a day  · 	Abilify 2 mg oral tablet: orally once a day  · 	Lexapro 5 mg oral tablet: 1 tab(s) orally once a day    .    Inpatient Medications:   amLODIPine   Tablet 2.5 milliGRAM(s) Oral daily  ARIPiprazole 2 milliGRAM(s) Oral daily  aspirin enteric coated 81 milliGRAM(s) Oral daily  atorvastatin 20 milliGRAM(s) Oral at bedtime  carvedilol 6.25 milliGRAM(s) Oral every 12 hours  dolutegravir 50 milliGRAM(s) Oral daily  emtricitabine 200 mG/tenofovir alafenamide 25 mG (DESCOVY) Tablet 1 Tablet(s) Oral daily  enalapril 2.5 milliGRAM(s) Oral daily  enoxaparin Injectable 40 milliGRAM(s) SubCutaneous daily  escitalopram 10 milliGRAM(s) Oral daily  folic acid 1 milliGRAM(s) Oral daily  influenza  Vaccine (HIGH DOSE) 0.7 milliLiter(s) IntraMuscular once  isosorbide   mononitrate ER Tablet (IMDUR) 30 milliGRAM(s) Oral daily  melatonin 5 milliGRAM(s) Oral at bedtime PRN  multivitamin 1 Tablet(s) Oral daily  thiamine 100 milliGRAM(s) Oral daily  traMADol 50 milliGRAM(s) Oral every 6 hours PRN    ALLERGIES:   Peaches (Rash)  penicillins (Rash)  shellfish (Unknown)  strawberry (Rash)  sulfa drugs (Rash)      ROS:   CONSTITUTIONAL: Denies fever, chills, weight loss, fatigue  HEENT: Pt denies  RESPIRATORY: Denies cough, wheezing, hemoptysis, shortness of Breath  CARDIOVASCULAR: see HPI  GASTROINTESTINAL: Pt denies  NEUROLOGICAL: Pt denies  MSK:  SKIN: Pt denies   PSYCHIATRIC: Pt denies  HEME/LYMPH: Pt denies    PHYSICAL:  T(C): 36.8 (11-08-21 @ 13:21), Max: 37 (11-08-21 @ 07:34)  HR: 58 (11-08-21 @ 11:38) (58 - 72)  BP: 124/78 (11-08-21 @ 11:38) (123/73 - 167/83)  RR: 18 (11-08-21 @ 11:38) (17 - 18)  SpO2: 95% (11-08-21 @ 11:38) (95% - 95%)    Exam:  Constitutional: NAD  HEENT: Normocephalic atraumatic, PERRLA, EOMI  Cardiovascular: Normal S1 S2, No JVD, No murmurs, No edema  Respiratory: Lungs clear to auscultation	bilaterally. No wheezing, rhonchi, rales  Gastrointestinal:  Soft, NT/ND, + BS	  Neurologic: A&O x3, No deficit noted  Psychiatric: appropraite mood and affect   Skin: WWP, no edema,   Ext: no edema, pulses intact 1+      LABS:  CBC:                       11.9   2.81  )-----------( 99       ( 08 Nov 2021 07:06 )             36.7       Chemistry: 11-08    143  |  110<H>  |  32<H>  ----------------------------<  140<H>  3.9   |  24  |  1.27    Ca    8.5      08 Nov 2021 07:06  Mg     1.7     11-08        Troponin: CARDIAC MARKERS ( 07 Nov 2021 08:46 )  x     / 0.01 ng/mL / x     / x     / x            EKG: on admission  revealed NSR@71BPM with TWI in lateral leads.    Telemetry:  NSR  with NSVT 11/7 and 11/6    ECHO:     9/2021:   CONCLUSIONS:   1. Mild mitral regurgitation.   2. No other significant valvular disease.   3. The right ventricle is normal in size. Right ventricular systolic function is normal.   4. There is mild concentric left ventricular hypertrophy. Left ventricular systolic function is moderately reduced with a calculated ejection fraction of 35-40% with regional wall motion abnormalities. There is basal to mid inferior wall severe hypokinesis. Basal to mid anterolateral wall is not well visualized.   5. No pericardial effusion.   6. The proximal ascending aorta is dilated measuring 4.70 cm.   7. Compared to the previous TTE performed on 7/28/2021, there have been no significant interval changes.        Prior EP procedures: none                 HPI:  71 yr old M, former [place], [facility] resident SRO (unsatisfied with his living situation), current cocaine/marijuanna/ETOH abuse (UTOX POSITIVE 11/6 and 11/4), PMHx of HTN, hyperlipidemia, diet controlled DM, Stage III CKD (baseline Cr 1.3-1.5 noted on prior admissions), HIV (undetectable viral load), Hep C s/p Harvoni ~4 yrs ago, prostate CA s/p radiation, depression, NICM (EF 35-40% by Echo 9/2021), nonobstructive CAD (cath@[facility] 7/28/21 which revealed normal LM, 30% mLAD lesion, luminal irregularities of LCx/RCA),  known ascending aortic aneurysm 4.7cm as per CT 7/2021 (never f/up with CTS Dr Butts), PAD, admission every 2 weeks for "syncope", last admitted to [facility] 9/25-9/27/21, who  presented to [facility] ED 11/6/21 s/p unwitnessed "syncopal episode" while walking in park.  Patient reports after walking for ~ 1 hour he felt sudden onset of nonradiating substernal chest pressure, followed by lightheadedness and fall. Patient believes he had LOC for ~2 minutes until he was able to finally seek help from bystanders. Patient denies any N/V, diaphoresis, palpitations, PND, orthopnea, LE edema, recent travel or sick contacts.     In ED, BP: 137/70 , HR: 80s, RR:18, Temp: 98.1 F, O2 sat: 98% RA. EKG revealed NSR@71BPM with TWI in lateral leads. CXR no acute findings. CT chest PE protocol: negative for PE with Stable aneurysmal dilatation of ascending aorta 4.6 cm. CT head negative. Labs notable for: UTOX positive for cocaine 11/6 and 11/4, Trop High sensitivity negative x 2 sets, , Ddimer 240, Cr 1.43, Mg 1.5, COVID negative. Patient treated with Aspirin 324mg PO x 1 dose and Mg 1g IV x 1 dose.  Patient was ransferred to [facility] 5U  for further syncope work and tele monitoring. Tele with SR and NSVT on 11/7 and 11/6. EP was called 11/8 for loop vs event monitor.    PAST MEDICAL & SURGICAL HISTORY:  HIV (human immunodeficiency virus infection)    DM (diabetes mellitus)    HTN (hypertension)    Chronic diarrhea    Hepatitis C    PVD (peripheral vascular disease)    CAD (coronary artery disease)    Thoracic aortic aneurysm    S/P arterial stent  bilat LE        No pertinent family history in first degree relatives    No pertinent family history in first degree relatives        FAMILY HISTORY:  No pertinent family history in first degree relatives    No pertinent family history in first degree relatives        Social History:  Smoking denies  Drugs cocaine - last use last Wed 11/321  drinks  3-4 iona weekly       Home Medications:   * Patient Currently Takes Medications as of 27-Sep-2021 12:06 documented in Structured Notes  · 	carvedilol 6.25 mg oral tablet: 1 tab(s) orally every 12 hours  · 	Vasotec 2.5 mg oral tablet: 1 tab(s) orally once a day  · 	Tivicay 50 mg oral tablet: 1 tab(s) orally once a day  · 	traMADol 50 mg oral tablet: 1 tab(s) orally every 6 hours, As Needed - for moderate pain MDD:4  · 	amLODIPine 2.5 mg oral tablet: 1 tab(s) orally once a day   · 	isosorbide mononitrate 30 mg oral tablet, extended release: 1 tab(s) orally once a day   · 	aspirin 81 mg oral delayed release tablet: 1 tab(s) orally once a day  · 	pravastatin 80 mg oral tablet: 1 tab(s) orally once a day (at bedtime)  · 	Descovy 200 mg-25 mg oral tablet: 1 tab(s) orally once a day  · 	Abilify 2 mg oral tablet: orally once a day  · 	Lexapro 5 mg oral tablet: 1 tab(s) orally once a day    .    Inpatient Medications:   amLODIPine   Tablet 2.5 milliGRAM(s) Oral daily  ARIPiprazole 2 milliGRAM(s) Oral daily  aspirin enteric coated 81 milliGRAM(s) Oral daily  atorvastatin 20 milliGRAM(s) Oral at bedtime  carvedilol 6.25 milliGRAM(s) Oral every 12 hours  dolutegravir 50 milliGRAM(s) Oral daily  emtricitabine 200 mG/tenofovir alafenamide 25 mG (DESCOVY) Tablet 1 Tablet(s) Oral daily  enalapril 2.5 milliGRAM(s) Oral daily  enoxaparin Injectable 40 milliGRAM(s) SubCutaneous daily  escitalopram 10 milliGRAM(s) Oral daily  folic acid 1 milliGRAM(s) Oral daily  influenza  Vaccine (HIGH DOSE) 0.7 milliLiter(s) IntraMuscular once  isosorbide   mononitrate ER Tablet (IMDUR) 30 milliGRAM(s) Oral daily  melatonin 5 milliGRAM(s) Oral at bedtime PRN  multivitamin 1 Tablet(s) Oral daily  thiamine 100 milliGRAM(s) Oral daily  traMADol 50 milliGRAM(s) Oral every 6 hours PRN    ALLERGIES:   Peaches (Rash)  penicillins (Rash)  shellfish (Unknown)  strawberry (Rash)  sulfa drugs (Rash)      ROS:   CONSTITUTIONAL: Denies fever, chills, weight loss, fatigue  HEENT: Pt denies  RESPIRATORY: Denies cough, wheezing, hemoptysis, shortness of Breath  CARDIOVASCULAR: see HPI  GASTROINTESTINAL: Pt denies  NEUROLOGICAL: Pt denies  MSK:  SKIN: Pt denies   PSYCHIATRIC: Pt denies  HEME/LYMPH: Pt denies    PHYSICAL:  T(C): 36.8 (11-08-21 @ 13:21), Max: 37 (11-08-21 @ 07:34)  HR: 58 (11-08-21 @ 11:38) (58 - 72)  BP: 124/78 (11-08-21 @ 11:38) (123/73 - 167/83)  RR: 18 (11-08-21 @ 11:38) (17 - 18)  SpO2: 95% (11-08-21 @ 11:38) (95% - 95%)    Exam:  Constitutional: NAD  HEENT: Normocephalic atraumatic, PERRLA, EOMI  Cardiovascular: Normal S1 S2, No JVD, No murmurs, No edema  Respiratory: Lungs clear to auscultation	bilaterally. No wheezing, rhonchi, rales  Gastrointestinal:  Soft, NT/ND, + BS	  Neurologic: A&O x3, No deficit noted  Psychiatric: appropraite mood and affect   Skin: WWP, no edema,   Ext: no edema, pulses intact 1+      LABS:  CBC:                       11.9   2.81  )-----------( 99       ( 08 Nov 2021 07:06 )             36.7       Chemistry: 11-08    143  |  110<H>  |  32<H>  ----------------------------<  140<H>  3.9   |  24  |  1.27    Ca    8.5      08 Nov 2021 07:06  Mg     1.7     11-08        Troponin: CARDIAC MARKERS ( 07 Nov 2021 08:46 )  x     / 0.01 ng/mL / x     / x     / x            EKG: on admission  revealed NSR@71BPM with TWI in lateral leads.    Telemetry:  NSR  with NSVT 11/7 and 11/6    ECHO:     9/2021: (unchanged from echo 7/2021)  CONCLUSIONS:   1. Mild mitral regurgitation.   2. No other significant valvular disease.   3. The right ventricle is normal in size. Right ventricular systolic function is normal.   4. There is mild concentric left ventricular hypertrophy. Left ventricular systolic function is moderately reduced with a calculated ejection fraction of 35-40% with regional wall motion abnormalities. There is basal to mid inferior wall severe hypokinesis. Basal to mid anterolateral wall is not well visualized.   5. No pericardial effusion.   6. The proximal ascending aorta is dilated measuring 4.70 cm.   7. Compared to the previous TTE performed on 7/28/2021, there have been no significant interval changes.        Prior EP procedures: none  cath@[facility] 7/28/21 which revealed normal LM, 30% mLAD lesion, luminal irregularities of LCx/RCA

## 2021-11-08 NOTE — BH CONSULTATION LIAISON PROGRESS NOTE - CURRENT MEDICATION
MEDICATIONS  (STANDING):  amLODIPine   Tablet 2.5 milliGRAM(s) Oral daily  ARIPiprazole 2 milliGRAM(s) Oral daily  aspirin enteric coated 81 milliGRAM(s) Oral daily  atorvastatin 20 milliGRAM(s) Oral at bedtime  carvedilol 6.25 milliGRAM(s) Oral every 12 hours  dolutegravir 50 milliGRAM(s) Oral daily  emtricitabine 200 mG/tenofovir alafenamide 25 mG (DESCOVY) Tablet 1 Tablet(s) Oral daily  enalapril 2.5 milliGRAM(s) Oral daily  enoxaparin Injectable 40 milliGRAM(s) SubCutaneous daily  escitalopram 10 milliGRAM(s) Oral daily  folic acid 1 milliGRAM(s) Oral daily  influenza  Vaccine (HIGH DOSE) 0.7 milliLiter(s) IntraMuscular once  isosorbide   mononitrate ER Tablet (IMDUR) 30 milliGRAM(s) Oral daily  multivitamin 1 Tablet(s) Oral daily  thiamine 100 milliGRAM(s) Oral daily    MEDICATIONS  (PRN):  melatonin 5 milliGRAM(s) Oral at bedtime PRN Insomnia  traMADol 50 milliGRAM(s) Oral every 6 hours PRN Moderate Pain (4 - 6)

## 2021-11-09 ENCOUNTER — TRANSCRIPTION ENCOUNTER (OUTPATIENT)
Age: 71
End: 2021-11-09

## 2021-11-09 VITALS — SYSTOLIC BLOOD PRESSURE: 141 MMHG | DIASTOLIC BLOOD PRESSURE: 87 MMHG

## 2021-11-09 LAB
ANION GAP SERPL CALC-SCNC: 7 MMOL/L — SIGNIFICANT CHANGE UP (ref 5–17)
BUN SERPL-MCNC: 28 MG/DL — HIGH (ref 7–23)
CALCIUM SERPL-MCNC: 8.9 MG/DL — SIGNIFICANT CHANGE UP (ref 8.4–10.5)
CHLORIDE SERPL-SCNC: 107 MMOL/L — SIGNIFICANT CHANGE UP (ref 96–108)
CO2 SERPL-SCNC: 26 MMOL/L — SIGNIFICANT CHANGE UP (ref 22–31)
CREAT SERPL-MCNC: 1.19 MG/DL — SIGNIFICANT CHANGE UP (ref 0.5–1.3)
GLUCOSE SERPL-MCNC: 141 MG/DL — HIGH (ref 70–99)
HCT VFR BLD CALC: 38.2 % — LOW (ref 39–50)
HGB BLD-MCNC: 12.5 G/DL — LOW (ref 13–17)
MAGNESIUM SERPL-MCNC: 1.8 MG/DL — SIGNIFICANT CHANGE UP (ref 1.6–2.6)
MCHC RBC-ENTMCNC: 30.8 PG — SIGNIFICANT CHANGE UP (ref 27–34)
MCHC RBC-ENTMCNC: 32.7 GM/DL — SIGNIFICANT CHANGE UP (ref 32–36)
MCV RBC AUTO: 94.1 FL — SIGNIFICANT CHANGE UP (ref 80–100)
NRBC # BLD: 0 /100 WBCS — SIGNIFICANT CHANGE UP (ref 0–0)
PLATELET # BLD AUTO: 106 K/UL — LOW (ref 150–400)
POTASSIUM SERPL-MCNC: 3.9 MMOL/L — SIGNIFICANT CHANGE UP (ref 3.5–5.3)
POTASSIUM SERPL-SCNC: 3.9 MMOL/L — SIGNIFICANT CHANGE UP (ref 3.5–5.3)
RBC # BLD: 4.06 M/UL — LOW (ref 4.2–5.8)
RBC # FLD: 12.4 % — SIGNIFICANT CHANGE UP (ref 10.3–14.5)
SODIUM SERPL-SCNC: 140 MMOL/L — SIGNIFICANT CHANGE UP (ref 135–145)
WBC # BLD: 2.91 K/UL — LOW (ref 3.8–10.5)
WBC # FLD AUTO: 2.91 K/UL — LOW (ref 3.8–10.5)

## 2021-11-09 PROCEDURE — 99238 HOSP IP/OBS DSCHRG MGMT 30/<: CPT

## 2021-11-09 RX ORDER — THIAMINE MONONITRATE (VIT B1) 100 MG
1 TABLET ORAL
Qty: 30 | Refills: 0
Start: 2021-11-09 | End: 2021-12-08

## 2021-11-09 RX ORDER — POTASSIUM CHLORIDE 20 MEQ
20 PACKET (EA) ORAL ONCE
Refills: 0 | Status: COMPLETED | OUTPATIENT
Start: 2021-11-09 | End: 2021-11-09

## 2021-11-09 RX ORDER — MAGNESIUM OXIDE 400 MG ORAL TABLET 241.3 MG
400 TABLET ORAL ONCE
Refills: 0 | Status: COMPLETED | OUTPATIENT
Start: 2021-11-09 | End: 2021-11-09

## 2021-11-09 RX ORDER — FOLIC ACID 0.8 MG
1 TABLET ORAL
Qty: 30 | Refills: 0
Start: 2021-11-09 | End: 2021-12-08

## 2021-11-09 RX ADMIN — ARIPIPRAZOLE 2 MILLIGRAM(S): 15 TABLET ORAL at 11:02

## 2021-11-09 RX ADMIN — CARVEDILOL PHOSPHATE 6.25 MILLIGRAM(S): 80 CAPSULE, EXTENDED RELEASE ORAL at 07:24

## 2021-11-09 RX ADMIN — EMTRICITABINE AND TENOFOVIR DISOPROXIL FUMARATE 1 TABLET(S): 200; 300 TABLET, FILM COATED ORAL at 04:37

## 2021-11-09 RX ADMIN — DOLUTEGRAVIR SODIUM 50 MILLIGRAM(S): 25 TABLET, FILM COATED ORAL at 11:02

## 2021-11-09 RX ADMIN — Medication 20 MILLIEQUIVALENT(S): at 11:02

## 2021-11-09 RX ADMIN — Medication 100 MILLIGRAM(S): at 11:03

## 2021-11-09 RX ADMIN — MAGNESIUM OXIDE 400 MG ORAL TABLET 400 MILLIGRAM(S): 241.3 TABLET ORAL at 11:02

## 2021-11-09 RX ADMIN — Medication 81 MILLIGRAM(S): at 11:02

## 2021-11-09 RX ADMIN — Medication 1 MILLIGRAM(S): at 11:02

## 2021-11-09 RX ADMIN — Medication 1 TABLET(S): at 11:01

## 2021-11-09 RX ADMIN — ESCITALOPRAM OXALATE 10 MILLIGRAM(S): 10 TABLET, FILM COATED ORAL at 11:02

## 2021-11-09 RX ADMIN — ISOSORBIDE MONONITRATE 30 MILLIGRAM(S): 60 TABLET, EXTENDED RELEASE ORAL at 11:03

## 2021-11-09 RX ADMIN — Medication 2.5 MILLIGRAM(S): at 07:23

## 2021-11-09 RX ADMIN — AMLODIPINE BESYLATE 2.5 MILLIGRAM(S): 2.5 TABLET ORAL at 07:24

## 2021-11-09 NOTE — DISCHARGE NOTE PROVIDER - CARE PROVIDER_API CALL
EP Clinic,   CARDIAC ELECTROPHYSIOLOGY  [address] [place] [address]  Phone: [phone]  Fax: [phone]  Phone: [phone]  Fax: (   )    -  Follow Up Time: Routine

## 2021-11-09 NOTE — DISCHARGE NOTE PROVIDER - PROVIDER TOKENS
FREE:[LAST:[EP Clinic],PHONE:[phone]],FAX:[(   )    -],ADDRESS:[CARDIAC ELECTROPHYSIOLOGY  [address] [place] [address]  Phone: [phone]  Fax: [phone]],FOLLOWUP:[Routine]]

## 2021-11-09 NOTE — DISCHARGE NOTE PROVIDER - NSDCMRMEDTOKEN_GEN_ALL_CORE_FT
Abilify 2 mg oral tablet: orally once a day  amLODIPine 2.5 mg oral tablet: 1 tab(s) orally once a day   aspirin 81 mg oral delayed release tablet: 1 tab(s) orally once a day  carvedilol 6.25 mg oral tablet: 1 tab(s) orally every 12 hours  Descovy 200 mg-25 mg oral tablet: 1 tab(s) orally once a day  isosorbide mononitrate 30 mg oral tablet, extended release: 1 tab(s) orally once a day   Lexapro 5 mg oral tablet: 1 tab(s) orally once a day  pravastatin 80 mg oral tablet: 1 tab(s) orally once a day (at bedtime)  Tivicay 50 mg oral tablet: 1 tab(s) orally once a day  traMADol 50 mg oral tablet: 1 tab(s) orally every 6 hours, As Needed - for moderate pain MDD:4  Vasotec 2.5 mg oral tablet: 1 tab(s) orally once a day   Abilify 2 mg oral tablet: orally once a day  amLODIPine 2.5 mg oral tablet: 1 tab(s) orally once a day   aspirin 81 mg oral delayed release tablet: 1 tab(s) orally once a day  carvedilol 6.25 mg oral tablet: 1 tab(s) orally every 12 hours  Descovy 200 mg-25 mg oral tablet: 1 tab(s) orally once a day  folic acid 1 mg oral tablet: 1 tab(s) orally once a day  isosorbide mononitrate 30 mg oral tablet, extended release: 1 tab(s) orally once a day   Lexapro 5 mg oral tablet: 1 tab(s) orally once a day  Multiple Vitamins oral tablet: 1 tab(s) orally once a day   pravastatin 80 mg oral tablet: 1 tab(s) orally once a day (at bedtime)  thiamine 100 mg oral tablet: 1 tab(s) orally once a day  Tivicay 50 mg oral tablet: 1 tab(s) orally once a day  traMADol 50 mg oral tablet: 1 tab(s) orally every 6 hours, As Needed - for moderate pain MDD:4  Vasotec 2.5 mg oral tablet: 1 tab(s) orally once a day

## 2021-11-09 NOTE — DISCHARGE NOTE NURSING/CASE MANAGEMENT/SOCIAL WORK - NSDCFUADDAPPT_GEN_ALL_CORE_FT
Rehab Facilities:   1. [facility]  [email or web address]  Three locations  ?[address] [place] [address]  Phone: [phone]  ?[address] [facility] [address]  [place], [place] [address]  Phone: [phone]  ?[address] [facility] [address] [place] [address]  Phone: [phone]  o	Medicare, Medicaid, most private insurance and sliding scale    2. [facility]  www[email or web address]  Comprehensive addiction treatment services, including psychopharm, psychotherapy, drug testing, as well as eating disorder treatment, and specialized treatment for LGBTQ, Anabaptist Taoist populations  Two locations  ?	[address] [facility] [id number] [phone]  ?	[address] [place] [address] [phone]    3.[facility]iction [place]  [email or web address]  Comprehensive addiction treatment services, including psychopharm, psychotherapy, opiod treatment program   [address] [place], [place] [id number] [phone]

## 2021-11-09 NOTE — DISCHARGE NOTE PROVIDER - HOSPITAL COURSE
71 y/oM, former , [facility]hab resident SR[facility] (unsatisfied with his living situation), current cocaine/marijuanna/ETOH abuse (UTOX POSITIVE 11/6 and 11/4), PMHx of HTN, hyperlipidemia, diet controlled DM, Stage III CKD (baseline Cr 1.3-1.5 noted on prior admissions), HIV (undetectable viral load), Hep C s/p Harvoni ~4 yrs ago, prostate CA s/p radiation, depression, NICM (EF 35-40% by Echo 9/2021), nonobstructive CAD (cath@[facility] 7/28/21 which revealed normal LM, 30% mLAD lesion, luminal irregularities of LCx/RCA),  known ascending aortic aneurysm 4.6cm, PAD, admission every 2 weeks for "syncope", last admitted to [facility] 9/25-9/27/21 presented to [facility] ED 11/6/21 s/p "unwitnessed syncopal episode".           In ED, BP: 137/70 , HR: 80s, RR:18, Temp: 98.1 F, O2 sat: 98% RA. EKG revealed NSR@71BPM with TWI in lateral leads. CXR no acute findings. CT chest PE protocol: negative for PE with Stable aneurysmal dilatation of ascending aorta 4.6 cm. CT head negative. Labs notable for: UTOX positive for cocaine 11/6 and 11/4, Trop High sensitivity negative x 2 sets, , Ddimer 240, Cr 1.43, Mg 1.5, COVID negative. Patient treated with Aspirin 324mg PO x 1 dose and Mg 1g IV x 1 dose.  Patient now transferred to [facility] for further syncope work and tele monitoring.    71 y/oM, former , [facility] resident SR[facility] (unsatisfied with his living situation), current cocaine/marijuanna/ETOH abuse (UTOX POSITIVE 11/6 and 11/4), PMHx of HTN, hyperlipidemia, diet controlled DM, Stage III CKD (baseline Cr 1.3-1.5 noted on prior admissions), HIV (undetectable viral load), Hep C s/p Harvoni ~4 yrs ago, prostate CA s/p radiation, depression, NICM (EF 35-40% by Echo 9/2021), nonobstructive CAD (cath@[facility] 7/28/21 which revealed normal LM, 30% mLAD lesion, luminal irregularities of LCx/RCA),  known ascending aortic aneurysm 4.6cm, PAD, admission every 2 weeks for "syncope", last admitted to [facility] 9/25-9/27/21 presented to [facility] ED 11/6/21 s/p "unwitnessed syncopal episode". In ED, VSS. EKG revealed NSR@71BPM with TWI in lateral leads. CXR no acute findings. CT chest PE protocol: negative for PE with Stable aneurysmal dilatation of ascending aorta 4.6 cm. CT head negative. Labs notable for: UTOX positive for cocaine 11/6 and 11/4, Trop High sensitivity negative x 2 sets, , Ddimer 240, Cr 1.43, Mg 1.5, COVID negative. Patient treated with Aspirin 324mg PO x 1 dose and Mg 1g IV x 1 dose; transferred to [facility] for further syncope work and tele monitoring.     During hospitalization, trop (-) x 3. Orthostatics negative and EP consulted for recurrent syncopal episodes a/w multiple hospitalizations w/plan for Zio patch prior to discharge. No syncopal episodes during hospitalization and no events/arrhythmias noted on tele.     On the day of discharge, the patient was seen and examined. Symptoms improved. Vital signs are stable. Labs and imaging reviewed. Patient is medically optimized and hemodynamically stable. Return precautions discussed, medication teach back done w/ patient, and importance of physician followup emphasized for which he verbalized understanding. Patient to receive Zio-patch prior to discharge and will follow up with EP as scheduled.     DC Meds:     71 y/oM, former , [facility] resident SRO (unsatisfied with his living situation), current cocaine/marijuanna/ETOH abuse (UTOX POSITIVE 11/6 and 11/4), PMHx of HTN, hyperlipidemia, diet controlled DM, Stage III CKD (baseline Cr 1.3-1.5 noted on prior admissions), HIV (undetectable viral load), Hep C s/p Harvoni ~4 yrs ago, prostate CA s/p radiation, depression, NICM (EF 35-40% by Echo 9/2021), nonobstructive CAD (cath@[facility] 7/28/21 which revealed normal LM, 30% mLAD lesion, luminal irregularities of LCx/RCA),  known ascending aortic aneurysm 4.6cm, PAD, admission every 2 weeks for "syncope", last admitted to [facility] 9/25-9/27/21 presented to [facility] ED 11/6/21 s/p "unwitnessed syncopal episode". In ED, VSS. EKG revealed NSR@71BPM with TWI in lateral leads. CXR no acute findings. CT chest PE protocol: negative for PE with Stable aneurysmal dilatation of ascending aorta 4.6 cm. CT head negative. Labs notable for: UTOX positive for cocaine 11/6 and 11/4, Trop High sensitivity negative x 2 sets, , Ddimer 240, Cr 1.43, Mg 1.5, COVID negative. Patient treated with Aspirin 324mg PO x 1 dose and Mg 1g IV x 1 dose; transferred to [facility] for further syncope work and tele monitoring.     During hospitalization, trop (-) x 3. Orthostatics negative and EP consulted for recurrent syncopal episodes a/w multiple hospitalizations w/plan for Zio patch prior to discharge. No syncopal episodes during hospitalization and no events/arrhythmias noted on tele.     On the day of discharge, the patient was seen and examined. Symptoms improved. Vital signs are stable. Labs and imaging reviewed. Patient is medically optimized and hemodynamically stable. Return precautions discussed, medication teach back done w/ patient, and importance of physician followup emphasized for which he verbalized understanding. Patient to receive Zio-patch prior to discharge and will follow up with EP as scheduled. PT eval-no needs. Patient given list of rehab facilities for cocaine abuse     DC Meds:    Amlodipine 2.5mg   ASA 81mg  Coreg 6.25mg BID   Imdur 30mg  Pravastatin 80mg  Vasotec 2.5mg

## 2021-11-09 NOTE — DISCHARGE NOTE NURSING/CASE MANAGEMENT/SOCIAL WORK - PATIENT PORTAL LINK FT
You can access the FollowMyHealth Patient Portal offered by [facility] by registering at the following website: [email or web address]. By joining [employer]’s FollowMyHealth portal, you will also be able to view your health information using other applications (apps) compatible with our system.

## 2021-11-09 NOTE — DISCHARGE NOTE PROVIDER - NSDCFUADDAPPT_GEN_ALL_CORE_FT
Rehab Facilities:   1. [facility] [place]  www.[email or web address].[email or web address]  Three locations  ?[address] [place] [address]  Phone: [phone]  ?[address] [facility] [address]  [place], [place] [address]  Phone: [phone]  ?[address] [facility] [place] [address] [place] [address]  Phone: [phone]  o	Medicare, Medicaid, most private insurance and sliding scale    2. [facility]  www.[facility]  Comprehensive addiction treatment services, including psychopharm, psychotherapy, drug testing, as well as eating disorder treatment, and specialized treatment for LGBTQ, Scientologist Confucianism populations  Two locations  ?	[address] [facility] [address] [phone]  ?	[address] [place] [address] [phone]    3.Addiction [place]  [email or web address]  Comprehensive addiction treatment services, including psychopharm, psychotherapy, opiod treatment program   [address] [place], [place] [id number] [phone]

## 2021-11-09 NOTE — DISCHARGE NOTE PROVIDER - NSDCCPCAREPLAN_GEN_ALL_CORE_FT
PRINCIPAL DISCHARGE DIAGNOSIS  Diagnosis: Syncope  Assessment and Plan of Treatment: - You presented to the hospital after you had a syncopal episode or in other words fainted. Multiple tests were performed while you were in the hospital. You DID NOT have a heart attack and NO arrhythmias or irregular heart beats were noted on telemetry. You had a cardiac cath in July which showed non-obstructive coronary artery disease for which you did not need another cardiac cath this hospitalization.   - As you have had multiple syncopal episodes in the past you are receiving a Zio patch prior to discharge as discussed.  This is an adhesive patch, worn much like a bandage, on the upper left side of the chest. It's water resistant and can be kept on around the clock while you sleep, exercise and shower. It continuously monitors your heart rhythm for which you will follow up with the electrophysiology team as scheduled (they will give you an appointment when you receive the patch).         SECONDARY DISCHARGE DIAGNOSES  Diagnosis: Cocaine abuse  Assessment and Plan of Treatment: - You have a known history of cocaine abuse. Cocaine is a highly addictive drug that ups your levels of alertness, attention, and energy. As discussed, it is very improtant to STOP using cocaine.  Long term effects incluce but are not limited to: headaches, Convulsions and seizures, Heart disease, heart attack, and stroke, Mood problems, Sexual trouble, Lung damage, HIV or hepatitis, Bowel decay if you swallow it, Loss of smell, nosebleeds, runny nose, and trouble swallowing, if you snort it.  - We provided a list of rehab facilities so that you can stop using this drug.

## 2021-11-12 DIAGNOSIS — N18.30 CHRONIC KIDNEY DISEASE, STAGE 3 UNSPECIFIED: ICD-10-CM

## 2021-11-12 DIAGNOSIS — I47.1 SUPRAVENTRICULAR TACHYCARDIA: ICD-10-CM

## 2021-11-12 DIAGNOSIS — Z95.828 PRESENCE OF OTHER VASCULAR IMPLANTS AND GRAFTS: ICD-10-CM

## 2021-11-12 DIAGNOSIS — F10.10 ALCOHOL ABUSE, UNCOMPLICATED: ICD-10-CM

## 2021-11-12 DIAGNOSIS — I13.0 HYPERTENSIVE HEART AND CHRONIC KIDNEY DISEASE WITH HEART FAILURE AND STAGE 1 THROUGH STAGE 4 CHRONIC KIDNEY DISEASE, OR UNSPECIFIED CHRONIC KIDNEY DISEASE: ICD-10-CM

## 2021-11-12 DIAGNOSIS — I25.2 OLD MYOCARDIAL INFARCTION: ICD-10-CM

## 2021-11-12 DIAGNOSIS — I42.8 OTHER CARDIOMYOPATHIES: ICD-10-CM
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
## 2021-11-12 DIAGNOSIS — Z91.013 ALLERGY TO SEAFOOD: ICD-10-CM

## 2021-11-12 DIAGNOSIS — R55 SYNCOPE AND COLLAPSE: ICD-10-CM

## 2021-11-12 DIAGNOSIS — F14.19 COCAINE ABUSE WITH UNSPECIFIED COCAINE-INDUCED DISORDER: ICD-10-CM

## 2021-11-12 DIAGNOSIS — I25.10 ATHEROSCLEROTIC HEART DISEASE OF NATIVE CORONARY ARTERY WITHOUT ANGINA PECTORIS: ICD-10-CM

## 2021-11-12 DIAGNOSIS — F32.9 MAJOR DEPRESSIVE DISORDER, SINGLE EPISODE, UNSPECIFIED: ICD-10-CM

## 2021-11-12 DIAGNOSIS — Z91.018 ALLERGY TO OTHER FOODS: ICD-10-CM

## 2021-11-12 DIAGNOSIS — I71.2 THORACIC AORTIC ANEURYSM, WITHOUT RUPTURE: ICD-10-CM

## 2021-11-12 DIAGNOSIS — E11.22 TYPE 2 DIABETES MELLITUS WITH DIABETIC CHRONIC KIDNEY DISEASE: ICD-10-CM

## 2021-11-12 DIAGNOSIS — Z86.19 PERSONAL HISTORY OF OTHER INFECTIOUS AND PARASITIC DISEASES: ICD-10-CM

## 2021-11-12 DIAGNOSIS — E11.51 TYPE 2 DIABETES MELLITUS WITH DIABETIC PERIPHERAL ANGIOPATHY WITHOUT GANGRENE: ICD-10-CM

## 2021-11-12 DIAGNOSIS — Z21 ASYMPTOMATIC HUMAN IMMUNODEFICIENCY VIRUS [HIV] INFECTION STATUS: ICD-10-CM

## 2021-11-12 DIAGNOSIS — Z88.2 ALLERGY STATUS TO SULFONAMIDES: ICD-10-CM

## 2021-11-12 DIAGNOSIS — E78.5 HYPERLIPIDEMIA, UNSPECIFIED: ICD-10-CM

## 2021-11-12 DIAGNOSIS — I50.22 CHRONIC SYSTOLIC (CONGESTIVE) HEART FAILURE: ICD-10-CM

## 2021-11-12 DIAGNOSIS — F12.10 CANNABIS ABUSE, UNCOMPLICATED: ICD-10-CM

## 2021-11-12 DIAGNOSIS — Z88.0 ALLERGY STATUS TO PENICILLIN: ICD-10-CM

## 2021-11-12 DIAGNOSIS — Z79.82 LONG TERM (CURRENT) USE OF ASPIRIN: ICD-10-CM

## 2021-11-22 ENCOUNTER — EMERGENCY (EMERGENCY)
Facility: HOSPITAL | Age: 71
LOS: 1 days | Discharge: HOME | End: 2021-11-22
Attending: EMERGENCY MEDICINE | Admitting: EMERGENCY MEDICINE
Payer: MEDICARE

## 2021-11-22 VITALS
SYSTOLIC BLOOD PRESSURE: 151 MMHG | TEMPERATURE: 97 F | HEART RATE: 67 BPM | RESPIRATION RATE: 18 BRPM | DIASTOLIC BLOOD PRESSURE: 90 MMHG | OXYGEN SATURATION: 98 %

## 2021-11-22 VITALS
TEMPERATURE: 97 F | OXYGEN SATURATION: 95 % | WEIGHT: 179.9 LBS | SYSTOLIC BLOOD PRESSURE: 145 MMHG | HEART RATE: 70 BPM | DIASTOLIC BLOOD PRESSURE: 92 MMHG | RESPIRATION RATE: 18 BRPM | HEIGHT: 72 IN

## 2021-11-22 DIAGNOSIS — R07.89 OTHER CHEST PAIN: ICD-10-CM

## 2021-11-22 DIAGNOSIS — Z85.46 PERSONAL HISTORY OF MALIGNANT NEOPLASM OF PROSTATE: ICD-10-CM

## 2021-11-22 DIAGNOSIS — N18.9 CHRONIC KIDNEY DISEASE, UNSPECIFIED: ICD-10-CM

## 2021-11-22 DIAGNOSIS — Z88.0 ALLERGY STATUS TO PENICILLIN: ICD-10-CM

## 2021-11-22 DIAGNOSIS — Z91.018 ALLERGY TO OTHER FOODS: ICD-10-CM

## 2021-11-22 DIAGNOSIS — I12.9 HYPERTENSIVE CHRONIC KIDNEY DISEASE WITH STAGE 1 THROUGH STAGE 4 CHRONIC KIDNEY DISEASE, OR UNSPECIFIED CHRONIC KIDNEY DISEASE: ICD-10-CM

## 2021-11-22 DIAGNOSIS — Z88.2 ALLERGY STATUS TO SULFONAMIDES: ICD-10-CM

## 2021-11-22 DIAGNOSIS — Z88.6 ALLERGY STATUS TO ANALGESIC AGENT: ICD-10-CM

## 2021-11-22 DIAGNOSIS — Z95.9 PRESENCE OF CARDIAC AND VASCULAR IMPLANT AND GRAFT, UNSPECIFIED: Chronic | ICD-10-CM

## 2021-11-22 DIAGNOSIS — Z20.822 CONTACT WITH AND (SUSPECTED) EXPOSURE TO COVID-19: ICD-10-CM

## 2021-11-22 DIAGNOSIS — E11.22 TYPE 2 DIABETES MELLITUS WITH DIABETIC CHRONIC KIDNEY DISEASE: ICD-10-CM

## 2021-11-22 DIAGNOSIS — Z21 ASYMPTOMATIC HUMAN IMMUNODEFICIENCY VIRUS [HIV] INFECTION STATUS: ICD-10-CM

## 2021-11-22 DIAGNOSIS — Z91.013 ALLERGY TO SEAFOOD: ICD-10-CM

## 2021-11-22 DIAGNOSIS — E78.5 HYPERLIPIDEMIA, UNSPECIFIED: ICD-10-CM

## 2021-11-22 LAB
ALBUMIN SERPL ELPH-MCNC: 3.8 G/DL — SIGNIFICANT CHANGE UP (ref 3.3–5)
ALP SERPL-CCNC: 51 U/L — SIGNIFICANT CHANGE UP (ref 40–120)
ALT FLD-CCNC: 18 U/L — SIGNIFICANT CHANGE UP (ref 10–45)
AMPHET UR-MCNC: NEGATIVE — SIGNIFICANT CHANGE UP
ANION GAP SERPL CALC-SCNC: 9 MMOL/L — SIGNIFICANT CHANGE UP (ref 5–17)
APTT BLD: 29.9 SEC — SIGNIFICANT CHANGE UP (ref 27.5–35.5)
AST SERPL-CCNC: 27 U/L — SIGNIFICANT CHANGE UP (ref 10–40)
BARBITURATES UR SCN-MCNC: NEGATIVE — SIGNIFICANT CHANGE UP
BASOPHILS # BLD AUTO: 0.01 K/UL — SIGNIFICANT CHANGE UP (ref 0–0.2)
BASOPHILS NFR BLD AUTO: 0.3 % — SIGNIFICANT CHANGE UP (ref 0–2)
BENZODIAZ UR-MCNC: NEGATIVE — SIGNIFICANT CHANGE UP
BILIRUB SERPL-MCNC: 0.3 MG/DL — SIGNIFICANT CHANGE UP (ref 0.2–1.2)
BUN SERPL-MCNC: 27 MG/DL — HIGH (ref 7–23)
CALCIUM SERPL-MCNC: 9.1 MG/DL — SIGNIFICANT CHANGE UP (ref 8.4–10.5)
CHLORIDE SERPL-SCNC: 107 MMOL/L — SIGNIFICANT CHANGE UP (ref 96–108)
CO2 SERPL-SCNC: 24 MMOL/L — SIGNIFICANT CHANGE UP (ref 22–31)
COCAINE METAB.OTHER UR-MCNC: POSITIVE
CREAT SERPL-MCNC: 1.42 MG/DL — HIGH (ref 0.5–1.3)
EOSINOPHIL # BLD AUTO: 0.06 K/UL — SIGNIFICANT CHANGE UP (ref 0–0.5)
EOSINOPHIL NFR BLD AUTO: 1.8 % — SIGNIFICANT CHANGE UP (ref 0–6)
GLUCOSE SERPL-MCNC: 140 MG/DL — HIGH (ref 70–99)
HCT VFR BLD CALC: 37.1 % — LOW (ref 39–50)
HGB BLD-MCNC: 12.6 G/DL — LOW (ref 13–17)
IMM GRANULOCYTES NFR BLD AUTO: 0.6 % — SIGNIFICANT CHANGE UP (ref 0–1.5)
INR BLD: 0.97 — SIGNIFICANT CHANGE UP (ref 0.88–1.16)
LYMPHOCYTES # BLD AUTO: 0.75 K/UL — LOW (ref 1–3.3)
LYMPHOCYTES # BLD AUTO: 22 % — SIGNIFICANT CHANGE UP (ref 13–44)
MCHC RBC-ENTMCNC: 31.4 PG — SIGNIFICANT CHANGE UP (ref 27–34)
MCHC RBC-ENTMCNC: 34 GM/DL — SIGNIFICANT CHANGE UP (ref 32–36)
MCV RBC AUTO: 92.5 FL — SIGNIFICANT CHANGE UP (ref 80–100)
METHADONE UR-MCNC: NEGATIVE — SIGNIFICANT CHANGE UP
MONOCYTES # BLD AUTO: 0.28 K/UL — SIGNIFICANT CHANGE UP (ref 0–0.9)
MONOCYTES NFR BLD AUTO: 8.2 % — SIGNIFICANT CHANGE UP (ref 2–14)
NEUTROPHILS # BLD AUTO: 2.29 K/UL — SIGNIFICANT CHANGE UP (ref 1.8–7.4)
NEUTROPHILS NFR BLD AUTO: 67.1 % — SIGNIFICANT CHANGE UP (ref 43–77)
NRBC # BLD: 0 /100 WBCS — SIGNIFICANT CHANGE UP (ref 0–0)
OPIATES UR-MCNC: NEGATIVE — SIGNIFICANT CHANGE UP
PCP SPEC-MCNC: SIGNIFICANT CHANGE UP
PCP UR-MCNC: NEGATIVE — SIGNIFICANT CHANGE UP
PLATELET # BLD AUTO: 116 K/UL — LOW (ref 150–400)
POTASSIUM SERPL-MCNC: 4.1 MMOL/L — SIGNIFICANT CHANGE UP (ref 3.5–5.3)
POTASSIUM SERPL-SCNC: 4.1 MMOL/L — SIGNIFICANT CHANGE UP (ref 3.5–5.3)
PROT SERPL-MCNC: 6.8 G/DL — SIGNIFICANT CHANGE UP (ref 6–8.3)
PROTHROM AB SERPL-ACNC: 11.7 SEC — SIGNIFICANT CHANGE UP (ref 10.6–13.6)
RBC # BLD: 4.01 M/UL — LOW (ref 4.2–5.8)
RBC # FLD: 12.3 % — SIGNIFICANT CHANGE UP (ref 10.3–14.5)
SARS-COV-2 RNA SPEC QL NAA+PROBE: NEGATIVE — SIGNIFICANT CHANGE UP
SODIUM SERPL-SCNC: 140 MMOL/L — SIGNIFICANT CHANGE UP (ref 135–145)
THC UR QL: NEGATIVE — SIGNIFICANT CHANGE UP
TROPONIN T SERPL-MCNC: <0.01 NG/ML — SIGNIFICANT CHANGE UP (ref 0–0.01)
TROPONIN T SERPL-MCNC: <0.01 NG/ML — SIGNIFICANT CHANGE UP (ref 0–0.01)
WBC # BLD: 3.41 K/UL — LOW (ref 3.8–10.5)
WBC # FLD AUTO: 3.41 K/UL — LOW (ref 3.8–10.5)

## 2021-11-22 PROCEDURE — 80307 DRUG TEST PRSMV CHEM ANLYZR: CPT

## 2021-11-22 PROCEDURE — 87635 SARS-COV-2 COVID-19 AMP PRB: CPT

## 2021-11-22 PROCEDURE — 80053 COMPREHEN METABOLIC PANEL: CPT

## 2021-11-22 PROCEDURE — 93005 ELECTROCARDIOGRAM TRACING: CPT

## 2021-11-22 PROCEDURE — 99284 EMERGENCY DEPT VISIT MOD MDM: CPT | Mod: 25

## 2021-11-22 PROCEDURE — 99285 EMERGENCY DEPT VISIT HI MDM: CPT

## 2021-11-22 PROCEDURE — 71045 X-RAY EXAM CHEST 1 VIEW: CPT | Mod: 26

## 2021-11-22 PROCEDURE — 36415 COLL VENOUS BLD VENIPUNCTURE: CPT

## 2021-11-22 PROCEDURE — 85730 THROMBOPLASTIN TIME PARTIAL: CPT

## 2021-11-22 PROCEDURE — 93010 ELECTROCARDIOGRAM REPORT: CPT

## 2021-11-22 PROCEDURE — 83880 ASSAY OF NATRIURETIC PEPTIDE: CPT

## 2021-11-22 PROCEDURE — 84484 ASSAY OF TROPONIN QUANT: CPT

## 2021-11-22 PROCEDURE — 85610 PROTHROMBIN TIME: CPT

## 2021-11-22 PROCEDURE — 71045 X-RAY EXAM CHEST 1 VIEW: CPT

## 2021-11-22 PROCEDURE — 85025 COMPLETE CBC W/AUTO DIFF WBC: CPT

## 2021-11-22 NOTE — ED PROVIDER NOTE - OBJECTIVE STATEMENT
71 y/oM, former , [facility] resident [facility] (unsatisfied with his living situation), current cocaine/marijuanna/ETOH abuse- states has not usedi n one week, pmh htn, hld, dm, CKD (baseline Cr 1.3-1.5), HIV (undetectable viral load), Hep C s/p Harvoni ~4 yrs ago, prostate CA s/p radiation, depression, NICM (EF 35-40% by Echo 9/2021), nonobstructive CAD (cath@[facility] 7/28/21 which revealed normal LM, 30% mLAD lesion, luminal irregularities of LCx/RCA),  known ascending aortic aneurysm 4.6cm, PAD, recurrent admissions for syncope p/w chest pain which began 2.5hrs PTA.  States he was sitting on the couch when he felt a heavy midsternal chest pressure w/ associated palpitations and sob.  When he got up he felt lightheaded, sat back down and improved.  pt then called 911, received 324mg ASA with improvement of sxs. 71 y/oM, former , [facility] resident [facility] (unsatisfied with his living situation), current cocaine/marijuanna/ETOH abuse- states has not used in one week, pmh htn, hld, dm, CKD (baseline Cr 1.3-1.5), HIV (undetectable viral load), Hep C s/p Harvoni ~4 yrs ago, prostate CA s/p radiation, depression, NICM (EF 35-40% by Echo 9/2021), nonobstructive CAD (cath@[facility] 7/28/21 which revealed normal LM, 30% mLAD lesion, luminal irregularities of LCx/RCA),  known ascending aortic aneurysm 4.6cm, PAD, recurrent admissions for syncope p/w chest pain which began 2.5hrs PTA.  States he was sitting on the couch when he felt a heavy midsternal chest pressure w/ associated sob.  When he got up he felt lightheaded, sat back down and improved.  pt then called 911, received 324mg ASA with improvement of sxs. Pt w/ zio cardiac event recorder placed during most recent admission, states he has appointment to f/u this month.  Denies f/c, headache, fainting, cough, uri sxs, abd pain, nvd, urinary sxs, leg pain/swelling, h/o dvt/pe, recent travel/immobilization, trauma/fall

## 2021-11-22 NOTE — ED ADULT TRIAGE NOTE - CHIEF COMPLAINT QUOTE
Pt with CP x1 hr PTA, +SOB. Pt with hx of MI 3 yrs ago, given 324 ASA. Pt 94% Pox on RA, on NRB for comfort. +tachypnea noted. +pacemaker.

## 2021-11-22 NOTE — ED PROVIDER NOTE - CLINICAL SUMMARY MEDICAL DECISION MAKING FREE TEXT BOX
71 y/oM, former , [facility] resident [facility] (unsatisfied with his living situation), current cocaine/marijuanna/ETOH abuse- states has not usedi n one week, pmh htn, hld, dm, CKD (baseline Cr 1.3-1.5), HIV (undetectable viral load), Hep C s/p Harvoni ~4 yrs ago, prostate CA s/p radiation, depression, NICM (EF 35-40% by Echo 9/2021), nonobstructive CAD (cath@[facility] 7/28/21 which revealed normal LM, 30% mLAD lesion, luminal irregularities of LCx/RCA),  known ascending aortic aneurysm 4.6cm, PAD, recurrent admissions for syncope p/w chest pain/sob which began 2.5hrs PTA, given ASA by EMS and symptoms resolved.  pt w/ zio cardiac event monitor placed during last [facility] admission, has appointment this month to f/u as per pt.  never passed out.  pt w/ recent cath and echo.  will obtain labs, EKG, CXR- if initial trop neg and EKG same as prior, plan for rpt trop and likely f/u outpt.

## 2021-11-22 NOTE — ED ADULT NURSE NOTE - NSIMPLEMENTINTERV_GEN_ALL_ED
Implemented All Fall with Harm Risk Interventions:  [employer] to call system. Call bell, personal items and telephone within reach. Instruct patient to call for assistance. Room bathroom lighting operational. Non-slip footwear when patient is off stretcher. Physically safe environment: no spills, clutter or unnecessary equipment. Stretcher in lowest position, wheels locked, appropriate side rails in place. Provide visual cue, wrist band, yellow gown, etc. Monitor gait and stability. Monitor for mental status changes and reorient to person, place, and time. Review medications for side effects contributing to fall risk. Reinforce activity limits and safety measures with patient and family. Provide visual clues: red socks.

## 2021-11-22 NOTE — ED PROVIDER NOTE - PHYSICAL EXAMINATION
Vitals reviewed  Gen: anxious appearing, nad, speaking in full sentences- np hypoxia/dyspnea, sating 99% on RA  Skin: wwp, no rash/lesions  HEENT: ncat, eomi, perrla, mmm  CV: + s1/2, no audible m/r/g  Chest: zio cardiac recorder L chest wall   Resp: symmetrical expansion, ctab, no w/r/r  Abd: nondistended, soft/nt  Ext: FROM throughout, no peripheral edema/calf ttp   Neuro: alert/oriented, no focal deficits, steady gait

## 2021-11-22 NOTE — ED PROVIDER NOTE - PATIENT PORTAL LINK FT
You can access the FollowMyHealth Patient Portal offered by [facility] by registering at the following website: [email or web address]. By joining [employer]’s FollowMyHealth portal, you will also be able to view your health information using other applications (apps) compatible with our system.

## 2021-11-22 NOTE — ED PROVIDER NOTE - NSFOLLOWUPINSTRUCTIONS_ED_ALL_ED_FT
Follow up with your cardiologist as scheduled:  Please call info below to confirm your appt.    CARDIAC ELECTROPHYSIOLOGY  [address] [address]  Phone: [phone]    Take your regularly prescribed medications.    Return to ED with worsening symptoms or other concerns.          Chest Pain    WHAT YOU NEED TO KNOW:    Chest pain can be caused by a range of conditions, from not serious to life-threatening. Chest pain can be a symptom of a digestive problem, such as acid reflux or a stomach ulcer. An anxiety attack or a strong emotion, such as anger, can also cause chest pain. Infection, inflammation, or a fracture in the bones or cartilage in your chest can cause pain or discomfort. Sometimes chest pain or pressure is caused by poor blood flow to your heart (angina). Chest pain may also be caused by life-threatening conditions such as a heart attack or blood clot in your lungs.    DISCHARGE INSTRUCTIONS:    Call your local emergency number (911 in the [place]) or have someone call if:   •You have any of the following signs of a heart attack: ?Squeezing, pressure, or pain in your chest      ?You may also have any of the following: ?Discomfort or pain in your back, neck, jaw, stomach, or arm      ?Shortness of breath      ?Nausea or vomiting      ?Lightheadedness or a sudden cold sweat            Seek care immediately if:   •You have chest discomfort that gets worse, even with medicine.      •You cough or vomit blood.      •Your bowel movements are black or bloody.      •You cannot stop vomiting, or it hurts to swallow.      Call your doctor if:   •You have questions or concerns about your condition or care.          Medicines:   •Medicines may be given to treat the cause of your chest pain. Examples include pain medicine, anxiety medicine, or medicines to increase blood flow to your heart.      •Do not take certain medicines without asking your healthcare provider first. These include NSAIDs, herbal or vitamin supplements, or hormones (estrogen or progestin).      •Take your medicine as directed. Contact your healthcare provider if you think your medicine is not helping or if you have side effects. Tell him or her if you are allergic to any medicine. Keep a list of the medicines, vitamins, and herbs you take. Include the amounts, and when and why you take them. Bring the list or the pill bottles to follow-up visits. Carry your medicine list with you in case of an emergency.      Healthy living tips: The following are general healthy guidelines. If the cause of your chest pain is known, your healthcare provider will give you specific guidelines to follow.  •Do not smoke. Nicotine and other chemicals in cigarettes and cigars can cause lung and heart damage. Ask your healthcare provider for information if you currently smoke and need help to quit. E-cigarettes or smokeless tobacco still contain nicotine. Talk to your healthcare provider before you use these products.      •Choose a variety of healthy foods as often as possible. Include fresh, frozen, or canned fruits and vegetables. Also include low-fat dairy products, fish, chicken (without skin), and lean meats. Your healthcare provider or a dietitian can help you create meal plans. You may need to avoid certain foods or drinks if your pain is caused by a digestion problem.  Healthy Foods           •Lower your sodium (salt) intake. Limit foods that are high in sodium, such as canned foods, salty snacks, and cold cuts. If you add salt when you cook food, do not add more at the table. Choose low-sodium canned foods as much as possible.             •Drink plenty of water every day. Water helps your body to control temperature and blood pressure. Ask your healthcare provider how much water you should drink every day.      •Ask about activity. Your healthcare provider will tell you which activities to limit or avoid. Ask when you can drive, return to work, and have sex. Ask about the best exercise plan for you.      •Maintain a healthy weight. Ask your healthcare provider what a healthy weight is for you. Ask him or her to help you create a safe weight loss plan if you are overweight.      •Ask about vaccines you may need. Get the influenza (flu) vaccine every year as soon as recommended, usually in September or October. You may also need a pneumococcal vaccine to prevent pneumonia. The vaccine is usually given every 5 years, starting at age 65. Your healthcare provider can tell you if should get other vaccines, and when to get them.      Follow up with your healthcare provider within 72 hours, or as directed: You may need to return for more tests to find the cause of your chest pain. You may be referred to a specialist, such as a cardiologist or gastroenterologist. Write down your questions so you remember to ask them during your visits.       © Copyright [employer] 2021           back to top                          © Copyright [employer] 2021

## 2021-11-22 NOTE — ED ADULT NURSE REASSESSMENT NOTE - NS ED NURSE REASSESS COMMENT FT1
Pt. VS updated. Pt. resting comfortably. Pt. took off nasal cannula and is comfortable on RA. Sa02 is at 98%. Pt. reports sob has resolved. Pt. aware of plan for waiting EP consult. Will continue to monitor.

## 2021-11-22 NOTE — ED PROVIDER NOTE - ATTENDING CONTRIBUTION TO CARE
71 y/oM, former , [facility] resident [facility] (unsatisfied with his living situation), current cocaine/marijuanna/ETOH abuse- states has not usedi n one week, pmh htn, hld, dm, CKD (baseline Cr 1.3-1.5), HIV (undetectable viral load), Hep C s/p Harvoni ~4 yrs ago, prostate CA s/p radiation, depression, NICM (EF 35-40% by Echo 9/2021), nonobstructive CAD (cath@[facility] 7/28/21 which revealed normal LM, 30% mLAD lesion, luminal irregularities of LCx/RCA),  known ascending aortic aneurysm 4.6cm, PAD, recurrent admissions for syncope p/w chest pain/sob which began 2.5hrs PTA, given ASA by EMS and symptoms resolved.  pt w/ zio cardiac event monitor placed during last [facility] admission, has appointment this month to f/u as per pt.  never passed out.  pt w/ recent cath and echo.  will obtain labs, EKG, CXR- if initial trop neg and EKG same as prior, plan for rpt trop and likely f/u outpt.    Agree with above note as documented by PA.  I was available as the supervising attending during patient's ER evaluation.    Pt with recent admission with zio monitor placed - chest pain prior to arrival, two trops negative, pt comfortable during ED stay, understands follow up with EP.  Received info.

## 2021-11-22 NOTE — ED ADULT NURSE NOTE - OBJECTIVE STATEMENT
pt presents to ED c/o chest pressure x 2 hours. Pt. reports he was standing from a sitting position in his home and when he stood up he got dizzy and his chest pressure began. Pt. denies radiation, describes it as intermittent. Pt. was given ASA in the EMS and reports mild relief, still reports the pressure as a 5/10. Pt. c/o blurred vision that began approx. same time as chest pressure. Pt. had hx MI 3 y. ago, reports this feels different because he became unconscious during MI. Pt. is on blood thinners. Pt. airway is patent, speaking in clear coherent sentences. Pt. last PO intake was 0800 this am. Pt. denies headache, abdominal pain, N/V/D, fever, chills.

## 2021-12-05 ENCOUNTER — EMERGENCY (EMERGENCY)
Facility: HOSPITAL | Age: 71
LOS: 1 days | Discharge: LEFT BEFORE DISCHARGE | End: 2021-12-05
Attending: EMERGENCY MEDICINE | Admitting: EMERGENCY MEDICINE
Payer: MEDICARE

## 2021-12-05 VITALS
OXYGEN SATURATION: 95 % | WEIGHT: 179.9 LBS | HEIGHT: 72 IN | TEMPERATURE: 98 F | DIASTOLIC BLOOD PRESSURE: 72 MMHG | HEART RATE: 76 BPM | RESPIRATION RATE: 18 BRPM | SYSTOLIC BLOOD PRESSURE: 170 MMHG

## 2021-12-05 DIAGNOSIS — Z91.018 ALLERGY TO OTHER FOODS: ICD-10-CM

## 2021-12-05 DIAGNOSIS — R45.851 SUICIDAL IDEATIONS: ICD-10-CM

## 2021-12-05 DIAGNOSIS — Z20.822 CONTACT WITH AND (SUSPECTED) EXPOSURE TO COVID-19: ICD-10-CM

## 2021-12-05 DIAGNOSIS — R07.89 OTHER CHEST PAIN: ICD-10-CM

## 2021-12-05 DIAGNOSIS — Z91.013 ALLERGY TO SEAFOOD: ICD-10-CM

## 2021-12-05 DIAGNOSIS — I12.9 HYPERTENSIVE CHRONIC KIDNEY DISEASE WITH STAGE 1 THROUGH STAGE 4 CHRONIC KIDNEY DISEASE, OR UNSPECIFIED CHRONIC KIDNEY DISEASE: ICD-10-CM

## 2021-12-05 DIAGNOSIS — Z88.0 ALLERGY STATUS TO PENICILLIN: ICD-10-CM

## 2021-12-05 DIAGNOSIS — Z21 ASYMPTOMATIC HUMAN IMMUNODEFICIENCY VIRUS [HIV] INFECTION STATUS: ICD-10-CM

## 2021-12-05 DIAGNOSIS — E11.22 TYPE 2 DIABETES MELLITUS WITH DIABETIC CHRONIC KIDNEY DISEASE: ICD-10-CM

## 2021-12-05 DIAGNOSIS — Z95.9 PRESENCE OF CARDIAC AND VASCULAR IMPLANT AND GRAFT, UNSPECIFIED: Chronic | ICD-10-CM

## 2021-12-05 DIAGNOSIS — F32.9 MAJOR DEPRESSIVE DISORDER, SINGLE EPISODE, UNSPECIFIED: ICD-10-CM

## 2021-12-05 DIAGNOSIS — Z88.2 ALLERGY STATUS TO SULFONAMIDES: ICD-10-CM

## 2021-12-05 DIAGNOSIS — N18.30 CHRONIC KIDNEY DISEASE, STAGE 3 UNSPECIFIED: ICD-10-CM

## 2021-12-05 DIAGNOSIS — F41.9 ANXIETY DISORDER, UNSPECIFIED: ICD-10-CM

## 2021-12-05 LAB
ALBUMIN SERPL ELPH-MCNC: 3.4 G/DL — SIGNIFICANT CHANGE UP (ref 3.3–5)
ALP SERPL-CCNC: SIGNIFICANT CHANGE UP U/L (ref 40–120)
ALT FLD-CCNC: SIGNIFICANT CHANGE UP U/L (ref 10–45)
AMPHET UR-MCNC: NEGATIVE — SIGNIFICANT CHANGE UP
ANION GAP SERPL CALC-SCNC: 9 MMOL/L — SIGNIFICANT CHANGE UP (ref 5–17)
APPEARANCE UR: CLEAR — SIGNIFICANT CHANGE UP
AST SERPL-CCNC: SIGNIFICANT CHANGE UP U/L (ref 10–40)
BACTERIA # UR AUTO: PRESENT /HPF
BARBITURATES UR SCN-MCNC: NEGATIVE — SIGNIFICANT CHANGE UP
BASOPHILS # BLD AUTO: 0.02 K/UL — SIGNIFICANT CHANGE UP (ref 0–0.2)
BASOPHILS NFR BLD AUTO: 0.6 % — SIGNIFICANT CHANGE UP (ref 0–2)
BENZODIAZ UR-MCNC: NEGATIVE — SIGNIFICANT CHANGE UP
BILIRUB SERPL-MCNC: 0.2 MG/DL — SIGNIFICANT CHANGE UP (ref 0.2–1.2)
BILIRUB UR-MCNC: NEGATIVE — SIGNIFICANT CHANGE UP
BUN SERPL-MCNC: 22 MG/DL — SIGNIFICANT CHANGE UP (ref 7–23)
CALCIUM SERPL-MCNC: 8.9 MG/DL — SIGNIFICANT CHANGE UP (ref 8.4–10.5)
CHLORIDE SERPL-SCNC: 106 MMOL/L — SIGNIFICANT CHANGE UP (ref 96–108)
CO2 SERPL-SCNC: 24 MMOL/L — SIGNIFICANT CHANGE UP (ref 22–31)
COCAINE METAB.OTHER UR-MCNC: POSITIVE
COLOR SPEC: YELLOW — SIGNIFICANT CHANGE UP
CREAT SERPL-MCNC: 1.71 MG/DL — HIGH (ref 0.5–1.3)
DIFF PNL FLD: NEGATIVE — SIGNIFICANT CHANGE UP
EOSINOPHIL # BLD AUTO: 0.08 K/UL — SIGNIFICANT CHANGE UP (ref 0–0.5)
EOSINOPHIL NFR BLD AUTO: 2.3 % — SIGNIFICANT CHANGE UP (ref 0–6)
EPI CELLS # UR: SIGNIFICANT CHANGE UP /HPF (ref 0–5)
ETHANOL SERPL-MCNC: <10 MG/DL — SIGNIFICANT CHANGE UP (ref 0–10)
GLUCOSE SERPL-MCNC: 125 MG/DL — HIGH (ref 70–99)
GLUCOSE UR QL: NEGATIVE — SIGNIFICANT CHANGE UP
HCT VFR BLD CALC: 36.9 % — LOW (ref 39–50)
HGB BLD-MCNC: 12.7 G/DL — LOW (ref 13–17)
IMM GRANULOCYTES NFR BLD AUTO: 0.3 % — SIGNIFICANT CHANGE UP (ref 0–1.5)
KETONES UR-MCNC: NEGATIVE — SIGNIFICANT CHANGE UP
LEUKOCYTE ESTERASE UR-ACNC: NEGATIVE — SIGNIFICANT CHANGE UP
LYMPHOCYTES # BLD AUTO: 0.7 K/UL — LOW (ref 1–3.3)
LYMPHOCYTES # BLD AUTO: 20.2 % — SIGNIFICANT CHANGE UP (ref 13–44)
MCHC RBC-ENTMCNC: 33.1 PG — SIGNIFICANT CHANGE UP (ref 27–34)
MCHC RBC-ENTMCNC: 34.4 GM/DL — SIGNIFICANT CHANGE UP (ref 32–36)
MCV RBC AUTO: 96.1 FL — SIGNIFICANT CHANGE UP (ref 80–100)
METHADONE UR-MCNC: NEGATIVE — SIGNIFICANT CHANGE UP
MONOCYTES # BLD AUTO: 0.32 K/UL — SIGNIFICANT CHANGE UP (ref 0–0.9)
MONOCYTES NFR BLD AUTO: 9.2 % — SIGNIFICANT CHANGE UP (ref 2–14)
NEUTROPHILS # BLD AUTO: 2.34 K/UL — SIGNIFICANT CHANGE UP (ref 1.8–7.4)
NEUTROPHILS NFR BLD AUTO: 67.4 % — SIGNIFICANT CHANGE UP (ref 43–77)
NITRITE UR-MCNC: NEGATIVE — SIGNIFICANT CHANGE UP
NRBC # BLD: 0 /100 WBCS — SIGNIFICANT CHANGE UP (ref 0–0)
NT-PROBNP SERPL-SCNC: 1402 PG/ML — HIGH (ref 0–300)
OPIATES UR-MCNC: NEGATIVE — SIGNIFICANT CHANGE UP
PCP SPEC-MCNC: SIGNIFICANT CHANGE UP
PCP UR-MCNC: NEGATIVE — SIGNIFICANT CHANGE UP
PH UR: 6.5 — SIGNIFICANT CHANGE UP (ref 5–8)
PLATELET # BLD AUTO: 144 K/UL — LOW (ref 150–400)
POTASSIUM SERPL-MCNC: SIGNIFICANT CHANGE UP MMOL/L (ref 3.5–5.3)
POTASSIUM SERPL-SCNC: SIGNIFICANT CHANGE UP MMOL/L (ref 3.5–5.3)
PROT SERPL-MCNC: 6.5 G/DL — SIGNIFICANT CHANGE UP (ref 6–8.3)
PROT UR-MCNC: ABNORMAL MG/DL
RBC # BLD: 3.84 M/UL — LOW (ref 4.2–5.8)
RBC # FLD: 12.6 % — SIGNIFICANT CHANGE UP (ref 10.3–14.5)
RBC CASTS # UR COMP ASSIST: < 5 /HPF — SIGNIFICANT CHANGE UP
SARS-COV-2 RNA SPEC QL NAA+PROBE: NEGATIVE — SIGNIFICANT CHANGE UP
SODIUM SERPL-SCNC: 139 MMOL/L — SIGNIFICANT CHANGE UP (ref 135–145)
SP GR SPEC: 1.02 — SIGNIFICANT CHANGE UP (ref 1–1.03)
THC UR QL: NEGATIVE — SIGNIFICANT CHANGE UP
TROPONIN T SERPL-MCNC: 0.01 NG/ML — SIGNIFICANT CHANGE UP (ref 0–0.01)
UROBILINOGEN FLD QL: 1 E.U./DL — SIGNIFICANT CHANGE UP
WBC # BLD: 3.47 K/UL — LOW (ref 3.8–10.5)
WBC # FLD AUTO: 3.47 K/UL — LOW (ref 3.8–10.5)
WBC UR QL: < 5 /HPF — SIGNIFICANT CHANGE UP

## 2021-12-05 PROCEDURE — 93010 ELECTROCARDIOGRAM REPORT: CPT

## 2021-12-05 PROCEDURE — 36415 COLL VENOUS BLD VENIPUNCTURE: CPT

## 2021-12-05 PROCEDURE — 81001 URINALYSIS AUTO W/SCOPE: CPT

## 2021-12-05 PROCEDURE — 80053 COMPREHEN METABOLIC PANEL: CPT

## 2021-12-05 PROCEDURE — 99285 EMERGENCY DEPT VISIT HI MDM: CPT | Mod: 25

## 2021-12-05 PROCEDURE — 71045 X-RAY EXAM CHEST 1 VIEW: CPT | Mod: 26

## 2021-12-05 PROCEDURE — 84484 ASSAY OF TROPONIN QUANT: CPT

## 2021-12-05 PROCEDURE — 83880 ASSAY OF NATRIURETIC PEPTIDE: CPT

## 2021-12-05 PROCEDURE — 71045 X-RAY EXAM CHEST 1 VIEW: CPT

## 2021-12-05 PROCEDURE — 93005 ELECTROCARDIOGRAM TRACING: CPT

## 2021-12-05 PROCEDURE — 99285 EMERGENCY DEPT VISIT HI MDM: CPT

## 2021-12-05 PROCEDURE — 85025 COMPLETE CBC W/AUTO DIFF WBC: CPT

## 2021-12-05 PROCEDURE — 80307 DRUG TEST PRSMV CHEM ANLYZR: CPT

## 2021-12-05 PROCEDURE — 87635 SARS-COV-2 COVID-19 AMP PRB: CPT

## 2021-12-05 NOTE — ED PROVIDER NOTE - DR. NAME
Last 5 Patient Entered Readings                                      Current 30 Day Average: 113/77     Recent Readings 5/23/2019 5/22/2019 5/21/2019 5/19/2019 5/17/2019    SBP (mmHg) 128 118 106 118 110    DBP (mmHg) 82 78 73 86 76    Pulse 73 67 85 76 70          Digital Medicine: Health  Follow Up    Unable to leave voicemail to follow up with Jacquie Vijaya Henriquezmary [place].  Current BP average 113/77 mmHg is at goal.           Amaya Santos (Attending)

## 2021-12-05 NOTE — ED PROVIDER NOTE - OBJECTIVE STATEMENT
Prior discharge summary:  Pt admitted from 11/6-11/9.  71 y/oM, former [place], [facility] resident [facility] (unsatisfied with his living situation), current cocaine/marijuanna/ETOH abuse (UTOX POSITIVE 11/6 and 11/4), PMHx of HTN, hyperlipidemia, diet controlled DM, Stage III CKD (baseline Cr 1.3-1.5 noted on prior admissions), HIV (undetectable viral load), Hep C s/p Harvoni ~4 yrs ago, prostate CA s/p radiation, depression, NICM (EF 35-40% by Echo 9/2021), nonobstructive CAD (cath@[facility] 7/28/21 which revealed normal LM, 30% mLAD lesion, luminal irregularities of LCx/RCA),  known ascending aortic aneurysm 4.6cm, PAD, admission every 2 weeks for "syncope", last admitted to [facility] 9/25-9/27/21 presented to [facility] ED 11/6/21 s/p "unwitnessed syncopal episode". In ED, VSS. EKG revealed NSR@71BPM with TWI in lateral leads. CXR no acute findings. CT chest PE protocol: negative for PE with Stable aneurysmal dilatation of ascending aorta 4.6 cm. CT head negative. Labs notable for: UTOX positive for cocaine 11/6 and 11/4, Trop High sensitivity negative x 2 sets, , Ddimer 240, Cr 1.43, Mg 1.5, COVID negative. Patient treated with Aspirin 324mg PO x 1 dose and Mg 1g IV x 1 dose; transferred to [facility] for further syncope work and tele monitoring.     During hospitalization, trop (-) x 3. Orthostatics negative and EP consulted for recurrent syncopal episodes a/w multiple hospitalizations w/plan for Zio patch prior to discharge. No syncopal episodes during hospitalization and no events/arrhythmias noted on tele.     On the day of discharge, the patient was seen and examined. Symptoms improved. Vital signs are stable. Labs and imaging reviewed. Patient is medically optimized and hemodynamically stable. Return precautions discussed, medication teach back done w/ patient, and importance of physician followup emphasized for which he verbalized understanding. Patient to receive Zio-patch prior to discharge and will follow up with EP as scheduled. PT eval-no needs. Patient given list of rehab facilities for cocaine abuse Prior discharge summary:  Pt admitted from 11/6-11/9.  71 y/oM, former [place], [facility] resident [facility] (unsatisfied with his living situation), current cocaine/marijuanna/ETOH abuse (UTOX POSITIVE 11/6 and 11/4), PMHx of HTN, hyperlipidemia, diet controlled DM, Stage III CKD (baseline Cr 1.3-1.5 noted on prior admissions), HIV (undetectable viral load), Hep C s/p Harvoni ~4 yrs ago, prostate CA s/p radiation, depression, NICM (EF 35-40% by Echo 9/2021), nonobstructive CAD (cath@[facility] 7/28/21 which revealed normal LM, 30% mLAD lesion, luminal irregularities of LCx/RCA),  known ascending aortic aneurysm 4.6cm, PAD, admission every 2 weeks for "syncope", last admitted to [facility] 9/25-9/27/21 presented to [facility] ED 11/6/21 s/p "unwitnessed syncopal episode". In ED, VSS. EKG revealed NSR@71BPM with TWI in lateral leads. CXR no acute findings. CT chest PE protocol: negative for PE with Stable aneurysmal dilatation of ascending aorta 4.6 cm. CT head negative. Labs notable for: UTOX positive for cocaine 11/6 and 11/4, Trop High sensitivity negative x 2 sets, , Ddimer 240, Cr 1.43, Mg 1.5, COVID negative. Patient treated with Aspirin 324mg PO x 1 dose and Mg 1g IV x 1 dose; transferred to [facility] for further syncope work and tele monitoring.     During hospitalization, trop (-) x 3. Orthostatics negative and EP consulted for recurrent syncopal episodes a/w multiple hospitalizations w/plan for Zio patch prior to discharge. No syncopal episodes during hospitalization and no events/arrhythmias noted on tele.     On the day of discharge, the patient was seen and examined. Symptoms improved. Vital signs are stable. Labs and imaging reviewed. Patient is medically optimized and hemodynamically stable. Return precautions discussed, medication teach back done w/ patient, and importance of physician followup emphasized for which he verbalized understanding. Patient to receive Zio-patch prior to discharge and will follow up with EP as scheduled. PT eval-no needs. Patient given list of rehab facilities for cocaine abuse    Presents with multiple complaints. Pt is unhappy with his living conditions and states he had a panic attack today because of the small room. Also states he was having chest pain / pressure that has since resolved. Pt has c/o feeling depressed and having suicidal ideation although now he states he had only brief thoughts of SI and they now are gone. He would like to talk with psych. Pt currently takes Abilify and Lexapro. Pt does not smoke but uses cocaine (last used 2 days ago). All physical complaints have resolved.

## 2021-12-05 NOTE — ED ADULT NURSE NOTE - CHIEF COMPLAINT
PT REPORTS THAT HE WAS HAVING A NIGHTMARE THIS MORNING IN WHICH HE FELT LIKE HE WAS IN A COFFIN. HE STATES HE WOKE UP AND RAN OUT OF THE HOUSE AND HAD A PANIC ATTACK. REPORTS INTERMITTENT MIDSTERNAL CHEST PAIN. REPORTS THAT HE FREQUENTLY USES COCAINE WITH LAST USE 2 DAYS AGO.

## 2021-12-05 NOTE — ED PROVIDER NOTE - PROGRESS NOTE DETAILS
Presents with multiple complaints. Pt is unhappy with his living conditions and states he had a panic attack today because of the small room. Also states he was having chest pain / pressure that has since resolved. Pt has c/o feeling depressed and having suicidal ideation. He would like to talk with psych. Pt currently takes Abilify and Lexapro. Pt does not smoke but uses cocaine (last used 2 days ago). All physical complaints have resolved.

## 2021-12-05 NOTE — ED ADULT TRIAGE NOTE - CHIEF COMPLAINT QUOTE
Pt complains of chest pain and SOB for 5 years however worst in the last couple days. Pt reports he had a terrible nightmare and woke up with chest pain/SOB. Pt states " I need to see a psychiatrist. I have severe anxiety." Pt denies cough or fever. he smoked cocaine two days ago. Pt denies SI or HI

## 2021-12-05 NOTE — ED PROVIDER NOTE - CLINICAL SUMMARY MEDICAL DECISION MAKING FREE TEXT BOX
70 y/o M presents to ED with multiple complaints. Pt expressed he is feeling depressed, having suicidal ideations, and would like to speak with psych. Plan for medical clearance labs and consult psych. 72 y/o M presents to ED with multiple complaints. Pt expressed he is feeling depressed, having suicidal ideations but states they have now resolved (no prior psych inpatient history), and would like to speak with psych. Plan for medical clearance labs and consult psych.    Pt eloped prior to being seen by psych.  Pt not placed on 1:1 because retracted SI.

## 2021-12-16 ENCOUNTER — APPOINTMENT (OUTPATIENT)
Dept: HEART AND VASCULAR | Facility: CLINIC | Age: 71
End: 2021-12-16

## 2021-12-16 ENCOUNTER — EMERGENCY (EMERGENCY)
Facility: HOSPITAL | Age: 71
LOS: 1 days | Discharge: HOME | End: 2021-12-16
Attending: EMERGENCY MEDICINE | Admitting: EMERGENCY MEDICINE
Payer: MEDICARE

## 2021-12-16 VITALS
TEMPERATURE: 98 F | OXYGEN SATURATION: 97 % | WEIGHT: 179.9 LBS | DIASTOLIC BLOOD PRESSURE: 90 MMHG | SYSTOLIC BLOOD PRESSURE: 156 MMHG | RESPIRATION RATE: 16 BRPM | HEART RATE: 72 BPM | HEIGHT: 72 IN

## 2021-12-16 DIAGNOSIS — Z88.0 ALLERGY STATUS TO PENICILLIN: ICD-10-CM

## 2021-12-16 DIAGNOSIS — R07.89 OTHER CHEST PAIN: ICD-10-CM

## 2021-12-16 DIAGNOSIS — E78.5 HYPERLIPIDEMIA, UNSPECIFIED: ICD-10-CM

## 2021-12-16 DIAGNOSIS — F17.200 NICOTINE DEPENDENCE, UNSPECIFIED, UNCOMPLICATED: ICD-10-CM

## 2021-12-16 DIAGNOSIS — Z88.2 ALLERGY STATUS TO SULFONAMIDES: ICD-10-CM

## 2021-12-16 DIAGNOSIS — Z91.013 ALLERGY TO SEAFOOD: ICD-10-CM

## 2021-12-16 DIAGNOSIS — Z95.9 PRESENCE OF CARDIAC AND VASCULAR IMPLANT AND GRAFT, UNSPECIFIED: Chronic | ICD-10-CM

## 2021-12-16 DIAGNOSIS — I12.9 HYPERTENSIVE CHRONIC KIDNEY DISEASE WITH STAGE 1 THROUGH STAGE 4 CHRONIC KIDNEY DISEASE, OR UNSPECIFIED CHRONIC KIDNEY DISEASE: ICD-10-CM

## 2021-12-16 DIAGNOSIS — Z79.82 LONG TERM (CURRENT) USE OF ASPIRIN: ICD-10-CM

## 2021-12-16 DIAGNOSIS — E11.22 TYPE 2 DIABETES MELLITUS WITH DIABETIC CHRONIC KIDNEY DISEASE: ICD-10-CM

## 2021-12-16 DIAGNOSIS — I25.10 ATHEROSCLEROTIC HEART DISEASE OF NATIVE CORONARY ARTERY WITHOUT ANGINA PECTORIS: ICD-10-CM

## 2021-12-16 DIAGNOSIS — N18.9 CHRONIC KIDNEY DISEASE, UNSPECIFIED: ICD-10-CM

## 2021-12-16 DIAGNOSIS — Z20.822 CONTACT WITH AND (SUSPECTED) EXPOSURE TO COVID-19: ICD-10-CM

## 2021-12-16 LAB
ALBUMIN SERPL ELPH-MCNC: 3.9 G/DL — SIGNIFICANT CHANGE UP (ref 3.3–5)
ALP SERPL-CCNC: 49 U/L — SIGNIFICANT CHANGE UP (ref 40–120)
ALT FLD-CCNC: 16 U/L — SIGNIFICANT CHANGE UP (ref 10–45)
AMPHET UR-MCNC: NEGATIVE — SIGNIFICANT CHANGE UP
ANION GAP SERPL CALC-SCNC: 7 MMOL/L — SIGNIFICANT CHANGE UP (ref 5–17)
APPEARANCE UR: CLEAR — SIGNIFICANT CHANGE UP
AST SERPL-CCNC: 24 U/L — SIGNIFICANT CHANGE UP (ref 10–40)
BACTERIA # UR AUTO: PRESENT /HPF
BARBITURATES UR SCN-MCNC: NEGATIVE — SIGNIFICANT CHANGE UP
BASOPHILS # BLD AUTO: 0.02 K/UL — SIGNIFICANT CHANGE UP (ref 0–0.2)
BASOPHILS NFR BLD AUTO: 0.4 % — SIGNIFICANT CHANGE UP (ref 0–2)
BENZODIAZ UR-MCNC: NEGATIVE — SIGNIFICANT CHANGE UP
BILIRUB SERPL-MCNC: 0.2 MG/DL — SIGNIFICANT CHANGE UP (ref 0.2–1.2)
BILIRUB UR-MCNC: NEGATIVE — SIGNIFICANT CHANGE UP
BUN SERPL-MCNC: 23 MG/DL — SIGNIFICANT CHANGE UP (ref 7–23)
CALCIUM SERPL-MCNC: 8.7 MG/DL — SIGNIFICANT CHANGE UP (ref 8.4–10.5)
CHLORIDE SERPL-SCNC: 106 MMOL/L — SIGNIFICANT CHANGE UP (ref 96–108)
CK MB CFR SERPL CALC: 6.9 NG/ML — HIGH (ref 0–6.7)
CK SERPL-CCNC: 193 U/L — SIGNIFICANT CHANGE UP (ref 30–200)
CO2 SERPL-SCNC: 28 MMOL/L — SIGNIFICANT CHANGE UP (ref 22–31)
COCAINE METAB.OTHER UR-MCNC: POSITIVE
COLOR SPEC: YELLOW — SIGNIFICANT CHANGE UP
COMMENT - URINE: SIGNIFICANT CHANGE UP
CREAT SERPL-MCNC: 1.38 MG/DL — HIGH (ref 0.5–1.3)
DIFF PNL FLD: NEGATIVE — SIGNIFICANT CHANGE UP
EOSINOPHIL # BLD AUTO: 0.14 K/UL — SIGNIFICANT CHANGE UP (ref 0–0.5)
EOSINOPHIL NFR BLD AUTO: 2.9 % — SIGNIFICANT CHANGE UP (ref 0–6)
EPI CELLS # UR: SIGNIFICANT CHANGE UP /HPF (ref 0–5)
GLUCOSE SERPL-MCNC: 135 MG/DL — HIGH (ref 70–99)
GLUCOSE UR QL: NEGATIVE — SIGNIFICANT CHANGE UP
HCT VFR BLD CALC: 40 % — SIGNIFICANT CHANGE UP (ref 39–50)
HGB BLD-MCNC: 13.6 G/DL — SIGNIFICANT CHANGE UP (ref 13–17)
IMM GRANULOCYTES NFR BLD AUTO: 0.4 % — SIGNIFICANT CHANGE UP (ref 0–1.5)
KETONES UR-MCNC: NEGATIVE — SIGNIFICANT CHANGE UP
LEUKOCYTE ESTERASE UR-ACNC: NEGATIVE — SIGNIFICANT CHANGE UP
LYMPHOCYTES # BLD AUTO: 1.02 K/UL — SIGNIFICANT CHANGE UP (ref 1–3.3)
LYMPHOCYTES # BLD AUTO: 21.2 % — SIGNIFICANT CHANGE UP (ref 13–44)
MAGNESIUM SERPL-MCNC: 1.8 MG/DL — SIGNIFICANT CHANGE UP (ref 1.6–2.6)
MCHC RBC-ENTMCNC: 32.4 PG — SIGNIFICANT CHANGE UP (ref 27–34)
MCHC RBC-ENTMCNC: 34 GM/DL — SIGNIFICANT CHANGE UP (ref 32–36)
MCV RBC AUTO: 95.2 FL — SIGNIFICANT CHANGE UP (ref 80–100)
METHADONE UR-MCNC: NEGATIVE — SIGNIFICANT CHANGE UP
MONOCYTES # BLD AUTO: 0.51 K/UL — SIGNIFICANT CHANGE UP (ref 0–0.9)
MONOCYTES NFR BLD AUTO: 10.6 % — SIGNIFICANT CHANGE UP (ref 2–14)
NEUTROPHILS # BLD AUTO: 3.11 K/UL — SIGNIFICANT CHANGE UP (ref 1.8–7.4)
NEUTROPHILS NFR BLD AUTO: 64.5 % — SIGNIFICANT CHANGE UP (ref 43–77)
NITRITE UR-MCNC: NEGATIVE — SIGNIFICANT CHANGE UP
NRBC # BLD: 0 /100 WBCS — SIGNIFICANT CHANGE UP (ref 0–0)
NT-PROBNP SERPL-SCNC: 557 PG/ML — HIGH (ref 0–300)
OPIATES UR-MCNC: NEGATIVE — SIGNIFICANT CHANGE UP
PCP SPEC-MCNC: SIGNIFICANT CHANGE UP
PCP UR-MCNC: NEGATIVE — SIGNIFICANT CHANGE UP
PH UR: 6.5 — SIGNIFICANT CHANGE UP (ref 5–8)
PLATELET # BLD AUTO: 143 K/UL — LOW (ref 150–400)
POTASSIUM SERPL-MCNC: 4.9 MMOL/L — SIGNIFICANT CHANGE UP (ref 3.5–5.3)
POTASSIUM SERPL-SCNC: 4.9 MMOL/L — SIGNIFICANT CHANGE UP (ref 3.5–5.3)
PROT SERPL-MCNC: 6.8 G/DL — SIGNIFICANT CHANGE UP (ref 6–8.3)
PROT UR-MCNC: 30 MG/DL
RBC # BLD: 4.2 M/UL — SIGNIFICANT CHANGE UP (ref 4.2–5.8)
RBC # FLD: 12.4 % — SIGNIFICANT CHANGE UP (ref 10.3–14.5)
RBC CASTS # UR COMP ASSIST: < 5 /HPF — SIGNIFICANT CHANGE UP
SODIUM SERPL-SCNC: 141 MMOL/L — SIGNIFICANT CHANGE UP (ref 135–145)
SP GR SPEC: 1.02 — SIGNIFICANT CHANGE UP (ref 1–1.03)
THC UR QL: NEGATIVE — SIGNIFICANT CHANGE UP
TROPONIN T SERPL-MCNC: 0.01 NG/ML — SIGNIFICANT CHANGE UP (ref 0–0.01)
TROPONIN T SERPL-MCNC: 0.01 NG/ML — SIGNIFICANT CHANGE UP (ref 0–0.01)
UROBILINOGEN FLD QL: 0.2 E.U./DL — SIGNIFICANT CHANGE UP
WBC # BLD: 4.82 K/UL — SIGNIFICANT CHANGE UP (ref 3.8–10.5)
WBC # FLD AUTO: 4.82 K/UL — SIGNIFICANT CHANGE UP (ref 3.8–10.5)
WBC UR QL: < 5 /HPF — SIGNIFICANT CHANGE UP

## 2021-12-16 PROCEDURE — 99285 EMERGENCY DEPT VISIT HI MDM: CPT

## 2021-12-16 PROCEDURE — 36415 COLL VENOUS BLD VENIPUNCTURE: CPT

## 2021-12-16 PROCEDURE — 99284 EMERGENCY DEPT VISIT MOD MDM: CPT | Mod: 25

## 2021-12-16 PROCEDURE — 93005 ELECTROCARDIOGRAM TRACING: CPT

## 2021-12-16 PROCEDURE — 71275 CT ANGIOGRAPHY CHEST: CPT | Mod: 26,MG

## 2021-12-16 PROCEDURE — 80053 COMPREHEN METABOLIC PANEL: CPT

## 2021-12-16 PROCEDURE — G1004: CPT

## 2021-12-16 PROCEDURE — 71275 CT ANGIOGRAPHY CHEST: CPT | Mod: MG

## 2021-12-16 PROCEDURE — 80307 DRUG TEST PRSMV CHEM ANLYZR: CPT

## 2021-12-16 PROCEDURE — 82962 GLUCOSE BLOOD TEST: CPT

## 2021-12-16 PROCEDURE — 82550 ASSAY OF CK (CPK): CPT

## 2021-12-16 PROCEDURE — 83735 ASSAY OF MAGNESIUM: CPT

## 2021-12-16 PROCEDURE — 81001 URINALYSIS AUTO W/SCOPE: CPT

## 2021-12-16 PROCEDURE — U0005: CPT

## 2021-12-16 PROCEDURE — 93010 ELECTROCARDIOGRAM REPORT: CPT

## 2021-12-16 PROCEDURE — 84484 ASSAY OF TROPONIN QUANT: CPT

## 2021-12-16 PROCEDURE — 85025 COMPLETE CBC W/AUTO DIFF WBC: CPT

## 2021-12-16 PROCEDURE — 74174 CTA ABD&PLVS W/CONTRAST: CPT | Mod: 26,MG

## 2021-12-16 PROCEDURE — 82553 CREATINE MB FRACTION: CPT

## 2021-12-16 PROCEDURE — 83880 ASSAY OF NATRIURETIC PEPTIDE: CPT

## 2021-12-16 PROCEDURE — U0003: CPT

## 2021-12-16 PROCEDURE — 74174 CTA ABD&PLVS W/CONTRAST: CPT | Mod: MG

## 2021-12-16 RX ORDER — SODIUM CHLORIDE 9 MG/ML
500 INJECTION INTRAMUSCULAR; INTRAVENOUS; SUBCUTANEOUS ONCE
Refills: 0 | Status: COMPLETED | OUTPATIENT
Start: 2021-12-16 | End: 2021-12-16

## 2021-12-16 RX ADMIN — SODIUM CHLORIDE 500 MILLILITER(S): 9 INJECTION INTRAMUSCULAR; INTRAVENOUS; SUBCUTANEOUS at 20:17

## 2021-12-16 NOTE — ED ADULT TRIAGE NOTE - HISTORY OF COVID-19 VACCINATION
[facility]  1950  [id number]    Situation:  Verbal report received from: RN Barbie  Procedure: Procedure(s):  ESOPHAGOGASTRODUODENOSCOPY (EGD)  ESOPHAGOGASTRODUODENAL (EGD) BIOPSY    Background:    Preoperative diagnosis: EPIGASTRIC PAIN  Postoperative diagnosis: hiatal hernia, gastritis    :  Dr. Karen Nogueira  Assistant(s): Endoscopy Technician-1: Bill Dallas  Endoscopy RN-1: Genevieve Edmonds RN    Specimens:   ID Type Source Tests Collected by Time Destination   1 : pathology Preservative Duodenum  Ernesto Santoro MD 6/1/2017 1426 Pathology   2 : pathology, gastritis Preservative Gastric  Souheil MD Kat 6/1/2017 1427 Pathology     H. Pylori  no    Assessment:  Intra-procedure medications       Anesthesia gave intra-procedure sedation and medications, see anesthesia flow sheet yes    Intravenous fluids:  300 NS  KVO     Vital signs stable yes    Abdominal assessment: round and soft yes    Recommendation:  Discharge patient per MD order yes.   Return to floor no  Family or Friend :   Permission to share finding with family or friend yes
Yes

## 2021-12-16 NOTE — ED ADULT NURSE REASSESSMENT NOTE - NS ED NURSE REASSESS COMMENT FT1
Received report from NIURKA Romero. Received patient in [facility]. AOX4. VSS. Patient complaints of unchanged midsternal chest pain. Patient oriented to ED area. Plan of care discussed and verbalized understanding. All needs attended. Pt on cardiac monitor. Purposeful proactive hourly rounding in progress.

## 2021-12-16 NOTE — ED PROVIDER NOTE - CLINICAL SUMMARY MEDICAL DECISION MAKING FREE TEXT BOX
CP for several hours.  EKG unchanged from prior. Will get labs, including serial troponin to eval for ACS.  If renal function adequate will get CTA to eval stability of aortic aneurysm and r/o dissection.

## 2021-12-16 NOTE — ED PROVIDER NOTE - PATIENT PORTAL LINK FT
You can access the FollowMyHealth Patient Portal offered by [facility] by registering at the following website: [email or web address]. By joining [employer]’s FollowMyHealth portal, you will also be able to view your health information using other applications (apps) compatible with our system.

## 2021-12-16 NOTE — ED ADULT TRIAGE NOTE - CHIEF COMPLAINT QUOTE
Pt has hx of HIV, htn, DM, cardiac stents, reports intermittent chest pain and lightheadedness starting at 2pm. Denies sob, cough, chills, fever. Pt given 324mg ASA.

## 2021-12-16 NOTE — ED PROVIDER NOTE - NSFOLLOWUPINSTRUCTIONS_ED_ALL_ED_FT
Stop using cocaine and stop smoking cigarettes.  Follow up with primary care physician/cardiologist in 1-2 days.  Return to the Emergency Department if you have any new or worsening symptoms, or if you have any concerns.  =====================    Chest Pain    WHAT YOU NEED TO KNOW:    Chest pain can be caused by a range of conditions, from not serious to life-threatening. Chest pain can be a symptom of a digestive problem, such as acid reflux or a stomach ulcer. An anxiety attack or a strong emotion, such as anger, can also cause chest pain. Infection, inflammation, or a fracture in the bones or cartilage in your chest can cause pain or discomfort. Sometimes chest pain or pressure is caused by poor blood flow to your heart (angina). Chest pain may also be caused by life-threatening conditions such as a heart attack or blood clot in your lungs.    DISCHARGE INSTRUCTIONS:    Call your local emergency number (911 in the ) or have someone call if:   •You have any of the following signs of a heart attack: ?Squeezing, pressure, or pain in your chest      ?You may also have any of the following: ?Discomfort or pain in your back, neck, jaw, stomach, or arm      ?Shortness of breath      ?Nausea or vomiting      ?Lightheadedness or a sudden cold sweat            Return to the emergency department if:   •You have chest discomfort that gets worse, even with medicine.      •You cough or vomit blood.      •Your bowel movements are black or bloody.      •You cannot stop vomiting, or it hurts to swallow.      Call your doctor if:   •You have questions or concerns about your condition or care.          Medicines:   •Medicines may be given to treat the cause of your chest pain. Examples include pain medicine, anxiety medicine, or medicines to increase blood flow to your heart.      •Do not take certain medicines without asking your healthcare provider first. These include NSAIDs, herbal or vitamin supplements, or hormones (estrogen or progestin).      •Take your medicine as directed. Contact your healthcare provider if you think your medicine is not helping or if you have side effects. Tell him or her if you are allergic to any medicine. Keep a list of the medicines, vitamins, and herbs you take. Include the amounts, and when and why you take them. Bring the list or the pill bottles to follow-up visits. Carry your medicine list with you in case of an emergency.      Healthy living tips: The following are general healthy guidelines. If the cause of your chest pain is known, your healthcare provider will give you specific guidelines to follow.  •Do not smoke. Nicotine and other chemicals in cigarettes and cigars can cause lung and heart damage. Ask your healthcare provider for information if you currently smoke and need help to quit. E-cigarettes or smokeless tobacco still contain nicotine. Talk to your healthcare provider before you use these products.      •Choose a variety of healthy foods as often as possible. Include fresh, frozen, or canned fruits and vegetables. Also include low-fat dairy products, fish, chicken (without skin), and lean meats. Your healthcare provider or a dietitian can help you create meal plans. You may need to avoid certain foods or drinks if your pain is caused by a digestion problem.  Healthy Foods           •Lower your sodium (salt) intake. Limit foods that are high in sodium, such as canned foods, salty snacks, and cold cuts. If you add salt when you cook food, do not add more at the table. Choose low-sodium canned foods as much as possible.             •Drink plenty of water every day. Water helps your body to control your temperature and blood pressure. Ask your healthcare provider how much water you should drink every day.      •Ask about activity. Your healthcare provider will tell you which activities to limit or avoid. Ask when you can drive, return to work, and have sex. Ask about the best exercise plan for you.      •Maintain a healthy weight. Ask your healthcare provider what a healthy weight is for you. Ask him or her to help you create a safe weight loss plan if you are overweight.      •Ask about vaccines you may need. Get the influenza (flu) vaccine every year as soon as recommended, usually in September or October. You may also need a pneumococcal vaccine to prevent pneumonia. The vaccine is usually given every 5 years, starting at age 65. Your healthcare provider can tell you if should get other vaccines, and when to get them.      Follow up with your healthcare provider within 72 hours, or as directed: You may need to return for more tests to find the cause of your chest pain. You may be referred to a specialist, such as a cardiologist or gastroenterologist. Write down your questions so you remember to ask them during your visits.

## 2021-12-16 NOTE — ED PROVIDER NOTE - OBJECTIVE STATEMENT
72 y/o M pt with PMHx of substance abuse HTN HLD, diet controlled DM, CKD, HIV with undetectable viral load, Hep C, depression, nonischemic cardiomyopathy with EF 35-40% on echo in September, nonobstructive CAD , card cath in July 2021, ascending aortic aneurysm 4.6cm, and PAD presents to ED c/o chest pain today. He says he went to lay down at his bed at his [facility] "to calm my nerves" because he was "stressed out" as it was taking such a long time to get assigned a new [facility]. However, when he went to lay around at around 2PM, he started to feel pain in the center of his chest, described "like something pressing on my chest." Pain radiated to his abdomen, but not to his back, neck, jaw, or arms, and he felt a little short of breath. Pain was persistent for about 1.5hrs, then resolved for 30min before it recurred around 4PM. Since then, pain has been persistent and the same as described above. He denies palpitations, sweating, nausea, headache, or any other acute complaints. Pt admits to using cocaine yesterday but none today.    Cigarette smoker.  Pt is vaccinated for covid-19. 70 y/o M pt with PMHx of substance abuse HTN HLD, diet controlled DM, CKD, HIV with undetectable viral load, Hep C, depression, nonischemic cardiomyopathy with EF 35-40% on echo in September, nonobstructive CAD on card cath July 2021, ascending aortic aneurysm 4.6cm, PAD presents to ED c/o chest pain today. He says he went to lay down at his bed at his [facility] "to calm my nerves" because he was "stressed out" as it has been taking such a long time to get assigned a new [facility]. When he went to lay around at around 2PM, he started to feel pain in the center of his chest, described "like something pressing on my chest." Pain radiated to his abdomen, but not to his back, neck, jaw, or arms, and he felt a little short of breath. Pain was persistent for about 1.5hrs, then resolved for 30min before it recurred around 4PM. Since then, pain has been persistent and the same as described above. He denies palpitations, sweating, nausea, headache, or any other acute complaints. Pt admits to using cocaine yesterday but none today.    Cigarette smoker.  Pt is vaccinated for covid-19.

## 2021-12-16 NOTE — ED PROVIDER NOTE - PHYSICAL EXAMINATION
CONSTITUTIONAL: NAD   SKIN: Normal color and turgor.    HEAD: NC/AT.  EYES: Conjunctiva clear. EOMI. PERRL.    ENT: Airway clear. Normal voice.   RESPIRATORY:  Normal work of breathing. Lungs CTAB.  CARDIOVASCULAR:  RRR, S1S2. No M/R/G. Symmetric upper and lower extremity pulses bilaterally.   GI:  Abdomen soft, nontender. No pulsatile masses  MSK: Neck supple.  No lower extremity edema or calf tenderness.  No joint swelling or ROM limitation.  NEURO: Alert and oriented; CN II-XII grossly intact. Speech clear. 5/5 strength in all extremities.  Good balance. Steady gait. CONSTITUTIONAL: NAD   SKIN: Normal color and turgor.    HEAD: NC/AT.  EYES: Conjunctiva clear. EOMI. PERRL.    ENT: Airway clear. Normal voice.   RESPIRATORY:  Normal work of breathing. Lungs CTAB.  CARDIOVASCULAR:  RRR, S1S2. No M/R/G. Symmetric upper and lower extremity pulses bilaterally.   GI:  Abdomen soft, nontender. No pulsatile abd mass.  MSK: Neck supple.  No lower extremity edema or calf tenderness.  No joint swelling or ROM limitation.  NEURO: Alert and oriented; CN II-XII grossly intact. Speech clear. 5/5 strength in all extremities.  Good balance. Steady gait.

## 2021-12-17 VITALS — HEART RATE: 79 BPM | DIASTOLIC BLOOD PRESSURE: 98 MMHG | SYSTOLIC BLOOD PRESSURE: 145 MMHG

## 2021-12-17 LAB — SARS-COV-2 RNA SPEC QL NAA+PROBE: SIGNIFICANT CHANGE UP

## 2021-12-22 PROCEDURE — 80053 COMPREHEN METABOLIC PANEL: CPT

## 2021-12-22 PROCEDURE — 97161 PT EVAL LOW COMPLEX 20 MIN: CPT

## 2021-12-22 PROCEDURE — 82962 GLUCOSE BLOOD TEST: CPT

## 2021-12-22 PROCEDURE — 71275 CT ANGIOGRAPHY CHEST: CPT

## 2021-12-22 PROCEDURE — 87635 SARS-COV-2 COVID-19 AMP PRB: CPT

## 2021-12-22 PROCEDURE — 83880 ASSAY OF NATRIURETIC PEPTIDE: CPT

## 2021-12-22 PROCEDURE — 99285 EMERGENCY DEPT VISIT HI MDM: CPT

## 2021-12-22 PROCEDURE — 85025 COMPLETE CBC W/AUTO DIFF WBC: CPT

## 2021-12-22 PROCEDURE — 83735 ASSAY OF MAGNESIUM: CPT

## 2021-12-22 PROCEDURE — 85379 FIBRIN DEGRADATION QUANT: CPT

## 2021-12-22 PROCEDURE — 80048 BASIC METABOLIC PNL TOTAL CA: CPT

## 2021-12-22 PROCEDURE — 85027 COMPLETE CBC AUTOMATED: CPT

## 2021-12-22 PROCEDURE — 71045 X-RAY EXAM CHEST 1 VIEW: CPT

## 2021-12-22 PROCEDURE — 36415 COLL VENOUS BLD VENIPUNCTURE: CPT

## 2021-12-22 PROCEDURE — 80307 DRUG TEST PRSMV CHEM ANLYZR: CPT

## 2021-12-22 PROCEDURE — 86769 SARS-COV-2 COVID-19 ANTIBODY: CPT

## 2021-12-22 PROCEDURE — 83036 HEMOGLOBIN GLYCOSYLATED A1C: CPT

## 2021-12-22 PROCEDURE — 80061 LIPID PANEL: CPT

## 2021-12-22 PROCEDURE — 70450 CT HEAD/BRAIN W/O DYE: CPT

## 2021-12-22 PROCEDURE — 84484 ASSAY OF TROPONIN QUANT: CPT

## 2021-12-30 NOTE — ED ADULT NURSE NOTE - NEURO BEHAVIOR
Dear Lady,     My name is JAJA Davies, and I recently had the pleasure of evaluating information related to your medical condition on our SmartCare system digital platform. Based on the information available, I have determined that your request for care was unable to be safely and effectively completed. Due to this, it is my recommendation that you seek care at a local Urgent Care or Immediate Care Center for an expedited and thorough face-to-face evaluation and consideration for diagnostic testing.     Please note that the SmartCare system department that you submitted your request for care is equivalent to a virtual format of Urgent Care or Immediate Care level of care. If you did not intend to seek this particular level of care and potentially had a scheduled appointment with another provider, we recommend that you directly call the office of the provider you are attempting to meet with to rectify connection issues.     Your care is very important to us. Please do not delay in acting on the recommendations for your care listed above. If you feel you may be experiencing a medical emergency, contact 911 immediately. If you have any questions regarding your visit, you may contact us at [phone].     Thank You,     JAJA Davies    calm

## 2022-02-14 ENCOUNTER — INPATIENT (INPATIENT)
Facility: HOSPITAL | Age: 72
LOS: 2 days | Discharge: HOME | DRG: 975 | End: 2022-02-17
Attending: INTERNAL MEDICINE | Admitting: STUDENT IN AN ORGANIZED HEALTH CARE EDUCATION/TRAINING PROGRAM
Payer: MEDICARE

## 2022-02-14 VITALS
RESPIRATION RATE: 18 BRPM | TEMPERATURE: 103 F | DIASTOLIC BLOOD PRESSURE: 84 MMHG | SYSTOLIC BLOOD PRESSURE: 143 MMHG | WEIGHT: 179.9 LBS | HEART RATE: 92 BPM | OXYGEN SATURATION: 96 % | HEIGHT: 72 IN

## 2022-02-14 DIAGNOSIS — R29.6 REPEATED FALLS: ICD-10-CM

## 2022-02-14 DIAGNOSIS — I13.0 HYPERTENSIVE HEART AND CHRONIC KIDNEY DISEASE WITH HEART FAILURE AND STAGE 1 THROUGH STAGE 4 CHRONIC KIDNEY DISEASE, OR UNSPECIFIED CHRONIC KIDNEY DISEASE: ICD-10-CM

## 2022-02-14 DIAGNOSIS — E83.42 HYPOMAGNESEMIA: ICD-10-CM

## 2022-02-14 DIAGNOSIS — Z88.0 ALLERGY STATUS TO PENICILLIN: ICD-10-CM

## 2022-02-14 DIAGNOSIS — J21.9 ACUTE BRONCHIOLITIS, UNSPECIFIED: ICD-10-CM

## 2022-02-14 DIAGNOSIS — B97.89 OTHER VIRAL AGENTS AS THE CAUSE OF DISEASES CLASSIFIED ELSEWHERE: ICD-10-CM

## 2022-02-14 DIAGNOSIS — E11.22 TYPE 2 DIABETES MELLITUS WITH DIABETIC CHRONIC KIDNEY DISEASE: ICD-10-CM

## 2022-02-14 DIAGNOSIS — Z79.899 OTHER LONG TERM (CURRENT) DRUG THERAPY: ICD-10-CM

## 2022-02-14 DIAGNOSIS — I25.10 ATHEROSCLEROTIC HEART DISEASE OF NATIVE CORONARY ARTERY WITHOUT ANGINA PECTORIS: ICD-10-CM

## 2022-02-14 DIAGNOSIS — F17.210 NICOTINE DEPENDENCE, CIGARETTES, UNCOMPLICATED: ICD-10-CM

## 2022-02-14 DIAGNOSIS — Z86.19 PERSONAL HISTORY OF OTHER INFECTIOUS AND PARASITIC DISEASES: ICD-10-CM

## 2022-02-14 DIAGNOSIS — D69.59 OTHER SECONDARY THROMBOCYTOPENIA: ICD-10-CM

## 2022-02-14 DIAGNOSIS — N18.30 CHRONIC KIDNEY DISEASE, STAGE 3 UNSPECIFIED: ICD-10-CM

## 2022-02-14 DIAGNOSIS — R31.0 GROSS HEMATURIA: ICD-10-CM

## 2022-02-14 DIAGNOSIS — Z79.82 LONG TERM (CURRENT) USE OF ASPIRIN: ICD-10-CM

## 2022-02-14 DIAGNOSIS — N17.9 ACUTE KIDNEY FAILURE, UNSPECIFIED: ICD-10-CM

## 2022-02-14 DIAGNOSIS — I50.22 CHRONIC SYSTOLIC (CONGESTIVE) HEART FAILURE: ICD-10-CM

## 2022-02-14 DIAGNOSIS — Z91.013 ALLERGY TO SEAFOOD: ICD-10-CM

## 2022-02-14 DIAGNOSIS — E86.0 DEHYDRATION: ICD-10-CM

## 2022-02-14 DIAGNOSIS — B37.0 CANDIDAL STOMATITIS: ICD-10-CM

## 2022-02-14 DIAGNOSIS — R31.21 ASYMPTOMATIC MICROSCOPIC HEMATURIA: ICD-10-CM

## 2022-02-14 DIAGNOSIS — Z85.46 PERSONAL HISTORY OF MALIGNANT NEOPLASM OF PROSTATE: ICD-10-CM

## 2022-02-14 DIAGNOSIS — B20 HUMAN IMMUNODEFICIENCY VIRUS [HIV] DISEASE: ICD-10-CM

## 2022-02-14 DIAGNOSIS — I25.2 OLD MYOCARDIAL INFARCTION: ICD-10-CM

## 2022-02-14 DIAGNOSIS — N40.1 BENIGN PROSTATIC HYPERPLASIA WITH LOWER URINARY TRACT SYMPTOMS: ICD-10-CM

## 2022-02-14 DIAGNOSIS — F17.200 NICOTINE DEPENDENCE, UNSPECIFIED, UNCOMPLICATED: ICD-10-CM

## 2022-02-14 DIAGNOSIS — A41.9 SEPSIS, UNSPECIFIED ORGANISM: ICD-10-CM

## 2022-02-14 DIAGNOSIS — E11.51 TYPE 2 DIABETES MELLITUS WITH DIABETIC PERIPHERAL ANGIOPATHY WITHOUT GANGRENE: ICD-10-CM

## 2022-02-14 DIAGNOSIS — N18.9 CHRONIC KIDNEY DISEASE, UNSPECIFIED: ICD-10-CM

## 2022-02-14 DIAGNOSIS — Z95.9 PRESENCE OF CARDIAC AND VASCULAR IMPLANT AND GRAFT, UNSPECIFIED: Chronic | ICD-10-CM

## 2022-02-14 DIAGNOSIS — J98.11 ATELECTASIS: ICD-10-CM

## 2022-02-14 DIAGNOSIS — F12.10 CANNABIS ABUSE, UNCOMPLICATED: ICD-10-CM

## 2022-02-14 DIAGNOSIS — Z91.81 HISTORY OF FALLING: ICD-10-CM

## 2022-02-14 DIAGNOSIS — F14.10 COCAINE ABUSE, UNCOMPLICATED: ICD-10-CM

## 2022-02-14 DIAGNOSIS — R65.10 SYSTEMIC INFLAMMATORY RESPONSE SYNDROME (SIRS) OF NON-INFECTIOUS ORIGIN WITHOUT ACUTE ORGAN DYSFUNCTION: ICD-10-CM

## 2022-02-14 DIAGNOSIS — R05.3 CHRONIC COUGH: ICD-10-CM

## 2022-02-14 DIAGNOSIS — F19.10 OTHER PSYCHOACTIVE SUBSTANCE ABUSE, UNCOMPLICATED: ICD-10-CM

## 2022-02-14 DIAGNOSIS — I71.2 THORACIC AORTIC ANEURYSM, WITHOUT RUPTURE: ICD-10-CM

## 2022-02-14 DIAGNOSIS — R35.0 FREQUENCY OF MICTURITION: ICD-10-CM

## 2022-02-14 DIAGNOSIS — A41.89 OTHER SPECIFIED SEPSIS: ICD-10-CM

## 2022-02-14 DIAGNOSIS — F10.10 ALCOHOL ABUSE, UNCOMPLICATED: ICD-10-CM

## 2022-02-14 DIAGNOSIS — R53.1 WEAKNESS: ICD-10-CM

## 2022-02-14 DIAGNOSIS — Z88.2 ALLERGY STATUS TO SULFONAMIDES: ICD-10-CM

## 2022-02-14 DIAGNOSIS — I42.8 OTHER CARDIOMYOPATHIES: ICD-10-CM

## 2022-02-14 DIAGNOSIS — E78.5 HYPERLIPIDEMIA, UNSPECIFIED: ICD-10-CM

## 2022-02-14 DIAGNOSIS — K52.9 NONINFECTIVE GASTROENTERITIS AND COLITIS, UNSPECIFIED: ICD-10-CM

## 2022-02-14 DIAGNOSIS — Z92.3 PERSONAL HISTORY OF IRRADIATION: ICD-10-CM

## 2022-02-14 DIAGNOSIS — Z29.9 ENCOUNTER FOR PROPHYLACTIC MEASURES, UNSPECIFIED: ICD-10-CM

## 2022-02-14 DIAGNOSIS — Z91.018 ALLERGY TO OTHER FOODS: ICD-10-CM

## 2022-02-14 LAB
ALBUMIN SERPL ELPH-MCNC: 3.7 G/DL — SIGNIFICANT CHANGE UP (ref 3.3–5)
ALP SERPL-CCNC: 58 U/L — SIGNIFICANT CHANGE UP (ref 40–120)
ALT FLD-CCNC: 15 U/L — SIGNIFICANT CHANGE UP (ref 10–45)
AMPHET UR-MCNC: NEGATIVE — SIGNIFICANT CHANGE UP
ANION GAP SERPL CALC-SCNC: 13 MMOL/L — SIGNIFICANT CHANGE UP (ref 5–17)
APPEARANCE UR: CLEAR — SIGNIFICANT CHANGE UP
APTT BLD: 30.9 SEC — SIGNIFICANT CHANGE UP (ref 27.5–35.5)
AST SERPL-CCNC: 27 U/L — SIGNIFICANT CHANGE UP (ref 10–40)
BACTERIA # UR AUTO: PRESENT /HPF
BARBITURATES UR SCN-MCNC: NEGATIVE — SIGNIFICANT CHANGE UP
BASE EXCESS BLDV CALC-SCNC: 0.5 MMOL/L — SIGNIFICANT CHANGE UP (ref -2–3)
BASOPHILS # BLD AUTO: 0 K/UL — SIGNIFICANT CHANGE UP (ref 0–0.2)
BASOPHILS NFR BLD AUTO: 0 % — SIGNIFICANT CHANGE UP (ref 0–2)
BENZODIAZ UR-MCNC: NEGATIVE — SIGNIFICANT CHANGE UP
BILIRUB SERPL-MCNC: 0.6 MG/DL — SIGNIFICANT CHANGE UP (ref 0.2–1.2)
BILIRUB UR-MCNC: NEGATIVE — SIGNIFICANT CHANGE UP
BUN SERPL-MCNC: 23 MG/DL — SIGNIFICANT CHANGE UP (ref 7–23)
CA-I SERPL-SCNC: 1.16 MMOL/L — SIGNIFICANT CHANGE UP (ref 1.15–1.33)
CALCIUM SERPL-MCNC: 8.7 MG/DL — SIGNIFICANT CHANGE UP (ref 8.4–10.5)
CHLORIDE SERPL-SCNC: 105 MMOL/L — SIGNIFICANT CHANGE UP (ref 96–108)
CO2 BLDV-SCNC: 26.7 MMOL/L — HIGH (ref 22–26)
CO2 SERPL-SCNC: 20 MMOL/L — LOW (ref 22–31)
COCAINE METAB.OTHER UR-MCNC: NEGATIVE — SIGNIFICANT CHANGE UP
COLOR SPEC: YELLOW — SIGNIFICANT CHANGE UP
CREAT SERPL-MCNC: 1.32 MG/DL — HIGH (ref 0.5–1.3)
CRP SERPL-MCNC: 26.3 MG/L — HIGH (ref 0–4)
DIFF PNL FLD: ABNORMAL
EOSINOPHIL # BLD AUTO: 0 K/UL — SIGNIFICANT CHANGE UP (ref 0–0.5)
EOSINOPHIL NFR BLD AUTO: 0 % — SIGNIFICANT CHANGE UP (ref 0–6)
EPI CELLS # UR: SIGNIFICANT CHANGE UP /HPF (ref 0–5)
GAS PNL BLDV: 133 MMOL/L — LOW (ref 136–145)
GAS PNL BLDV: SIGNIFICANT CHANGE UP
GLUCOSE SERPL-MCNC: 134 MG/DL — HIGH (ref 70–99)
GLUCOSE UR QL: NEGATIVE — SIGNIFICANT CHANGE UP
HCO3 BLDV-SCNC: 25 MMOL/L — SIGNIFICANT CHANGE UP (ref 22–29)
HCT VFR BLD CALC: 39 % — SIGNIFICANT CHANGE UP (ref 39–50)
HGB BLD-MCNC: 13.3 G/DL — SIGNIFICANT CHANGE UP (ref 13–17)
INR BLD: 1.13 — SIGNIFICANT CHANGE UP (ref 0.88–1.16)
KETONES UR-MCNC: NEGATIVE — SIGNIFICANT CHANGE UP
LACTATE SERPL-SCNC: 0.9 MMOL/L — SIGNIFICANT CHANGE UP (ref 0.5–2)
LDH SERPL L TO P-CCNC: 437 U/L — HIGH (ref 50–242)
LEUKOCYTE ESTERASE UR-ACNC: NEGATIVE — SIGNIFICANT CHANGE UP
LYMPHOCYTES # BLD AUTO: 0.39 K/UL — LOW (ref 1–3.3)
LYMPHOCYTES # BLD AUTO: 3.5 % — LOW (ref 13–44)
MANUAL SMEAR VERIFICATION: SIGNIFICANT CHANGE UP
MCHC RBC-ENTMCNC: 31.9 PG — SIGNIFICANT CHANGE UP (ref 27–34)
MCHC RBC-ENTMCNC: 34.1 GM/DL — SIGNIFICANT CHANGE UP (ref 32–36)
MCV RBC AUTO: 93.5 FL — SIGNIFICANT CHANGE UP (ref 80–100)
METAMYELOCYTES # FLD: 0.9 % — HIGH (ref 0–0)
METHADONE UR-MCNC: NEGATIVE — SIGNIFICANT CHANGE UP
MONOCYTES # BLD AUTO: 0.68 K/UL — SIGNIFICANT CHANGE UP (ref 0–0.9)
MONOCYTES NFR BLD AUTO: 6.1 % — SIGNIFICANT CHANGE UP (ref 2–14)
NEUTROPHILS # BLD AUTO: 9.76 K/UL — HIGH (ref 1.8–7.4)
NEUTROPHILS NFR BLD AUTO: 81.6 % — HIGH (ref 43–77)
NEUTS BAND # BLD: 6.1 % — SIGNIFICANT CHANGE UP (ref 0–8)
NITRITE UR-MCNC: NEGATIVE — SIGNIFICANT CHANGE UP
OPIATES UR-MCNC: NEGATIVE — SIGNIFICANT CHANGE UP
PCO2 BLDV: 41 MMHG — LOW (ref 42–55)
PCP SPEC-MCNC: SIGNIFICANT CHANGE UP
PCP UR-MCNC: NEGATIVE — SIGNIFICANT CHANGE UP
PH BLDV: 7.4 — SIGNIFICANT CHANGE UP (ref 7.32–7.43)
PH UR: 6 — SIGNIFICANT CHANGE UP (ref 5–8)
PHOSPHATE SERPL-MCNC: 2.6 MG/DL — SIGNIFICANT CHANGE UP (ref 2.5–4.5)
PLAT MORPH BLD: ABNORMAL
PLATELET # BLD AUTO: 105 K/UL — LOW (ref 150–400)
PO2 BLDV: 50 MMHG — HIGH (ref 25–45)
POLYCHROMASIA BLD QL SMEAR: SLIGHT — SIGNIFICANT CHANGE UP
POTASSIUM BLDV-SCNC: 3.8 MMOL/L — SIGNIFICANT CHANGE UP (ref 3.5–5.1)
POTASSIUM SERPL-MCNC: 3.9 MMOL/L — SIGNIFICANT CHANGE UP (ref 3.5–5.3)
POTASSIUM SERPL-SCNC: 3.9 MMOL/L — SIGNIFICANT CHANGE UP (ref 3.5–5.3)
PROMYELOCYTES # FLD: 0.9 % — HIGH (ref 0–0)
PROT SERPL-MCNC: 6.5 G/DL — SIGNIFICANT CHANGE UP (ref 6–8.3)
PROT UR-MCNC: 100 MG/DL
PROTHROM AB SERPL-ACNC: 13.5 SEC — SIGNIFICANT CHANGE UP (ref 10.6–13.6)
RBC # BLD: 4.17 M/UL — LOW (ref 4.2–5.8)
RBC # FLD: 12.1 % — SIGNIFICANT CHANGE UP (ref 10.3–14.5)
RBC BLD AUTO: NORMAL — SIGNIFICANT CHANGE UP
RBC CASTS # UR COMP ASSIST: ABNORMAL /HPF
SAO2 % BLDV: 83.2 % — SIGNIFICANT CHANGE UP (ref 67–88)
SARS-COV-2 RNA SPEC QL NAA+PROBE: SIGNIFICANT CHANGE UP
SODIUM SERPL-SCNC: 138 MMOL/L — SIGNIFICANT CHANGE UP (ref 135–145)
SP GR SPEC: 1.02 — SIGNIFICANT CHANGE UP (ref 1–1.03)
THC UR QL: NEGATIVE — SIGNIFICANT CHANGE UP
TROPONIN T SERPL-MCNC: 0.01 NG/ML — SIGNIFICANT CHANGE UP (ref 0–0.01)
UROBILINOGEN FLD QL: 0.2 E.U./DL — SIGNIFICANT CHANGE UP
VARIANT LYMPHS # BLD: 0.9 % — SIGNIFICANT CHANGE UP (ref 0–6)
WBC # BLD: 11.13 K/UL — HIGH (ref 3.8–10.5)
WBC # FLD AUTO: 11.13 K/UL — HIGH (ref 3.8–10.5)
WBC UR QL: < 5 /HPF — SIGNIFICANT CHANGE UP

## 2022-02-14 PROCEDURE — 99285 EMERGENCY DEPT VISIT HI MDM: CPT

## 2022-02-14 PROCEDURE — 71045 X-RAY EXAM CHEST 1 VIEW: CPT | Mod: 26

## 2022-02-14 PROCEDURE — 99223 1ST HOSP IP/OBS HIGH 75: CPT | Mod: GC

## 2022-02-14 PROCEDURE — 93010 ELECTROCARDIOGRAM REPORT: CPT

## 2022-02-14 RX ORDER — CARVEDILOL PHOSPHATE 80 MG/1
6.25 CAPSULE, EXTENDED RELEASE ORAL EVERY 12 HOURS
Refills: 0 | Status: DISCONTINUED | OUTPATIENT
Start: 2022-02-14 | End: 2022-02-17

## 2022-02-14 RX ORDER — ISOSORBIDE MONONITRATE 60 MG/1
30 TABLET, EXTENDED RELEASE ORAL DAILY
Refills: 0 | Status: DISCONTINUED | OUTPATIENT
Start: 2022-02-14 | End: 2022-02-17

## 2022-02-14 RX ORDER — MAGNESIUM SULFATE 500 MG/ML
2 VIAL (ML) INJECTION ONCE
Refills: 0 | Status: COMPLETED | OUTPATIENT
Start: 2022-02-14 | End: 2022-02-15

## 2022-02-14 RX ORDER — NYSTATIN 500MM UNIT
500000 POWDER (EA) MISCELLANEOUS EVERY 6 HOURS
Refills: 0 | Status: DISCONTINUED | OUTPATIENT
Start: 2022-02-14 | End: 2022-02-17

## 2022-02-14 RX ORDER — ESCITALOPRAM OXALATE 10 MG/1
5 TABLET, FILM COATED ORAL DAILY
Refills: 0 | Status: DISCONTINUED | OUTPATIENT
Start: 2022-02-14 | End: 2022-02-17

## 2022-02-14 RX ORDER — ATOVAQUONE 750 MG/5ML
1500 SUSPENSION ORAL DAILY
Refills: 0 | Status: DISCONTINUED | OUTPATIENT
Start: 2022-02-14 | End: 2022-02-17

## 2022-02-14 RX ORDER — ASPIRIN/CALCIUM CARB/MAGNESIUM 324 MG
81 TABLET ORAL DAILY
Refills: 0 | Status: DISCONTINUED | OUTPATIENT
Start: 2022-02-14 | End: 2022-02-17

## 2022-02-14 RX ORDER — CEFTRIAXONE 500 MG/1
1000 INJECTION, POWDER, FOR SOLUTION INTRAMUSCULAR; INTRAVENOUS ONCE
Refills: 0 | Status: COMPLETED | OUTPATIENT
Start: 2022-02-14 | End: 2022-02-14

## 2022-02-14 RX ORDER — SODIUM CHLORIDE 9 MG/ML
500 INJECTION INTRAMUSCULAR; INTRAVENOUS; SUBCUTANEOUS ONCE
Refills: 0 | Status: COMPLETED | OUTPATIENT
Start: 2022-02-14 | End: 2022-02-14

## 2022-02-14 RX ORDER — MAGNESIUM SULFATE 500 MG/ML
1 VIAL (ML) INJECTION ONCE
Refills: 0 | Status: COMPLETED | OUTPATIENT
Start: 2022-02-14 | End: 2022-02-14

## 2022-02-14 RX ORDER — VANCOMYCIN HCL 1 G
1250 VIAL (EA) INTRAVENOUS EVERY 12 HOURS
Refills: 0 | Status: DISCONTINUED | OUTPATIENT
Start: 2022-02-14 | End: 2022-02-15

## 2022-02-14 RX ORDER — ENOXAPARIN SODIUM 100 MG/ML
40 INJECTION SUBCUTANEOUS EVERY 24 HOURS
Refills: 0 | Status: DISCONTINUED | OUTPATIENT
Start: 2022-02-14 | End: 2022-02-17

## 2022-02-14 RX ORDER — ACETAMINOPHEN 500 MG
650 TABLET ORAL ONCE
Refills: 0 | Status: COMPLETED | OUTPATIENT
Start: 2022-02-14 | End: 2022-02-14

## 2022-02-14 RX ORDER — CEFEPIME 1 G/1
2000 INJECTION, POWDER, FOR SOLUTION INTRAMUSCULAR; INTRAVENOUS EVERY 12 HOURS
Refills: 0 | Status: DISCONTINUED | OUTPATIENT
Start: 2022-02-14 | End: 2022-02-15

## 2022-02-14 RX ADMIN — Medication 100 GRAM(S): at 20:44

## 2022-02-14 RX ADMIN — Medication 166.67 MILLIGRAM(S): at 23:15

## 2022-02-14 RX ADMIN — CEFTRIAXONE 100 MILLIGRAM(S): 500 INJECTION, POWDER, FOR SOLUTION INTRAMUSCULAR; INTRAVENOUS at 22:02

## 2022-02-14 RX ADMIN — ENOXAPARIN SODIUM 40 MILLIGRAM(S): 100 INJECTION SUBCUTANEOUS at 23:50

## 2022-02-14 RX ADMIN — SODIUM CHLORIDE 1000 MILLILITER(S): 9 INJECTION INTRAMUSCULAR; INTRAVENOUS; SUBCUTANEOUS at 22:02

## 2022-02-14 RX ADMIN — Medication 650 MILLIGRAM(S): at 19:19

## 2022-02-14 NOTE — H&P ADULT - PROBLEM SELECTOR PLAN 7
Hypovolemic to euvolemic on exam. NICM. EF 35%.  s/p 500cc IVF lungs cta b/l.   - c/w coreg, imdur as bps HD stable  - Cr. Stable.

## 2022-02-14 NOTE — ED PROVIDER NOTE - PROGRESS NOTE DETAILS
upon reassessment, pt states he still feels generalized weakness despite fever defervescence. feels unsafe going home. requesting admission. Sski Pregnancy And Lactation Text: This medication is Pregnancy Category D and isn't considered safe during pregnancy. It is excreted in breast milk. ED chest us: no B lines. will give ivf x500cc.

## 2022-02-14 NOTE — H&P ADULT - ATTENDING COMMENTS
72 year old male patient with history of CVD, (CAD, NICM-EF 30s), polysubstance abuse (cocaine, cannabis, etoh), cad (last cath 7/2021),  ascending aortic aneurysm (4.6cm), pad, htn, hld, dm, ckd, prostate ca s/p xrt and AIDS(Last CD4 125 in 1/2022, and undetectable viral load) presented to [facility] ED with progressive weakness of 6 days duration, subjective fevers of 1 day duration, chronic cough of 6 months duration, and watery diarrhea of a longer duration. Patient is compliant with ART. In the ED patient is found to be febrile with tmax 102.5, tachycardic 92, bp 143/84, RR 18, spo 96%, His ED Labs are significant for WBC 11 and bandemia 6.1%, thrombocytopenia of 105. His chest xray is negative for infiltrates. Patient is admitted for treatment of sepsis presumed to be secondary to gastroenteritis and possible other infectious etiologies.    1. Sepsis  presumed to be secondary to GI vs pulmonary source  s/p 500 cc of NS, caution due to CHF  s/p ceftriaxone in the ED, would cover for CAP and some GI sources      2. Diarrhea presumed infectious  stool PCR  stool C. diff  will wait for results     3. Chronic Cough  could have secondary lung infection   r/o opportunistic lung infections   hold off antimicrobial therapy until a source is identified  CT chest to evaluate lung parenchyma   RVP    4. AIDS  c/w tivicay and descovy  PCP ppx with atovoquone    5. Oral Thrush  Nystatin swish and swallow given no dysphagia likely mild  if no improvement can consider PO fluconazole

## 2022-02-14 NOTE — H&P ADULT - PROBLEM SELECTOR PLAN 1
With SIRS. 2/4 Sirs with fever and HR > 90, + bandemia on peripheral smear. Last known CD count 95 in september 9/2021. Reportedly adherent with descovy and tivicay. ddx includes infectious with concern for OI vs. hematologic/oncologic. given ceftriaxone in ED. if infectious source ?pulm vs. gi as with chronic cough and chronic diarrhea. no HA or neuro symptoms to think meninigitis, no evidence of endocarditis on exam and denied IVDU, no rashes, no penile discharge.  - will start on vanc/cefepime  - mrsa swab  - f/u blood cultures  - repeat viral load, and t cell subset  - OI work up: GI pcr, rpr, fungal blood cultures, cmv, ebv, cryptococcal antigen, fungitell, toxoplasma igm, quant level, gi pcr  - hold ARV for now  - consider heme-onc work up for pending infectious work up    #oral thrush  denies dysphagia  - can start on oral nystatin With SIRS. 2/4 Sirs with fever and HR > 90, + bandemia on peripheral smear. Last known CD count 95 in september 9/2021. Reportedly adherent with descovy and tivicay. ddx includes infectious with concern for OI vs. hematologic/oncologic. given ceftriaxone in ED. if infectious source ?pulm vs. gi as with chronic cough and chronic diarrhea. no HA or neuro symptoms to think meninigitis, no evidence of endocarditis on exam and denied IVDU, no rashes, no penile discharge. CXR without reticular infiltrates, not hypoxemic on RA lower suspicion for PCP.   - will start on vanc/cefepime  - mrsa swab  - f/u blood cultures  - repeat viral load, and t cell subset  - OI work up: GI pcr, rpr, fungal blood cultures, cmv, ebv, cryptococcal antigen, fungitell, toxoplasma igm, quant level, gi pcr  - will start on bactrim for PCP   - hold ARV for now  - consider heme-onc work up for pending infectious work up  - CT chest given chronic cough     #oral thrush  denies dysphagia  - can start on oral nystatin With SIRS. 2/4 Sirs with fever and HR > 90, + bandemia on peripheral smear. Last known CD count 95 in september 9/2021. Reportedly adherent with descovy and tivicay. ddx includes infectious with concern for OI vs. hematologic/oncologic. given ceftriaxone in ED. if infectious source ?pulm vs. gi as with chronic cough and chronic diarrhea. no HA or neuro symptoms to think meninigitis, no evidence of endocarditis on exam and denied IVDU, no rashes, no penile discharge. CXR without reticular infiltrates, not hypoxemic on RA lower suspicion for PCP.   - will start on vanc/cefepime  - mrsa swab  - f/u blood cultures  - repeat viral load, and t cell subset  - OI work up: GI pcr, rpr, fungal blood cultures, cmv, ebv, cryptococcal antigen, fungitell, toxoplasma igm, quant level, gi pcr  - will start on atovaqone for PCP given sulfa allergy   - hold ARV for now  - consider heme-onc work up for pending infectious work up  - CT chest given chronic cough     #oral thrush  denies dysphagia  - can start on oral nystatin With SIRS. 2/4 Sirs with fever and HR > 90, + bandemia on peripheral smear. Last known CD count 95 in september 9/2021. Reportedly adherent with descovy and tivicay. ddx includes infectious with concern for OI vs. hematologic/oncologic. given ceftriaxone in ED. if infectious source ?pulm vs. gi as with chronic cough and chronic diarrhea. no HA or neuro symptoms to think meninigitis, no evidence of endocarditis on exam and denied IVDU, no rashes, no penile discharge. CXR without reticular infiltrates, not hypoxemic on RA lower suspicion for PCP.   -hold off on further antibiotics, pending work up  - ID consult  - mrsa swab  - f/u blood cultures  - repeat viral load, and t cell subset  - OI work up: GI pcr, rpr, fungal blood cultures, cmv, ebv, cryptococcal antigen, fungitell, toxoplasma igm, quant level, gi pcr  - will start on atovaqone for PCP given sulfa allergy   - hold ARV for now  - consider heme-onc work up for pending infectious work up  - CT chest given chronic cough     #oral thrush  denies dysphagia  - can start on oral nystatin With SIRS. 2/4 Sirs with fever and HR > 90, + bandemia on peripheral smear. Last known CD count 95 in september 9/2021. Reportedly adherent with descovy and tivicay. ddx includes infectious with concern for OI vs. hematologic/oncologic. given ceftriaxone in ED. if infectious source ?pulm vs. gi as with chronic cough and chronic diarrhea. no HA or neuro symptoms to think meninigitis, no evidence of endocarditis on exam and denied IVDU, no rashes, no penile discharge. CXR without reticular infiltrates, not hypoxemic on RA lower suspicion for PCP.   -hold off on further antibiotics, pending work up  - ID consult  - mrsa swab  - f/u blood cultures  - repeat viral load, and t cell subset  - OI work up: GI pcr, rpr, fungal blood cultures, cmv, ebv, cryptococcal antigen, fungitell, toxoplasma igm, quant level, gi pcr  - will start on atovaqone for PCP given sulfa allergy   - c/w ART  - consider heme-onc work up for pending infectious work up  - CT chest given chronic cough     #oral thrush  denies dysphagia  - can start on oral nystatin

## 2022-02-14 NOTE — H&P ADULT - PROBLEM SELECTOR PLAN 5
Thrombocytopenia. chronic. likely 2/2 to AIDS. no evidence of petechiae.   - trend plts  - goal PLT> 20K given fevers. possible in setting of Diarrhea. less likely CKD.   - f/u urine magnesium, urine creatinine  - replete mag to goal >2  - f/u urine protein/cr ratio

## 2022-02-14 NOTE — ED PROVIDER NOTE - RATE
Pt calls in requesting a refill of Zolpidem and XANAX.   Pharmacy of choice is [facility] on [address]    Pt last saw ROSARIO Naranjo PA-C 3/16/21 for an acute visit. No pending appt scheduled. Pt will be due in September for CPE.     ALPRAZolam (XANAX) 1 MG tablets last ordered 1/29/21 #120 tablets R0  Take 1 tablet by mouth 2 times daily as needed for Sleep or Anxiety.    zolpidem (AMBIEN) 10 MG tablets last ordered 9/29/20 #180 tablets R0  Take 1 tablet by mouth nightly as needed for Sleep.    PDMP:   MME: 0  Pt has four ongoing controlled substances being prescribed by one authorizing provider.     OK to refill both as requested?    109

## 2022-02-14 NOTE — H&P ADULT - PROBLEM SELECTOR PLAN 3
see above see above. Hold ART for now, due to concern for IRIS. ddx: secondary to underlying infectious vs. electrolyte derrangements in setting of hypomagnesemia.  - management of infectious process as above  - replete magnesium as needed  - check tsh  - low suspicion for cardiac cause as does not appear in acute decompensated heart failure based on volume status, pending clinical improvement can repeat

## 2022-02-14 NOTE — H&P ADULT - NSHPLABSRESULTS_GEN_ALL_CORE
.  LABS:                         13.3   11.13 )-----------( 105      ( 2022 18:40 )             39.0         138  |  105  |  23  ----------------------------<  134<H>  3.9   |  20<L>  |  1.32<H>    Ca    8.7      2022 18:40  Phos  2.6       Mg     1.2         TPro  6.5  /  Alb  3.7  /  TBili  0.6  /  DBili  x   /  AST  27  /  ALT  15  /  AlkPhos  58      PT/INR - ( 2022 18:40 )   PT: 13.5 sec;   INR: 1.13          PTT - ( 2022 18:40 )  PTT:30.9 sec  Urinalysis Basic - ( 2022 20:32 )    Color: Yellow / Appearance: Clear / S.025 / pH: x  Gluc: x / Ketone: NEGATIVE  / Bili: Negative / Urobili: 0.2 E.U./dL   Blood: x / Protein: 100 mg/dL / Nitrite: NEGATIVE   Leuk Esterase: NEGATIVE / RBC: 5-10 /HPF / WBC < 5 /HPF   Sq Epi: x / Non Sq Epi: 0-5 /HPF / Bacteria: Present /HPF      CARDIAC MARKERS ( 2022 18:40 )  x     / 0.01 ng/mL / x     / x     / x          Serum Pro-Brain Natriuretic Peptide: 2274 pg/mL ( @ 18:40)    Lactate, Blood: 0.9 mmol/L ( @ 18:40)      RADIOLOGY, EKG & ADDITIONAL TESTS: Reviewed.

## 2022-02-14 NOTE — H&P ADULT - ASSESSMENT
72M c pertinent history of AIDS (last CD4 95 in september) and extensive cardiovascular disease who presents with weakness. Now admitted for FUO with AIDS.

## 2022-02-14 NOTE — H&P ADULT - PROBLEM/PLAN-7
Subjective:      Patient ID: Arianna Mckinney is a 3 y.o. male who presents in office today accompanied by his father for ear check patient Dx acute suppurative otitis media of right ear with out rupture at [facility] urgent care on 07/30/2021 completed Amoxicillin. Also, patient has possible enlarged lymph node right side onset 3 weeks ago denies pain. Review of Systems   Constitutional: Negative for activity change, appetite change, fever and irritability. HENT: Negative for congestion. Eyes: Negative for pain, discharge and itching. Respiratory: Negative for cough. All other systems reviewed and are negative. Objective:   Temp 98.1 °F (36.7 °C) (Temporal)   Ht 40.95\" (104 cm)   Wt 35 lb 4 oz (16 kg)   BMI 14.78 kg/m²      Physical Exam  Constitutional:       General: He is active. HENT:      Right Ear: Tympanic membrane normal.      Left Ear: Tympanic membrane normal.      Nose: Nose normal.      Mouth/Throat:      Mouth: Mucous membranes are moist.   Pulmonary:      Effort: Pulmonary effort is normal.      Breath sounds: Normal breath sounds. Lymphadenopathy:      Cervical: Cervical adenopathy present. Neurological:      Mental Status: He is alert. Assessment/Plan:       Diagnosis Orders   1. Otitis media resolved     2. Reactive lymphadenopathy              No results found for this visit on 08/13/21. Return if symptoms worsen or fail to improve. There are no Patient Instructions on file for this visit. I have reviewed and agree with documentation per clinical staff, and have made any necessaryadjustments.   Electronically signed by DONALD Salazar CNP on 8/20/2021 at 11:22 PM Please note that portions of this note were completed with a voice recognition program. Efforts weremade to edit the dictations but occasionally words are mis-transcribed.)
DISPLAY PLAN FREE TEXT

## 2022-02-14 NOTE — H&P ADULT - PROBLEM SELECTOR PLAN 6
possible in setting of Diarrhea. less likely CKD.   - f/u urine magnesium, urine creatinine  - replete mag to goal >2  - f/u urine protein/cr ratio Hypovolemic to euvolemic on exam. NICM. EF 35%.  s/p 500cc IVF lungs cta b/l.   - c/w coreg, imdur as bps HD stable  -

## 2022-02-14 NOTE — H&P ADULT - NSHPPHYSICALEXAM_GEN_ALL_CORE
Vital Signs (24 Hrs):  T(C): 37.4 (02-14-22 @ 22:07), Max: 39.2 (02-14-22 @ 17:20)  HR: 88 (02-14-22 @ 22:07) (88 - 96)  BP: 136/85 (02-14-22 @ 22:07) (136/85 - 143/84)  RR: 16 (02-14-22 @ 22:07) (16 - 18)  SpO2: 96% (02-14-22 @ 22:07) (96% - 96%)  Wt(kg): -- Vital Signs (24 Hrs):  T(C): 37.4 (02-14-22 @ 22:07), Max: 39.2 (02-14-22 @ 17:20)  HR: 88 (02-14-22 @ 22:07) (88 - 96)  BP: 136/85 (02-14-22 @ 22:07) (136/85 - 143/84)  RR: 16 (02-14-22 @ 22:07) (16 - 18)  SpO2: 96% (02-14-22 @ 22:07) (96% - 96%)  Wt(kg): --    PHYSICAL EXAM:  GENERAL: Disheveled. Unkempt  HEAD:  NC/AT. No meningeal signs  THROAT: White plauqes noted on posterior oropharnyx. DMM  EYES: PERRLA.   NECK: No JVD. No LAD  CHEST/LUNG: CTA B/L  HEART: RRR. No Murmurs.  ABDOMEN: NTND BS+4  : No penile discharge or rashes. Leather ring noted around scrotum   EXTREMITIES:  2+ Peripheral Pulses, No edema.  PSYCH: AAOx3, cooperative, appropriate  NEUROLOGY: AAOx3, No gross FNDs.   SKIN: No rashes or lesions

## 2022-02-14 NOTE — H&P ADULT - HISTORY OF PRESENT ILLNESS
ED course:  Vitals: tmax 102.5, HR > 90, bp 143/84, RR 18, spo 96% RA  Labs: WBC 11 (BAndemia 6.1%, baseline wbc 4), plt 105. Coags WNL. Bicarb 20, BUN/Cr 23, 1.32. Mag 1.2. UA with no pyruia, spec grav 1.025, blood, minimal RBCs, bacteruria +. utox negative. lactate 0.9.   Imaging: CXR no infiltrates, prominent aorto-pulmonary knob, cardiomegally, no effusions.  EKG: sinus tach  Interventions: 500cc bolus x1. magnesium 1g, ceftriaxone, tylenol 650mg.    72M c pertinent hx of AIDS (Last CD4 95 in 9/2021, and Detectable viral load), who presents with progressive weakness over last few days. Patient c/o worsening weakness to the point that he felt like he was falling, no head strike, loc, or palpitations. + fever this am at home therefore he presented to the ED. Unsure of his last VL but believes he is undetectable-says he takes descovy and tivicay for HAART. ROS+ for chronic non-productive cough over last 6 months, and chronic diarrhea for which he takes immodium for. Currently denies HA, vision changes, chest pain, sob, dysuria/penile discharge, rashes.     Other pmhx CVD (CAD, NICM-EF 30s)    ED course:  Vitals: tmax 102.5, HR > 90, bp 143/84, RR 18, spo 96% RA  Labs: WBC 11 (BAndemia 6.1%, baseline wbc 4), plt 105. Coags WNL. Bicarb 20, BUN/Cr 23, 1.32. Mag 1.2. UA with no pyruia, spec grav 1.025, blood, minimal RBCs, bacteruria +. utox negative. lactate 0.9.   Imaging: CXR no infiltrates, prominent aorto-pulmonary knob, cardiomegally, no effusions.  EKG: sinus tach  Interventions: 500cc bolus x1. magnesium 1g, ceftriaxone, tylenol 650mg.    72M c pertinent hx of AIDS (Last CD4 95 in 9/2021, and Detectable viral load), who presents with progressive weakness over last few days. Patient c/o worsening weakness to the point that he felt like he was falling, no head strike, loc, or palpitations. + fever this am at home therefore he presented to the ED. Unsure of his last VL but believes he is undetectable-says he takes descovy and tivicay for HAART. ROS+ for chronic non-productive cough over last 6 months, and chronic diarrhea for which he takes immodium for. Currently denies HA, vision changes, chest pain, sob, dysuria/penile discharge, rashes.     Other pmhx CVD, (CAD, NICM-EF 30s), polysubstance abuse (cocaine, cannabis, etoh), cad (last cath 7/2021),  ascending aortic aneurysm (4.6cm), pad, htn, hld, dm, ckd, prostate ca s/p xrt c/b chronic urinary incontinence, completed covid19 vaccination, multiple hospital admissions for syncope (last hospitalized 11/2021)    ED course:  Vitals: tmax 102.5, HR > 90, bp 143/84, RR 18, spo 96% RA  Labs: WBC 11 (BAndemia 6.1%, baseline wbc 4), plt 105. Coags WNL. Bicarb 20, BUN/Cr 23, 1.32. Mag 1.2. UA with no pyruia, spec grav 1.025, blood, minimal RBCs, bacteruria +. utox negative. lactate 0.9.   Imaging: CXR no infiltrates, prominent aorto-pulmonary knob, cardiomegally, no effusions.  EKG: sinus tach  Interventions: 500cc bolus x1. magnesium 1g, ceftriaxone, tylenol 650mg.    72M c pertinent hx of AIDS (Last CD4 125, VLUD), who presents with progressive weakness over last few days. Patient c/o worsening weakness to the point that he felt like he was falling, no head strike, loc, or palpitations. + fever this am at home therefore he presented to the ED. Unsure of his last VL but believes he is undetectable-says he takes descovy and tivicay for HAART. ROS+ for chronic non-productive cough over last 6 months, and chronic diarrhea for which he takes immodium for. Currently denies HA, vision changes, chest pain, sob, dysuria/penile discharge, rashes.     Other pmhx CVD, (CAD, NICM-EF 30s), polysubstance abuse (cocaine, cannabis, etoh), cad (last cath 7/2021),  ascending aortic aneurysm (4.6cm), pad, htn, hld, dm, ckd, prostate ca s/p xrt c/b chronic urinary incontinence, completed covid19 vaccination, multiple hospital admissions for syncope (last hospitalized 11/2021)    ED course:  Vitals: tmax 102.5, HR > 90, bp 143/84, RR 18, spo 96% RA  Labs: WBC 11 (BAndemia 6.1%, baseline wbc 4), plt 105. Coags WNL. Bicarb 20, BUN/Cr 23, 1.32. Mag 1.2. UA with no pyruia, spec grav 1.025, blood, minimal RBCs, bacteruria +. utox negative. lactate 0.9.   Imaging: CXR no infiltrates, prominent aorto-pulmonary knob, cardiomegally, no effusions.  EKG: sinus tach  Interventions: 500cc bolus x1. magnesium 1g, ceftriaxone, tylenol 650mg.

## 2022-02-14 NOTE — H&P ADULT - PROBLEM SELECTOR PLAN 4
ddx: secondary to underlying infectious vs. electrolyte derrangements in setting of hypomagnesemia.  - management of infectious process as above  - replete magnesium as needed  - check tsh  - low suspicion for cardiac cause as does not appear in acute decompensated heart failure based on volume status, pending clinical improvement can repeat Thrombocytopenia. chronic. likely 2/2 to AIDS. no evidence of petechiae.   - trend plts  - goal PLT> 20K given fevers.

## 2022-02-14 NOTE — ED PROVIDER NOTE - PHYSICAL EXAMINATION
CONST: nontoxic NAD speaking in full sentences  HEAD: atraumatic  EYES: conjunctivae clear, PERRL, EOMI  ENT: mmm  NECK: supple/FROM, nttp  CARD: rrr no murmurs  CHEST: L>R basal rales, no rhonchi/wheezing/stridor  ABD: soft, nd, nttp, no rebound/guarding, no cvat  EXT: FROM, symmetric distal pulses intact  SKIN: warm, dry, no rash, no pedal edema/ttp/rash, cap refill <2sec  NEURO: a+ox3, 5/5 strength x4, gross sensation intact x4 CONST: nontoxic NAD speaking in full sentences  HEAD: atraumatic  EYES: conjunctivae clear, PERRL, EOMI  ENT: tacky mucous membranes  NECK: supple/FROM, nttp  CARD: rrr no murmurs  CHEST: L>R basal rales, no rhonchi/wheezing/stridor  ABD: soft, nd, nttp, no rebound/guarding, no cvat  EXT: FROM, symmetric distal pulses intact  SKIN: warm, dry, no rash, no pedal edema/ttp/rash, cap refill <2sec  NEURO: a+ox3, 5/5 strength x4, gross sensation intact x4

## 2022-02-14 NOTE — ED ADULT TRIAGE NOTE - CHIEF COMPLAINT QUOTE
Pt BIBEMS for evaluation of chest pain that began 1 week ago. Pt c/o fever that began today. Pt given  mg by EMS. Denies chills, SOB, palpitations, NVD.

## 2022-02-14 NOTE — ED ADULT NURSE NOTE - OBJECTIVE STATEMENT
Pt presenting for midsternal CP, weakness, and SOB. States this has been going on for "years". States he is here today because he "almost fell into the door". Endorsing fevers/chills for months. Last drug use was 10 days ago. States he smokes cocaine. Denies ETOH.   Pt aox3, breathing unlabored on RA. Line and labs obtained. Cultures sent. Denies n/v/d.

## 2022-02-14 NOTE — ED PROVIDER NOTE - OBJECTIVE STATEMENT
72M poor historian, daily smoker, hiv, hcv s/p harvoni, polysubstance abuse (cocaine, cannabis, etoh), cad (last cath 7/2021), nicm (EF 35-40%, 9/2021), ascending aortic aneurysm (4.6cm), pad, htn, hld, dm, ckd, prostate ca s/p xrt, completed covid19 vaccination, multiple hospital admissions for syncope (last hospitalized 11/2021), multiple ED visits for cp (last visit 12/16/21), biba from [place] [facility] rehab for multiple vague chronic complaints. pt reports "i have weakness, near/complete falls, chest pain, shortness of breath for years." when asked what brings him in today, states "cause i almost fell into the door when i tried to answer it." when asked if he's had fever, states "probably for months." reportedly has not seen any doctor for months but gets his cd4/vl routinely done at the rehab, reportedly ?cd4 but vlud. +chronic dry cough. last drug use 10d ago ("smokes cocaine"). given [id number] by ems. no ha/dizziness, no neck pain/stiffness, no photophobia/vision changes, no abd pain/n/v, no diarrhea, no hematochezia/melena, no change in appetite/po intake, no dysuria, no rash, no trauma, no etoh-dpt/ivdu. 72M poor historian, daily smoker, hiv, hcv s/p harvoni, polysubstance abuse (cocaine, cannabis, etoh), cad (last cath 7/2021), nicm (EF 35-40%, 9/2021), ascending aortic aneurysm (4.6cm), pad, htn, hld, dm, ckd, prostate ca s/p xrt, completed covid19 vaccination, multiple hospital admissions for syncope (last hospitalized 11/2021), multiple ED visits for cp (last visit 12/16/21), prostate ca s/p resection in remission c/b chronic urinary incontinence, biba from [place] drug rehab for multiple vague chronic complaints. pt reports "i have weakness, near/complete falls, chest pain, shortness of breath for years." when asked what brings him in today, states "cause i almost fell into the door when i tried to answer it." when asked if he's had fever, states "probably for months." reportedly has not seen any doctor for months but gets his cd4/vl routinely done at the rehab, reportedly ?cd4 but vlud. +chronic dry cough. last drug use 10d ago ("smokes cocaine"). given [id number] by ems. no ha/dizziness, no neck pain/stiffness, no photophobia/vision changes, no back pain, no abd pain/n/v, no diarrhea, no hematochezia/melena, no dysuria, no rash, no trauma, no etoh-dpt/ivdu. 72M poor historian, daily smoker, hiv on haart (vlud, ?cd4), hcv s/p harvoni, polysubstance abuse (cocaine, cannabis, etoh), cad (last cath 7/2021), nicm (EF 35-40%, 9/2021), ascending aortic aneurysm (4.6cm), pad, htn, hld, dm, ckd, prostate ca s/p xrt c/b chronic urinary incontinence, completed covid19 vaccination, multiple hospital admissions for syncope (last hospitalized 11/2021), multiple ED visits for cp (last visit 12/16/21), biba from [place] drug re[facility] for multiple vague chronic complaints. pt reports "i have weakness, near/complete falls, chest pain, shortness of breath for years." when asked what brings him in today, states "cause i almost fell into the door when i tried to answer it." when asked if he's had fever, states "probably for months." reportedly has not seen any doctor for months but gets his cd4/vl routinely done at the rehab, reportedly ?cd4 but vlud. +chronic dry cough. last drug use 10d ago ("smokes cocaine"). given [id number] by ems. no ha/dizziness, no neck pain/stiffness, no photophobia/vision changes, no back pain, no abd pain/n/v, no diarrhea, no hematochezia/melena, no dysuria, no rash, no trauma, no etoh-dpt/ivdu.

## 2022-02-15 DIAGNOSIS — D69.6 THROMBOCYTOPENIA, UNSPECIFIED: ICD-10-CM

## 2022-02-15 DIAGNOSIS — R31.9 HEMATURIA, UNSPECIFIED: ICD-10-CM

## 2022-02-15 DIAGNOSIS — I50.9 HEART FAILURE, UNSPECIFIED: ICD-10-CM

## 2022-02-15 DIAGNOSIS — N17.9 ACUTE KIDNEY FAILURE, UNSPECIFIED: ICD-10-CM

## 2022-02-15 DIAGNOSIS — A41.9 SEPSIS, UNSPECIFIED ORGANISM: ICD-10-CM

## 2022-02-15 LAB
4/8 RATIO: 0.37 RATIO — LOW (ref 0.86–4.14)
ABS CD8: 321 /UL — SIGNIFICANT CHANGE UP (ref 90–775)
ALBUMIN SERPL ELPH-MCNC: 3.4 G/DL — SIGNIFICANT CHANGE UP (ref 3.3–5)
ALP SERPL-CCNC: 62 U/L — SIGNIFICANT CHANGE UP (ref 40–120)
ALT FLD-CCNC: 14 U/L — SIGNIFICANT CHANGE UP (ref 10–45)
ANION GAP SERPL CALC-SCNC: 12 MMOL/L — SIGNIFICANT CHANGE UP (ref 5–17)
AST SERPL-CCNC: 24 U/L — SIGNIFICANT CHANGE UP (ref 10–40)
BASOPHILS # BLD AUTO: 0.01 K/UL — SIGNIFICANT CHANGE UP (ref 0–0.2)
BASOPHILS NFR BLD AUTO: 0.1 % — SIGNIFICANT CHANGE UP (ref 0–2)
BILIRUB SERPL-MCNC: 0.8 MG/DL — SIGNIFICANT CHANGE UP (ref 0.2–1.2)
BUN SERPL-MCNC: 26 MG/DL — HIGH (ref 7–23)
CALCIUM SERPL-MCNC: 8.5 MG/DL — SIGNIFICANT CHANGE UP (ref 8.4–10.5)
CD3 BLASTS SPEC-ACNC: 450 /UL — SIGNIFICANT CHANGE UP (ref 396–2024)
CD3 BLASTS SPEC-ACNC: 83 % — SIGNIFICANT CHANGE UP (ref 58–84)
CD4 %: 22 % — LOW (ref 30–56)
CD8 %: 59 % — HIGH (ref 11–43)
CHLORIDE SERPL-SCNC: 100 MMOL/L — SIGNIFICANT CHANGE UP (ref 96–108)
CO2 SERPL-SCNC: 23 MMOL/L — SIGNIFICANT CHANGE UP (ref 22–31)
CREAT ?TM UR-MCNC: 153 MG/DL — SIGNIFICANT CHANGE UP
CREAT SERPL-MCNC: 1.74 MG/DL — HIGH (ref 0.5–1.3)
CULTURE RESULTS: SIGNIFICANT CHANGE UP
EBV EA AB SER IA-ACNC: <5 U/ML — SIGNIFICANT CHANGE UP
EBV EA AB TITR SER IF: POSITIVE
EBV EA IGG SER-ACNC: NEGATIVE — SIGNIFICANT CHANGE UP
EBV NA IGG SER IA-ACNC: >600 U/ML — HIGH
EBV PATRN SPEC IB-IMP: SIGNIFICANT CHANGE UP
EBV VCA IGG AVIDITY SER QL IA: POSITIVE
EBV VCA IGM SER IA-ACNC: 418 U/ML — HIGH
EBV VCA IGM SER IA-ACNC: <10 U/ML — SIGNIFICANT CHANGE UP
EBV VCA IGM TITR FLD: NEGATIVE — SIGNIFICANT CHANGE UP
EOSINOPHIL # BLD AUTO: 0 K/UL — SIGNIFICANT CHANGE UP (ref 0–0.5)
EOSINOPHIL NFR BLD AUTO: 0 % — SIGNIFICANT CHANGE UP (ref 0–6)
ERYTHROCYTE [SEDIMENTATION RATE] IN BLOOD: 12 MM/HR — SIGNIFICANT CHANGE UP
GLUCOSE SERPL-MCNC: 149 MG/DL — HIGH (ref 70–99)
HCT VFR BLD CALC: 37.3 % — LOW (ref 39–50)
HGB BLD-MCNC: 13.2 G/DL — SIGNIFICANT CHANGE UP (ref 13–17)
IMM GRANULOCYTES NFR BLD AUTO: 0.6 % — SIGNIFICANT CHANGE UP (ref 0–1.5)
LEGIONELLA AG UR QL: NEGATIVE — SIGNIFICANT CHANGE UP
LYMPHOCYTES # BLD AUTO: 0.64 K/UL — LOW (ref 1–3.3)
LYMPHOCYTES # BLD AUTO: 7.4 % — LOW (ref 13–44)
MAGNESIUM SERPL-MCNC: 2.1 MG/DL — SIGNIFICANT CHANGE UP (ref 1.6–2.6)
MCHC RBC-ENTMCNC: 33.5 PG — SIGNIFICANT CHANGE UP (ref 27–34)
MCHC RBC-ENTMCNC: 35.4 GM/DL — SIGNIFICANT CHANGE UP (ref 32–36)
MCV RBC AUTO: 94.7 FL — SIGNIFICANT CHANGE UP (ref 80–100)
MONOCYTES # BLD AUTO: 0.46 K/UL — SIGNIFICANT CHANGE UP (ref 0–0.9)
MONOCYTES NFR BLD AUTO: 5.3 % — SIGNIFICANT CHANGE UP (ref 2–14)
NEUTROPHILS # BLD AUTO: 7.48 K/UL — HIGH (ref 1.8–7.4)
NEUTROPHILS NFR BLD AUTO: 86.6 % — HIGH (ref 43–77)
NRBC # BLD: 0 /100 WBCS — SIGNIFICANT CHANGE UP (ref 0–0)
OSMOLALITY UR: 366 MOSM/KG — SIGNIFICANT CHANGE UP (ref 300–900)
PHOSPHATE SERPL-MCNC: 3.4 MG/DL — SIGNIFICANT CHANGE UP (ref 2.5–4.5)
PLATELET # BLD AUTO: 103 K/UL — LOW (ref 150–400)
POTASSIUM SERPL-MCNC: 3.5 MMOL/L — SIGNIFICANT CHANGE UP (ref 3.5–5.3)
POTASSIUM SERPL-SCNC: 3.5 MMOL/L — SIGNIFICANT CHANGE UP (ref 3.5–5.3)
PROCALCITONIN SERPL-MCNC: 0.26 NG/ML — HIGH (ref 0.02–0.1)
PROT SERPL-MCNC: 6.3 G/DL — SIGNIFICANT CHANGE UP (ref 6–8.3)
RAPID RVP RESULT: SIGNIFICANT CHANGE UP
RBC # BLD: 3.94 M/UL — LOW (ref 4.2–5.8)
RBC # FLD: 12.2 % — SIGNIFICANT CHANGE UP (ref 10.3–14.5)
S PNEUM AG UR QL: NEGATIVE — SIGNIFICANT CHANGE UP
SARS-COV-2 RNA SPEC QL NAA+PROBE: SIGNIFICANT CHANGE UP
SODIUM SERPL-SCNC: 135 MMOL/L — SIGNIFICANT CHANGE UP (ref 135–145)
SODIUM UR-SCNC: 28 MMOL/L — SIGNIFICANT CHANGE UP
SPECIMEN SOURCE: SIGNIFICANT CHANGE UP
T GONDII IGM SER QL: <3 AU/ML — SIGNIFICANT CHANGE UP
T GONDII IGM SER QL: NEGATIVE — SIGNIFICANT CHANGE UP
T-CELL CD4 SUBSET PNL BLD: 120 /UL — LOW (ref 325–1251)
WBC # BLD: 8.64 K/UL — SIGNIFICANT CHANGE UP (ref 3.8–10.5)
WBC # FLD AUTO: 8.64 K/UL — SIGNIFICANT CHANGE UP (ref 3.8–10.5)

## 2022-02-15 PROCEDURE — 99233 SBSQ HOSP IP/OBS HIGH 50: CPT | Mod: GC

## 2022-02-15 PROCEDURE — 71250 CT THORAX DX C-: CPT | Mod: 26

## 2022-02-15 RX ORDER — VANCOMYCIN HCL 1 G
1250 VIAL (EA) INTRAVENOUS EVERY 12 HOURS
Refills: 0 | Status: DISCONTINUED | OUTPATIENT
Start: 2022-02-15 | End: 2022-02-15

## 2022-02-15 RX ORDER — CEFEPIME 1 G/1
2000 INJECTION, POWDER, FOR SOLUTION INTRAMUSCULAR; INTRAVENOUS EVERY 12 HOURS
Refills: 0 | Status: COMPLETED | OUTPATIENT
Start: 2022-02-15 | End: 2022-02-15

## 2022-02-15 RX ORDER — EMTRICITABINE AND TENOFOVIR DISOPROXIL FUMARATE 200; 300 MG/1; MG/1
1 TABLET, FILM COATED ORAL DAILY
Refills: 0 | Status: DISCONTINUED | OUTPATIENT
Start: 2022-02-15 | End: 2022-02-17

## 2022-02-15 RX ORDER — INFLUENZA VIRUS VACCINE 15; 15; 15; 15 UG/.5ML; UG/.5ML; UG/.5ML; UG/.5ML
0.7 SUSPENSION INTRAMUSCULAR ONCE
Refills: 0 | Status: DISCONTINUED | OUTPATIENT
Start: 2022-02-15 | End: 2022-02-17

## 2022-02-15 RX ORDER — DOLUTEGRAVIR SODIUM 25 MG/1
50 TABLET, FILM COATED ORAL DAILY
Refills: 0 | Status: DISCONTINUED | OUTPATIENT
Start: 2022-02-15 | End: 2022-02-17

## 2022-02-15 RX ORDER — AZITHROMYCIN 500 MG/1
500 TABLET, FILM COATED ORAL EVERY 24 HOURS
Refills: 0 | Status: DISCONTINUED | OUTPATIENT
Start: 2022-02-15 | End: 2022-02-16

## 2022-02-15 RX ORDER — CEFTRIAXONE 500 MG/1
2000 INJECTION, POWDER, FOR SOLUTION INTRAMUSCULAR; INTRAVENOUS EVERY 24 HOURS
Refills: 0 | Status: DISCONTINUED | OUTPATIENT
Start: 2022-02-15 | End: 2022-02-16

## 2022-02-15 RX ORDER — ACETAMINOPHEN 500 MG
650 TABLET ORAL EVERY 6 HOURS
Refills: 0 | Status: DISCONTINUED | OUTPATIENT
Start: 2022-02-15 | End: 2022-02-17

## 2022-02-15 RX ORDER — POTASSIUM CHLORIDE 20 MEQ
20 PACKET (EA) ORAL
Refills: 0 | Status: COMPLETED | OUTPATIENT
Start: 2022-02-15 | End: 2022-02-15

## 2022-02-15 RX ADMIN — ATOVAQUONE 1500 MILLIGRAM(S): 750 SUSPENSION ORAL at 13:12

## 2022-02-15 RX ADMIN — AZITHROMYCIN 500 MILLIGRAM(S): 500 TABLET, FILM COATED ORAL at 13:14

## 2022-02-15 RX ADMIN — Medication 25 GRAM(S): at 03:55

## 2022-02-15 RX ADMIN — CEFTRIAXONE 100 MILLIGRAM(S): 500 INJECTION, POWDER, FOR SOLUTION INTRAMUSCULAR; INTRAVENOUS at 14:11

## 2022-02-15 RX ADMIN — Medication 650 MILLIGRAM(S): at 05:24

## 2022-02-15 RX ADMIN — Medication 500000 UNIT(S): at 13:11

## 2022-02-15 RX ADMIN — CARVEDILOL PHOSPHATE 6.25 MILLIGRAM(S): 80 CAPSULE, EXTENDED RELEASE ORAL at 18:26

## 2022-02-15 RX ADMIN — Medication 500000 UNIT(S): at 05:25

## 2022-02-15 RX ADMIN — Medication 650 MILLIGRAM(S): at 06:24

## 2022-02-15 RX ADMIN — CARVEDILOL PHOSPHATE 6.25 MILLIGRAM(S): 80 CAPSULE, EXTENDED RELEASE ORAL at 05:25

## 2022-02-15 RX ADMIN — ENOXAPARIN SODIUM 40 MILLIGRAM(S): 100 INJECTION SUBCUTANEOUS at 23:04

## 2022-02-15 RX ADMIN — CEFEPIME 100 MILLIGRAM(S): 1 INJECTION, POWDER, FOR SOLUTION INTRAMUSCULAR; INTRAVENOUS at 07:48

## 2022-02-15 RX ADMIN — ESCITALOPRAM OXALATE 5 MILLIGRAM(S): 10 TABLET, FILM COATED ORAL at 13:13

## 2022-02-15 RX ADMIN — ISOSORBIDE MONONITRATE 30 MILLIGRAM(S): 60 TABLET, EXTENDED RELEASE ORAL at 13:11

## 2022-02-15 RX ADMIN — Medication 500000 UNIT(S): at 23:04

## 2022-02-15 RX ADMIN — DOLUTEGRAVIR SODIUM 50 MILLIGRAM(S): 25 TABLET, FILM COATED ORAL at 13:12

## 2022-02-15 RX ADMIN — EMTRICITABINE AND TENOFOVIR DISOPROXIL FUMARATE 1 TABLET(S): 200; 300 TABLET, FILM COATED ORAL at 13:13

## 2022-02-15 RX ADMIN — Medication 20 MILLIEQUIVALENT(S): at 09:14

## 2022-02-15 RX ADMIN — Medication 81 MILLIGRAM(S): at 13:13

## 2022-02-15 RX ADMIN — Medication 20 MILLIEQUIVALENT(S): at 13:14

## 2022-02-15 RX ADMIN — Medication 500000 UNIT(S): at 01:26

## 2022-02-15 RX ADMIN — Medication 500000 UNIT(S): at 18:26

## 2022-02-15 NOTE — PHYSICAL THERAPY INITIAL EVALUATION ADULT - MANUAL MUSCLE TESTING RESULTS, REHAB EVAL
Grossly 4/5 throughout BUE in shoulder flex/ext and elbow flex/ext movements. Grossly good bilateral  strength. Grossly 4/5 throughout BLE in hip flex, knee flex/ext and ankle DF/PF.

## 2022-02-15 NOTE — PROGRESS NOTE ADULT - PROBLEM SELECTOR PLAN 3
-patient's baseline Cr from previous admissions appears to be 1.3-1.4  -on admission, patient received 500cc IVF  -this AM, Cr of 1.74  -may be secondary to sepsis/dehydration  -f/u urine lytes

## 2022-02-15 NOTE — PHYSICAL THERAPY INITIAL EVALUATION ADULT - ADDITIONAL COMMENTS
Pt currently resides alone in [place]. Primarily amb independently w/o AD and states falling "a few times" over the past 6 months 2/2 BLE weakness; unable to quantify amount. Pt adamantly refuses to use DME as pt states "people look at you and treat you differently when you have a walker". Currently attends outpatient rehab facility for drug (cocaine) addiction a few times a week.

## 2022-02-15 NOTE — PATIENT PROFILE ADULT - DO YOU LACK THE NECESSARY SUPPORT TO HELP YOU COPE WITH LIFE CHALLENGES?
Called and talked to father he is having BM but color is not right it is whitish yellowish and loose has vomited once yesterday playing OK acting OK denies fever and cough decreased appetite.  Made appointment to be seen tomorrow at 3 pm with Luis Enrique Rajan no

## 2022-02-15 NOTE — PROGRESS NOTE ADULT - ASSESSMENT
· Assessment	  72M c pertinent history of AIDS (last CD4 95 in september) and extensive cardiovascular disease who presents after a fall secondary to an episode of diffuse acute weakness with fever, diarrhea, and non productive cough that has worsened over the past couple of months. Fever of 102 at 5am this morning. No focal neurologic deficits on physical exam. CT results show infectious/inflammatory bronchiolitis within the right upper lobe. Patient is adherent with HAART therapy, descovy and tivicay.       Problem/Plan - 1:  ·  Problem: Sepsis.   ·  Plan: With SIRS. 2/4 Sirs with fever and HR > 90, + bandemia on peripheral smear. Ddx includes infectious with concern for opportunistic infection vs. hematologic/oncologic. given ceftriaxone in ED. if infectious source ?pulm vs. gi as with chronic cough and chronic diarrhea. no HA or neuro symptoms to think meninigitis, no evidence of endocarditis on exam and denied IVDU, no rashes, no penile discharge. CXR without reticular infiltrates, not hypoxemic on RA lower suspicion for PCP.   -Cefepime.   - ID consult  - mrsa swab  - f/u blood cultures  - repeat viral load, and t cell subset  - OI work up: GI pcr, rpr, fungal blood cultures, cmv, ebv, cryptococcal antigen, fungitell, toxoplasma igm, quant level, gi pcr  - will start on atovaqone for PCP given sulfa allergy   - c/w ART  - consider heme-onc work up for pending infectious work up  [X] CT chest given chronic cough       Problem/Plan - 2:  ·  Problem: AIDS.   ·  Plan: c/w ART.    Problem/Plan - 3:  ·  Problem: Weakness.   ·  Plan: ddx: secondary to underlying infectious vs. electrolyte derrangements in setting of hypomagnesemia.  - management of infectious process as above  - replete magnesium as needed  - check tsh  - low suspicion for cardiac cause as does not appear in acute decompensated heart failure based on volume status, pending clinical improvement can repeat.    Problem/Plan - 4:  ·  Problem: AIDS with thrombocytopenia.   ·  Plan: Thrombocytopenia. chronic. likely 2/2 to AIDS. no evidence of petechiae.   - trend plts  - goal PLT> 20K given fevers.    Problem/Plan - 5:  ·  Problem: Hypomagnesemia.   ·  Plan: possible in setting of Diarrhea. less likely CKD.   - f/u urine magnesium, urine creatinine  - replete mag to goal >2  - f/u urine protein/cr ratio.    Problem/Plan - 6:  ·  Problem: Cardiovascular disease.   ·  Plan: Hypovolemic to euvolemic on exam. NICM. EF 35%.  s/p 500cc IVF lungs cta b/l.   - c/w coreg, imdur as bps HD stable  -.    Problem/Plan - 7:  ·  Problem: Chronic kidney disease, unspecified CKD stage.   ·  Plan: Cr. Stable.    Problem/Plan - 8:  ·  Problem: Polysubstance abuse.   ·  Plan: Last use x9 days ago. Utox negative. No s/s of acute withdrawal and out of window.    Problem/Plan - 9:  ·  Problem: Smoking.   ·  Plan: NRT.    Problem/Plan - 10:  ·  Problem: Prophylactic measure.   ·  Plan; DVT ppx  GI: NA    Problem/Plan - 11:  -Problem: oral thrush  -Plan: Can start on oral nystatin. Denies dysphasia.     F: s/p 500cc IVF, PO  E: K > 4, mg > 2  N: regular. · Assessment	  72M c pertinent history of AIDS (last CD4 95 in september) and extensive cardiovascular disease who presents after a fall secondary to an episode of diffuse acute weakness with fever, diarrhea, and non productive cough that has worsened over the past couple of months. Fever of 102 at 5am this morning. No focal neurologic deficits on physical exam. CT results show infectious/inflammatory bronchiolitis within the right upper lobe. Patient is adherent with HAART therapy, descovy and tivicay.       Problem/Plan - 1:  ·  Problem: Sepsis.   ·  Plan: With SIRS. 2/4 Sirs with fever and HR > 90, + bandemia on peripheral smear. Last known CD count 95 in september 9/2021. Reportedly adherent with descovy and tivicay. ddx includes infectious with concern for OI vs. hematologic/oncologic. given ceftriaxone in ED. if infectious source ?pulm vs. gi as with chronic cough and chronic diarrhea. no HA or neuro symptoms to think meninigitis, no evidence of endocarditis on exam and denied IVDU, no rashes, no penile discharge. CXR without reticular infiltrates, not hypoxemic on RA lower suspicion for PCP.   -Begin Cefapime   - ID consult  - mrsa swab  - f/u blood cultures  - repeat viral load, and t cell subset  - OI work up: GI pcr, rpr, fungal blood cultures, cmv, ebv, cryptococcal antigen, fungitell, toxoplasma igm, quant level, gi pcr  - will start on atovaqone for PCP given sulfa allergy   - c/w ART  - consider heme-onc work up for pending infectious work up  - CT chest given chronic cough     #oral thrush  denies dysphagia  - can start on oral nystatin.    Problem/Plan - 2:  ·  Problem: AIDS.   ·  Plan: c/w ART.    Problem/Plan - 3:  ·  Problem: Weakness.   ·  Plan: ddx: secondary to underlying infectious vs. electrolyte derrangements in setting of hypomagnesemia.  - management of infectious process as above  - replete magnesium as needed  - check tsh  - low suspicion for cardiac cause as does not appear in acute decompensated heart failure based on volume status, pending clinical improvement can repeat.    Problem/Plan - 4:  ·  Problem: AIDS with thrombocytopenia.   ·  Plan: Thrombocytopenia. chronic. likely 2/2 to AIDS. no evidence of petechiae.   - trend plts  - goal PLT> 20K given fevers.    Problem/Plan - 5:  ·  Problem: Hypomagnesemia.   ·  Plan: possible in setting of Diarrhea. less likely CKD.   - f/u urine magnesium, urine creatinine  - replete mag to goal >2  - f/u urine protein/cr ratio.    Problem/Plan - 6:  ·  Problem: Cardiovascular disease.   ·  Plan: Hypovolemic to euvolemic on exam. NICM. EF 35%.  s/p 500cc IVF lungs cta b/l.   - c/w coreg, imdur as bps HD stable  -orthostatics    Problem/Plan - 7:  ·  Problem: Chronic kidney disease, unspecified CKD stage.   ·  Plan: Monitor Cr.    Problem/Plan - 8:  ·  Problem: Polysubstance abuse.   ·  Plan: Last use x9 days ago. Utox negative. No s/s of acute withdrawal and out of window.    Problem/Plan - 9:  ·  Problem: Smoking.   ·  Plan: NRT.    Problem/Plan - 10:  ·  Problem: Prophylactic measure.   ·  Plan; DVT ppx  GI: NA    F: s/p 500cc IVF, PO  E: K > 4, mg > 2  N: regular. 	  72M c pertinent history of AIDS (last CD4 95 in september, VL undetectable), hx of MI in 2019 no stents, HFrEF, prostate CA s/p radiation, ascending aortic aneurysm, HTN, HLD, DM, CKD (baseline Cr 1.3-1.4), polysubstance abuse who presents after a fall secondary to an episode of diffuse weakness with fever, diarrhea, and non productive cough that has worsened over the past couple of months. Now admitted for work up of sepsis, currently being treated with CTX and Azithro.

## 2022-02-15 NOTE — PROGRESS NOTE ADULT - PROBLEM SELECTOR PLAN 5
-pt denies hematuria, however on UA, large blood + RBCs were seen  -given history of HIV, concern for glomerulonephritis  -f/u renal US and urine lytes

## 2022-02-15 NOTE — PROGRESS NOTE ADULT - PROBLEM SELECTOR PLAN 9
F: s/p 500cc IVF, encourage PO  E: K > 4, mg > 2  N: regular  DVT ppx: lovenox  GI: NA    Dispo: [facility]

## 2022-02-15 NOTE — PHYSICAL THERAPY INITIAL EVALUATION ADULT - PERTINENT HX OF CURRENT PROBLEM, REHAB EVAL
72M c pertinent hx of AIDS (Last CD4 125, VLUD), who presents with progressive weakness over last few days. Patient c/o worsening weakness to the point that he felt like he was falling, no head strike, loc, or palpitations. + fever this am at home therefore he presented to the ED. Unsure of his last VL but believes he is undetectable-says he takes descovy and tivicay for HAART. ROS+ for chronic non-productive cough over last 6 months, and chronic diarrhea for which he takes immodium

## 2022-02-15 NOTE — PATIENT PROFILE ADULT - FALL HARM RISK - HARM RISK INTERVENTIONS
Assistance with ambulation/Assistance OOB with selected safe patient handling equipment/Communicate Risk of Fall with Harm to all staff/Discuss with provider need for PT consult/Monitor gait and stability/Reinforce activity limits and safety measures with patient and family/Tailored Fall Risk Interventions/Visual Cue: Yellow wristband and red socks/Bed in lowest position, wheels locked, appropriate side rails in place/Call bell, personal items and telephone in reach/Instruct patient to call for assistance before getting out of bed or chair/Non-slip footwear when patient is out of bed/[place] to call system/Physically safe environment - no spills, clutter or unnecessary equipment/Purposeful Proactive Rounding/Room/bathroom lighting operational, light cord in reach

## 2022-02-15 NOTE — PROGRESS NOTE ADULT - PROBLEM SELECTOR PLAN 4
ddx: secondary to underlying infectious vs. electrolyte derrangements in setting of hypomagnesemia.  - management of infectious process as above  - replete magnesium as needed  - check tsh  - low suspicion for cardiac cause as does not appear in acute decompensated heart failure based on volume status, pending clinical improvement can repeat  -orthostatic negative (however measured after IV fluids administered in the ED)  -patient has been admitted in the past for work up of multiple syncopal episodes. In the past, no cardiac arrhythmias seen on tele and patient was pending outpatient follow up with EP

## 2022-02-15 NOTE — PHYSICAL THERAPY INITIAL EVALUATION ADULT - GAIT DEVIATIONS NOTED, PT EVAL
Pt w/ slightly unsteady gait / dec weight shifting abilties however no LOB/falls or knee buckling observed. Able to maneuver self well through obstacles in room without collision./decreased weight-shifting ability

## 2022-02-15 NOTE — PROGRESS NOTE ADULT - SUBJECTIVE AND OBJECTIVE BOX
**INCOMPLETE NOTE    OVERNIGHT EVENTS:    SUBJECTIVE:  Patient seen and examined at bedside.    Vital Signs Last 12 Hrs  T(F): 97.8 (02-15-22 @ 09:23), Max: 102 (02-15-22 @ 05:05)  HR: 67 (02-15-22 @ 09:23) (67 - 89)  BP: 108/64 (02-15-22 @ 09:23) (106/64 - 120/64)  BP(mean): --  RR: 20 (02-15-22 @ 09:23) (18 - 20)  SpO2: 97% (02-15-22 @ 09:23) (96% - 97%)  I&O's Summary      PHYSICAL EXAM:  Constitutional: NAD, comfortable in bed.  HEENT: NC/AT, PERRLA, EOMI, no conjunctival pallor or scleral icterus, MMM  Neck: Supple, no JVD  Respiratory: CTA B/L. No w/r/r.   Cardiovascular: RRR, normal S1 and S2, no m/r/g.   Gastrointestinal: +BS, soft NTND, no guarding or rebound tenderness, no palpable masses   Extremities: wwp; no cyanosis, clubbing or edema.   Vascular: Pulses equal and strong throughout.   Neurological: AAOx3, no CN deficits, strength and sensation intact throughout.   Skin: No gross skin abnormalities or rashes        LABS:                        13.2   8.64  )-----------( 103      ( 15 Feb 2022 08:05 )             37.3     02-15    135  |  100  |  26<H>  ----------------------------<  149<H>  3.5   |  23  |  1.74<H>    Ca    8.5      15 Feb 2022 08:05  Phos  3.4     02-15  Mg     2.1     02-15    TPro  6.3  /  Alb  3.4  /  TBili  0.8  /  DBili  x   /  AST  24  /  ALT  14  /  AlkPhos  62  02-15    PT/INR - ( 2022 18:40 )   PT: 13.5 sec;   INR: 1.13          PTT - ( 2022 18:40 )  PTT:30.9 sec  Urinalysis Basic - ( 2022 20:32 )    Color: Yellow / Appearance: Clear / S.025 / pH: x  Gluc: x / Ketone: NEGATIVE  / Bili: Negative / Urobili: 0.2 E.U./dL   Blood: x / Protein: 100 mg/dL / Nitrite: NEGATIVE   Leuk Esterase: NEGATIVE / RBC: 5-10 /HPF / WBC < 5 /HPF   Sq Epi: x / Non Sq Epi: 0-5 /HPF / Bacteria: Present /HPF          RADIOLOGY & ADDITIONAL TESTS:    MEDICATIONS  (STANDING):  aspirin  chewable 81 milliGRAM(s) Oral daily  atovaquone  Suspension 1500 milliGRAM(s) Oral daily  carvedilol 6.25 milliGRAM(s) Oral every 12 hours  dolutegravir 50 milliGRAM(s) Oral daily  emtricitabine 200 mG/tenofovir alafenamide 25 mG (DESCOVY) Tablet 1 Tablet(s) Oral daily  enoxaparin Injectable 40 milliGRAM(s) SubCutaneous every 24 hours  escitalopram 5 milliGRAM(s) Oral daily  influenza  Vaccine (HIGH DOSE) 0.7 milliLiter(s) IntraMuscular once  isosorbide   mononitrate ER Tablet (IMDUR) 30 milliGRAM(s) Oral daily  nystatin    Suspension 352530 Unit(s) Oral every 6 hours  potassium chloride    Tablet ER 20 milliEquivalent(s) Oral every 2 hours  vancomycin  IVPB 1250 milliGRAM(s) IV Intermittent every 12 hours    MEDICATIONS  (PRN):  acetaminophen     Tablet .. 650 milliGRAM(s) Oral every 6 hours PRN Temp greater or equal to 38C (100.4F)   OVERNIGHT EVENTS: Had a fever of 102 at 5am this morning and received Tylenol.      SUBJECTIVE:  Patient seen and examined at bedside.     Vital Signs Last 12 Hrs  T(F): 97.8 (02-15-22 @ 09:23), Max: 102 (02-15-22 @ 05:05)  HR: 67 (02-15-22 @ 09:23) (67 - 89)  BP: 108/64 (02-15-22 @ 09:23) (106/64 - 120/64)  BP(mean): --  RR: 20 (02-15-22 @ 09:23) (18 - 20)  SpO2: 97% (02-15-22 @ 09:23) (96% - 97%)        PHYSICAL EXAM:  Constitutional: NAD, comfortable in bed.  HEENT: NC/AT, PERRL, EOMI, no conjunctival pallor or scleral icterus, no oral ulcers. Slightly dry mucus membranes. Wears dentures. White plaque on posterior pharynx.  Neck: Supple, no JVD. No cervical lymphadenopathy  Respiratory: CTA B/L. No w/r/r.   Cardiovascular: RRR, normal S1 and S2, no m/r/g.   Gastrointestinal: +BS, soft NTND, no guarding or rebound tenderness, no palpable masses   Extremities: wwp; no cyanosis or clubbing.   Vascular: Pulses equal and strong throughout.   Neurological: AAOx3, no CN deficits, sensation intact throughout.   MSK: Diffuse muscle weakness, 3+/5 throughout.   Skin: No gross skin abnormalities or rashes        LABS:                        13.2   8.64  )-----------( 103      ( 15 Feb 2022 08:05 )             37.3     -15    135  |  100  |  26<H>  ----------------------------<  149<H>  3.5   |  23  |  1.74<H>    Ca    8.5      15 Feb 2022 08:05  Phos  3.4     -15  Mg     2.1     -15    TPro  6.3  /  Alb  3.4  /  TBili  0.8  /  DBili  x   /  AST  24  /  ALT  14  /  AlkPhos  62  -15    PT/INR - ( 2022 18:40 )   PT: 13.5 sec;   INR: 1.13          PTT - ( 2022 18:40 )  PTT:30.9 sec  Urinalysis Basic - ( 2022 20:32 )    Color: Yellow / Appearance: Clear / S.025 / pH: x  Gluc: x / Ketone: NEGATIVE  / Bili: Negative / Urobili: 0.2 E.U./dL   Blood: x / Protein: 100 mg/dL / Nitrite: NEGATIVE   Leuk Esterase: NEGATIVE / RBC: 5-10 /HPF / WBC < 5 /HPF   Sq Epi: x / Non Sq Epi: 0-5 /HPF / Bacteria: Present /HPF          RADIOLOGY & ADDITIONAL TESTS:    IMPRESSION from CHEST CT - 2/15/22:    Interval development of minimal infectious/inflammatory bronchiolitis   within the right upper lobe.    < end of copied text >      MEDICATIONS  (STANDING):  aspirin  chewable 81 milliGRAM(s) Oral daily  atovaquone  Suspension 1500 milliGRAM(s) Oral daily  carvedilol 6.25 milliGRAM(s) Oral every 12 hours  dolutegravir 50 milliGRAM(s) Oral daily  emtricitabine 200 mG/tenofovir alafenamide 25 mG (DESCOVY) Tablet 1 Tablet(s) Oral daily  enoxaparin Injectable 40 milliGRAM(s) SubCutaneous every 24 hours  escitalopram 5 milliGRAM(s) Oral daily  influenza  Vaccine (HIGH DOSE) 0.7 milliLiter(s) IntraMuscular once  isosorbide   mononitrate ER Tablet (IMDUR) 30 milliGRAM(s) Oral daily  nystatin    Suspension 122207 Unit(s) Oral every 6 hours  potassium chloride    Tablet ER 20 milliEquivalent(s) Oral every 2 hours  vancomycin  IVPB 1250 milliGRAM(s) IV Intermittent every 12 hours    MEDICATIONS  (PRN):  acetaminophen     Tablet .. 650 milliGRAM(s) Oral every 6 hours PRN Temp greater or equal to 38C (100.4F)   OVERNIGHT EVENTS: No acute overnight events.     SUBJECTIVE:  Patient seen and examined at bedside. Had a fever of 102 at 5am this morning and received Tylenol.      Vital Signs Last 12 Hrs  T(F): 97.8 (02-15-22 @ 09:23), Max: 102 (02-15-22 @ 05:05)  HR: 67 (02-15-22 @ 09:23) (67 - 89)  BP: 108/64 (02-15-22 @ 09:23) (106/64 - 120/64)  BP(mean): --  RR: 20 (02-15-22 @ 09:23) (18 - 20)  SpO2: 97% (02-15-22 @ 09:23) (96% - 97%)        PHYSICAL EXAM:  Constitutional: NAD, comfortable in bed.  HEENT: NC/AT, PERRL, EOMI, no conjunctival pallor or scleral icterus, no oral ulcers. Slightly dry mucus membranes. Wears dentures. White plaque on posterior pharynx.  Neck: Supple, no JVD. No cervical lymphadenopathy  Respiratory: CTA B/L. No w/r/r.   Cardiovascular: RRR, normal S1 and S2, no m/r/g.   Gastrointestinal: +BS, soft NTND, no guarding or rebound tenderness, no palpable masses   Extremities: wwp; no cyanosis or clubbing.   Vascular: Pulses equal and strong throughout.   Neurological: AAOx3, no CN deficits, sensation intact throughout.   MSK: Diffuse muscle weakness, 3+/5 throughout.   Skin: No gross skin abnormalities or rashes        LABS:                        13.2   8.64  )-----------( 103      ( 15 Feb 2022 08:05 )             37.3     02-15    135  |  100  |  26<H>  ----------------------------<  149<H>  3.5   |  23  |  1.74<H>    Ca    8.5      15 Feb 2022 08:05  Phos  3.4     02-15  Mg     2.1     -15    TPro  6.3  /  Alb  3.4  /  TBili  0.8  /  DBili  x   /  AST  24  /  ALT  14  /  AlkPhos  62  02-15    PT/INR - ( 2022 18:40 )   PT: 13.5 sec;   INR: 1.13          PTT - ( 2022 18:40 )  PTT:30.9 sec  Urinalysis Basic - ( 2022 20:32 )    Color: Yellow / Appearance: Clear / S.025 / pH: x  Gluc: x / Ketone: NEGATIVE  / Bili: Negative / Urobili: 0.2 E.U./dL   Blood: x / Protein: 100 mg/dL / Nitrite: NEGATIVE   Leuk Esterase: NEGATIVE / RBC: 5-10 /HPF / WBC < 5 /HPF   Sq Epi: x / Non Sq Epi: 0-5 /HPF / Bacteria: Present /HPF          RADIOLOGY & ADDITIONAL TESTS:    IMPRESSION from CHEST CT - 2/15/22:    Interval development of minimal infectious/inflammatory bronchiolitis   within the right upper lobe.    < end of copied text >      MEDICATIONS  (STANDING):  aspirin  chewable 81 milliGRAM(s) Oral daily  atovaquone  Suspension 1500 milliGRAM(s) Oral daily  carvedilol 6.25 milliGRAM(s) Oral every 12 hours  dolutegravir 50 milliGRAM(s) Oral daily  emtricitabine 200 mG/tenofovir alafenamide 25 mG (DESCOVY) Tablet 1 Tablet(s) Oral daily  enoxaparin Injectable 40 milliGRAM(s) SubCutaneous every 24 hours  escitalopram 5 milliGRAM(s) Oral daily  influenza  Vaccine (HIGH DOSE) 0.7 milliLiter(s) IntraMuscular once  isosorbide   mononitrate ER Tablet (IMDUR) 30 milliGRAM(s) Oral daily  nystatin    Suspension 242853 Unit(s) Oral every 6 hours  potassium chloride    Tablet ER 20 milliEquivalent(s) Oral every 2 hours  vancomycin  IVPB 1250 milliGRAM(s) IV Intermittent every 12 hours    MEDICATIONS  (PRN):  acetaminophen     Tablet .. 650 milliGRAM(s) Oral every 6 hours PRN Temp greater or equal to 38C (100.4F)   OVERNIGHT EVENTS: No acute overnight events.     SUBJECTIVE: Patient seen and examined at bedside, resting comfortably in bed, and does not appear to be in any acute distress. Had a fever of 102 at 5am this morning and received Tylenol. Patient states he is feeling weak which makes him feel scared. Patient denies headaches, chills, acute SOB, nausea, abdominal pain, and genitourinary symptoms.     Vital Signs Last 12 Hrs  T(F): 97.8 (02-15-22 @ 09:23), Max: 102 (02-15-22 @ 05:05)  HR: 67 (02-15-22 @ 09:23) (67 - 89)  BP: 108/64 (02-15-22 @ 09:23) (106/64 - 120/64)  BP(mean): --  RR: 20 (02-15-22 @ 09:23) (18 - 20)  SpO2: 97% (02-15-22 @ 09:23) (96% - 97%)        PHYSICAL EXAM:  Constitutional: NAD, comfortable in bed.  HEENT: NC/AT, PERRL, EOMI, no conjunctival pallor or scleral icterus, no oral ulcers. Slightly dry mucus membranes. Wears dentures. White plaque on posterior pharynx.  Neck: Supple, no JVD. No cervical lymphadenopathy  Respiratory: CTA B/L. No w/r/r.   Cardiovascular: RRR, normal S1 and S2, no m/r/g.   Gastrointestinal: +BS, soft NTND, no guarding or rebound tenderness, no palpable masses   Extremities: wwp; no cyanosis or clubbing.   Vascular: Pulses equal and strong throughout.   Neurological: AAOx3, no CN deficits, sensation intact throughout.   MSK: Diffuse muscle weakness, 3+/5 throughout.   Skin: No gross skin abnormalities or rashes        LABS:                        13.2   8.64  )-----------( 103      ( 15 Feb 2022 08:05 )             37.3     02-15    135  |  100  |  26<H>  ----------------------------<  149<H>  3.5   |  23  |  1.74<H>    Ca    8.5      15 Feb 2022 08:05  Phos  3.4     -15  Mg     2.1     02-15    TPro  6.3  /  Alb  3.4  /  TBili  0.8  /  DBili  x   /  AST  24  /  ALT  14  /  AlkPhos  62  02-15    PT/INR - ( 2022 18:40 )   PT: 13.5 sec;   INR: 1.13          PTT - ( 2022 18:40 )  PTT:30.9 sec  Urinalysis Basic - ( 2022 20:32 )    Color: Yellow / Appearance: Clear / S.025 / pH: x  Gluc: x / Ketone: NEGATIVE  / Bili: Negative / Urobili: 0.2 E.U./dL   Blood: x / Protein: 100 mg/dL / Nitrite: NEGATIVE   Leuk Esterase: NEGATIVE / RBC: 5-10 /HPF / WBC < 5 /HPF   Sq Epi: x / Non Sq Epi: 0-5 /HPF / Bacteria: Present /HPF          RADIOLOGY & ADDITIONAL TESTS:    IMPRESSION from CHEST CT - 2/15/22:    Interval development of minimal infectious/inflammatory bronchiolitis   within the right upper lobe.    < end of copied text >      MEDICATIONS  (STANDING):  aspirin  chewable 81 milliGRAM(s) Oral daily  atovaquone  Suspension 1500 milliGRAM(s) Oral daily  carvedilol 6.25 milliGRAM(s) Oral every 12 hours  dolutegravir 50 milliGRAM(s) Oral daily  emtricitabine 200 mG/tenofovir alafenamide 25 mG (DESCOVY) Tablet 1 Tablet(s) Oral daily  enoxaparin Injectable 40 milliGRAM(s) SubCutaneous every 24 hours  escitalopram 5 milliGRAM(s) Oral daily  influenza  Vaccine (HIGH DOSE) 0.7 milliLiter(s) IntraMuscular once  isosorbide   mononitrate ER Tablet (IMDUR) 30 milliGRAM(s) Oral daily  nystatin    Suspension 247046 Unit(s) Oral every 6 hours  potassium chloride    Tablet ER 20 milliEquivalent(s) Oral every 2 hours  vancomycin  IVPB 1250 milliGRAM(s) IV Intermittent every 12 hours    MEDICATIONS  (PRN):  acetaminophen     Tablet .. 650 milliGRAM(s) Oral every 6 hours PRN Temp greater or equal to 38C (100.4F)   OVERNIGHT EVENTS: No acute overnight events. Had a fever of 102 at 5am this morning and received Tylenol.     SUBJECTIVE: Patient seen and examined at bedside. Patient states he is feeling weak which makes him feel scared. Patient denies headaches, chills, acute SOB, nausea, abdominal pain. Pt reports continued frequent urination, which is his baseline. Also reports continued cough, but states it remains non productive.    Vital Signs Last 12 Hrs  T(F): 97.8 (02-15-22 @ 09:23), Max: 102 (02-15-22 @ 05:05)  HR: 67 (02-15-22 @ 09:23) (67 - 89)  BP: 108/64 (02-15-22 @ 09:23) (106/64 - 120/64)  BP(mean): --  RR: 20 (02-15-22 @ 09:23) (18 - 20)  SpO2: 97% (02-15-22 @ 09:23) (96% - 97%)      PHYSICAL EXAM:  Constitutional: NAD, unkempt, comfortable in bed. speaking in full sentences in no acute painful or respiratory distress  HEENT: NC/AT, PERRL, EOMI, no conjunctival pallor or scleral icterus, no oral ulcers. Slightly dry mucus membranes. Wears dentures. White plaque on posterior pharynx.  Neck: Supple, no JVD. No cervical lymphadenopathy  Respiratory: CTA B/L. No w/r/r.   Cardiovascular: RRR, normal S1 and S2, no m/r/g.   Gastrointestinal: +BS, soft NTND, no guarding or rebound tenderness, no palpable masses   Extremities: wwp; no cyanosis or clubbing.   Vascular: Pulses equal and strong throughout.   Neurological: AAOx3, no CN deficits, sensation intact throughout. Strength 4/5 throughout, effort dependent  Skin: No gross skin abnormalities or rashes      LABS:                        13.2   8.64  )-----------( 103      ( 15 Feb 2022 08:05 )             37.3     02-15    135  |  100  |  26<H>  ----------------------------<  149<H>  3.5   |  23  |  1.74<H>    Ca    8.5      15 Feb 2022 08:05  Phos  3.4     02-15  Mg     2.1     -15    TPro  6.3  /  Alb  3.4  /  TBili  0.8  /  DBili  x   /  AST  24  /  ALT  14  /  AlkPhos  62  02-15    PT/INR - ( 2022 18:40 )   PT: 13.5 sec;   INR: 1.13          PTT - ( 2022 18:40 )  PTT:30.9 sec  Urinalysis Basic - ( 2022 20:32 )    Color: Yellow / Appearance: Clear / S.025 / pH: x  Gluc: x / Ketone: NEGATIVE  / Bili: Negative / Urobili: 0.2 E.U./dL   Blood: x / Protein: 100 mg/dL / Nitrite: NEGATIVE   Leuk Esterase: NEGATIVE / RBC: 5-10 /HPF / WBC < 5 /HPF   Sq Epi: x / Non Sq Epi: 0-5 /HPF / Bacteria: Present /HPF          RADIOLOGY & ADDITIONAL TESTS:    IMPRESSION from CHEST CT - 2/15/22:    Interval development of minimal infectious/inflammatory bronchiolitis   within the right upper lobe.    < end of copied text >      MEDICATIONS  (STANDING):  aspirin  chewable 81 milliGRAM(s) Oral daily  atovaquone  Suspension 1500 milliGRAM(s) Oral daily  carvedilol 6.25 milliGRAM(s) Oral every 12 hours  dolutegravir 50 milliGRAM(s) Oral daily  emtricitabine 200 mG/tenofovir alafenamide 25 mG (DESCOVY) Tablet 1 Tablet(s) Oral daily  enoxaparin Injectable 40 milliGRAM(s) SubCutaneous every 24 hours  escitalopram 5 milliGRAM(s) Oral daily  influenza  Vaccine (HIGH DOSE) 0.7 milliLiter(s) IntraMuscular once  isosorbide   mononitrate ER Tablet (IMDUR) 30 milliGRAM(s) Oral daily  nystatin    Suspension 730465 Unit(s) Oral every 6 hours  potassium chloride    Tablet ER 20 milliEquivalent(s) Oral every 2 hours  vancomycin  IVPB 1250 milliGRAM(s) IV Intermittent every 12 hours    MEDICATIONS  (PRN):  acetaminophen     Tablet .. 650 milliGRAM(s) Oral every 6 hours PRN Temp greater or equal to 38C (100.4F)

## 2022-02-15 NOTE — PROGRESS NOTE ADULT - PROBLEM SELECTOR PLAN 1
On admission, with SIRS (fever and HR > 90, + bandemia on peripheral smear)  -given history of HIV, differential includes infectious with concern for OI  -patient complaining of chronic cough as well as a few year history of diarrhea s/p prostate CA radiation. Pt denies dysuria, states he has urinary frequency which is his baseline and denies hematuria. Denies rashes/skin infections. No penile discharge. Mild amount of thrush seen at the posterior oropharynx  -CT shows bronchiolitis of RUL as well as bibasilar atelectasis  -CXR without reticular infiltrates, not hypoxemic on RA lower suspicion for PCP.   - f/u blood cultures  - repeat viral load, and t cell subset  - OI work up: GI pcr, rpr, fungal blood cultures, cmv, ebv, cryptococcal antigen, fungitell, toxoplasma igm, quant level, gi pcr  - will start on atovaqone for PCP given sulfa allergy   - c/w ART

## 2022-02-15 NOTE — PROGRESS NOTE ADULT - ATTENDING COMMENTS
73 yo male with hx HIV + AIDS admitted to the hospital with fever , generalized malaise , chronic diarrhea , chronic cough     Impression and Plan   1. Fever in the setting of HIV and AIDS   -CD4 Count , Viral load pending , cxr and CT chest not consistent with PJP   -RVP + Covid negative   -Blood cx ,C  diff , Stool GI pcr and stool for ova and parasites   -no signs of SSTI , no spine tenderness ,no meningeal signs     2. BRENNAN on CKD stage 3 - Urine lytes     3. Chronic HF with Reduced Ejection Fraction, Non Ischemic CMPY - No signs of volume overload on exam     4. Microscopic and Macroscopic Hematuria - USG abd  to further evaluate etiology     5.  HIV + AIDS     6. Chronic Thrombocytopenia     7. DVT prophylaxis 73 yo male with hx HIV + AIDS admitted to the hospital with fever , generalized malaise , chronic diarrhea , chronic cough     Impression and Plan   1. Sepsis ,  Fever in the setting of HIV and AIDS   -CD4 Count , Viral load pending , cxr and CT chest not consistent with PJP   -RVP + Covid negative   -Blood cx ,C  diff , Stool GI pcr and stool for ova and parasites   -no signs of SSTI , no spine tenderness ,no meningeal signs   -CT chest with ? discoid bibasilar atelectasis , currently on room air     2. BRENNAN on CKD stage 3 - Urine lytes     3. Chronic HF with Reduced Ejection Fraction, Non Ischemic CMPY - No signs of volume overload on exam     4. Microscopic and Macroscopic Hematuria - USG abd  to further evaluate etiology     5.  HIV + AIDS     6. Chronic Thrombocytopenia     7. DVT prophylaxis

## 2022-02-16 LAB
ALBUMIN SERPL ELPH-MCNC: 3.3 G/DL — SIGNIFICANT CHANGE UP (ref 3.3–5)
ALP SERPL-CCNC: 54 U/L — SIGNIFICANT CHANGE UP (ref 40–120)
ALT FLD-CCNC: 14 U/L — SIGNIFICANT CHANGE UP (ref 10–45)
ANION GAP SERPL CALC-SCNC: 6 MMOL/L — SIGNIFICANT CHANGE UP (ref 5–17)
AST SERPL-CCNC: 23 U/L — SIGNIFICANT CHANGE UP (ref 10–40)
BASOPHILS # BLD AUTO: 0.02 K/UL — SIGNIFICANT CHANGE UP (ref 0–0.2)
BASOPHILS NFR BLD AUTO: 0.4 % — SIGNIFICANT CHANGE UP (ref 0–2)
BILIRUB SERPL-MCNC: 0.5 MG/DL — SIGNIFICANT CHANGE UP (ref 0.2–1.2)
BUN SERPL-MCNC: 34 MG/DL — HIGH (ref 7–23)
CALCIUM SERPL-MCNC: 8.4 MG/DL — SIGNIFICANT CHANGE UP (ref 8.4–10.5)
CHLORIDE SERPL-SCNC: 101 MMOL/L — SIGNIFICANT CHANGE UP (ref 96–108)
CO2 SERPL-SCNC: 23 MMOL/L — SIGNIFICANT CHANGE UP (ref 22–31)
CREAT SERPL-MCNC: 1.52 MG/DL — HIGH (ref 0.5–1.3)
CRYPTOC AG FLD QL: NEGATIVE — SIGNIFICANT CHANGE UP
EOSINOPHIL # BLD AUTO: 0.04 K/UL — SIGNIFICANT CHANGE UP (ref 0–0.5)
EOSINOPHIL NFR BLD AUTO: 0.8 % — SIGNIFICANT CHANGE UP (ref 0–6)
GLUCOSE SERPL-MCNC: 160 MG/DL — HIGH (ref 70–99)
HCT VFR BLD CALC: 35.5 % — LOW (ref 39–50)
HGB BLD-MCNC: 11.9 G/DL — LOW (ref 13–17)
HIV-1 VIRAL LOAD RESULT: ABNORMAL
HIV1 RNA # SERPL NAA+PROBE: ABNORMAL COPIES/ML
HIV1 RNA SER-IMP: SIGNIFICANT CHANGE UP
HIV1 RNA SERPL NAA+PROBE-ACNC: ABNORMAL
HIV1 RNA SERPL NAA+PROBE-LOG#: ABNORMAL LG COP/ML
IMM GRANULOCYTES NFR BLD AUTO: 0.4 % — SIGNIFICANT CHANGE UP (ref 0–1.5)
LYMPHOCYTES # BLD AUTO: 0.59 K/UL — LOW (ref 1–3.3)
LYMPHOCYTES # BLD AUTO: 11.6 % — LOW (ref 13–44)
MAGNESIUM SERPL-MCNC: 1.8 MG/DL — SIGNIFICANT CHANGE UP (ref 1.6–2.6)
MCHC RBC-ENTMCNC: 31.8 PG — SIGNIFICANT CHANGE UP (ref 27–34)
MCHC RBC-ENTMCNC: 33.5 GM/DL — SIGNIFICANT CHANGE UP (ref 32–36)
MCV RBC AUTO: 94.9 FL — SIGNIFICANT CHANGE UP (ref 80–100)
MONOCYTES # BLD AUTO: 0.82 K/UL — SIGNIFICANT CHANGE UP (ref 0–0.9)
MONOCYTES NFR BLD AUTO: 16.1 % — HIGH (ref 2–14)
NEUTROPHILS # BLD AUTO: 3.61 K/UL — SIGNIFICANT CHANGE UP (ref 1.8–7.4)
NEUTROPHILS NFR BLD AUTO: 70.7 % — SIGNIFICANT CHANGE UP (ref 43–77)
NRBC # BLD: 0 /100 WBCS — SIGNIFICANT CHANGE UP (ref 0–0)
PHOSPHATE SERPL-MCNC: 2.5 MG/DL — SIGNIFICANT CHANGE UP (ref 2.5–4.5)
PLATELET # BLD AUTO: 78 K/UL — LOW (ref 150–400)
POTASSIUM SERPL-MCNC: 3.6 MMOL/L — SIGNIFICANT CHANGE UP (ref 3.5–5.3)
POTASSIUM SERPL-SCNC: 3.6 MMOL/L — SIGNIFICANT CHANGE UP (ref 3.5–5.3)
PROT SERPL-MCNC: 6.1 G/DL — SIGNIFICANT CHANGE UP (ref 6–8.3)
RBC # BLD: 3.74 M/UL — LOW (ref 4.2–5.8)
RBC # FLD: 12.2 % — SIGNIFICANT CHANGE UP (ref 10.3–14.5)
RPR SER-TITR: (no result)
RPR SERPL-ACNC: REACTIVE
SODIUM SERPL-SCNC: 130 MMOL/L — LOW (ref 135–145)
T PALLIDUM AB TITR SER: POSITIVE
WBC # BLD: 5.1 K/UL — SIGNIFICANT CHANGE UP (ref 3.8–10.5)
WBC # FLD AUTO: 5.1 K/UL — SIGNIFICANT CHANGE UP (ref 3.8–10.5)

## 2022-02-16 PROCEDURE — 76770 US EXAM ABDO BACK WALL COMP: CPT | Mod: 26

## 2022-02-16 PROCEDURE — 99233 SBSQ HOSP IP/OBS HIGH 50: CPT | Mod: GC

## 2022-02-16 RX ORDER — SODIUM CHLORIDE 9 MG/ML
250 INJECTION INTRAMUSCULAR; INTRAVENOUS; SUBCUTANEOUS ONCE
Refills: 0 | Status: COMPLETED | OUTPATIENT
Start: 2022-02-16 | End: 2022-02-16

## 2022-02-16 RX ORDER — TAMSULOSIN HYDROCHLORIDE 0.4 MG/1
0.4 CAPSULE ORAL AT BEDTIME
Refills: 0 | Status: DISCONTINUED | OUTPATIENT
Start: 2022-02-16 | End: 2022-02-17

## 2022-02-16 RX ORDER — MAGNESIUM SULFATE 500 MG/ML
1 VIAL (ML) INJECTION ONCE
Refills: 0 | Status: COMPLETED | OUTPATIENT
Start: 2022-02-16 | End: 2022-02-16

## 2022-02-16 RX ORDER — POTASSIUM CHLORIDE 20 MEQ
20 PACKET (EA) ORAL
Refills: 0 | Status: COMPLETED | OUTPATIENT
Start: 2022-02-16 | End: 2022-02-16

## 2022-02-16 RX ADMIN — EMTRICITABINE AND TENOFOVIR DISOPROXIL FUMARATE 1 TABLET(S): 200; 300 TABLET, FILM COATED ORAL at 13:11

## 2022-02-16 RX ADMIN — CARVEDILOL PHOSPHATE 6.25 MILLIGRAM(S): 80 CAPSULE, EXTENDED RELEASE ORAL at 06:27

## 2022-02-16 RX ADMIN — Medication 500000 UNIT(S): at 06:28

## 2022-02-16 RX ADMIN — ESCITALOPRAM OXALATE 5 MILLIGRAM(S): 10 TABLET, FILM COATED ORAL at 13:12

## 2022-02-16 RX ADMIN — SODIUM CHLORIDE 250 MILLILITER(S): 9 INJECTION INTRAMUSCULAR; INTRAVENOUS; SUBCUTANEOUS at 13:10

## 2022-02-16 RX ADMIN — Medication 500000 UNIT(S): at 13:12

## 2022-02-16 RX ADMIN — TAMSULOSIN HYDROCHLORIDE 0.4 MILLIGRAM(S): 0.4 CAPSULE ORAL at 23:12

## 2022-02-16 RX ADMIN — ATOVAQUONE 1500 MILLIGRAM(S): 750 SUSPENSION ORAL at 13:12

## 2022-02-16 RX ADMIN — ISOSORBIDE MONONITRATE 30 MILLIGRAM(S): 60 TABLET, EXTENDED RELEASE ORAL at 13:12

## 2022-02-16 RX ADMIN — Medication 20 MILLIEQUIVALENT(S): at 13:11

## 2022-02-16 RX ADMIN — Medication 81 MILLIGRAM(S): at 13:11

## 2022-02-16 RX ADMIN — Medication 100 GRAM(S): at 10:02

## 2022-02-16 RX ADMIN — CARVEDILOL PHOSPHATE 6.25 MILLIGRAM(S): 80 CAPSULE, EXTENDED RELEASE ORAL at 19:29

## 2022-02-16 RX ADMIN — ENOXAPARIN SODIUM 40 MILLIGRAM(S): 100 INJECTION SUBCUTANEOUS at 23:13

## 2022-02-16 RX ADMIN — Medication 20 MILLIEQUIVALENT(S): at 10:02

## 2022-02-16 RX ADMIN — Medication 500000 UNIT(S): at 19:29

## 2022-02-16 RX ADMIN — DOLUTEGRAVIR SODIUM 50 MILLIGRAM(S): 25 TABLET, FILM COATED ORAL at 13:12

## 2022-02-16 NOTE — PROGRESS NOTE ADULT - PROBLEM SELECTOR PLAN 2
-c/w ART    #oral thrush  denies dysphagia  - can start on oral nystatin -c/w ART  -PCP ppx: Atovaqone for PCP given sulfa allergy   -CD4 120, f/u viral load    #oral thrush  denies dysphagia  - continue oral nystatin

## 2022-02-16 NOTE — PROGRESS NOTE ADULT - PROBLEM SELECTOR PLAN 8
Hypovolemic to euvolemic on exam. NICM  -echo in 9/2021 showed EF 35% with regional wall motion abnormalities  - c/w coreg, imdur as bps HD stable
Hypovolemic to euvolemic on exam. NICM  -echo in 9/2021 showed EF 35% with regional wall motion abnormalities  - c/w coreg, imdur as bps HD stable

## 2022-02-16 NOTE — PROGRESS NOTE ADULT - PROBLEM SELECTOR PLAN 9
F: s/p 500cc IVF, encourage PO  E: K > 4, mg > 2  N: regular  DVT ppx: lovenox  GI: NA    Dispo: [facility] F: s/p additional 250cc IV today. encouraged PO intake  E: K > 4, mg > 2  N: regular  DVT ppx: lovenox  GI: NA    Dispo: [facility]

## 2022-02-16 NOTE — CONSULT NOTE ADULT - SUBJECTIVE AND OBJECTIVE BOX
Patient is a 72y old  Male who presents with a chief complaint of fever (15 Feb 2022 08:29)       HPI:  72M c pertinent hx of AIDS (Last CD4 125, VLUD), who presents with progressive weakness over last few days. Patient c/o worsening weakness to the point that he felt like he was falling, no head strike, loc, or palpitations. + fever this am at home therefore he presented to the ED. Unsure of his last VL but believes he is undetectable-says he takes descovy and tivicay for HAART. ROS+ for chronic non-productive cough over last 6 months, and chronic diarrhea for which he takes immodium for. Currently denies HA, vision changes, chest pain, sob, dysuria/penile discharge, rashes.     Other pmhx CVD, (CAD, NICM-EF 30s), polysubstance abuse (cocaine, cannabis, etoh), cad (last cath 2021),  ascending aortic aneurysm (4.6cm), pad, htn, hld, dm, ckd, prostate ca s/p xrt c/b chronic urinary incontinence, completed covid19 vaccination, multiple hospital admissions for syncope (last hospitalized 2021)    ED course:  Vitals: tmax 102.5, HR > 90, bp 143/84, RR 18, spo 96% RA  Labs: WBC 11 (BAndemia 6.1%, baseline wbc 4), plt 105. Coags WNL. Bicarb 20, BUN/Cr 23, 1.32. Mag 1.2. UA with no pyruia, spec grav 1.025, blood, minimal RBCs, bacteruria +. utox negative. lactate 0.9.   Imaging: CXR no infiltrates, prominent aorto-pulmonary knob, cardiomegally, no effusions.  EKG: sinus tach  Interventions: 500cc bolus x1. magnesium 1g, ceftriaxone, tylenol 650mg.    (2022 22:17)      PAST MEDICAL & SURGICAL HISTORY:  HIV (human immunodeficiency virus infection)    DM (diabetes mellitus)    HTN (hypertension)    Chronic diarrhea    Hepatitis C    PVD (peripheral vascular disease)    CAD (coronary artery disease)    Thoracic aortic aneurysm    S/P arterial stent  bilat LE        MEDICATIONS  (STANDING):  aspirin  chewable 81 milliGRAM(s) Oral daily  atovaquone  Suspension 1500 milliGRAM(s) Oral daily  carvedilol 6.25 milliGRAM(s) Oral every 12 hours  cefTRIAXone   IVPB 2000 milliGRAM(s) IV Intermittent every 24 hours  dolutegravir 50 milliGRAM(s) Oral daily  emtricitabine 200 mG/tenofovir alafenamide 25 mG (DESCOVY) Tablet 1 Tablet(s) Oral daily  enoxaparin Injectable 40 milliGRAM(s) SubCutaneous every 24 hours  escitalopram 5 milliGRAM(s) Oral daily  influenza  Vaccine (HIGH DOSE) 0.7 milliLiter(s) IntraMuscular once  isosorbide   mononitrate ER Tablet (IMDUR) 30 milliGRAM(s) Oral daily  magnesium sulfate  IVPB 1 Gram(s) IV Intermittent once  nystatin    Suspension 635065 Unit(s) Oral every 6 hours  potassium chloride    Tablet ER 20 milliEquivalent(s) Oral every 2 hours    MEDICATIONS  (PRN):  acetaminophen     Tablet .. 650 milliGRAM(s) Oral every 6 hours PRN Temp greater or equal to 38C (100.4F)        Social History:                -  Home Living Status:  lives alone in a O         -  Prior Home Care Services:   none             Baseline Functional Level Prior to Admission:             - ADL's/ IADL's:  independent         - ambulatory status PTA:  walked without DME    FAMILY HISTORY:      CBC Full  -  ( 2022 07:40 )  WBC Count : 5.10 K/uL  RBC Count : 3.74 M/uL  Hemoglobin : 11.9 g/dL  Hematocrit : 35.5 %  Platelet Count - Automated : 78 K/uL  Mean Cell Volume : 94.9 fl  Mean Cell Hemoglobin : 31.8 pg  Mean Cell Hemoglobin Concentration : 33.5 gm/dL  Auto Neutrophil # : 3.61 K/uL  Auto Lymphocyte # : 0.59 K/uL  Auto Monocyte # : 0.82 K/uL  Auto Eosinophil # : 0.04 K/uL  Auto Basophil # : 0.02 K/uL  Auto Neutrophil % : 70.7 %  Auto Lymphocyte % : 11.6 %  Auto Monocyte % : 16.1 %  Auto Eosinophil % : 0.8 %  Auto Basophil % : 0.4 %      02-16    130<L>  |  101  |  34<H>  ----------------------------<  160<H>  3.6   |  23  |  1.52<H>    Ca    8.4      2022 07:40  Phos  2.5     02-16  Mg     1.8         TPro  6.1  /  Alb  3.3  /  TBili  0.5  /  DBili  x   /  AST  23  /  ALT  14  /  AlkPhos  54        Urinalysis Basic - ( 2022 20:32 )    Color: Yellow / Appearance: Clear / S.025 / pH: x  Gluc: x / Ketone: NEGATIVE  / Bili: Negative / Urobili: 0.2 E.U./dL   Blood: x / Protein: 100 mg/dL / Nitrite: NEGATIVE   Leuk Esterase: NEGATIVE / RBC: 5-10 /HPF / WBC < 5 /HPF   Sq Epi: x / Non Sq Epi: 0-5 /HPF / Bacteria: Present /HPF          Radiology:    < from: Xray Chest 1 View- PORTABLE-Urgent (Xray Chest 1 View- PORTABLE-Urgent .) (22 @ 20:14) >  ACC: [id number] EXAM:  XR CHEST PORTABLE URGENT 1V                          PROCEDURE DATE:  2022          INTERPRETATION:  TECHNIQUE: Single portable view of the chest.    COMPARISON:  2 2021    CLINICAL HISTORY: cp    FINDINGS:    Singlefrontal view of the chest demonstrates bibasilar atelectasis. The   cardiomediastinal silhouette is enlarged. No acute osseous abnormalities.   Please refer to the subsequent chest CT for further details.          < from: CT Chest No Cont (02.15.22 @ 01:41) >  ACC: [id number] EXAM:  CT CHEST                          PROCEDURE DATE:  02/15/2022          INTERPRETATION:  CLINICAL INFORMATION: 72-year-old with chronic cough.    COMPARISON: Comparison is made to prior studies dated 2021 and   2021.      PROCEDURE:  CT of the Chest was performed.  Sagittal and coronal reformats were performed.    FINDINGS:    LUNGS AND AIRWAYS: There are a few small stable pulmonary micronodules,   for example within the right apex (image 37) measuring 4 mm. Nonspecific   reticulation of the subpleural interstitium. Bibasilar discoid   atelectasis. No pulmonary consolidation or mass. Minimal centrilobular   emphysematous change. No endobronchial lesion. Mild diffuse peribronchial   thickening with tree-in-budbranching micronodular opacity within the   subpleural right upper lobe, new since 2021, consistent with focal   bronchiolitis.  PLEURA: No pleural effusion.  MEDIASTINUM AND ROSELIA: No lymphadenopathy.  VESSELS: Within normal limits.  HEART: There is normal heart size. There is no pericardial effusion.   There is coronary arterial calcification. There is minimal calcification   of the aortic valve, which can correlate with degree of aortic stenosis.   There is mitral annular calcification. Negative for pericardial   calcification.    CHEST WALL AND LOWER NECK: Minimal bilateral gynecomastia.  VISUALIZED UPPER ABDOMEN: Nonobstructing right intrarenal calculus. Left   renal cyst. Small hiatal hernia. Adrenal adenomatous hyperplasia. Spleen   is mildly enlarged spanning 12.9 cm.  BONES: Within normal limits.        IMPRESSION:    Interval development of minimal infectious/inflammatory bronchiolitis   within the right upper lobe.                Vital Signs Last 24 Hrs  T(C): 36.6 (2022 09:03), Max: 37.1 (15 Feb 2022 16:11)  T(F): 97.8 (2022 09:03), Max: 98.8 (15 Feb 2022 16:11)  HR: 63 (2022 09:03) (63 - 77)  BP: 136/79 (2022 09:03) (104/60 - 136/79)  BP(mean): --  RR: 18 (2022 09:03) (16 - 20)  SpO2: 98% (2022 09:03) (94% - 98%)        REVIEW OF SYSTEMS:    CONSTITUTIONAL: per HPI        Physical Exam:  on contact isolation, 72 y o  gentleman lying in bed c/o feeling tired    Head: normocephalic, atraumatic    Eyes: PERRLA, EOMI, no nystagmus, sclera anicteric    ENT: nasal discharge, uvula midline, no oropharyngeal erythema/exudate    Neck: supple, negative JVD, negative carotid bruits, no thyromegaly    Chest: CTA bilaterally, neg wheeze/ rhonchi/ rales/ crackles/ egophany    Cardiovascular: regular rate and rhythm, neg murmurs/rubs/gallops    Abdomen: soft, non distended, non tender to palpation in all 4 quadrants, negative rebound/guarding, normal bowel sounds    Extremities: WWP, neg cyanosis/clubbing/edema, negative calf tenderness to palpation, negative Carmelo's sign    Neurologic Exam:    Alert and oriented x 3      Motor Exam:    Right UE:             > 4/5 throughout    Left UE:                > 4/5 throughout      Right LE:              > 4/5 throughout    Left LE:                > 4/5 throughout               Sensation:           intact to light touch x 4 extremities                                               DTR:                  biceps/brachioradialis: equal                                                       patella/ankle: equal                                Gait:  not tested              PM&R Impression:    1) sepsis    Plan :    1) Physical therapy focusing on therapeutic exercises, bed mobility/transfer out of bed evaluation, progressive ambulation with assistive devices prn.    2) Anticipated Disposition Plan/Recs:    d/c home with no post discharge rehab needs

## 2022-02-16 NOTE — PROGRESS NOTE ADULT - PROBLEM SELECTOR PLAN 1
On admission, with SIRS (fever and HR > 90, + bandemia on peripheral smear)  -given history of HIV, differential includes infectious with concern for OI  -patient complaining of chronic cough as well as a few year history of diarrhea s/p prostate CA radiation. Pt denies dysuria, states he has urinary frequency which is his baseline and denies hematuria. Denies rashes/skin infections. No penile discharge. Mild amount of thrush seen at the posterior oropharynx  -CT shows bronchiolitis of RUL as well as bibasilar atelectasis  -CXR without reticular infiltrates, not hypoxemic on RA lower suspicion for PCP.   - f/u blood cultures  - repeat viral load, and t cell subset  - OI work up: GI pcr, rpr, fungal blood cultures, cmv, ebv, cryptococcal antigen, fungitell, toxoplasma igm, quant level, gi pcr  - will start on atovaqone for PCP given sulfa allergy   - c/w ART On admission, with SIRS (fever and HR > 90, + bandemia on peripheral smear)  -given history of HIV, differential includes infectious with concern for OI  -patient complaining of chronic cough as well as a few year history of diarrhea s/p prostate CA radiation. Pt denies dysuria, states he has urinary frequency which is his baseline and denies hematuria. Denies rashes/skin infections. No penile discharge. Mild amount of thrush seen at the posterior oropharynx.   [x] Atovaqone for PCP given sulfa allergy   [x] CT shows bronchiolitis of RUL as well as bibasilar atelectasis.   [x] CXR without reticular infiltrates, not hypoxemic on RA lower suspicion for PCP.   - f/u blood cultures  [x] repeat viral load, and t cell subset  - OI work up: GI pcr, rpr, fungal blood cultures, cmv, ebv, cryptococcal antigen, fungitell, toxoplasma igm, quant level, gi pcr    - c/w ART On admission, with SIRS (fever and HR > 90, + bandemia on peripheral smear)  -given history of HIV, differential includes infectious with concern for OI  -patient complaining of chronic cough as well as a few year history of diarrhea s/p prostate CA radiation. Pt denies dysuria, states he has urinary frequency which is his baseline and denies hematuria. Denies rashes/skin infections. No penile discharge. Mild amount of thrush seen at the posterior oropharynx.   [x] Atovaqone for PCP given sulfa allergy   [x] CT shows bronchiolitis of RUL as well as bibasilar atelectasis.   [x] CXR without reticular infiltrates, not hypoxemic on RA lower suspicion for PCP.   - f/u blood cultures  [x] repeat viral load, and t cell subset  - OI work up: GI pcr, rpr, fungal blood cultures, cmv, ebv, cryptococcal antigen, fungitell, toxoplasma igm, quant level, gi pcr  - c/w ART On admission, with SIRS (fever and HR > 90, + bandemia on peripheral smear)  -given history of HIV, differential includes infectious with concern for OI  -patient complaining of chronic cough as well as a few year history of diarrhea s/p prostate CA radiation. Pt denies dysuria, states he has urinary frequency which is his baseline and denies hematuria. Denies rashes/skin infections. No penile discharge. Mild amount of thrush seen at the posterior oropharynx. Reports history of chronic diarrhea, for which he uses immodium  -CT shows bronchiolitis of RUL as well as bibasilar atelectasis.   -CXR without reticular infiltrates, not hypoxemic on RA lower suspicion for PCP.   - BCx NGTD, GI PCR negative. Urine legionella and strep negative  -EBV capsid antigen IgG positive and nuclear antigen positive. Toxoplasma negative  - OI work up:  fungal blood cultures, cmv, cryptococcal antigen, fungitell, toxoplasma igm, quant level - pending  -given history of chronic diarrhea, added O+P  -will d/c CTX and Azithro at this time, no source of infection  -continue to monitor for fevers

## 2022-02-16 NOTE — PROGRESS NOTE ADULT - ASSESSMENT
72M c pertinent history of AIDS (last CD4 95 in september, VL undetectable), hx of MI in 2019 no stents, HFrEF, prostate CA s/p radiation, ascending aortic aneurysm, HTN, HLD, DM, CKD (baseline Cr 1.3-1.4), polysubstance abuse who presents after a fall secondary to an episode of diffuse weakness with fever, diarrhea, and non productive cough that has worsened over the past couple of months. Now admitted for work up of sepsis, currently being treated with CTX and Azithro.       72M c pertinent history of AIDS (last CD4 95 in september, VL undetectable), hx of MI in 2019 no stents, HFrEF, prostate CA s/p radiation, ascending aortic aneurysm, HTN, HLD, DM, CKD (baseline Cr 1.3-1.4), polysubstance abuse who presents after a fall secondary to an episode of diffuse weakness with fever, diarrhea, and non productive cough that has worsened over the past couple of months. Patient was admitted for sepsis workup. Currently, the patient is being treated with CTX and Azithromycin and has been afebrile for 24hr. Consider discharge after negative 24hr blood cultures and continuous 24hr without a fever.  72M c pertinent history of AIDS (last CD4 95 in september, VL undetectable), hx of MI in 2019 no stents, HFrEF, prostate CA s/p radiation, ascending aortic aneurysm, HTN, HLD, DM, CKD (baseline Cr 1.3-1.4), polysubstance abuse who presents after a fall secondary to an episode of diffuse weakness with fever, diarrhea, and non productive cough that has worsened over the past couple of months. Patient was admitted for sepsis workup. Currently the patient is stable, afebrile for 24hrs, and being treated with CTX and Azithromycin. Consider discharge after negative 24hr blood cultures and continuous 24hr without a fever.  72M w/ pertinent history of AIDS (CD4 120, VL undetectable), hx of MI in 2019 no stents, HFrEF, prostate CA s/p radiation, ascending aortic aneurysm, HTN, HLD, DM, CKD (baseline Cr 1.3-1.4), polysubstance abuse who presents after a fall secondary to an episode of diffuse weakness with fever, diarrhea, and non productive cough that has worsened over the past couple of months. Patient was admitted for sepsis workup. Currently the patient is stable, afebrile for 24hrs, and being treated with CTX and Azithromycin. Consider discharge after negative 24hr blood cultures and continuous 24hr without a fever.  72M w/ pertinent history of AIDS (CD4 120, VL undetectable), hx of MI in 2019 no stents, HFrEF, prostate CA s/p radiation, ascending aortic aneurysm, HTN, HLD, DM, CKD (baseline Cr 1.3-1.4), polysubstance abuse who presents after a fall secondary to an episode of diffuse weakness with fever, diarrhea, and non productive cough that has worsened over the past couple of months. Patient was admitted for sepsis workup. Currently the patient is stable, afebrile for 24hrs, pending further infectious work up, no longer on antibiotics.

## 2022-02-16 NOTE — CONSULT NOTE ADULT - ASSESSMENT
per Internal Medicine    72M c pertinent history of AIDS (last CD4 95 in september, VL undetectable), hx of MI in 2019 no stents, HFrEF, prostate CA s/p radiation, ascending aortic aneurysm, HTN, HLD, DM, CKD (baseline Cr 1.3-1.4), polysubstance abuse who presents after a fall secondary to an episode of diffuse weakness with fever, diarrhea, and non productive cough that has worsened over the past couple of months. Now admitted for work up of sepsis, currently being treated with CTX and Azithro.    Problem/Plan - 1:  ·  Problem: Sepsis.   ·  Plan: On admission, with SIRS (fever and HR > 90, + bandemia on peripheral smear)  -given history of HIV, differential includes infectious with concern for OI  -patient complaining of chronic cough as well as a few year history of diarrhea s/p prostate CA radiation. Pt denies dysuria, states he has urinary frequency which is his baseline and denies hematuria. Denies rashes/skin infections. No penile discharge. Mild amount of thrush seen at the posterior oropharynx  -CT shows bronchiolitis of RUL as well as bibasilar atelectasis  -CXR without reticular infiltrates, not hypoxemic on RA lower suspicion for PCP.   - f/u blood cultures  - repeat viral load, and t cell subset  - OI work up: GI pcr, rpr, fungal blood cultures, cmv, ebv, cryptococcal antigen, fungitell, toxoplasma igm, quant level, gi pcr  - will start on atovaqone for PCP given sulfa allergy   - c/w ART.    Problem/Plan - 2:  ·  Problem: AIDS.   ·  Plan: -c/w ART    #oral thrush  denies dysphagia  - can start on oral nystatin.    Problem/Plan - 3:  ·  Problem: Acute kidney injury superimposed on CKD.   ·  Plan: -patient's baseline Cr from previous admissions appears to be 1.3-1.4  -on admission, patient received 500cc IVF  -this AM, Cr of 1.74  -may be secondary to sepsis/dehydration  -f/u urine lytes.    Problem/Plan - 4:  ·  Problem: Weakness.   ·  Plan: ddx: secondary to underlying infectious vs. electrolyte derrangements in setting of hypomagnesemia.  - management of infectious process as above  - replete magnesium as needed  - check tsh  - low suspicion for cardiac cause as does not appear in acute decompensated heart failure based on volume status, pending clinical improvement can repeat  -orthostatic negative (however measured after IV fluids administered in the ED)  -patient has been admitted in the past for work up of multiple syncopal episodes. In the past, no cardiac arrhythmias seen on tele and patient was pending outpatient follow up with EP.    Problem/Plan - 5:  ·  Problem: Hematuria.   ·  Plan: -pt denies hematuria, however on UA, large blood + RBCs were seen  -given history of HIV, concern for glomerulonephritis  -f/u renal US and urine lytes.    Problem/Plan - 6:  ·  Problem: Thrombocytopenia.   ·  Plan: Thrombocytopenia. chronic. likely 2/2 to AIDS. no evidence of petechiae.   - trend plts  - goal PLT> 20K given fevers.    Problem/Plan - 7:  ·  Problem: Polysubstance abuse.   ·  Plan: Last use x9 days ago. Utox negative. No s/s of acute withdrawal and out of window.    Problem/Plan - 8:  ·  Problem: Heart failure.   ·  Plan: Hypovolemic to euvolemic on exam. NICM  -echo in 9/2021 showed EF 35% with regional wall motion abnormalities  - c/w coreg, imdur as bps HD stable.    Problem/Plan - 9:  ·  Problem: Prophylactic measure.   ·  Plan: F: s/p 500cc IVF, encourage PO  E: K > 4, mg > 2  N: regular  DVT ppx: lovenox  GI: NA    Dispo: [facility].

## 2022-02-16 NOTE — PROGRESS NOTE ADULT - PROBLEM SELECTOR PLAN 7
Last use x9 days ago. Utox negative. No s/s of acute withdrawal and out of window. Last use x9 days prior to admission. Utox negative. No s/s of acute withdrawal and out of window.

## 2022-02-16 NOTE — PROGRESS NOTE ADULT - SUBJECTIVE AND OBJECTIVE BOX
**INCOMPLETE NOTE    OVERNIGHT EVENTS:    SUBJECTIVE:  Patient seen and examined at bedside.    Vital Signs Last 12 Hrs  T(F): 97.8 (22 @ 09:03), Max: 98.6 (22 @ 05:26)  HR: 63 (22 @ 09:03) (63 - 77)  BP: 136/79 (22 @ 09:03) (131/70 - 136/79)  BP(mean): --  RR: 18 (22 @ 09:03) (16 - 18)  SpO2: 98% (22 @ 09:03) (94% - 98%)  I&O's Summary      PHYSICAL EXAM:  Constitutional: NAD, comfortable in bed.  HEENT: NC/AT, PERRLA, EOMI, no conjunctival pallor or scleral icterus, MMM  Neck: Supple, no JVD  Respiratory: CTA B/L. No w/r/r.   Cardiovascular: RRR, normal S1 and S2, no m/r/g.   Gastrointestinal: +BS, soft NTND, no guarding or rebound tenderness, no palpable masses   Extremities: wwp; no cyanosis, clubbing or edema.   Vascular: Pulses equal and strong throughout.   Neurological: AAOx3, no CN deficits, strength and sensation intact throughout.   Skin: No gross skin abnormalities or rashes        LABS:                        11.9   5.10  )-----------( 78       ( 2022 07:40 )             35.5     02-16    130<L>  |  101  |  34<H>  ----------------------------<  160<H>  3.6   |  23  |  1.52<H>    Ca    8.4      2022 07:40  Phos  2.5     02-16  Mg     1.8     02-16    TPro  6.1  /  Alb  3.3  /  TBili  0.5  /  DBili  x   /  AST  23  /  ALT  14  /  AlkPhos  54  02-16    PT/INR - ( 2022 18:40 )   PT: 13.5 sec;   INR: 1.13          PTT - ( 2022 18:40 )  PTT:30.9 sec  Urinalysis Basic - ( 2022 20:32 )    Color: Yellow / Appearance: Clear / S.025 / pH: x  Gluc: x / Ketone: NEGATIVE  / Bili: Negative / Urobili: 0.2 E.U./dL   Blood: x / Protein: 100 mg/dL / Nitrite: NEGATIVE   Leuk Esterase: NEGATIVE / RBC: 5-10 /HPF / WBC < 5 /HPF   Sq Epi: x / Non Sq Epi: 0-5 /HPF / Bacteria: Present /HPF          RADIOLOGY & ADDITIONAL TESTS:    MEDICATIONS  (STANDING):  aspirin  chewable 81 milliGRAM(s) Oral daily  atovaquone  Suspension 1500 milliGRAM(s) Oral daily  carvedilol 6.25 milliGRAM(s) Oral every 12 hours  dolutegravir 50 milliGRAM(s) Oral daily  emtricitabine 200 mG/tenofovir alafenamide 25 mG (DESCOVY) Tablet 1 Tablet(s) Oral daily  enoxaparin Injectable 40 milliGRAM(s) SubCutaneous every 24 hours  escitalopram 5 milliGRAM(s) Oral daily  influenza  Vaccine (HIGH DOSE) 0.7 milliLiter(s) IntraMuscular once  isosorbide   mononitrate ER Tablet (IMDUR) 30 milliGRAM(s) Oral daily  nystatin    Suspension 664544 Unit(s) Oral every 6 hours  potassium chloride    Tablet ER 20 milliEquivalent(s) Oral every 2 hours  sodium chloride 0.9% Bolus 250 milliLiter(s) IV Bolus once    MEDICATIONS  (PRN):  acetaminophen     Tablet .. 650 milliGRAM(s) Oral every 6 hours PRN Temp greater or equal to 38C (100.4F)   **INCOMPLETE NOTE    OVERNIGHT EVENTS: No acute overnight events and no fevers.     SUBJECTIVE:  Patient seen and examined at bedside. Patient feels frustrated at persisting weakness and need to depend on staff to walk to the bathroom and take a shower.     Vital Signs Last 12 Hrs  T(F): 97.8 (22 @ 09:03), Max: 98.6 (22 @ 05:26)  HR: 63 (22 @ 09:03) (63 - 77)  BP: 136/79 (22 @ 09:03) (131/70 - 136/79)  BP(mean): --  RR: 18 (22 @ 09:03) (16 - 18)  SpO2: 98% (22 @ 09:03) (94% - 98%)  I&O's Summary      PHYSICAL EXAM:  Constitutional: NAD, comfortable in bed.  HEENT: NC/AT, PERRL, EOMI, no conjunctival pallor or scleral icterus, MMM. white plaque on posterior oropharynx.   Neck: Supple, no JVD. No cervical lymphadenopathy.   Respiratory: CTA B/L. No w/r/r.   Cardiovascular: RRR, normal S1 and S2, no m/r/g.   Gastrointestinal: +BS, soft NTND, no guarding or rebound tenderness, no palpable masses   Extremities: wwp; no cyanosis, clubbing or edema.   Vascular: Pulses equal and strong throughout.   Neurological: AAOx3, no CN deficits, sensation intact throughout. Strength 4/5 throughout.   Skin: No gross skin abnormalities or rashes        LABS:                        11.9   5.10  )-----------( 78       ( 2022 07:40 )             35.5     02-16    130<L>  |  101  |  34<H>  ----------------------------<  160<H>  3.6   |  23  |  1.52<H>    Ca    8.4      2022 07:40  Phos  2.5     02-  Mg     1.8     -16    TPro  6.1  /  Alb  3.3  /  TBili  0.5  /  DBili  x   /  AST  23  /  ALT  14  /  AlkPhos  54  -16    PT/INR - ( 2022 18:40 )   PT: 13.5 sec;   INR: 1.13          PTT - ( 2022 18:40 )  PTT:30.9 sec  Urinalysis Basic - ( 2022 20:32 )    Color: Yellow / Appearance: Clear / S.025 / pH: x  Gluc: x / Ketone: NEGATIVE  / Bili: Negative / Urobili: 0.2 E.U./dL   Blood: x / Protein: 100 mg/dL / Nitrite: NEGATIVE   Leuk Esterase: NEGATIVE / RBC: 5-10 /HPF / WBC < 5 /HPF   Sq Epi: x / Non Sq Epi: 0-5 /HPF / Bacteria: Present /HPF          RADIOLOGY & ADDITIONAL TESTS:    MEDICATIONS  (STANDING):  aspirin  chewable 81 milliGRAM(s) Oral daily  atovaquone  Suspension 1500 milliGRAM(s) Oral daily  carvedilol 6.25 milliGRAM(s) Oral every 12 hours  dolutegravir 50 milliGRAM(s) Oral daily  emtricitabine 200 mG/tenofovir alafenamide 25 mG (DESCOVY) Tablet 1 Tablet(s) Oral daily  enoxaparin Injectable 40 milliGRAM(s) SubCutaneous every 24 hours  escitalopram 5 milliGRAM(s) Oral daily  influenza  Vaccine (HIGH DOSE) 0.7 milliLiter(s) IntraMuscular once  isosorbide   mononitrate ER Tablet (IMDUR) 30 milliGRAM(s) Oral daily  nystatin    Suspension 582813 Unit(s) Oral every 6 hours  potassium chloride    Tablet ER 20 milliEquivalent(s) Oral every 2 hours  sodium chloride 0.9% Bolus 250 milliLiter(s) IV Bolus once    MEDICATIONS  (PRN):  acetaminophen     Tablet .. 650 milliGRAM(s) Oral every 6 hours PRN Temp greater or equal to 38C (100.4F)   **INCOMPLETE NOTE    OVERNIGHT EVENTS: No acute overnight events and no fevers.     SUBJECTIVE:  Patient seen and examined at bedside, resting comfortably in bed and in no acute distress. Patient complaining of midline chest pain rated at 5/10 which he attributes to his frustration at the persisting weakness and need to depend on staff for showering/bathroom. Patient denies any recent fevers, chills, nausea, headache, acute sob, abdominal pain, diarrhea, genitourinary sx, extremity pain or swelling.     Vital Signs Last 12 Hrs  T(F): 97.8 (22 @ 09:03), Max: 98.6 (22 @ 05:26)  HR: 63 (22 @ 09:03) (63 - 77)  BP: 136/79 (22 @ 09:03) (131/70 - 136/79)  BP(mean): --  RR: 18 (22 @ 09:03) (16 - 18)  SpO2: 98% (22 @ 09:03) (94% - 98%)  I&O's Summary      PHYSICAL EXAM:  Constitutional: NAD, comfortable in bed.  HEENT: NC/AT, PERRL, EOMI, no conjunctival pallor or scleral icterus, MMM. white plaque on posterior oropharynx.   Neck: Supple, no JVD. No cervical lymphadenopathy.   Respiratory: CTA B/L. No w/r/r.   Cardiovascular: RRR, normal S1 and S2, no m/r/g.   Gastrointestinal: +BS, soft NTND, no guarding or rebound tenderness, no palpable masses   Extremities: wwp; no cyanosis, clubbing or edema.   Vascular: Pulses equal and strong throughout.   Neurological: AAOx3, no CN deficits, sensation intact throughout. Strength 4/5 throughout.   Skin: No gross skin abnormalities or rashes        LABS:                        11.9   5.10  )-----------( 78       ( 2022 07:40 )             35.5     02-    130<L>  |  101  |  34<H>  ----------------------------<  160<H>  3.6   |  23  |  1.52<H>    Ca    8.4      2022 07:40  Phos  2.5     02-16  Mg     1.8     02-16    TPro  6.1  /  Alb  3.3  /  TBili  0.5  /  DBili  x   /  AST  23  /  ALT  14  /  AlkPhos  54  02-16    PT/INR - ( 2022 18:40 )   PT: 13.5 sec;   INR: 1.13          PTT - ( 2022 18:40 )  PTT:30.9 sec  Urinalysis Basic - ( 2022 20:32 )    Color: Yellow / Appearance: Clear / S.025 / pH: x  Gluc: x / Ketone: NEGATIVE  / Bili: Negative / Urobili: 0.2 E.U./dL   Blood: x / Protein: 100 mg/dL / Nitrite: NEGATIVE   Leuk Esterase: NEGATIVE / RBC: 5-10 /HPF / WBC < 5 /HPF   Sq Epi: x / Non Sq Epi: 0-5 /HPF / Bacteria: Present /HPF          RADIOLOGY & ADDITIONAL TESTS:    MEDICATIONS  (STANDING):  aspirin  chewable 81 milliGRAM(s) Oral daily  atovaquone  Suspension 1500 milliGRAM(s) Oral daily  carvedilol 6.25 milliGRAM(s) Oral every 12 hours  dolutegravir 50 milliGRAM(s) Oral daily  emtricitabine 200 mG/tenofovir alafenamide 25 mG (DESCOVY) Tablet 1 Tablet(s) Oral daily  enoxaparin Injectable 40 milliGRAM(s) SubCutaneous every 24 hours  escitalopram 5 milliGRAM(s) Oral daily  influenza  Vaccine (HIGH DOSE) 0.7 milliLiter(s) IntraMuscular once  isosorbide   mononitrate ER Tablet (IMDUR) 30 milliGRAM(s) Oral daily  nystatin    Suspension 388214 Unit(s) Oral every 6 hours  potassium chloride    Tablet ER 20 milliEquivalent(s) Oral every 2 hours  sodium chloride 0.9% Bolus 250 milliLiter(s) IV Bolus once    MEDICATIONS  (PRN):  acetaminophen     Tablet .. 650 milliGRAM(s) Oral every 6 hours PRN Temp greater or equal to 38C (100.4F)   **INCOMPLETE NOTE    OVERNIGHT EVENTS: No acute overnight events and no fevers.     SUBJECTIVE:  Patient seen and examined at bedside, resting comfortably in bed and in no acute distress. Patient complaining of midline chest pain rated at 5/10 which he attributes to his frustration at the persisting weakness and need to depend on staff for showering/bathroom. Patient denies any recent fevers, chills, nausea, headache, acute sob, abdominal pain, diarrhea, genitourinary sx, extremity pain or swelling.     Vital Signs Last 12 Hrs  T(F): 97.8 (22 @ 09:03), Max: 98.6 (22 @ 05:26)  HR: 63 (22 @ 09:03) (63 - 77)  BP: 136/79 (22 @ 09:03) (131/70 - 136/79)  BP(mean): --  RR: 18 (22 @ 09:03) (16 - 18)  SpO2: 98% (22 @ 09:03) (94% - 98%)  I&O's Summary      PHYSICAL EXAM:  Constitutional: NAD, comfortable in bed.  HEENT: NC/AT, PERRL, EOMI, no conjunctival pallor or scleral icterus, MMM. white plaque on posterior oropharynx.   Neck: Supple, no JVD. No cervical lymphadenopathy.   Respiratory: CTA B/L. No w/r/r.   Cardiovascular: RRR, normal S1 and S2, no m/r/g.   Gastrointestinal: +BS, soft NTND, no guarding or rebound tenderness, no palpable masses   Extremities: wwp; no cyanosis, clubbing or edema.   Vascular: Pulses equal and strong throughout.   Neurological: AAOx3, no CN deficits, sensation intact throughout. Strength 4/5 throughout.   Skin: No gross skin abnormalities or rashes        LABS:                        11.9   5.10  )-----------( 78       ( 2022 07:40 )             35.5     02-    130<L>  |  101  |  34<H>  ----------------------------<  160<H>  3.6   |  23  |  1.52<H>    Ca    8.4      2022 07:40  Phos  2.5     02-16  Mg     1.8     02-16    TPro  6.1  /  Alb  3.3  /  TBili  0.5  /  DBili  x   /  AST  23  /  ALT  14  /  AlkPhos  54  02-16    PT/INR - ( 2022 18:40 )   PT: 13.5 sec;   INR: 1.13          PTT - ( 2022 18:40 )  PTT:30.9 sec  Urinalysis Basic - ( 2022 20:32 )    Color: Yellow / Appearance: Clear / S.025 / pH: x  Gluc: x / Ketone: NEGATIVE  / Bili: Negative / Urobili: 0.2 E.U./dL   Blood: x / Protein: 100 mg/dL / Nitrite: NEGATIVE   Leuk Esterase: NEGATIVE / RBC: 5-10 /HPF / WBC < 5 /HPF   Sq Epi: x / Non Sq Epi: 0-5 /HPF / Bacteria: Present /HPF          RADIOLOGY & ADDITIONAL TESTS:        MEDICATIONS  (STANDING):  aspirin  chewable 81 milliGRAM(s) Oral daily  atovaquone  Suspension 1500 milliGRAM(s) Oral daily  carvedilol 6.25 milliGRAM(s) Oral every 12 hours  dolutegravir 50 milliGRAM(s) Oral daily  emtricitabine 200 mG/tenofovir alafenamide 25 mG (DESCOVY) Tablet 1 Tablet(s) Oral daily  enoxaparin Injectable 40 milliGRAM(s) SubCutaneous every 24 hours  escitalopram 5 milliGRAM(s) Oral daily  influenza  Vaccine (HIGH DOSE) 0.7 milliLiter(s) IntraMuscular once  isosorbide   mononitrate ER Tablet (IMDUR) 30 milliGRAM(s) Oral daily  nystatin    Suspension 764619 Unit(s) Oral every 6 hours  potassium chloride    Tablet ER 20 milliEquivalent(s) Oral every 2 hours  sodium chloride 0.9% Bolus 250 milliLiter(s) IV Bolus once    MEDICATIONS  (PRN):  acetaminophen     Tablet .. 650 milliGRAM(s) Oral every 6 hours PRN Temp greater or equal to 38C (100.4F)   OVERNIGHT EVENTS: No acute overnight events and no fevers.     SUBJECTIVE:  Patient seen and examined at bedside, resting comfortably in bed and in no acute distress. Patient complaining of midline chest pain rated at 5/10 which he attributes to his frustration at the persisting weakness and need to depend on staff for showering/bathroom. Patient denies any recent fevers, chills, nausea, headache, acute sob, abdominal pain, diarrhea, genitourinary sx, extremity pain or swelling.     Vital Signs Last 12 Hrs  T(F): 97.8 (22 @ 09:03), Max: 98.6 (22 @ 05:26)  HR: 63 (22 @ 09:03) (63 - 77)  BP: 136/79 (22 @ 09:03) (131/70 - 136/79)  BP(mean): --  RR: 18 (22 @ 09:03) (16 - 18)  SpO2: 98% (22 @ 09:03) (94% - 98%)  I&O's Summary      PHYSICAL EXAM:  Constitutional: NAD, comfortable in bed.  HEENT: NC/AT, PERRL, EOMI, no conjunctival pallor or scleral icterus, MMM. white plaque on posterior oropharynx.   Neck: Supple, no JVD. No cervical lymphadenopathy.   Respiratory: CTA B/L. No w/r/r.   Cardiovascular: RRR, normal S1 and S2, no m/r/g.   Gastrointestinal: +BS, soft NTND, no guarding or rebound tenderness, no palpable masses   Extremities: wwp; no cyanosis, clubbing or edema.   Vascular: Pulses equal and strong throughout.   Neurological: AAOx3, no CN deficits, sensation intact throughout. Strength 4/5 throughout.   Skin: No gross skin abnormalities or rashes        LABS:                        11.9   5.10  )-----------( 78       ( 2022 07:40 )             35.5     02-    130<L>  |  101  |  34<H>  ----------------------------<  160<H>  3.6   |  23  |  1.52<H>    Ca    8.4      2022 07:40  Phos  2.5     02-16  Mg     1.8     02-16    TPro  6.1  /  Alb  3.3  /  TBili  0.5  /  DBili  x   /  AST  23  /  ALT  14  /  AlkPhos  54  02-16    PT/INR - ( 2022 18:40 )   PT: 13.5 sec;   INR: 1.13          PTT - ( 2022 18:40 )  PTT:30.9 sec  Urinalysis Basic - ( 2022 20:32 )    Color: Yellow / Appearance: Clear / S.025 / pH: x  Gluc: x / Ketone: NEGATIVE  / Bili: Negative / Urobili: 0.2 E.U./dL   Blood: x / Protein: 100 mg/dL / Nitrite: NEGATIVE   Leuk Esterase: NEGATIVE / RBC: 5-10 /HPF / WBC < 5 /HPF   Sq Epi: x / Non Sq Epi: 0-5 /HPF / Bacteria: Present /HPF          RADIOLOGY & ADDITIONAL TESTS:        MEDICATIONS  (STANDING):  aspirin  chewable 81 milliGRAM(s) Oral daily  atovaquone  Suspension 1500 milliGRAM(s) Oral daily  carvedilol 6.25 milliGRAM(s) Oral every 12 hours  dolutegravir 50 milliGRAM(s) Oral daily  emtricitabine 200 mG/tenofovir alafenamide 25 mG (DESCOVY) Tablet 1 Tablet(s) Oral daily  enoxaparin Injectable 40 milliGRAM(s) SubCutaneous every 24 hours  escitalopram 5 milliGRAM(s) Oral daily  influenza  Vaccine (HIGH DOSE) 0.7 milliLiter(s) IntraMuscular once  isosorbide   mononitrate ER Tablet (IMDUR) 30 milliGRAM(s) Oral daily  nystatin    Suspension 607891 Unit(s) Oral every 6 hours  potassium chloride    Tablet ER 20 milliEquivalent(s) Oral every 2 hours  sodium chloride 0.9% Bolus 250 milliLiter(s) IV Bolus once    MEDICATIONS  (PRN):  acetaminophen     Tablet .. 650 milliGRAM(s) Oral every 6 hours PRN Temp greater or equal to 38C (100.4F)

## 2022-02-16 NOTE — PROGRESS NOTE ADULT - PROBLEM SELECTOR PLAN 6
Thrombocytopenia. chronic. likely 2/2 to AIDS. no evidence of petechiae.   - trend plts  - goal PLT> 20K given fevers.
Thrombocytopenia. chronic. likely 2/2 to AIDS. no evidence of petechiae.   - trend plts  - goal PLT> 20K given fevers.

## 2022-02-16 NOTE — PROGRESS NOTE ADULT - PROBLEM SELECTOR PLAN 4
ddx: secondary to underlying infectious vs. electrolyte derrangements in setting of hypomagnesemia.  - management of infectious process as above  - replete magnesium as needed  - check tsh  - low suspicion for cardiac cause as does not appear in acute decompensated heart failure based on volume status, pending clinical improvement can repeat  -orthostatic negative (however measured after IV fluids administered in the ED)  -patient has been admitted in the past for work up of multiple syncopal episodes. In the past, no cardiac arrhythmias seen on tele and patient was pending outpatient follow up with EP ddx: secondary to underlying infectious vs. electrolyte derrangements in setting of hypomagnesemia.  - management of infectious process as above  - replete magnesium as needed  - check tsh  - low suspicion for cardiac cause as does not appear in acute decompensated heart failure based on volume status, pending clinical improvement can repeat  [X] orthostatic negative (however measured after IV fluids administered in the ED)  -patient has been admitted in the past for work up of multiple syncopal episodes. In the past, no cardiac arrhythmias seen on tele and patient was pending outpatient follow up with EP ddx: secondary to underlying infectious vs. electrolyte derangements in setting of hypomagnesemia.  - management of infectious process as above  - replete magnesium as needed  - check tsh  - low suspicion for cardiac cause as does not appear in acute decompensated heart failure based on volume status, pending clinical improvement can repeat  -orthostatic negative (however measured after IV fluids administered in the ED)  -patient has been admitted in the past for work up of multiple syncopal episodes. In the past, no cardiac arrhythmias seen on tele and patient was pending outpatient follow up with EP  -PT evaluated patient, no needs on discharge

## 2022-02-16 NOTE — PROGRESS NOTE ADULT - PROBLEM SELECTOR PLAN 3
-patient's baseline Cr from previous admissions appears to be 1.3-1.4  -on admission, patient received 500cc IVF  -this AM, Cr of 1.74  -may be secondary to sepsis/dehydration  -f/u urine lytes -patient's baseline Cr from previous admissions appears to be 1.3-1.4  -on admission, patient received 500cc IVF  -this AM, Cr of 1.52.   - prerenal BRENNAN, feNA .2  -give 250ccs normal saline  -may be secondary to sepsis/dehydration  [x] f/u urine lytes -patient's baseline Cr from previous admissions appears to be 1.3-1.4  -on admission, patient received 500cc IVF  -this AM, Cr of 1.52.   - prerenal BRENNAN, feNA .2% - likely secondary to sepsis/dehydration  -give additional 250ccs normal saline  -f/u AM Cr

## 2022-02-16 NOTE — PROGRESS NOTE ADULT - ATTENDING COMMENTS
73 yo male with hx HIV + AIDS admitted to the hospital with fever , generalized malaise , chronic diarrhea , chronic cough     Impression and Plan   1. Sepsis ,  Fever in the setting of HIV and AIDS   -CD4 Count , Viral load pending , cxr and CT chest not consistent with PJP   -RVP + Covid negative   -Blood cx ,C  diff , Stool GI pcr and stool for ova and parasites   -no signs of SSTI , no spine tenderness ,no meningeal signs   -CT chest with ? discoid bibasilar atelectasis , currently on room air , no need for anbx at this time     2. BRENNAN on CKD stage 3 - Urine lytes - pre renal, IV fluids      3. Chronic HF with Reduced Ejection Fraction, Non Ischemic CMPY - No signs of volume overload on exam     4. Microscopic and Macroscopic Hematuria - USG abd  to further evaluate etiology     5.  HIV + AIDS     6. Chronic Thrombocytopenia     7. DVT prophylaxis    If blood cx remain negative for 48hrs and afebrile for 48 hours he can be discharged home

## 2022-02-16 NOTE — PROGRESS NOTE ADULT - PROBLEM SELECTOR PLAN 5
-pt denies hematuria, however on UA, large blood + RBCs were seen  -given history of HIV, concern for glomerulonephritis  -f/u renal US and urine lytes -pt denies hematuria, however on UA, large blood + RBCs were seen  -given history of HIV, concern for glomerulonephritis  -f/u renal US

## 2022-02-17 ENCOUNTER — TRANSCRIPTION ENCOUNTER (OUTPATIENT)
Age: 72
End: 2022-02-17

## 2022-02-17 VITALS
HEART RATE: 65 BPM | DIASTOLIC BLOOD PRESSURE: 78 MMHG | RESPIRATION RATE: 20 BRPM | SYSTOLIC BLOOD PRESSURE: 131 MMHG | OXYGEN SATURATION: 98 % | TEMPERATURE: 99 F

## 2022-02-17 LAB
ANION GAP SERPL CALC-SCNC: 5 MMOL/L — SIGNIFICANT CHANGE UP (ref 5–17)
ANISOCYTOSIS BLD QL: SLIGHT — SIGNIFICANT CHANGE UP
BASOPHILS # BLD AUTO: 0 K/UL — SIGNIFICANT CHANGE UP (ref 0–0.2)
BASOPHILS NFR BLD AUTO: 0 % — SIGNIFICANT CHANGE UP (ref 0–2)
BUN SERPL-MCNC: 32 MG/DL — HIGH (ref 7–23)
BURR CELLS BLD QL SMEAR: PRESENT — SIGNIFICANT CHANGE UP
CALCIUM SERPL-MCNC: 8.4 MG/DL — SIGNIFICANT CHANGE UP (ref 8.4–10.5)
CHLORIDE SERPL-SCNC: 106 MMOL/L — SIGNIFICANT CHANGE UP (ref 96–108)
CMV DNA CSF QL NAA+PROBE: SIGNIFICANT CHANGE UP
CO2 SERPL-SCNC: 23 MMOL/L — SIGNIFICANT CHANGE UP (ref 22–31)
CREAT SERPL-MCNC: 1.38 MG/DL — HIGH (ref 0.5–1.3)
EOSINOPHIL # BLD AUTO: 0.11 K/UL — SIGNIFICANT CHANGE UP (ref 0–0.5)
EOSINOPHIL NFR BLD AUTO: 2.6 % — SIGNIFICANT CHANGE UP (ref 0–6)
FUNGITELL: <31 PG/ML — SIGNIFICANT CHANGE UP
GIANT PLATELETS BLD QL SMEAR: PRESENT — SIGNIFICANT CHANGE UP
GLUCOSE SERPL-MCNC: 154 MG/DL — HIGH (ref 70–99)
HCT VFR BLD CALC: 37.5 % — LOW (ref 39–50)
HGB BLD-MCNC: 12.6 G/DL — LOW (ref 13–17)
HYPOCHROMIA BLD QL: SLIGHT — SIGNIFICANT CHANGE UP
LYMPHOCYTES # BLD AUTO: 0.44 K/UL — LOW (ref 1–3.3)
LYMPHOCYTES # BLD AUTO: 10.4 % — LOW (ref 13–44)
MAGNESIUM SERPL-MCNC: 1.9 MG/DL — SIGNIFICANT CHANGE UP (ref 1.6–2.6)
MANUAL SMEAR VERIFICATION: SIGNIFICANT CHANGE UP
MCHC RBC-ENTMCNC: 32.1 PG — SIGNIFICANT CHANGE UP (ref 27–34)
MCHC RBC-ENTMCNC: 33.6 GM/DL — SIGNIFICANT CHANGE UP (ref 32–36)
MCV RBC AUTO: 95.4 FL — SIGNIFICANT CHANGE UP (ref 80–100)
METAMYELOCYTES # FLD: 0.9 % — HIGH (ref 0–0)
MONOCYTES # BLD AUTO: 0.37 K/UL — SIGNIFICANT CHANGE UP (ref 0–0.9)
MONOCYTES NFR BLD AUTO: 8.7 % — SIGNIFICANT CHANGE UP (ref 2–14)
NEUTROPHILS # BLD AUTO: 3.29 K/UL — SIGNIFICANT CHANGE UP (ref 1.8–7.4)
NEUTROPHILS NFR BLD AUTO: 77.4 % — HIGH (ref 43–77)
OVALOCYTES BLD QL SMEAR: SLIGHT — SIGNIFICANT CHANGE UP
PHOSPHATE SERPL-MCNC: 1.6 MG/DL — LOW (ref 2.5–4.5)
PLAT MORPH BLD: ABNORMAL
PLATELET # BLD AUTO: 84 K/UL — LOW (ref 150–400)
POIKILOCYTOSIS BLD QL AUTO: SLIGHT — SIGNIFICANT CHANGE UP
POLYCHROMASIA BLD QL SMEAR: SLIGHT — SIGNIFICANT CHANGE UP
POTASSIUM SERPL-MCNC: 4.5 MMOL/L — SIGNIFICANT CHANGE UP (ref 3.5–5.3)
POTASSIUM SERPL-SCNC: 4.5 MMOL/L — SIGNIFICANT CHANGE UP (ref 3.5–5.3)
RBC # BLD: 3.93 M/UL — LOW (ref 4.2–5.8)
RBC # FLD: 12 % — SIGNIFICANT CHANGE UP (ref 10.3–14.5)
RBC BLD AUTO: ABNORMAL
SODIUM SERPL-SCNC: 134 MMOL/L — LOW (ref 135–145)
SPHEROCYTES BLD QL SMEAR: SLIGHT — SIGNIFICANT CHANGE UP
WBC # BLD: 4.25 K/UL — SIGNIFICANT CHANGE UP (ref 3.8–10.5)
WBC # FLD AUTO: 4.25 K/UL — SIGNIFICANT CHANGE UP (ref 3.8–10.5)

## 2022-02-17 PROCEDURE — U0005: CPT

## 2022-02-17 PROCEDURE — 87536 HIV-1 QUANT&REVRSE TRNSCRPJ: CPT

## 2022-02-17 PROCEDURE — 96374 THER/PROPH/DIAG INJ IV PUSH: CPT

## 2022-02-17 PROCEDURE — 86140 C-REACTIVE PROTEIN: CPT

## 2022-02-17 PROCEDURE — 85652 RBC SED RATE AUTOMATED: CPT

## 2022-02-17 PROCEDURE — 86360 T CELL ABSOLUTE COUNT/RATIO: CPT

## 2022-02-17 PROCEDURE — 83615 LACTATE (LD) (LDH) ENZYME: CPT

## 2022-02-17 PROCEDURE — U0003: CPT

## 2022-02-17 PROCEDURE — 86606 ASPERGILLUS ANTIBODY: CPT

## 2022-02-17 PROCEDURE — 84300 ASSAY OF URINE SODIUM: CPT

## 2022-02-17 PROCEDURE — 71250 CT THORAX DX C-: CPT

## 2022-02-17 PROCEDURE — 85025 COMPLETE CBC W/AUTO DIFF WBC: CPT

## 2022-02-17 PROCEDURE — 87449 NOS EACH ORGANISM AG IA: CPT

## 2022-02-17 PROCEDURE — 84100 ASSAY OF PHOSPHORUS: CPT

## 2022-02-17 PROCEDURE — 86359 T CELLS TOTAL COUNT: CPT

## 2022-02-17 PROCEDURE — 86780 TREPONEMA PALLIDUM: CPT

## 2022-02-17 PROCEDURE — 86665 EPSTEIN-BARR CAPSID VCA: CPT

## 2022-02-17 PROCEDURE — 36415 COLL VENOUS BLD VENIPUNCTURE: CPT

## 2022-02-17 PROCEDURE — 86593 SYPHILIS TEST NON-TREP QUANT: CPT

## 2022-02-17 PROCEDURE — 87899 AGENT NOS ASSAY W/OPTIC: CPT

## 2022-02-17 PROCEDURE — 83935 ASSAY OF URINE OSMOLALITY: CPT

## 2022-02-17 PROCEDURE — 86481 TB AG RESPONSE T-CELL SUSP: CPT

## 2022-02-17 PROCEDURE — 86663 EPSTEIN-BARR ANTIBODY: CPT

## 2022-02-17 PROCEDURE — 82803 BLOOD GASES ANY COMBINATION: CPT

## 2022-02-17 PROCEDURE — 99239 HOSP IP/OBS DSCHRG MGMT >30: CPT | Mod: GC

## 2022-02-17 PROCEDURE — 85610 PROTHROMBIN TIME: CPT

## 2022-02-17 PROCEDURE — 85730 THROMBOPLASTIN TIME PARTIAL: CPT

## 2022-02-17 PROCEDURE — 84145 PROCALCITONIN (PCT): CPT

## 2022-02-17 PROCEDURE — 97161 PT EVAL LOW COMPLEX 20 MIN: CPT

## 2022-02-17 PROCEDURE — 86664 EPSTEIN-BARR NUCLEAR ANTIGEN: CPT

## 2022-02-17 PROCEDURE — 84484 ASSAY OF TROPONIN QUANT: CPT

## 2022-02-17 PROCEDURE — 83880 ASSAY OF NATRIURETIC PEPTIDE: CPT

## 2022-02-17 PROCEDURE — 76770 US EXAM ABDO BACK WALL COMP: CPT

## 2022-02-17 PROCEDURE — 87040 BLOOD CULTURE FOR BACTERIA: CPT

## 2022-02-17 PROCEDURE — 86778 TOXOPLASMA ANTIBODY IGM: CPT

## 2022-02-17 PROCEDURE — 86403 PARTICLE AGGLUT ANTBDY SCRN: CPT

## 2022-02-17 PROCEDURE — 99285 EMERGENCY DEPT VISIT HI MDM: CPT | Mod: 25

## 2022-02-17 PROCEDURE — 80307 DRUG TEST PRSMV CHEM ANLYZR: CPT

## 2022-02-17 PROCEDURE — 87507 IADNA-DNA/RNA PROBE TQ 12-25: CPT

## 2022-02-17 PROCEDURE — 97110 THERAPEUTIC EXERCISES: CPT

## 2022-02-17 PROCEDURE — 0225U NFCT DS DNA&RNA 21 SARSCOV2: CPT

## 2022-02-17 PROCEDURE — 83735 ASSAY OF MAGNESIUM: CPT

## 2022-02-17 PROCEDURE — 71045 X-RAY EXAM CHEST 1 VIEW: CPT

## 2022-02-17 PROCEDURE — 86592 SYPHILIS TEST NON-TREP QUAL: CPT

## 2022-02-17 PROCEDURE — 83605 ASSAY OF LACTIC ACID: CPT

## 2022-02-17 PROCEDURE — 80048 BASIC METABOLIC PNL TOTAL CA: CPT

## 2022-02-17 PROCEDURE — 82570 ASSAY OF URINE CREATININE: CPT

## 2022-02-17 PROCEDURE — 80053 COMPREHEN METABOLIC PANEL: CPT

## 2022-02-17 PROCEDURE — 84295 ASSAY OF SERUM SODIUM: CPT

## 2022-02-17 PROCEDURE — 87324 CLOSTRIDIUM AG IA: CPT

## 2022-02-17 PROCEDURE — 84132 ASSAY OF SERUM POTASSIUM: CPT

## 2022-02-17 PROCEDURE — 93005 ELECTROCARDIOGRAM TRACING: CPT

## 2022-02-17 PROCEDURE — 81001 URINALYSIS AUTO W/SCOPE: CPT

## 2022-02-17 PROCEDURE — 82330 ASSAY OF CALCIUM: CPT

## 2022-02-17 RX ORDER — ATOVAQUONE 750 MG/5ML
10 SUSPENSION ORAL
Qty: 300 | Refills: 0
Start: 2022-02-17 | End: 2022-03-18

## 2022-02-17 RX ORDER — NYSTATIN 500MM UNIT
4 POWDER (EA) MISCELLANEOUS
Qty: 160 | Refills: 0
Start: 2022-02-17 | End: 2022-02-26

## 2022-02-17 RX ORDER — TAMSULOSIN HYDROCHLORIDE 0.4 MG/1
1 CAPSULE ORAL
Qty: 30 | Refills: 0
Start: 2022-02-17

## 2022-02-17 RX ADMIN — CARVEDILOL PHOSPHATE 6.25 MILLIGRAM(S): 80 CAPSULE, EXTENDED RELEASE ORAL at 06:23

## 2022-02-17 RX ADMIN — Medication 500000 UNIT(S): at 11:17

## 2022-02-17 RX ADMIN — Medication 500000 UNIT(S): at 06:23

## 2022-02-17 RX ADMIN — ISOSORBIDE MONONITRATE 30 MILLIGRAM(S): 60 TABLET, EXTENDED RELEASE ORAL at 11:18

## 2022-02-17 RX ADMIN — EMTRICITABINE AND TENOFOVIR DISOPROXIL FUMARATE 1 TABLET(S): 200; 300 TABLET, FILM COATED ORAL at 11:18

## 2022-02-17 RX ADMIN — Medication 500000 UNIT(S): at 00:37

## 2022-02-17 RX ADMIN — ATOVAQUONE 1500 MILLIGRAM(S): 750 SUSPENSION ORAL at 11:24

## 2022-02-17 RX ADMIN — ESCITALOPRAM OXALATE 5 MILLIGRAM(S): 10 TABLET, FILM COATED ORAL at 11:18

## 2022-02-17 RX ADMIN — Medication 85 MILLIMOLE(S): at 11:17

## 2022-02-17 RX ADMIN — DOLUTEGRAVIR SODIUM 50 MILLIGRAM(S): 25 TABLET, FILM COATED ORAL at 11:18

## 2022-02-17 RX ADMIN — Medication 81 MILLIGRAM(S): at 11:18

## 2022-02-17 NOTE — DISCHARGE NOTE NURSING/CASE MANAGEMENT/SOCIAL WORK - PATIENT PORTAL LINK FT
You can access the FollowMyHealth Patient Portal offered by [facility] by registering at the following website: [email or web address]. By joining [employer]’s FollowMyHealth portal, you will also be able to view your health information using other applications (apps) compatible with our system.

## 2022-02-17 NOTE — DISCHARGE NOTE PROVIDER - NSDCMRMEDTOKEN_GEN_ALL_CORE_FT
Abilify 2 mg oral tablet: orally once a day  amLODIPine 2.5 mg oral tablet: 1 tab(s) orally once a day   aspirin 81 mg oral delayed release tablet: 1 tab(s) orally once a day  atovaquone 750 mg/5 mL oral suspension: 10 milliliter(s) orally once a day  carvedilol 6.25 mg oral tablet: 1 tab(s) orally every 12 hours  Descovy 200 mg-25 mg oral tablet: 1 tab(s) orally once a day  folic acid 1 mg oral tablet: 1 tab(s) orally once a day  isosorbide mononitrate 30 mg oral tablet, extended release: 1 tab(s) orally once a day   Lexapro 5 mg oral tablet: 1 tab(s) orally once a day  Multiple Vitamins oral tablet: 1 tab(s) orally once a day   pravastatin 80 mg oral tablet: 1 tab(s) orally once a day (at bedtime)  tamsulosin 0.4 mg oral capsule: 1 cap(s) orally once a day (at bedtime)  thiamine 100 mg oral tablet: 1 tab(s) orally once a day  Tivicay 50 mg oral tablet: 1 tab(s) orally once a day  Vasotec 2.5 mg oral tablet: 1 tab(s) orally once a day   Abilify 2 mg oral tablet: orally once a day  amLODIPine 2.5 mg oral tablet: 1 tab(s) orally once a day   aspirin 81 mg oral delayed release tablet: 1 tab(s) orally once a day  atovaquone 750 mg/5 mL oral suspension: 10 milliliter(s) orally once a day  carvedilol 6.25 mg oral tablet: 1 tab(s) orally every 12 hours  Descovy 200 mg-25 mg oral tablet: 1 tab(s) orally once a day  folic acid 1 mg oral tablet: 1 tab(s) orally once a day  isosorbide mononitrate 30 mg oral tablet, extended release: 1 tab(s) orally once a day   Lexapro 5 mg oral tablet: 1 tab(s) orally once a day  Multiple Vitamins oral tablet: 1 tab(s) orally once a day   nystatin 100,000 units/mL oral suspension: 4 milliliter(s) orally- Swish and Swallow  4 times a day   pravastatin 80 mg oral tablet: 1 tab(s) orally once a day (at bedtime)  tamsulosin 0.4 mg oral capsule: 1 cap(s) orally once a day (at bedtime)  thiamine 100 mg oral tablet: 1 tab(s) orally once a day  Tivicay 50 mg oral tablet: 1 tab(s) orally once a day  Vasotec 2.5 mg oral tablet: 1 tab(s) orally once a day

## 2022-02-17 NOTE — DISCHARGE NOTE PROVIDER - HOSPITAL COURSE
#Discharge: do not delete    72M w/ pertinent history of AIDS (CD4 120, VL undetectable), hx of MI in 2019 no stents, HFrEF, prostate CA s/p radiation, ascending aortic aneurysm, HTN, HLD, DM, CKD (baseline Cr 1.3-1.4), polysubstance abuse who presents after a fall secondary to an episode of diffuse weakness with fever, diarrhea, and non productive cough that has worsened over the past couple of months. Patient was admitted for sepsis workup. Currently the patient is stable, afebrile for 48hrs, infectious work up negative, no longer on antibiotics. PT evaluated the patient and recommend discharge to home.    Hospital course (by problem):   #fever, unknown source  -On admission, with SIRS (fever and HR > 90, + bandemia on peripheral smear)  -given history of HIV, differential included infectious with concern for OI  -patient complaining of chronic cough as well as a few year history of diarrhea s/p prostate CA radiation. Pt denies dysuria, states he has urinary frequency which is his baseline and denies hematuria. Denies rashes/skin infections. No penile discharge. Mild amount of thrush seen at the posterior oropharynx. Reports history of chronic diarrhea, for which he uses immodium  -CT shows bronchiolitis of RUL as well as bibasilar atelectasis.   -CXR without reticular infiltrates, not hypoxemic on RA lower suspicion for PCP.   - BCx NGTD, GI PCR negative. Urine legionella and strep negative  -CMV negative, fungitell negative  -EBV capsid antigen IgG positive and nuclear antigen positive. Toxoplasma negative  - OI work up: Aspergillus, O+P, and quant level - pending  -will d/c CTX and Azithro at this time, no source of infection  -patient now afebrile for 48 hours    #AIDS.   -c/w ART during admission and on discharge  -PCP ppx: Atovaqone for PCP given sulfa allergy   -CD4 120, viral load detectable  -advised patient to continue f/u with PCP for HAART    #oral thrush  -received oral nystatin during admission, no complaints of dysphagia    # Acute kidney injury superimposed on CKD - improved   -patient's baseline Cr from previous admissions appears to be 1.3-1.4  -during admission, Cr elevated to 1.72, and improved after hydration to 1.38  - prerenal BRENNAN, feNA .2% - likely secondary to sepsis/dehydration  -continue to monitor outpatient    #Weakness.   -ddx: secondary to underlying infectious vs. electrolyte derangements in setting of hypomagnesemia.  -improved throughout admission, likely was worsened by sepsis. patient with previous admissions for weakness and syncope with negative work up  -PT evaluated the patient and discharged with no needs  - low suspicion for cardiac cause as does not appear in acute decompensated heart failure based on volume status, pending clinical improvement can repeat  -orthostatic negative (however measured after IV fluids administered in the ED)  -patient has been admitted in the past for work up of multiple syncopal episodes. In the past, no cardiac arrhythmias seen on tele and patient was pending outpatient follow up with EP  -orthostatic negative on admission  -continue to monitor outpatient    #Hematuria.   -pt denies hematuria, however on UA, large blood + RBCs were seen  -given history of HIV, concern for glomerulonephritis  -renal US shows medical renal disease    #concern for BPH  -patient reports a history of constant, small amount of urination throughout the day  -started patient on flomax and recommend outpatient f/u with Urology    #Thrombocytopenia.   -Thrombocytopenia. chronic. likely 2/2 to AIDS. no evidence of petechiae.   -continue to monitor outpatient    #Polysubstance abuse.   - Last use x9 days prior to admission. Utox negative. No s/s of acute withdrawal and out of window    # Heart failure.   -Hypovolemic to euvolemic on exam. NIC  -echo in 9/2021 showed EF 35% with regional wall motion abnormalities  - c/w coreg, imdur as bps HD stable.    Patient was discharged to: [facility]    New medications: Flomax  Changes to old medications: None  Medications that were stopped: None    Items to follow up as outpatient: PCP    Physical exam at the time of discharge:  Constitutional: NAD, comfortable in bed.  HEENT: NC/AT, PERRL, EOMI, no conjunctival pallor or scleral icterus, MMM  Neck: Supple, no JVD. No cervical lymphadenopathy.   Respiratory: CTA B/L. No w/r/r.   Cardiovascular: RRR, normal S1 and S2, no m/r/g.   Gastrointestinal: +BS, soft NTND, no guarding or rebound tenderness, no palpable masses   Extremities: wwp; no cyanosis, clubbing or edema.   Vascular: Pulses equal and strong throughout.   Neurological: AAOx3, no CN deficits, sensation intact throughout. Strength 4/5 throughout.   Skin: No gross skin abnormalities or rashes       #Discharge: do not delete    72M w/ pertinent history of AIDS (CD4 120, VL undetectable), hx of MI in 2019 no stents, HFrEF, prostate CA s/p radiation, ascending aortic aneurysm, HTN, HLD, DM, CKD (baseline Cr 1.3-1.4), polysubstance abuse who presents after a fall secondary to an episode of diffuse weakness with fever, diarrhea, and non productive cough that has worsened over the past couple of months. Patient was admitted for sepsis workup. Currently the patient is stable, afebrile for 48hrs, infectious work up negative, no longer on antibiotics. PT evaluated the patient and recommend discharge to home.    Hospital course (by problem):   #fever, unknown source  -On admission, with SIRS (fever and HR > 90, + bandemia on peripheral smear)  -given history of HIV, differential included infectious with concern for OI  -patient complaining of chronic cough as well as a few year history of diarrhea s/p prostate CA radiation. Pt denies dysuria, states he has urinary frequency which is his baseline and denies hematuria. Denies rashes/skin infections. No penile discharge. Mild amount of thrush seen at the posterior oropharynx. Reports history of chronic diarrhea, for which he uses immodium  -CT shows bronchiolitis of RUL as well as bibasilar atelectasis.   -CXR without reticular infiltrates, not hypoxemic on RA lower suspicion for PCP.   - BCx NGTD, GI PCR negative. Urine legionella and strep negative  -CMV negative, fungitell negative  -EBV capsid antigen IgG positive and nuclear antigen positive. Toxoplasma negative  - OI work up: Aspergillus, O+P, and quant level - pending  -will d/c CTX and Azithro at this time, no source of infection  -patient now afebrile for 48 hours    #AIDS.   -c/w ART during admission and on discharge  -PCP ppx: Atovaqone for PCP given sulfa allergy   -CD4 120, viral load detectable  -advised patient to continue f/u with PCP for HAART    #oral thrush  -received oral nystatin , Nystatin Swish and Swallow for 10 days     # Acute kidney injury superimposed on CKD - improved   -patient's baseline Cr from previous admissions appears to be 1.3-1.4  -during admission, Cr elevated to 1.72, and improved after hydration to 1.38  - prerenal BRENNAN, feNA .2% - likely secondary to sepsis/dehydration  -continue to monitor outpatient    #Weakness.   -ddx: secondary to underlying infectious vs. electrolyte derangements in setting of hypomagnesemia.  -improved throughout admission, likely was worsened by sepsis. patient with previous admissions for weakness and syncope with negative work up  -PT evaluated the patient and discharged with no needs  - low suspicion for cardiac cause as does not appear in acute decompensated heart failure based on volume status, pending clinical improvement can repeat  -orthostatic negative (however measured after IV fluids administered in the ED)  -patient has been admitted in the past for work up of multiple syncopal episodes. In the past, no cardiac arrhythmias seen on tele and patient was pending outpatient follow up with EP  -orthostatic negative on admission  -continue to monitor outpatient    #Hematuria.   -pt denies hematuria, however on UA, large blood + RBCs were seen  -renal US shows medical renal disease    #concern for BPH  -patient reports a history of constant, small amount of urination throughout the day  -started patient on flomax and recommend outpatient f/u with Urology    #Thrombocytopenia.   -Thrombocytopenia. chronic. likely 2/2 to AIDS. no evidence of petechiae.   -continue to monitor outpatient    #Polysubstance abuse.   - Last use x9 days prior to admission. Utox negative. No s/s of acute withdrawal and out of window    # Heart failure.   -Hypovolemic to euvolemic on exam. NIC  -echo in 9/2021 showed EF 35% with regional wall motion abnormalities  - c/w coreg, imdur as bps HD stable.    Patient was discharged to: [facility]    New medications: Flomax  Changes to old medications: None  Medications that were stopped: None    Items to follow up as outpatient: PCP    Physical exam at the time of discharge:  Constitutional: NAD, comfortable in bed.  HEENT: NC/AT, PERRL, EOMI, no conjunctival pallor or scleral icterus, MMM  Neck: Supple, no JVD. No cervical lymphadenopathy.   Respiratory: CTA B/L. No w/r/r.   Cardiovascular: RRR, normal S1 and S2, no m/r/g.   Gastrointestinal: +BS, soft NTND, no guarding or rebound tenderness, no palpable masses   Extremities: wwp; no cyanosis, clubbing or edema.   Vascular: Pulses equal and strong throughout.   Neurological: AAOx3, no CN deficits, sensation intact throughout. Strength 4/5 throughout.   Skin: No gross skin abnormalities or rashes

## 2022-02-17 NOTE — DISCHARGE NOTE PROVIDER - NSDCCPCAREPLAN_GEN_ALL_CORE_FT
PRINCIPAL DISCHARGE DIAGNOSIS  Diagnosis: Fever  Assessment and Plan of Treatment: You were admitted to the hospital secondary to a fever of 102 in the ED. During your admission, we did a CT scan of your chest, which showed possible bronchiolitis, which is inflammation of the airways. You initially received antibiotics. In addition, we checked for different viruses and fungi, which were all negative. We not believe you need further antibiotics at this time. If you develop fevers again at home, please follow up with your PCP or return to the Emergency Department for further evaluation.      SECONDARY DISCHARGE DIAGNOSES  Diagnosis: Generalized weakness  Assessment and Plan of Treatment: You presented to the hospital due to overall body weakness. We believe your weakness was secondary to your fever and infection. Our physical therapist evaluated you and do not believe you need physical therapy on discharge. Please follow up with your PCP within 2 weeks of discharge.    Diagnosis: History of BPH  Assessment and Plan of Treatment: You were complaining of increased urinary frequency and small amounts of urine each time. We believe you may have an increased prostate size, which is contributing to your urinary complaints. In order to help with your urinary complaints, we started you on Flomax, which you will take every night.     PRINCIPAL DISCHARGE DIAGNOSIS  Diagnosis: Fever  Assessment and Plan of Treatment: You were admitted to the hospital secondary to a fever of 102 in the ED. During your admission, we did a CT scan of your chest, which showed possible bronchiolitis, which is inflammation of the airways. You initially received antibiotics. In addition, we checked for different viruses and fungi, which were all negative. We not believe you need further antibiotics at this time. If you develop fevers again at home, please follow up with your PCP or return to the Emergency Department for further evaluation.  We recommend you follow up with your PCP regarding HAART therapy. In addition, we started you on Atovaquone, which you will be taking daily to help prevent further infections.      SECONDARY DISCHARGE DIAGNOSES  Diagnosis: Generalized weakness  Assessment and Plan of Treatment: You presented to the hospital due to overall body weakness. We believe your weakness was secondary to your fever and infection. Our physical therapist evaluated you and do not believe you need physical therapy on discharge. Please follow up with your PCP within 2 weeks of discharge.    Diagnosis: History of BPH  Assessment and Plan of Treatment: You were complaining of increased urinary frequency and small amounts of urine each time. We believe you may have an increased prostate size, which is contributing to your urinary complaints. In order to help with your urinary complaints, we started you on Flomax, which you will take every night.

## 2022-02-17 NOTE — DISCHARGE NOTE PROVIDER - CARE PROVIDER_API CALL
Bobby Duran  Internal Medicine  [address] [place] [address]  Phone: [phone]  Fax: [phone]  Established Patient  Follow Up Time: 1 week

## 2022-02-17 NOTE — DISCHARGE NOTE NURSING/CASE MANAGEMENT/SOCIAL WORK - NSDCPEFALRISK_GEN_ALL_CORE
For information on Fall & Injury Prevention, visit: [email or web address] OR  [email or web address] OR  https://www.cdc.gov/steadi/patient.html

## 2022-02-19 LAB
A FLAVUS AB FLD QL: NEGATIVE — SIGNIFICANT CHANGE UP
A NIGER AB FLD QL: NEGATIVE — SIGNIFICANT CHANGE UP
A NIGER AB FLD QL: NEGATIVE — SIGNIFICANT CHANGE UP
CULTURE RESULTS: SIGNIFICANT CHANGE UP
CULTURE RESULTS: SIGNIFICANT CHANGE UP
SPECIMEN SOURCE: SIGNIFICANT CHANGE UP
SPECIMEN SOURCE: SIGNIFICANT CHANGE UP

## 2022-02-25 ENCOUNTER — APPOINTMENT (OUTPATIENT)
Dept: GASTROENTEROLOGY | Facility: CLINIC | Age: 72
End: 2022-02-25

## 2022-02-26 ENCOUNTER — INPATIENT (INPATIENT)
Facility: HOSPITAL | Age: 72
LOS: 4 days | Discharge: HOME | DRG: 880 | End: 2022-03-03
Attending: PSYCHIATRY & NEUROLOGY | Admitting: PSYCHIATRY & NEUROLOGY
Payer: MEDICARE

## 2022-02-26 VITALS
TEMPERATURE: 99 F | HEART RATE: 78 BPM | WEIGHT: 179.9 LBS | DIASTOLIC BLOOD PRESSURE: 100 MMHG | SYSTOLIC BLOOD PRESSURE: 168 MMHG | OXYGEN SATURATION: 97 % | HEIGHT: 72 IN | RESPIRATION RATE: 18 BRPM

## 2022-02-26 DIAGNOSIS — Z95.9 PRESENCE OF CARDIAC AND VASCULAR IMPLANT AND GRAFT, UNSPECIFIED: Chronic | ICD-10-CM

## 2022-02-26 LAB
ALBUMIN SERPL ELPH-MCNC: 4.5 G/DL — SIGNIFICANT CHANGE UP (ref 3.3–5)
ALP SERPL-CCNC: 69 U/L — SIGNIFICANT CHANGE UP (ref 40–120)
ALT FLD-CCNC: 18 U/L — SIGNIFICANT CHANGE UP (ref 10–45)
AMPHET UR-MCNC: NEGATIVE — SIGNIFICANT CHANGE UP
ANION GAP SERPL CALC-SCNC: 15 MMOL/L — SIGNIFICANT CHANGE UP (ref 5–17)
APAP SERPL-MCNC: <5 UG/ML — LOW (ref 10–30)
APPEARANCE UR: CLEAR — SIGNIFICANT CHANGE UP
AST SERPL-CCNC: 29 U/L — SIGNIFICANT CHANGE UP (ref 10–40)
BACTERIA # UR AUTO: PRESENT /HPF
BARBITURATES UR SCN-MCNC: NEGATIVE — SIGNIFICANT CHANGE UP
BASOPHILS # BLD AUTO: 0.03 K/UL — SIGNIFICANT CHANGE UP (ref 0–0.2)
BASOPHILS NFR BLD AUTO: 0.4 % — SIGNIFICANT CHANGE UP (ref 0–2)
BENZODIAZ UR-MCNC: NEGATIVE — SIGNIFICANT CHANGE UP
BILIRUB SERPL-MCNC: 0.6 MG/DL — SIGNIFICANT CHANGE UP (ref 0.2–1.2)
BILIRUB UR-MCNC: NEGATIVE — SIGNIFICANT CHANGE UP
BUN SERPL-MCNC: 13 MG/DL — SIGNIFICANT CHANGE UP (ref 7–23)
CALCIUM SERPL-MCNC: 10 MG/DL — SIGNIFICANT CHANGE UP (ref 8.4–10.5)
CHLORIDE SERPL-SCNC: 100 MMOL/L — SIGNIFICANT CHANGE UP (ref 96–108)
CO2 SERPL-SCNC: 24 MMOL/L — SIGNIFICANT CHANGE UP (ref 22–31)
COCAINE METAB.OTHER UR-MCNC: POSITIVE
COLOR SPEC: YELLOW — SIGNIFICANT CHANGE UP
CREAT SERPL-MCNC: 1.42 MG/DL — HIGH (ref 0.5–1.3)
DIFF PNL FLD: ABNORMAL
EOSINOPHIL # BLD AUTO: 0.01 K/UL — SIGNIFICANT CHANGE UP (ref 0–0.5)
EOSINOPHIL NFR BLD AUTO: 0.1 % — SIGNIFICANT CHANGE UP (ref 0–6)
EPI CELLS # UR: SIGNIFICANT CHANGE UP /HPF (ref 0–5)
ETHANOL SERPL-MCNC: <10 MG/DL — SIGNIFICANT CHANGE UP (ref 0–10)
GLUCOSE SERPL-MCNC: 172 MG/DL — HIGH (ref 70–99)
GLUCOSE UR QL: NEGATIVE — SIGNIFICANT CHANGE UP
HCT VFR BLD CALC: 43.2 % — SIGNIFICANT CHANGE UP (ref 39–50)
HGB BLD-MCNC: 15.9 G/DL — SIGNIFICANT CHANGE UP (ref 13–17)
IMM GRANULOCYTES NFR BLD AUTO: 0.4 % — SIGNIFICANT CHANGE UP (ref 0–1.5)
KETONES UR-MCNC: ABNORMAL MG/DL
LEUKOCYTE ESTERASE UR-ACNC: NEGATIVE — SIGNIFICANT CHANGE UP
LYMPHOCYTES # BLD AUTO: 0.84 K/UL — LOW (ref 1–3.3)
LYMPHOCYTES # BLD AUTO: 12.1 % — LOW (ref 13–44)
MCHC RBC-ENTMCNC: 33.2 PG — SIGNIFICANT CHANGE UP (ref 27–34)
MCHC RBC-ENTMCNC: 36.8 GM/DL — HIGH (ref 32–36)
MCV RBC AUTO: 90.2 FL — SIGNIFICANT CHANGE UP (ref 80–100)
METHADONE UR-MCNC: NEGATIVE — SIGNIFICANT CHANGE UP
MONOCYTES # BLD AUTO: 0.29 K/UL — SIGNIFICANT CHANGE UP (ref 0–0.9)
MONOCYTES NFR BLD AUTO: 4.2 % — SIGNIFICANT CHANGE UP (ref 2–14)
NEUTROPHILS # BLD AUTO: 5.73 K/UL — SIGNIFICANT CHANGE UP (ref 1.8–7.4)
NEUTROPHILS NFR BLD AUTO: 82.8 % — HIGH (ref 43–77)
NITRITE UR-MCNC: NEGATIVE — SIGNIFICANT CHANGE UP
NRBC # BLD: 0 /100 WBCS — SIGNIFICANT CHANGE UP (ref 0–0)
OPIATES UR-MCNC: NEGATIVE — SIGNIFICANT CHANGE UP
PCP SPEC-MCNC: SIGNIFICANT CHANGE UP
PCP UR-MCNC: NEGATIVE — SIGNIFICANT CHANGE UP
PH UR: 6 — SIGNIFICANT CHANGE UP (ref 5–8)
PLATELET # BLD AUTO: 139 K/UL — LOW (ref 150–400)
POTASSIUM SERPL-MCNC: 3.1 MMOL/L — LOW (ref 3.5–5.3)
POTASSIUM SERPL-SCNC: 3.1 MMOL/L — LOW (ref 3.5–5.3)
PROT SERPL-MCNC: 7.7 G/DL — SIGNIFICANT CHANGE UP (ref 6–8.3)
PROT UR-MCNC: >=300 MG/DL
RBC # BLD: 4.79 M/UL — SIGNIFICANT CHANGE UP (ref 4.2–5.8)
RBC # FLD: 11.9 % — SIGNIFICANT CHANGE UP (ref 10.3–14.5)
RBC CASTS # UR COMP ASSIST: > 10 /HPF
SALICYLATES SERPL-MCNC: <0.3 MG/DL — LOW (ref 2.8–20)
SARS-COV-2 RNA SPEC QL NAA+PROBE: SIGNIFICANT CHANGE UP
SODIUM SERPL-SCNC: 139 MMOL/L — SIGNIFICANT CHANGE UP (ref 135–145)
SP GR SPEC: >=1.03 — SIGNIFICANT CHANGE UP (ref 1–1.03)
THC UR QL: NEGATIVE — SIGNIFICANT CHANGE UP
UROBILINOGEN FLD QL: 0.2 E.U./DL — SIGNIFICANT CHANGE UP
WBC # BLD: 6.93 K/UL — SIGNIFICANT CHANGE UP (ref 3.8–10.5)
WBC # FLD AUTO: 6.93 K/UL — SIGNIFICANT CHANGE UP (ref 3.8–10.5)
WBC UR QL: < 5 /HPF — SIGNIFICANT CHANGE UP

## 2022-02-26 PROCEDURE — 93010 ELECTROCARDIOGRAM REPORT: CPT

## 2022-02-26 PROCEDURE — 99285 EMERGENCY DEPT VISIT HI MDM: CPT

## 2022-02-26 PROCEDURE — 90792 PSYCH DIAG EVAL W/MED SRVCS: CPT

## 2022-02-26 RX ORDER — AMLODIPINE BESYLATE 2.5 MG/1
2.5 TABLET ORAL ONCE
Refills: 0 | Status: COMPLETED | OUTPATIENT
Start: 2022-02-26 | End: 2022-02-26

## 2022-02-26 RX ORDER — POTASSIUM CHLORIDE 20 MEQ
40 PACKET (EA) ORAL ONCE
Refills: 0 | Status: COMPLETED | OUTPATIENT
Start: 2022-02-26 | End: 2022-02-26

## 2022-02-26 RX ADMIN — AMLODIPINE BESYLATE 2.5 MILLIGRAM(S): 2.5 TABLET ORAL at 18:29

## 2022-02-26 RX ADMIN — Medication 40 MILLIEQUIVALENT(S): at 18:54

## 2022-02-26 NOTE — ED ADULT TRIAGE NOTE - OTHER COMPLAINTS
Pt BIBEMS co SI and anxiety. Hx of SI attempt in the past. Denies alcohol/ drug use today. 1:! initiated, security paged for wanding. Pt BIBEMS co SI and anxiety. Hx of SI attempt in the past. Pt tearful in triage. Denies alcohol/ drug use today. 1:1 initiated, security paged for wanding.

## 2022-02-26 NOTE — ED ADULT NURSE NOTE - OTHER COMPLAINTS
Pt BIBEMS co SI and anxiety. Hx of SI attempt in the past. Pt tearful in triage. Denies alcohol/ drug use today. 1:1 initiated, security paged for wanding.
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
## 2022-02-26 NOTE — ED PROVIDER NOTE - PHYSICAL EXAMINATION
CONSTITUTIONAL: Well-appearing;  in no apparent distress.   HEAD: Normocephalic; atraumatic.   EYES: PERRL; EOM intact; conjunctiva and sclera clear  ENT: normal nose; no rhinorrhea; normal pharynx with no erythema or lesions.   NECK: Supple; non-tender; no LAD  CARDIOVASCULAR: Normal S1, S2; No audible murmurs. Regular rate and rhythm.   RESPIRATORY: Breathing easily; breath sounds clear and equal bilaterally; no wheezes, rhonchi, or rales.  GI: Soft; non-distended; non-tender; no palpable organomegaly.   MSK: FROM at all extremities, normal tone   EXT: No cyanosis or edema; N/V intact  SKIN: Normal for age and race; warm; dry; good turgor; no apparent lesions or rash.   NEURO: A & O x 3; face symmetric; grossly unremarkable.   PSYCHOLOGICAL: sad affect

## 2022-02-26 NOTE — ED ADULT NURSE NOTE - OBJECTIVE STATEMENT
Pt presents to ED c/o suicidal thoughts x 1 year. States "I had a panic attack before I came in. I got very panicky, my heart was racing and I felt lost". Reports thoughts of slitting wrist. Denies auditory/visual hallucinations. Feels depressed and anxious. Endorses cocaine use 2 days ago. Denies ETOH/drug use today.

## 2022-02-26 NOTE — ED PROVIDER NOTE - ATTENDING CONTRIBUTION TO CARE
I discussed the plan of care of the patient directly with the PA while the patient was in the Emergency Department. VSS, endorses SI, calm cooperative in the ED. I have reviewed the ACP note and agree with the history, exam and plan of care.

## 2022-02-26 NOTE — ED ADULT TRIAGE NOTE - CHIEF COMPLAINT QUOTE
"I had a panic attack, I want to cut my wrists" "I had a panic attack, I want to cut my wrists and die"

## 2022-02-26 NOTE — ED PROVIDER NOTE - CLINICAL SUMMARY MEDICAL DECISION MAKING FREE TEXT BOX
72M with pmh of AIDS (CD4 120, VL undetectable), hx of MI in 2019 no stents, HFrEF, prostate CA s/p radiation, ascending aortic aneurysm, HTN, HLD, DM, CKD (baseline Cr 1.3-1.4), polysubstance abuse, depression, panic attacks, c/o having a panic attack earlier today and chronic SI. No AH/VH, no HI. Reports cocaine use, last used 2 days ago. Offers no physical complaints. VSS. Well appearing, sad affect. PE unremarkable. Labs, psyc  eval. 72M with pmh of AIDS (CD4 120, VL undetectable), hx of MI in 2019 no stents, HFrEF, prostate CA s/p radiation, ascending aortic aneurysm, HTN, HLD, DM, CKD (baseline Cr 1.3-1.4), polysubstance abuse, depression, panic attacks, c/o having a panic attack earlier today and chronic SI. No AH/VH, no HI. Reports cocaine use, last used 2 days ago. Offers no physical complaints. Well appearing, sad affect. PE unremarkable. Labs, psyc  eval.

## 2022-02-26 NOTE — ED ADULT NURSE REASSESSMENT NOTE - NS ED NURSE REASSESS COMMENT FT1
Patient placed on constant observation. Security at bedside. Pt wanded. Pt changed into gown. All belongings secured with security. ED tech at bedside. All ligature risks removed. All safety precautions maintained.

## 2022-02-27 ENCOUNTER — EMERGENCY (EMERGENCY)
Facility: HOSPITAL | Age: 72
LOS: 1 days | Discharge: HOME | End: 2022-02-27
Attending: EMERGENCY MEDICINE | Admitting: EMERGENCY MEDICINE
Payer: MEDICARE

## 2022-02-27 VITALS
HEIGHT: 72 IN | HEART RATE: 63 BPM | WEIGHT: 171.96 LBS | OXYGEN SATURATION: 99 % | SYSTOLIC BLOOD PRESSURE: 116 MMHG | DIASTOLIC BLOOD PRESSURE: 77 MMHG | TEMPERATURE: 98 F | RESPIRATION RATE: 17 BRPM

## 2022-02-27 VITALS
SYSTOLIC BLOOD PRESSURE: 137 MMHG | OXYGEN SATURATION: 99 % | TEMPERATURE: 98 F | DIASTOLIC BLOOD PRESSURE: 81 MMHG | RESPIRATION RATE: 16 BRPM | HEART RATE: 77 BPM

## 2022-02-27 DIAGNOSIS — F14.10 COCAINE ABUSE, UNCOMPLICATED: ICD-10-CM

## 2022-02-27 DIAGNOSIS — Z95.9 PRESENCE OF CARDIAC AND VASCULAR IMPLANT AND GRAFT, UNSPECIFIED: Chronic | ICD-10-CM

## 2022-02-27 DIAGNOSIS — F41.1 GENERALIZED ANXIETY DISORDER: ICD-10-CM

## 2022-02-27 DIAGNOSIS — F33.2 MAJOR DEPRESSIVE DISORDER, RECURRENT SEVERE WITHOUT PSYCHOTIC FEATURES: ICD-10-CM

## 2022-02-27 LAB
ANION GAP SERPL CALC-SCNC: 11 MMOL/L — SIGNIFICANT CHANGE UP (ref 5–17)
BUN SERPL-MCNC: 21 MG/DL — SIGNIFICANT CHANGE UP (ref 7–23)
CALCIUM SERPL-MCNC: 9.1 MG/DL — SIGNIFICANT CHANGE UP (ref 8.4–10.5)
CHLORIDE SERPL-SCNC: 99 MMOL/L — SIGNIFICANT CHANGE UP (ref 96–108)
CO2 SERPL-SCNC: 23 MMOL/L — SIGNIFICANT CHANGE UP (ref 22–31)
CREAT SERPL-MCNC: 1.58 MG/DL — HIGH (ref 0.5–1.3)
GLUCOSE SERPL-MCNC: 159 MG/DL — HIGH (ref 70–99)
HCT VFR BLD CALC: 39.7 % — SIGNIFICANT CHANGE UP (ref 39–50)
HGB BLD-MCNC: 14.3 G/DL — SIGNIFICANT CHANGE UP (ref 13–17)
MCHC RBC-ENTMCNC: 33 PG — SIGNIFICANT CHANGE UP (ref 27–34)
MCHC RBC-ENTMCNC: 36 GM/DL — SIGNIFICANT CHANGE UP (ref 32–36)
MCV RBC AUTO: 91.7 FL — SIGNIFICANT CHANGE UP (ref 80–100)
NRBC # BLD: 0 /100 WBCS — SIGNIFICANT CHANGE UP (ref 0–0)
PLATELET # BLD AUTO: 151 K/UL — SIGNIFICANT CHANGE UP (ref 150–400)
POTASSIUM SERPL-MCNC: 3.7 MMOL/L — SIGNIFICANT CHANGE UP (ref 3.5–5.3)
POTASSIUM SERPL-SCNC: 3.7 MMOL/L — SIGNIFICANT CHANGE UP (ref 3.5–5.3)
RBC # BLD: 4.33 M/UL — SIGNIFICANT CHANGE UP (ref 4.2–5.8)
RBC # FLD: 12 % — SIGNIFICANT CHANGE UP (ref 10.3–14.5)
SARS-COV-2 RNA SPEC QL NAA+PROBE: SIGNIFICANT CHANGE UP
SODIUM SERPL-SCNC: 133 MMOL/L — LOW (ref 135–145)
TROPONIN T SERPL-MCNC: 0.01 NG/ML — SIGNIFICANT CHANGE UP (ref 0–0.01)
WBC # BLD: 7.43 K/UL — SIGNIFICANT CHANGE UP (ref 3.8–10.5)
WBC # FLD AUTO: 7.43 K/UL — SIGNIFICANT CHANGE UP (ref 3.8–10.5)

## 2022-02-27 PROCEDURE — 80048 BASIC METABOLIC PNL TOTAL CA: CPT

## 2022-02-27 PROCEDURE — U0003: CPT

## 2022-02-27 PROCEDURE — 93005 ELECTROCARDIOGRAM TRACING: CPT

## 2022-02-27 PROCEDURE — 99285 EMERGENCY DEPT VISIT HI MDM: CPT | Mod: 25

## 2022-02-27 PROCEDURE — 84484 ASSAY OF TROPONIN QUANT: CPT

## 2022-02-27 PROCEDURE — U0005: CPT

## 2022-02-27 PROCEDURE — 99221 1ST HOSP IP/OBS SF/LOW 40: CPT

## 2022-02-27 PROCEDURE — 93010 ELECTROCARDIOGRAM REPORT: CPT

## 2022-02-27 PROCEDURE — 36415 COLL VENOUS BLD VENIPUNCTURE: CPT

## 2022-02-27 PROCEDURE — 99284 EMERGENCY DEPT VISIT MOD MDM: CPT

## 2022-02-27 PROCEDURE — 85027 COMPLETE CBC AUTOMATED: CPT

## 2022-02-27 RX ORDER — ATORVASTATIN CALCIUM 80 MG/1
20 TABLET, FILM COATED ORAL AT BEDTIME
Refills: 0 | Status: DISCONTINUED | OUTPATIENT
Start: 2022-02-27 | End: 2022-03-03

## 2022-02-27 RX ORDER — CARVEDILOL PHOSPHATE 80 MG/1
6.25 CAPSULE, EXTENDED RELEASE ORAL EVERY 12 HOURS
Refills: 0 | Status: DISCONTINUED | OUTPATIENT
Start: 2022-02-27 | End: 2022-03-03

## 2022-02-27 RX ORDER — ISOSORBIDE MONONITRATE 60 MG/1
30 TABLET, EXTENDED RELEASE ORAL DAILY
Refills: 0 | Status: DISCONTINUED | OUTPATIENT
Start: 2022-02-27 | End: 2022-03-03

## 2022-02-27 RX ORDER — HEPARIN SODIUM 5000 [USP'U]/ML
5000 INJECTION INTRAVENOUS; SUBCUTANEOUS EVERY 8 HOURS
Refills: 0 | Status: DISCONTINUED | OUTPATIENT
Start: 2022-02-27 | End: 2022-03-03

## 2022-02-27 RX ORDER — ACETAMINOPHEN 500 MG
650 TABLET ORAL EVERY 6 HOURS
Refills: 0 | Status: DISCONTINUED | OUTPATIENT
Start: 2022-02-27 | End: 2022-03-03

## 2022-02-27 RX ORDER — POTASSIUM CHLORIDE 20 MEQ
20 PACKET (EA) ORAL
Refills: 0 | Status: DISCONTINUED | OUTPATIENT
Start: 2022-02-27 | End: 2022-02-27

## 2022-02-27 RX ORDER — NYSTATIN 500MM UNIT
500000 POWDER (EA) MISCELLANEOUS
Refills: 0 | Status: DISCONTINUED | OUTPATIENT
Start: 2022-02-27 | End: 2022-03-03

## 2022-02-27 RX ORDER — TRAZODONE HCL 50 MG
50 TABLET ORAL AT BEDTIME
Refills: 0 | Status: DISCONTINUED | OUTPATIENT
Start: 2022-02-27 | End: 2022-03-03

## 2022-02-27 RX ORDER — METOPROLOL TARTRATE 50 MG
25 TABLET ORAL ONCE
Refills: 0 | Status: COMPLETED | OUTPATIENT
Start: 2022-02-27 | End: 2022-02-27

## 2022-02-27 RX ORDER — ARIPIPRAZOLE 15 MG/1
2 TABLET ORAL DAILY
Refills: 0 | Status: DISCONTINUED | OUTPATIENT
Start: 2022-02-27 | End: 2022-03-03

## 2022-02-27 RX ORDER — DOLUTEGRAVIR SODIUM 25 MG/1
50 TABLET, FILM COATED ORAL DAILY
Refills: 0 | Status: DISCONTINUED | OUTPATIENT
Start: 2022-02-27 | End: 2022-03-03

## 2022-02-27 RX ORDER — ESCITALOPRAM OXALATE 10 MG/1
5 TABLET, FILM COATED ORAL DAILY
Refills: 0 | Status: DISCONTINUED | OUTPATIENT
Start: 2022-02-27 | End: 2022-03-03

## 2022-02-27 RX ORDER — AMLODIPINE BESYLATE 2.5 MG/1
2.5 TABLET ORAL DAILY
Refills: 0 | Status: DISCONTINUED | OUTPATIENT
Start: 2022-02-27 | End: 2022-03-03

## 2022-02-27 RX ORDER — METOPROLOL TARTRATE 50 MG
25 TABLET ORAL
Refills: 0 | Status: DISCONTINUED | OUTPATIENT
Start: 2022-02-27 | End: 2022-02-27

## 2022-02-27 RX ORDER — ENOXAPARIN SODIUM 100 MG/ML
40 INJECTION SUBCUTANEOUS DAILY
Refills: 0 | Status: DISCONTINUED | OUTPATIENT
Start: 2022-02-27 | End: 2022-02-27

## 2022-02-27 RX ORDER — HYDROXYZINE HCL 10 MG
25 TABLET ORAL EVERY 8 HOURS
Refills: 0 | Status: DISCONTINUED | OUTPATIENT
Start: 2022-02-27 | End: 2022-03-03

## 2022-02-27 RX ORDER — ATOVAQUONE 750 MG/5ML
750 SUSPENSION ORAL DAILY
Refills: 0 | Status: DISCONTINUED | OUTPATIENT
Start: 2022-02-27 | End: 2022-03-03

## 2022-02-27 RX ORDER — ASPIRIN/CALCIUM CARB/MAGNESIUM 324 MG
81 TABLET ORAL DAILY
Refills: 0 | Status: DISCONTINUED | OUTPATIENT
Start: 2022-02-27 | End: 2022-03-03

## 2022-02-27 RX ORDER — TAMSULOSIN HYDROCHLORIDE 0.4 MG/1
0.4 CAPSULE ORAL AT BEDTIME
Refills: 0 | Status: DISCONTINUED | OUTPATIENT
Start: 2022-02-27 | End: 2022-03-03

## 2022-02-27 RX ORDER — FOLIC ACID 0.8 MG
1 TABLET ORAL DAILY
Refills: 0 | Status: DISCONTINUED | OUTPATIENT
Start: 2022-02-27 | End: 2022-03-03

## 2022-02-27 RX ORDER — EMTRICITABINE AND TENOFOVIR DISOPROXIL FUMARATE 200; 300 MG/1; MG/1
1 TABLET, FILM COATED ORAL DAILY
Refills: 0 | Status: DISCONTINUED | OUTPATIENT
Start: 2022-02-27 | End: 2022-03-03

## 2022-02-27 RX ADMIN — Medication 50 MILLIGRAM(S): at 01:57

## 2022-02-27 RX ADMIN — AMLODIPINE BESYLATE 2.5 MILLIGRAM(S): 2.5 TABLET ORAL at 11:25

## 2022-02-27 RX ADMIN — CARVEDILOL PHOSPHATE 6.25 MILLIGRAM(S): 80 CAPSULE, EXTENDED RELEASE ORAL at 10:41

## 2022-02-27 RX ADMIN — Medication 81 MILLIGRAM(S): at 11:25

## 2022-02-27 RX ADMIN — Medication 50 MILLIGRAM(S): at 23:52

## 2022-02-27 RX ADMIN — Medication 2.5 MILLIGRAM(S): at 11:25

## 2022-02-27 RX ADMIN — TAMSULOSIN HYDROCHLORIDE 0.4 MILLIGRAM(S): 0.4 CAPSULE ORAL at 21:45

## 2022-02-27 RX ADMIN — HEPARIN SODIUM 5000 UNIT(S): 5000 INJECTION INTRAVENOUS; SUBCUTANEOUS at 21:43

## 2022-02-27 RX ADMIN — DOLUTEGRAVIR SODIUM 50 MILLIGRAM(S): 25 TABLET, FILM COATED ORAL at 10:43

## 2022-02-27 RX ADMIN — EMTRICITABINE AND TENOFOVIR DISOPROXIL FUMARATE 1 TABLET(S): 200; 300 TABLET, FILM COATED ORAL at 10:43

## 2022-02-27 RX ADMIN — ESCITALOPRAM OXALATE 5 MILLIGRAM(S): 10 TABLET, FILM COATED ORAL at 11:25

## 2022-02-27 RX ADMIN — ATOVAQUONE 750 MILLIGRAM(S): 750 SUSPENSION ORAL at 11:25

## 2022-02-27 RX ADMIN — ATORVASTATIN CALCIUM 20 MILLIGRAM(S): 80 TABLET, FILM COATED ORAL at 21:44

## 2022-02-27 RX ADMIN — Medication 1 MILLIGRAM(S): at 11:25

## 2022-02-27 RX ADMIN — ARIPIPRAZOLE 2 MILLIGRAM(S): 15 TABLET ORAL at 10:40

## 2022-02-27 RX ADMIN — ISOSORBIDE MONONITRATE 30 MILLIGRAM(S): 60 TABLET, EXTENDED RELEASE ORAL at 10:43

## 2022-02-27 RX ADMIN — Medication 500000 UNIT(S): at 21:44

## 2022-02-27 RX ADMIN — Medication 500000 UNIT(S): at 10:42

## 2022-02-27 RX ADMIN — CARVEDILOL PHOSPHATE 6.25 MILLIGRAM(S): 80 CAPSULE, EXTENDED RELEASE ORAL at 23:51

## 2022-02-27 RX ADMIN — Medication 25 MILLIGRAM(S): at 02:01

## 2022-02-27 NOTE — ED PROVIDER NOTE - PROGRESS NOTE DETAILS
Labs notable for slight increase in cr o/w wnl, trop neg.  Pt discussed w Dr Rae - he suspects pt w vagal related syncope and feels pt can be seen as consult on psych.  Psych paged to discuss.  Pt medically clear. Pt ambulating in ed w/o difficulty; discussed w psych - ok for return to psych floor.  Plan 2nd trop; if neg, will dc back to 8 [facility]. Labs notable for slight increase in cr o/w wnl, trop neg.  Pt discussed w Dr Rae (EP) - he suspects pt w vagal related syncope and feels pt can be seen as consult by EP on psych.  Psych paged to discuss. 2nd trop neg.  Pt medically clear for psych admit and return to psych floor.

## 2022-02-27 NOTE — BH INPATIENT PSYCHIATRY ASSESSMENT NOTE - CURRENT MEDICATION
MEDICATIONS  (STANDING):  amLODIPine   Tablet 2.5 milliGRAM(s) Oral daily  ARIPiprazole 2 milliGRAM(s) Oral daily  aspirin  chewable 81 milliGRAM(s) Oral daily  atorvastatin 20 milliGRAM(s) Oral at bedtime  atovaquone  Suspension 750 milliGRAM(s) Oral daily  carvedilol 6.25 milliGRAM(s) Oral every 12 hours  dolutegravir 50 milliGRAM(s) Oral daily  emtricitabine 200 mG/tenofovir alafenamide 25 mG (DESCOVY) Tablet 1 Tablet(s) Oral daily  enalapril 2.5 milliGRAM(s) Oral daily  escitalopram 5 milliGRAM(s) Oral daily  folic acid 1 milliGRAM(s) Oral daily  heparin   Injectable 5000 Unit(s) SubCutaneous every 8 hours  isosorbide   mononitrate ER Tablet (IMDUR) 30 milliGRAM(s) Oral daily  nystatin    Suspension [id number] Unit(s) Oral four times a day  tamsulosin 0.4 milliGRAM(s) Oral at bedtime    MEDICATIONS  (PRN):  acetaminophen     Tablet .. 650 milliGRAM(s) Oral every 6 hours PRN Temp greater or equal to 38C (100.4F), Mild Pain (1 - 3), Moderate Pain (4 - 6)  hydrOXYzine hydrochloride 25 milliGRAM(s) Oral every 8 hours PRN Anxiety  traZODone 50 milliGRAM(s) Oral at bedtime PRN sleep

## 2022-02-27 NOTE — ED ADULT TRIAGE NOTE - CHIEF COMPLAINT QUOTE
Pt coming from [facility], admitted for SI. Reports talking to psychiatrist while sitting and "felt like I was going to pass out but then they left the room." Pt found on the floor. Unsure if he hit his head. Pt reports chest pain, lightheadedness at this time. Denies headache, n/v, dizziness, no slurred speech. Pt also noted to have neon yellow on mustache and under lip, reports "I eat a lot of cheetos." 1:1 initiated in triage.

## 2022-02-27 NOTE — BH INPATIENT PSYCHIATRY ASSESSMENT NOTE - NSBHMETABOLIC_PSY_ALL_CORE_FT
BMI: BMI (kg/m2): 23.3 (02-27-22 @ 12:38)  HbA1c: A1C with Estimated Average Glucose Result: 6.1 % (11-07-21 @ 08:04)    Glucose: POCT Blood Glucose.: 219 mg/dL (12-16-21 @ 17:58)    BP: 158/99 (02-27-22 @ 08:30) (148/88 - 173/114)  Lipid Panel: Date/Time: 11-07-21 @ 08:17  Cholesterol, Serum: 110  Direct LDL: --  HDL Cholesterol, Serum: 47  Total Cholesterol/HDL Ration Measurement: --  Triglycerides, Serum: 89
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
## 2022-02-27 NOTE — ED BEHAVIORAL HEALTH ASSESSMENT NOTE - AXIS III
HIV, prostate cancer s/p radiation with urinary incontinence, hx of MI in 2019 no stents, HFrEF, prostate CA s/p radiation, ascending aortic aneurysm, HTN, HLD, DM, CKD (baseline Cr 1.3-1.4)

## 2022-02-27 NOTE — ED PROVIDER NOTE - PATIENT PORTAL LINK FT
You can access the FollowMyHealth Patient Portal offered by [facility] by registering at the following website: [email or web address]. By joining [facility]’s FollowMyHealth portal, you will also be able to view your health information using other applications (apps) compatible with our system.

## 2022-02-27 NOTE — ED PROVIDER NOTE - CLINICAL SUMMARY MEDICAL DECISION MAKING FREE TEXT BOX
Pt w h/o mult med problems including syncope sent from psych floor for syncope - reports he felt fatigued w + prodrome.  Pt w neg card eval in Nov 21 for syncope.  VSS, ekg w/o ischemic changes.  Plan labs, cardiac monitor, discuss w ep since prior eval had plan for outpt monitoring after neg inpt eval.  Poss dc back to psych.

## 2022-02-27 NOTE — BH CHART NOTE - NSEVENTNOTEFT_PSY_ALL_CORE
Pt found on the floor by nursing today s/p an unwitnessed episode of syncope in the setting of worsening dizziness and lightheadedness.  Subjectively, pt reported feeling unwell this morning and despite oral rehydration continued to endorse feeling ill. Denied current chest pain or shortness of breath.  Objectively, appeared pale and fatigued, with increased RR and subjective experience of near syncope and anxiety. He received his BP medications (unchanged from prior discharge summary on 2/17).    Vitals s/p syncope:  104/63 p 87 sitting  94/62 p89 standing    Writer call the ED and notified  Director as well as charge nurse.
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
## 2022-02-27 NOTE — BH INPATIENT PSYCHIATRY ASSESSMENT NOTE - NSBHCHARTREVIEWVS_PSY_A_CORE FT
Vital Signs Last 24 Hrs  T(C): 36.4 (02-27-22 @ 17:07), Max: 36.9 (02-27-22 @ 08:30)  T(F): 97.6 (02-27-22 @ 17:07), Max: 98.4 (02-27-22 @ 08:30)  HR: 77 (02-27-22 @ 17:07) (63 - 107)  BP: 137/81 (02-27-22 @ 17:07) (116/77 - 173/114)  BP(mean): --  RR: 16 (02-27-22 @ 17:07) (16 - 18)  SpO2: 99% (02-27-22 @ 17:07) (95% - 99%)

## 2022-02-27 NOTE — CHART NOTE - NSCHARTNOTEFT_GEN_A_CORE
[facility]  PHYSICAL EXAM: Agree/Declined    VITALS: T(C): 36.8 (02-27-22 @ 12:38), Max: 37.3 (02-26-22 @ 17:14)  HR: 63 (02-27-22 @ 12:38) (63 - 107)  BP: 116/77 (02-27-22 @ 12:38) (116/77 - 173/114)  RR: 17 (02-27-22 @ 12:38) (17 - 18)  SpO2: 99% (02-27-22 @ 12:38) (95% - 99%)      GENERAL: NAD, comfortable, ambulating  HEAD:  Atraumatic, Normocephalic  EYES: EOMI, PERRLA, conjunctiva and sclera clear  ENT: Moist mucous membranes  NECK: Supple, No JVD  CHEST/LUNG: Clear to auscultation bilaterally; No rales, rhonchi, wheezing, or rubs. Unlabored respirations  HEART: Regular rate and rhythm; No murmurs, rubs, or gallops  ABDOMEN: BSx4; Soft, nontender, nondistended  EXTREMITIES:  No clubbing, cyanosis, or edema  NERVOUS SYSTEM:  A&Ox3, no focal deficits   SKIN: No rashes or lesions PHYSICAL EXAM: Agree    VITALS: T(C): 36.8 (02-27-22 @ 12:38), Max: 37.3 (02-26-22 @ 17:14)  HR: 63 (02-27-22 @ 12:38) (63 - 107)  BP: 116/77 (02-27-22 @ 12:38) (116/77 - 173/114)  RR: 17 (02-27-22 @ 12:38) (17 - 18)  SpO2: 99% (02-27-22 @ 12:38) (95% - 99%)      GENERAL: NAD, comfortable, ambulating  HEAD:  Atraumatic, Normocephalic  EYES: EOMI, PERRLA, conjunctiva and sclera clear  ENT: Moist mucous membranes  NECK: Supple, No JVD  CHEST/LUNG: Clear to auscultation bilaterally; No rales, rhonchi, wheezing, or rubs. Unlabored respirations  HEART: Regular rate and rhythm; No murmurs, rubs, or gallops  ABDOMEN: BSx4; Soft, nontender, nondistended  EXTREMITIES:  No clubbing, cyanosis, or edema  NERVOUS SYSTEM:  A&Ox3, no focal deficits   SKIN: No rashes or lesions

## 2022-02-27 NOTE — ED PROVIDER NOTE - NS ED ATTENDING STATEMENT MOD
S: 79 y.o. female with   Chief Complaint   Patient presents with    Medicare AWV       HPI: please see resident note for HPI and ROS. BP Readings from Last 3 Encounters:   02/14/22 130/84   12/02/21 134/62   11/22/21 130/80     Wt Readings from Last 3 Encounters:   02/14/22 272 lb (123.4 kg)   12/02/21 278 lb 3.2 oz (126.2 kg)   11/22/21 273 lb 12.8 oz (124.2 kg)       O: VS:  height is 5' 4\" (1.626 m) and weight is 272 lb (123.4 kg). Her temporal temperature is 96.9 °F (36.1 °C). Her blood pressure is 130/84 and her pulse is 64. Her respiration is 20 and oxygen saturation is 98%. Diagnosis Orders   1. Routine general medical examination at a [facility] care facility  [facility] Referral to [address]   2. Primary osteoarthritis of both hips  [facility] - Rui Keys MD, Orthopedic Surgery, [address] [facility]   3. Paroxysmal atrial fibrillation (HCC)     4. Body mass index (BMI) 45.0-49.9, adult (HCC)         Plan:  Please refer to resident note for full plan    Primary osteoarthritis of bilateral hips: Patient continues to have significant issues with bilateral hip arthritis. Will discontinue p.o. Voltaren secondary to kidney function concerns, continue use topical Voltaren and capsaicin as needed. We will send referral to [facility]  orthopedics for possible injection versus other treatment. Educated patient about aquatic therapy. Atrial fibrillation: Chronic stable. Status post pacemaker. Continue to follow-up with cardiology at [facility] as well as cardiology at [facility]. Consider ablation in the future. Continue treatment with flecainide as prescribed by cardiology. Depression screen: Depression screen negative. No concerns this time. PHQ-9 Total Score: 0 (2/14/2022  1:14 PM)    Referral will be sent to ACP specialist to assist with advanced care planning.       Health Maintenance Due   Topic Date Due    COVID-19 Vaccine (3 - Booster for Moderna series) 08/24/2021  Annual Wellness Visit (AWV)  02/05/2022       Attending Physician Statement  I have discussed the case, including pertinent history and exam findings with the resident. I agree with the documented assessment and plan as documented by the resident.   JORGE Chua DO 2/14/2022 1:56 PM Attending Only

## 2022-02-27 NOTE — ED BEHAVIORAL HEALTH ASSESSMENT NOTE - RISK ASSESSMENT
Low Acute Suicide Risk Chronic risk factors of harming self include male gender, advanced age, psychiatric disorder, substance use disorder, prior admissions, and medication non-compliance. The risks are acutely elevated with worsening symptoms in the setting of medication non-compliance with can be mitigated by inpatient admission.

## 2022-02-27 NOTE — ED PROVIDER NOTE - PHYSICAL EXAMINATION
VITAL SIGNS: I have reviewed nursing notes and confirm.  CONSTITUTIONAL: Well-developed; well-nourished; in no acute distress.   SKIN:  warm and dry, no acute rash.   HEAD:  normocephalic, atraumatic.  EYES: PERRL, EOM intact; conjunctiva and sclera clear.  ENT: No nasal discharge; airway clear.   NECK: Supple; non tender.  CARD: S1, S2 normal; no murmurs, gallops, or rubs. Regular rate and rhythm.   RESP:  Clear to auscultation b/l, no wheezes, rales or rhonchi.  ABD: Normal bowel sounds; soft; non-distended; non-tender; no guarding/ rebound.  MSK: Normal ROM. No clubbing, cyanosis or edema. no ttp bilat le  NEURO: Alert, oriented, grossly unremarkable  PSYCH: Cooperative, mood and affect appropriate.

## 2022-02-27 NOTE — BH INPATIENT PSYCHIATRY ASSESSMENT NOTE - NSBHASSESSSUMMFT_PSY_ALL_CORE
Pt is a 73 yo male h/o AIDS (CD4 120, VL undetectable, on meds), chronic cp unchanged today, CAD, s/p MI in 2019 (cath 7/21 nl LM, 30% LAD, luminal irreg L Cx, RCA), HFrEF (ef 35-40%), ascending aortic aneurysm, HTN, HLD, DM, CKD (baseline Cr 1.3-1.4), prostate CA s/p radiation resulting in chronic diarrhea, polysubstance abuse, depression, panic attacks, recently admitted 2/14-17 for fever, diarrhea, cough w/o known source and neg eval, admitted in Nov for syncope w neg eval including cta w stable ascending aortic aneurysm, neg for pe, ep consult who recommended Ziopatch as outpt (pt did not fu as outpt), admitted to 8U  for anxiety and SI. Hospital course c/b syncopal episode with hypotension.    Plan:  c/w admission on 9.13 voluntary status  q15 min checks  c/w lexapro 5 mg po daily with a plan to titrate up  c/w abilify 2 mg po daily with a plan to titrate up    c/w outpatient medications:     nystatin 100,000 units/mL oral suspension: 4 milliliter(s) orally- Swish and Swallow  4 times a day   atovaquone 750 mg/5 mL oral suspension: 10 milliliter(s) orally once a day  tamsulosin 0.4 mg oral capsule: 1 cap(s) orally once a day (at bedtime)  thiamine 100 mg oral tablet: 1 tab(s) orally once a day  folic acid 1 mg oral tablet: 1 tab(s) orally once a day  Multiple Vitamins oral tablet: 1 tab(s) orally once a day   carvedilol 6.25 mg oral tablet: 1 tab(s) orally every 12 hours  Vasotec 2.5 mg oral tablet: 1 tab(s) orally once a day  Tivicay 50 mg oral tablet: 1 tab(s) orally once a day  amLODIPine 2.5 mg oral tablet: 1 tab(s) orally once a day   isosorbide mononitrate 30 mg oral tablet, extended release: 1 tab(s) orally once a day   aspirin 81 mg oral delayed release tablet: 1 tab(s) orally once a day  pravastatin 80 mg oral tablet: 1 tab(s) orally once a day (at bedtime)  Descovy 200 mg-25 mg oral tablet: 1 tab(s) orally once a day    Please consult EP cardiology in am re: further recommendations and outpatient follow up

## 2022-02-27 NOTE — BH INPATIENT PSYCHIATRY ASSESSMENT NOTE - RISK ASSESSMENT
Chronic risk factors of harming self include male gender, advanced age, psychiatric disorder, substance use disorder, prior admissions, and medication non-compliance. The risks are acutely elevated with worsening symptoms in the setting of medication non-compliance with can be mitigated by inpatient admission.  Currently on the unit, low risk and the pt appears future oriented and denies any SI/HI/AVH, does not exhibit any acute mood symptoms albeit appears to be medically fragile

## 2022-02-27 NOTE — BH INPATIENT PSYCHIATRY ASSESSMENT NOTE - NSBHCRANIAL_PSY_ALL_CORE
Recognizes 2 fingers or can read (II)/Normal speech (IX, X, XII)/EOMI (III, IV, VI)/Hearing intact (VIII)

## 2022-02-27 NOTE — ED PROVIDER NOTE - OBJECTIVE STATEMENT
73 yo male h/o AIDS (CD4 120, VL undetectable, on meds), chronic cp unchanged today, CAD, s/p MI in 2019 (cath 7/21 nl LM, 30% LAD, luminal irreg L Cx, RCA), HFrEF (ef 35-40%), ascending aortic aneurysm, HTN, HLD, DM, CKD (baseline Cr 1.3-1.4), prostate CA s/p radiation resulting in chronic diarrhea, polysubstance abuse, depression, panic attacks, admitted to psych yest for anxiety and SI, recently admitted 2/14-17 for fever, diarrhea, cough w/o known source and neg eval, admitted in nov for syncope w neg eval including cta w stable ascending aortic aneurysm, neg for pe, ep consult who recommended Ziopatch as outpt (pt did not fu as outpt), sent from psych for syncope.  Pt reports he felt v fatigued and lh as if he might pass out, told the psychiatrist and then passed out.  Pt now c/o ongoing severe fatigue.  No change in chronic cp, no sob, palpitations, ha, change in vision/speech/gait, numbness or weakness in ext, abd pain, n/v, fever, cough, change in his typical diarrhea.  Psych unit reports pt w/o orthostasis.

## 2022-02-27 NOTE — ED BEHAVIORAL HEALTH ASSESSMENT NOTE - SUMMARY
Patient is a 72 year-old kate man, single, [employer] , domiciled with a roommate, disabled, with a psychiatric history of depression, anxiety, cocaine use disorder, currently in an outpatient substance use program ([facility] on [address]) but active cocaine use, one prior admission in 2019 at [facility] for SI, no SA/SIB, history of trauma, a history of HIV, prostate cancer s/p radiation with urinary incontinence, hx of MI in 2019 no stents, HFrEF, prostate CA s/p radiation, ascending aortic aneurysm, HTN, HLD, DM, CKD (baseline Cr 1.3-1.4), who presented to ER complaining panic attack and SI in the setting of ongoing social stressors.     Patient presents depression and anxiety in the setting of ongoing cocaine use and social stressors from the conflict with his roommate. He has passive SI but without intent or plan. He only has one prior admission, and does not appear to be malingering.    Plan:  - Voluntary admission  - Haldol/Ativan PRN agitation  - Restart Abilify 2mg daily  - Restart Lexapro 5mg daily  - Continue outpatient medications including  ..            nystatin 100,000 units/mL oral suspension: 4 milliliter(s) orally- Swish and Swallow  4 times a day   •?	atovaquone 750 mg/5 mL oral suspension: 10 milliliter(s) orally once a day  •?	tamsulosin 0.4 mg oral capsule: 1 cap(s) orally once a day (at bedtime)  •?	thiamine 100 mg oral tablet: 1 tab(s) orally once a day  •?	folic acid 1 mg oral tablet: 1 tab(s) orally once a day  •?	Multiple Vitamins oral tablet: 1 tab(s) orally once a day   •?	carvedilol 6.25 mg oral tablet: 1 tab(s) orally every 12 hours  •?	Vasotec 2.5 mg oral tablet: 1 tab(s) orally once a day  •?	Tivicay 50 mg oral tablet: 1 tab(s) orally once a day  •?	amLODIPine 2.5 mg oral tablet: 1 tab(s) orally once a day   •?	isosorbide mononitrate 30 mg oral tablet, extended release: 1 tab(s) orally once a day   •?	aspirin 81 mg oral delayed release tablet: 1 tab(s) orally once a day  •?	pravastatin 80 mg oral tablet: 1 tab(s) orally once a day (at bedtime)  •?	Descovy 200 mg-25 mg oral tablet: 1 tab(s) orally once a day

## 2022-02-27 NOTE — BH INPATIENT PSYCHIATRY ASSESSMENT NOTE - NSICDXBHSECONDARYDX_PSY_ALL_CORE
Severe episode of recurrent major depressive disorder, without psychotic features   F33.2  Cocaine use disorder   F14.10  Generalized anxiety disorder   F41.1

## 2022-02-27 NOTE — ED ADULT NURSE NOTE - OBJECTIVE STATEMENT
Pt from 8 uris for SI, having generalized weakness, dizziness and chest pain started couple of hrs ago, Pt found on the floor and possible syncopy episode. Unknown LOC, chest pain scale 6/10 reported, having yellowish on mustache and pt said, taking yellow color medicine unknown that name and eating cheetos. 1:1 bedside kept state

## 2022-02-28 DIAGNOSIS — F19.94 OTHER PSYCHOACTIVE SUBSTANCE USE, UNSPECIFIED WITH PSYCHOACTIVE SUBSTANCE-INDUCED MOOD DISORDER: ICD-10-CM

## 2022-02-28 LAB
A1C WITH ESTIMATED AVERAGE GLUCOSE RESULT: 5.9 % — HIGH (ref 4–5.6)
CHOLEST SERPL-MCNC: 123 MG/DL — SIGNIFICANT CHANGE UP
ESTIMATED AVERAGE GLUCOSE: 123 MG/DL — HIGH (ref 68–114)
GLUCOSE BLDC GLUCOMTR-MCNC: 181 MG/DL — HIGH (ref 70–99)
HDLC SERPL-MCNC: 46 MG/DL — SIGNIFICANT CHANGE UP
LIPID PNL WITH DIRECT LDL SERPL: 43 MG/DL — SIGNIFICANT CHANGE UP
NON HDL CHOLESTEROL: 77 MG/DL — SIGNIFICANT CHANGE UP
TRIGL SERPL-MCNC: 171 MG/DL — HIGH

## 2022-02-28 PROCEDURE — 99233 SBSQ HOSP IP/OBS HIGH 50: CPT

## 2022-02-28 RX ADMIN — Medication 2.5 MILLIGRAM(S): at 11:51

## 2022-02-28 RX ADMIN — Medication 25 MILLIGRAM(S): at 22:02

## 2022-02-28 RX ADMIN — ATOVAQUONE 750 MILLIGRAM(S): 750 SUSPENSION ORAL at 12:23

## 2022-02-28 RX ADMIN — Medication 50 MILLIGRAM(S): at 22:02

## 2022-02-28 RX ADMIN — ESCITALOPRAM OXALATE 5 MILLIGRAM(S): 10 TABLET, FILM COATED ORAL at 11:49

## 2022-02-28 RX ADMIN — CARVEDILOL PHOSPHATE 6.25 MILLIGRAM(S): 80 CAPSULE, EXTENDED RELEASE ORAL at 22:00

## 2022-02-28 RX ADMIN — ISOSORBIDE MONONITRATE 30 MILLIGRAM(S): 60 TABLET, EXTENDED RELEASE ORAL at 11:50

## 2022-02-28 RX ADMIN — Medication 1 MILLIGRAM(S): at 11:51

## 2022-02-28 RX ADMIN — CARVEDILOL PHOSPHATE 6.25 MILLIGRAM(S): 80 CAPSULE, EXTENDED RELEASE ORAL at 11:49

## 2022-02-28 RX ADMIN — EMTRICITABINE AND TENOFOVIR DISOPROXIL FUMARATE 1 TABLET(S): 200; 300 TABLET, FILM COATED ORAL at 11:50

## 2022-02-28 RX ADMIN — Medication 81 MILLIGRAM(S): at 11:50

## 2022-02-28 RX ADMIN — ATORVASTATIN CALCIUM 20 MILLIGRAM(S): 80 TABLET, FILM COATED ORAL at 22:01

## 2022-02-28 RX ADMIN — Medication 500000 UNIT(S): at 13:55

## 2022-02-28 RX ADMIN — TAMSULOSIN HYDROCHLORIDE 0.4 MILLIGRAM(S): 0.4 CAPSULE ORAL at 22:00

## 2022-02-28 RX ADMIN — HEPARIN SODIUM 5000 UNIT(S): 5000 INJECTION INTRAVENOUS; SUBCUTANEOUS at 22:03

## 2022-02-28 RX ADMIN — HEPARIN SODIUM 5000 UNIT(S): 5000 INJECTION INTRAVENOUS; SUBCUTANEOUS at 06:26

## 2022-02-28 RX ADMIN — Medication 500000 UNIT(S): at 22:01

## 2022-02-28 RX ADMIN — DOLUTEGRAVIR SODIUM 50 MILLIGRAM(S): 25 TABLET, FILM COATED ORAL at 11:50

## 2022-02-28 RX ADMIN — Medication 500000 UNIT(S): at 11:50

## 2022-02-28 RX ADMIN — HEPARIN SODIUM 5000 UNIT(S): 5000 INJECTION INTRAVENOUS; SUBCUTANEOUS at 13:57

## 2022-02-28 RX ADMIN — Medication 500000 UNIT(S): at 18:03

## 2022-02-28 RX ADMIN — ARIPIPRAZOLE 2 MILLIGRAM(S): 15 TABLET ORAL at 11:49

## 2022-02-28 RX ADMIN — AMLODIPINE BESYLATE 2.5 MILLIGRAM(S): 2.5 TABLET ORAL at 11:51

## 2022-02-28 NOTE — PHYSICAL THERAPY INITIAL EVALUATION ADULT - PERTINENT HX OF CURRENT PROBLEM, REHAB EVAL
71 yo male h/o AIDS, chronic cp unchanged today, CAD, s/p MI in 2019, HFrEF, ascending aortic aneurysm, HTN, HLD, DM, CKD, prostate CA s/p radiation resulting in chronic diarrhea, polysubstance abuse, depression, panic attacks admitted to 8U  for anxiety and SI.

## 2022-02-28 NOTE — BH INPATIENT PSYCHIATRY PROGRESS NOTE - NSICDXBHSECONDARYDX_PSY_ALL_CORE
Severe episode of recurrent major depressive disorder, without psychotic features   F33.2  Cocaine use disorder   F14.10  Generalized anxiety disorder   F41.1   Cocaine use disorder   F14.10  Generalized anxiety disorder   F41.1  Substance induced mood disorder   F19.94

## 2022-02-28 NOTE — PHYSICAL THERAPY INITIAL EVALUATION ADULT - GENERAL OBSERVATIONS, REHAB EVAL
PT IE Completed. Pt received sitting in TV room, A&Ox4, +RA,  in NAD and agreeable to work with PT, NIURKA Stratton notified.Pt is discharged from PT program at this time. Educated patient to find seats during panic attacks to deter falls. Should patient status change, new PT consult is recommended. Pt left in standing in room, NIURKA Stratton notified.

## 2022-02-28 NOTE — PHYSICAL THERAPY INITIAL EVALUATION ADULT - MODALITIES TREATMENT COMMENTS
Pt will be d/c from PT program as he is functionally independent. Pt educated to find a seat during panic attacks to deter further falls.

## 2022-02-28 NOTE — PHYSICAL THERAPY INITIAL EVALUATION ADULT - GAIT PATTERN USED, PT EVAL
Pt ambulated from TV room to hallway on RA. SpO2 97% with HR 99bpm. Pt denied SOB or any difficulty with gait./2-point gait

## 2022-02-28 NOTE — PHYSICAL THERAPY INITIAL EVALUATION ADULT - ADDITIONAL COMMENTS
Pt lives in an SRO with 5 COLIN and elevator access within. Pt reporting he has had falls recently 2/2 panic attacks that make him unsteady and disoriented. Pt declines use of DME for ambulation or within home.

## 2022-02-28 NOTE — BH INPATIENT PSYCHIATRY PROGRESS NOTE - NSBHCHARTREVIEWVS_PSY_A_CORE FT
Vital Signs Last 24 Hrs  T(C): 36.8 (02-28-22 @ 08:05), Max: 37.3 (02-27-22 @ 20:11)  T(F): 98.3 (02-28-22 @ 08:05), Max: 99.1 (02-27-22 @ 20:11)  HR: 75 (02-28-22 @ 08:05) (68 - 77)  BP: 148/74 (02-28-22 @ 08:05) (137/81 - 159/91)  BP(mean): --  RR: 18 (02-28-22 @ 08:05) (16 - 18)  SpO2: 94% (02-28-22 @ 08:05) (94% - 99%)     Vital Signs Last 24 Hrs  T(C): --  T(F): --  HR: --  BP: --  BP(mean): --  RR: --  SpO2: --

## 2022-02-28 NOTE — BH INPATIENT PSYCHIATRY PROGRESS NOTE - NSBHFUPINTERVALHXFT_PSY_A_CORE
Pt calm and cooperative, sitting at the desk in his room upon examination this afternoon. States that he is feeling significantly improved compared to on admission. States that on the day he came to the hospital he had a panic attack in the context of cocaine use. He felt as though he was weak and would fall into the subway. He also was worried about how horrible and dangerous his housing situation is. These worries led him to have passive suicidal ideation that brought him to the hospital. In the hospital, pt has described a renewed sense of hope and improved future outlook. States that he plans to try to stop using cocaine again by going to an inpatient rehab. He also describes wanting to work on his self-esteem and watch more movies because they make him feel more "human" and like the "terrible things" he has suffered through in his life were for a reason. He denies current SI/HI. Denies AVH. Thought process is linear and logical. Denies delusional thought content or paranoid ideation. Endorses good sleep and appetite. Pt is eager for discharge.

## 2022-02-28 NOTE — BH PATIENT PROFILE - FALL HARM RISK - UNIVERSAL INTERVENTIONS
Bed in lowest position, wheels locked, appropriate side rails in place/Call bell, personal items and telephone in reach/Instruct patient to call for assistance before getting out of bed or chair/Non-slip footwear when patient is out of bed/[place] to call system/Physically safe environment - no spills, clutter or unnecessary equipment/Purposeful Proactive Rounding/Room/bathroom lighting operational, light cord in reach
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
## 2022-02-28 NOTE — BH INPATIENT PSYCHIATRY PROGRESS NOTE - NSBHMETABOLIC_PSY_ALL_CORE_FT
BMI: BMI (kg/m2): 23.3 (02-27-22 @ 12:38)  HbA1c: A1C with Estimated Average Glucose Result: 5.9 % (02-28-22 @ 08:10)    Glucose: POCT Blood Glucose.: 181 mg/dL (02-28-22 @ 12:19)    BP: 148/74 (02-28-22 @ 08:05) (148/74 - 173/114)  Lipid Panel: Date/Time: 02-28-22 @ 08:10  Cholesterol, Serum: 123  Direct LDL: --  HDL Cholesterol, Serum: 46  Total Cholesterol/HDL Ration Measurement: --  Triglycerides, Serum: 171

## 2022-02-28 NOTE — BH PATIENT PROFILE - HOME MEDICATIONS
nystatin 100,000 units/mL oral suspension , 4 milliliter(s) orally- Swish and Swallow  4 times a day   atovaquone 750 mg/5 mL oral suspension , 10 milliliter(s) orally once a day  tamsulosin 0.4 mg oral capsule , 1 cap(s) orally once a day (at bedtime)  thiamine 100 mg oral tablet , 1 tab(s) orally once a day  folic acid 1 mg oral tablet , 1 tab(s) orally once a day  Multiple Vitamins oral tablet , 1 tab(s) orally once a day   carvedilol 6.25 mg oral tablet , 1 tab(s) orally every 12 hours  Lexapro 5 mg oral tablet , 1 tab(s) orally once a day  Vasotec 2.5 mg oral tablet , 1 tab(s) orally once a day  isosorbide mononitrate 30 mg oral tablet, extended release , 1 tab(s) orally once a day   amLODIPine 2.5 mg oral tablet , 1 tab(s) orally once a day   Tivicay 50 mg oral tablet , 1 tab(s) orally once a day  Abilify 2 mg oral tablet , orally once a day  aspirin 81 mg oral delayed release tablet , 1 tab(s) orally once a day  pravastatin 80 mg oral tablet , 1 tab(s) orally once a day (at bedtime)  Descovy 200 mg-25 mg oral tablet , 1 tab(s) orally once a day

## 2022-02-28 NOTE — BH SOCIAL WORK INITIAL PSYCHOSOCIAL EVALUATION - NSBHHOUSECOMMENTFT_PSY_ALL_CORE
Patient currently lives in [facility] on [address]

## 2022-03-01 DIAGNOSIS — R55 SYNCOPE AND COLLAPSE: ICD-10-CM

## 2022-03-01 DIAGNOSIS — Z91.013 ALLERGY TO SEAFOOD: ICD-10-CM

## 2022-03-01 DIAGNOSIS — E11.22 TYPE 2 DIABETES MELLITUS WITH DIABETIC CHRONIC KIDNEY DISEASE: ICD-10-CM

## 2022-03-01 DIAGNOSIS — Z91.018 ALLERGY TO OTHER FOODS: ICD-10-CM

## 2022-03-01 DIAGNOSIS — N18.9 CHRONIC KIDNEY DISEASE, UNSPECIFIED: ICD-10-CM

## 2022-03-01 DIAGNOSIS — Z79.82 LONG TERM (CURRENT) USE OF ASPIRIN: ICD-10-CM

## 2022-03-01 DIAGNOSIS — Z20.822 CONTACT WITH AND (SUSPECTED) EXPOSURE TO COVID-19: ICD-10-CM

## 2022-03-01 DIAGNOSIS — B20 HUMAN IMMUNODEFICIENCY VIRUS [HIV] DISEASE: ICD-10-CM

## 2022-03-01 DIAGNOSIS — I12.0 HYPERTENSIVE CHRONIC KIDNEY DISEASE WITH STAGE 5 CHRONIC KIDNEY DISEASE OR END STAGE RENAL DISEASE: ICD-10-CM

## 2022-03-01 DIAGNOSIS — G89.29 OTHER CHRONIC PAIN: ICD-10-CM

## 2022-03-01 DIAGNOSIS — Z88.0 ALLERGY STATUS TO PENICILLIN: ICD-10-CM

## 2022-03-01 DIAGNOSIS — Z88.2 ALLERGY STATUS TO SULFONAMIDES: ICD-10-CM

## 2022-03-01 DIAGNOSIS — R07.9 CHEST PAIN, UNSPECIFIED: ICD-10-CM

## 2022-03-01 DIAGNOSIS — E78.5 HYPERLIPIDEMIA, UNSPECIFIED: ICD-10-CM

## 2022-03-01 PROCEDURE — 99232 SBSQ HOSP IP/OBS MODERATE 35: CPT

## 2022-03-01 RX ADMIN — HEPARIN SODIUM 5000 UNIT(S): 5000 INJECTION INTRAVENOUS; SUBCUTANEOUS at 21:25

## 2022-03-01 RX ADMIN — HEPARIN SODIUM 5000 UNIT(S): 5000 INJECTION INTRAVENOUS; SUBCUTANEOUS at 07:25

## 2022-03-01 RX ADMIN — CARVEDILOL PHOSPHATE 6.25 MILLIGRAM(S): 80 CAPSULE, EXTENDED RELEASE ORAL at 21:24

## 2022-03-01 RX ADMIN — ATOVAQUONE 750 MILLIGRAM(S): 750 SUSPENSION ORAL at 10:12

## 2022-03-01 RX ADMIN — Medication 81 MILLIGRAM(S): at 10:07

## 2022-03-01 RX ADMIN — ISOSORBIDE MONONITRATE 30 MILLIGRAM(S): 60 TABLET, EXTENDED RELEASE ORAL at 10:09

## 2022-03-01 RX ADMIN — CARVEDILOL PHOSPHATE 6.25 MILLIGRAM(S): 80 CAPSULE, EXTENDED RELEASE ORAL at 10:06

## 2022-03-01 RX ADMIN — EMTRICITABINE AND TENOFOVIR DISOPROXIL FUMARATE 1 TABLET(S): 200; 300 TABLET, FILM COATED ORAL at 10:07

## 2022-03-01 RX ADMIN — ESCITALOPRAM OXALATE 5 MILLIGRAM(S): 10 TABLET, FILM COATED ORAL at 10:08

## 2022-03-01 RX ADMIN — Medication 500000 UNIT(S): at 21:24

## 2022-03-01 RX ADMIN — TAMSULOSIN HYDROCHLORIDE 0.4 MILLIGRAM(S): 0.4 CAPSULE ORAL at 21:23

## 2022-03-01 RX ADMIN — DOLUTEGRAVIR SODIUM 50 MILLIGRAM(S): 25 TABLET, FILM COATED ORAL at 10:06

## 2022-03-01 RX ADMIN — HEPARIN SODIUM 5000 UNIT(S): 5000 INJECTION INTRAVENOUS; SUBCUTANEOUS at 15:20

## 2022-03-01 RX ADMIN — Medication 500000 UNIT(S): at 10:07

## 2022-03-01 RX ADMIN — Medication 1 MILLIGRAM(S): at 10:06

## 2022-03-01 RX ADMIN — Medication 500000 UNIT(S): at 19:11

## 2022-03-01 RX ADMIN — AMLODIPINE BESYLATE 2.5 MILLIGRAM(S): 2.5 TABLET ORAL at 10:06

## 2022-03-01 RX ADMIN — ARIPIPRAZOLE 2 MILLIGRAM(S): 15 TABLET ORAL at 10:12

## 2022-03-01 RX ADMIN — Medication 2.5 MILLIGRAM(S): at 10:07

## 2022-03-01 RX ADMIN — ATORVASTATIN CALCIUM 20 MILLIGRAM(S): 80 TABLET, FILM COATED ORAL at 21:24

## 2022-03-01 RX ADMIN — Medication 50 MILLIGRAM(S): at 21:24

## 2022-03-01 RX ADMIN — Medication 500000 UNIT(S): at 15:19

## 2022-03-01 NOTE — BH SAFETY PLAN - WARNING SIGN 1
Safety plan completed. Copy placed in chart, copy placed in safety plan binder, copy provided to pt. See chart for more detail.

## 2022-03-01 NOTE — BH INPATIENT PSYCHIATRY PROGRESS NOTE - NSICDXBHSECONDARYDX_PSY_ALL_CORE
Cocaine use disorder   F14.10  Generalized anxiety disorder   F41.1  Substance induced mood disorder   F19.94

## 2022-03-01 NOTE — BH INPATIENT PSYCHIATRY PROGRESS NOTE - NSBHMETABOLIC_PSY_ALL_CORE_FT
BMI: BMI (kg/m2): 23.3 (02-27-22 @ 12:38)  HbA1c: A1C with Estimated Average Glucose Result: 5.9 % (02-28-22 @ 08:10)    Glucose: POCT Blood Glucose.: 181 mg/dL (02-28-22 @ 12:19)    BP: 148/74 (02-28-22 @ 08:05) (148/74 - 173/114)  Lipid Panel: Date/Time: 02-28-22 @ 08:10  Cholesterol, Serum: 123  Direct LDL: --  HDL Cholesterol, Serum: 46  Total Cholesterol/HDL Ration Measurement: --  Triglycerides, Serum: 171   BMI: BMI (kg/m2): 23.3 (02-27-22 @ 12:38)  HbA1c: A1C with Estimated Average Glucose Result: 5.9 % (02-28-22 @ 08:10)    Glucose: POCT Blood Glucose.: 181 mg/dL (02-28-22 @ 12:19)    BP: 174/89 (03-01-22 @ 08:05) (148/74 - 174/89)  Lipid Panel: Date/Time: 02-28-22 @ 08:10  Cholesterol, Serum: 123  Direct LDL: --  HDL Cholesterol, Serum: 46  Total Cholesterol/HDL Ration Measurement: --  Triglycerides, Serum: 171
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
## 2022-03-01 NOTE — BH PSYCHOLOGY - CLINICIAN PSYCHOTHERAPY NOTE - NSTXDEPRESGOAL_PSY_ALL_CORE
Will identify thoughts and self-talk that contribute to depression
Exhibit improvements in self-grooming, hygiene, sleep and appetite
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
## 2022-03-01 NOTE — BH INPATIENT PSYCHIATRY PROGRESS NOTE - NSBHCHARTREVIEWVS_PSY_A_CORE FT
Vital Signs Last 24 Hrs  T(C): --  T(F): --  HR: --  BP: --  BP(mean): --  RR: --  SpO2: --     Vital Signs Last 24 Hrs  T(C): 36.9 (03-01-22 @ 08:05), Max: 36.9 (03-01-22 @ 08:05)  T(F): 98.4 (03-01-22 @ 08:05), Max: 98.4 (03-01-22 @ 08:05)  HR: 74 (03-01-22 @ 08:05) (74 - 74)  BP: 174/89 (03-01-22 @ 08:05) (174/89 - 174/89)  BP(mean): --  RR: 18 (03-01-22 @ 08:05) (18 - 18)  SpO2: 97% (03-01-22 @ 08:05) (97% - 97%)     No masses; no nipple discharge

## 2022-03-01 NOTE — BH PSYCHOLOGY - CLINICIAN PSYCHOTHERAPY NOTE - NSBHPSYCHOLINT_PSY_A_CORE
Monitor abstinence/Supportive therapy
Monitor abstinence/Problem-solving techniques discussed/other...

## 2022-03-01 NOTE — BH PSYCHOLOGY - CLINICIAN PSYCHOTHERAPY NOTE - NSTXSUBMISGOAL_PSY_ALL_CORE
Be able to verbalize an understanding of the association between substance abuse and mental health
Be able to acknowledge that substance abuse is a problem

## 2022-03-01 NOTE — BH INPATIENT PSYCHIATRY PROGRESS NOTE - NSBHFUPINTERVALHXFT_PSY_A_CORE
Not endorsing  suicidal or homicidal ideation intent or plans; no mention of auditory or visual hallucinations Sleep  appetite ok; no pain issues. i&j fair to poor : aware of medications and acknowledges symptoms but not reflective in a meaningful way  on issues that impact on symptoms. not as pressured ; for tnastion to aftercare awaiting arrangements. No behavioral issues.

## 2022-03-01 NOTE — BH PSYCHOLOGY - CLINICIAN PSYCHOTHERAPY NOTE - NSBHPSYCHOLADDL_PSY_A_CORE
Suicide Risk Assessment    Chronic Risk Factors: hx of trauma, hx of substance use, hx family trauma  Modifiable Risk Factors: lack of current rehab treatment  Protective Factors: future-oriented, willingness to engage in treatment, social support
Suicide Risk Assessment    Chronic Risk Factors: hx of trauma, hx of substance use, hx family trauma  Modifiable Risk Factors: lack of current rehab treatment  Protective Factors: future-oriented, willingness to engage in treatment, social support

## 2022-03-02 DIAGNOSIS — B20 HUMAN IMMUNODEFICIENCY VIRUS [HIV] DISEASE: ICD-10-CM

## 2022-03-02 DIAGNOSIS — E78.5 HYPERLIPIDEMIA, UNSPECIFIED: ICD-10-CM

## 2022-03-02 DIAGNOSIS — I50.20 UNSPECIFIED SYSTOLIC (CONGESTIVE) HEART FAILURE: ICD-10-CM

## 2022-03-02 DIAGNOSIS — I10 ESSENTIAL (PRIMARY) HYPERTENSION: ICD-10-CM

## 2022-03-02 PROCEDURE — 99232 SBSQ HOSP IP/OBS MODERATE 35: CPT

## 2022-03-02 RX ORDER — ESCITALOPRAM OXALATE 10 MG/1
1 TABLET, FILM COATED ORAL
Qty: 30 | Refills: 0
Start: 2022-03-02

## 2022-03-02 RX ORDER — EMTRICITABINE AND TENOFOVIR DISOPROXIL FUMARATE 200; 300 MG/1; MG/1
1 TABLET, FILM COATED ORAL
Qty: 30 | Refills: 0
Start: 2022-03-02 | End: 2022-03-31

## 2022-03-02 RX ORDER — ATOVAQUONE 750 MG/5ML
10 SUSPENSION ORAL
Qty: 300 | Refills: 0
Start: 2022-03-02 | End: 2022-03-31

## 2022-03-02 RX ORDER — FOLIC ACID 0.8 MG
1 TABLET ORAL
Qty: 30 | Refills: 0
Start: 2022-03-02 | End: 2022-03-31

## 2022-03-02 RX ORDER — AMLODIPINE BESYLATE 2.5 MG/1
1 TABLET ORAL
Qty: 30 | Refills: 0
Start: 2022-03-02 | End: 2022-03-31

## 2022-03-02 RX ORDER — ISOSORBIDE MONONITRATE 60 MG/1
1 TABLET, EXTENDED RELEASE ORAL
Qty: 30 | Refills: 0
Start: 2022-03-02 | End: 2022-03-31

## 2022-03-02 RX ORDER — ARIPIPRAZOLE 15 MG/1
1 TABLET ORAL
Qty: 30 | Refills: 0
Start: 2022-03-02 | End: 2022-03-31

## 2022-03-02 RX ORDER — EMTRICITABINE AND TENOFOVIR DISOPROXIL FUMARATE 200; 300 MG/1; MG/1
1 TABLET, FILM COATED ORAL
Qty: 0 | Refills: 0 | DISCHARGE

## 2022-03-02 RX ORDER — NYSTATIN 500MM UNIT
4 POWDER (EA) MISCELLANEOUS
Qty: 160 | Refills: 0
Start: 2022-03-02 | End: 2022-03-11

## 2022-03-02 RX ORDER — CARVEDILOL PHOSPHATE 80 MG/1
1 CAPSULE, EXTENDED RELEASE ORAL
Qty: 60 | Refills: 0
Start: 2022-03-02 | End: 2022-03-31

## 2022-03-02 RX ORDER — ASPIRIN/CALCIUM CARB/MAGNESIUM 324 MG
1 TABLET ORAL
Qty: 30 | Refills: 0
Start: 2022-03-02 | End: 2022-03-31

## 2022-03-02 RX ORDER — DOLUTEGRAVIR SODIUM 25 MG/1
1 TABLET, FILM COATED ORAL
Qty: 30 | Refills: 0
Start: 2022-03-02 | End: 2022-03-31

## 2022-03-02 RX ORDER — ARIPIPRAZOLE 15 MG/1
0 TABLET ORAL
Qty: 0 | Refills: 0 | DISCHARGE

## 2022-03-02 RX ORDER — ESCITALOPRAM OXALATE 10 MG/1
1 TABLET, FILM COATED ORAL
Qty: 0 | Refills: 0 | DISCHARGE

## 2022-03-02 RX ORDER — THIAMINE MONONITRATE (VIT B1) 100 MG
1 TABLET ORAL
Qty: 30 | Refills: 0
Start: 2022-03-02 | End: 2022-03-31

## 2022-03-02 RX ADMIN — Medication 81 MILLIGRAM(S): at 10:28

## 2022-03-02 RX ADMIN — CARVEDILOL PHOSPHATE 6.25 MILLIGRAM(S): 80 CAPSULE, EXTENDED RELEASE ORAL at 21:42

## 2022-03-02 RX ADMIN — ATOVAQUONE 750 MILLIGRAM(S): 750 SUSPENSION ORAL at 10:36

## 2022-03-02 RX ADMIN — ATORVASTATIN CALCIUM 20 MILLIGRAM(S): 80 TABLET, FILM COATED ORAL at 21:43

## 2022-03-02 RX ADMIN — EMTRICITABINE AND TENOFOVIR DISOPROXIL FUMARATE 1 TABLET(S): 200; 300 TABLET, FILM COATED ORAL at 10:29

## 2022-03-02 RX ADMIN — TAMSULOSIN HYDROCHLORIDE 0.4 MILLIGRAM(S): 0.4 CAPSULE ORAL at 21:42

## 2022-03-02 RX ADMIN — Medication 500000 UNIT(S): at 13:38

## 2022-03-02 RX ADMIN — ISOSORBIDE MONONITRATE 30 MILLIGRAM(S): 60 TABLET, EXTENDED RELEASE ORAL at 10:28

## 2022-03-02 RX ADMIN — ARIPIPRAZOLE 2 MILLIGRAM(S): 15 TABLET ORAL at 10:29

## 2022-03-02 RX ADMIN — HEPARIN SODIUM 5000 UNIT(S): 5000 INJECTION INTRAVENOUS; SUBCUTANEOUS at 21:43

## 2022-03-02 RX ADMIN — AMLODIPINE BESYLATE 2.5 MILLIGRAM(S): 2.5 TABLET ORAL at 10:28

## 2022-03-02 RX ADMIN — ESCITALOPRAM OXALATE 5 MILLIGRAM(S): 10 TABLET, FILM COATED ORAL at 10:28

## 2022-03-02 RX ADMIN — HEPARIN SODIUM 5000 UNIT(S): 5000 INJECTION INTRAVENOUS; SUBCUTANEOUS at 17:38

## 2022-03-02 RX ADMIN — CARVEDILOL PHOSPHATE 6.25 MILLIGRAM(S): 80 CAPSULE, EXTENDED RELEASE ORAL at 10:29

## 2022-03-02 RX ADMIN — DOLUTEGRAVIR SODIUM 50 MILLIGRAM(S): 25 TABLET, FILM COATED ORAL at 10:29

## 2022-03-02 RX ADMIN — Medication 500000 UNIT(S): at 10:29

## 2022-03-02 RX ADMIN — Medication 50 MILLIGRAM(S): at 21:42

## 2022-03-02 RX ADMIN — HEPARIN SODIUM 5000 UNIT(S): 5000 INJECTION INTRAVENOUS; SUBCUTANEOUS at 06:44

## 2022-03-02 RX ADMIN — Medication 2.5 MILLIGRAM(S): at 10:29

## 2022-03-02 RX ADMIN — Medication 500000 UNIT(S): at 21:43

## 2022-03-02 RX ADMIN — Medication 1 MILLIGRAM(S): at 10:27

## 2022-03-02 NOTE — BH INPATIENT PSYCHIATRY DISCHARGE NOTE - OTHER PAST PSYCHIATRIC HISTORY (INCLUDE DETAILS REGARDING ONSET, COURSE OF ILLNESS, INPATIENT/OUTPATIENT TREATMENT)
F19.94 Substance Induced Mood Disorder  F33.2 Major Depressive Disorder, Recurrent, Severe  F14.10 Cocaine Use Disorder  F41.1 Generalized Anxiety Disorder  Multiple prior psychiatric hospitalizations (most recent at [facility] in 01/2022)

## 2022-03-02 NOTE — BH INPATIENT PSYCHIATRY PROGRESS NOTE - NSCGIIMPROVESX_PSY_ALL_CORE
2 = Much improved - notably better with signficant reduction of symptoms; increase in the level of functioning but some symptoms remain
3 = Minimally improved - slightly better with little or no clinically meaningful reduction of symptoms.  Represents very little change in basic clinical status, level of care, or functional capacity.
2 = Much improved - notably better with signficant reduction of symptoms; increase in the level of functioning but some symptoms remain

## 2022-03-02 NOTE — BH INPATIENT PSYCHIATRY DISCHARGE NOTE - NSDCRECOMMEND_PSY_ALL_CORE
Unrestricted diet/activity/Recommended medical follow-up following discharge... Recommended medical follow-up following discharge...
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
## 2022-03-02 NOTE — BH INPATIENT PSYCHIATRY PROGRESS NOTE - NSBHINPTBILLING_PSY_ALL_CORE
11015 - Inpatient Moderate Complexity
02025 - Inpatient Moderate Complexity
92651 - Inpatient High Complexity

## 2022-03-02 NOTE — BH INPATIENT PSYCHIATRY DISCHARGE NOTE - NSBHDCMEDICALFT_PSY_A_CORE
Continue home medications:  AIDS (CD4 120, VL undetectable, on meds)  CAD, s/p MI in 2019 (cath 7/21 nl LM, 30% LAD, luminal irreg L Cx, RCA)  HFrEF (ef 35-40%)  ascending aortic aneurysm  HTN  HLD  DM  CKD (baseline Cr 1.3-1.4)  prostate CA s/p radiation

## 2022-03-02 NOTE — BH INPATIENT PSYCHIATRY PROGRESS NOTE - NSBHFUPINTERVALHXFT_PSY_A_CORE
Not endorsing  suicidal or homicidal ideation intent or plans; no mention of auditory or visual hallucinations Sleep  appetite ok; no pain issues. i&j fair to poor : aware of medications and acknowledges symptoms but not reflective in a meaningful way  on issues that impact on symptoms. not as pressured ; for tnastion to aftercare awaiting arrangements. No behavioral issues.  Patient reports that he is feeling better with improvement in mood and no suicidality or homicidality.    Not endorsing  suicidal or homicidal ideation intent or plans; no mention of auditory or visual hallucinations Sleep  appetite ok; no pain issues. i&j fair to poor : aware of medications and acknowledges symptoms but not reflective in a meaningful way  on issues that impact on symptoms. not as pressured ; for tnastion to aftercare awaiting arrangements. No behavioral issues.  Patient reports that he is feeling better with improvement in mood and no suicidality or homicility.    Not endorsing  suicidal or homicidal ideation intent or plans; no mention of auditory or visual hallucinations Sleep  appetite ok; no pain issues. i&j fair to poor : aware of medications and acknowledges symptoms but not reflective in a meaningful way  on issues that impact on symptoms. not as pressured ; for transition to aftercare awaiting arrangements. No behavioral issues.
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
## 2022-03-02 NOTE — BH INPATIENT PSYCHIATRY PROGRESS NOTE - MODIFICATIONS
transition to aftercare d/c in am. 
continue with current medications and monitor mood and encourage patient to continue rehab and stopping cocaine.   Current medications acetaminophen     Tablet .. 650 milliGRAM(s) Oral every 6 hours PRN  amLODIPine   Tablet 2.5 milliGRAM(s) Oral daily for HTN  ARIPiprazole 2 milliGRAM(s) Oral daily for mood   aspirin  chewable 81 milliGRAM(s) Oral daily for cardiopreventative  atorvastatin 20 milliGRAM(s) Oral at bedtime for HLD  atovaquone  Suspension 750 milliGRAM(s) Oral daily  carvedilol 6.25 milliGRAM(s) Oral every 12 hours  dolutegravir 50 milliGRAM(s) Oral daily for HIV  emtricitabine 200 mG/tenofovir alafenamide 25 mG (DESCOVY) Tablet 1 Tablet(s) Oral daily for HIV  enalapril 2.5 milliGRAM(s) Oral daily for HTN  escitalopram 5 milliGRAM(s) Oral daily for depression  folic acid 1 milliGRAM(s) Oral daily  heparin   Injectable 5000 Unit(s) SubCutaneous every 8 hours  hydrOXYzine hydrochloride 25 milliGRAM(s) Oral every 8 hours PRN  isosorbide   mononitrate ER Tablet (IMDUR) 30 milliGRAM(s) Oral daily  nystatin    Suspension [id number] Unit(s) Oral four times a day  tamsulosin 0.4 milliGRAM(s) Oral at bedtime  traZODone 50 milliGRAM(s) Oral at bedtime PRN for insomnia

## 2022-03-02 NOTE — BH INPATIENT PSYCHIATRY DISCHARGE NOTE - NSDCCPCAREPLAN_GEN_ALL_CORE_FT
PRINCIPAL DISCHARGE DIAGNOSIS  Diagnosis: Suicidal ideation  Assessment and Plan of Treatment:       SECONDARY DISCHARGE DIAGNOSES  Diagnosis: Substance induced mood disorder  Assessment and Plan of Treatment:

## 2022-03-02 NOTE — BH INPATIENT PSYCHIATRY DISCHARGE NOTE - REASON FOR ADMISSION
72 year old male with multiple medical issues, depression, anxiety with panic attacks, and substance use (cocaine) was admitted to 8U for anxiety and suicidal ideation after using cocaine and several stressors in his living environment.

## 2022-03-02 NOTE — BH INPATIENT PSYCHIATRY DISCHARGE NOTE - HOSPITAL COURSE
Mr. Francis was admitted after expressing suicidal ideation and having anxiety with panic attacks in the context of using cocaine and having social stressors in his home. He was continued on his home medications of Abilify 2 mg daily and Lexapro 5 mg daily and reported resolution of SI after several days of psychiatric, psychological, and group therapy. Patient engaged in discharge planning and expressed a wish to explore rehab options on his own after discharge. Resources were provided. He is not an acute risk to himself or others and can be safely discharged at this time. Patient was discharged with a 30-day supply of Abilify 2 mg daily (30 pills) and Lexapro 5 mg daily (30 pills). Mr. Francis was admitted after expressing suicidal ideation and having anxiety with panic attacks in the context of using cocaine and having social stressors in his home. He was continued on his home medications of Abilify 2 mg daily and Lexapro 5 mg daily and reported resolution of SI after several days of psychiatric, psychological, and group therapy. Patient engaged in discharge planning and expressed a wish to explore rehab options on his own after discharge. Resources were provided. He is not an acute risk to himself or others and can be safely discharged at this time. Patient was discharged with a 30-day supply of Abilify 2 mg daily (30 pills) and Lexapro 5 mg daily (30 pills).    ;;03/03:  on day of discharge. Alert; oriented; cognition intact; speech clear; no tremor or evidence of movement impairment. Not endorsing  suicidal or homicidal ideation intent or plans; no mention of auditory or visual hallucinations sitting in the TV lounge; Future oriented; looks forward to going to see a movie.  concerned about having Trazodone for sleep. i&j fair  : aware of medications and acknowledges symptoms but minimally  reflective in a meaningful way  on issues that impact on symptoms.  Thinking is congruent with affect; no peculiarities of thinking or language use. Fair to good eye contact; speech clear spontaneous and normal volume  stable for disharge.

## 2022-03-02 NOTE — BH INPATIENT PSYCHIATRY PROGRESS NOTE - NSCGISEVERILLNESS_PSY_ALL_CORE
4 = Moderately ill – overt symptoms causing noticeable, but modest, functional impairment or distress; symptom level may warrant medication
55
4 = Moderately ill – overt symptoms causing noticeable, but modest, functional impairment or distress; symptom level may warrant medication
4 = Moderately ill – overt symptoms causing noticeable, but modest, functional impairment or distress; symptom level may warrant medication

## 2022-03-02 NOTE — BH INPATIENT PSYCHIATRY DISCHARGE NOTE - NSBHMETABOLIC_PSY_ALL_CORE_FT
BMI: BMI (kg/m2): 23.3 (02-27-22 @ 12:38)  HbA1c: A1C with Estimated Average Glucose Result: 5.9 % (02-28-22 @ 08:10)    Glucose: POCT Blood Glucose.: 181 mg/dL (02-28-22 @ 12:19)    BP: 158/92 (03-02-22 @ 09:01) (147/90 - 174/89)  Lipid Panel: Date/Time: 02-28-22 @ 08:10  Cholesterol, Serum: 123  Direct LDL: --  HDL Cholesterol, Serum: 46  Total Cholesterol/HDL Ration Measurement: --  Triglycerides, Serum: 171
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
## 2022-03-02 NOTE — BH INPATIENT PSYCHIATRY DISCHARGE NOTE - NSDCMRMEDTOKEN_GEN_ALL_CORE_FT
Abilify 2 mg oral tablet: 1 tab(s) orally once a day   amLODIPine 2.5 mg oral tablet: 1 tab(s) orally once a day   aspirin 81 mg oral delayed release tablet: 1 tab(s) orally once a day  atovaquone 750 mg/5 mL oral suspension: 10 milliliter(s) orally once a day  carvedilol 6.25 mg oral tablet: 1 tab(s) orally every 12 hours  Descovy 200 mg-25 mg oral tablet: 1 tab(s) orally once a day  folic acid 1 mg oral tablet: 1 tab(s) orally once a day  isosorbide mononitrate 30 mg oral tablet, extended release: 1 tab(s) orally once a day   Lexapro 5 mg oral tablet: 1 tab(s) orally once a day  Multiple Vitamins oral tablet: 1 tab(s) orally once a day   nystatin 100,000 units/mL oral suspension: 4 milliliter(s) orally- Swish and Swallow  4 times a day   pravastatin 80 mg oral tablet: 1 tab(s) orally once a day (at bedtime)  tamsulosin 0.4 mg oral capsule: 1 cap(s) orally once a day (at bedtime)  thiamine 100 mg oral tablet: 1 tab(s) orally once a day  Tivicay 50 mg oral tablet: 1 tab(s) orally once a day  Vasotec 2.5 mg oral tablet: 1 tab(s) orally once a day

## 2022-03-02 NOTE — BH INPATIENT PSYCHIATRY PROGRESS NOTE - NSBHMETABOLIC_PSY_ALL_CORE_FT
BMI: BMI (kg/m2): 23.3 (02-27-22 @ 12:38)  HbA1c: A1C with Estimated Average Glucose Result: 5.9 % (02-28-22 @ 08:10)    Glucose: POCT Blood Glucose.: 181 mg/dL (02-28-22 @ 12:19)    BP: 158/92 (03-02-22 @ 09:01) (147/90 - 174/89)  Lipid Panel: Date/Time: 02-28-22 @ 08:10  Cholesterol, Serum: 123  Direct LDL: --  HDL Cholesterol, Serum: 46  Total Cholesterol/HDL Ration Measurement: --  Triglycerides, Serum: 171   BMI: BMI (kg/m2): 23.3 (02-27-22 @ 12:38)  HbA1c: A1C with Estimated Average Glucose Result: 5.9 % (02-28-22 @ 08:10)    Glucose: POCT Blood Glucose.: 181 mg/dL (02-28-22 @ 12:19)    BP: 152/86 (03-03-22 @ 08:50) (147/90 - 174/89)  Lipid Panel: Date/Time: 02-28-22 @ 08:10  Cholesterol, Serum: 123  Direct LDL: --  HDL Cholesterol, Serum: 46  Total Cholesterol/HDL Ration Measurement: --  Triglycerides, Serum: 171

## 2022-03-03 VITALS
DIASTOLIC BLOOD PRESSURE: 86 MMHG | HEART RATE: 68 BPM | TEMPERATURE: 98 F | RESPIRATION RATE: 18 BRPM | SYSTOLIC BLOOD PRESSURE: 152 MMHG | OXYGEN SATURATION: 97 %

## 2022-03-03 PROCEDURE — 82962 GLUCOSE BLOOD TEST: CPT

## 2022-03-03 PROCEDURE — 99285 EMERGENCY DEPT VISIT HI MDM: CPT | Mod: 25

## 2022-03-03 PROCEDURE — 85025 COMPLETE CBC W/AUTO DIFF WBC: CPT

## 2022-03-03 PROCEDURE — U0005: CPT

## 2022-03-03 PROCEDURE — 80307 DRUG TEST PRSMV CHEM ANLYZR: CPT

## 2022-03-03 PROCEDURE — 36415 COLL VENOUS BLD VENIPUNCTURE: CPT

## 2022-03-03 PROCEDURE — 99238 HOSP IP/OBS DSCHRG MGMT 30/<: CPT

## 2022-03-03 PROCEDURE — 80061 LIPID PANEL: CPT

## 2022-03-03 PROCEDURE — 96374 THER/PROPH/DIAG INJ IV PUSH: CPT

## 2022-03-03 PROCEDURE — U0003: CPT

## 2022-03-03 PROCEDURE — 97161 PT EVAL LOW COMPLEX 20 MIN: CPT

## 2022-03-03 PROCEDURE — 81001 URINALYSIS AUTO W/SCOPE: CPT

## 2022-03-03 PROCEDURE — 83036 HEMOGLOBIN GLYCOSYLATED A1C: CPT

## 2022-03-03 PROCEDURE — 80053 COMPREHEN METABOLIC PANEL: CPT

## 2022-03-03 RX ADMIN — CARVEDILOL PHOSPHATE 6.25 MILLIGRAM(S): 80 CAPSULE, EXTENDED RELEASE ORAL at 11:10

## 2022-03-03 RX ADMIN — DOLUTEGRAVIR SODIUM 50 MILLIGRAM(S): 25 TABLET, FILM COATED ORAL at 13:05

## 2022-03-03 RX ADMIN — ATOVAQUONE 750 MILLIGRAM(S): 750 SUSPENSION ORAL at 13:06

## 2022-03-03 RX ADMIN — Medication 81 MILLIGRAM(S): at 11:11

## 2022-03-03 RX ADMIN — Medication 1 MILLIGRAM(S): at 11:12

## 2022-03-03 RX ADMIN — Medication 500000 UNIT(S): at 11:14

## 2022-03-03 RX ADMIN — ESCITALOPRAM OXALATE 5 MILLIGRAM(S): 10 TABLET, FILM COATED ORAL at 11:12

## 2022-03-03 RX ADMIN — HEPARIN SODIUM 5000 UNIT(S): 5000 INJECTION INTRAVENOUS; SUBCUTANEOUS at 07:27

## 2022-03-03 RX ADMIN — ARIPIPRAZOLE 2 MILLIGRAM(S): 15 TABLET ORAL at 11:14

## 2022-03-03 RX ADMIN — EMTRICITABINE AND TENOFOVIR DISOPROXIL FUMARATE 1 TABLET(S): 200; 300 TABLET, FILM COATED ORAL at 11:13

## 2022-03-03 RX ADMIN — ISOSORBIDE MONONITRATE 30 MILLIGRAM(S): 60 TABLET, EXTENDED RELEASE ORAL at 11:12

## 2022-03-03 RX ADMIN — Medication 2.5 MILLIGRAM(S): at 11:11

## 2022-03-03 RX ADMIN — AMLODIPINE BESYLATE 2.5 MILLIGRAM(S): 2.5 TABLET ORAL at 11:11

## 2022-03-03 NOTE — BH DISCHARGE NOTE NURSING/SOCIAL WORK/PSYCH REHAB - NSDCPRGOAL_PSY_ALL_CORE
Pt. has attended select groups during admission.  Pt. has participated fully and appropriately.  Pt. has been pleasant on approach and social with select peers.  Pt. has been verbally expressive in groups and has discussed his recent difficulties with substance abuse.  Pt. has discussed how he has been part of an outpatient treatment program that he has found to be helpful and stated that he is looking forward to returning to that treatment.  Pt. has also been able to identify other coping skills, such as journaling, that have helped him in the last few years.  Pt. endorsed readiness for discharge and completed a safety plan.

## 2022-03-03 NOTE — BH INPATIENT PSYCHIATRY PROGRESS NOTE - NSBHCONSULTIPREASON_PSY_A_CORE
danger to self; mental illness expected to respond to inpatient care
Statement Selected
danger to self; mental illness expected to respond to inpatient care

## 2022-03-03 NOTE — BH DISCHARGE NOTE NURSING/SOCIAL WORK/PSYCH REHAB - CAREGIVER ADDRESS
Colette Oh presents to the clinic requesting cortisone injections to the bilateral knees. He reports that his last cortisone injections in December provided him with about 1.5 months of relief. He then underwent visco-supplement injections by Dr. Gordy Sommers in February which provided him with no relief. With his consent, the bilateral knees were prepped with betadine and were injected with 5cc's of 1% lidocaine and 80mg of Depo-Medrol. He tolerated the injections uneventfully. We did discuss a genicular nerve block. Unfortunately, he is on anticoagulation for his A-fib and does not think he would want to hold his blood thinners to undergo this procedure. He plans on discussing the risks of holding his anticoagulation with Dr. Gene Castellanos next week. If they change their mind, they are to call the office and we will place an order to Dr. Tony Mora office. We did discuss that he could return in about 2 months if needed for repeat injections.      Leyla Enciso
NA

## 2022-03-03 NOTE — BH INPATIENT PSYCHIATRY PROGRESS NOTE - NSTXSUBMISGOAL_PSY_ALL_CORE
Be able to verbalize an understanding of the association between substance abuse and mental health
Be able to verbalize an understanding of the association between substance abuse and mental health
Be able to acknowledge that substance abuse is a problem
Be able to verbalize an understanding of the association between substance abuse and mental health

## 2022-03-03 NOTE — BH INPATIENT PSYCHIATRY PROGRESS NOTE - PRN MEDS
MEDICATIONS  (PRN):  acetaminophen     Tablet .. 650 milliGRAM(s) Oral every 6 hours PRN Temp greater or equal to 38C (100.4F), Mild Pain (1 - 3), Moderate Pain (4 - 6)  hydrOXYzine hydrochloride 25 milliGRAM(s) Oral every 8 hours PRN Anxiety  traZODone 50 milliGRAM(s) Oral at bedtime PRN sleep  

## 2022-03-03 NOTE — BH DISCHARGE NOTE NURSING/SOCIAL WORK/PSYCH REHAB - NSCDUDCCRISIS_PSY_A_CORE
[facility] Well  1 (940) [facility]-WELL ([phone])  Text "WELL" to 70594  Website: www.[employer]/.[facility] 1 (352) 531-UQXF (6959) Website: www.safehorizon.org/.National Suicide Prevention Lifeline 2 (273) 291-3526/.  [facility]  1 (957) LIFENET (039-1676)/.  [facility]  [address] [place] [address] [phone]   Hours:  Monday through Friday from 9 AM to 3 PM [facility] Well  1 (698) [facility]-WELL ([phone])  Text "WELL" to 06960  Website: www.[employer]abi/.[facility]izons 1 (963) 461-COTC (3522) Website: www.safehorizon.org/.National Suicide Prevention Lifeline 8 (243) 111-7360/.  [facility]  1 (310) LIFENET (576-7269)/.  [facility]’s Behavioral [facility] Crisis [facility]  [address] [place] [address] [phone]   Hours:  Monday through Friday from 9 AM to 3 PM/.  [employer] - [facility] Crisis Line  9 [phone], Option 1

## 2022-03-03 NOTE — BH INPATIENT PSYCHIATRY PROGRESS NOTE - NSBHMETABOLIC_PSY_ALL_CORE_FT
BMI: BMI (kg/m2): 23.3 (02-27-22 @ 12:38)  HbA1c: A1C with Estimated Average Glucose Result: 5.9 % (02-28-22 @ 08:10)    Glucose: POCT Blood Glucose.: 181 mg/dL (02-28-22 @ 12:19)    BP: 155/96 (03-02-22 @ 20:07) (147/90 - 174/89)  Lipid Panel: Date/Time: 02-28-22 @ 08:10  Cholesterol, Serum: 123  Direct LDL: --  HDL Cholesterol, Serum: 46  Total Cholesterol/HDL Ration Measurement: --  Triglycerides, Serum: 171

## 2022-03-03 NOTE — BH DISCHARGE NOTE NURSING/SOCIAL WORK/PSYCH REHAB - PATIENT PORTAL LINK FT
You can access the FollowMyHealth Patient Portal offered by [facility] by registering at the following website: [email or web address]. By joining [facility]’s FollowMyHealth portal, you will also be able to view your health information using other applications (apps) compatible with our system.

## 2022-03-03 NOTE — BH DISCHARGE NOTE NURSING/SOCIAL WORK/PSYCH REHAB - NSDCADDINFO1FT_PSY_ALL_CORE
No appointment needed. Patient will follow up with case management and psychotherapy services as needed as patient is an established provider.

## 2022-03-03 NOTE — BH INPATIENT PSYCHIATRY PROGRESS NOTE - NSBHCHARTREVIEWVS_PSY_A_CORE FT
Vital Signs Last 24 Hrs  T(C): 36.6 (03-02-22 @ 20:07), Max: 37.2 (03-02-22 @ 16:24)  T(F): 97.9 (03-02-22 @ 20:07), Max: 98.9 (03-02-22 @ 16:24)  HR: 75 (03-02-22 @ 20:07) (63 - 75)  BP: 155/96 (03-02-22 @ 20:07) (155/96 - 158/92)  BP(mean): --  RR: 18 (03-02-22 @ 20:07) (18 - 18)  SpO2: 95% (03-02-22 @ 20:07) (95% - 97%)

## 2022-03-03 NOTE — BH INPATIENT PSYCHIATRY PROGRESS NOTE - CURRENT MEDICATION
MEDICATIONS  (STANDING):  amLODIPine   Tablet 2.5 milliGRAM(s) Oral daily  ARIPiprazole 2 milliGRAM(s) Oral daily  aspirin  chewable 81 milliGRAM(s) Oral daily  atorvastatin 20 milliGRAM(s) Oral at bedtime  atovaquone  Suspension 750 milliGRAM(s) Oral daily  carvedilol 6.25 milliGRAM(s) Oral every 12 hours  dolutegravir 50 milliGRAM(s) Oral daily  emtricitabine 200 mG/tenofovir alafenamide 25 mG (DESCOVY) Tablet 1 Tablet(s) Oral daily  enalapril 2.5 milliGRAM(s) Oral daily  escitalopram 5 milliGRAM(s) Oral daily  folic acid 1 milliGRAM(s) Oral daily  heparin   Injectable 5000 Unit(s) SubCutaneous every 8 hours  isosorbide   mononitrate ER Tablet (IMDUR) 30 milliGRAM(s) Oral daily  nystatin    Suspension 370486 Unit(s) Oral four times a day  tamsulosin 0.4 milliGRAM(s) Oral at bedtime    MEDICATIONS  (PRN):  acetaminophen     Tablet .. 650 milliGRAM(s) Oral every 6 hours PRN Temp greater or equal to 38C (100.4F), Mild Pain (1 - 3), Moderate Pain (4 - 6)  hydrOXYzine hydrochloride 25 milliGRAM(s) Oral every 8 hours PRN Anxiety  traZODone 50 milliGRAM(s) Oral at bedtime PRN sleep  
MEDICATIONS  (STANDING):  amLODIPine   Tablet 2.5 milliGRAM(s) Oral daily  ARIPiprazole 2 milliGRAM(s) Oral daily  aspirin  chewable 81 milliGRAM(s) Oral daily  atorvastatin 20 milliGRAM(s) Oral at bedtime  atovaquone  Suspension 750 milliGRAM(s) Oral daily  carvedilol 6.25 milliGRAM(s) Oral every 12 hours  dolutegravir 50 milliGRAM(s) Oral daily  emtricitabine 200 mG/tenofovir alafenamide 25 mG (DESCOVY) Tablet 1 Tablet(s) Oral daily  enalapril 2.5 milliGRAM(s) Oral daily  escitalopram 5 milliGRAM(s) Oral daily  folic acid 1 milliGRAM(s) Oral daily  heparin   Injectable 5000 Unit(s) SubCutaneous every 8 hours  isosorbide   mononitrate ER Tablet (IMDUR) 30 milliGRAM(s) Oral daily  nystatin    Suspension 605765 Unit(s) Oral four times a day  tamsulosin 0.4 milliGRAM(s) Oral at bedtime    MEDICATIONS  (PRN):  acetaminophen     Tablet .. 650 milliGRAM(s) Oral every 6 hours PRN Temp greater or equal to 38C (100.4F), Mild Pain (1 - 3), Moderate Pain (4 - 6)  hydrOXYzine hydrochloride 25 milliGRAM(s) Oral every 8 hours PRN Anxiety  traZODone 50 milliGRAM(s) Oral at bedtime PRN sleep  
MEDICATIONS  (STANDING):  amLODIPine   Tablet 2.5 milliGRAM(s) Oral daily  ARIPiprazole 2 milliGRAM(s) Oral daily  aspirin  chewable 81 milliGRAM(s) Oral daily  atorvastatin 20 milliGRAM(s) Oral at bedtime  atovaquone  Suspension 750 milliGRAM(s) Oral daily  carvedilol 6.25 milliGRAM(s) Oral every 12 hours  dolutegravir 50 milliGRAM(s) Oral daily  emtricitabine 200 mG/tenofovir alafenamide 25 mG (DESCOVY) Tablet 1 Tablet(s) Oral daily  enalapril 2.5 milliGRAM(s) Oral daily  escitalopram 5 milliGRAM(s) Oral daily  folic acid 1 milliGRAM(s) Oral daily  heparin   Injectable 5000 Unit(s) SubCutaneous every 8 hours  isosorbide   mononitrate ER Tablet (IMDUR) 30 milliGRAM(s) Oral daily  nystatin    Suspension 604075 Unit(s) Oral four times a day  tamsulosin 0.4 milliGRAM(s) Oral at bedtime    MEDICATIONS  (PRN):  acetaminophen     Tablet .. 650 milliGRAM(s) Oral every 6 hours PRN Temp greater or equal to 38C (100.4F), Mild Pain (1 - 3), Moderate Pain (4 - 6)  hydrOXYzine hydrochloride 25 milliGRAM(s) Oral every 8 hours PRN Anxiety  traZODone 50 milliGRAM(s) Oral at bedtime PRN sleep  
MEDICATIONS  (STANDING):  amLODIPine   Tablet 2.5 milliGRAM(s) Oral daily  ARIPiprazole 2 milliGRAM(s) Oral daily  aspirin  chewable 81 milliGRAM(s) Oral daily  atorvastatin 20 milliGRAM(s) Oral at bedtime  atovaquone  Suspension 750 milliGRAM(s) Oral daily  carvedilol 6.25 milliGRAM(s) Oral every 12 hours  dolutegravir 50 milliGRAM(s) Oral daily  emtricitabine 200 mG/tenofovir alafenamide 25 mG (DESCOVY) Tablet 1 Tablet(s) Oral daily  enalapril 2.5 milliGRAM(s) Oral daily  escitalopram 5 milliGRAM(s) Oral daily  folic acid 1 milliGRAM(s) Oral daily  heparin   Injectable 5000 Unit(s) SubCutaneous every 8 hours  isosorbide   mononitrate ER Tablet (IMDUR) 30 milliGRAM(s) Oral daily  nystatin    Suspension 768226 Unit(s) Oral four times a day  tamsulosin 0.4 milliGRAM(s) Oral at bedtime    MEDICATIONS  (PRN):  acetaminophen     Tablet .. 650 milliGRAM(s) Oral every 6 hours PRN Temp greater or equal to 38C (100.4F), Mild Pain (1 - 3), Moderate Pain (4 - 6)  hydrOXYzine hydrochloride 25 milliGRAM(s) Oral every 8 hours PRN Anxiety  traZODone 50 milliGRAM(s) Oral at bedtime PRN sleep

## 2022-03-06 DIAGNOSIS — I50.20 UNSPECIFIED SYSTOLIC (CONGESTIVE) HEART FAILURE: ICD-10-CM

## 2022-03-06 DIAGNOSIS — R45.851 SUICIDAL IDEATIONS: ICD-10-CM

## 2022-03-06 DIAGNOSIS — N18.9 CHRONIC KIDNEY DISEASE, UNSPECIFIED: ICD-10-CM

## 2022-03-06 DIAGNOSIS — F14.10 COCAINE ABUSE, UNCOMPLICATED: ICD-10-CM

## 2022-03-06 DIAGNOSIS — I13.10 HYPERTENSIVE HEART AND CHRONIC KIDNEY DISEASE WITHOUT HEART FAILURE, WITH STAGE 1 THROUGH STAGE 4 CHRONIC KIDNEY DISEASE, OR UNSPECIFIED CHRONIC KIDNEY DISEASE: ICD-10-CM

## 2022-03-06 DIAGNOSIS — F19.94 OTHER PSYCHOACTIVE SUBSTANCE USE, UNSPECIFIED WITH PSYCHOACTIVE SUBSTANCE-INDUCED MOOD DISORDER: ICD-10-CM

## 2022-03-06 DIAGNOSIS — E11.22 TYPE 2 DIABETES MELLITUS WITH DIABETIC CHRONIC KIDNEY DISEASE: ICD-10-CM

## 2022-03-06 DIAGNOSIS — Z85.46 PERSONAL HISTORY OF MALIGNANT NEOPLASM OF PROSTATE: ICD-10-CM

## 2022-03-06 DIAGNOSIS — F41.1 GENERALIZED ANXIETY DISORDER: ICD-10-CM

## 2022-03-06 DIAGNOSIS — F41.0 PANIC DISORDER [EPISODIC PAROXYSMAL ANXIETY]: ICD-10-CM

## 2022-03-06 DIAGNOSIS — B20 HUMAN IMMUNODEFICIENCY VIRUS [HIV] DISEASE: ICD-10-CM

## 2022-04-11 NOTE — BH INPATIENT PSYCHIATRY PROGRESS NOTE - NSBHMSEAFFRANGE_PSY_A_CORE
Assisted By: Shannon TRIPP    CC: Follow-up on coronary disease and weakness    Interview History/HPI: Patient feels like her right leg is getting stronger, she states it hurts to move it but seems to be improving.  No further falls.  No chest pain or shortness of breath, she is tolerating her diet, her metoprolol was held this morning due to blood pressure parameters.  This had been decreased yesterday and she actually received a fluid bolus yesterday.    ROS:     Vitals:    04/11/22 0950   BP: 108/74   Pulse: 88   Resp: 16   Temp: 97.2 °F (36.2 °C)   SpO2: 96%         Intake/Output Summary (Last 24 hours) at 4/11/2022 1244  Last data filed at 4/11/2022 0500  Gross per 24 hour   Intake 1080 ml   Output --   Net 1080 ml       EXAM: She is pleasant no distress lying flat, no JVD lungs have bilateral breath sounds clear without rhonchi rales wheezing, heart regular rate and rhythm without murmur, abdomen is soft, benign, there is some minimal bruising around her right inguinal cath site but site looks good, she has adequate palpable distal pulses without clubbing or cyanosis, sensation intact in her foot, she can plantar and dorsiflex her right foot.  No edema      EKG: Sinus rhythm image reviewed    Tele: Sinus rhythm    LABS:     Lab Results (last 48 hours)     Procedure Component Value Units Date/Time    POC Glucose Once [[id number]]  (Abnormal) Collected: 04/11/22 1119    Specimen: Blood Updated: 04/11/22 1127     Glucose 170 mg/dL      Comment: Meter: [id number] : [id number] Tri Ortega       POC Glucose Once [[id number]]  (Normal) Collected: 04/11/22 0721    Specimen: Blood Updated: 04/11/22 0727     Glucose 115 mg/dL      Comment: Meter: [id number] : [id number] DEEPALI DOTY       POC Glucose Once [[id number]]  (Abnormal) Collected: 04/10/22 1717    Specimen: Blood Updated: 04/10/22 1723     Glucose 183 mg/dL      Comment: Meter: [id number] : [id number] yodit goodman       POC Glucose Once [[id number]]   (Normal) Collected: 04/10/22 1154    Specimen: Blood Updated: 04/10/22 1200     Glucose 124 mg/dL      Comment: Meter: [id number] : [id number] SHWETHA CUNNINGHAM       Basic Metabolic Panel [[id number]]  (Abnormal) Collected: 04/10/22 0840    Specimen: Blood Updated: 04/10/22 0913     Glucose 145 mg/dL      BUN 9 mg/dL      Creatinine 0.69 mg/dL      Sodium 134 mmol/L      Potassium 4.2 mmol/L      Chloride 100 mmol/L      CO2 24.6 mmol/L      Calcium 9.4 mg/dL      BUN/Creatinine Ratio 13.0     Anion Gap 9.4 mmol/L      eGFR 104.6 mL/min/1.73      Comment: National Kidney Foundation and American Society of Nephrology (ASN) Task Force recommended calculation based on the Chronic Kidney Disease Epidemiology Collaboration (CKD-EPI) equation refit without adjustment for race.       Narrative:      GFR Normal >60  Chronic Kidney Disease <60  Kidney Failure <15      CBC & Differential [[id number]]  (Abnormal) Collected: 04/10/22 0840    Specimen: Blood Updated: 04/10/22 0849    Narrative:      The following orders were created for panel order CBC & Differential.  Procedure                               Abnormality         Status                     ---------                               -----------         ------                     CBC Auto Differential[[id number]]        Abnormal            Final result                 Please view results for these tests on the individual orders.    CBC Auto Differential [[id number]]  (Abnormal) Collected: 04/10/22 0840    Specimen: Blood Updated: 04/10/22 0849     WBC 7.53 10*3/mm3      RBC 5.26 10*6/mm3      Hemoglobin 14.4 g/dL      Hematocrit 43.6 %      MCV 82.9 fL      MCH 27.4 pg      MCHC 33.0 g/dL      RDW 14.5 %      RDW-SD 43.1 fl      MPV 9.0 fL      Platelets 298 10*3/mm3      Neutrophil % 45.8 %      Lymphocyte % 37.3 %      Monocyte % 7.2 %      Eosinophil % 7.8 %      Basophil % 0.7 %      Immature Grans % 1.2 %      Neutrophils, Absolute 3.45 10*3/mm3      Lymphocytes,  Absolute 2.81 10*3/mm3      Monocytes, Absolute 0.54 10*3/mm3      Eosinophils, Absolute 0.59 10*3/mm3      Basophils, Absolute 0.05 10*3/mm3      Immature Grans, Absolute 0.09 10*3/mm3      nRBC 0.0 /100 WBC     POC Glucose Once [[id number]]  (Normal) Collected: 04/10/22 0722    Specimen: Blood Updated: 04/10/22 0728     Glucose 120 mg/dL      Comment: Meter: [id number] : [id number] david roselin       POC Glucose Once [[id number]]  (Abnormal) Collected: 04/09/22 1914    Specimen: Blood Updated: 04/09/22 1920     Glucose 181 mg/dL      Comment: Meter: [id number] : [id number] LISETTE NEAL       POC Glucose Once [[id number]]  (Normal) Collected: 04/09/22 1534    Specimen: Blood Updated: 04/09/22 1540     Glucose 107 mg/dL      Comment: Meter: [id number] : [id number] LIGIA ARZATE                  Radiology:    Imaging Results (Last 72 Hours)     ** No results found for the last 72 hours. **          Results for orders placed during the hospital encounter of 04/02/22    Adult Transthoracic Echo Complete w/ Color, Spectral and Contrast if necessary per protocol    Interpretation Summary  · Left ventricular ejection fraction appears to be 61 - 65%.  · Left ventricular diastolic function is consistent with (grade I) impaired relaxation.  · No significant valvular abnormality  · Moderate sclerosis of the aortic valve  · No pericardial effusion  · No prior study for comparison      Assessment/Plan:   Non-ST elevation myocardial infarction status post cardiac catheterization with intervention.  Patient is on DAPT with aspirin and Brilinta.  Chest pain-free, she is also on a beta-blocker but have decreased the dose to 12.5 twice daily and I have changed the hold parameters so hopefully she will be able to receive this.  She is also on high-dose statin.  EF is preserved.    Hematoma, resolving, leg appears to be improving.    Diabetes, A1c was 5.9, she states she did get some lows at home on Ozempic and Metformin,  I explained that most likely when she goes home I will be able to discharge her on Metformin alone.    DVT prophylaxis, holding on anticoagulants due to her hematoma, I have added SCUDs.    Homeless, she and her  are working this out they live in a trailer, she states a friend will take her to appointments, we can get her prescriptions filled meds to bed.    Disposition Home    Ananth Noble MD      Full

## 2022-06-15 NOTE — ED PROVIDER NOTE - CROS ED ROS STATEMENT
Spoke with Jeanette regarding her FNA results.    Based on the result -> Sarcoid cannot be ruled out.  It was recommended to do excisional biopsy of the LN, if clinically indicated.    Discussed with Jeanette-> she lives in a nursing home with multiple issues.  Decided to hold off the surgical option.    Currently she does not have respiratory issues. Her breathing is stable according to her.    All questions answered.     all other ROS negative except as per HPI

## 2022-06-27 NOTE — ED PROVIDER NOTE - PRINCIPAL DIAGNOSIS
[FreeTextEntry1] : The patient is a 53-year-old  white female with [place]e and [place] descent.  She underwent menarche at age 16 and had her first child at age 28.  She has no family history of breast or ovarian cancer.  She has a history of bilateral silicone augmentation implants placed back in .  She also has a history of MS.  She underwent a screening mammography on 2019 at [facility] which showed some left breast asymmetry in the upper outer quadrant and inner quadrant and a lobulated density in the right breast 4:00 region.  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  She had a directed right breast ultrasound showing the right breast 4:00 density to be benign appearing and slide review of the left breast biopsy was consistent with a benign radial sclerosing lesion.  She obtained follow-up of this area which has no longer been visualized on ultrasound.  On exam today, I cannot feel any suspicious densities in either breast.  She underwent her last bilateral mammography and ultrasound which was reviewed from the [facility] and performed on May 10, 2022 which did not show any suspicious findings.  The patient was reassured and should follow-up again in 1 year and her next bilateral mammography and ultrasound will be due in  and she was given prescriptions.
ACS (acute coronary syndrome)

## 2022-07-20 NOTE — ED PROVIDER NOTE - PROGRESS NOTE ADDITIONAL1
"Yang Melendez" Eneida was seen and treated in our emergency department on 7/20/2022.  She may return to school on 07/21/2022.      If you have any questions or concerns, please don't hesitate to call.      Daniel Jean PA-C"
"Yang Melendez" Eneida was seen and treated in our emergency department on 7/20/2022.  She may return to school on 07/21/2022.      If you have any questions or concerns, please don't hesitate to call.      Daniel Jean PA-C"
Additional Progress Note...

## 2022-07-29 NOTE — ED BEHAVIORAL HEALTH ASSESSMENT NOTE - THOUGHT CONTENT
Unremarkable Taltz Counseling: I discussed with the patient the risks of ixekizumab including but not limited to immunosuppression, serious infections, worsening of inflammatory bowel disease and drug reactions.  The patient understands that monitoring is required including a PPD at baseline and must alert us or the primary physician if symptoms of infection or other concerning signs are noted.

## 2022-09-29 ENCOUNTER — EMERGENCY (EMERGENCY)
Facility: HOSPITAL | Age: 72
LOS: 1 days | Discharge: LEFT BEFORE DISCHARGE | End: 2022-09-29
Attending: EMERGENCY MEDICINE | Admitting: EMERGENCY MEDICINE

## 2022-09-29 VITALS
DIASTOLIC BLOOD PRESSURE: 69 MMHG | OXYGEN SATURATION: 99 % | TEMPERATURE: 97 F | RESPIRATION RATE: 19 BRPM | HEIGHT: 72 IN | SYSTOLIC BLOOD PRESSURE: 117 MMHG | HEART RATE: 60 BPM | WEIGHT: 175.05 LBS

## 2022-09-29 DIAGNOSIS — Z95.9 PRESENCE OF CARDIAC AND VASCULAR IMPLANT AND GRAFT, UNSPECIFIED: Chronic | ICD-10-CM

## 2022-09-29 PROCEDURE — L9991: CPT

## 2022-09-29 NOTE — ED ADULT TRIAGE NOTE - CHIEF COMPLAINT QUOTE
Pt walked in c/o he can't hear out of his left ear for months. Denies trauma/pain/injuries.  Denies bleeding/discharge. No difficulty hearing from right ear.

## 2022-10-02 DIAGNOSIS — Z53.21 PROCEDURE AND TREATMENT NOT CARRIED OUT DUE TO PATIENT LEAVING PRIOR TO BEING SEEN BY HEALTH CARE PROVIDER: ICD-10-CM

## 2022-10-02 DIAGNOSIS — H91.90 UNSPECIFIED HEARING LOSS, UNSPECIFIED EAR: ICD-10-CM

## 2022-10-10 ENCOUNTER — EMERGENCY (EMERGENCY)
Facility: HOSPITAL | Age: 72
LOS: 1 days | Discharge: HOME | End: 2022-10-10
Attending: STUDENT IN AN ORGANIZED HEALTH CARE EDUCATION/TRAINING PROGRAM | Admitting: STUDENT IN AN ORGANIZED HEALTH CARE EDUCATION/TRAINING PROGRAM
Payer: MEDICARE

## 2022-10-10 VITALS
WEIGHT: 177.91 LBS | RESPIRATION RATE: 18 BRPM | DIASTOLIC BLOOD PRESSURE: 96 MMHG | SYSTOLIC BLOOD PRESSURE: 156 MMHG | HEART RATE: 79 BPM | OXYGEN SATURATION: 98 % | HEIGHT: 72 IN | TEMPERATURE: 98 F

## 2022-10-10 VITALS
OXYGEN SATURATION: 98 % | DIASTOLIC BLOOD PRESSURE: 89 MMHG | SYSTOLIC BLOOD PRESSURE: 149 MMHG | RESPIRATION RATE: 16 BRPM | HEART RATE: 83 BPM

## 2022-10-10 DIAGNOSIS — Z95.9 PRESENCE OF CARDIAC AND VASCULAR IMPLANT AND GRAFT, UNSPECIFIED: Chronic | ICD-10-CM

## 2022-10-10 LAB
ALBUMIN SERPL ELPH-MCNC: 3.8 G/DL — SIGNIFICANT CHANGE UP (ref 3.3–5)
ALP SERPL-CCNC: 58 U/L — SIGNIFICANT CHANGE UP (ref 40–120)
ALT FLD-CCNC: 12 U/L — SIGNIFICANT CHANGE UP (ref 10–45)
ANION GAP SERPL CALC-SCNC: 7 MMOL/L — SIGNIFICANT CHANGE UP (ref 5–17)
APTT BLD: 30 SEC — SIGNIFICANT CHANGE UP (ref 27.5–35.5)
AST SERPL-CCNC: 17 U/L — SIGNIFICANT CHANGE UP (ref 10–40)
BASOPHILS # BLD AUTO: 0.02 K/UL — SIGNIFICANT CHANGE UP (ref 0–0.2)
BASOPHILS NFR BLD AUTO: 0.6 % — SIGNIFICANT CHANGE UP (ref 0–2)
BILIRUB SERPL-MCNC: 0.7 MG/DL — SIGNIFICANT CHANGE UP (ref 0.2–1.2)
BUN SERPL-MCNC: 9 MG/DL — SIGNIFICANT CHANGE UP (ref 7–23)
CALCIUM SERPL-MCNC: 8.6 MG/DL — SIGNIFICANT CHANGE UP (ref 8.4–10.5)
CHLORIDE SERPL-SCNC: 105 MMOL/L — SIGNIFICANT CHANGE UP (ref 96–108)
CK MB CFR SERPL CALC: 3.7 NG/ML — SIGNIFICANT CHANGE UP (ref 0–6.7)
CK SERPL-CCNC: 100 U/L — SIGNIFICANT CHANGE UP (ref 30–200)
CO2 SERPL-SCNC: 29 MMOL/L — SIGNIFICANT CHANGE UP (ref 22–31)
CREAT SERPL-MCNC: 1.37 MG/DL — HIGH (ref 0.5–1.3)
EGFR: 55 ML/MIN/1.73M2 — LOW
EOSINOPHIL # BLD AUTO: 0.1 K/UL — SIGNIFICANT CHANGE UP (ref 0–0.5)
EOSINOPHIL NFR BLD AUTO: 3.1 % — SIGNIFICANT CHANGE UP (ref 0–6)
GLUCOSE SERPL-MCNC: 234 MG/DL — HIGH (ref 70–99)
HCT VFR BLD CALC: 36.4 % — LOW (ref 39–50)
HGB BLD-MCNC: 12.7 G/DL — LOW (ref 13–17)
IMM GRANULOCYTES NFR BLD AUTO: 0.3 % — SIGNIFICANT CHANGE UP (ref 0–0.9)
INR BLD: 1.01 — SIGNIFICANT CHANGE UP (ref 0.88–1.16)
LIDOCAIN IGE QN: 32 U/L — SIGNIFICANT CHANGE UP (ref 7–60)
LYMPHOCYTES # BLD AUTO: 0.88 K/UL — LOW (ref 1–3.3)
LYMPHOCYTES # BLD AUTO: 27.2 % — SIGNIFICANT CHANGE UP (ref 13–44)
MAGNESIUM SERPL-MCNC: 1.5 MG/DL — LOW (ref 1.6–2.6)
MCHC RBC-ENTMCNC: 34.3 PG — HIGH (ref 27–34)
MCHC RBC-ENTMCNC: 34.9 GM/DL — SIGNIFICANT CHANGE UP (ref 32–36)
MCV RBC AUTO: 98.4 FL — SIGNIFICANT CHANGE UP (ref 80–100)
MONOCYTES # BLD AUTO: 0.36 K/UL — SIGNIFICANT CHANGE UP (ref 0–0.9)
MONOCYTES NFR BLD AUTO: 11.1 % — SIGNIFICANT CHANGE UP (ref 2–14)
MYOGLOBIN SERPL-MCNC: 54 NG/ML — SIGNIFICANT CHANGE UP (ref 16–96)
NEUTROPHILS # BLD AUTO: 1.86 K/UL — SIGNIFICANT CHANGE UP (ref 1.8–7.4)
NEUTROPHILS NFR BLD AUTO: 57.7 % — SIGNIFICANT CHANGE UP (ref 43–77)
NRBC # BLD: 0 /100 WBCS — SIGNIFICANT CHANGE UP (ref 0–0)
NT-PROBNP SERPL-SCNC: 822 PG/ML — HIGH (ref 0–300)
PLATELET # BLD AUTO: 113 K/UL — LOW (ref 150–400)
POTASSIUM SERPL-MCNC: 4.1 MMOL/L — SIGNIFICANT CHANGE UP (ref 3.5–5.3)
POTASSIUM SERPL-SCNC: 4.1 MMOL/L — SIGNIFICANT CHANGE UP (ref 3.5–5.3)
PROT SERPL-MCNC: 6.3 G/DL — SIGNIFICANT CHANGE UP (ref 6–8.3)
PROTHROM AB SERPL-ACNC: 12 SEC — SIGNIFICANT CHANGE UP (ref 10.5–13.4)
RBC # BLD: 3.7 M/UL — LOW (ref 4.2–5.8)
RBC # FLD: 11.7 % — SIGNIFICANT CHANGE UP (ref 10.3–14.5)
SARS-COV-2 RNA SPEC QL NAA+PROBE: NEGATIVE — SIGNIFICANT CHANGE UP
SODIUM SERPL-SCNC: 141 MMOL/L — SIGNIFICANT CHANGE UP (ref 135–145)
TROPONIN T SERPL-MCNC: 0.01 NG/ML — SIGNIFICANT CHANGE UP (ref 0–0.01)
WBC # BLD: 3.23 K/UL — LOW (ref 3.8–10.5)
WBC # FLD AUTO: 3.23 K/UL — LOW (ref 3.8–10.5)

## 2022-10-10 PROCEDURE — 71045 X-RAY EXAM CHEST 1 VIEW: CPT

## 2022-10-10 PROCEDURE — 83880 ASSAY OF NATRIURETIC PEPTIDE: CPT

## 2022-10-10 PROCEDURE — 85730 THROMBOPLASTIN TIME PARTIAL: CPT

## 2022-10-10 PROCEDURE — 73030 X-RAY EXAM OF SHOULDER: CPT | Mod: 26,RT

## 2022-10-10 PROCEDURE — 83690 ASSAY OF LIPASE: CPT

## 2022-10-10 PROCEDURE — 83874 ASSAY OF MYOGLOBIN: CPT

## 2022-10-10 PROCEDURE — 85610 PROTHROMBIN TIME: CPT

## 2022-10-10 PROCEDURE — 71045 X-RAY EXAM CHEST 1 VIEW: CPT | Mod: 26

## 2022-10-10 PROCEDURE — 73030 X-RAY EXAM OF SHOULDER: CPT

## 2022-10-10 PROCEDURE — 82550 ASSAY OF CK (CPK): CPT

## 2022-10-10 PROCEDURE — 99285 EMERGENCY DEPT VISIT HI MDM: CPT | Mod: 25

## 2022-10-10 PROCEDURE — 82553 CREATINE MB FRACTION: CPT

## 2022-10-10 PROCEDURE — 85025 COMPLETE CBC W/AUTO DIFF WBC: CPT

## 2022-10-10 PROCEDURE — 80053 COMPREHEN METABOLIC PANEL: CPT

## 2022-10-10 PROCEDURE — 83735 ASSAY OF MAGNESIUM: CPT

## 2022-10-10 PROCEDURE — 87635 SARS-COV-2 COVID-19 AMP PRB: CPT

## 2022-10-10 PROCEDURE — 84484 ASSAY OF TROPONIN QUANT: CPT

## 2022-10-10 PROCEDURE — 93010 ELECTROCARDIOGRAM REPORT: CPT

## 2022-10-10 PROCEDURE — 36415 COLL VENOUS BLD VENIPUNCTURE: CPT

## 2022-10-10 RX ADMIN — Medication 0.5 MILLIGRAM(S): at 05:06

## 2022-10-10 NOTE — ED PROVIDER NOTE - NSFOLLOWUPINSTRUCTIONS_ED_ALL_ED_FT
Follow up with your primary medical doctor as soon as possible.  Follow up with your cardiologist as soon as possible.  Return to the emergency department if your symptoms worsen or if you develop new symptoms.    Chest Pain    Chest pain can be caused by many different conditions which may or may not be dangerous. Causes include heartburn, lung infections, heart attack, blood clot in lungs, skin infections, strain or damage to muscle, cartilage, or bones, etc. In addition to a history and physical examination, an electrocardiogram (ECG) or other lab tests may have been performed to determine the cause of your chest pain. Follow up with your primary care provider or with a cardiologist as instructed.     SEEK IMMEDIATE MEDICAL CARE IF YOU HAVE ANY OF THE FOLLOWING SYMPTOMS: worsening chest pain, coughing up blood, unexplained back/neck/jaw pain, severe abdominal pain, dizziness or lightheadedness, fainting, shortness of breath, sweaty or clammy skin, vomiting, or racing heart beat. These symptoms may represent a serious problem that is an emergency. Do not wait to see if the symptoms will go away. Get medical help right away. Call 911 and do not drive yourself to the hospital.

## 2022-10-10 NOTE — ED ADULT NURSE NOTE - OBJECTIVE STATEMENT
Pt is 72M c/o chest pain x3days. Per pt CP radiates to R shoulder and pain increases with movement and is tender on palpation of shoulder and chest. Pt states was seen at [place] [facility] and DC'ed "because there was nothing wrong with my heart." [facility] heart attack 2017, no stents. Pt A&Ox3, respirations spontaneous and unlabored, skin warm and dry, ambulatory with steady gait.

## 2022-10-10 NOTE — ED PROVIDER NOTE - PATIENT PORTAL LINK FT
You can access the FollowMyHealth Patient Portal offered by [facility] by registering at the following website: [email or web address]. By joining [facility]’s FollowMyHealth portal, you will also be able to view your health information using other applications (apps) compatible with our system.

## 2022-10-10 NOTE — ED ADULT TRIAGE NOTE - CHIEF COMPLAINT QUOTE
pain to right side of chest radiating to right arm since 2-3 days ago; with cough; denies fever, chills, sob
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
## 2022-10-10 NOTE — ED PROVIDER NOTE - NS ED ROS FT
CONSTITUTIONAL: No fever, no chills, no fatigue  EYES: No eye redness, no visual changes  ENT: No ear pain, no sore throat  CARDIOVASCULAR: +chest pain, no palpitations  RESPIRATORY: No cough, no SOB  GI: No abdominal pain, no nausea, no vomiting, no constipation, no diarrhea  GENITOURINARY: No dysuria, no frequency, no hematuria  MUSCULOSKELETAL: No back pain, + shoulder joint pain, no myalgias  SKIN: No rash, no peripheral edema  NEURO: No headache, no confusion    ALL OTHER SYSTEMS NEGATIVE.

## 2022-10-10 NOTE — ED PROVIDER NOTE - PHYSICAL EXAMINATION
CONSTITUTIONAL: Non-toxic; in no apparent distress  HEAD: Normocephalic; atraumatic  EYES: PERRL; EOM intact   ENMT: External appears normal  NECK: Supple; non-tender  CARD: Normal S1, S2; no murmurs, rubs, or gallops  RESP: Normal chest excursion with respiration; breath sounds clear and equal bilaterally  ABD: Soft, non-distended; non-tender  EXT: Normal ROM in all four extremities; + ttp of R chest wall, + ttp of shoulder, Strength/sensation intact, cap refil <2s, DPs intact throughout, limbs WWP  SKIN: Warm, dry, no rash  NEURO:  No focal neurological deficiencies.

## 2022-10-10 NOTE — ED PROVIDER NOTE - PROGRESS NOTE DETAILS
CXR negative for pneumothorax or pneumonia or other opacity or acute abnormality. ACS eval with negative troponin, EKG, CXR.  HEART score considered. Discussed with the patient the residual risk of ACS and MACE in the context of their negative troponin testing, EKG findings, risk factors, age, and story. They accept this residual risk and wish to follow up with cardiology and PMD as outpatient. All findings discussed. Ok to discharge with home care instructions, return precautions.

## 2022-10-10 NOTE — ED PROVIDER NOTE - CARE PLAN
73 y/o female w/pmhx of HTN, chronic lymphedema with RLE wounds w/cellulitis.    UTI ruled out.   UCx no growth  patient asymptomatic     12/13 --patient complaining of left sided CP and some SOB.   CE neg x 1   EKG: NSR   CXR : poor inspiratory effort, mildly congested , no e/o infiltrates or effusions (checked myself)  d-dimer elevated 1631  given duonebs   supplement oxygen via NC prn to maintain O2 sat >90 % , currently patient is saturating 96% on 2L NC   obtain CT angio   lasix 80mg IVP x1   d/c IVF       Assessment and Plan:     Cellulitis with open draining wound of right lower extremity , draining from wound is very minimal now   massive chronic lymphedema b/l  increasing  WBC with bandemia --will obtain MRI RLE   remains afebrile   A1c 6.3%  - blood cultures: NGTD, repeat blood Cx also no growth   discussed with Dr. Marquez, no need for debridement at this time, continue with abx, daily wound care dressing changes  continue with vanc 1250mg Q12H , next vanc trough 12/14 @ 1700   completed   ceftriaxone   analgesics prn   discussed with ID, due to persistently elevated WBC with bandemia, blood Cx repeated today ;   12/11 CT RLE done today w/o e/o abscess or tracking subcut emphysema       Wheezing, atelectasis?  OHS?   - nebs treatment, wheezing resolved   s/p short steroid course    symbicort 2/2     Morbid obesity due to excess calories.    - nutrition eval appreciated   -weight loss program (consider [facility] obesity clinic referral once ready for discharge)       Essential hypertension, controlled off meds   - monitor.     Hypophosphatemia --repleted     Preventive measure.    - sq lovenox bid -dvt ppx  PT :PRASHANT  fall precautions   HOBE      73 y/o female w/pmhx of HTN, chronic lymphedema with RLE wounds w/cellulitis.    UTI ruled out.   UCx no growth  patient asymptomatic     12/13 --patient complaining of left sided CP and some SOB.   CE neg x 1   EKG: NSR   CXR : poor inspiratory effort, mildly congested , no e/o infiltrates or effusions (checked myself)  d-dimer elevated 1631  given duonebs   supplement oxygen via NC prn to maintain O2 sat >90 % , currently patient is saturating 96% on 2L NC   CT angio: neg for PE , Somewhat linear bilateral lower lobe opacities, suggesting atelectasis. imaging finding of esophagitis/gastritis (however patient is asymptomatic). Nonspecific bilateral perinephric stranding. however amylase and lipase unremarkable.   lasix 80mg IVP x1   d/c IVF     12/14 WBC is still elevated.   awaiting vanc trough @ 5pm  CXR unchanged.   lasix 80mg IVP x 1 given   added cefepime for broad spectrum coverage        Assessment and Plan:     Cellulitis with open draining wound of right lower extremity , draining from wound is very minimal now   massive chronic lymphedema b/l  increasing  WBC with bandemia  remains afebrile   A1c 6.3%  - blood cultures: NGTD, repeat blood Cx also no growth   discussed with Dr. Marquez, no need for debridement at this time, continue with abx, daily wound care dressing changes  continue with vanc 1250mg Q12H , next vanc trough 12/14 @ 1700   completed   ceftriaxone   analgesics prn   discussed with ID, due to persistently elevated WBC with bandemia, blood Cx repeated today ;   12/11 CT RLE done today w/o e/o abscess or tracking subcut emphysema   patient was unable to obtain MRI of LE due to body habitus     Wheezing, atelectasis?  OHS?   - nebs treatment, wheezing resolved   s/p short steroid course    symbicort 2/2     Morbid obesity due to excess calories.    - nutrition eval appreciated   -weight loss program (consider [facility] obesity clinic referral once ready for discharge)       Essential hypertension, controlled off meds   - monitor.     Hypophosphatemia --repleted     Preventive measure.    - sq lovenox bid -dvt ppx  PT :PRASHANT  fall precautions   HOBE      1 Principal Discharge DX:	Right shoulder pain

## 2022-10-12 DIAGNOSIS — F19.10 OTHER PSYCHOACTIVE SUBSTANCE ABUSE, UNCOMPLICATED: ICD-10-CM

## 2022-10-12 DIAGNOSIS — M25.511 PAIN IN RIGHT SHOULDER: ICD-10-CM

## 2022-10-12 DIAGNOSIS — I13.0 HYPERTENSIVE HEART AND CHRONIC KIDNEY DISEASE WITH HEART FAILURE AND STAGE 1 THROUGH STAGE 4 CHRONIC KIDNEY DISEASE, OR UNSPECIFIED CHRONIC KIDNEY DISEASE: ICD-10-CM

## 2022-10-12 DIAGNOSIS — I25.2 OLD MYOCARDIAL INFARCTION: ICD-10-CM

## 2022-10-12 DIAGNOSIS — Z79.82 LONG TERM (CURRENT) USE OF ASPIRIN: ICD-10-CM

## 2022-10-12 DIAGNOSIS — I71.21 ANEURYSM OF THE ASCENDING AORTA, WITHOUT RUPTURE: ICD-10-CM

## 2022-10-12 DIAGNOSIS — Z88.0 ALLERGY STATUS TO PENICILLIN: ICD-10-CM

## 2022-10-12 DIAGNOSIS — Z91.018 ALLERGY TO OTHER FOODS: ICD-10-CM
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
## 2022-10-12 DIAGNOSIS — I50.20 UNSPECIFIED SYSTOLIC (CONGESTIVE) HEART FAILURE: ICD-10-CM

## 2022-10-12 DIAGNOSIS — R07.89 OTHER CHEST PAIN: ICD-10-CM

## 2022-10-12 DIAGNOSIS — I25.10 ATHEROSCLEROTIC HEART DISEASE OF NATIVE CORONARY ARTERY WITHOUT ANGINA PECTORIS: ICD-10-CM

## 2022-10-12 DIAGNOSIS — Z91.013 ALLERGY TO SEAFOOD: ICD-10-CM
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
## 2022-10-12 DIAGNOSIS — Z88.2 ALLERGY STATUS TO SULFONAMIDES: ICD-10-CM
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
## 2022-10-12 DIAGNOSIS — B20 HUMAN IMMUNODEFICIENCY VIRUS [HIV] DISEASE: ICD-10-CM

## 2022-10-12 DIAGNOSIS — C61 MALIGNANT NEOPLASM OF PROSTATE: ICD-10-CM

## 2022-10-12 DIAGNOSIS — F32.A DEPRESSION, UNSPECIFIED: ICD-10-CM

## 2022-10-12 DIAGNOSIS — E11.22 TYPE 2 DIABETES MELLITUS WITH DIABETIC CHRONIC KIDNEY DISEASE: ICD-10-CM

## 2022-10-12 DIAGNOSIS — Z20.822 CONTACT WITH AND (SUSPECTED) EXPOSURE TO COVID-19: ICD-10-CM
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
## 2022-10-12 DIAGNOSIS — N18.9 CHRONIC KIDNEY DISEASE, UNSPECIFIED: ICD-10-CM

## 2022-11-01 NOTE — DIETITIAN INITIAL EVALUATION ADULT. - WEIGHT FOR BMI (KG)
81.2 Consent 2/Introductory Paragraph: Mohs surgery was explained to the patient and consent was obtained. The risks, benefits and alternatives to therapy were discussed in detail. Specifically, the risks of infection, scarring, bleeding, prolonged wound healing, incomplete removal, allergy to anesthesia, nerve injury and recurrence were addressed. Prior to the procedure, the treatment site was clearly identified and confirmed by the patient using mirror when possible. All components of Universal Protocol/PAUSE Rule completed.

## 2022-12-27 NOTE — ED PROVIDER NOTE - CONTEXT
unknown Mirvaso Pregnancy And Lactation Text: This medication has not been assigned a Pregnancy Risk Category. It is unknown if the medication is excreted in breast milk.

## 2023-02-12 NOTE — ED ADULT NURSE NOTE - TEMPLATE LIST FOR HEAD TO TOE ASSESSMENT
Physical Therapy    Patient not seen in therapy.     On hold due to medical condition    Re-attempt plan: tomorrow    Elevated HR in a-fib.      OBJECTIVE                      Documented in the chart in the following areas: Assessment.      Therapy procedure time and total treatment time can be found documented on the Time Entry flowsheet   Cardiac

## 2023-03-10 NOTE — H&P ADULT - NSICDXNOFAMILYHX_GEN_ALL_CORE
High Risk Discharge Note: Writer called patient  spoke to patient who states he remains anxious and is looking to get reinstated in [facility].  Pt was informed he can call back to the ED if he needs assistance to which he agreed. <-- Click to add NO pertinent Family History

## 2023-03-22 NOTE — ED PROVIDER NOTE - MUSCULOSKELETAL, MLM
Cryotherapy Text: The wound bed was treated with cryotherapy after the biopsy was performed. Spine appears normal, range of motion is not limited, +Tenderness over the left foot, +2 pedal pulses. Spine appears normal, range of motion is not limited, +Tenderness over the left foot, +2 pedal pulses (DP/PT) - equal.

## 2023-04-25 NOTE — BH CONSULTATION LIAISON PROGRESS NOTE - NSBHMSEPERCEPT_PSY_A_CORE
Patient Name (Optional- Will Render 'the Patient' If Blank): Cory Plata Mohs Case Number: [id number] Date Of Previous Biopsy (Optional): 03/02/23 Previous Accession (Optional): [id number] Biopsy Photograph Reviewed: Yes No abnormalities Referring Physician (Optional): Ricardo Consent Type: Consent 1 (Standard) Eye Shield Used: No Surgeon Performing Repair (Optional): Oumar Initial Size Of Lesion: 1.7 X Size Of Lesion In Cm (Optional): 1.3 Number Of Stages: 1 Primary Defect Length In Cm (Final Defect Size - Required For Flaps/Grafts): 2 Primary Defect Width In Cm (Final Defect Size - Required For Flaps/Grafts): 1.8 Repair Type: No repair - secondary intention Oculoplastic Surgeon Procedure Text (A): After obtaining clear surgical margins the patient was sent to oculoplastics for surgical repair.  The patient understands they will receive post-surgical care and follow-up from the referring physician's office. Otolaryngologist Procedure Text (A): After obtaining clear surgical margins the patient was sent to otolaryngology for surgical repair.  The patient understands they will receive post-surgical care and follow-up from the referring physician's office. Plastic Surgeon (A): Tom Rosario MD Plastic Surgeon Procedure Text (A): After obtaining clear surgical margins the patient was sent to plastics for surgical repair.  The patient understands they will receive post-surgical care and follow-up from the referring physician's office. Mid-Level Procedure Text (A): After obtaining clear surgical margins the patient was sent to a mid-level provider for surgical repair.  The patient understands they will receive post-surgical care and follow-up from the mid-level provider. Provider Procedure Text (A): After obtaining clear surgical margins the defect was repaired by another provider. Asc Procedure Text (A): After obtaining clear surgical margins the patient was sent to an ASC for surgical repair.  The patient understands they will receive post-surgical care and follow-up from the ASC physician. Simple / Intermediate / Complex Repair - Final Wound Length In Cm: 0 Suturegard Retention Suture: 2-0 Nylon Retention Suture Bite Size: 3 mm Length To Time In Minutes Device Was In Place: 10 Undermining Type: Entire Wound Debridement Text: The wound edges were debrided prior to proceeding with the closure to facilitate wound healing. Helical Rim Text: The closure involved the helical rim. Vermilion Border Text: The closure involved the vermilion border. Nostril Rim Text: The closure involved the nostril rim. Retention Suture Text: Retention sutures were placed to support the closure and prevent dehiscence. Location Indication Override (Is Already Calculated Based On Selected Body Location): Area H Area H Indication Text: Tumors in this location are included in Area H (eyelids, eyebrows, nose, lips, chin, ear, pre-auricular, post-auricular, temple, genitalia, hands, feet, ankles and areola).  Tissue conservation is critical in these anatomic locations. Area M Indication Text: Tumors in this location are included in Area M (cheek, forehead, scalp, neck, jawline and pretibial skin).  Mohs surgery is indicated for tumors in these anatomic locations. Area L Indication Text: Tumors in this location are included in Area L (trunk and extremities).  Mohs surgery is indicated for larger tumors, or tumors with aggressive histologic features, in these anatomic locations. Tumor Debulked?: curette Depth Of Tumor Invasion (For Histology): tumor not visualized (deep and peripheral margins are clear of tumor) Perineural Invasion (For Histology - Be Specific If Possible): absent Surgical Defect Length In Cm (Optional): 2.0 Special Stains Stage 1 - Results: Base On Clearance Noted Above Stage 2: Additional Anesthesia Type: 1% lidocaine with 1:100,000 epinephrine and a 1:10 solution of 8.4% sodium bicarbonate Stage 3: Additional Anesthesia Type: 1% lidocaine with epinephrine Staging Info: By selecting yes to the question above you will include information on AJCC 8 tumor staging in your Mohs note. Information on tumor staging will be automatically added for SCCs on the head and neck. AJCC 8 includes tumor size, tumor depth, perineural involvement and bone invasion. Tumor Depth: Less than 6mm from granular layer and no invasion beyond the subcutaneous fat Why Was The Change Made?: Please Select the Appropriate Response Medical Necessity Statement: Based on my medical judgement, Mohs surgery is the most appropriate treatment for this cancer compared to other treatments. Alternatives Discussed Intro (Do Not Add Period): I discussed alternative treatments to Mohs surgery and specifically discussed the risks and benefits of Consent 1/Introductory Paragraph: The rationale for Mohs was explained to the patient and consent was obtained. The risks, benefits and alternatives to therapy were discussed in detail. Specifically, the risks of infection, scarring, bleeding, prolonged wound healing, incomplete removal, allergy to anesthesia, nerve injury and recurrence were addressed. Prior to the procedure, the treatment site was clearly identified and confirmed by the patient. All components of Universal Protocol/PAUSE Rule completed. Consent 2/Introductory Paragraph: Mohs surgery was explained to the patient and consent was obtained. The risks, benefits and alternatives to therapy were discussed in detail. Specifically, the risks of infection, scarring, bleeding, prolonged wound healing, incomplete removal, allergy to anesthesia, nerve injury and recurrence were addressed. Prior to the procedure, the treatment site was clearly identified and confirmed by the patient. All components of Universal Protocol/PAUSE Rule completed. Consent 3/Introductory Paragraph: I gave the patient a chance to ask questions they had about the procedure.  Following this I explained the Mohs procedure and consent was obtained. The risks, benefits and alternatives to therapy were discussed in detail. Specifically, the risks of infection, scarring, bleeding, prolonged wound healing, incomplete removal, allergy to anesthesia, nerve injury and recurrence were addressed. Prior to the procedure, the treatment site was clearly identified and confirmed by the patient. All components of Universal Protocol/PAUSE Rule completed. Consent (Temporal Branch)/Introductory Paragraph: The rationale for Mohs was explained to the patient and consent was obtained. The risks, benefits and alternatives to therapy were discussed in detail. Specifically, the risks of damage to the temporal branch of the facial nerve, infection, scarring, bleeding, prolonged wound healing, incomplete removal, allergy to anesthesia, and recurrence were addressed. Prior to the procedure, the treatment site was clearly identified and confirmed by the patient. All components of Universal Protocol/PAUSE Rule completed. Consent (Marginal Mandibular)/Introductory Paragraph: The rationale for Mohs was explained to the patient and consent was obtained. The risks, benefits and alternatives to therapy were discussed in detail. Specifically, the risks of damage to the marginal mandibular branch of the facial nerve, infection, scarring, bleeding, prolonged wound healing, incomplete removal, allergy to anesthesia, and recurrence were addressed. Prior to the procedure, the treatment site was clearly identified and confirmed by the patient. All components of Universal Protocol/PAUSE Rule completed. Consent (Spinal Accessory)/Introductory Paragraph: The rationale for Mohs was explained to the patient and consent was obtained. The risks, benefits and alternatives to therapy were discussed in detail. Specifically, the risks of damage to the spinal accessory nerve, infection, scarring, bleeding, prolonged wound healing, incomplete removal, allergy to anesthesia, and recurrence were addressed. Prior to the procedure, the treatment site was clearly identified and confirmed by the patient. All components of Universal Protocol/PAUSE Rule completed. Consent (Near Eyelid Margin)/Introductory Paragraph: The rationale for Mohs was explained to the patient and consent was obtained. The risks, benefits and alternatives to therapy were discussed in detail. Specifically, the risks of ectropion or eyelid deformity, infection, scarring, bleeding, prolonged wound healing, incomplete removal, allergy to anesthesia, nerve injury and recurrence were addressed. Prior to the procedure, the treatment site was clearly identified and confirmed by the patient. All components of Universal Protocol/PAUSE Rule completed. Consent (Ear)/Introductory Paragraph: The rationale for Mohs was explained to the patient and consent was obtained. The risks, benefits and alternatives to therapy were discussed in detail. Specifically, the risks of ear deformity, infection, scarring, bleeding, prolonged wound healing, incomplete removal, allergy to anesthesia, nerve injury and recurrence were addressed. Prior to the procedure, the treatment site was clearly identified and confirmed by the patient. All components of Universal Protocol/PAUSE Rule completed. Consent (Nose)/Introductory Paragraph: The rationale for Mohs was explained to the patient and consent was obtained. The risks, benefits and alternatives to therapy were discussed in detail. Specifically, the risks of nasal deformity, changes in the flow of air through the nose, infection, scarring, bleeding, prolonged wound healing, incomplete removal, allergy to anesthesia, nerve injury and recurrence were addressed. Prior to the procedure, the treatment site was clearly identified and confirmed by the patient. All components of Universal Protocol/PAUSE Rule completed. Consent (Lip)/Introductory Paragraph: The rationale for Mohs was explained to the patient and consent was obtained. The risks, benefits and alternatives to therapy were discussed in detail. Specifically, the risks of lip deformity, changes in the oral aperture, infection, scarring, bleeding, prolonged wound healing, incomplete removal, allergy to anesthesia, nerve injury and recurrence were addressed. Prior to the procedure, the treatment site was clearly identified and confirmed by the patient. All components of Universal Protocol/PAUSE Rule completed. Consent (Scalp)/Introductory Paragraph: The rationale for Mohs was explained to the patient and consent was obtained. The risks, benefits and alternatives to therapy were discussed in detail. Specifically, the risks of changes in hair growth pattern secondary to repair, infection, scarring, bleeding, prolonged wound healing, incomplete removal, allergy to anesthesia, nerve injury and recurrence were addressed. Prior to the procedure, the treatment site was clearly identified and confirmed by the patient. All components of Universal Protocol/PAUSE Rule completed. Detail Level: Detailed Postop Diagnosis: same Anesthesia Volume In Cc: 2.5 Hemostasis: Electrocoagulation Estimated Blood Loss (Cc): minimal Brow Lift Text: A midfrontal incision was made medially to the defect to allow access to the tissues just superior to the left eyebrow. Following careful dissection inferiorly in a supraperiosteal plane to the level of the left eyebrow, several 3-0 monocryl sutures were used to resuspend the eyebrow orbicularis oculi muscular unit to the superior frontal bone periosteum. This resulted in an appropriate reapproximation of static eyebrow symmetry and correction of the left brow ptosis. Epidermal Closure: none-secondary intention Suturegard Intro: Intraoperative tissue expansion was performed, utilizing the SUTUREGARD device, in order to reduce wound tension. Suturegard Body: The suture ends were repeatedly re-tightened and re-clamped to achieve the desired tissue expansion. Hemigard Intro: Due to skin fragility and wound tension, it was decided to use HEMIGARD adhesive retention suture devices to permit a linear closure. The skin was cleaned and dried for a 6cm distance away from the wound. Excessive hair, if present, was removed to allow for adhesion. Hemigard Postcare Instructions: The HEMIGARD strips are to remain completely dry for at least 5-7 days. Donor Site Anesthesia Type: same as repair anesthesia Graft Basting Suture (Optional): 5-0 Fast Absorbing Gut Epidermal Closure Graft Donor Site (Optional): simple interrupted Closure 2 Information: This tab is for additional flaps and grafts, including complex repair and grafts and complex repair and flaps. You can also specify a different location for the additional defect, if the location is the same you do not need to select a new one. We will insert the automated text for the repair you select below just as we do for solitary flaps and grafts. Please note that at this time if you select a location with a different insurance zone you will need to override the ICD10 and CPT if appropriate. Closure 3 Information: This tab is for additional flaps and grafts above and beyond our usual structured repairs.  Please note if you enter information here it will not currently bill and you will need to add the billing information manually. Wound Care: Petrolatum Dressing: xeroform gauze Dressing (No Sutures): dry sterile dressing Wound Check: 7 days Unna Boot Text: An Unna boot was placed to help immobilize the limb and facilitate more rapid healing. Home Suture Removal Text: Patient was provided instructions on removing sutures and will remove their sutures at home.  If they have any questions or difficulties they will call the office. Post-Care Instructions: I reviewed with the patient in detail post-care instructions. Patient is not to engage in any heavy lifting, exercise, or swimming for the next 14 days. Should the patient develop any fevers, chills, bleeding, severe pain patient will contact the office immediately. Pain Refusal Text: I offered to prescribe pain medication but the patient refused to take this medication. Mauc Instructions: By selecting yes to the question below the MAUC number will be added into the note.  This will be calculated automatically based on the diagnosis chosen, the size entered, the body zone selected (H,M,L) and the specific indications you chose. You will also have the option to override the Mohs AUC if you disagree with the automatically calculated number and this option is found in the Case Summary tab. Where Do You Want The Question To Include Opioid Counseling Located?: Case Summary Tab Eye Protection Verbiage: Before proceeding with the stage, a plastic scleral shield was inserted. The globe was anesthetized with a few drops of 1% lidocaine with 1:100,000 epinephrine. Then, an appropriate sized scleral shield was chosen and coated with lacrilube ointment. The shield was gently inserted and left in place for the duration of each stage. After the stage was completed, the shield was gently removed. Mohs Method Verbiage: An incision at a 45 degree angle following the standard Mohs approach was done and the specimen was harvested as a microscopic controlled layer. Surgeon/Pathologist Verbiage (Will Incorporate Name Of Surgeon From Intro If Not Blank): operated in two distinct and integrated capacities as the surgeon and pathologist. Mohs Histo Method Verbiage: Each section was then chromacoded and processed in the Mohs lab using the Mohs protocol and submitted for frozen section. Subsequent Stages Histo Method Verbiage: Using a similar technique to that described above, a thin layer of tissue was removed from all areas where tumor was visible on the previous stage.  The tissue was again oriented, mapped, dyed, and processed as above. Mohs Rapid Report Verbiage: The area of clinically evident tumor was marked with skin marking ink and appropriately hatched.  The initial incision was made following the Mohs approach through the skin.  The specimen was taken to the lab, divided into the necessary number of pieces, chromacoded and processed according to the Mohs protocol.  This was repeated in successive stages until a tumor free defect was achieved. Complex Repair Preamble Text (Leave Blank If You Do Not Want): Extensive wide undermining was performed. Intermediate Repair Preamble Text (Leave Blank If You Do Not Want): Undermining was performed with blunt dissection. Non-Graft Cartilage Fenestration Text: The cartilage was fenestrated with a 2mm punch biopsy to help facilitate healing. Graft Cartilage Fenestration Text: The cartilage was fenestrated with a 2mm punch biopsy to help facilitate graft survival and healing. Secondary Intention Text (Leave Blank If You Do Not Want): The defect will heal with secondary intention. No Repair - Repaired With Adjacent Surgical Defect Text (Leave Blank If You Do Not Want): After obtaining clear surgical margins the defect was repaired concurrently with another surgical defect which was in close approximation. Adjacent Tissue Transfer Text: The defect edges were debeveled with a #15 scalpel blade.  Given the location of the defect and the proximity to free margins an adjacent tissue transfer was deemed most appropriate.  Using a sterile surgical marker, an appropriate flap was drawn incorporating the defect and placing the expected incisions within the relaxed skin tension lines where possible.    The area thus outlined was incised deep to adipose tissue with a #15 scalpel blade.  The skin margins were undermined to an appropriate distance in all directions utilizing iris scissors. Advancement Flap (Single) Text: The defect edges were debeveled with a #15 scalpel blade.  Given the location of the defect and the proximity to free margins a single advancement flap was deemed most appropriate.  Using a sterile surgical marker, an appropriate advancement flap was drawn incorporating the defect and placing the expected incisions within the relaxed skin tension lines where possible.    The area thus outlined was incised deep to adipose tissue with a #15 scalpel blade.  The skin margins were undermined to an appropriate distance in all directions utilizing iris scissors. Advancement Flap (Double) Text: The defect edges were debeveled with a #15 scalpel blade.  Given the location of the defect and the proximity to free margins a double advancement flap was deemed most appropriate.  Using a sterile surgical marker, the appropriate advancement flaps were drawn incorporating the defect and placing the expected incisions within the relaxed skin tension lines where possible.    The area thus outlined was incised deep to adipose tissue with a #15 scalpel blade.  The skin margins were undermined to an appropriate distance in all directions utilizing iris scissors. Burow's Advancement Flap Text: The defect edges were debeveled with a #15 scalpel blade.  Given the location of the defect and the proximity to free margins a Burow's advancement flap was deemed most appropriate.  Using a sterile surgical marker, the appropriate advancement flap was drawn incorporating the defect and placing the expected incisions within the relaxed skin tension lines where possible.    The area thus outlined was incised deep to adipose tissue with a #15 scalpel blade.  The skin margins were undermined to an appropriate distance in all directions utilizing iris scissors. Chonodrocutaneous Helical Advancement Flap Text: The defect edges were debeveled with a #15 scalpel blade.  Given the location of the defect and the proximity to free margins a chondrocutaneous helical advancement flap was deemed most appropriate.  Using a sterile surgical marker, the appropriate advancement flap was drawn incorporating the defect and placing the expected incisions within the relaxed skin tension lines where possible.    The area thus outlined was incised deep to adipose tissue with a #15 scalpel blade.  The skin margins were undermined to an appropriate distance in all directions utilizing iris scissors. Crescentic Advancement Flap Text: The defect edges were debeveled with a #15 scalpel blade.  Given the location of the defect and the proximity to free margins a crescentic advancement flap was deemed most appropriate.  Using a sterile surgical marker, the appropriate advancement flap was drawn incorporating the defect and placing the expected incisions within the relaxed skin tension lines where possible.    The area thus outlined was incised deep to adipose tissue with a #15 scalpel blade.  The skin margins were undermined to an appropriate distance in all directions utilizing iris scissors. A-T Advancement Flap Text: The defect edges were debeveled with a #15 scalpel blade.  Given the location of the defect, shape of the defect and the proximity to free margins an A-T advancement flap was deemed most appropriate.  Using a sterile surgical marker, an appropriate advancement flap was drawn incorporating the defect and placing the expected incisions within the relaxed skin tension lines where possible.    The area thus outlined was incised deep to adipose tissue with a #15 scalpel blade.  The skin margins were undermined to an appropriate distance in all directions utilizing iris scissors. O-T Advancement Flap Text: The defect edges were debeveled with a #15 scalpel blade.  Given the location of the defect, shape of the defect and the proximity to free margins an O-T advancement flap was deemed most appropriate.  Using a sterile surgical marker, an appropriate advancement flap was drawn incorporating the defect and placing the expected incisions within the relaxed skin tension lines where possible.    The area thus outlined was incised deep to adipose tissue with a #15 scalpel blade.  The skin margins were undermined to an appropriate distance in all directions utilizing iris scissors. O-L Flap Text: The defect edges were debeveled with a #15 scalpel blade.  Given the location of the defect, shape of the defect and the proximity to free margins an O-L flap was deemed most appropriate.  Using a sterile surgical marker, an appropriate advancement flap was drawn incorporating the defect and placing the expected incisions within the relaxed skin tension lines where possible.    The area thus outlined was incised deep to adipose tissue with a #15 scalpel blade.  The skin margins were undermined to an appropriate distance in all directions utilizing iris scissors. O-Z Flap Text: The defect edges were debeveled with a #15 scalpel blade.  Given the location of the defect, shape of the defect and the proximity to free margins an O-Z flap was deemed most appropriate.  Using a sterile surgical marker, an appropriate transposition flap was drawn incorporating the defect and placing the expected incisions within the relaxed skin tension lines where possible. The area thus outlined was incised deep to adipose tissue with a #15 scalpel blade.  The skin margins were undermined to an appropriate distance in all directions utilizing iris scissors. Double O-Z Flap Text: The defect edges were debeveled with a #15 scalpel blade.  Given the location of the defect, shape of the defect and the proximity to free margins a Double O-Z flap was deemed most appropriate.  Using a sterile surgical marker, an appropriate transposition flap was drawn incorporating the defect and placing the expected incisions within the relaxed skin tension lines where possible. The area thus outlined was incised deep to adipose tissue with a #15 scalpel blade.  The skin margins were undermined to an appropriate distance in all directions utilizing iris scissors. V-Y Flap Text: The defect edges were debeveled with a #15 scalpel blade.  Given the location of the defect, shape of the defect and the proximity to free margins a V-Y flap was deemed most appropriate.  Using a sterile surgical marker, an appropriate advancement flap was drawn incorporating the defect and placing the expected incisions within the relaxed skin tension lines where possible.    The area thus outlined was incised deep to adipose tissue with a #15 scalpel blade.  The skin margins were undermined to an appropriate distance in all directions utilizing iris scissors. Advancement-Rotation Flap Text: The defect edges were debeveled with a #15 scalpel blade.  Given the location of the defect, shape of the defect and the proximity to free margins an advancement-rotation flap was deemed most appropriate.  Using a sterile surgical marker, an appropriate flap was drawn incorporating the defect and placing the expected incisions within the relaxed skin tension lines where possible. The area thus outlined was incised deep to adipose tissue with a #15 scalpel blade.  The skin margins were undermined to an appropriate distance in all directions utilizing iris scissors. Mercedes Flap Text: The defect edges were debeveled with a #15 scalpel blade.  Given the location of the defect, shape of the defect and the proximity to free margins a Mercedes flap was deemed most appropriate.  Using a sterile surgical marker, an appropriate advancement flap was drawn incorporating the defect and placing the expected incisions within the relaxed skin tension lines where possible. The area thus outlined was incised deep to adipose tissue with a #15 scalpel blade.  The skin margins were undermined to an appropriate distance in all directions utilizing iris scissors. Modified Advancement Flap Text: The defect edges were debeveled with a #15 scalpel blade.  Given the location of the defect, shape of the defect and the proximity to free margins a modified advancement flap was deemed most appropriate.  Using a sterile surgical marker, an appropriate advancement flap was drawn incorporating the defect and placing the expected incisions within the relaxed skin tension lines where possible.    The area thus outlined was incised deep to adipose tissue with a #15 scalpel blade.  The skin margins were undermined to an appropriate distance in all directions utilizing iris scissors. Mucosal Advancement Flap Text: Given the location of the defect, shape of the defect and the proximity to free margins a mucosal advancement flap was deemed most appropriate. Incisions were made with a 15 blade scalpel in the appropriate fashion along the cutaneous vermilion border and the mucosal lip. The remaining actinically damaged mucosal tissue was excised.  The mucosal advancement flap was then elevated to the gingival sulcus with care taken to preserve the neurovascular structures and advanced into the primary defect. Care was taken to ensure that precise realignment of the vermilion border was achieved. Peng Advancement Flap Text: The defect edges were debeveled with a #15 scalpel blade.  Given the location of the defect, shape of the defect and the proximity to free margins a Peng advancement flap was deemed most appropriate.  Using a sterile surgical marker, an appropriate advancement flap was drawn incorporating the defect and placing the expected incisions within the relaxed skin tension lines where possible. The area thus outlined was incised deep to adipose tissue with a #15 scalpel blade.  The skin margins were undermined to an appropriate distance in all directions utilizing iris scissors. Hatchet Flap Text: The defect edges were debeveled with a #15 scalpel blade.  Given the location of the defect, shape of the defect and the proximity to free margins a hatchet flap was deemed most appropriate.  Using a sterile surgical marker, an appropriate hatchet flap was drawn incorporating the defect and placing the expected incisions within the relaxed skin tension lines where possible.    The area thus outlined was incised deep to adipose tissue with a #15 scalpel blade.  The skin margins were undermined to an appropriate distance in all directions utilizing iris scissors. Rotation Flap Text: The defect edges were debeveled with a #15 scalpel blade.  Given the location of the defect, shape of the defect and the proximity to free margins a rotation flap was deemed most appropriate.  Using a sterile surgical marker, an appropriate rotation flap was drawn incorporating the defect and placing the expected incisions within the relaxed skin tension lines where possible.    The area thus outlined was incised deep to adipose tissue with a #15 scalpel blade.  The skin margins were undermined to an appropriate distance in all directions utilizing iris scissors. Bilateral Rotation Flap Text: The defect edges were debeveled with a #15 scalpel blade. Given the location of the defect, shape of the defect and the proximity to free margins a bilateral rotation flap was deemed most appropriate. Using a sterile surgical marker, an appropriate rotation flap was drawn incorporating the defect and placing the expected incisions within the relaxed skin tension lines where possible. The area thus outlined was incised deep to adipose tissue with a #15 scalpel blade. The skin margins were undermined to an appropriate distance in all directions utilizing iris scissors. Following this, the designed flap was carried over into the primary defect and sutured into place. Spiral Flap Text: The defect edges were debeveled with a #15 scalpel blade.  Given the location of the defect, shape of the defect and the proximity to free margins a spiral flap was deemed most appropriate.  Using a sterile surgical marker, an appropriate rotation flap was drawn incorporating the defect and placing the expected incisions within the relaxed skin tension lines where possible. The area thus outlined was incised deep to adipose tissue with a #15 scalpel blade.  The skin margins were undermined to an appropriate distance in all directions utilizing iris scissors. Staged Advancement Flap Text: The defect edges were debeveled with a #15 scalpel blade.  Given the location of the defect, shape of the defect and the proximity to free margins a staged advancement flap was deemed most appropriate.  Using a sterile surgical marker, an appropriate advancement flap was drawn incorporating the defect and placing the expected incisions within the relaxed skin tension lines where possible. The area thus outlined was incised deep to adipose tissue with a #15 scalpel blade.  The skin margins were undermined to an appropriate distance in all directions utilizing iris scissors. Star Wedge Flap Text: The defect edges were debeveled with a #15 scalpel blade.  Given the location of the defect, shape of the defect and the proximity to free margins a star wedge flap was deemed most appropriate.  Using a sterile surgical marker, an appropriate rotation flap was drawn incorporating the defect and placing the expected incisions within the relaxed skin tension lines where possible. The area thus outlined was incised deep to adipose tissue with a #15 scalpel blade.  The skin margins were undermined to an appropriate distance in all directions utilizing iris scissors. Transposition Flap Text: The defect edges were debeveled with a #15 scalpel blade.  Given the location of the defect and the proximity to free margins a transposition flap was deemed most appropriate.  Using a sterile surgical marker, an appropriate transposition flap was drawn incorporating the defect.    The area thus outlined was incised deep to adipose tissue with a #15 scalpel blade.  The skin margins were undermined to an appropriate distance in all directions utilizing iris scissors. Muscle Hinge Flap Text: The defect edges were debeveled with a #15 scalpel blade.  Given the size, depth and location of the defect and the proximity to free margins a muscle hinge flap was deemed most appropriate.  Using a sterile surgical marker, an appropriate hinge flap was drawn incorporating the defect. The area thus outlined was incised with a #15 scalpel blade.  The skin margins were undermined to an appropriate distance in all directions utilizing iris scissors. Mustarde Flap Text: The defect edges were debeveled with a #15 scalpel blade.  Given the size, depth and location of the defect and the proximity to free margins a Mustarde flap was deemed most appropriate.  Using a sterile surgical marker, an appropriate flap was drawn incorporating the defect. The area thus outlined was incised with a #15 scalpel blade.  The skin margins were undermined to an appropriate distance in all directions utilizing iris scissors. Nasal Turnover Hinge Flap Text: The defect edges were debeveled with a #15 scalpel blade.  Given the size, depth, location of the defect and the defect being full thickness a nasal turnover hinge flap was deemed most appropriate.  Using a sterile surgical marker, an appropriate hinge flap was drawn incorporating the defect. The area thus outlined was incised with a #15 scalpel blade. The flap was designed to recreate the nasal mucosal lining and the alar rim. The skin margins were undermined to an appropriate distance in all directions utilizing iris scissors. Nasalis-Muscle-Based Myocutaneous Island Pedicle Flap Text: Using a #15 blade, an incision was made around the donor flap to the level of the nasalis muscle. Wide lateral undermining was then performed in both the subcutaneous plane above the nasalis muscle, and in a submuscular plane just above periosteum. This allowed the formation of a free nasalis muscle axial pedicle (based on the angular artery) which was still attached to the actual cutaneous flap, increasing its mobility and vascular viability. Hemostasis was obtained with pinpoint electrocoagulation. The flap was mobilized into position and the pivotal anchor points positioned and stabilized with buried interrupted sutures. Subcutaneous and dermal tissues were closed in a multilayered fashion with sutures. Tissue redundancies were excised, and the epidermal edges were apposed without significant tension and sutured with sutures. Orbicularis Oris Muscle Flap Text: The defect edges were debeveled with a #15 scalpel blade.  Given that the defect affected the competency of the oral sphincter an obicularis oris muscle flap was deemed most appropriate to restore this competency and normal muscle function.  Using a sterile surgical marker, an appropriate flap was drawn incorporating the defect. The area thus outlined was incised with a #15 scalpel blade. Melolabial Transposition Flap Text: The defect edges were debeveled with a #15 scalpel blade.  Given the location of the defect and the proximity to free margins a melolabial flap was deemed most appropriate.  Using a sterile surgical marker, an appropriate melolabial transposition flap was drawn incorporating the defect.    The area thus outlined was incised deep to adipose tissue with a #15 scalpel blade.  The skin margins were undermined to an appropriate distance in all directions utilizing iris scissors. Rhombic Flap Text: The defect edges were debeveled with a #15 scalpel blade.  Given the location of the defect and the proximity to free margins a rhombic flap was deemed most appropriate.  Using a sterile surgical marker, an appropriate rhombic flap was drawn incorporating the defect.    The area thus outlined was incised deep to adipose tissue with a #15 scalpel blade.  The skin margins were undermined to an appropriate distance in all directions utilizing iris scissors. Rhomboid Transposition Flap Text: The defect edges were debeveled with a #15 scalpel blade.  Given the location of the defect and the proximity to free margins a rhomboid transposition flap was deemed most appropriate.  Using a sterile surgical marker, an appropriate rhomboid flap was drawn incorporating the defect.    The area thus outlined was incised deep to adipose tissue with a #15 scalpel blade.  The skin margins were undermined to an appropriate distance in all directions utilizing iris scissors. Bi-Rhombic Flap Text: The defect edges were debeveled with a #15 scalpel blade.  Given the location of the defect and the proximity to free margins a bi-rhombic flap was deemed most appropriate.  Using a sterile surgical marker, an appropriate rhombic flap was drawn incorporating the defect. The area thus outlined was incised deep to adipose tissue with a #15 scalpel blade.  The skin margins were undermined to an appropriate distance in all directions utilizing iris scissors. Helical Rim Advancement Flap Text: The defect edges were debeveled with a #15 blade scalpel.  Given the location of the defect and the proximity to free margins (helical rim) a double helical rim advancement flap was deemed most appropriate.  Using a sterile surgical marker, the appropriate advancement flaps were drawn incorporating the defect and placing the expected incisions between the helical rim and antihelix where possible.  The area thus outlined was incised through and through with a #15 scalpel blade.  With a skin hook and iris scissors, the flaps were gently and sharply undermined and freed up. Bilateral Helical Rim Advancement Flap Text: The defect edges were debeveled with a #15 blade scalpel.  Given the location of the defect and the proximity to free margins (helical rim) a bilateral helical rim advancement flap was deemed most appropriate.  Using a sterile surgical marker, the appropriate advancement flaps were drawn incorporating the defect and placing the expected incisions between the helical rim and antihelix where possible.  The area thus outlined was incised through and through with a #15 scalpel blade.  With a skin hook and iris scissors, the flaps were gently and sharply undermined and freed up. Ear Star Wedge Flap Text: The defect edges were debeveled with a #15 blade scalpel.  Given the location of the defect and the proximity to free margins (helical rim) an ear star wedge flap was deemed most appropriate.  Using a sterile surgical marker, the appropriate flap was drawn incorporating the defect and placing the expected incisions between the helical rim and antihelix where possible.  The area thus outlined was incised through and through with a #15 scalpel blade. Banner Transposition Flap Text: The defect edges were debeveled with a #15 scalpel blade.  Given the location of the defect and the proximity to free margins a Banner transposition flap was deemed most appropriate.  Using a sterile surgical marker, an appropriate flap drawn around the defect. The area thus outlined was incised deep to adipose tissue with a #15 scalpel blade.  The skin margins were undermined to an appropriate distance in all directions utilizing iris scissors. Bilobed Flap Text: The defect edges were debeveled with a #15 scalpel blade.  Given the location of the defect and the proximity to free margins a bilobe flap was deemed most appropriate.  Using a sterile surgical marker, an appropriate bilobe flap drawn around the defect.    The area thus outlined was incised deep to adipose tissue with a #15 scalpel blade.  The skin margins were undermined to an appropriate distance in all directions utilizing iris scissors. Bilobed Transposition Flap Text: The defect edges were debeveled with a #15 scalpel blade.  Given the location of the defect and the proximity to free margins a bilobed transposition flap was deemed most appropriate.  Using a sterile surgical marker, an appropriate bilobe flap drawn around the defect.    The area thus outlined was incised deep to adipose tissue with a #15 scalpel blade.  The skin margins were undermined to an appropriate distance in all directions utilizing iris scissors. Trilobed Flap Text: The defect edges were debeveled with a #15 scalpel blade.  Given the location of the defect and the proximity to free margins a trilobed flap was deemed most appropriate.  Using a sterile surgical marker, an appropriate trilobed flap drawn around the defect.    The area thus outlined was incised deep to adipose tissue with a #15 scalpel blade.  The skin margins were undermined to an appropriate distance in all directions utilizing iris scissors. Dorsal Nasal Flap Text: The defect edges were debeveled with a #15 scalpel blade.  Given the location of the defect and the proximity to free margins a dorsal nasal flap was deemed most appropriate.  Using a sterile surgical marker, an appropriate dorsal nasal flap was drawn around the defect.    The area thus outlined was incised deep to adipose tissue with a #15 scalpel blade.  The skin margins were undermined to an appropriate distance in all directions utilizing iris scissors. Island Pedicle Flap Text: The defect edges were debeveled with a #15 scalpel blade.  Given the location of the defect, shape of the defect and the proximity to free margins an island pedicle advancement flap was deemed most appropriate.  Using a sterile surgical marker, an appropriate advancement flap was drawn incorporating the defect, outlining the appropriate donor tissue and placing the expected incisions within the relaxed skin tension lines where possible.    The area thus outlined was incised deep to adipose tissue with a #15 scalpel blade.  The skin margins were undermined to an appropriate distance in all directions around the primary defect and laterally outward around the island pedicle utilizing iris scissors.  There was minimal undermining beneath the pedicle flap. Island Pedicle Flap With Canthal Suspension Text: The defect edges were debeveled with a #15 scalpel blade.  Given the location of the defect, shape of the defect and the proximity to free margins an island pedicle advancement flap was deemed most appropriate.  Using a sterile surgical marker, an appropriate advancement flap was drawn incorporating the defect, outlining the appropriate donor tissue and placing the expected incisions within the relaxed skin tension lines where possible. The area thus outlined was incised deep to adipose tissue with a #15 scalpel blade.  The skin margins were undermined to an appropriate distance in all directions around the primary defect and laterally outward around the island pedicle utilizing iris scissors.  There was minimal undermining beneath the pedicle flap. A suspension suture was placed in the canthal tendon to prevent tension and prevent ectropion. Alar Island Pedicle Flap Text: The defect edges were debeveled with a #15 scalpel blade.  Given the location of the defect, shape of the defect and the proximity to the alar rim an island pedicle advancement flap was deemed most appropriate.  Using a sterile surgical marker, an appropriate advancement flap was drawn incorporating the defect, outlining the appropriate donor tissue and placing the expected incisions within the nasal ala running parallel to the alar rim. The area thus outlined was incised with a #15 scalpel blade.  The skin margins were undermined minimally to an appropriate distance in all directions around the primary defect and laterally outward around the island pedicle utilizing iris scissors.  There was minimal undermining beneath the pedicle flap. Double Island Pedicle Flap Text: The defect edges were debeveled with a #15 scalpel blade.  Given the location of the defect, shape of the defect and the proximity to free margins a double island pedicle advancement flap was deemed most appropriate.  Using a sterile surgical marker, an appropriate advancement flap was drawn incorporating the defect, outlining the appropriate donor tissue and placing the expected incisions within the relaxed skin tension lines where possible.    The area thus outlined was incised deep to adipose tissue with a #15 scalpel blade.  The skin margins were undermined to an appropriate distance in all directions around the primary defect and laterally outward around the island pedicle utilizing iris scissors.  There was minimal undermining beneath the pedicle flap. Island Pedicle Flap-Requiring Vessel Identification Text: The defect edges were debeveled with a #15 scalpel blade.  Given the location of the defect, shape of the defect and the proximity to free margins an island pedicle advancement flap was deemed most appropriate.  Using a sterile surgical marker, an appropriate advancement flap was drawn, based on the axial vessel mentioned above, incorporating the defect, outlining the appropriate donor tissue and placing the expected incisions within the relaxed skin tension lines where possible.    The area thus outlined was incised deep to adipose tissue with a #15 scalpel blade.  The skin margins were undermined to an appropriate distance in all directions around the primary defect and laterally outward around the island pedicle utilizing iris scissors.  There was minimal undermining beneath the pedicle flap. Keystone Flap Text: The defect edges were debeveled with a #15 scalpel blade.  Given the location of the defect, shape of the defect a keystone flap was deemed most appropriate.  Using a sterile surgical marker, an appropriate keystone flap was drawn incorporating the defect, outlining the appropriate donor tissue and placing the expected incisions within the relaxed skin tension lines where possible. The area thus outlined was incised deep to adipose tissue with a #15 scalpel blade.  The skin margins were undermined to an appropriate distance in all directions around the primary defect and laterally outward around the flap utilizing iris scissors. O-T Plasty Text: The defect edges were debeveled with a #15 scalpel blade.  Given the location of the defect, shape of the defect and the proximity to free margins an O-T plasty was deemed most appropriate.  Using a sterile surgical marker, an appropriate O-T plasty was drawn incorporating the defect and placing the expected incisions within the relaxed skin tension lines where possible.    The area thus outlined was incised deep to adipose tissue with a #15 scalpel blade.  The skin margins were undermined to an appropriate distance in all directions utilizing iris scissors. O-Z Plasty Text: The defect edges were debeveled with a #15 scalpel blade.  Given the location of the defect, shape of the defect and the proximity to free margins an O-Z plasty (double transposition flap) was deemed most appropriate.  Using a sterile surgical marker, the appropriate transposition flaps were drawn incorporating the defect and placing the expected incisions within the relaxed skin tension lines where possible.    The area thus outlined was incised deep to adipose tissue with a #15 scalpel blade.  The skin margins were undermined to an appropriate distance in all directions utilizing iris scissors.  Hemostasis was achieved with electrocautery.  The flaps were then transposed into place, one clockwise and the other counterclockwise, and anchored with interrupted buried subcutaneous sutures. Double O-Z Plasty Text: The defect edges were debeveled with a #15 scalpel blade.  Given the location of the defect, shape of the defect and the proximity to free margins a Double O-Z plasty (double transposition flap) was deemed most appropriate.  Using a sterile surgical marker, the appropriate transposition flaps were drawn incorporating the defect and placing the expected incisions within the relaxed skin tension lines where possible. The area thus outlined was incised deep to adipose tissue with a #15 scalpel blade.  The skin margins were undermined to an appropriate distance in all directions utilizing iris scissors.  Hemostasis was achieved with electrocautery.  The flaps were then transposed into place, one clockwise and the other counterclockwise, and anchored with interrupted buried subcutaneous sutures. V-Y Plasty Text: The defect edges were debeveled with a #15 scalpel blade.  Given the location of the defect, shape of the defect and the proximity to free margins an V-Y advancement flap was deemed most appropriate.  Using a sterile surgical marker, an appropriate advancement flap was drawn incorporating the defect and placing the expected incisions within the relaxed skin tension lines where possible.    The area thus outlined was incised deep to adipose tissue with a #15 scalpel blade.  The skin margins were undermined to an appropriate distance in all directions utilizing iris scissors. H Plasty Text: Given the location of the defect, shape of the defect and the proximity to free margins a H-plasty was deemed most appropriate for repair.  Using a sterile surgical marker, the appropriate advancement arms of the H-plasty were drawn incorporating the defect and placing the expected incisions within the relaxed skin tension lines where possible. The area thus outlined was incised deep to adipose tissue with a #15 scalpel blade. The skin margins were undermined to an appropriate distance in all directions utilizing iris scissors.  The opposing advancement arms were then advanced into place in opposite direction and anchored with interrupted buried subcutaneous sutures. W Plasty Text: The lesion was extirpated to the level of the fat with a #15 scalpel blade.  Given the location of the defect, shape of the defect and the proximity to free margins a W-plasty was deemed most appropriate for repair.  Using a sterile surgical marker, the appropriate transposition arms of the W-plasty were drawn incorporating the defect and placing the expected incisions within the relaxed skin tension lines where possible.    The area thus outlined was incised deep to adipose tissue with a #15 scalpel blade.  The skin margins were undermined to an appropriate distance in all directions utilizing iris scissors.  The opposing transposition arms were then transposed into place in opposite direction and anchored with interrupted buried subcutaneous sutures. Z Plasty Text: The lesion was extirpated to the level of the fat with a #15 scalpel blade.  Given the location of the defect, shape of the defect and the proximity to free margins a Z-plasty was deemed most appropriate for repair.  Using a sterile surgical marker, the appropriate transposition arms of the Z-plasty were drawn incorporating the defect and placing the expected incisions within the relaxed skin tension lines where possible.    The area thus outlined was incised deep to adipose tissue with a #15 scalpel blade.  The skin margins were undermined to an appropriate distance in all directions utilizing iris scissors.  The opposing transposition arms were then transposed into place in opposite direction and anchored with interrupted buried subcutaneous sutures. Double Z Plasty Text: The lesion was extirpated to the level of the fat with a #15 scalpel blade. Given the location of the defect, shape of the defect and the proximity to free margins a double Z-plasty was deemed most appropriate for repair. Using a sterile surgical marker, the appropriate transposition arms of the double Z-plasty were drawn incorporating the defect and placing the expected incisions within the relaxed skin tension lines where possible. The area thus outlined was incised deep to adipose tissue with a #15 scalpel blade. The skin margins were undermined to an appropriate distance in all directions utilizing iris scissors. The opposing transposition arms were then transposed and carried over into place in opposite direction and anchored with interrupted buried subcutaneous sutures. Zygomaticofacial Flap Text: Given the location of the defect, shape of the defect and the proximity to free margins a zygomaticofacial flap was deemed most appropriate for repair.  Using a sterile surgical marker, the appropriate flap was drawn incorporating the defect and placing the expected incisions within the relaxed skin tension lines where possible. The area thus outlined was incised deep to adipose tissue with a #15 scalpel blade with preservation of a vascular pedicle.  The skin margins were undermined to an appropriate distance in all directions utilizing iris scissors.  The flap was then placed into the defect and anchored with interrupted buried subcutaneous sutures. Cheek Interpolation Flap Text: A decision was made to reconstruct the defect utilizing an interpolation axial flap and a staged reconstruction.  A telfa template was made of the defect.  This telfa template was then used to outline the Cheek Interpolation flap.  The donor area for the pedicle flap was then injected with anesthesia.  The flap was excised through the skin and subcutaneous tissue down to the layer of the underlying musculature.  The interpolation flap was carefully excised within this deep plane to maintain its blood supply.  The edges of the donor site were undermined.   The donor site was closed in a primary fashion.  The pedicle was then rotated into position and sutured.  Once the tube was sutured into place, adequate blood supply was confirmed with blanching and refill.  The pedicle was then wrapped with xeroform gauze and dressed appropriately with a telfa and gauze bandage to ensure continued blood supply and protect the attached pedicle. Cheek-To-Nose Interpolation Flap Text: A decision was made to reconstruct the defect utilizing an interpolation axial flap and a staged reconstruction.  A telfa template was made of the defect.  This telfa template was then used to outline the Cheek-To-Nose Interpolation flap.  The donor area for the pedicle flap was then injected with anesthesia.  The flap was excised through the skin and subcutaneous tissue down to the layer of the underlying musculature.  The interpolation flap was carefully excised within this deep plane to maintain its blood supply.  The edges of the donor site were undermined.   The donor site was closed in a primary fashion.  The pedicle was then rotated into position and sutured.  Once the tube was sutured into place, adequate blood supply was confirmed with blanching and refill.  The pedicle was then wrapped with xeroform gauze and dressed appropriately with a telfa and gauze bandage to ensure continued blood supply and protect the attached pedicle. Interpolation Flap Text: A decision was made to reconstruct the defect utilizing an interpolation axial flap and a staged reconstruction.  A telfa template was made of the defect.  This telfa template was then used to outline the interpolation flap.  The donor area for the pedicle flap was then injected with anesthesia.  The flap was excised through the skin and subcutaneous tissue down to the layer of the underlying musculature.  The interpolation flap was carefully excised within this deep plane to maintain its blood supply.  The edges of the donor site were undermined.   The donor site was closed in a primary fashion.  The pedicle was then rotated into position and sutured.  Once the tube was sutured into place, adequate blood supply was confirmed with blanching and refill.  The pedicle was then wrapped with xeroform gauze and dressed appropriately with a telfa and gauze bandage to ensure continued blood supply and protect the attached pedicle. Melolabial Interpolation Flap Text: A decision was made to reconstruct the defect utilizing an interpolation axial flap and a staged reconstruction.  A telfa template was made of the defect.  This telfa template was then used to outline the melolabial interpolation flap.  The donor area for the pedicle flap was then injected with anesthesia.  The flap was excised through the skin and subcutaneous tissue down to the layer of the underlying musculature.  The pedicle flap was carefully excised within this deep plane to maintain its blood supply.  The edges of the donor site were undermined.   The donor site was closed in a primary fashion.  The pedicle was then rotated into position and sutured.  Once the tube was sutured into place, adequate blood supply was confirmed with blanching and refill.  The pedicle was then wrapped with xeroform gauze and dressed appropriately with a telfa and gauze bandage to ensure continued blood supply and protect the attached pedicle. Mastoid Interpolation Flap Text: A decision was made to reconstruct the defect utilizing an interpolation axial flap and a staged reconstruction.  A telfa template was made of the defect.  This telfa template was then used to outline the mastoid interpolation flap.  The donor area for the pedicle flap was then injected with anesthesia.  The flap was excised through the skin and subcutaneous tissue down to the layer of the underlying musculature.  The pedicle flap was carefully excised within this deep plane to maintain its blood supply.  The edges of the donor site were undermined.   The donor site was closed in a primary fashion.  The pedicle was then rotated into position and sutured.  Once the tube was sutured into place, adequate blood supply was confirmed with blanching and refill.  The pedicle was then wrapped with xeroform gauze and dressed appropriately with a telfa and gauze bandage to ensure continued blood supply and protect the attached pedicle. Posterior Auricular Interpolation Flap Text: A decision was made to reconstruct the defect utilizing an interpolation axial flap and a staged reconstruction.  A telfa template was made of the defect.  This telfa template was then used to outline the posterior auricular interpolation flap.  The donor area for the pedicle flap was then injected with anesthesia.  The flap was excised through the skin and subcutaneous tissue down to the layer of the underlying musculature.  The pedicle flap was carefully excised within this deep plane to maintain its blood supply.  The edges of the donor site were undermined.   The donor site was closed in a primary fashion.  The pedicle was then rotated into position and sutured.  Once the tube was sutured into place, adequate blood supply was confirmed with blanching and refill.  The pedicle was then wrapped with xeroform gauze and dressed appropriately with a telfa and gauze bandage to ensure continued blood supply and protect the attached pedicle. Paramedian Forehead Flap Text: A decision was made to reconstruct the defect utilizing an interpolation axial flap and a staged reconstruction.  A telfa template was made of the defect.  This telfa template was then used to outline the paramedian forehead pedicle flap.  The donor area for the pedicle flap was then injected with anesthesia.  The flap was excised through the skin and subcutaneous tissue down to the layer of the underlying musculature.  The pedicle flap was carefully excised within this deep plane to maintain its blood supply.  The edges of the donor site were undermined.   The donor site was closed in a primary fashion.  The pedicle was then rotated into position and sutured.  Once the tube was sutured into place, adequate blood supply was confirmed with blanching and refill.  The pedicle was then wrapped with xeroform gauze and dressed appropriately with a telfa and gauze bandage to ensure continued blood supply and protect the attached pedicle. Abbe Flap (Upper To Lower Lip) Text: The defect of the lower lip was assessed and measured.  Given the location and size of the defect, an Abbe flap was deemed most appropriate.  Using a sterile surgical marker, an appropriate Abbe flap was measured and drawn on the upper lip. Local anesthesia was then infiltrated.  A scalpel was then used to incise the upper lip through and through the skin, vermilion, muscle and mucosa, leaving the flap pedicled on the opposite side.  The flap was then rotated and transferred to the lower lip defect.  The flap was then sutured into place with a three layer technique, closing the orbicularis oris muscle layer with subcutaneous buried sutures, followed by a mucosal layer and an epidermal layer. Abbe Flap (Lower To Upper Lip) Text: The defect of the upper lip was assessed and measured.  Given the location and size of the defect, an Abbe flap was deemed most appropriate.  Using a sterile surgical marker, an appropriate Abbe flap was measured and drawn on the lower lip. Local anesthesia was then infiltrated. A scalpel was then used to incise the upper lip through and through the skin, vermilion, muscle and mucosa, leaving the flap pedicled on the opposite side.  The flap was then rotated and transferred to the lower lip defect.  The flap was then sutured into place with a three layer technique, closing the orbicularis oris muscle layer with subcutaneous buried sutures, followed by a mucosal layer and an epidermal layer. Estlander Flap (Upper To Lower Lip) Text: The defect of the lower lip was assessed and measured.  Given the location and size of the defect, an Estlander flap was deemed most appropriate.  Using a sterile surgical marker, an appropriate Estlander flap was measured and drawn on the upper lip. Local anesthesia was then infiltrated. A scalpel was then used to incise the lateral aspect of the flap, through skin, muscle and mucosa, leaving the flap pedicled medially.  The flap was then rotated and positioned to fill the lower lip defect.  The flap was then sutured into place with a three layer technique, closing the orbicularis oris muscle layer with subcutaneous buried sutures, followed by a mucosal layer and an epidermal layer. Cheiloplasty (Less Than 50%) Text: A decision was made to reconstruct the defect with a  cheiloplasty.  The defect was undermined extensively.  Additional obicularis oris muscle was excised with a 15 blade scalpel.  The defect was converted into a full thickness wedge, of less than 50% of the vertical height of the lip, to facilite a better cosmetic result.  Small vessels were then tied off with 5-0 monocyrl. The obicularis oris, superficial fascia, adipose and dermis were then reapproximated.  After the deeper layers were approximated the epidermis was reapproximated with particular care given to realign the vermilion border. Cheiloplasty (Complex) Text: A decision was made to reconstruct the defect with a  cheiloplasty.  The defect was undermined extensively.  Additional obicularis oris muscle was excised with a 15 blade scalpel.  The defect was converted into a full thickness wedge to facilite a better cosmetic result.  Small vessels were then tied off with 5-0 monocyrl. The obicularis oris, superficial fascia, adipose and dermis were then reapproximated.  After the deeper layers were approximated the epidermis was reapproximated with particular care given to realign the vermilion border. Ear Wedge Repair Text: A wedge excision was completed by carrying down an excision through the full thickness of the ear and cartilage with an inward facing Burow's triangle. The wound was then closed in a layered fashion. Full Thickness Lip Wedge Repair (Flap) Text: Given the location of the defect and the proximity to free margins a full thickness wedge repair was deemed most appropriate.  Using a sterile surgical marker, the appropriate repair was drawn incorporating the defect and placing the expected incisions perpendicular to the vermilion border.  The vermilion border was also meticulously outlined to ensure appropriate reapproximation during the repair.  The area thus outlined was incised through and through with a #15 scalpel blade.  The muscularis and dermis were reaproximated with deep sutures following hemostasis. Care was taken to realign the vermilion border before proceeding with the superficial closure.  Once the vermilion was realigned the superfical and mucosal closure was finished. Ftsg Text: The defect edges were debeveled with a #15 scalpel blade.  Given the location of the defect, shape of the defect and the proximity to free margins a full thickness skin graft was deemed most appropriate.  Using a sterile surgical marker, the primary defect shape was transferred to the donor site. The area thus outlined was incised deep to adipose tissue with a #15 scalpel blade.  The harvested graft was then trimmed of adipose tissue until only dermis and epidermis was left.  The skin margins of the secondary defect were undermined to an appropriate distance in all directions utilizing iris scissors.  The secondary defect was closed with interrupted buried subcutaneous sutures.  The skin edges were then re-apposed with running  sutures.  The skin graft was then placed in the primary defect and oriented appropriately. Split-Thickness Skin Graft Text: The defect edges were debeveled with a #15 scalpel blade.  Given the location of the defect, shape of the defect and the proximity to free margins a split thickness skin graft was deemed most appropriate.  Using a sterile surgical marker, the primary defect shape was transferred to the donor site. The split thickness graft was then harvested.  The skin graft was then placed in the primary defect and oriented appropriately. Burow's Graft Text: The defect edges were debeveled with a #15 scalpel blade.  Given the location of the defect, shape of the defect, the proximity to free margins and the presence of a standing cone deformity a Burow's skin graft was deemed most appropriate. The standing cone was removed and this tissue was then trimmed to the shape of the primary defect. The adipose tissue was also removed until only dermis and epidermis were left.  The skin margins of the secondary defect were undermined to an appropriate distance in all directions utilizing iris scissors.  The secondary defect was closed with interrupted buried subcutaneous sutures.  The skin edges were then re-apposed with running  sutures.  The skin graft was then placed in the primary defect and oriented appropriately. Cartilage Graft Text: The defect edges were debeveled with a #15 scalpel blade.  Given the location of the defect, shape of the defect, the fact the defect involved a full thickness cartilage defect a cartilage graft was deemed most appropriate.  An appropriate donor site was identified, cleansed, and anesthetized. The cartilage graft was then harvested and transferred to the recipient site, oriented appropriately and then sutured into place.  The secondary defect was then repaired using a primary closure. Composite Graft Text: The defect edges were debeveled with a #15 scalpel blade.  Given the location of the defect, shape of the defect, the proximity to free margins and the fact the defect was full thickness a composite graft was deemed most appropriate.  The defect was outline and then transferred to the donor site.  A full thickness graft was then excised from the donor site. The graft was then placed in the primary defect, oriented appropriately and then sutured into place.  The secondary defect was then repaired using a primary closure. Epidermal Autograft Text: The defect edges were debeveled with a #15 scalpel blade.  Given the location of the defect, shape of the defect and the proximity to free margins an epidermal autograft was deemed most appropriate.  Using a sterile surgical marker, the primary defect shape was transferred to the donor site. The epidermal graft was then harvested.  The skin graft was then placed in the primary defect and oriented appropriately. Dermal Autograft Text: The defect edges were debeveled with a #15 scalpel blade.  Given the location of the defect, shape of the defect and the proximity to free margins a dermal autograft was deemed most appropriate.  Using a sterile surgical marker, the primary defect shape was transferred to the donor site. The area thus outlined was incised deep to adipose tissue with a #15 scalpel blade.  The harvested graft was then trimmed of adipose and epidermal tissue until only dermis was left.  The skin graft was then placed in the primary defect and oriented appropriately. Skin Substitute Text: The defect edges were debeveled with a #15 scalpel blade.  Given the location of the defect, shape of the defect and the proximity to free margins a skin substitute graft was deemed most appropriate.  The graft material was trimmed to fit the size of the defect. The graft was then placed in the primary defect and oriented appropriately. Tissue Cultured Epidermal Autograft Text: The defect edges were debeveled with a #15 scalpel blade.  Given the location of the defect, shape of the defect and the proximity to free margins a tissue cultured epidermal autograft was deemed most appropriate.  The graft was then trimmed to fit the size of the defect.  The graft was then placed in the primary defect and oriented appropriately. Xenograft Text: The defect edges were debeveled with a #15 scalpel blade.  Given the location of the defect, shape of the defect and the proximity to free margins a xenograft was deemed most appropriate.  The graft was then trimmed to fit the size of the defect.  The graft was then placed in the primary defect and oriented appropriately. Purse String (Simple) Text: Given the location of the defect and the characteristics of the surrounding skin a purse string closure was deemed most appropriate.  Undermining was performed circumfirentially around the surgical defect.  A purse string suture was then placed and tightened. Purse String (Intermediate) Text: Given the location of the defect and the characteristics of the surrounding skin a purse string intermediate closure was deemed most appropriate.  Undermining was performed circumfirentially around the surgical defect.  A purse string suture was then placed and tightened. Partial Purse String (Simple) Text: Given the location of the defect and the characteristics of the surrounding skin a simple purse string closure was deemed most appropriate.  Undermining was performed circumfirentially around the surgical defect.  A purse string suture was then placed and tightened. Wound tension only allowed a partial closure of the circular defect. Partial Purse String (Intermediate) Text: Given the location of the defect and the characteristics of the surrounding skin an intermediate purse string closure was deemed most appropriate.  Undermining was performed circumfirentially around the surgical defect.  A purse string suture was then placed and tightened. Wound tension only allowed a partial closure of the circular defect. Localized Dermabrasion With Wire Brush Text: The patient was draped in routine manner.  Localized dermabrasion using 3 x 17 mm wire brush was performed in routine manner to papillary dermis. This spot dermabrasion is being performed to complete skin cancer reconstruction. It also will eliminate the other sun damaged precancerous cells that are known to be part of the regional effect of a lifetime's worth of sun exposure. This localized dermabrasion is therapeutic and should not be considered cosmetic in any regard. Tarsorrhaphy Text: A tarsorrhaphy was performed using Frost sutures. Intermediate Repair And Flap Additional Text (Will Appearing After The Standard Complex Repair Text): The intermediate repair was not sufficient to completely close the primary defect. The remaining additional defect was repaired with the flap mentioned below. Intermediate Repair And Graft Additional Text (Will Appearing After The Standard Complex Repair Text): The intermediate repair was not sufficient to completely close the primary defect. The remaining additional defect was repaired with the graft mentioned below. Complex Repair And Flap Additional Text (Will Appearing After The Standard Complex Repair Text): The complex repair was not sufficient to completely close the primary defect. The remaining additional defect was repaired with the flap mentioned below. Complex Repair And Graft Additional Text (Will Appearing After The Standard Complex Repair Text): The complex repair was not sufficient to completely close the primary defect. The remaining additional defect was repaired with the graft mentioned below. Manual Repair Warning Statement: We plan on removing the manually selected variable below in favor of our much easier automatic structured text blocks found in the previous tab. We decided to do this to help make the flow better and give you the full power of structured data. Manual selection is never going to be ideal in our platform and I would encourage you to avoid using manual selection from this point on, especially since I will be sunsetting this feature. It is important that you do one of two things with the customized text below. First, you can save all of the text in a word file so you can have it for future reference. Second, transfer the text to the appropriate area in the Library tab. Lastly, if there is a flap or graft type which we do not have you need to let us know right away so I can add it in before the variable is hidden. No need to panic, we plan to give you roughly 6 months to make the change. Same Histology In Subsequent Stages Text: The pattern and morphology of the tumor is as described in the first stage. No Residual Tumor Seen Histology Text: There were no malignant cells seen in the sections examined. Inflammation Suggestive Of Cancer Camouflage Histology Text: There was a dense lymphocytic infiltrate which prevented adequate histologic evaluation of adjacent structures. Bcc Histology Text: There were numerous aggregates of basaloid cells. Bcc Infiltrative Histology Text: There were numerous aggregates of basaloid cells demonstrating an infiltrative pattern. Mart-1 - Positive Histology Text: MART-1 staining demonstrates areas of higher density and clustering of melanocytes with Pagetoid spread upwards within the epidermis. The surgical margins are positive for tumor cells. Mart-1 - Negative Histology Text: MART-1 staining demonstrates a normal density and pattern of melanocytes along the dermal-epidermal junction. The surgical margins are negative for tumor cells. Information: Selecting Yes will display possible errors in your note based on the variables you have selected. This validation is only offered as a suggestion for you. PLEASE NOTE THAT THE VALIDATION TEXT WILL BE REMOVED WHEN YOU FINALIZE YOUR NOTE. IF YOU WANT TO FAX A PRELIMINARY NOTE YOU WILL NEED TO TOGGLE THIS TO 'NO' IF YOU DO NOT WANT IT IN YOUR FAXED NOTE.

## 2023-06-03 ENCOUNTER — EMERGENCY (EMERGENCY)
Facility: HOSPITAL | Age: 73
LOS: 1 days | Discharge: HOME | End: 2023-06-03
Attending: EMERGENCY MEDICINE | Admitting: EMERGENCY MEDICINE
Payer: MEDICARE

## 2023-06-03 VITALS
DIASTOLIC BLOOD PRESSURE: 89 MMHG | WEIGHT: 175.05 LBS | RESPIRATION RATE: 18 BRPM | HEART RATE: 81 BPM | TEMPERATURE: 99 F | SYSTOLIC BLOOD PRESSURE: 141 MMHG | OXYGEN SATURATION: 95 %

## 2023-06-03 VITALS
TEMPERATURE: 98 F | SYSTOLIC BLOOD PRESSURE: 135 MMHG | HEART RATE: 66 BPM | OXYGEN SATURATION: 98 % | DIASTOLIC BLOOD PRESSURE: 76 MMHG | RESPIRATION RATE: 16 BRPM

## 2023-06-03 DIAGNOSIS — Z95.9 PRESENCE OF CARDIAC AND VASCULAR IMPLANT AND GRAFT, UNSPECIFIED: Chronic | ICD-10-CM

## 2023-06-03 DIAGNOSIS — Z91.018 ALLERGY TO OTHER FOODS: ICD-10-CM

## 2023-06-03 DIAGNOSIS — Z91.013 ALLERGY TO SEAFOOD: ICD-10-CM

## 2023-06-03 DIAGNOSIS — Z88.2 ALLERGY STATUS TO SULFONAMIDES: ICD-10-CM

## 2023-06-03 DIAGNOSIS — N18.9 CHRONIC KIDNEY DISEASE, UNSPECIFIED: ICD-10-CM

## 2023-06-03 DIAGNOSIS — F41.0 PANIC DISORDER [EPISODIC PAROXYSMAL ANXIETY]: ICD-10-CM

## 2023-06-03 DIAGNOSIS — Z85.46 PERSONAL HISTORY OF MALIGNANT NEOPLASM OF PROSTATE: ICD-10-CM

## 2023-06-03 DIAGNOSIS — E11.22 TYPE 2 DIABETES MELLITUS WITH DIABETIC CHRONIC KIDNEY DISEASE: ICD-10-CM

## 2023-06-03 DIAGNOSIS — R07.89 OTHER CHEST PAIN: ICD-10-CM

## 2023-06-03 DIAGNOSIS — R19.7 DIARRHEA, UNSPECIFIED: ICD-10-CM

## 2023-06-03 DIAGNOSIS — G89.29 OTHER CHRONIC PAIN: ICD-10-CM

## 2023-06-03 DIAGNOSIS — I50.20 UNSPECIFIED SYSTOLIC (CONGESTIVE) HEART FAILURE: ICD-10-CM

## 2023-06-03 DIAGNOSIS — B20 HUMAN IMMUNODEFICIENCY VIRUS [HIV] DISEASE: ICD-10-CM

## 2023-06-03 DIAGNOSIS — I25.10 ATHEROSCLEROTIC HEART DISEASE OF NATIVE CORONARY ARTERY WITHOUT ANGINA PECTORIS: ICD-10-CM

## 2023-06-03 DIAGNOSIS — F14.90 COCAINE USE, UNSPECIFIED, UNCOMPLICATED: ICD-10-CM

## 2023-06-03 DIAGNOSIS — F32.A DEPRESSION, UNSPECIFIED: ICD-10-CM

## 2023-06-03 DIAGNOSIS — I13.0 HYPERTENSIVE HEART AND CHRONIC KIDNEY DISEASE WITH HEART FAILURE AND STAGE 1 THROUGH STAGE 4 CHRONIC KIDNEY DISEASE, OR UNSPECIFIED CHRONIC KIDNEY DISEASE: ICD-10-CM

## 2023-06-03 DIAGNOSIS — Z79.82 LONG TERM (CURRENT) USE OF ASPIRIN: ICD-10-CM

## 2023-06-03 DIAGNOSIS — E78.5 HYPERLIPIDEMIA, UNSPECIFIED: ICD-10-CM

## 2023-06-03 DIAGNOSIS — Z88.0 ALLERGY STATUS TO PENICILLIN: ICD-10-CM

## 2023-06-03 LAB
ALBUMIN SERPL ELPH-MCNC: 3.3 G/DL — LOW (ref 3.4–5)
ALP SERPL-CCNC: 64 U/L — SIGNIFICANT CHANGE UP (ref 40–120)
ALT FLD-CCNC: 29 U/L — SIGNIFICANT CHANGE UP (ref 12–42)
AMPHET UR-MCNC: NEGATIVE — SIGNIFICANT CHANGE UP
ANION GAP SERPL CALC-SCNC: 9 MMOL/L — SIGNIFICANT CHANGE UP (ref 9–16)
AST SERPL-CCNC: 39 U/L — HIGH (ref 15–37)
BARBITURATES UR SCN-MCNC: NEGATIVE — SIGNIFICANT CHANGE UP
BASOPHILS # BLD AUTO: 0.02 K/UL — SIGNIFICANT CHANGE UP (ref 0–0.2)
BASOPHILS NFR BLD AUTO: 0.5 % — SIGNIFICANT CHANGE UP (ref 0–2)
BENZODIAZ UR-MCNC: NEGATIVE — SIGNIFICANT CHANGE UP
BILIRUB SERPL-MCNC: 0.4 MG/DL — SIGNIFICANT CHANGE UP (ref 0.2–1.2)
BUN SERPL-MCNC: 23 MG/DL — SIGNIFICANT CHANGE UP (ref 7–23)
CALCIUM SERPL-MCNC: 8.9 MG/DL — SIGNIFICANT CHANGE UP (ref 8.5–10.5)
CHLORIDE SERPL-SCNC: 104 MMOL/L — SIGNIFICANT CHANGE UP (ref 96–108)
CO2 SERPL-SCNC: 28 MMOL/L — SIGNIFICANT CHANGE UP (ref 22–31)
COCAINE METAB.OTHER UR-MCNC: POSITIVE
CREAT SERPL-MCNC: 1.6 MG/DL — HIGH (ref 0.5–1.3)
EGFR: 45 ML/MIN/1.73M2 — LOW
EOSINOPHIL # BLD AUTO: 0.16 K/UL — SIGNIFICANT CHANGE UP (ref 0–0.5)
EOSINOPHIL NFR BLD AUTO: 4.1 % — SIGNIFICANT CHANGE UP (ref 0–6)
GLUCOSE SERPL-MCNC: 150 MG/DL — HIGH (ref 70–99)
HCT VFR BLD CALC: 37.4 % — LOW (ref 39–50)
HGB BLD-MCNC: 13 G/DL — SIGNIFICANT CHANGE UP (ref 13–17)
IMM GRANULOCYTES NFR BLD AUTO: 0.5 % — SIGNIFICANT CHANGE UP (ref 0–0.9)
LYMPHOCYTES # BLD AUTO: 0.8 K/UL — LOW (ref 1–3.3)
LYMPHOCYTES # BLD AUTO: 20.3 % — SIGNIFICANT CHANGE UP (ref 13–44)
MCHC RBC-ENTMCNC: 34.5 PG — HIGH (ref 27–34)
MCHC RBC-ENTMCNC: 34.8 GM/DL — SIGNIFICANT CHANGE UP (ref 32–36)
MCV RBC AUTO: 99.2 FL — SIGNIFICANT CHANGE UP (ref 80–100)
METHADONE UR-MCNC: NEGATIVE — SIGNIFICANT CHANGE UP
MONOCYTES # BLD AUTO: 0.33 K/UL — SIGNIFICANT CHANGE UP (ref 0–0.9)
MONOCYTES NFR BLD AUTO: 8.4 % — SIGNIFICANT CHANGE UP (ref 2–14)
NEUTROPHILS # BLD AUTO: 2.62 K/UL — SIGNIFICANT CHANGE UP (ref 1.8–7.4)
NEUTROPHILS NFR BLD AUTO: 66.2 % — SIGNIFICANT CHANGE UP (ref 43–77)
NRBC # BLD: 0 /100 WBCS — SIGNIFICANT CHANGE UP (ref 0–0)
OPIATES UR-MCNC: NEGATIVE — SIGNIFICANT CHANGE UP
PCP SPEC-MCNC: SIGNIFICANT CHANGE UP
PCP UR-MCNC: NEGATIVE — SIGNIFICANT CHANGE UP
PLATELET # BLD AUTO: 130 K/UL — LOW (ref 150–400)
POTASSIUM SERPL-MCNC: 4 MMOL/L — SIGNIFICANT CHANGE UP (ref 3.5–5.3)
POTASSIUM SERPL-SCNC: 4 MMOL/L — SIGNIFICANT CHANGE UP (ref 3.5–5.3)
PROT SERPL-MCNC: 6.8 G/DL — SIGNIFICANT CHANGE UP (ref 6.4–8.2)
RBC # BLD: 3.77 M/UL — LOW (ref 4.2–5.8)
RBC # FLD: 11.9 % — SIGNIFICANT CHANGE UP (ref 10.3–14.5)
SODIUM SERPL-SCNC: 141 MMOL/L — SIGNIFICANT CHANGE UP (ref 132–145)
THC UR QL: NEGATIVE — SIGNIFICANT CHANGE UP
TROPONIN I, HIGH SENSITIVITY RESULT: 20.7 NG/L — SIGNIFICANT CHANGE UP
WBC # BLD: 3.95 K/UL — SIGNIFICANT CHANGE UP (ref 3.8–10.5)
WBC # FLD AUTO: 3.95 K/UL — SIGNIFICANT CHANGE UP (ref 3.8–10.5)

## 2023-06-03 PROCEDURE — 99285 EMERGENCY DEPT VISIT HI MDM: CPT

## 2023-06-03 PROCEDURE — 71045 X-RAY EXAM CHEST 1 VIEW: CPT | Mod: 26

## 2023-06-03 RX ORDER — ASPIRIN/CALCIUM CARB/MAGNESIUM 324 MG
162 TABLET ORAL ONCE
Refills: 0 | Status: COMPLETED | OUTPATIENT
Start: 2023-06-03 | End: 2023-06-03

## 2023-06-03 RX ORDER — SODIUM CHLORIDE 9 MG/ML
1000 INJECTION INTRAMUSCULAR; INTRAVENOUS; SUBCUTANEOUS ONCE
Refills: 0 | Status: COMPLETED | OUTPATIENT
Start: 2023-06-03 | End: 2023-06-03

## 2023-06-03 RX ADMIN — SODIUM CHLORIDE 1000 MILLILITER(S): 9 INJECTION INTRAMUSCULAR; INTRAVENOUS; SUBCUTANEOUS at 09:53

## 2023-06-03 RX ADMIN — Medication 162 MILLIGRAM(S): at 09:52

## 2023-06-03 NOTE — ED PROVIDER NOTE - NSFOLLOWUPINSTRUCTIONS_ED_ALL_ED_FT
don't use cocaine    stay well hydrated     follow up with your primary care doctor next week    follow up in your outpatient drug teat ment program as scheduled next week    -     Chest Pain    Chest pain can be caused by many different conditions which may or may not be dangerous. Causes include heartburn, lung infections, heart attack, blood clot in lungs, skin infections, strain or damage to muscle, cartilage, or bones, etc. In addition to a history and physical examination, an electrocardiogram (ECG) or other lab tests may have been performed to determine the cause of your chest pain. Follow up with your primary care provider or with a cardiologist as instructed.     SEEK IMMEDIATE MEDICAL CARE IF YOU HAVE ANY OF THE FOLLOWING SYMPTOMS: worsening chest pain, coughing up blood, unexplained back/neck/jaw pain, severe abdominal pain, dizziness or lightheadedness, fainting, shortness of breath, sweaty or clammy skin, vomiting, or racing heart beat. These symptoms may represent a serious problem that is an emergency. Do not wait to see if the symptoms will go away. Get medical help right away. Call 911 and do not drive yourself to the hospital.

## 2023-06-03 NOTE — ED PROVIDER NOTE - PATIENT PORTAL LINK FT
You can access the FollowMyHealth Patient Portal offered by [facility] by registering at the following website: [email or web address]. By joining [employer]’s FollowMyHealth portal, you will also be able to view your health information using other applications (apps) compatible with our system.

## 2023-06-03 NOTE — ED ADULT NURSE NOTE - NS ED NURSE IV DC DT
Procedure Information: Please note that the numeric value listed in the Medication (1) and associated J-code units and Medication (2) and associated J-code units variables are j-code amounts and do not represent either the concentration or the total amount of the medications injected.  I strongly recommend selecting no to the Render J-code information in note question. This will allow your note to be more clear. If you are billing j-codes with your injection codes you need to document the total amount of the medication injected. This amount should match the j-code units. For example, if you are injecting Triamcinolone 40mg as an intramuscular injection you would select 40 for the dose field and mg for the units. This would allow you to document  with 4 units (40mg = 10mg x 4). The total volume is not used to calculate j-codes only the amount of the medication administered. 03-Jun-2023 12:35

## 2023-06-03 NOTE — ED ADULT NURSE NOTE - OBJECTIVE STATEMENT
Pt BIBA from [place]. C/o constant CP. Reports constant CP since MI 10 years ago but today felt near syncopal. Admits to cocaine use last night.

## 2023-06-03 NOTE — ED PROVIDER NOTE - WR INTERPRETATION 1
CXR negative - No CHF, No cardiomegaly, No pleural effusions-  tortuous aorta same wdith as 10/22/22

## 2023-06-03 NOTE — ED PROVIDER NOTE - OBJECTIVE STATEMENT
73 yo M w PMH of AIDS (CD4 120, VL undetectable, on meds), chronic cp, CAD, s/p MI in 2019 (cath 7/21 nl LM, 30% LAD, luminal irreg L Cx, RCA), HFrEF (ef 35-40%), ascending aortic aneurysm, HTN, HLD, DM, CKD (baseline Cr 1.3-1.4), prostate CA s/p radiation resulting in chronic diarrhea, polysubstance abuse, depression, panic attacks complaint of brief episode of chest pain after using cocaine this morning.    In at of initial. pt states cp has resolved. no radiation fo chest pain to head/neck/back or abdomen.   no dizziness no light headedness no sweating no n/v/   no sob. no motor sensory or visual changes

## 2023-06-03 NOTE — ED ADULT NURSE NOTE - NSFALLUNIVINTERV_ED_ALL_ED
Bed/Stretcher in lowest position, wheels locked, appropriate side rails in place/Call bell, personal items and telephone in reach/Instruct patient to call for assistance before getting out of bed/chair/stretcher/Non-slip footwear applied when patient is off stretcher/[place] to call system/Physically safe environment - no spills, clutter or unnecessary equipment/Purposeful proactive rounding/Room/bathroom lighting operational, light cord in reach

## 2023-06-03 NOTE — ED PROVIDER NOTE - CLINICAL SUMMARY MEDICAL DECISION MAKING FREE TEXT BOX
chest pain associated with cocaine use     labs cxr ivf     The patient's symptoms progressively improved throughout the ED stay the patient remained without cp spb and abd pain or motor/sensory changes throughout ed stay. calm and comfortable in the bed . The patient tolerated PO fluids.  ED evaluation and management discussed with the patient and family (if available) in detail.  Close PMD and/or specialist follow up encouraged.  Strict ED return instructions discussed in detail for any worsening or new symptoms. The patient was given the opportunity to ask any questions about their discharge diagnosis and discharge instructions. The patient verbalized understanding of these instructions and need to return to the ED for any worsening of illness or for any concern. The patient received a printed version of the discharge instructions. The patient understands the Emergency Department diagnosis is a preliminary diagnosis often based on limited information and that the patient must adhere to the follow-up plan as discussed.  At the time of discharge from the Emergency Department, the patient is alert with fluent appropriate speech and ambulatory without difficulty.  A medical screening examination was performed and no emergency medical condition was identified.    I spoke with patient at length regarding risks of using cocaine and his cardiac history   he verbally expressed understanding chest pain associated with cocaine use     labs cxr ivf     The patient's symptoms progressively improved throughout the ED stay the patient remained without cp spb and abd pain or motor/sensory changes throughout ed stay. calm and comfortable in the bed . The patient tolerated PO fluids.      ED evaluation and management discussed with the patient and family (if available) in detail.  Close PMD and/or specialist follow up encouraged.  Strict ED return instructions discussed in detail for any worsening or new symptoms. The patient was given the opportunity to ask any questions about their discharge diagnosis and discharge instructions. The patient verbalized understanding of these instructions and need to return to the ED for any worsening of illness or for any concern. The patient received a printed version of the discharge instructions. The patient understands the Emergency Department diagnosis is a preliminary diagnosis often based on limited information and that the patient must adhere to the follow-up plan as discussed.  At the time of discharge from the Emergency Department, the patient is alert with fluent appropriate speech and ambulatory without difficulty.  A medical screening examination was performed and no emergency medical condition was identified.    I spoke with patient at length regarding risks of using cocaine and his cardiac history   he verbally expressed understanding

## 2023-06-03 NOTE — ED ADULT TRIAGE NOTE - CHIEF COMPLAINT QUOTE
PT BIBEMS from street complaining of constant chest pain and body pain. Pt admits to using cocaine last night.

## 2023-06-14 NOTE — ED ADULT TRIAGE NOTE - WEIGHT IN LBS
Pt was medicated shortly after arrival to the ED. Pt is unable to stay awake to complete assessment with SW. Will re-attempt at later time.       Lucy Polo, [facility]  06/14/23 [id number]

## 2023-07-07 NOTE — PROGRESS NOTE ADULT - PROBLEM SELECTOR PLAN 3
chest pain free, EKG revealed NSR@86BPM with nonspecific ST changes in V1-V3 (unchanged from prior EKG). Cardiac enzymes negative x 3  --s/p recent diagnostic cath@[facility] 7/28/21 which revealed normal LM, 30% mLAD lesion, luminal irregularities of LCx/RCA.  --continue ASA/BB/Statin. Female Completion Statement: After discussing her treatment course we decided to discontinue isotretinoin therapy at this time. I explained that she would need to continue her birth control methods for at least one month after the last dosage. She should also get a pregnancy test one month after the last dose. She shouldn't donate blood for one month after the last dose. She should call with any new symptoms of depression.

## 2023-09-21 NOTE — ED ADULT NURSE NOTE - NSSUHOSCREENINGYN_ED_ALL_ED
Currently still finishing course of antibiotics. Colonoscopy was done about 4 years ago. Pain has resolved. Recommend finishing course of antibiotics. Also recommend colonoscopy in 6-8 weeks to assess for colon cancer. Patient would like to think about the colonoscopy.   Yes - the patient is able to be screened

## 2023-10-05 NOTE — ED ADULT NURSE NOTE - PMH
Patient called to reschedule a missed appointment. He will have new labs before his visit. Recent labs were reviewed and stable for now.  
Statement Selected
Chronic diarrhea    DM (diabetes mellitus)    Hepatitis C    HIV (human immunodeficiency virus infection)    HTN (hypertension)

## 2023-10-06 NOTE — PATIENT PROFILE ADULT - PSYCHOSOCIAL CONCERNS
C3 nurse spoke with [facility] for a TCC post hospital discharge follow up call. The patient has a scheduled [facility] [place] appointment with Cristian Oconnell 
none

## 2023-10-09 NOTE — ED ADULT NURSE NOTE - HIV CLINIC
Try Wellow compression stocking while you are up during the day and may take off at night.     Melatonin 5 mg one hour prior to bed.  
Yes

## 2023-10-15 ENCOUNTER — EMERGENCY (EMERGENCY)
Facility: HOSPITAL | Age: 73
LOS: 1 days | Discharge: HOME | End: 2023-10-15
Attending: EMERGENCY MEDICINE | Admitting: EMERGENCY MEDICINE
Payer: MEDICARE

## 2023-10-15 VITALS
RESPIRATION RATE: 19 BRPM | WEIGHT: 175.05 LBS | OXYGEN SATURATION: 94 % | DIASTOLIC BLOOD PRESSURE: 79 MMHG | SYSTOLIC BLOOD PRESSURE: 128 MMHG | TEMPERATURE: 98 F | HEART RATE: 115 BPM

## 2023-10-15 VITALS
HEART RATE: 77 BPM | RESPIRATION RATE: 19 BRPM | SYSTOLIC BLOOD PRESSURE: 124 MMHG | DIASTOLIC BLOOD PRESSURE: 77 MMHG | TEMPERATURE: 98 F | OXYGEN SATURATION: 94 %

## 2023-10-15 DIAGNOSIS — Z95.9 PRESENCE OF CARDIAC AND VASCULAR IMPLANT AND GRAFT, UNSPECIFIED: Chronic | ICD-10-CM

## 2023-10-15 LAB
ALBUMIN SERPL ELPH-MCNC: 2.8 G/DL — LOW (ref 3.4–5)
ALP SERPL-CCNC: 56 U/L — SIGNIFICANT CHANGE UP (ref 40–120)
ALT FLD-CCNC: 54 U/L — HIGH (ref 12–42)
ANION GAP SERPL CALC-SCNC: 5 MMOL/L — LOW (ref 9–16)
AST SERPL-CCNC: 89 U/L — HIGH (ref 15–37)
BASOPHILS # BLD AUTO: 0.01 K/UL — SIGNIFICANT CHANGE UP (ref 0–0.2)
BASOPHILS NFR BLD AUTO: 0.2 % — SIGNIFICANT CHANGE UP (ref 0–2)
BILIRUB SERPL-MCNC: 0.4 MG/DL — SIGNIFICANT CHANGE UP (ref 0.2–1.2)
BUN SERPL-MCNC: 19 MG/DL — SIGNIFICANT CHANGE UP (ref 7–23)
CALCIUM SERPL-MCNC: 8.5 MG/DL — SIGNIFICANT CHANGE UP (ref 8.5–10.5)
CHLORIDE SERPL-SCNC: 106 MMOL/L — SIGNIFICANT CHANGE UP (ref 96–108)
CO2 SERPL-SCNC: 27 MMOL/L — SIGNIFICANT CHANGE UP (ref 22–31)
CREAT SERPL-MCNC: 1.47 MG/DL — HIGH (ref 0.5–1.3)
EGFR: 50 ML/MIN/1.73M2 — LOW
EOSINOPHIL # BLD AUTO: 0.03 K/UL — SIGNIFICANT CHANGE UP (ref 0–0.5)
EOSINOPHIL NFR BLD AUTO: 0.7 % — SIGNIFICANT CHANGE UP (ref 0–6)
GLUCOSE SERPL-MCNC: 101 MG/DL — HIGH (ref 70–99)
HCT VFR BLD CALC: 39.1 % — SIGNIFICANT CHANGE UP (ref 39–50)
HGB BLD-MCNC: 13.2 G/DL — SIGNIFICANT CHANGE UP (ref 13–17)
IMM GRANULOCYTES NFR BLD AUTO: 0.2 % — SIGNIFICANT CHANGE UP (ref 0–0.9)
LIDOCAIN IGE QN: 52 U/L — SIGNIFICANT CHANGE UP (ref 16–77)
LYMPHOCYTES # BLD AUTO: 1.5 K/UL — SIGNIFICANT CHANGE UP (ref 1–3.3)
LYMPHOCYTES # BLD AUTO: 37 % — SIGNIFICANT CHANGE UP (ref 13–44)
MCHC RBC-ENTMCNC: 31.5 PG — SIGNIFICANT CHANGE UP (ref 27–34)
MCHC RBC-ENTMCNC: 33.8 GM/DL — SIGNIFICANT CHANGE UP (ref 32–36)
MCV RBC AUTO: 93.3 FL — SIGNIFICANT CHANGE UP (ref 80–100)
MONOCYTES # BLD AUTO: 0.48 K/UL — SIGNIFICANT CHANGE UP (ref 0–0.9)
MONOCYTES NFR BLD AUTO: 11.9 % — SIGNIFICANT CHANGE UP (ref 2–14)
NEUTROPHILS # BLD AUTO: 2.02 K/UL — SIGNIFICANT CHANGE UP (ref 1.8–7.4)
NEUTROPHILS NFR BLD AUTO: 50 % — SIGNIFICANT CHANGE UP (ref 43–77)
NRBC # BLD: 0 /100 WBCS — SIGNIFICANT CHANGE UP (ref 0–0)
NT-PROBNP SERPL-SCNC: 1239 PG/ML — HIGH
PLATELET # BLD AUTO: 110 K/UL — LOW (ref 150–400)
POTASSIUM SERPL-MCNC: 3.7 MMOL/L — SIGNIFICANT CHANGE UP (ref 3.5–5.3)
POTASSIUM SERPL-SCNC: 3.7 MMOL/L — SIGNIFICANT CHANGE UP (ref 3.5–5.3)
PROT SERPL-MCNC: 6.1 G/DL — LOW (ref 6.4–8.2)
RBC # BLD: 4.19 M/UL — LOW (ref 4.2–5.8)
RBC # FLD: 12.1 % — SIGNIFICANT CHANGE UP (ref 10.3–14.5)
SODIUM SERPL-SCNC: 138 MMOL/L — SIGNIFICANT CHANGE UP (ref 132–145)
TROPONIN I, HIGH SENSITIVITY RESULT: 25.3 NG/L — SIGNIFICANT CHANGE UP
TROPONIN I, HIGH SENSITIVITY RESULT: 26.9 NG/L — SIGNIFICANT CHANGE UP
WBC # BLD: 4.05 K/UL — SIGNIFICANT CHANGE UP (ref 3.8–10.5)
WBC # FLD AUTO: 4.05 K/UL — SIGNIFICANT CHANGE UP (ref 3.8–10.5)

## 2023-10-15 PROCEDURE — 99285 EMERGENCY DEPT VISIT HI MDM: CPT

## 2023-10-15 PROCEDURE — 71046 X-RAY EXAM CHEST 2 VIEWS: CPT | Mod: 26

## 2023-10-15 NOTE — ED PROVIDER NOTE - CARE PROVIDER_API CALL
Arun Andrade  Urology  [address] [place] [id number]  Phone: [phone]  Fax: [phone]  Follow Up Time:

## 2023-10-15 NOTE — ED ADULT NURSE NOTE - NSFALLUNIVINTERV_ED_ALL_ED
Bed/Stretcher in lowest position, wheels locked, appropriate side rails in place/Call bell, personal items and telephone in reach/Instruct patient to call for assistance before getting out of bed/chair/stretcher/Non-slip footwear applied when patient is off stretcher/[place] to call system/Physically safe environment - no spills, clutter or unnecessary equipment/Purposeful proactive rounding/Room/bathroom lighting operational, light cord in reach

## 2023-10-15 NOTE — ED PROVIDER NOTE - PATIENT PORTAL LINK FT
You can access the FollowMyHealth Patient Portal offered by [facility] by registering at the following website: [email or web address]. By joining [employer]’s FollowMyHealth portal, you will also be able to view your health information using other applications (apps) compatible with our system.

## 2023-10-15 NOTE — ED PROVIDER NOTE - OBJECTIVE STATEMENT
72 y/o M with PMH of hypertension, diabetes mellitus, HIV [CD4 count over 500; Viral load undetectable], PAD s/p stents, and CAD s/p MI w/o cardiac stent placement presents to the ED for CP. Pt reports he typically has pain to the middle of the chest that has been ongoing for 15 years. This morning while shopping, Pt noted L sided CP. He states the pain was worse with walking with slight shortness of breath. Pt states the pain has resolved upon ED arrival. He denies fevers, chills, Abd pain, N/V, coughs, or recent travels. Of note, Pt had unremarkable stress test and echocardiogram done in the past.

## 2023-10-15 NOTE — ED PROVIDER NOTE - CLINICAL SUMMARY MEDICAL DECISION MAKING FREE TEXT BOX
72 y/o M with PMH of hypertension, diabetes mellitus, HIV [CD4 count over 500; Viral load undetectable], PAD s/p stents, and CAD s/p MI w/o cardiac stent placement presenting with L sided CP this morning while shopping. Pt endorses pain has now resolved. On exam, Pt appears well and in no acute distress. EKG is non-ischemic. Differential Dx includes ACS, CHF, pneumonia, other. Doubt PE or dissection clinically. Plan for labs and Chest XR. Will reassess clinically after results have been obtained.

## 2023-10-15 NOTE — ED PROVIDER NOTE - PROGRESS NOTE DETAILS
1st troponin is negative. Chest XR clear. Plan for serial troponin. Repeat trop neg.  Pt asymptomatic for hrs in ED.  Pt has a cardiologist with whom he will follow up this week.  Does not want to stay in hospital.  Pt also requesting referral to urologist, unrelated to current complaint.  States he had been on lupron in the past but was lost to follow up years ago and wants to reestablish care with urology.  Will give referral.

## 2023-10-17 DIAGNOSIS — Z95.5 PRESENCE OF CORONARY ANGIOPLASTY IMPLANT AND GRAFT: ICD-10-CM

## 2023-10-17 DIAGNOSIS — Z91.013 ALLERGY TO SEAFOOD: ICD-10-CM

## 2023-10-17 DIAGNOSIS — I25.10 ATHEROSCLEROTIC HEART DISEASE OF NATIVE CORONARY ARTERY WITHOUT ANGINA PECTORIS: ICD-10-CM

## 2023-10-17 DIAGNOSIS — Z91.018 ALLERGY TO OTHER FOODS: ICD-10-CM

## 2023-10-17 DIAGNOSIS — Z21 ASYMPTOMATIC HUMAN IMMUNODEFICIENCY VIRUS [HIV] INFECTION STATUS: ICD-10-CM

## 2023-10-17 DIAGNOSIS — E11.51 TYPE 2 DIABETES MELLITUS WITH DIABETIC PERIPHERAL ANGIOPATHY WITHOUT GANGRENE: ICD-10-CM

## 2023-10-17 DIAGNOSIS — R06.02 SHORTNESS OF BREATH: ICD-10-CM

## 2023-10-17 DIAGNOSIS — Z88.2 ALLERGY STATUS TO SULFONAMIDES: ICD-10-CM

## 2023-10-17 DIAGNOSIS — Z88.0 ALLERGY STATUS TO PENICILLIN: ICD-10-CM

## 2023-10-17 DIAGNOSIS — R07.89 OTHER CHEST PAIN: ICD-10-CM

## 2023-10-17 DIAGNOSIS — I25.2 OLD MYOCARDIAL INFARCTION: ICD-10-CM

## 2023-10-17 DIAGNOSIS — I10 ESSENTIAL (PRIMARY) HYPERTENSION: ICD-10-CM

## 2023-10-17 DIAGNOSIS — Z79.82 LONG TERM (CURRENT) USE OF ASPIRIN: ICD-10-CM

## 2023-10-29 NOTE — ED PROVIDER NOTE - COVID-19  TEST TYPE
Chief Complaint   Patient presents with    Hallucinations     Pt states there is a talking toiled.     Pt arrives via EMS from near a homeless shelter for above complaint.   MOLECULAR PCR

## 2023-11-10 NOTE — ED ADULT NURSE NOTE - NEURO BEHAVIOR
Per Dr. Ohara, no new orders at this time.     Faxed back to [facility] at [phone].   calm

## 2023-11-15 NOTE — BH SOCIAL WORK INITIAL PSYCHOSOCIAL EVALUATION - NSBHTREATHXTYPE_PSY_ALL_CORE
Orthopedics Post Op Check    Procedure: RIGHT THR  Surgeon: REGINALD    Pt. stable, laying in bed comfortably. Denies CP, SOB, N/V, numbness/tingling in b/l les.     Vital Signs Last 24 Hrs  T(C): 36.5 (15 Nov 2023 10:23), Max: 37.2 (14 Nov 2023 13:35)  T(F): 97.7 (15 Nov 2023 10:23), Max: 99 (14 Nov 2023 13:35)  HR: 58 (15 Nov 2023 11:36) (54 - 76)  BP: 115/58 (15 Nov 2023 10:43) (105/57 - 149/79)  BP(mean): 80 (15 Nov 2023 10:43) (75 - 80)  RR: 15 (15 Nov 2023 11:36) (14 - 21)  SpO2: 88% (15 Nov 2023 11:36) (88% - 99%)    Parameters below as of 15 Nov 2023 10:58  Patient On (Oxygen Delivery Method): nasal cannula  O2 Flow (L/min): 2      General: NAD   Dressing C/D/I JENNI in place   Pulses: DP/PT 2+ B/L LES  SLT: ijntact  B/L LES  Motor:  EHL/FHL/TA/GS  5/5 b/l les           Post op XR: prosthesis in place     A/P: 76yoFemale POD#0 s/p RIGHT THR  - Stable  - Pain Control  - DVT ppx: ASA   - Post op abx: ANCEF   - PT, WBS: WBAT B/L LES   - F/U AM Labs Substance Use and Behavioral Health

## 2023-12-28 NOTE — ED ADULT NURSE NOTE - CHIEF COMPLAINT QUOTE
Render In Strict Bullet Format?: No
Continue Regimen: Efudex apply bid to scalp x2 weeks.
Detail Level: Zone
BIBEMS c/o weakness and fall this morning. denies head injury or LOC. reports using cocaine last night. had moderna vaccine x 2. hx HIV, hep C, prostate CA, MI.

## 2024-01-03 NOTE — ED ADULT TRIAGE NOTE - BP NONINVASIVE SYSTOLIC (MM HG)
It was nice to meet you today. Please contact our office with any questions/concerns, side effects, or worsening of symptoms: [phone]. Thank you!    
141

## 2024-03-14 NOTE — BH INPATIENT PSYCHIATRY PROGRESS NOTE - NSBHASSESSSUMMFT_PSY_ALL_CORE
[General Appearance - Alert] : alert [General Appearance - In No Acute Distress] : in no acute distress [Sclera] : the sclera and conjunctiva were normal [Outer Ear] : the ears and nose were normal in appearance [Neck Appearance] : the appearance of the neck was normal [] : no respiratory distress [Auscultation Breath Sounds / Voice Sounds] : lungs were clear to auscultation bilaterally [Heart Rate And Rhythm] : heart rate was normal and rhythm regular [Heart Sounds] : normal S1 and S2 [Bowel Sounds] : normal bowel sounds [Abdomen Soft] : soft [Abdomen Tenderness] : non-tender Pt is a 71 yo male h/o AIDS (CD4 120, VL undetectable, on meds), chronic cp unchanged today, CAD, s/p MI in 2019 (cath 7/21 nl LM, 30% LAD, luminal irreg L Cx, RCA), HFrEF (ef 35-40%), ascending aortic aneurysm, HTN, HLD, DM, CKD (baseline Cr 1.3-1.4), prostate CA s/p radiation resulting in chronic diarrhea, polysubstance abuse, depression, panic attacks, recently admitted 2/14-17 for fever, diarrhea, cough w/o known source and neg eval, admitted in Nov for syncope w neg eval including cta w stable ascending aortic aneurysm, neg for pe, ep consult who recommended [facility] as outpt (pt did not fu as outpt), admitted to 8U  for anxiety and SI in the context of recent cocaine use. Hospital course c/b syncopal episode with hypotension.    2/28: Pt denies SI/HI. States that mood is improved and he has positive outlook on the future. Wants to go to inpatient rehab for cocaine use to receive help for his addiction as well as to get out of his housing situation for the time being. He denies mood exacerbation, manic symptoms, AVH, delusional thought content. He is adherent with medication regimen. He is discharge focused and behaviorally well regulated on the unit.     Plan:  c/w admission on 9.13 voluntary status  q15 min checks  c/w lexapro 5 mg po daily with a plan to titrate up  c/w abilify 2 mg po daily with a plan to titrate up    c/w outpatient medications:    nystatin 100,000 units/mL oral suspension: 4 milliliter(s) orally- Swish and Swallow  4 times a day   atovaquone 750 mg/5 mL oral suspension: 10 milliliter(s) orally once a day  tamsulosin 0.4 mg oral capsule: 1 cap(s) orally once a day (at bedtime)  thiamine 100 mg oral tablet: 1 tab(s) orally once a day  folic acid 1 mg oral tablet: 1 tab(s) orally once a day  Multiple Vitamins oral tablet: 1 tab(s) orally once a day   carvedilol 6.25 mg oral tablet: 1 tab(s) orally every 12 hours  Vasotec 2.5 mg oral tablet: 1 tab(s) orally once a day  Tivicay 50 mg oral tablet: 1 tab(s) orally once a day  amLODIPine 2.5 mg oral tablet: 1 tab(s) orally once a day   isosorbide mononitrate 30 mg oral tablet, extended release: 1 tab(s) orally once a day   aspirin 81 mg oral delayed release tablet: 1 tab(s) orally once a day  pravastatin 80 mg oral tablet: 1 tab(s) orally once a day (at bedtime)  Descovy 200 mg-25 mg oral tablet: 1 tab(s) orally once a day

## 2024-03-19 NOTE — ED ADULT NURSE NOTE - FINAL NURSING ELECTRONIC SIGNATURE
Medical Exception Letter for Diabetic Supplies    03/19/24      RE: Khushboo Chino  [address] [place] [id number]    To Whom It Concerns:    This letter is to verify Khushboo Chino is under my care, with a history of insulin-dependent diabetes, for which medication adjustments are still being made. Due to fluctuating blood sugars and current inadequate control of blood glucose levels, it is medically necessary for Ms. Chino to check her blood glucose at least three times daily.     I ask you to consider coverage of lancets on Ms. Chino's behalf. The quantity requested is at least 100 lancets per 30-days with 11 refills per year.     If there are any questions or additional information is needed, please contact the office at [phone]. Thank you for your attention to this matter.     Sincerely,     Electronically signed.  Samanta Oh,   [facility]-[address]  [address] [place] [address]  Phone: [phone]  Fax: [phone]                
27-Feb-2022 00:52

## 2024-04-15 NOTE — ED ADULT TRIAGE NOTE - NS ED NURSE BANDS TYPE
Name band; Detail Level: Zone Initiate Treatment: triamcinolone acetonide 0.1 % topical cream Bid - Apply to affected areas on the leg bid x 2 weeks max as needed for itch. Do not apply to face or skin folds Render In Strict Bullet Format?: No Initiate Treatment: ketoconazole 2 % topical cream BID - Apply to ears twice daily x 2 weeks prn flares to dry patches on ears.

## 2024-06-21 NOTE — PHYSICAL THERAPY INITIAL EVALUATION ADULT - ADL SKILLS, REHAB EVAL
[facility] EMERGENCY DEPT  EMERGENCY DEPARTMENT HISTORY AND PHYSICAL EXAM      Date: 2024  Patient Name: Anjali Bahena  MRN: [id number]  YOB: 1947  Date of evaluation: 2024  Provider: LEO Austin NP   Note Started: 8:36 PM EDT 24    HISTORY OF PRESENT ILLNESS     Chief Complaint   Patient presents with    Laceration       History Provided By: Patient    HPI: Anjali Bahena is a 77 y.o. female with past medical history as listed below presents to the ER with a laceration.  Patient cut the second and third digits while cutting watermelon immediately prior to arrival.  Patient shattering it finger stressed by paramedics.  Bleeding controlled in the last tetanus    PAST MEDICAL HISTORY   Past Medical History:  Past Medical History:   Diagnosis Date    Arthritis     Hypertension     Morbid obesity (HCC)     Thyroid disease        Past Surgical History:  Past Surgical History:   Procedure Laterality Date    APPENDECTOMY      CHOLECYSTECTOMY      COLONOSCOPY N/A 2023    COLONOSCOPY performed by Fabian Roman MD at [facility] ENDOSCOPY    GASTRIC BYPASS SURGERY  1998    HYSTERECTOMY, TOTAL ABDOMINAL (CERVIX REMOVED)      LUMBAR FUSION  2016    L3-L5    ORTHOPEDIC SURGERY Bilateral 1999    TKR    ORTHOPEDIC SURGERY Bilateral     carpal tunnel release       Family History:  Family History   Problem Relation Age of Onset    Cancer Father     Other Sister         auto immune disease    No Known Problems Brother     No Known Problems Mother        Social History:  Social History     Tobacco Use    Smoking status: Former     Current packs/day: 0.00     Types: Cigarettes     Quit date: 1980     Years since quittin.3    Smokeless tobacco: Never   Substance Use Topics    Alcohol use: Never    Drug use: No       Allergies:  Allergies   Allergen Reactions    Iodine Rash       PCP: Tom Gonzalez Jr., MD    Current Meds:   No current facility-administered medications for this  independent

## 2024-08-31 ENCOUNTER — EMERGENCY (EMERGENCY)
Facility: HOSPITAL | Age: 74
LOS: 1 days | Discharge: LEFT BEFORE DISCHARGE | End: 2024-08-31
Attending: EMERGENCY MEDICINE | Admitting: EMERGENCY MEDICINE
Payer: MEDICARE

## 2024-08-31 VITALS
WEIGHT: 175.05 LBS | RESPIRATION RATE: 18 BRPM | SYSTOLIC BLOOD PRESSURE: 117 MMHG | TEMPERATURE: 98 F | OXYGEN SATURATION: 94 % | DIASTOLIC BLOOD PRESSURE: 59 MMHG | HEART RATE: 75 BPM

## 2024-08-31 DIAGNOSIS — Z95.9 PRESENCE OF CARDIAC AND VASCULAR IMPLANT AND GRAFT, UNSPECIFIED: Chronic | ICD-10-CM

## 2024-08-31 PROCEDURE — 99285 EMERGENCY DEPT VISIT HI MDM: CPT

## 2024-08-31 NOTE — ED PROVIDER NOTE - OBJECTIVE STATEMENT
74-year-old male with significant past medical history that includes diabetes, hypertension, CAD, PVD, HIV presenting to the emergency room for medication refills.  Patient states that he has not seen his primary care doctor in a year and a half due to  residing in a nursing home, states that he is on "16 medications" and states that he does not have an appointment with his primary care doctor until September 13.  States that he currently only has 6 of the medications.  Patient does not of the names of the medications that he is on, nor does he know what 6 medications he is currently taking.  [place] and he started feeling dizzy, was afraid that he was going to pass out, states that bystanders called 911 for him.  Denies chest pain, shortness of breath.

## 2024-08-31 NOTE — ED PROVIDER NOTE - CLINICAL SUMMARY MEDICAL DECISION MAKING FREE TEXT BOX
A/P: 74-year-old male with significant past medical history that includes diabetes, hypertension, CAD, PVD, HIV presenting to the emergency room for medication refills.  Patient states that he has not seen his primary care doctor in a year and a half due to  residing in a nursing home, states that he is on "16 medications" and states that he does not have an appointment with his primary care doctor until September 13.  States that he currently only has 6 of the medications.  Patient does not of the names of the medications that he is on, nor does he know what 6 medications he is currently taking.  [place] and he started feeling dizzy, was afraid that he was going to pass out, states that bystanders called 911 for him.  Denies chest pain, shortness of breath.  --  Differential clues not limited to electrolyte imbalance, CAD, arrhythmia, dehydration  -- patient appears well, asymptomatic at this time, requesting refills of "all his medications"  -- plan to check lab work, EKG, will do chart review to find what patient's medications are

## 2024-08-31 NOTE — ED PROVIDER NOTE - PROGRESS NOTE DETAILS
Attempted to find patient in the E.D., however patient was seen by E.D. staff walking out of the E.D.

## 2024-08-31 NOTE — ED ADULT TRIAGE NOTE - CHIEF COMPLAINT QUOTE
requesting his HIV, dm meds- states he felt like he was going to have a near syncopal episode. denies alcohol use. FS in the field 202- denies cp, sob, dizziness

## 2024-09-04 DIAGNOSIS — I25.10 ATHEROSCLEROTIC HEART DISEASE OF NATIVE CORONARY ARTERY WITHOUT ANGINA PECTORIS: ICD-10-CM

## 2024-09-04 DIAGNOSIS — Z88.0 ALLERGY STATUS TO PENICILLIN: ICD-10-CM

## 2024-09-04 DIAGNOSIS — Z76.0 ENCOUNTER FOR ISSUE OF REPEAT PRESCRIPTION: ICD-10-CM

## 2024-09-04 DIAGNOSIS — I73.9 PERIPHERAL VASCULAR DISEASE, UNSPECIFIED: ICD-10-CM

## 2024-09-04 DIAGNOSIS — I10 ESSENTIAL (PRIMARY) HYPERTENSION: ICD-10-CM

## 2024-09-04 DIAGNOSIS — Z21 ASYMPTOMATIC HUMAN IMMUNODEFICIENCY VIRUS [HIV] INFECTION STATUS: ICD-10-CM

## 2024-09-04 DIAGNOSIS — E11.9 TYPE 2 DIABETES MELLITUS WITHOUT COMPLICATIONS: ICD-10-CM

## 2024-09-04 DIAGNOSIS — R42 DIZZINESS AND GIDDINESS: ICD-10-CM

## 2024-09-04 DIAGNOSIS — Z53.29 PROCEDURE AND TREATMENT NOT CARRIED OUT BECAUSE OF PATIENT'S DECISION FOR OTHER REASONS: ICD-10-CM

## 2024-09-04 DIAGNOSIS — Z91.018 ALLERGY TO OTHER FOODS: ICD-10-CM

## 2024-09-04 DIAGNOSIS — Z88.2 ALLERGY STATUS TO SULFONAMIDES: ICD-10-CM

## 2024-09-04 DIAGNOSIS — Z91.013 ALLERGY TO SEAFOOD: ICD-10-CM

## 2024-10-08 NOTE — ED PROVIDER NOTE - NS ED ATTENDING STATEMENT MOD
Refill Routing Note   Medication(s) are not appropriate for processing by [facility] for the following reason(s):        Patient not seen by provider within 15 months    [facility] action(s):  Defer        Medication Therapy Plan: FOV in 2 weeks      Appointments  past 12m or future 3m with PCP    Date Provider   Last Visit   5/12/2023 Marian Parikh MD   Next Visit   10/24/2024 Marian Parikh MD   ED visits in past 90 days: 0        Note composed:3:08 PM 10/08/2024          
Attending Only

## 2025-01-04 ENCOUNTER — EMERGENCY (EMERGENCY)
Facility: HOSPITAL | Age: 75
LOS: 1 days | Discharge: HOME | End: 2025-01-04
Attending: EMERGENCY MEDICINE | Admitting: EMERGENCY MEDICINE
Payer: MEDICARE

## 2025-01-04 VITALS
OXYGEN SATURATION: 97 % | SYSTOLIC BLOOD PRESSURE: 129 MMHG | TEMPERATURE: 98 F | DIASTOLIC BLOOD PRESSURE: 86 MMHG | HEART RATE: 82 BPM | RESPIRATION RATE: 16 BRPM

## 2025-01-04 VITALS
DIASTOLIC BLOOD PRESSURE: 76 MMHG | OXYGEN SATURATION: 98 % | TEMPERATURE: 98 F | SYSTOLIC BLOOD PRESSURE: 132 MMHG | HEART RATE: 74 BPM | RESPIRATION RATE: 16 BRPM

## 2025-01-04 DIAGNOSIS — Z88.0 ALLERGY STATUS TO PENICILLIN: ICD-10-CM

## 2025-01-04 DIAGNOSIS — Z79.02 LONG TERM (CURRENT) USE OF ANTITHROMBOTICS/ANTIPLATELETS: ICD-10-CM

## 2025-01-04 DIAGNOSIS — Z86.19 PERSONAL HISTORY OF OTHER INFECTIOUS AND PARASITIC DISEASES: ICD-10-CM

## 2025-01-04 DIAGNOSIS — R55 SYNCOPE AND COLLAPSE: ICD-10-CM

## 2025-01-04 DIAGNOSIS — Z59.01 SHELTERED HOMELESSNESS: ICD-10-CM

## 2025-01-04 DIAGNOSIS — Z88.2 ALLERGY STATUS TO SULFONAMIDES: ICD-10-CM

## 2025-01-04 DIAGNOSIS — R05.9 COUGH, UNSPECIFIED: ICD-10-CM

## 2025-01-04 DIAGNOSIS — Z21 ASYMPTOMATIC HUMAN IMMUNODEFICIENCY VIRUS [HIV] INFECTION STATUS: ICD-10-CM

## 2025-01-04 DIAGNOSIS — J44.9 CHRONIC OBSTRUCTIVE PULMONARY DISEASE, UNSPECIFIED: ICD-10-CM

## 2025-01-04 DIAGNOSIS — Z95.9 PRESENCE OF CARDIAC AND VASCULAR IMPLANT AND GRAFT, UNSPECIFIED: Chronic | ICD-10-CM

## 2025-01-04 DIAGNOSIS — F19.90 OTHER PSYCHOACTIVE SUBSTANCE USE, UNSPECIFIED, UNCOMPLICATED: ICD-10-CM

## 2025-01-04 DIAGNOSIS — Z86.79 PERSONAL HISTORY OF OTHER DISEASES OF THE CIRCULATORY SYSTEM: ICD-10-CM

## 2025-01-04 DIAGNOSIS — Z91.018 ALLERGY TO OTHER FOODS: ICD-10-CM

## 2025-01-04 LAB
ALBUMIN SERPL ELPH-MCNC: 2.9 G/DL — LOW (ref 3.4–5)
ALP SERPL-CCNC: 63 U/L — SIGNIFICANT CHANGE UP (ref 40–120)
ALT FLD-CCNC: 23 U/L — SIGNIFICANT CHANGE UP (ref 12–42)
ANION GAP SERPL CALC-SCNC: 7 MMOL/L — LOW (ref 9–16)
APPEARANCE UR: CLEAR — SIGNIFICANT CHANGE UP
APTT BLD: 28.7 SEC — SIGNIFICANT CHANGE UP (ref 24.5–35.6)
AST SERPL-CCNC: 22 U/L — SIGNIFICANT CHANGE UP (ref 15–37)
BILIRUB SERPL-MCNC: 0.5 MG/DL — SIGNIFICANT CHANGE UP (ref 0.2–1.2)
BILIRUB UR-MCNC: NEGATIVE — SIGNIFICANT CHANGE UP
BUN SERPL-MCNC: 17 MG/DL — SIGNIFICANT CHANGE UP (ref 7–23)
CALCIUM SERPL-MCNC: 8.3 MG/DL — LOW (ref 8.5–10.5)
CHLORIDE SERPL-SCNC: 105 MMOL/L — SIGNIFICANT CHANGE UP (ref 96–108)
CK MB BLD-MCNC: 1.69 % — SIGNIFICANT CHANGE UP
CK MB CFR SERPL CALC: 2.5 NG/ML — SIGNIFICANT CHANGE UP (ref 0.5–3.6)
CK SERPL-CCNC: 148 U/L — SIGNIFICANT CHANGE UP (ref 39–308)
CO2 SERPL-SCNC: 26 MMOL/L — SIGNIFICANT CHANGE UP (ref 22–31)
COLOR SPEC: YELLOW — SIGNIFICANT CHANGE UP
CREAT SERPL-MCNC: 1.59 MG/DL — HIGH (ref 0.5–1.3)
DIFF PNL FLD: ABNORMAL
EGFR: 45 ML/MIN/1.73M2 — LOW
ETHANOL SERPL-MCNC: <3 MG/DL — SIGNIFICANT CHANGE UP
FLUAV AG NPH QL: SIGNIFICANT CHANGE UP
FLUBV AG NPH QL: SIGNIFICANT CHANGE UP
GLUCOSE SERPL-MCNC: 138 MG/DL — HIGH (ref 70–99)
GLUCOSE UR QL: NEGATIVE MG/DL — SIGNIFICANT CHANGE UP
HCT VFR BLD CALC: 35.4 % — LOW (ref 39–50)
HGB BLD-MCNC: 12.2 G/DL — LOW (ref 13–17)
INR BLD: 0.98 — SIGNIFICANT CHANGE UP (ref 0.85–1.16)
KETONES UR-MCNC: NEGATIVE MG/DL — SIGNIFICANT CHANGE UP
LEUKOCYTE ESTERASE UR-ACNC: NEGATIVE — SIGNIFICANT CHANGE UP
MAGNESIUM SERPL-MCNC: 1.5 MG/DL — LOW (ref 1.6–2.6)
MCHC RBC-ENTMCNC: 32.3 PG — SIGNIFICANT CHANGE UP (ref 27–34)
MCHC RBC-ENTMCNC: 34.5 G/DL — SIGNIFICANT CHANGE UP (ref 32–36)
MCV RBC AUTO: 93.7 FL — SIGNIFICANT CHANGE UP (ref 80–100)
NITRITE UR-MCNC: NEGATIVE — SIGNIFICANT CHANGE UP
NRBC # BLD: 0 /100 WBCS — SIGNIFICANT CHANGE UP (ref 0–0)
NT-PROBNP SERPL-SCNC: 1622 PG/ML — HIGH
PCP SPEC-MCNC: SIGNIFICANT CHANGE UP
PH UR: 6 — SIGNIFICANT CHANGE UP (ref 5–8)
PLATELET # BLD AUTO: 117 K/UL — LOW (ref 150–400)
POTASSIUM SERPL-MCNC: 3.5 MMOL/L — SIGNIFICANT CHANGE UP (ref 3.5–5.3)
POTASSIUM SERPL-SCNC: 3.5 MMOL/L — SIGNIFICANT CHANGE UP (ref 3.5–5.3)
PROT SERPL-MCNC: 6.8 G/DL — SIGNIFICANT CHANGE UP (ref 6.4–8.2)
PROT UR-MCNC: 100 MG/DL
PROTHROM AB SERPL-ACNC: 11.4 SEC — SIGNIFICANT CHANGE UP (ref 9.9–13.4)
RBC # BLD: 3.78 M/UL — LOW (ref 4.2–5.8)
RBC # FLD: 12.7 % — SIGNIFICANT CHANGE UP (ref 10.3–14.5)
RSV RNA NPH QL NAA+NON-PROBE: SIGNIFICANT CHANGE UP
SARS-COV-2 RNA SPEC QL NAA+PROBE: SIGNIFICANT CHANGE UP
SODIUM SERPL-SCNC: 138 MMOL/L — SIGNIFICANT CHANGE UP (ref 132–145)
SP GR SPEC: 1.02 — SIGNIFICANT CHANGE UP (ref 1–1.03)
TROPONIN I, HIGH SENSITIVITY RESULT: 15.5 NG/L — SIGNIFICANT CHANGE UP
UROBILINOGEN FLD QL: 1 MG/DL — SIGNIFICANT CHANGE UP (ref 0.2–1)
WBC # BLD: 5.52 K/UL — SIGNIFICANT CHANGE UP (ref 3.8–10.5)
WBC # FLD AUTO: 5.52 K/UL — SIGNIFICANT CHANGE UP (ref 3.8–10.5)

## 2025-01-04 PROCEDURE — 70450 CT HEAD/BRAIN W/O DYE: CPT | Mod: 26

## 2025-01-04 PROCEDURE — 99285 EMERGENCY DEPT VISIT HI MDM: CPT

## 2025-01-04 PROCEDURE — 71275 CT ANGIOGRAPHY CHEST: CPT | Mod: 26

## 2025-01-04 PROCEDURE — 74174 CTA ABD&PLVS W/CONTRAST: CPT | Mod: 26

## 2025-01-04 RX ORDER — SODIUM CHLORIDE 9 MG/ML
1000 INJECTION, SOLUTION INTRAMUSCULAR; INTRAVENOUS; SUBCUTANEOUS ONCE
Refills: 0 | Status: COMPLETED | OUTPATIENT
Start: 2025-01-04 | End: 2025-01-04

## 2025-01-04 RX ORDER — ACETAMINOPHEN 80 MG/.8ML
650 SOLUTION/ DROPS ORAL ONCE
Refills: 0 | Status: COMPLETED | OUTPATIENT
Start: 2025-01-04 | End: 2025-01-04

## 2025-01-04 RX ADMIN — SODIUM CHLORIDE 1000 MILLILITER(S): 9 INJECTION, SOLUTION INTRAMUSCULAR; INTRAVENOUS; SUBCUTANEOUS at 10:40

## 2025-01-04 RX ADMIN — ACETAMINOPHEN 650 MILLIGRAM(S): 80 SOLUTION/ DROPS ORAL at 10:39

## 2025-01-04 SDOH — ECONOMIC STABILITY - HOUSING INSECURITY: SHELTERED HOMELESSNESS: Z59.01

## 2025-01-04 NOTE — ED PROVIDER NOTE - NSFOLLOWUPINSTRUCTIONS_ED_ALL_ED_FT
Follow up with your primary care doctor or clinics listed below if you do not have a doctor  [address] [address]  To make an appointment, call [phone]  [facility]  Address: [address] [place] [id number]  Appointment Center: [phone] ([phone])    Polysubstance Use Disorder    AMBULATORY CARE:    Polysubstance use disorder is a medical condition that develops from long-term use or misuse of 2 or more substances. You are not able to stop even though it causes physical or social problems. PUD includes use of a drug such as cocaine or misuse of alcohol, tobacco, or a prescription medicine such as opioids. This disorder is also called polysubstance abuse.    Signs and symptoms of PUD include at least 2 of the following in a 12-month period:    You take a prescription medicine in a way it was not intended. This is also called misuse. For example, your prescription is for anxiety relief, but you take it to feel good instead. You take more than prescribed or for longer than recommended. Misuse can also mean you take a medicine even though you do not have a prescription for it.    You have a strong urge or craving for the substances. This is also called addiction. You are not able to control when or how much you use. You spend large amounts of time trying to get, use, or recover from the substances. In between uses, you think about when you will get to use it again.    You become tolerant to the substances. This means the amount you have been taking no longer has the effects you want. You need higher amounts to feel the effects.    You become dependent on the substances. This means your body becomes used to the substances. You have withdrawal symptoms when you do not use the substances for a short amount of time. You have to take them to stop or prevent withdrawal symptoms, such as shaky hands.    You are not able to decrease or stop the substances. You start again when you try to quit. You try to use lower amounts, but you are not able.    You continue the substances even though it causes problems or is dangerous. For example, you drive after you use a substance that causes drowsiness. You may try to make the effect stronger by mixing a substance with alcohol, a medicine, or a drug. You have problems at school, work, or home. You spend less time doing important or enjoyable activities.  Call your local emergency number (911 in the [place]) or have someone call if:    You have chest pain and your heart is beating faster than usual.    You have a seizure.    You feel you might harm yourself or others.    You have new shortness of breath.  Seek care immediately if:    You are dizzy and lightheaded.    Call your doctor or therapist if:    You know or think you are pregnant.    You have questions or concerns about your condition or care.  Treatment may be offered in a hospital, outpatient facility, or drug rehabilitation center. Healthcare providers can help you make decisions about treatment programs. The goal is to help you decrease or stop taking the substances.    A detox program includes medicine and treatment to reduce withdrawal symptoms and anxiety when you stop taking the substances. You will be in the hospital with close monitoring and care.    Your dose will be gradually decreased by your healthcare provider to help prevent withdrawal symptoms.    Cognitive behavioral therapy (CBT) can help you manage depression and anxiety caused by PUD. CBT can be done with you and a talk therapist or in a group with others.    Motivational enhancement therapy can help you set and reach healthy, positive goals.    Twelve-step facilitation (TSF) is a short, structured approach to reach early recovery. It is done one-to-one with a therapist in 12 to 15 sessions.  Safety guidelines:    Do not combine medicines, drugs, or alcohol. The combination can cause an overdose, or cause you to stop breathing.    Learn about the signs of an overdose so you know how to respond. Signs depend on the substances. Your heartbeat or breathing may be faster or slower than usual. You may have heavy sweating, vomiting, trouble sleeping, or sleeping too much. Your skin may be pale or clammy. You may have slurred or slow speech. Tell others about signs of an overdose so they will know what to do if needed. Talk to your healthcare provider about naloxone. You may be able to keep naloxone at home in case of an overdose. Your family and friends can also be trained on how to give it to you if needed. Get immediate help or call your local emergency number (911 in the [place]) for signs of an overdose.    Take prescribed medicines exactly as directed. Do not take more than the recommended amount. Do not take it more often than recommended. If you use a pain patch, be sure to remove the old patch before you place a new one. Make sure the patch is not exposed to sunlight. Sunlight speeds up the opioid release from the patch.    Keep substances out of the reach of children. Store substances in a locked cabinet or in a location that children cannot get to.  Common Childproofing Latches   Follow up with your doctor or therapist as directed: Write down your questions so you remember to ask them during your visits.    For support and more information:    Alcoholics Anonymous  Web Address: http://www.aa.org  Substance Abuse and Mental Health Services Administration (SAMHSA)  [address]  [place] [address]  Web Address: http://www.samhsa.gov or https://dpt2.samhsa.gov/treatment/

## 2025-01-04 NOTE — ED PROVIDER NOTE - OBJECTIVE STATEMENT
BIBEMS from street s/p syncope today. Pt states syncope on street, than got up, went to shelter to collect belongings, then called ems to shelter. Pt on plavix. hx copd, hiv, hep c, etc  syncope

## 2025-01-04 NOTE — ED ADULT NURSE NOTE - OBJECTIVE STATEMENT
Patient BIBEMS from street s/p syncope from standing position, fell with head strike, loss consciousness for a few mins. Bystander helped him get up. Patient then went to shelter to collect his belongings, then called ems to come to the ED. Patient is on plavix. hx copd, hiv, hep c, heart attack, DM. Patient complaining of body ache, headache, dry coughing. Also has chest pressure, SOB and dizziness which is not new and has been having that for year. Denies nausea, vomiting, diarrhea, fever, chills.

## 2025-01-04 NOTE — ED PROVIDER NOTE - PATIENT PORTAL LINK FT
You can access the FollowMyHealth Patient Portal offered by [facility] by registering at the following website: [email or web address]. By joining [facility]’s FollowMyHealth portal, you will also be able to view your health information using other applications (apps) compatible with our system.

## 2025-01-04 NOTE — ED PROVIDER NOTE - CLINICAL SUMMARY MEDICAL DECISION MAKING FREE TEXT BOX
BIBEMS from street s/p syncope today. Pt states syncope on street, than got up, went to shelter to collect belongings, then called ems to shelter. Pt on plavix. hx copd, hiv, hep c, etc  syncope  labs/trop/BNP/EKG ordered  CP, hx of aneurysm thoracic  CTA aorta chest abd ordered  flu and covid ordered  CT head ordered

## 2025-01-04 NOTE — ED PROVIDER NOTE - CARDIAC RHYTHM
"CPM/PAT Evaluation       Name: Tanisha Freeman (Tanisha Freeman)  /Age: 1949/75 y.o.     In-Person       Chief Complaint: \"knee pain\"    HPI  The patient is a 75 year old female.  For more than 1 year she has experienced non-radiating right knee pain with walking, standing and climbing stairs.  She has associated right knee swelling, weakness and instability, but no falls.  The pain resolves with rest and the frequency depends on her activities.  She was seen by Dr. Welch and a right total knee arthroplasty is recommended at this time.    Past Medical History:   Diagnosis Date    Arthritis     ASHD (arteriosclerotic heart disease)     Cataract     Depression     GERD (gastroesophageal reflux disease)     Hearing aid worn     HL (hearing loss)     Hyperlipidemia     Hypertension     Low back strain     Tear of meniscus of knee     Vision loss     Visual impairment        Past Surgical History:   Procedure Laterality Date    CARPAL TUNNEL RELEASE Bilateral     COLONOSCOPY      HYSTERECTOMY  1994    JOINT REPLACEMENT Left 2014    KNEE SURGERY Right     Knee arthroscopy with meniscus repair    TUBAL LIGATION  1979       Family History   Problem Relation Name Age of Onset    Heart disease Mother Kassi Howell     Other (colon problems) Mother Kassi Howell     Other (stomach problems) Mother Kassi Howell     Arthritis Mother Kassi Howell     Heart attack Mother Kassi Howell     Hypertension Mother Kassi Howell     Osteoporosis Mother Kassi Howell     Heart failure Father Levy Howell father     Heart disease Father Levy Howell father     Hearing loss Father Levy Howell father     Vision loss Father Levy Howell father     Other (heart issues) Brother Carlitos Howell     Diverticulitis Brother Carlitos Howell     Other (knee issues) Brother Carlitos Howell     Heart disease Brother Carlitos Howell     Other (heart issue-irregular heart beat) Brother      Heart disease Brother  "     Diverticulitis Brother      No Known Problems Daughter      Asthma Daughter Viry Sylvester     Asthma Daughter Kassi Burns      Social History     Tobacco Use    Smoking status: Never     Passive exposure: Never    Smokeless tobacco: Never   Substance Use Topics    Alcohol use: Yes     Alcohol/week: 3.0 - 4.0 standard drinks of alcohol     Social History     Substance and Sexual Activity   Drug Use Never       Allergies   Allergen Reactions    Aspirin Other     stomach upset    Losartan Potassium Swelling     Current Outpatient Medications   Medication Sig Dispense Refill    ascorbic acid (VITAMIN C ORAL) Take by mouth. As directed      biotin 1 mg tablet Take 1 tablet (1,000 mcg) by mouth once daily.      CALCIUM ORAL Take by mouth.      [START ON 6/2/2024] chlorhexidine (Peridex) 0.12 % solution Use as directed - patient may  prescription prior to 6/2/2024. Do not fill before June 2, 2024. 473 mL 0    chlorthalidone (Hygroton) 25 mg tablet TAKE 1 TABLET BY MOUTH IN THE MORNING WITH FOOD ONCE DAILY 90 tablet 1    glucosamine sulfate 500 mg capsule Take 1 capsule by mouth once daily.      ibuprofen 200 mg tablet Take 2 tablets (400 mg) by mouth every 6 hours if needed for mild pain (1 - 3).      naproxen sodium (Aleve) 220 mg tablet Take 1 tablet (220 mg) by mouth 2 times a day as needed.      nystatin (Mycostatin) 100,000 unit/gram powder Apply 1 Application topically 2 times a day. 60 g 3    simvastatin (Zocor) 20 mg tablet Take 1 tablet (20 mg) by mouth once daily at bedtime. 90 tablet 1    soft lens rinse,store solution (SALINE SENSITIVE EYES MISC) Saline Sensitive Eyes      vit C/E/Zn/coppr/lutein/zeaxan (PRESERVISION AREDS-2 ORAL) Take 1 tablet by mouth once daily.       No current facility-administered medications for this visit.     Review of Systems   Constitutional: Negative.    HENT: Negative.     Eyes: Negative.    Respiratory: Negative.     Cardiovascular: Negative.    Gastrointestinal:  "Negative.    Endocrine: Negative.    Genitourinary: Negative.    Musculoskeletal:  Positive for arthralgias.   Neurological: Negative.    Psychiatric/Behavioral: Negative.       /74   Pulse 93   Temp 36.5 °C (97.7 °F) (Temporal)   Resp 16   Ht 1.626 m (5' 4\")   Wt 105 kg (232 lb)   SpO2 97%   BMI 39.82 kg/m²     Physical Exam  Vitals reviewed.   Constitutional:       Comments: Overweight     HENT:      Head: Normocephalic and atraumatic.      Mouth/Throat:      Mouth: Mucous membranes are moist.      Pharynx: Oropharynx is clear.   Eyes:      Extraocular Movements: Extraocular movements intact.      Pupils: Pupils are equal, round, and reactive to light.   Cardiovascular:      Rate and Rhythm: Normal rate and regular rhythm.      Heart sounds: Normal heart sounds.   Pulmonary:      Breath sounds: Normal breath sounds.   Abdominal:      General: Bowel sounds are normal.      Palpations: Abdomen is soft.   Musculoskeletal:      Comments: Tenderness with palpation right knee.  Limited range of motion right knee.     Skin:     General: Skin is warm and dry.   Neurological:      General: No focal deficit present.      Mental Status: She is alert and oriented to person, place, and time.   Psychiatric:         Mood and Affect: Mood normal.         Behavior: Behavior normal.        PAT AIRWAY:   Airway:     Mallampati::  III    TM distance::  >3 FB    Neck ROM::  Full   Teeth intact    ASA:  II  DASI RISK SCORE:  29.45  METS SCORE:  6.4  CHAD2 SCORE:  4.0%  REVISED CARDIAC RISK INDEX:  0.4%  STOP BANG SCORE:  3    EKG  5/16/2024    Assessment and Plan:     Primary osteoarthritis of right knee:  Right total knee arthroplasty  H/O ASHD - currently stable, followed by Dr. Maciej Dyer - cardiac clearance 2/21/2024  Hypertension - well controlled with chlorthalidone  Obesity - BMI:  39.82    Susan Dickinson PA-C          " regular

## 2025-01-04 NOTE — ED ADULT NURSE NOTE - NSFALLHARMRISKINTERV_ED_ALL_ED
Assistance OOB with selected safe patient handling equipment if applicable/Communicate risk of Fall with Harm to all staff, patient, and family/Orthostatic vital signs/Provide visual cue: red socks, yellow wristband, yellow gown, etc/Reinforce activity limits and safety measures with patient and family/Bed in lowest position, wheels locked, appropriate side rails in place/Call bell, personal items and telephone in reach/Instruct patient to call for assistance before getting out of bed/chair/stretcher/Non-slip footwear applied when patient is off stretcher/[place] to call system/Physically safe environment - no spills, clutter or unnecessary equipment/Purposeful Proactive Rounding/Room/bathroom lighting operational, light cord in reach

## 2025-01-04 NOTE — ED ADULT TRIAGE NOTE - CHIEF COMPLAINT QUOTE
BIBEMS from street s/p syncope today. Pt states syncope on street, than got up, went to shelter to collect belongings, then called ems to shelter. Pt on plavix. hx copd, hiv, hep c, etc

## 2025-02-24 NOTE — BH INPATIENT PSYCHIATRY PROGRESS NOTE - NSBHCHARTREVIEWVS_PSY_A_CORE FT
Subjective   iPlar Crump is a 64 year old female who presents for Follow-up (Ok'd scribe/Discuss Fluticasone and docusate sodium-sennosides/Patient wishes to discuss with clinician: Respiratory Syncytial Virus (RSV)/Patient is not proceeding with: COVID-19)    HPI  64-year-old female here for routine follow-up. History of anemia post-CABG and smoking.    Reports persistent hip pain.  Did home PT, hasn't returned to formal PT since CABG.  Seeing pain management. Scheduled to start cardiac rehabilitation on 03/03/2025. Manages pain with meloxicam, feels to her more effective than hydrocodone.    Has not started Ozempic therapy. Diet well-managed, lacks exercise. Continues to smoke 3-5 cigarettes daily, despite cessation attempts and 15-pound weight loss when she did stop smoking. Has nicotine patches available.    Reports mild congestion. History of allergic rhinitis.,  alleviated by Flonase. No chest pain or shortness of breath during ambulation. Attributes dyspnea on exertion to lack of physical activity.    Feels ear fullness, requests examination.    SOCIAL HISTORY  History of smoking, currently smokes 3-5 cigarettes daily.    MEDICATIONS  Current: metformin, meloxicam, hydrocodone, Flonase    IMMUNIZATIONS  The patient received a tetanus booster on February 17, 2015. The patient has already received the influenza vaccine this year.    Last Lab A1C:  Hemoglobin A1C (%)   Date Value   11/20/2024 6.7 (H)   10/04/2024 6.5 (H)       Last Point of Care A1C:  No results found for: \"UXAUECGL8N\", \"5GLYH\"      Patient arrived late and was seen late as a result    Wt Readings from Last 6 Encounters:   02/24/25 73.6 kg (162 lb 5.9 oz)   02/20/25 75.8 kg (167 lb)   02/05/25 75.8 kg (167 lb)   02/04/25 75.8 kg (167 lb)   01/27/25 77.1 kg (170 lb)   01/20/25 76.7 kg (169 lb)         Review of Systems  As documented above.    Objective   Vitals:    02/24/25 0824 02/24/25 0918   BP: (!) 143/87 130/82   Pulse: 73    Temp: 97.3  °F (36.3 °C)    TempSrc: Temporal    SpO2: 96%    Weight: 73.6 kg (162 lb 5.9 oz)    Height: 5' (1.524 m)    PainSc: 7     BMI (Calculated): 31.71      Physical Exam  Vitals reviewed.   Constitutional:       General: She is not in acute distress.     Appearance: Normal appearance. She is not toxic-appearing.   HENT:      Head: Normocephalic and atraumatic.      Right Ear: There is impacted cerumen.      Left Ear: There is impacted cerumen.      Mouth/Throat:      Mouth: Mucous membranes are moist.      Pharynx: Oropharynx is clear.   Eyes:      Extraocular Movements: Extraocular movements intact.   Cardiovascular:      Rate and Rhythm: Normal rate and regular rhythm.      Heart sounds: Normal heart sounds. No murmur heard.  Pulmonary:      Effort: Pulmonary effort is normal. No respiratory distress.      Breath sounds: Normal breath sounds.   Abdominal:      Palpations: Abdomen is soft.      Tenderness: There is no abdominal tenderness. There is no guarding.   Musculoskeletal:      Right lower leg: No edema.      Left lower leg: No edema.   Neurological:      Mental Status: She is alert.      Cranial Nerves: No cranial nerve deficit.   Psychiatric:         Mood and Affect: Mood normal.             Laboratory Studies  Last A1c was 6.7.    Assessment & Plan   Hip pain: chronic. Discussed cardiac risks of nsaids.     Anemia: History post-CABG. Blood work today to monitor levels.  Anemia may be contributing to dyspnea on exertion. F/u with cardiology.    Diabetes Mellitus: Last A1c 6.7. Start Ozempic,  S/e discussed. Discussed contraindications/side effects- MTC/MEN, pancreatitis, gallstones, GI upset. Eat smaller portions, stop eating before full, low carb diet.  Discontinue metformin upon starting Ozempic.     Smoking Cessation: Smokes 3-5 cigarettes daily. Encouraged to use nicotine patches to quit.  4 minutes spent on smoking cessation counseling. Risks of smoking discuss.    Seasonal allergies: Prescription for  Flonase to manage congestion.    Ear wax removal: Irrigation of both ears today by MA-- not able to completely remove by MA due to patient dizziness with irrigation. Referral to ENT.     Receive RSV and tetanus vaccines today.      1. Type 2 diabetes mellitus with obesity  (CMD)  -     semaglutide,0.25 or 0.5 mg/DOSE, (Ozempic, 0.25 or 0.5 MG/DOSE,) (0.68 mg/mL) injection; Indications: Type 2 Diabetes 0.25 mg subcutaneous every 7 days for 4 weeks.  Then starting March 24, 2025 0.5 mg subcutaneous every 7 days.  -     Glycohemoglobin; Future  2. COVID-19 vaccination declined  3. Need for RSV immunization  -     RSV (AREXVY) 60Y+  4. Current smoker  5. Seasonal allergies  -     fluticasone (FLONASE) 50 MCG/ACT nasal spray; Spray 2 sprays in each nostril daily.  6. Need for diphtheria-tetanus-pertussis (Tdap) vaccine  -     TDAP (BOOSTRIX)  7. Bilateral impacted cerumen  -     SERVICE TO ENT  8. Routine health maintenance  -     Haptoglobin  -     GGT  -     Vitamin B12 And Folate  -     Ferritin  -     Iron And total Iron Binding Capacity  -     Comprehensive Metabolic Panel  -     CBC with Automated Differential  9. Elevated transaminase level  -     Comprehensive Metabolic Panel     Vital Signs Last 24 Hrs  T(C): 36.7 (03-02-22 @ 09:01), Max: 37.1 (03-01-22 @ 16:40)  T(F): 98.1 (03-02-22 @ 09:01), Max: 98.8 (03-01-22 @ 16:40)  HR: 66 (03-02-22 @ 09:01) (66 - 75)  BP: 158/92 (03-02-22 @ 09:01) (147/90 - 158/92)  BP(mean): --  RR: 18 (03-01-22 @ 20:22) (18 - 18)  SpO2: 97% (03-02-22 @ 09:01) (96% - 97%)     Vital Signs Last 24 Hrs  T(C): 36.7 (03-03-22 @ 08:50), Max: 37.2 (03-02-22 @ 16:24)  T(F): 98 (03-03-22 @ 08:50), Max: 98.9 (03-02-22 @ 16:24)  HR: 68 (03-03-22 @ 08:50) (63 - 75)  BP: 152/86 (03-03-22 @ 08:50) (152/86 - 158/88)  BP(mean): --  RR: 18 (03-03-22 @ 08:50) (18 - 18)  SpO2: 97% (03-03-22 @ 08:50) (95% - 97%)

## 2025-03-18 NOTE — ED ADULT NURSE NOTE - NS ED NURSE RECORD ANOTHER HT AND WT
A [employer] message was sent to the patient for PATIENT ROUNDING with [facility].    
Yes

## 2025-03-22 VITALS
SYSTOLIC BLOOD PRESSURE: 172 MMHG | OXYGEN SATURATION: 92 % | RESPIRATION RATE: 28 BRPM | TEMPERATURE: 103 F | HEIGHT: 72 IN | WEIGHT: 175.05 LBS | HEART RATE: 105 BPM | DIASTOLIC BLOOD PRESSURE: 102 MMHG

## 2025-03-22 LAB
ALBUMIN SERPL ELPH-MCNC: 2.9 G/DL — LOW (ref 3.4–5)
ALP SERPL-CCNC: 69 U/L — SIGNIFICANT CHANGE UP (ref 40–120)
ALT FLD-CCNC: 16 U/L — SIGNIFICANT CHANGE UP (ref 12–42)
ANION GAP SERPL CALC-SCNC: 9 MMOL/L — SIGNIFICANT CHANGE UP (ref 9–16)
APTT BLD: 30 SEC — SIGNIFICANT CHANGE UP (ref 24.5–35.6)
AST SERPL-CCNC: 25 U/L — SIGNIFICANT CHANGE UP (ref 15–37)
BASOPHILS # BLD AUTO: 0.01 K/UL — SIGNIFICANT CHANGE UP (ref 0–0.2)
BASOPHILS NFR BLD AUTO: 0.2 % — SIGNIFICANT CHANGE UP (ref 0–2)
BILIRUB SERPL-MCNC: 1.4 MG/DL — HIGH (ref 0.2–1.2)
BUN SERPL-MCNC: 25 MG/DL — HIGH (ref 7–23)
CALCIUM SERPL-MCNC: 8.5 MG/DL — SIGNIFICANT CHANGE UP (ref 8.5–10.5)
CHLORIDE SERPL-SCNC: 102 MMOL/L — SIGNIFICANT CHANGE UP (ref 96–108)
CO2 SERPL-SCNC: 28 MMOL/L — SIGNIFICANT CHANGE UP (ref 22–31)
CREAT SERPL-MCNC: 1.58 MG/DL — HIGH (ref 0.5–1.3)
EGFR: 45 ML/MIN/1.73M2 — LOW
EGFR: 45 ML/MIN/1.73M2 — LOW
EOSINOPHIL # BLD AUTO: 0.01 K/UL — SIGNIFICANT CHANGE UP (ref 0–0.5)
EOSINOPHIL NFR BLD AUTO: 0.2 % — SIGNIFICANT CHANGE UP (ref 0–6)
FLUAV AG NPH QL: SIGNIFICANT CHANGE UP
FLUBV AG NPH QL: SIGNIFICANT CHANGE UP
GLUCOSE SERPL-MCNC: 115 MG/DL — HIGH (ref 70–99)
HCT VFR BLD CALC: 37.4 % — LOW (ref 39–50)
HGB BLD-MCNC: 12.5 G/DL — LOW (ref 13–17)
IMM GRANULOCYTES # BLD AUTO: 0.03 K/UL — SIGNIFICANT CHANGE UP (ref 0–0.07)
IMM GRANULOCYTES NFR BLD AUTO: 0.7 % — SIGNIFICANT CHANGE UP (ref 0–0.9)
INR BLD: 1.05 — SIGNIFICANT CHANGE UP (ref 0.85–1.16)
LACTATE BLDV-MCNC: 1.3 MMOL/L — SIGNIFICANT CHANGE UP (ref 0.5–2)
LYMPHOCYTES # BLD AUTO: 0.49 K/UL — LOW (ref 1–3.3)
LYMPHOCYTES NFR BLD AUTO: 11 % — LOW (ref 13–44)
MCHC RBC-ENTMCNC: 31.4 PG — SIGNIFICANT CHANGE UP (ref 27–34)
MCHC RBC-ENTMCNC: 33.4 G/DL — SIGNIFICANT CHANGE UP (ref 32–36)
MCV RBC AUTO: 94 FL — SIGNIFICANT CHANGE UP (ref 80–100)
MONOCYTES # BLD AUTO: 0.27 K/UL — SIGNIFICANT CHANGE UP (ref 0–0.9)
MONOCYTES NFR BLD AUTO: 6 % — SIGNIFICANT CHANGE UP (ref 2–14)
NEUTROPHILS # BLD AUTO: 3.66 K/UL — SIGNIFICANT CHANGE UP (ref 1.8–7.4)
NEUTROPHILS NFR BLD AUTO: 81.9 % — HIGH (ref 43–77)
NRBC # BLD AUTO: 0 K/UL — SIGNIFICANT CHANGE UP (ref 0–0)
NRBC # FLD: 0 K/UL — SIGNIFICANT CHANGE UP (ref 0–0)
NRBC BLD AUTO-RTO: 0 /100 WBCS — SIGNIFICANT CHANGE UP (ref 0–0)
PLATELET # BLD AUTO: 120 K/UL — LOW (ref 150–400)
PMV BLD: 9.7 FL — SIGNIFICANT CHANGE UP (ref 7–13)
POTASSIUM SERPL-MCNC: 3.5 MMOL/L — SIGNIFICANT CHANGE UP (ref 3.5–5.3)
POTASSIUM SERPL-SCNC: 3.5 MMOL/L — SIGNIFICANT CHANGE UP (ref 3.5–5.3)
PROT SERPL-MCNC: 7.1 G/DL — SIGNIFICANT CHANGE UP (ref 6.4–8.2)
PROTHROM AB SERPL-ACNC: 12.3 SEC — SIGNIFICANT CHANGE UP (ref 9.9–13.4)
RBC # BLD: 3.98 M/UL — LOW (ref 4.2–5.8)
RBC # FLD: 12.4 % — SIGNIFICANT CHANGE UP (ref 10.3–14.5)
RSV RNA NPH QL NAA+NON-PROBE: SIGNIFICANT CHANGE UP
SARS-COV-2 RNA SPEC QL NAA+PROBE: SIGNIFICANT CHANGE UP
SODIUM SERPL-SCNC: 139 MMOL/L — SIGNIFICANT CHANGE UP (ref 132–145)
SOURCE RESPIRATORY: SIGNIFICANT CHANGE UP
WBC # BLD: 4.43 K/UL — SIGNIFICANT CHANGE UP (ref 3.8–10.5)
WBC # FLD AUTO: 4.43 K/UL — SIGNIFICANT CHANGE UP (ref 3.8–10.5)

## 2025-03-22 PROCEDURE — 71045 X-RAY EXAM CHEST 1 VIEW: CPT | Mod: 26

## 2025-03-22 PROCEDURE — 99285 EMERGENCY DEPT VISIT HI MDM: CPT

## 2025-03-22 RX ORDER — CEFEPIME 2 G/20ML
2000 INJECTION, POWDER, FOR SOLUTION INTRAVENOUS ONCE
Refills: 0 | Status: COMPLETED | OUTPATIENT
Start: 2025-03-22 | End: 2025-03-22

## 2025-03-22 RX ORDER — ACETAMINOPHEN 500 MG/5ML
650 LIQUID (ML) ORAL ONCE
Refills: 0 | Status: COMPLETED | OUTPATIENT
Start: 2025-03-22 | End: 2025-03-22

## 2025-03-22 RX ORDER — VANCOMYCIN HCL IN 5 % DEXTROSE 1.5G/250ML
1000 PLASTIC BAG, INJECTION (ML) INTRAVENOUS ONCE
Refills: 0 | Status: COMPLETED | OUTPATIENT
Start: 2025-03-22 | End: 2025-03-22

## 2025-03-22 RX ORDER — CEFEPIME 2 G/20ML
INJECTION, POWDER, FOR SOLUTION INTRAVENOUS
Refills: 0 | Status: DISCONTINUED | OUTPATIENT
Start: 2025-03-22 | End: 2025-03-22

## 2025-03-22 RX ADMIN — Medication 2500 MILLILITER(S): at 21:07

## 2025-03-22 RX ADMIN — CEFEPIME 100 MILLIGRAM(S): 2 INJECTION, POWDER, FOR SOLUTION INTRAVENOUS at 21:07

## 2025-03-22 RX ADMIN — Medication 650 MILLIGRAM(S): at 21:26

## 2025-03-22 RX ADMIN — Medication 250 MILLIGRAM(S): at 21:39

## 2025-03-22 NOTE — ED PROVIDER NOTE - OBJECTIVE STATEMENT
César Francis presents to the Emergency Room with shortness of breath. He reports symptoms started about a month ago and have worsened today, describing it as 'very annoying' and causing trouble breathing. Mr. Francis has a history of pneumonia with multiple hospitalizations last year. He was unaware of his current fever, which was measured at 103.4°F. The patient is anxious about an upcoming move, stating he has been waiting five years for a new place. He currently resides in an O on [address] [place] [address]. Mr. Francis denies smoking. He reports non-adherence to his medications, particularly his antiretroviral therapy for HIV.

## 2025-03-22 NOTE — ED PROVIDER NOTE - PHYSICAL EXAMINATION
July 9, 2024     Rowdy Gracia MD  [address] [facility] [address]  [facility] [address]  [place] [address]    Patient: Edilma Doss   YOB: 1948   Date of Visit: 7/9/2024     Dear Rowdy Gracia MD:       Thank you for referring Edilma Doss to me for evaluation. Below are the relevant portions of my assessment and plan of care.    If you have questions, please do not hesitate to call me. I look forward to following Edilma along with you.         Sincerely,        Vega Javier MD        CC: No Recipients    Vega Javier MD  07/09/24 1551  Sign when Signing Visit        [facility]    Subjective:     Encounter Date:07/09/24      Patient ID: Edilma Doss is a 75 y.o. female.    Chief Complaint:   Chief Complaint   Patient presents with   • Coronary Artery Disease   Establish care for CAD  History of Present Illness    Ms. Doss is a 74-year-old with a past medical history hypertension, hyperlipidemia, coronary disease status post PCI 2010's at [facility], borderline ischemic cardiomyopathy who presents for further evaluation.      She previously followed the cardiologist at [facility] in [place].      She reports intermittent palpitation episodes which is having randomly, sporadically for minutes at a time over the last several years.  Subsequent Holter monitor revealed some short runs of atrial tachycardia.  Improved with metoprolol for which she continues.    As part of her work-up for dyspnea, she underwent a stress test which in 2018 showed a small amount of apical ischemia reportedly, is being managed medically.  She has no overt angina.  She also reports that she had ABIs which revealed peripheral vascular disease, no significant claudication.  An echo in January 2019 showed LVEF of 45 to 50%.    Since her last visit, she has intentionally lost over 100 pounds.  She reports her functional capacity is good, she is able to escalate flights of stairs  without stopping, she is able to do yard work and she has no angina or any dyspnea symptoms.  She underwent a repeat echocardiogram but showed recovery of her LVEF.  She otherwise has no complaints today and feels well.    Echocardiogram January 2024:  •  Left ventricular systolic function is normal. Calculated left ventricular EF = 58.8%  •  Left ventricular diastolic function was normal.  •  Estimated right ventricular systolic pressure from tricuspid regurgitation is normal (<35 mmHg).    Holter monitor November 2023:  •  A relatively benign monitor study.  •  There were a total of 3 supraventricular runs with longest lasting 19.2 seconds with average heart rate of 110 bpm.  •  A total of 14 patient triggered and 13 diary events submitted.  Symptoms included skipped beats/lightheadedness.  Most did not have any arrhythmia correlation but few episodes correlate with premature ventricular complexes.    The following portions of the patient's history were reviewed and updated as appropriate: allergies, current medications, past family history, past medical history, past social history, past surgical history and problem list.    Past Medical History:   Diagnosis Date   • Allergic    • Anxiety    • Bursitis of hip hurts when walking and sleeping on side.   • COPD (chronic obstructive pulmonary disease)    • CTS (carpal tunnel syndrome) yes, 1986 ?   • Depression    • Frozen shoulder    • Hip arthrosis 1122/    pain when walking   • Knee sprain twisted it Sore   • Knee swelling    • Obesity    • Periarthritis of shoulder grinds   • Tear of meniscus of knee left knee, 5yrs. ago now right knee I think.       Past Surgical History:   Procedure Laterality Date   • CHOLECYSTECTOMY     • COLONOSCOPY     • CORONARY STENT PLACEMENT     • GALLBLADDER SURGERY     • HAND SURGERY  both thumb joints from repitation movements   • SHOULDER SURGERY     • TONSILLECTOMY     • TRIGGER POINT INJECTION  knleft knee   • TUBAL ABDOMINAL  "LIGATION             ECG 12 Lead    Date/Time: 7/9/2024 3:22 PM  Performed by: Vega Javier MD    Authorized by: Vega Javier MD  Comparison: compared with previous ECG from 9/6/2023  Similar to previous ECG  Rhythm: sinus rhythm  Rate: normal  Conduction: conduction normal  ST Segments: ST segments normal  T Waves: T waves normal  QRS axis: normal  Other: no other findings    Clinical impression: normal ECG             Objective:     Vitals:    07/09/24 1505   BP: 118/70   Pulse: 66   Weight: 81.6 kg (180 lb)   Height: 160 cm (63\")           Constitutional:       Appearance: Healthy appearance. Not in distress.   Neck:      Vascular: JVD normal.   Pulmonary:      Effort: Pulmonary effort is normal.      Breath sounds: Normal breath sounds.   Cardiovascular:      PMI at left midclavicular line. Normal rate. Regular rhythm. Normal S2.       Murmurs: There is no murmur.   Pulses:     Decreased pulses.      Radial: 2+ bilaterally.     Dorsalis pedis: 2+ bilaterally.     Posterior tibial: 1+ on the left side and 2+ on the right side.  Edema:     Peripheral edema absent.   Skin:     General: Skin is warm and dry.   Neurological:      General: No focal deficit present.      Mental Status: Alert, oriented to person, place, and time and oriented to person, place and time.   Psychiatric:         Mood and Affect: Mood and affect normal.         Lab Review:     Lipid Panel          9/6/2023    13:47 12/29/2023    11:50 7/1/2024    00:00   Lipid Panel   Total Cholesterol 139      Total Cholesterol  117  124    Triglycerides 123  115  130    HDL Cholesterol 46  52  57    VLDL Cholesterol 22  21  23    LDL Cholesterol  71  44  44    LDL/HDL Ratio 1.49        BUN   Date Value Ref Range Status   07/01/2024 24 8 - 27 mg/dL Final     Creatinine   Date Value Ref Range Status   07/01/2024 0.90 0.57 - 1.00 mg/dL Final     Potassium   Date Value Ref Range Status   07/01/2024 4.7 3.5 - 5.2 mmol/L Final     ALT (SGPT)   Date Value Ref " Range Status   07/01/2024 25 0 - 32 IU/L Final     AST (SGOT)   Date Value Ref Range Status   07/01/2024 23 0 - 40 IU/L Final           Assessment:          Diagnosis Plan   1. CAD in native artery  ECG 12 Lead      2. Dyslipidemia, goal LDL below 70        3. Preop cardiovascular exam        4. Mixed hyperlipidemia                   Plan:         Coronary disease status post PCI, remote, [facility]: Free of angina  Mild ischemic cardiomyopathy EF 45 to 50%, now with recovered LVEF  Hyperlipidemia  Repeat echo shows recovery  Continue metoprolol succinate 50 daily   She continued to have significant bruising and bleeding on Plavix monotherapy so she is on aspirin monotherapy and tolerating it well.  Cardiovascular assessment for upcoming noncardiac surgery.  She is being considered for TKA.  From the cardiac standpoint, she appears well compensated.  Her ECG is normal, she has no functional complaints, no chest pain or no angina.  She has good functional capacity is able to escalate flights of stairs without stopping, but she does have some issues due to her knee but she never gets held back by any dyspnea or chest pain.  She is low risk for perioperative M ACE for a low risk knee operation, and her risk is nonmodifiable.  She does not meet guideline recommendations for any additional cardiac testing and she can perform 4 METS of exertion without any functional imitations.  Would consider continuing aspirin 81 preoperatively.  Palpitations: Fleeting, short episodes that are likely symptomatic PVCs.  2-week patch Holter did not have any significant arrhythmias.    Obesity.  She continues with weight loss with Rybelsus.  Will keep a watch on her cholesterol but she may no longer need the Zetia if she continues to lose weight.  For now would favor keeping it.      RTC 6 months.    Vega Javier MD  [facility]  07/09/24  13:04 EDT       Current Outpatient Medications:   •  aspirin 81 MG EC  tablet, Take 1 tablet by mouth Daily., Disp: 90 tablet, Rfl: 3  •  atorvastatin (LIPITOR) 40 MG tablet, Take 1 tablet by mouth Every Night., Disp: 90 tablet, Rfl: 3  •  cholecalciferol (VITAMIN D3) 10 MCG (400 UNIT) tablet, Take 1 tablet by mouth., Disp: , Rfl:   •  CINNAMON PO, Take  by mouth., Disp: , Rfl:   •  clotrimazole-betamethasone (LOTRISONE) 1-0.05 % cream, 1 cream two times daily, Disp: , Rfl:   •  escitalopram (LEXAPRO) 10 MG tablet, Take 1 tablet by mouth Daily., Disp: 90 tablet, Rfl: 2  •  ezetimibe (ZETIA) 10 MG tablet, Take 1 tablet by mouth Daily., Disp: 90 tablet, Rfl: 3  •  Flaxseed, Linseed, (Flax Seed Oil) 1000 MG capsule, Take 1,000 mg by mouth., Disp: , Rfl:   •  Fluticasone-Umeclidin-Vilant (Trelegy Ellipta) 100-62.5-25 MCG/ACT inhaler, Inhale 1 puff., Disp: , Rfl:   •  Magnesium 250 MG tablet, Take  by mouth., Disp: , Rfl:   •  metoprolol succinate XL (TOPROL-XL) 50 MG 24 hr tablet, Take 1 tablet by mouth Daily., Disp: 90 tablet, Rfl: 3  •  nitroglycerin (NITROSTAT) 0.4 MG SL tablet, Place 1 tablet under the tongue Every 5 (Five) Minutes As Needed for Chest Pain. Take no more than 3 doses in 15 minutes., Disp: 30 tablet, Rfl: 11  •  Semaglutide (Rybelsus) 14 MG tablet, Take 1 tablet by mouth Daily., Disp: 90 tablet, Rfl: 0  •  vitamin C (ASCORBIC ACID) 250 MG tablet, Take 2 tablets by mouth Daily., Disp: , Rfl:          Return in about 6 months (around 1/9/2025).      Part of this note may be an electronic transcription/translation of spoken language to printed text using the Dragon Dictation System.   VITAL SIGNS: I have reviewed nursing notes and confirm.  CONSTITUTIONAL: Well-developed; well-nourished; smells of urine; unkempt  HEAD: Normocephalic; atraumatic.  EYES: EOM intact; conjunctiva and sclera clear.  ENT: nose appears normal  NECK: Supple  CARD: S1, S2 normal; no murmurs, gallops, or rubs. Tachycardic  RESP: Ronchi in the right lower lung fields  ABD: soft; non-distended; non-tender  EXT: No deformities  PSYCH: Cooperative, appropriate.

## 2025-03-22 NOTE — ED ADULT TRIAGE NOTE - CHIEF COMPLAINT QUOTE
Pt brought in by EMS with complaint of "my heart racing, weakness," and shortness of breath today. Pt arrived febrile, tachypneic and hypoxic to RA at 92%. PT reports history of HTN, DM, HIV (intermittent compliance to medication regime), and afib. Reports cough.

## 2025-03-22 NOTE — ED ADULT NURSE NOTE - OBJECTIVE STATEMENT
76 y/o male bibems for eval of shortness of breath. patient states "I think im getting pneumonia" endorses prior admission last year for pneumonia. patient is alert verbal oriented x3 able to make needs known.

## 2025-03-22 NOTE — ED PROVIDER NOTE - CLINICAL SUMMARY MEDICAL DECISION MAKING FREE TEXT BOX
Mr. Francis presents with shortness of breath, fever, and a history of recurrent pneumonia, raising concern for a new pulmonary infection. Given his immunocompromised status due to HIV and medication non-adherence, he is at high risk for opportunistic infections. The patient meets sepsis criteria based on his fever of 103.4°F and clinical presentation. Differential diagnoses include pneumonia (bacterial, viral, or opportunistic), sepsis, and exacerbation of underlying HIV-related lung disease. Plan: 1) Initiate sepsis protocol with 30 cc/kg IV fluids. 2) Start broad-spectrum antibiotics including vancomycin and cefepime. 3) Obtain blood cultures, urine cultures, and chest X-ray. 4) Consider additional testing for opportunistic infections given HIV status. 5) Admit to hospital for further management and close monitoring. 6) Consult Infectious Disease team for guidance on HIV management and potential opportunistic infections.  7) Avoid penicillin-based antibiotics due to reported allergy. 8) Reassess respiratory status frequently and consider need for supplemental oxygen or further respiratory support.

## 2025-03-23 ENCOUNTER — INPATIENT (INPATIENT)
Facility: HOSPITAL | Age: 75
LOS: 4 days | Discharge: HOME | End: 2025-03-28
Attending: STUDENT IN AN ORGANIZED HEALTH CARE EDUCATION/TRAINING PROGRAM | Admitting: STUDENT IN AN ORGANIZED HEALTH CARE EDUCATION/TRAINING PROGRAM
Payer: MEDICARE

## 2025-03-23 DIAGNOSIS — I25.10 ATHEROSCLEROTIC HEART DISEASE OF NATIVE CORONARY ARTERY WITHOUT ANGINA PECTORIS: ICD-10-CM

## 2025-03-23 DIAGNOSIS — I50.22 CHRONIC SYSTOLIC (CONGESTIVE) HEART FAILURE: ICD-10-CM

## 2025-03-23 DIAGNOSIS — D69.6 THROMBOCYTOPENIA, UNSPECIFIED: ICD-10-CM

## 2025-03-23 DIAGNOSIS — Z21 ASYMPTOMATIC HUMAN IMMUNODEFICIENCY VIRUS [HIV] INFECTION STATUS: ICD-10-CM

## 2025-03-23 DIAGNOSIS — A41.9 SEPSIS, UNSPECIFIED ORGANISM: ICD-10-CM

## 2025-03-23 DIAGNOSIS — N18.9 CHRONIC KIDNEY DISEASE, UNSPECIFIED: ICD-10-CM

## 2025-03-23 DIAGNOSIS — J44.9 CHRONIC OBSTRUCTIVE PULMONARY DISEASE, UNSPECIFIED: ICD-10-CM

## 2025-03-23 DIAGNOSIS — Z29.9 ENCOUNTER FOR PROPHYLACTIC MEASURES, UNSPECIFIED: ICD-10-CM

## 2025-03-23 DIAGNOSIS — I10 ESSENTIAL (PRIMARY) HYPERTENSION: ICD-10-CM

## 2025-03-23 DIAGNOSIS — E11.9 TYPE 2 DIABETES MELLITUS WITHOUT COMPLICATIONS: ICD-10-CM

## 2025-03-23 DIAGNOSIS — Z95.9 PRESENCE OF CARDIAC AND VASCULAR IMPLANT AND GRAFT, UNSPECIFIED: Chronic | ICD-10-CM

## 2025-03-23 DIAGNOSIS — F41.9 ANXIETY DISORDER, UNSPECIFIED: ICD-10-CM

## 2025-03-23 DIAGNOSIS — J18.9 PNEUMONIA, UNSPECIFIED ORGANISM: ICD-10-CM

## 2025-03-23 LAB
ALBUMIN SERPL ELPH-MCNC: 2.8 G/DL — LOW (ref 3.3–5)
ALP SERPL-CCNC: 61 U/L — SIGNIFICANT CHANGE UP (ref 40–120)
ALT FLD-CCNC: 10 U/L — SIGNIFICANT CHANGE UP (ref 10–45)
ANION GAP SERPL CALC-SCNC: 14 MMOL/L — SIGNIFICANT CHANGE UP (ref 5–17)
APPEARANCE UR: CLEAR — SIGNIFICANT CHANGE UP
AST SERPL-CCNC: 17 U/L — SIGNIFICANT CHANGE UP (ref 10–40)
BASOPHILS # BLD AUTO: 0.01 K/UL — SIGNIFICANT CHANGE UP (ref 0–0.2)
BASOPHILS NFR BLD AUTO: 0.3 % — SIGNIFICANT CHANGE UP (ref 0–2)
BILIRUB DIRECT SERPL-MCNC: 0.3 MG/DL — SIGNIFICANT CHANGE UP (ref 0–0.3)
BILIRUB INDIRECT FLD-MCNC: 0.7 MG/DL — SIGNIFICANT CHANGE UP (ref 0.2–1)
BILIRUB SERPL-MCNC: 1 MG/DL — SIGNIFICANT CHANGE UP (ref 0.2–1.2)
BILIRUB SERPL-MCNC: 1 MG/DL — SIGNIFICANT CHANGE UP (ref 0.2–1.2)
BILIRUB UR-MCNC: NEGATIVE — SIGNIFICANT CHANGE UP
BUN SERPL-MCNC: 19 MG/DL — SIGNIFICANT CHANGE UP (ref 7–23)
CALCIUM SERPL-MCNC: 7.8 MG/DL — LOW (ref 8.4–10.5)
CHLORIDE SERPL-SCNC: 101 MMOL/L — SIGNIFICANT CHANGE UP (ref 96–108)
CO2 SERPL-SCNC: 20 MMOL/L — LOW (ref 22–31)
COLOR SPEC: YELLOW — SIGNIFICANT CHANGE UP
CREAT SERPL-MCNC: 1.28 MG/DL — SIGNIFICANT CHANGE UP (ref 0.5–1.3)
DIFF PNL FLD: ABNORMAL
EGFR: 58 ML/MIN/1.73M2 — LOW
EGFR: 58 ML/MIN/1.73M2 — LOW
EOSINOPHIL # BLD AUTO: 0.03 K/UL — SIGNIFICANT CHANGE UP (ref 0–0.5)
EOSINOPHIL NFR BLD AUTO: 0.8 % — SIGNIFICANT CHANGE UP (ref 0–6)
GLUCOSE SERPL-MCNC: 158 MG/DL — HIGH (ref 70–99)
GLUCOSE UR QL: NEGATIVE MG/DL — SIGNIFICANT CHANGE UP
HCT VFR BLD CALC: 35.5 % — LOW (ref 39–50)
HGB BLD-MCNC: 11.7 G/DL — LOW (ref 13–17)
IMM GRANULOCYTES NFR BLD AUTO: 0.8 % — SIGNIFICANT CHANGE UP (ref 0–0.9)
KETONES UR-MCNC: ABNORMAL MG/DL
LEGIONELLA AG UR QL: NEGATIVE — SIGNIFICANT CHANGE UP
LEUKOCYTE ESTERASE UR-ACNC: NEGATIVE — SIGNIFICANT CHANGE UP
LYMPHOCYTES # BLD AUTO: 0.57 K/UL — LOW (ref 1–3.3)
LYMPHOCYTES # BLD AUTO: 14.4 % — SIGNIFICANT CHANGE UP (ref 13–44)
MAGNESIUM SERPL-MCNC: 1.4 MG/DL — LOW (ref 1.6–2.6)
MCHC RBC-ENTMCNC: 32.1 PG — SIGNIFICANT CHANGE UP (ref 27–34)
MCHC RBC-ENTMCNC: 33 G/DL — SIGNIFICANT CHANGE UP (ref 32–36)
MCV RBC AUTO: 97.5 FL — SIGNIFICANT CHANGE UP (ref 80–100)
MONOCYTES # BLD AUTO: 0.31 K/UL — SIGNIFICANT CHANGE UP (ref 0–0.9)
MONOCYTES NFR BLD AUTO: 7.8 % — SIGNIFICANT CHANGE UP (ref 2–14)
NEUTROPHILS # BLD AUTO: 3.01 K/UL — SIGNIFICANT CHANGE UP (ref 1.8–7.4)
NEUTROPHILS NFR BLD AUTO: 75.9 % — SIGNIFICANT CHANGE UP (ref 43–77)
NITRITE UR-MCNC: NEGATIVE — SIGNIFICANT CHANGE UP
NRBC BLD AUTO-RTO: 0 /100 WBCS — SIGNIFICANT CHANGE UP (ref 0–0)
PH UR: 6 — SIGNIFICANT CHANGE UP (ref 5–8)
PHOSPHATE SERPL-MCNC: 2.6 MG/DL — SIGNIFICANT CHANGE UP (ref 2.5–4.5)
PLATELET # BLD AUTO: 109 K/UL — LOW (ref 150–400)
POTASSIUM SERPL-MCNC: 3.8 MMOL/L — SIGNIFICANT CHANGE UP (ref 3.5–5.3)
POTASSIUM SERPL-SCNC: 3.8 MMOL/L — SIGNIFICANT CHANGE UP (ref 3.5–5.3)
PROT SERPL-MCNC: 6.3 G/DL — SIGNIFICANT CHANGE UP (ref 6–8.3)
PROT UR-MCNC: 100 MG/DL
RAPID RVP RESULT: SIGNIFICANT CHANGE UP
RBC # BLD: 3.64 M/UL — LOW (ref 4.2–5.8)
RBC # FLD: 12.3 % — SIGNIFICANT CHANGE UP (ref 10.3–14.5)
S PNEUM AG UR QL: NEGATIVE — SIGNIFICANT CHANGE UP
SARS-COV-2 RNA SPEC QL NAA+PROBE: SIGNIFICANT CHANGE UP
SODIUM SERPL-SCNC: 135 MMOL/L — SIGNIFICANT CHANGE UP (ref 135–145)
SP GR SPEC: 1.01 — SIGNIFICANT CHANGE UP (ref 1–1.03)
UROBILINOGEN FLD QL: 1 MG/DL — SIGNIFICANT CHANGE UP (ref 0.2–1)
WBC # BLD: 3.96 K/UL — SIGNIFICANT CHANGE UP (ref 3.8–10.5)
WBC # FLD AUTO: 3.96 K/UL — SIGNIFICANT CHANGE UP (ref 3.8–10.5)

## 2025-03-23 PROCEDURE — 99222 1ST HOSP IP/OBS MODERATE 55: CPT

## 2025-03-23 RX ORDER — DOXYCYCLINE HYCLATE 100 MG
100 TABLET ORAL EVERY 12 HOURS
Refills: 0 | Status: DISCONTINUED | OUTPATIENT
Start: 2025-03-23 | End: 2025-03-23

## 2025-03-23 RX ORDER — ATORVASTATIN CALCIUM 80 MG/1
80 TABLET, FILM COATED ORAL AT BEDTIME
Refills: 0 | Status: DISCONTINUED | OUTPATIENT
Start: 2025-03-23 | End: 2025-03-28

## 2025-03-23 RX ORDER — AZITHROMYCIN 250 MG
500 CAPSULE ORAL EVERY 24 HOURS
Refills: 0 | Status: DISCONTINUED | OUTPATIENT
Start: 2025-03-24 | End: 2025-03-24

## 2025-03-23 RX ORDER — ESCITALOPRAM OXALATE 20 MG/1
1 TABLET ORAL
Refills: 0 | DISCHARGE

## 2025-03-23 RX ORDER — DEXTROSE 50 % IN WATER 50 %
12.5 SYRINGE (ML) INTRAVENOUS ONCE
Refills: 0 | Status: DISCONTINUED | OUTPATIENT
Start: 2025-03-23 | End: 2025-03-28

## 2025-03-23 RX ORDER — DEXTROSE 50 % IN WATER 50 %
25 SYRINGE (ML) INTRAVENOUS ONCE
Refills: 0 | Status: DISCONTINUED | OUTPATIENT
Start: 2025-03-23 | End: 2025-03-28

## 2025-03-23 RX ORDER — INSULIN LISPRO 100 U/ML
INJECTION, SOLUTION INTRAVENOUS; SUBCUTANEOUS
Refills: 0 | Status: DISCONTINUED | OUTPATIENT
Start: 2025-03-23 | End: 2025-03-28

## 2025-03-23 RX ORDER — GLUCAGON 3 MG/1
1 POWDER NASAL ONCE
Refills: 0 | Status: DISCONTINUED | OUTPATIENT
Start: 2025-03-23 | End: 2025-03-28

## 2025-03-23 RX ORDER — MAGNESIUM SULFATE 500 MG/ML
4 SYRINGE (ML) INJECTION ONCE
Refills: 0 | Status: COMPLETED | OUTPATIENT
Start: 2025-03-23 | End: 2025-03-23

## 2025-03-23 RX ORDER — MELATONIN 5 MG
3 TABLET ORAL AT BEDTIME
Refills: 0 | Status: DISCONTINUED | OUTPATIENT
Start: 2025-03-23 | End: 2025-03-26

## 2025-03-23 RX ORDER — CEFTRIAXONE 500 MG/1
2000 INJECTION, POWDER, FOR SOLUTION INTRAMUSCULAR; INTRAVENOUS EVERY 24 HOURS
Refills: 0 | Status: DISCONTINUED | OUTPATIENT
Start: 2025-03-23 | End: 2025-03-24

## 2025-03-23 RX ORDER — ARIPIPRAZOLE 2 MG/1
1 TABLET ORAL
Refills: 0 | DISCHARGE

## 2025-03-23 RX ORDER — DEXTROSE 50 % IN WATER 50 %
15 SYRINGE (ML) INTRAVENOUS ONCE
Refills: 0 | Status: DISCONTINUED | OUTPATIENT
Start: 2025-03-23 | End: 2025-03-28

## 2025-03-23 RX ORDER — TAMSULOSIN HYDROCHLORIDE 0.4 MG/1
0.4 CAPSULE ORAL AT BEDTIME
Refills: 0 | Status: DISCONTINUED | OUTPATIENT
Start: 2025-03-23 | End: 2025-03-28

## 2025-03-23 RX ORDER — AZITHROMYCIN 250 MG
CAPSULE ORAL
Refills: 0 | Status: DISCONTINUED | OUTPATIENT
Start: 2025-03-23 | End: 2025-03-23

## 2025-03-23 RX ORDER — ONDANSETRON HCL/PF 4 MG/2 ML
4 VIAL (ML) INJECTION EVERY 8 HOURS
Refills: 0 | Status: DISCONTINUED | OUTPATIENT
Start: 2025-03-23 | End: 2025-03-28

## 2025-03-23 RX ORDER — AZITHROMYCIN 250 MG
500 CAPSULE ORAL ONCE
Refills: 0 | Status: COMPLETED | OUTPATIENT
Start: 2025-03-23 | End: 2025-03-23

## 2025-03-23 RX ORDER — MAGNESIUM, ALUMINUM HYDROXIDE 200-200 MG
30 TABLET,CHEWABLE ORAL EVERY 4 HOURS
Refills: 0 | Status: DISCONTINUED | OUTPATIENT
Start: 2025-03-23 | End: 2025-03-28

## 2025-03-23 RX ORDER — VANCOMYCIN HCL IN 5 % DEXTROSE 1.5G/250ML
1000 PLASTIC BAG, INJECTION (ML) INTRAVENOUS EVERY 12 HOURS
Refills: 0 | Status: DISCONTINUED | OUTPATIENT
Start: 2025-03-23 | End: 2025-03-24

## 2025-03-23 RX ORDER — ESCITALOPRAM OXALATE 20 MG/1
20 TABLET ORAL EVERY 24 HOURS
Refills: 0 | Status: DISCONTINUED | OUTPATIENT
Start: 2025-03-23 | End: 2025-03-28

## 2025-03-23 RX ORDER — INSULIN LISPRO 100 U/ML
INJECTION, SOLUTION INTRAVENOUS; SUBCUTANEOUS AT BEDTIME
Refills: 0 | Status: DISCONTINUED | OUTPATIENT
Start: 2025-03-23 | End: 2025-03-28

## 2025-03-23 RX ORDER — SODIUM CHLORIDE 9 G/1000ML
1000 INJECTION, SOLUTION INTRAVENOUS
Refills: 0 | Status: DISCONTINUED | OUTPATIENT
Start: 2025-03-23 | End: 2025-03-28

## 2025-03-23 RX ORDER — ASPIRIN 325 MG
81 TABLET ORAL EVERY 24 HOURS
Refills: 0 | Status: DISCONTINUED | OUTPATIENT
Start: 2025-03-23 | End: 2025-03-28

## 2025-03-23 RX ORDER — DOLUTEGRAVIR SODIUM 5 MG/1
50 TABLET, FOR SUSPENSION ORAL DAILY
Refills: 0 | Status: DISCONTINUED | OUTPATIENT
Start: 2025-03-23 | End: 2025-03-24

## 2025-03-23 RX ORDER — VANCOMYCIN HCL IN 5 % DEXTROSE 1.5G/250ML
1000 PLASTIC BAG, INJECTION (ML) INTRAVENOUS EVERY 12 HOURS
Refills: 0 | Status: DISCONTINUED | OUTPATIENT
Start: 2025-03-23 | End: 2025-03-23

## 2025-03-23 RX ORDER — CARVEDILOL 3.12 MG/1
1 TABLET, FILM COATED ORAL
Refills: 0 | DISCHARGE

## 2025-03-23 RX ORDER — ATOVAQUONE 750 MG/5ML
1500 SUSPENSION ORAL DAILY
Refills: 0 | Status: DISCONTINUED | OUTPATIENT
Start: 2025-03-23 | End: 2025-03-28

## 2025-03-23 RX ORDER — ARIPIPRAZOLE 2 MG/1
5 TABLET ORAL EVERY 24 HOURS
Refills: 0 | Status: DISCONTINUED | OUTPATIENT
Start: 2025-03-23 | End: 2025-03-28

## 2025-03-23 RX ORDER — ACETAMINOPHEN 500 MG/5ML
650 LIQUID (ML) ORAL EVERY 6 HOURS
Refills: 0 | Status: DISCONTINUED | OUTPATIENT
Start: 2025-03-23 | End: 2025-03-27

## 2025-03-23 RX ADMIN — ATOVAQUONE 1500 MILLIGRAM(S): 750 SUSPENSION ORAL at 18:01

## 2025-03-23 RX ADMIN — CEFEPIME 2000 MILLIGRAM(S): 2 INJECTION, POWDER, FOR SOLUTION INTRAVENOUS at 04:43

## 2025-03-23 RX ADMIN — ARIPIPRAZOLE 5 MILLIGRAM(S): 2 TABLET ORAL at 09:32

## 2025-03-23 RX ADMIN — Medication 20 MILLIEQUIVALENT(S): at 11:54

## 2025-03-23 RX ADMIN — Medication 3 MILLIGRAM(S): at 21:46

## 2025-03-23 RX ADMIN — Medication 250 MILLIGRAM(S): at 17:54

## 2025-03-23 RX ADMIN — Medication 500 MILLIGRAM(S): at 08:12

## 2025-03-23 RX ADMIN — ATORVASTATIN CALCIUM 80 MILLIGRAM(S): 80 TABLET, FILM COATED ORAL at 21:43

## 2025-03-23 RX ADMIN — TAMSULOSIN HYDROCHLORIDE 0.4 MILLIGRAM(S): 0.4 CAPSULE ORAL at 23:32

## 2025-03-23 RX ADMIN — Medication 1000 MILLIGRAM(S): at 04:43

## 2025-03-23 RX ADMIN — Medication 81 MILLIGRAM(S): at 09:28

## 2025-03-23 RX ADMIN — CEFTRIAXONE 100 MILLIGRAM(S): 500 INJECTION, POWDER, FOR SOLUTION INTRAMUSCULAR; INTRAVENOUS at 09:28

## 2025-03-23 RX ADMIN — Medication 1 DOSE(S): at 17:54

## 2025-03-23 RX ADMIN — Medication 2500 MILLILITER(S): at 04:43

## 2025-03-23 RX ADMIN — Medication 1 TABLET(S): at 11:11

## 2025-03-23 RX ADMIN — Medication 25 GRAM(S): at 11:54

## 2025-03-23 RX ADMIN — DOLUTEGRAVIR SODIUM 50 MILLIGRAM(S): 5 TABLET, FOR SUSPENSION ORAL at 11:11

## 2025-03-23 RX ADMIN — ESCITALOPRAM OXALATE 20 MILLIGRAM(S): 20 TABLET ORAL at 15:57

## 2025-03-23 RX ADMIN — Medication 40 MILLIEQUIVALENT(S): at 08:12

## 2025-03-23 NOTE — H&P ADULT - PROBLEM SELECTOR PLAN 9
Creatine 1.58 on admission, prior Cr 1.59 (1/2025). Baseline Cr 1.4-1.6  - continue to trend  - avoid nephrotoxic meds

## 2025-03-23 NOTE — H&P ADULT - NSHPPHYSICALEXAM_GEN_ALL_CORE
VITAL SIGNS:  T(C): 36.8 (03-23-25 @ 06:00), Max: 39.7 (03-22-25 @ 20:48)  T(F): 98.3 (03-23-25 @ 06:00), Max: 103.4 (03-22-25 @ 20:48)  HR: 73 (03-23-25 @ 06:00) (73 - 105)  BP: 148/84 (03-23-25 @ 06:00) (136/73 - 172/102)  BP(mean): --  RR: 19 (03-23-25 @ 06:00) (19 - 28)  SpO2: 98% (03-23-25 @ 06:00) (92% - 98%)  Wt(kg): --    PHYSICAL EXAM:  Constitutional: NAD;  Head: NC/AT  Eyes: PERRL, EOMI, anicteric sclera  ENT: no nasal discharge;  Respiratory: CTA B/L; no W/R/R, no retractions  Cardiac: +S1/S2; RRR; no M/R/G;   Gastrointestinal: abdomen soft, NT/ND;   Extremities: WWP, no clubbing or cyanosis;   Musculoskeletal: NROM x4;  Neurologic: AAOx3;

## 2025-03-23 NOTE — H&P ADULT - PROBLEM SELECTOR PLAN 4
EF 40-45%   -Continue carvediol 3.125 mg BID EF 40-45%. Home med: Carvedilol 2.135mg BID   - hold iso sepsis on admission, restart as tolerated EF 40-45%. Home med: Carvedilol 3.125mg BID   - hold iso sepsis on admission, restart as tolerated

## 2025-03-23 NOTE — H&P ADULT - PROBLEM SELECTOR PLAN 12
F: none  E: replete as necessary  N: Carb consistent  DVT: heparin SubQ  Dispo: [facility]

## 2025-03-23 NOTE — H&P ADULT - NSHPLABSRESULTS_GEN_ALL_CORE
LABS:                         12.5   4.43  )-----------( 120      ( 22 Mar 2025 21:03 )             37.4         139  |  102  |  25[H]  ----------------------------<  115[H]  3.5   |  28  |  1.58[H]    Ca    8.5      22 Mar 2025 21:03    TPro  7.1  /  Alb  2.9[L]  /  TBili  1.4[H]  /  DBili  x   /  AST  25  /  ALT  16  /  AlkPhos  69  -22    PT/INR - ( 22 Mar 2025 21:03 )   PT: 12.3 sec;   INR: 1.05          PTT - ( 22 Mar 2025 21:03 )  PTT:30.0 sec  Urinalysis Basic - ( 22 Mar 2025 21:03 )    Color: Yellow / Appearance: Clear / S.012 / pH: x  Gluc: 115 mg/dL / Ketone: Trace mg/dL  / Bili: Negative / Urobili: 1.0 mg/dL   Blood: x / Protein: 100 mg/dL / Nitrite: Negative   Leuk Esterase: Negative / RBC: Too Numerous to count /HPF / WBC 6 /HPF   Sq Epi: x / Non Sq Epi: x / Bacteria: Negative /HPF            RADIOLOGY, EKG & ADDITIONAL TESTS: Reviewed.

## 2025-03-23 NOTE — PATIENT PROFILE ADULT - FALL HARM RISK - HARM RISK INTERVENTIONS

## 2025-03-23 NOTE — H&P ADULT - NSHPSOCIALHISTORY_GEN_ALL_CORE
Lives in [facility], reports he is moving this week to a new housing project on [address]

## 2025-03-23 NOTE — H&P ADULT - HISTORY OF PRESENT ILLNESS
75 year old male with history of HIV (CD4 160, VL 26 7/2024), CAD (prior MI), T2DM, HFrEF (EF 40-45% 11/2024), recurrent syncope, COPD, depression/anxiety,  prostate cancer s/p radiation (2013), presenting for shortness of breath. His symptoms started around a month ago and worsened yesterday causing him to present to the ED. He lives in an O on [address]     In the ED  Initial vital signs: T 103.4 degrees F, , /102, Sp02 92% on RA  Labs significant for: Hgb 12.5, Platelet 120, Creatinine 1.58, Glucose 115, Bilirubin 1.4. UA with large blood, 6 WBCs  CXR: RLL opacity  Interventions: 2.5L, Cefepime 2gx1, Tylenol 650mg x1, Vanc 1gx1  75 year old male with history of HIV (CD4 160, VL 26 7/2024), CAD (prior MI), T2DM, HFrEF (EF 40-45% 11/2024), recurrent syncope, COPD, depression/anxiety,  prostate cancer s/p radiation (2013), presenting for cough and shortness of breath. His symptoms started around a month ago and worsened yesterday causing him to present to the ED. He reports having a dry cough for the past month. Denies mucus production, nausea, vomiting, diarrhea, and chills.  He reports having prior hospitalizations for pneumonia, most recently last year. He lives in an [place] on [address] and is moving this week.    In the ED  Initial vital signs: T 103.4 degrees F, , /102, Sp02 92% on RA  Labs significant for: Hgb 12.5, Platelet 120, Creatinine 1.58, Glucose 115, Bilirubin 1.4. UA with large blood, 6 WBCs  CXR: RLL opacity  Interventions: 2.5L, Cefepime 2gx1, Tylenol 650mg x1, Vanc 1gx1  75 year old male with history of HIV (CD4 160, VL 26 7/2024), CAD (prior MI), T2DM, HFrEF (EF 40-45% 11/2024), recurrent syncope, COPD, depression/anxiety,  prostate cancer s/p radiation (2013), presenting for cough and shortness of breath. His symptoms started around a month ago and worsened yesterday causing him to present to the ED. He reports having a dry cough for the past month. Denies mucus production, nausea, vomiting, diarrhea, and chills.  He reports having prior hospitalizations for pneumonia, most recently last year. He lives in an [place] on [address] and is moving this week. He doesn't know his home medications and reports not taking all of them daily.     In the ED  Initial vital signs: T 103.4 degrees F, , /102, Sp02 92% on RA  Labs significant for: Hgb 12.5, Platelet 120, Creatinine 1.58, Glucose 115, Bilirubin 1.4. UA with large blood, 6 WBCs  CXR: RLL opacity  Interventions: 2.5L, Cefepime 2gx1, Tylenol 650mg x1, Vanc 1gx1

## 2025-03-23 NOTE — H&P ADULT - PROBLEM SELECTOR PLAN 2
CXR with RLL opacity CXR with RLL opacity. S/p Vanc and Cefepime. Reports prior hx of hospitalizations due to pneumonia, most recently last year.   MRSA swab+.  - start CTX 2g qd (tolerated at [facility] per chart review), Vanc 1g Q12, and Azithromycin   - f/u RVP  - f/u urine strep and legionella. CXR with RLL opacity. S/p Vanc and Cefepime. Reports prior hx of hospitalizations due to pneumonia, most recently last year.   MRSA swab+.  - start CTX 2g qd (tolerated at [facility] per chart review) and Doxycycline 100mg BID (for MRSA and atypical coverage)   - f/u RVP  - f/u urine strep and legionella.

## 2025-03-23 NOTE — H&P ADULT - PROBLEM SELECTOR PLAN 3
CD4 160, VL 26 7/2024. On dolutegravir (Tivicay) and emtricitabine-tenofovir (Descovy).  - start Atovoquone for PCP ppx (has sulfa allergy)   - c/w Tivicay and Descovy  - f/u CD4 and VL CD4 160, VL 26 7/2024. On dolutegravir (Tivicay) and emtricitabine-tenofovir (Descovy) and Atovaquone   - c/w Atovaquone for PCP ppx (has sulfa allergy)   - c/w Tivicay and Descovy  - f/u CD4 and VL

## 2025-03-23 NOTE — H&P ADULT - ASSESSMENT
75 year old male with history of HIV (CD4 160, VL 26 7/2024), CAD (prior MI), T2DM, HFrEF (EF 40-45% 11/2024), recurrent syncope, COPD, depression/anxiety,  prostate cancer s/p radiation (2013), presenting for shortness of breath, found to have sepsis 2/2 pneumonia.

## 2025-03-23 NOTE — H&P ADULT - PROBLEM SELECTOR PLAN 5
Home med: metformin 500mg, Linagliptin 5mg qd, and    -Start mISS  - f/u A1c Home med: metformin 500mg, Linagliptin 5mg qd   -Start mISS  - f/u A1c

## 2025-03-23 NOTE — H&P ADULT - PROBLEM SELECTOR PLAN 11
-Continue home aripiprazole 5mg qd, escitalopram 20mg qd - Continue home aripiprazole 5mg qd, escitalopram 20mg qd

## 2025-03-23 NOTE — H&P ADULT - PROBLEM SELECTOR PLAN 1
Subjective:      Patient ID: Kateryna Desai is a 60 y.o. female.    Chief Complaint: Diabetic Foot Exam (GIOVANA Mendoza 9/7/17  A1C 6/10/17  6.3); Nail Care; and Heel Pain (Left heel)    Kateryna is a 60 y.o. female who presents to the clinic for routine evaluation and treatment of diabetic feet. Kateryna has a past medical history of Allergy; Arthritis; Diabetes mellitus type I; Diabetic gastroparesis; Diabetic retinopathy of both eyes; Difficult intubation; GERD (gastroesophageal reflux disease); Headache(784.0); Hiatal hernia; Hyperlipidemia; Hypertension; Infection of the upper respiratory tract (June 2012); Lumbar spinal stenosis; Osteopenia; Rosacea; Seizures; Stroke (1985?); Tachycardia; Thyroid disease; and Venous insufficiency of leg. Patient relates recent Lt. Sided heel pain.  Describes pain as aching and rates as a 6/10.  States symptoms are exacerbated with weight bearing after periods of rest and alleviated with rest.  Has not attempted to self treat.  Denies trauma to the affected heel.  Denies any additional pedal complaints.      PCP: Endy Mendoza MD    Date Last Seen by PCP: 9/7/17      Hemoglobin A1C   Date Value Ref Range Status   06/10/2017 6.3 (H) 4.5 - 6.2 % Final     Comment:     According to ADA guidelines, hemoglobin A1C <7.0% represents  optimal control in non-pregnant diabetic patients.  Different  metrics may apply to specific populations.   Standards of Medical Care in Diabetes - 2016.  For the purpose of screening for the presence of diabetes:  <5.7%     Consistent with the absence of diabetes  5.7-6.4%  Consistent with increasing risk for diabetes   (prediabetes)  >or=6.5%  Consistent with diabetes  Currently no consensus exists for use of hemoglobin A1C  for diagnosis of diabetes for children.     02/09/2017 6.0 4.5 - 6.2 % Final     Comment:     According to ADA guidelines, hemoglobin A1C <7.0% represents  optimal control in non-pregnant diabetic patients.   Different  metrics may apply to specific populations.   Standards of Medical Care in Diabetes - 2016.  For the purpose of screening for the presence of diabetes:  <5.7%     Consistent with the absence of diabetes  5.7-6.4%  Consistent with increasing risk for diabetes   (prediabetes)  >or=6.5%  Consistent with diabetes  Currently no consensus exists for use of hemoglobin A1C  for diagnosis of diabetes for children.     10/03/2016 6.0 4.5 - 6.2 % Final     Comment:     According to ADA guidelines, hemoglobin A1C <7.0% represents  optimal control in non-pregnant diabetic patients.  Different  metrics may apply to specific populations.   Standards of Medical Care in Diabetes - 2016.  For the purpose of screening for the presence of diabetes:  <5.7%     Consistent with the absence of diabetes  5.7-6.4%  Consistent with increasing risk for diabetes   (prediabetes)  >or=6.5%  Consistent with diabetes  Currently no consensus exists for use of hemoglobin A1C  for diagnosis of diabetes for children.             Past Medical History:   Diagnosis Date    Allergy     Arthritis     degnerative disease low back,muscle spasms, shoulders    Diabetes mellitus type I     since age 11    Diabetic gastroparesis     takes Cytotec    Diabetic retinopathy of both eyes     gets periodic laser treatments    Difficult intubation     as a child had sore throat after a procedure    GERD (gastroesophageal reflux disease)     Headache(784.0)     Hiatal hernia     with GERD    Hyperlipidemia     Hypertension     Infection of the upper respiratory tract June 2012    Lumbar spinal stenosis     Osteopenia     Rosacea     Seizures     Pt states during pgy with stroke    Stroke 1985?    while pregnant    Tachycardia     asymptomatic with Toprol    Thyroid disease     hypothyroidism    Venous insufficiency of leg     improved since EVLT       Past Surgical History:   Procedure Laterality Date    BUNIONECTOMY Right 2012    CARDIAC  "CATHETERIZATION      no stents     SECTION      COLONOSCOPY  2007    Dr. Rose, 10 year recheck    EXOSTECTOMY  12    left foot, local anesthesia, in office    EYE SURGERY      has had many laser procedures for diabetic retinopathy    VEIN SURGERY  2012    EVLT right greater saphenous, IV sedation       Family History   Problem Relation Age of Onset    Cancer Maternal Aunt      breast    Diabetes Maternal Grandmother     Cancer Maternal Grandfather      prostate    Diabetes Maternal Grandfather     Diabetes Cousin     Arthritis Mother     Heart disease Father     Stroke Father     No Known Problems Sister     No Known Problems Brother     No Known Problems Daughter     No Known Problems Paternal Grandmother     No Known Problems Paternal Grandfather     Alzheimer's disease Maternal Uncle     Alcohol abuse Maternal Uncle      x2    Heart disease Maternal Uncle     No Known Problems Paternal Aunt     Heart disease Paternal Uncle        Social History     Social History    Marital status:      Spouse name: N/A    Number of children: N/A    Years of education: N/A     Social History Main Topics    Smoking status: Never Smoker    Smokeless tobacco: Never Used    Alcohol use Yes      Comment: social, 1 drink monthly    Drug use: No    Sexual activity: Yes     Partners: Male     Other Topics Concern    None     Social History Narrative    None       Current Outpatient Prescriptions   Medication Sig Dispense Refill    ACZONE 5 % topical gel   0    albuterol 90 mcg/actuation inhaler Inhale 2 puffs into the lungs 4 (four) times daily. 1 Inhaler 1    BD INSULIN SYRINGE ULTRA-FINE 1/2 mL 31 x 15/64" Syrg 3 (three) times daily.   1    BD ULTRA-FINE JCARLOS PEN NEEDLES 32 gauge x 5/32" Ndle once daily.   0    BENEFIBER, GUAR GUM, ORAL Take 1 Dose by mouth once daily.       biotin 300 mcg Tab Take 1 tablet by mouth once daily.      calcium citrate-vitamin D3 " 315-200 mg (CITRACAL+D) 315-200 mg-unit per tablet Take 1 tablet by mouth 2 (two) times daily.      CRESTOR 40 mg Tab Take 40 mg by mouth once daily.       cyanocobalamin, vitamin B-12, 2,500 mcg Lozg Place 1 lozenge under the tongue once daily.      doxycycline (VIBRAMYCIN) 50 MG capsule Take 1 capsule by mouth 2 (two) times daily as needed.  0    ESTRACE 0.01 % (0.1 mg/gram) vaginal cream Place vaginally twice a week.   0    fluticasone (FLONASE) 50 mcg/actuation nasal spray 1 spray by Each Nare route once daily. 16 g 0    furosemide (LASIX) 20 MG tablet take 1 tablet by mouth twice a day 60 tablet 5    GLUCAGON EMERGENCY 1 mg injection 1 mg as needed.       guaifenesin-codeine 100-10 mg/5 ml (TUSSI-ORGANIDIN NR)  mg/5 mL syrup Take 5 mLs by mouth every 4 (four) hours as needed for Cough. 180 mL 0    HUMALOG 100 unit/mL injection Inject into the skin. 10-17 units tid sliding scale  1    hydrocodone-acetaminophen 5-325mg (NORCO) 5-325 mg per tablet Take 1 tablet by mouth every 4 (four) hours as needed for Pain. 30 tablet 0    INSULIN GLARGINE,HUM.REC.ANLOG (TOUJEO SOLOSTAR SUBQ) Inject 46 Units into the skin once daily.      metoprolol succinate (TOPROL-XL) 25 MG 24 hr tablet take 1 tablet by mouth once daily 30 tablet 11    minocycline (MINOCIN,DYNACIN) 75 MG capsule Take 75 mg by mouth 2 (two) times daily as needed.   0    ondansetron (ZOFRAN-ODT) 4 MG TbDL Take 1 tablet (4 mg total) by mouth every 8 (eight) hours as needed. 12 tablet 0    ONE TOUCH ULTRA TEST Strp 6-8 times daily      pantoprazole (PROTONIX) 40 MG tablet Take 1 tablet (40 mg total) by mouth 2 (two) times daily. 180 tablet 3    spironolactone (ALDACTONE) 50 MG tablet take 1 tablet by mouth twice a day 60 tablet 5    SYNTHROID 88 mcg tablet Take 88 mcg by mouth once daily.  0    tramadol (ULTRAM-ER) 100 MG Tb24 Take 1 tablet (100 mg total) by mouth once daily. 30 tablet 1    vitamin D 1000 units Tab Take 1,000 mg by mouth  2 (two) times daily.       WELCHOL 625 mg tablet Take by mouth once daily. 6 tabs qam       No current facility-administered medications for this visit.        Review of patient's allergies indicates:   Allergen Reactions    Bactrim [sulfamethoxazole-trimethoprim] Rash     Patient experienced on 12/3/14 redness to face and rash to chest and arms/ with no SOB or airway obstruction    Percocet [oxycodone-acetaminophen] Nausea And Vomiting     Also caused dizziness and passed out    Iodinated contrast media - iv dye Swelling and Rash     Says topical iodine OK    Norco [hydrocodone-acetaminophen] Itching, Swelling and Rash     Can tolerate if she takes Benadryl with it    Niaspan extended-release [niacin] Itching and Other (See Comments)     Skin flushing and redness         Review of Systems   Constitution: Negative for chills, decreased appetite, diaphoresis and fever.   Cardiovascular: Negative for claudication and leg swelling.   Skin: Positive for color change, dry skin and nail changes. Negative for flushing and itching.   Musculoskeletal: Positive for myalgias. Negative for arthritis, back pain, falls, gout, joint pain and joint swelling.   Neurological: Positive for numbness and paresthesias.   Psychiatric/Behavioral: Negative for altered mental status.           Objective:      Physical Exam   Constitutional: She is oriented to person, place, and time. She appears well-developed and well-nourished. No distress.   Cardiovascular:   Pulses:       Dorsalis pedis pulses are 2+ on the right side, and 2+ on the left side.        Posterior tibial pulses are 1+ on the right side, and 1+ on the left side.   CFT <3 seconds bilateral.  Pedal hair growth decreased bilateral.  Varicosities noted to bilateral lower extremity. Mild nonpitting edema noted to bilateral lower extremity.  Toes are cool to touch bilateral.       Musculoskeletal: Normal range of motion. She exhibits edema. She exhibits no tenderness.    Muscle strength 5/5 in all muscle groups bilateral.  No tenderness nor crepitation with active and passive ROM of foot/ankle joints bilateral.  Mild pain with palpation of Lt. Medial calcaneal tubercle.  Bilateral gastrocnemius equinus.  Bilateral pes planus foot type.  Bilateral hallux abducto valgus.  Bilateral semi-rigid contracture of toes 2-5.     Neurological: She is alert and oriented to person, place, and time. She has normal strength. A sensory deficit is present.   Protective sensation per Crowder-Abigail monofilament decreased bilateral.    Vibratory sensation intact bilateral.    Light touch intact bilateral.   Skin: Skin is warm, dry and intact. Lesion noted. No abrasion, no bruising, no burn, no ecchymosis, no laceration, no petechiae and no rash noted. She is not diaphoretic. No cyanosis or erythema. No pallor. Nails show no clubbing.   Pedal skin is thin and atrophic bilateral.  Toenails x 10 appear thickened by 2 mm's, elongated by 2 mm's, and discolored with subungual debris.  Focal hyperkeratotic lesions noted to the tip of bilateral 2nd toe.  No open wounds, interdigital maceration noted bilateral.                    Assessment:       Encounter Diagnoses   Name Primary?    Controlled type 1 diabetes mellitus with diabetic polyneuropathy Yes    Onychomycosis due to dermatophyte     Corn or callus     Paresthesia of foot, bilateral     Hammer toes of both feet     Plantar fasciitis     Equinus deformity of foot          Plan:       Kateryna was seen today for diabetic foot exam, nail care and heel pain.    Diagnoses and all orders for this visit:    Controlled type 1 diabetes mellitus with diabetic polyneuropathy    Onychomycosis due to dermatophyte    Corn or callus    Paresthesia of foot, bilateral    Hammer toes of both feet    Plantar fasciitis    Equinus deformity of foot      I counseled the patient on her conditions, their implications and medical management.    - Discussed  performing stretching exercises to address equinus.    - Recommend wearing supportive shoes or orthopedic sandals only.  Avoidance of barefoot walking, flip flops, and Crocs, as this will exacerbate current symptoms.      - Recommend icing the affected heel a minimum of 20 minutes daily.    - Discussed avoidance of high impact activities such as squatting, stooping, and running as these activities will exacerbate symptoms.      - May consider applying a topical analgesic (biofreeze) to help with pain symptoms.      - Discussed applying frankincense and myrrh oil to bilateral foot to address paresthesias.    - Shoe inspection. Diabetic Foot Education. Patient reminded of the importance of good nutrition and blood sugar control to help prevent podiatric complications of diabetes. Patient instructed on proper foot hygeine. We discussed wearing proper shoe gear, daily foot inspections, never walking without protective shoe gear, never putting sharp instruments to feet    - With patient's permission, nails were aggressively reduced and debrided x 10 to their soft tissue attachment mechanically and with electric , removing all offending nail and debris.  With a sterile #15 scalpel was used to trim lesions x 2 down to smooth appearing skin without incident.  Patient relates relief following the procedure. She will continue to monitor the areas daily, inspect her feet, wear protective shoe gear when ambulatory, moisturizer to maintain skin integrity and follow in this office in approximately 2-3 months, sooner p.r.n.    Return in about 3 months (around 1/3/2018).    Lei Rhoades DPM         Patient with fever and tachycardia meeting 2/4 criteria for sepsis 2/2 pneumonia. S/p Vanc and Cefepime  - start CTX and Azithromycin   - f/u BCx  - f/u RVP  - f/u MRSA swab Patient with fever and tachycardia meeting 2/4 criteria for sepsis 2/2 pneumonia. S/p Vanc and Cefepime.  MRSA swab+.  - start CTX 2g qd (tolerated at [facility] per chart review), Vanc 1g Q12, and Azithromycin   - f/u BCx  - f/u RVP  - f/u urine strep and legionella. Patient with fever and tachycardia meeting 2/4 criteria for sepsis 2/2 pneumonia. S/p Vanc and Cefepime.  MRSA swab+.  - start CTX 2g qd (tolerated at [facility] per chart review) and Doxycycline 100mg BID (for MRSA and atypical coverage)   - f/u BCx  - f/u RVP  - f/u urine strep and legionella.

## 2025-03-23 NOTE — H&P ADULT - PROBLEM SELECTOR PLAN 8
Platelets 120 on admission. Hx of HIV (non-compliant with meds). Prior platelets 120 (1/2025). Likely chronic thrombocytopenia.   - continue to trend platelets

## 2025-03-23 NOTE — H&P ADULT - ATTENDING COMMENTS
75 year old male with PMHx HIV, obstructive CAD, DM, chronic diastolic heart failure, syncope, COPD, depression/anxiety, prostate cancer presents to [facility] for dyspnea and cough. Admitted to [facility] for community acquired pneumonia and acute hypoxemic respiratory failure.    Seen and examined at bedside. Reports malaise. States he lives in shared housing and believes multiple roommates were sick.    VSS.   PE benign    Labs reviewed: stable normocytic anemia, no leukocytosis. Nares MRSA/staph aureus +. Covid negative. CXR+ right lung infiltrate.    A/P  #Community acquired PNA/acute hypoxemic respiratory failure; Ceftriaxone/doxy. Encourage oral hydration. Fu septic work up. Hold anti hypertensives for now. Anti pyretics.  #HIV: atovaquone. Ticavay. Descovy   #DM f/u HbA1c ISS diabetic diet  #Chronic diastolic HF. Resume coreg as BP allows. Fluid restriction  #COPD Symbicort

## 2025-03-24 LAB
-  CORYNEBACTERIUM SPECIES: SIGNIFICANT CHANGE UP
4/8 RATIO: 0.18 RATIO — LOW (ref 0.86–4.14)
A1C WITH ESTIMATED AVERAGE GLUCOSE RESULT: 5.6 % — SIGNIFICANT CHANGE UP (ref 4–5.6)
ABS CD8: 403 CELLS/UL — SIGNIFICANT CHANGE UP (ref 90–775)
ALBUMIN SERPL ELPH-MCNC: 2.9 G/DL — LOW (ref 3.3–5)
ALP SERPL-CCNC: 57 U/L — SIGNIFICANT CHANGE UP (ref 40–120)
ALT FLD-CCNC: 8 U/L — LOW (ref 10–45)
ANION GAP SERPL CALC-SCNC: 11 MMOL/L — SIGNIFICANT CHANGE UP (ref 5–17)
AST SERPL-CCNC: 13 U/L — SIGNIFICANT CHANGE UP (ref 10–40)
BASOPHILS # BLD AUTO: 0.01 K/UL — SIGNIFICANT CHANGE UP (ref 0–0.2)
BASOPHILS NFR BLD AUTO: 0.3 % — SIGNIFICANT CHANGE UP (ref 0–2)
BILIRUB SERPL-MCNC: 0.5 MG/DL — SIGNIFICANT CHANGE UP (ref 0.2–1.2)
BUN SERPL-MCNC: 16 MG/DL — SIGNIFICANT CHANGE UP (ref 7–23)
CALCIUM SERPL-MCNC: 7.9 MG/DL — LOW (ref 8.4–10.5)
CD16+CD56+ CELLS NFR BLD: 17 % — SIGNIFICANT CHANGE UP (ref 7–27)
CD16+CD56+ CELLS NFR SPEC: 104 CELLS/UL — SIGNIFICANT CHANGE UP (ref 80–426)
CD19 BLASTS SPEC-ACNC: 20 CELLS/UL — LOW (ref 32–326)
CD19 BLASTS SPEC-ACNC: 3 % — LOW (ref 4–18)
CD3 BLASTS SPEC-ACNC: 494 CELLS/UL — SIGNIFICANT CHANGE UP (ref 396–2024)
CD3 BLASTS SPEC-ACNC: 79 % — SIGNIFICANT CHANGE UP (ref 58–84)
CD4 %: 12 % — LOW (ref 30–56)
CD8 %: 63 % — HIGH (ref 11–43)
CHLORIDE SERPL-SCNC: 100 MMOL/L — SIGNIFICANT CHANGE UP (ref 96–108)
CO2 SERPL-SCNC: 22 MMOL/L — SIGNIFICANT CHANGE UP (ref 22–31)
CREAT SERPL-MCNC: 1.21 MG/DL — SIGNIFICANT CHANGE UP (ref 0.5–1.3)
CULTURE RESULTS: NO GROWTH — SIGNIFICANT CHANGE UP
EGFR: 62 ML/MIN/1.73M2 — SIGNIFICANT CHANGE UP
EGFR: 62 ML/MIN/1.73M2 — SIGNIFICANT CHANGE UP
EOSINOPHIL # BLD AUTO: 0.04 K/UL — SIGNIFICANT CHANGE UP (ref 0–0.5)
EOSINOPHIL NFR BLD AUTO: 1.4 % — SIGNIFICANT CHANGE UP (ref 0–6)
ESTIMATED AVERAGE GLUCOSE: 114 MG/DL — SIGNIFICANT CHANGE UP (ref 68–114)
GLUCOSE SERPL-MCNC: 165 MG/DL — HIGH (ref 70–99)
GRAM STN FLD: ABNORMAL
HCT VFR BLD CALC: 33.4 % — LOW (ref 39–50)
HGB BLD-MCNC: 11 G/DL — LOW (ref 13–17)
HIV-1 VIRAL LOAD RESULT: ABNORMAL
HIV1 RNA # SERPL NAA+PROBE: SIGNIFICANT CHANGE UP
HIV1 RNA SER-IMP: SIGNIFICANT CHANGE UP
HIV1 RNA SERPL NAA+PROBE-ACNC: ABNORMAL
HIV1 RNA SERPL NAA+PROBE-LOG#: 3.65 — SIGNIFICANT CHANGE UP
IMM GRANULOCYTES NFR BLD AUTO: 0.3 % — SIGNIFICANT CHANGE UP (ref 0–0.9)
LYMPHOCYTES # BLD AUTO: 0.74 K/UL — LOW (ref 1–3.3)
LYMPHOCYTES # BLD AUTO: 25.3 % — SIGNIFICANT CHANGE UP (ref 13–44)
MAGNESIUM SERPL-MCNC: 2 MG/DL — SIGNIFICANT CHANGE UP (ref 1.6–2.6)
MCHC RBC-ENTMCNC: 31.8 PG — SIGNIFICANT CHANGE UP (ref 27–34)
MCHC RBC-ENTMCNC: 32.9 G/DL — SIGNIFICANT CHANGE UP (ref 32–36)
MCV RBC AUTO: 96.5 FL — SIGNIFICANT CHANGE UP (ref 80–100)
METHOD TYPE: SIGNIFICANT CHANGE UP
MONOCYTES # BLD AUTO: 0.34 K/UL — SIGNIFICANT CHANGE UP (ref 0–0.9)
MONOCYTES NFR BLD AUTO: 11.6 % — SIGNIFICANT CHANGE UP (ref 2–14)
NEUTROPHILS # BLD AUTO: 1.78 K/UL — LOW (ref 1.8–7.4)
NEUTROPHILS NFR BLD AUTO: 61.1 % — SIGNIFICANT CHANGE UP (ref 43–77)
NRBC BLD AUTO-RTO: 0 /100 WBCS — SIGNIFICANT CHANGE UP (ref 0–0)
PHOSPHATE SERPL-MCNC: 1.8 MG/DL — LOW (ref 2.5–4.5)
PLATELET # BLD AUTO: 103 K/UL — LOW (ref 150–400)
POTASSIUM SERPL-MCNC: 3.8 MMOL/L — SIGNIFICANT CHANGE UP (ref 3.5–5.3)
POTASSIUM SERPL-SCNC: 3.8 MMOL/L — SIGNIFICANT CHANGE UP (ref 3.5–5.3)
PROT SERPL-MCNC: 6.2 G/DL — SIGNIFICANT CHANGE UP (ref 6–8.3)
RBC # BLD: 3.46 M/UL — LOW (ref 4.2–5.8)
RBC # FLD: 12.2 % — SIGNIFICANT CHANGE UP (ref 10.3–14.5)
SODIUM SERPL-SCNC: 133 MMOL/L — LOW (ref 135–145)
SPECIMEN SOURCE: SIGNIFICANT CHANGE UP
T-CELL CD4 SUBSET PNL BLD: 74 CELLS/UL — LOW (ref 325–1251)
WBC # BLD: 2.92 K/UL — LOW (ref 3.8–10.5)
WBC # FLD AUTO: 2.92 K/UL — LOW (ref 3.8–10.5)

## 2025-03-24 PROCEDURE — G0545: CPT

## 2025-03-24 PROCEDURE — 99223 1ST HOSP IP/OBS HIGH 75: CPT

## 2025-03-24 PROCEDURE — 99233 SBSQ HOSP IP/OBS HIGH 50: CPT

## 2025-03-24 RX ORDER — AZITHROMYCIN 250 MG
1 CAPSULE ORAL
Qty: 1 | Refills: 0
Start: 2025-03-24 | End: 2025-03-24

## 2025-03-24 RX ORDER — CEFEPIME 2 G/20ML
2000 INJECTION, POWDER, FOR SOLUTION INTRAVENOUS EVERY 8 HOURS
Refills: 0 | Status: DISCONTINUED | OUTPATIENT
Start: 2025-03-24 | End: 2025-03-28

## 2025-03-24 RX ORDER — CEFPODOXIME PROXETIL 200 MG/1
1 TABLET, FILM COATED ORAL
Qty: 11 | Refills: 0
Start: 2025-03-24 | End: 2025-03-28

## 2025-03-24 RX ORDER — VANCOMYCIN HCL IN 5 % DEXTROSE 1.5G/250ML
1000 PLASTIC BAG, INJECTION (ML) INTRAVENOUS ONCE
Refills: 0 | Status: COMPLETED | OUTPATIENT
Start: 2025-03-24 | End: 2025-03-24

## 2025-03-24 RX ORDER — CARVEDILOL 3.12 MG/1
3.12 TABLET, FILM COATED ORAL EVERY 12 HOURS
Refills: 0 | Status: DISCONTINUED | OUTPATIENT
Start: 2025-03-24 | End: 2025-03-28

## 2025-03-24 RX ORDER — CEFPODOXIME PROXETIL 200 MG/1
1 TABLET, FILM COATED ORAL
Qty: 10 | Refills: 0
Start: 2025-03-24 | End: 2025-03-28

## 2025-03-24 RX ORDER — DOXYCYCLINE HYCLATE 100 MG
1 TABLET ORAL
Qty: 11 | Refills: 0
Start: 2025-03-24 | End: 2025-03-28

## 2025-03-24 RX ORDER — BENZONATATE 100 MG
100 CAPSULE ORAL THREE TIMES A DAY
Refills: 0 | Status: DISCONTINUED | OUTPATIENT
Start: 2025-03-24 | End: 2025-03-28

## 2025-03-24 RX ADMIN — ESCITALOPRAM OXALATE 20 MILLIGRAM(S): 20 TABLET ORAL at 15:18

## 2025-03-24 RX ADMIN — CEFEPIME 100 MILLIGRAM(S): 2 INJECTION, POWDER, FOR SOLUTION INTRAVENOUS at 16:51

## 2025-03-24 RX ADMIN — DOLUTEGRAVIR SODIUM 50 MILLIGRAM(S): 5 TABLET, FOR SUSPENSION ORAL at 12:04

## 2025-03-24 RX ADMIN — Medication 3 MILLIGRAM(S): at 23:03

## 2025-03-24 RX ADMIN — Medication 650 MILLIGRAM(S): at 16:23

## 2025-03-24 RX ADMIN — CARVEDILOL 3.12 MILLIGRAM(S): 3.12 TABLET, FILM COATED ORAL at 19:53

## 2025-03-24 RX ADMIN — Medication 1 DOSE(S): at 05:45

## 2025-03-24 RX ADMIN — Medication 100 MILLIGRAM(S): at 06:11

## 2025-03-24 RX ADMIN — TAMSULOSIN HYDROCHLORIDE 0.4 MILLIGRAM(S): 0.4 CAPSULE ORAL at 22:57

## 2025-03-24 RX ADMIN — INSULIN LISPRO 2: 100 INJECTION, SOLUTION INTRAVENOUS; SUBCUTANEOUS at 09:19

## 2025-03-24 RX ADMIN — CEFEPIME 100 MILLIGRAM(S): 2 INJECTION, POWDER, FOR SOLUTION INTRAVENOUS at 22:57

## 2025-03-24 RX ADMIN — Medication 1 LOZENGE: at 06:11

## 2025-03-24 RX ADMIN — ATORVASTATIN CALCIUM 80 MILLIGRAM(S): 80 TABLET, FILM COATED ORAL at 22:57

## 2025-03-24 RX ADMIN — Medication 1 TABLET(S): at 12:04

## 2025-03-24 RX ADMIN — INSULIN LISPRO 2: 100 INJECTION, SOLUTION INTRAVENOUS; SUBCUTANEOUS at 19:53

## 2025-03-24 RX ADMIN — Medication 100 MILLIGRAM(S): at 12:04

## 2025-03-24 RX ADMIN — Medication 1 DOSE(S): at 23:03

## 2025-03-24 RX ADMIN — Medication 81 MILLIGRAM(S): at 09:19

## 2025-03-24 RX ADMIN — Medication 1 LOZENGE: at 12:04

## 2025-03-24 RX ADMIN — ARIPIPRAZOLE 5 MILLIGRAM(S): 2 TABLET ORAL at 09:19

## 2025-03-24 RX ADMIN — Medication 250 MILLIGRAM(S): at 17:30

## 2025-03-24 RX ADMIN — ATOVAQUONE 1500 MILLIGRAM(S): 750 SUSPENSION ORAL at 12:04

## 2025-03-24 RX ADMIN — Medication 650 MILLIGRAM(S): at 15:19

## 2025-03-24 RX ADMIN — CEFTRIAXONE 100 MILLIGRAM(S): 500 INJECTION, POWDER, FOR SOLUTION INTRAMUSCULAR; INTRAVENOUS at 07:12

## 2025-03-24 RX ADMIN — Medication 255 MILLIGRAM(S): at 07:29

## 2025-03-24 RX ADMIN — Medication 250 MILLIGRAM(S): at 05:41

## 2025-03-24 NOTE — PROGRESS NOTE ADULT - ATTENDING COMMENTS
75M with history of HIV/AIDS (CD4 74/12%, VL 4516 3/2025), non-obstructive CAD (last cath 7/2021) and HFrEF LV EF (LVEF 35-40% with LVH and RWMA 9/2021), prior prostate cancer, who presented for 1 month of cough and shortness of breath.    Seen at bedside with team on AM rounds, patient was titrated off supplemental O2 and was maintaining ~95% saturation on room air, respirations appeared comfortable without tachypnea, lungs with mild crackles bilaterally. Otherwise was pleasant, warm and well-perfused, euvolemic. Labs notable pancytopenia, urine Legionella and Strep are negative, RVP negative, MRSA PCR+, CD4 74 and VL 4516. CXR with diffuse R lung infiltrates. Patient later attempted to leave AMA (citing personal issues he had to take care of) however was only able to make it to the elevator before becoming too SOB and having to return to the unit for continued care.    #subacute hypoxic respiratory insufficiency  #sepsis #HIV/AIDS (CD4 74/12%, VL 4516 3/2025)  - ID consulted, concern for opportunistic infection  - will cover for CAP with vancomycin, transition CTX to cefepime, d/c azithro  - CT chest non-con  - broad work up for infectious etiologies as per ID  - r/o TB  - holding ART pending work up    #HFrEF LV EF (LVEF 35-40% with LVH and RWMA 9/2021)  #NOCAD (30% mLAD lesion, [facility] 7/2021)  - c/w ASA, statin  - resume home coreg 3.125 BID  - no recent TTE, unclear why patient is not on additional GDMT - need collateral from PCP  - repeat TTE to r/o worsening cardiac function as contributor of subacute SOB (although seems more c/w infectious etiology)  - EKG NSR with PACs, note septal abnormality but this was previously seen, no acute ischemic changes

## 2025-03-24 NOTE — PROGRESS NOTE ADULT - PROBLEM SELECTOR PLAN 2
CXR with RLL opacity. S/p Vanc and Cefepime. Reports prior hx of hospitalizations due to pneumonia, most recently last year.   MRSA swab+.  - c/w CTX 2g qd (tolerated at [facility] per chart review) and Doxycycline 100mg BID (for MRSA and atypical coverage)   - f/u RVP  - f/u urine strep and legionella. CXR with RLL opacity. S/p Vanc and Cefepime. Reports prior hx of hospitalizations due to pneumonia, most recently last year.   MRSA swab+, urine legionella and strep negative, RVP negative    - C/w abx as above

## 2025-03-24 NOTE — CONSULT NOTE ADULT - NS ATTEND AMEND GEN_ALL_CORE FT
75M w/ HIV (reportedly previously adherent to tivicay + descovy + atovaquone but not for the past 1+ month, follows with Dr. Paul Noguera, CD4 74/12%, VL 4.5k), with several hospitalizations at various OSH over the past few years for PNA (unclear agent, he does not think it was PCP but he reports going home on several weeks of PO abx), as well as prior HCV s/p Harvoni in 2015 w/ SVR, active cocaine use d/o (smokes), mood d/o, and remote prostate cancer who presented to ED on 3/22 with a reported one month+ history of cough, SOB and chills. On presentation he was febrile to 103.4, hypoxic req 2L NC, labs with mild pancytopenia, RVP neg, urine strep/legionella neg, MRSA nares +, BCx 1 out of 4 bottles with GPR. CXR with bilateral opacities. He has thusfar been treated with vanc + cefepime, now vanc + ceftriaxone + azithro. This afternoon he attempted to sign out AMA but became so short of breath by the time he reached the elevator he had to return. ID now consulted to assist with management. ROS positive for above respiratory sx and chills, as well as chronic dysphagia (for many years) and chronic urinary/fecal incontinence since remote prostate CA tx, and negative for night sweats, weight loss, HA, vision changes, sore throat, skin issues. Exam notable for increased respiratory effort on RA, speaks in ~5 word sentences, some costal retractions.     I am suspicious that this respiratory syndrome is not a typical CAP. Particularly concerned for PCP, but will not treat empirically at this time as he has a reported anaphylactic sulfa allergy. For now, continue vancomycin 1g IV q12h and check trough before 4th dose tomorrow 5am, stop ceftriaxone and start cefepime 2g IV q8h. Stop azithromycin. Hold home ART for now. Start airborne precautions. Order CT chest non-con, induced sputum x3 for AFB smear/culture and x2 MTB/Rif PCR, SCx, fungitell, LDH, toxo IgG, CrAg, IGRA, Histo ag/ab, induced sputum for PCP PCR, urine/pharyngeal GC, RPR, GI PCR If diarrhea. If has respiratory decompensation requiring significant O2 would empirically start primaquine 30mg PO daily + clindamycin 900mg IV q8h plus prednisone 40mg PO BID.

## 2025-03-24 NOTE — PROGRESS NOTE ADULT - SUBJECTIVE AND OBJECTIVE BOX
INTERVAL HPI/OVERNIGHT EVENTS:    SUBJECTIVE: Patient seen and examined at bedside, resting comfortably in bed, and does not appear to be in any acute distress. Patient reports he/she is feeling well, denies any dizziness, lightheadedness, headache, chest pain, abdominal pain, nausea, vomiting, diarrhea, constipation.    Vital Signs Last 24 Hrs  T(C): 36.9 (24 Mar 2025 06:09), Max: 36.9 (23 Mar 2025 11:16)  T(F): 98.4 (24 Mar 2025 06:09), Max: 98.5 (23 Mar 2025 11:16)  HR: 81 (24 Mar 2025 06:09) (80 - 85)  BP: 128/73 (24 Mar 2025 06:09) (128/73 - 150/73)  BP(mean): 97 (23 Mar 2025 23:30) (97 - 99)  RR: 19 (24 Mar 2025 06:09) (17 - 19)  SpO2: 96% (24 Mar 2025 06:09) (96% - 98%)    Parameters below as of 24 Mar 2025 06:09  Patient On (Oxygen Delivery Method): nasal cannula  O2 Flow (L/min): 2      PHYSICAL EXAM:  General: in no acute distress  Eyes: EOMI intact bilaterally. Anicteric sclerae, moist conjunctivae  HENT: Moist mucous membranes  Neck: Trachea midline, supple. No cervical lymphadenopathy in anterior or posterior chain.  Lungs: CTA B/L. No wheezes, rales, or rhonchi  Cardiovascular: RRR. No murmurs, rubs, or gallops  Abdomen: +BS, soft, non-tender non-distended; No rebound or guarding  Extremities: WWP, No clubbing, cyanosis or edema. 2+ peripheral pulses, cap refill <2 seconds  Neurological: AOx3 to person, place, time  Skin: Warm and dry. No obvious rash     LABS:                        11.7   3.96  )-----------( 109      ( 23 Mar 2025 10:00 )             35.5     03-23    135  |  101  |  19  ----------------------------<  158[H]  3.8   |  20[L]  |  1.28    Ca    7.8[L]      23 Mar 2025 10:00  Phos  2.6     03-23  Mg     1.4     03-23    TPro  6.3  /  Alb  2.8[L]  /  TBili  1.0  /  DBili  0.3  /  AST  17  /  ALT  10  /  AlkPhos  61  03-23   INTERVAL HPI/OVERNIGHT EVENTS: carlyn    SUBJECTIVE: Patient seen and examined at bedside, resting comfortably in bed, and does not appear to be in any acute distress. He reports he is overall feeling better but still feels week. He has improvement in his breathing. Patient reports he/she is feeling well, denies any dizziness, lightheadedness, headache, chest pain, abdominal pain, nausea, vomiting, diarrhea, constipation.    Vital Signs Last 24 Hrs  T(C): 36.9 (24 Mar 2025 06:09), Max: 36.9 (23 Mar 2025 11:16)  T(F): 98.4 (24 Mar 2025 06:09), Max: 98.5 (23 Mar 2025 11:16)  HR: 81 (24 Mar 2025 06:09) (80 - 85)  BP: 128/73 (24 Mar 2025 06:09) (128/73 - 150/73)  BP(mean): 97 (23 Mar 2025 23:30) (97 - 99)  RR: 19 (24 Mar 2025 06:09) (17 - 19)  SpO2: 96% (24 Mar 2025 06:09) (96% - 98%)    Parameters below as of 24 Mar 2025 06:09  Patient On (Oxygen Delivery Method): nasal cannula  O2 Flow (L/min): 2      PHYSICAL EXAM:  General: in no acute distress  Eyes: EOMI intact bilaterally. Anicteric sclerae, moist conjunctivae  HENT: Moist mucous membranes  Neck: Trachea midline, supple. No cervical lymphadenopathy in anterior or posterior chain.  Lungs: Mild wheezing on L lung field. No rales, or rhonchi  Cardiovascular: RRR. No murmurs, rubs, or gallops  Abdomen: +BS, soft, non-tender non-distended; No rebound or guarding  Extremities: WWP, No clubbing, cyanosis or edema. 2+ peripheral pulses.  Neurological: AOx3 to person, place, time  Skin: Warm and dry. No obvious rash     LABS:                        11.7   3.96  )-----------( 109      ( 23 Mar 2025 10:00 )             35.5     03-23    135  |  101  |  19  ----------------------------<  158[H]  3.8   |  20[L]  |  1.28    Ca    7.8[L]      23 Mar 2025 10:00  Phos  2.6     03-23  Mg     1.4     03-23    TPro  6.3  /  Alb  2.8[L]  /  TBili  1.0  /  DBili  0.3  /  AST  17  /  ALT  10  /  AlkPhos  61  03-23

## 2025-03-24 NOTE — PROGRESS NOTE ADULT - PROBLEM SELECTOR PLAN 12
F: none  E: replete as necessary  N: Carb consistent  DVT: heparin SubQ  Dispo: [facility]

## 2025-03-24 NOTE — PROGRESS NOTE ADULT - PROBLEM SELECTOR PLAN 9
Creatine 1.58 on admission, prior Cr 1.59 (1/2025). Baseline Cr 1.4-1.6  - continue to trend  - avoid nephrotoxic meds Creatine 1.58 on admission, prior Cr 1.59 (1/2025). Baseline Cr 1.4-1.6. RESOLVED    - continue to trend  - avoid nephrotoxic meds

## 2025-03-24 NOTE — PROGRESS NOTE ADULT - PROBLEM SELECTOR PLAN 7
Hx of prior MI, denies stents in heart. On home asa and lipitor 80  - c/w home meds Hx of prior MI, denies stents in heart. On home asa and lipitor 80    - c/w home meds

## 2025-03-24 NOTE — PROGRESS NOTE ADULT - PROBLEM SELECTOR PLAN 10
Home med- symbicort. No home 02.   - c/w home med Home med- symbicort. No home 02.     - Continue with 2L NC, wean as tolerated  - c/w home med

## 2025-03-24 NOTE — PROGRESS NOTE ADULT - PROBLEM SELECTOR PLAN 1
Last ov 01/4/22 over a year ago with Dr. Nixon for CPX  Last refill on requested med 12/6/2022 #30  Last labs done 11/13/2021 tsh lipid bmp cbc over  Year         Please assist him with establishing with new provider for refill continuation   Patient with fever and tachycardia meeting 2/4 criteria for sepsis 2/2 pneumonia. S/p Vanc and Cefepime.  MRSA swab+.  - c/w CTX 2g qd (tolerated at [facility] per chart review)   - c/w Vancomycin 1000mg q12h (trough before 4th dose today 6PM) for MRSA   - c/w Azithromycin 500mg x 3 days (3/23 - 3/25) for atypicals i/s/o immunocompromised state   - f/u BCx  - f/u RVP  - f/u urine strep and legionella. Patient with fever and tachycardia meeting 2/4 criteria for sepsis 2/2 pneumonia. S/p Vanc and Cefepime.  MRSA swab+.  S/p CTX 2g qd, Doxy 100mg, Azithro 500mg x 2    - ID consulted, recommend extensive infectious workup for c/o PCP. F/u ID note for all labs ordered  - F/u CT Non-con  - c/w Vancomycin 1000mg q12h (trough before 4th dose today 6PM) for MRSA   - c/w Azithromycin 500mg x 3 days (3/23 - 3/25) for atypicals i/s/o immunocompromised state   - f/u BCx  - f/u RVP  - f/u urine strep and legionella. Patient with fever and tachycardia meeting 2/4 criteria for sepsis 2/2 pneumonia. S/p Vanc and Cefepime.  MRSA swab+.  S/p CTX 2g qd, Doxy 100mg, Azithro 500mg x 2 (d/c with negative urine strep/legionella in case patient has MAC/to prevent resistance)  BCx positive for gram positive rods, possible contamination    - ID consulted, recommend extensive infectious workup for c/o PCP. F/u ID note for all labs ordered  - F/u CT Non-con  - c/w Vancomycin 1000mg q12h (trough before 4th dose today tomorrow AM labs) (3/23 - )  - Start Cefepime 2g q8h i/s/o immunocompromised state to cover MRSA and pseudomonas (3/24 - )  - TB R/O - airborne precautions

## 2025-03-24 NOTE — PROGRESS NOTE ADULT - ASSESSMENT
75 year old male with history of HIV (CD4 160, VL 26 7/2024), CAD (prior MI), T2DM, HFrEF (EF 40-45% 11/2024), recurrent syncope, COPD, depression/anxiety,  prostate cancer s/p radiation (2013), presenting for shortness of breath, found to have sepsis 2/2 pneumonia. 75 year old male with history of HIV (CD4 160, VL 26 7/2024), CAD (prior MI), T2DM, HFrEF (EF 40-45% 11/2024), recurrent syncope, COPD, depression/anxiety,  prostate cancer s/p radiation (2013), presenting for shortness of breath, found to have sepsis 2/2 pneumonia, pending further workup with ID.

## 2025-03-24 NOTE — CHART NOTE - NSCHARTNOTEFT_GEN_A_CORE
Was called by RN due to patient wanting to sign out AMA. Asked patient why he wanted to leave, reports that he got a phone call and has to go handle something. Would not explain further.     Patient is AAO x 3. I explained at length to the patient the risks of signing out AMA including but not limited to harm, injury or death. I explained the risks, benefits and alternatives to treatment as well as the attendant risks of refusing treatment at this time. I offered to answer any questions and fully answered any such questions. We believe that the patient fully understands what has been explained and answered. I informed hospitalist Dr. Burt of this, aware. Patient signed form to sign out AMA and accepts responsibility for any and all results of this decision.      Bryson Ferguson MD   Department of Medicine

## 2025-03-24 NOTE — PROGRESS NOTE ADULT - PROBLEM SELECTOR PLAN 4
EF 40-45%. Home med: Carvedilol 3.125mg BID   - hold iso sepsis on admission, restart as tolerated EF 40-45%. Home med: Carvedilol 3.125mg BID     - hold iso sepsis on admission, restart as tolerated

## 2025-03-24 NOTE — PROGRESS NOTE ADULT - PROBLEM SELECTOR PLAN 3
CD4 160, VL 26 7/2024. On dolutegravir (Tivicay) and emtricitabine-tenofovir (Descovy) and Atovaquone   - c/w Atovaquone for PCP ppx (has sulfa allergy)   - c/w Tivicay and Descovy  - f/u CD4 and VL CD4 160, VL 26 7/2024. On dolutegravir (Tivicay) and emtricitabine-tenofovir (Descovy) and Atovaquone \\  CD4 (3/24) - 74, pending viral load    - c/w Atovaquone for PCP ppx (has sulfa allergy)   - Hold Tivicay and Descovy i/s/o active infection

## 2025-03-24 NOTE — PROGRESS NOTE ADULT - PROBLEM SELECTOR PLAN 5
Home med: metformin 500mg, Linagliptin 5mg qd   -Start mISS  - f/u A1c Home med: metformin 500mg, Linagliptin 5mg qd     -Start mISS  - f/u A1c

## 2025-03-25 ENCOUNTER — RESULT REVIEW (OUTPATIENT)
Age: 75
End: 2025-03-25

## 2025-03-25 LAB
ALBUMIN SERPL ELPH-MCNC: 3.2 G/DL — LOW (ref 3.3–5)
ALP SERPL-CCNC: 78 U/L — SIGNIFICANT CHANGE UP (ref 40–120)
ALT FLD-CCNC: 10 U/L — SIGNIFICANT CHANGE UP (ref 10–45)
ANION GAP SERPL CALC-SCNC: 7 MMOL/L — SIGNIFICANT CHANGE UP (ref 5–17)
AST SERPL-CCNC: 17 U/L — SIGNIFICANT CHANGE UP (ref 10–40)
BASOPHILS # BLD AUTO: 0 K/UL — SIGNIFICANT CHANGE UP (ref 0–0.2)
BASOPHILS NFR BLD AUTO: 0 % — SIGNIFICANT CHANGE UP (ref 0–2)
BILIRUB SERPL-MCNC: 0.5 MG/DL — SIGNIFICANT CHANGE UP (ref 0.2–1.2)
BUN SERPL-MCNC: 14 MG/DL — SIGNIFICANT CHANGE UP (ref 7–23)
CALCIUM SERPL-MCNC: 8.7 MG/DL — SIGNIFICANT CHANGE UP (ref 8.4–10.5)
CHLORIDE SERPL-SCNC: 105 MMOL/L — SIGNIFICANT CHANGE UP (ref 96–108)
CO2 SERPL-SCNC: 26 MMOL/L — SIGNIFICANT CHANGE UP (ref 22–31)
CREAT SERPL-MCNC: 1.23 MG/DL — SIGNIFICANT CHANGE UP (ref 0.5–1.3)
CRYPTOC AG FLD QL: NEGATIVE — SIGNIFICANT CHANGE UP
CULTURE RESULTS: ABNORMAL
EGFR: 61 ML/MIN/1.73M2 — SIGNIFICANT CHANGE UP
EGFR: 61 ML/MIN/1.73M2 — SIGNIFICANT CHANGE UP
EOSINOPHIL # BLD AUTO: 0.07 K/UL — SIGNIFICANT CHANGE UP (ref 0–0.5)
EOSINOPHIL NFR BLD AUTO: 2.5 % — SIGNIFICANT CHANGE UP (ref 0–6)
GLUCOSE SERPL-MCNC: 122 MG/DL — HIGH (ref 70–99)
HAV IGM SER-ACNC: SIGNIFICANT CHANGE UP
HBV CORE AB SER-ACNC: REACTIVE
HBV CORE IGM SER-ACNC: SIGNIFICANT CHANGE UP
HBV SURFACE AB SER-ACNC: REACTIVE — SIGNIFICANT CHANGE UP
HBV SURFACE AG SER-ACNC: SIGNIFICANT CHANGE UP
HCT VFR BLD CALC: 36.2 % — LOW (ref 39–50)
HCV AB S/CO SERPL IA: 162.1 S/CO — HIGH
HCV AB SERPL-IMP: REACTIVE
HGB BLD-MCNC: 12 G/DL — LOW (ref 13–17)
IMM GRANULOCYTES NFR BLD AUTO: 0.7 % — SIGNIFICANT CHANGE UP (ref 0–0.9)
LDH SERPL L TO P-CCNC: 242 U/L — SIGNIFICANT CHANGE UP (ref 50–242)
LYMPHOCYTES # BLD AUTO: 0.74 K/UL — LOW (ref 1–3.3)
LYMPHOCYTES # BLD AUTO: 26.9 % — SIGNIFICANT CHANGE UP (ref 13–44)
MAGNESIUM SERPL-MCNC: 1.8 MG/DL — SIGNIFICANT CHANGE UP (ref 1.6–2.6)
MCHC RBC-ENTMCNC: 31.6 PG — SIGNIFICANT CHANGE UP (ref 27–34)
MCHC RBC-ENTMCNC: 33.1 G/DL — SIGNIFICANT CHANGE UP (ref 32–36)
MCV RBC AUTO: 95.3 FL — SIGNIFICANT CHANGE UP (ref 80–100)
MONOCYTES # BLD AUTO: 0.37 K/UL — SIGNIFICANT CHANGE UP (ref 0–0.9)
MONOCYTES NFR BLD AUTO: 13.5 % — SIGNIFICANT CHANGE UP (ref 2–14)
NEUTROPHILS # BLD AUTO: 1.55 K/UL — LOW (ref 1.8–7.4)
NEUTROPHILS NFR BLD AUTO: 56.4 % — SIGNIFICANT CHANGE UP (ref 43–77)
NRBC BLD AUTO-RTO: 0 /100 WBCS — SIGNIFICANT CHANGE UP (ref 0–0)
ORGANISM # SPEC MICROSCOPIC CNT: ABNORMAL
ORGANISM # SPEC MICROSCOPIC CNT: SIGNIFICANT CHANGE UP
PHOSPHATE SERPL-MCNC: 2.5 MG/DL — SIGNIFICANT CHANGE UP (ref 2.5–4.5)
PLATELET # BLD AUTO: 125 K/UL — LOW (ref 150–400)
POTASSIUM SERPL-MCNC: 4.1 MMOL/L — SIGNIFICANT CHANGE UP (ref 3.5–5.3)
POTASSIUM SERPL-SCNC: 4.1 MMOL/L — SIGNIFICANT CHANGE UP (ref 3.5–5.3)
PROT SERPL-MCNC: 6.9 G/DL — SIGNIFICANT CHANGE UP (ref 6–8.3)
RBC # BLD: 3.8 M/UL — LOW (ref 4.2–5.8)
RBC # FLD: 11.9 % — SIGNIFICANT CHANGE UP (ref 10.3–14.5)
SODIUM SERPL-SCNC: 138 MMOL/L — SIGNIFICANT CHANGE UP (ref 135–145)
SPECIMEN SOURCE: SIGNIFICANT CHANGE UP
VANCOMYCIN TROUGH SERPL-MCNC: 14.7 UG/ML — SIGNIFICANT CHANGE UP (ref 10–20)
WBC # BLD: 2.75 K/UL — LOW (ref 3.8–10.5)
WBC # FLD AUTO: 2.75 K/UL — LOW (ref 3.8–10.5)

## 2025-03-25 PROCEDURE — 93306 TTE W/DOPPLER COMPLETE: CPT | Mod: 26

## 2025-03-25 PROCEDURE — 99232 SBSQ HOSP IP/OBS MODERATE 35: CPT

## 2025-03-25 PROCEDURE — G0545: CPT

## 2025-03-25 PROCEDURE — 99233 SBSQ HOSP IP/OBS HIGH 50: CPT | Mod: GC

## 2025-03-25 RX ORDER — HEPARIN SODIUM 1000 [USP'U]/ML
5000 INJECTION INTRAVENOUS; SUBCUTANEOUS EVERY 8 HOURS
Refills: 0 | Status: DISCONTINUED | OUTPATIENT
Start: 2025-03-25 | End: 2025-03-28

## 2025-03-25 RX ORDER — MAGNESIUM SULFATE 500 MG/ML
2 SYRINGE (ML) INJECTION ONCE
Refills: 0 | Status: COMPLETED | OUTPATIENT
Start: 2025-03-25 | End: 2025-03-25

## 2025-03-25 RX ORDER — SOD PHOS DI, MONO/K PHOS MONO 250 MG
1 TABLET ORAL ONCE
Refills: 0 | Status: COMPLETED | OUTPATIENT
Start: 2025-03-25 | End: 2025-03-25

## 2025-03-25 RX ORDER — ENALAPRIL MALEATE 20 MG
10 TABLET ORAL EVERY 24 HOURS
Refills: 0 | Status: DISCONTINUED | OUTPATIENT
Start: 2025-03-25 | End: 2025-03-28

## 2025-03-25 RX ORDER — VANCOMYCIN HCL IN 5 % DEXTROSE 1.5G/250ML
1000 PLASTIC BAG, INJECTION (ML) INTRAVENOUS EVERY 12 HOURS
Refills: 0 | Status: COMPLETED | OUTPATIENT
Start: 2025-03-25 | End: 2025-03-26

## 2025-03-25 RX ADMIN — CEFEPIME 100 MILLIGRAM(S): 2 INJECTION, POWDER, FOR SOLUTION INTRAVENOUS at 14:05

## 2025-03-25 RX ADMIN — ESCITALOPRAM OXALATE 20 MILLIGRAM(S): 20 TABLET ORAL at 17:30

## 2025-03-25 RX ADMIN — HEPARIN SODIUM 5000 UNIT(S): 1000 INJECTION INTRAVENOUS; SUBCUTANEOUS at 14:05

## 2025-03-25 RX ADMIN — Medication 10 MILLIGRAM(S): at 17:30

## 2025-03-25 RX ADMIN — CARVEDILOL 3.12 MILLIGRAM(S): 3.12 TABLET, FILM COATED ORAL at 06:23

## 2025-03-25 RX ADMIN — ARIPIPRAZOLE 5 MILLIGRAM(S): 2 TABLET ORAL at 09:09

## 2025-03-25 RX ADMIN — Medication 100 MILLIGRAM(S): at 06:23

## 2025-03-25 RX ADMIN — CEFEPIME 100 MILLIGRAM(S): 2 INJECTION, POWDER, FOR SOLUTION INTRAVENOUS at 23:02

## 2025-03-25 RX ADMIN — Medication 25 GRAM(S): at 11:51

## 2025-03-25 RX ADMIN — TAMSULOSIN HYDROCHLORIDE 0.4 MILLIGRAM(S): 0.4 CAPSULE ORAL at 22:39

## 2025-03-25 RX ADMIN — Medication 250 MILLIGRAM(S): at 14:23

## 2025-03-25 RX ADMIN — CARVEDILOL 3.12 MILLIGRAM(S): 3.12 TABLET, FILM COATED ORAL at 17:29

## 2025-03-25 RX ADMIN — Medication 1 DOSE(S): at 09:09

## 2025-03-25 RX ADMIN — Medication 4 MILLILITER(S): at 23:02

## 2025-03-25 RX ADMIN — Medication 1 LOZENGE: at 06:23

## 2025-03-25 RX ADMIN — Medication 1 PACKET(S): at 11:51

## 2025-03-25 RX ADMIN — INSULIN LISPRO 2: 100 INJECTION, SOLUTION INTRAVENOUS; SUBCUTANEOUS at 17:28

## 2025-03-25 RX ADMIN — Medication 100 MILLIGRAM(S): at 23:32

## 2025-03-25 RX ADMIN — Medication 650 MILLIGRAM(S): at 09:59

## 2025-03-25 RX ADMIN — ATORVASTATIN CALCIUM 80 MILLIGRAM(S): 80 TABLET, FILM COATED ORAL at 22:39

## 2025-03-25 RX ADMIN — CEFEPIME 100 MILLIGRAM(S): 2 INJECTION, POWDER, FOR SOLUTION INTRAVENOUS at 06:23

## 2025-03-25 RX ADMIN — Medication 3 MILLIGRAM(S): at 22:39

## 2025-03-25 RX ADMIN — Medication 81 MILLIGRAM(S): at 09:09

## 2025-03-25 RX ADMIN — HEPARIN SODIUM 5000 UNIT(S): 1000 INJECTION INTRAVENOUS; SUBCUTANEOUS at 22:38

## 2025-03-25 RX ADMIN — Medication 650 MILLIGRAM(S): at 09:04

## 2025-03-25 NOTE — PROGRESS NOTE ADULT - PROBLEM SELECTOR PLAN 6
Home med: enalapril 10mg  - hold iso sepsis, restart as tolerated Home med: enalapril 10mg  - Restart enalapril 10 mg. Home med: enalapril 10mg  - Hold enalapril 10 mg.

## 2025-03-25 NOTE — PROGRESS NOTE ADULT - ASSESSMENT
75M with HTN, HIV/AIDS (CD4 74, VL 4,516 this admission), CAD (prior MI), T2DM, HFrEF (EF 40-45% 11/2024), recurrent syncope, COPD, depression/anxiety, prostate cancer s/p radiation (2013), hx of hepatitis C treated with harvoni 2015 – NAAT negative 1/2025, Syphilis last treated with 21 days of oral therapy early 2017, presenting for cough and shortness of breath x >1 month admitted for pneumonia. On arrival, febrile 103.5 with WBC 4.43, 81.9% PMNs. CXR with diffuse R lung infiltrates. RVP/COVID negative. Urine strep and legionella antigens negative. S/p Cefepime 2g x 1 3/22,  azithromycin and ceftriaxone. On vancomycin and cefepime. Given low CD4 count and patient's social hx, DDX includes PCP pneumonia (high suspicion), CAP, pulmonary TB, other atypical infections and crack lung. Bcxs 3/22 1/4 bottles growing corynebacterium -- suspect contaminant. Regarding dysphagia - has had for 10 yrs and no e/o thrush on exam, low suspicion for esophageal candidiasis. Afebrile, respiratory symptoms unchanged.     Suggest:  -f/u blood cultures 3/22   -obtain CT chest without contrast   -send sputum culture   -induced sputum x 3 for AFB smear/culture   -induced sputum x 3 for MTB/Rif PCR   -induced sputum for PCP PCR   -F/u Fungitell   -F/u RPR titer  -send urine/pharyngeal GC   -F/u toxo IgG   -F/u QuantiFeron   -F/u serum Crypt Ag   -Send Histoplasma urine Ag and will f/u serum Ag  -if having diarrhea, send GI PCR   -continue airborne precautions   -Hold home ART   -continue vancomycin 1g IV Q12 as trough was therapeutic this AM @ 5:30 --14.7. Check trough prior to 4th dose   -continue cefepime 2g IV Q8   -if patient decompensates, requiring significant O2- start primaquine 30mg PO daily plus clindamycin 900mg IV Q8 plus prednisone 40mg PO BID for empiric coverage of PCP PNA      Team 2 will follow you.    Case d/w primary team.  Final recommendation pending attending note.    Teresa Miller, Infectious Diseases PA  Please reach out for any questions 9 am-5pm.   For evenings and weekends, please call the ID physician on call.

## 2025-03-25 NOTE — PROGRESS NOTE ADULT - ATTENDING COMMENTS
Patient seen and examined by me. Case discussed with resident and agree with the resident's findings and plan as documented in the resident's note. 75M h/o HIV/AIDS (CD4 74/12%, VL 4516 3/2025), non-obstructive CAD (last cath 7/2021) and HFrEF LV EF (LVEF 35-40% with LVH and RWMA 9/2021), prior prostate cancer p/w 1-month-history of cough and shortness of breath a/w sepsis 2/2 acute hypoxemic respiratory failure 2/2 pneumonia.    1. Sepsis:   -sepsis resolved  -f/u ID recs  -f/u respiratory therapy to induce sputum for AFB x 3   -f/u CT Non-con ordered  -c/w Vancomycin 1000mg q12h ( vancomycin trough: 14.7 ) (3/23 - ) and Cefepime 2g q8h i/s/o immunocompromised state to cover MRSA and pseudomonas (3/24 - )  -TB R/O- airborne precautions; QuantiFeron pending    2. HIV:  -holding HIV meds  -f/u ID re-whether to resume or not    3. HFrEF:  -f/u TTE    4. r/o Hep C:  -f/u RNA viral load    5. Diabetes type 2:  -holding metformin  -c/w ISS; FlO0l=8.6%    6. Dispo:  -f/u PT recs Patient seen and examined by me. Case discussed with resident and agree with the resident's findings and plan as documented in the resident's note. 75M h/o HIV/AIDS (CD4 74/12%, VL 4516 3/2025), non-obstructive CAD (last cath 7/2021) and HFrEF LV EF (LVEF 35-40% with LVH and RWMA 9/2021), prior prostate cancer p/w 1-month-history of cough and shortness of breath a/w sepsis 2/2 acute hypoxemic respiratory failure 2/2 pneumonia.    1. Sepsis:   -sepsis resolved  -f/u ID recs  -f/u respiratory therapy to induce sputum for AFB x 3   -f/u CT Non-con ordered  -c/w Vancomycin 1000mg q12h ( vancomycin trough: 14.7 ) (3/23 - ) and Cefepime 2g q8h i/s/o immunocompromised state to cover MRSA and pseudomonas (3/24 - )  -TB R/O- airborne precautions; QuantiFeron pending    2. HIV:  -holding HIV meds  -f/u ID re-whether to resume or not    3. HFrEF:  -f/u TTE    4. r/o Hep C:  -f/u RNA viral load    5. Diabetes type 2:  -holding metformin  -c/w ISS; NpW6p=1.6%    6. Dispo:  -f/u PT recommendations

## 2025-03-25 NOTE — PROGRESS NOTE ADULT - PROBLEM SELECTOR PLAN 5
Home med: metformin 500mg, Linagliptin 5mg qd     -Start mISS  - f/u A1c Home med: metformin 500mg, Linagliptin 5mg qd    -Hold metformin   -c/w ISS; LfH4b=1.6%

## 2025-03-25 NOTE — PROGRESS NOTE ADULT - PROBLEM SELECTOR PLAN 3
CD4 160, VL 26 7/2024. On dolutegravir (Tivicay) and emtricitabine-tenofovir (Descovy) and Atovaquone \\  CD4 (3/24) - 74, pending viral load    - c/w Atovaquone for PCP ppx (has sulfa allergy)   - Hold Tivicay and Descovy i/s/o active infection CD4 160, VL 26 7/2024. On dolutegravir (Tivicay) and emtricitabine-tenofovir (Descovy) and Atovaquone \\  CD4 (3/24) - 74, 3.65 VL    - c/w Atovaquone for PCP ppx (has sulfa allergy)   - Restart Tivicay and Descovy CD4 160, VL 26 7/2024. On dolutegravir (Tivicay) and emtricitabine-tenofovir (Descovy) and Atovaquone \\  CD4 (3/24) - 74, 3.65 VL    - c/w Atovaquone for PCP ppx (has sulfa allergy)   - Hold Tivicay and Descovy. F/U ID recs

## 2025-03-25 NOTE — PROGRESS NOTE ADULT - SUBJECTIVE AND OBJECTIVE BOX
*****INCOMPLETE NOTE*****    INTERVAL HPI/OVERNIGHT EVENTS:    SUBJECTIVE: Patient seen and examined at bedside, resting comfortably in bed, and does not appear to be in any acute distress. Patient reports    Vital Signs Last 24 Hrs  T(C): 36.7 (25 Mar 2025 05:34), Max: 36.9 (24 Mar 2025 12:15)  T(F): 98 (25 Mar 2025 05:34), Max: 98.5 (24 Mar 2025 12:15)  HR: 79 (25 Mar 2025 05:34) (73 - 108)  BP: 150/89 (25 Mar 2025 05:34) (121/73 - 157/92)  BP(mean): --  RR: 20 (25 Mar 2025 05:34) (17 - 22)  SpO2: 96% (25 Mar 2025 05:34) (94% - 96%)    Parameters below as of 25 Mar 2025 05:34  Patient On (Oxygen Delivery Method): nasal cannula  O2 Flow (L/min): 2      PHYSICAL EXAM:  General: in no acute distress  Eyes: EOMI intact bilaterally. Anicteric sclerae, moist conjunctivae  HENT: Moist mucous membranes  Neck: Trachea midline, supple  Lungs: CTA B/L. No wheezes, rales, or rhonchi  Cardiovascular: RRR. No murmurs, rubs, or gallops  Abdomen: Soft, non-tender non-distended; No rebound or guarding  Extremities: WWP, No clubbing, cyanosis or edema  Neurological: Alert and oriented  Skin: Warm and dry. No obvious rash     LABS:                        11.0   2.92  )-----------( 103      ( 24 Mar 2025 05:30 )             33.4     03-24    133[L]  |  100  |  16  ----------------------------<  165[H]  3.8   |  22  |  1.21    Ca    7.9[L]      24 Mar 2025 05:30  Phos  1.8     03-24  Mg     2.0     03-24    TPro  6.2  /  Alb  2.9[L]  /  TBili  0.5  /  DBili  x   /  AST  13  /  ALT  8[L]  /  AlkPhos  57  03-24   *****INCOMPLETE NOTE*****    INTERVAL HPI/OVERNIGHT EVENTS:    SUBJECTIVE: Patient seen and examined at bedside, resting comfortably in bed, and does not appear to be in any acute distress. Patient reports    Vital Signs Last 24 Hrs  T(C): 36.7 (25 Mar 2025 05:34), Max: 36.9 (24 Mar 2025 12:15)  T(F): 98 (25 Mar 2025 05:34), Max: 98.5 (24 Mar 2025 12:15)  HR: 79 (25 Mar 2025 05:34) (73 - 108)  BP: 150/89 (25 Mar 2025 05:34) (121/73 - 157/92)  BP(mean): --  RR: 20 (25 Mar 2025 05:34) (17 - 22)  SpO2: 96% (25 Mar 2025 05:34) (94% - 96%)    Parameters below as of 25 Mar 2025 05:34  Patient On (Oxygen Delivery Method): nasal cannula  O2 Flow (L/min): 2      PHYSICAL EXAM:  General: in no acute distress  Eyes: EOMI intact bilaterally. Anicteric sclerae, moist conjunctivae  HENT: Moist mucous membranes  Neck: Trachea midline, supple  Lungs: Diffuse rhonci on anterior right side and rhonchi on posterior left side. No Rales or wheezing.   Cardiovascular: RRR. No murmurs, rubs, or gallops  Abdomen: Soft, non-tender non-distended; No rebound or guarding  Extremities: WWP, No clubbing, cyanosis or edema  Neurological: Alert and oriented  Skin: Warm and dry. No obvious rash     LABS:                        12.0   2.75  )-----------( 125      ( 25 Mar 2025 05:30 )             36.2     03-25    138  |  105  |  14  ----------------------------<  122 [H]  4.1   |  26  |  1.23    Ca    8.7     25 Mar 2025 05:30  Phos  2.5     03-25  Mg    1.8     03-25    TPro  6.9  /  Alb  3.2 [L]  /  TBili  0.5  /  DBili  x   /  AST  17  /  ALT 10  /  AlkPhos  78 03-25   *****INCOMPLETE NOTE*****    INTERVAL HPI/OVERNIGHT EVENTS: Nirmala.     SUBJECTIVE: Patient seen and examined at bedside. He is resting comfortably in bed and does not appear to be acute distress. He reports SOB, nonproductive cough, and reports 1 episode of diarrhea this morning. Denies orthopnea, hematochezia, constipation, abdominal pain, headache, nausea, vomiting, fever.    Vital Signs Last 24 Hrs  T(C): 36.7 (25 Mar 2025 05:34), Max: 36.9 (24 Mar 2025 12:15)  T(F): 98 (25 Mar 2025 05:34), Max: 98.5 (24 Mar 2025 12:15)  HR: 79 (25 Mar 2025 05:34) (73 - 108)  BP: 150/89 (25 Mar 2025 05:34) (121/73 - 157/92)  BP(mean): --  RR: 20 (25 Mar 2025 05:34) (17 - 22)  SpO2: 96% (25 Mar 2025 05:34) (94% - 96%)    Parameters below as of 25 Mar 2025 05:34  Patient On (Oxygen Delivery Method): nasal cannula  O2 Flow (L/min): 2      PHYSICAL EXAM:  General: in no acute distress  Eyes: EOMI intact bilaterally. Anicteric sclerae, moist conjunctivae  HENT: Moist mucous membranes  Neck: Trachea midline, supple  Lungs: Diffuse rhonci on anterior right side and rhonchi on posterior left side. No Rales or wheezing.   Cardiovascular: RRR. No murmurs, rubs, or gallops  Abdomen: Soft, non-tender non-distended; No rebound or guarding  Extremities: WWP, No clubbing, cyanosis or edema.  Neurological: Alert and oriented  Skin: Warm and dry. No obvious rash     LABS:                        12.0   2.75  )-----------( 125      ( 25 Mar 2025 05:30 )             36.2     03-25    138  |  105  |  14  ----------------------------<  122 [H]  4.1   |  26  |  1.23    Ca    8.7     25 Mar 2025 05:30  Phos  2.5     03-25  Mg    1.8     03-25    TPro  6.9  /  Alb  3.2 [L]  /  TBili  0.5  /  DBili  x   /  AST  17  /  ALT 10  /  AlkPhos  78 03-25   INTERVAL HPI/OVERNIGHT EVENTS: Nirmala.     SUBJECTIVE: Patient seen and examined at bedside. He is resting comfortably in bed and does not appear to be acute distress. He reports SOB, nonproductive cough, and reports 1 episode of diarrhea this morning. Denies orthopnea, hematochezia, constipation, abdominal pain, headache, nausea, vomiting, fever.    Vital Signs Last 24 Hrs  T(C): 36.7 (25 Mar 2025 05:34), Max: 36.9 (24 Mar 2025 12:15)  T(F): 98 (25 Mar 2025 05:34), Max: 98.5 (24 Mar 2025 12:15)  HR: 79 (25 Mar 2025 05:34) (73 - 108)  BP: 150/89 (25 Mar 2025 05:34) (121/73 - 157/92)  BP(mean): --  RR: 20 (25 Mar 2025 05:34) (17 - 22)  SpO2: 96% (25 Mar 2025 05:34) (94% - 96%)    Parameters below as of 25 Mar 2025 05:34  Patient On (Oxygen Delivery Method): nasal cannula  O2 Flow (L/min): 2      PHYSICAL EXAM:  General: in no acute distress  Eyes: EOMI intact bilaterally. Anicteric sclerae, moist conjunctivae  HENT: Moist mucous membranes  Neck: Trachea midline, supple  Lungs: Diffuse rhonci on anterior right side and rhonchi on posterior left side. No Rales or wheezing.   Cardiovascular: RRR. No murmurs, rubs, or gallops  Abdomen: Soft, non-tender non-distended; No rebound or guarding  Extremities: WWP, No clubbing, cyanosis or edema.  Neurological: Alert and oriented  Skin: Warm and dry. No obvious rash     LABS:                        12.0   2.75  )-----------( 125      ( 25 Mar 2025 05:30 )             36.2     03-25    138  |  105  |  14  ----------------------------<  122 [H]  4.1   |  26  |  1.23    Ca    8.7     25 Mar 2025 05:30  Phos  2.5     03-25  Mg    1.8     03-25    TPro  6.9  /  Alb  3.2 [L]  /  TBili  0.5  /  DBili  x   /  AST  17  /  ALT 10  /  AlkPhos  78 03-25

## 2025-03-25 NOTE — PROGRESS NOTE ADULT - PROBLEM SELECTOR PLAN 12
F: none  E: replete as necessary  N: Carb consistent  DVT: heparin SubQ  Dispo: [facility] F: none  E: replete as necessary  N: Carb consistent  DVT: heparin SubQ  Dispo: [facility]  f/u PT recs

## 2025-03-25 NOTE — PROGRESS NOTE ADULT - PROBLEM SELECTOR PLAN 2
CXR with RLL opacity. S/p Vanc and Cefepime. Reports prior hx of hospitalizations due to pneumonia, most recently last year.   MRSA swab+, urine legionella and strep negative, RVP negative    - C/w abx as above

## 2025-03-25 NOTE — PROGRESS NOTE ADULT - PROBLEM SELECTOR PLAN 8
Platelets 120 on admission. Hx of HIV (non-compliant with meds). Prior platelets 120 (1/2025). Likely chronic thrombocytopenia.   - continue to trend platelets Platelets 120 on admission. Hx of HIV (non-compliant with meds). Prior platelets 120 (1/2025). Likely chronic thrombocytopenia.   - continue to trend platelets; 125 (3/25)

## 2025-03-25 NOTE — PROGRESS NOTE ADULT - PROBLEM SELECTOR PLAN 4
EF 40-45%. Home med: Carvedilol 3.125mg BID     - hold iso sepsis on admission, restart as tolerated EF 40-45%. Home med: Carvedilol 3.125mg BID     - C/w carvedilol 3.125 mg BID. EF 40-45%. Home med: Carvedilol 3.125mg BID     - Restarted carvedilol 3.125 mg BID d/t high HR: 108 (3/24)  - F/u TTE

## 2025-03-25 NOTE — PROGRESS NOTE ADULT - ASSESSMENT
75 year old male with history of HIV (CD4 160, VL 26 7/2024), CAD (prior MI), T2DM, HFrEF (EF 40-45% 11/2024), recurrent syncope, COPD, depression/anxiety,  prostate cancer s/p radiation (2013), presenting for shortness of breath, found to have sepsis 2/2 pneumonia, pending further workup with ID. 75 year old male with history of HIV (CD4 160, VL 26 7/2024), CAD (prior MI), T2DM, HFrEF (EF 40-45% 11/2024), recurrent syncope, COPD, depression/anxiety,  prostate cancer s/p radiation (2013), presenting for shortness of breath, found to have sepsis 2/2 pneumonia, pending lab results, CT head non-contrast, and TTE  per with ID.

## 2025-03-25 NOTE — PROGRESS NOTE ADULT - SUBJECTIVE AND OBJECTIVE BOX
INFECTIOUS DISEASES CONSULT FOLLOW-UP NOTE    INTERVAL HPI/OVERNIGHT EVENTS:    Pt seen and examined at bedside. MILLY. Afebrile. On 2L O2 via NC. States cough is mostly unchanged - still dry, thinks maybe less freq. States SOB is better today with supplemental O2.       ROS:   Constitutional, eyes, ENT, cardiovascular, respiratory, gastrointestinal, genitourinary, integumentary, neurological, psychiatric and heme/lymph are otherwise negative other than noted above       ANTIBIOTICS/RELEVANT:    MEDICATIONS  (STANDING):  ARIPiprazole 5 milliGRAM(s) Oral every 24 hours  aspirin  chewable 81 milliGRAM(s) Oral every 24 hours  atorvastatin 80 milliGRAM(s) Oral at bedtime  atovaquone  Suspension 1500 milliGRAM(s) Oral daily  carvedilol 3.125 milliGRAM(s) Oral every 12 hours  cefepime   IVPB 2000 milliGRAM(s) IV Intermittent every 8 hours  dextrose 5%. 1000 milliLiter(s) (100 mL/Hr) IV Continuous <Continuous>  dextrose 5%. 1000 milliLiter(s) (50 mL/Hr) IV Continuous <Continuous>  dextrose 50% Injectable 25 Gram(s) IV Push once  dextrose 50% Injectable 12.5 Gram(s) IV Push once  dextrose 50% Injectable 25 Gram(s) IV Push once  enalapril 10 milliGRAM(s) Oral every 24 hours  escitalopram 20 milliGRAM(s) Oral every 24 hours  fluticasone propionate/ salmeterol 100-50 MICROgram(s) Diskus 1 Dose(s) Inhalation two times a day  glucagon  Injectable 1 milliGRAM(s) IntraMuscular once  heparin   Injectable 5000 Unit(s) SubCutaneous every 8 hours  insulin lispro (ADMELOG) corrective regimen sliding scale   SubCutaneous three times a day before meals  insulin lispro (ADMELOG) corrective regimen sliding scale   SubCutaneous at bedtime  sodium chloride 3%  Inhalation 4 milliLiter(s) Inhalation once  tamsulosin 0.4 milliGRAM(s) Oral at bedtime  vancomycin  IVPB 1000 milliGRAM(s) IV Intermittent every 12 hours    MEDICATIONS  (PRN):  acetaminophen     Tablet .. 650 milliGRAM(s) Oral every 6 hours PRN Temp greater or equal to 38C (100.4F), Mild Pain (1 - 3)  aluminum hydroxide/magnesium hydroxide/simethicone Suspension 30 milliLiter(s) Oral every 4 hours PRN Dyspepsia  benzocaine/menthol Lozenge 1 Lozenge Oral three times a day PRN Sore Throat  benzonatate 100 milliGRAM(s) Oral three times a day PRN Cough  dextrose Oral Gel 15 Gram(s) Oral once PRN Blood Glucose LESS THAN 70 milliGRAM(s)/deciliter  melatonin 3 milliGRAM(s) Oral at bedtime PRN Insomnia  ondansetron Injectable 4 milliGRAM(s) IV Push every 8 hours PRN Nausea and/or Vomiting        Vital Signs Last 24 Hrs  T(C): 36.2 (25 Mar 2025 09:14), Max: 36.8 (24 Mar 2025 22:30)  T(F): 97.1 (25 Mar 2025 09:14), Max: 98.2 (24 Mar 2025 22:30)  HR: 85 (25 Mar 2025 09:14) (73 - 86)  BP: 151/91 (25 Mar 2025 09:14) (134/82 - 157/92)  BP(mean): --  RR: 20 (25 Mar 2025 09:14) (17 - 20)  SpO2: 95% (25 Mar 2025 09:14) (95% - 96%)    Parameters below as of 25 Mar 2025 09:14  Patient On (Oxygen Delivery Method): room air        PHYSICAL EXAM:  Constitutional: alert, NAD, resting comfortably in bed   Eyes: the sclera and conjunctiva were normal.   ENT: the ears and nose were normal in appearance.   Neck: the appearance of the neck was normal and the neck was supple.   Pulmonary: +NC. Decreased WOB today however still with conversational dyspnea having to stop to take breaths about every 5 words.     Vascular: there was no peripheral edema  Abdomen: soft, non-tender  Neurological: no focal deficits.   Psychiatric: the affect was normal        LABS:                        12.0   2.75  )-----------( 125      ( 25 Mar 2025 05:30 )             36.2     03-25    138  |  105  |  14  ----------------------------<  122[H]  4.1   |  26  |  1.23    Ca    8.7      25 Mar 2025 05:30  Phos  2.5     03-25  Mg     1.8     03-25    TPro  6.9  /  Alb  3.2[L]  /  TBili  0.5  /  DBili  x   /  AST  17  /  ALT  10  /  AlkPhos  78  03-25      Urinalysis Basic - ( 25 Mar 2025 05:30 )    Color: x / Appearance: x / SG: x / pH: x  Gluc: 122 mg/dL / Ketone: x  / Bili: x / Urobili: x   Blood: x / Protein: x / Nitrite: x   Leuk Esterase: x / RBC: x / WBC x   Sq Epi: x / Non Sq Epi: x / Bacteria: x        MICROBIOLOGY:    Culture - Urine (collected 03-22-25 @ 21:03)  Source: Clean Catch Clean Catch (Midstream)  Final Report (03-24-25 @ 07:08):    No growth    Culture - Blood (collected 03-22-25 @ 21:03)  Source: Blood Blood-Peripheral  Gram Stain (03-24-25 @ 14:21):    Growth in anaerobic bottle: Gram Positive Rods  Final Report (03-25-25 @ 12:22):    Growth in anaerobic bottle: Corynebacterium imitans "Susceptibilities not    performed"    Direct identification is available within approximately 3-5    hours either by Blood Panel Multiplexed PCR or Direct    MALDI-TOF. Details: [email or web address]  Organism: Blood Culture PCR (03-25-25 @ 12:22)  Organism: Blood Culture PCR (03-25-25 @ 12:22)      Method Type: PCR      -  Corynebacterium species: Detec    Culture - Blood (collected 03-22-25 @ 21:03)  Source: Blood Blood-Peripheral  Preliminary Report (03-25-25 @ 05:01):    No growth at 48 Hours    Urinalysis with Rflx Culture (collected 03-22-25 @ 21:03)        RADIOLOGY & ADDITIONAL STUDIES:  Reviewed

## 2025-03-25 NOTE — PROGRESS NOTE ADULT - PROBLEM SELECTOR PLAN 1
Patient with fever and tachycardia meeting 2/4 criteria for sepsis 2/2 pneumonia. S/p Vanc and Cefepime.  MRSA swab+.  S/p CTX 2g qd, Doxy 100mg, Azithro 500mg x 2 (d/c with negative urine strep/legionella in case patient has MAC/to prevent resistance)  BCx positive for gram positive rods, possible contamination    - ID consulted, recommend extensive infectious workup for c/o PCP. F/u ID note for all labs ordered  - F/u CT Non-con  - c/w Vancomycin 1000mg q12h (trough before 4th dose today tomorrow AM labs) (3/23 - )  - Start Cefepime 2g q8h i/s/o immunocompromised state to cover MRSA and pseudomonas (3/24 - )  - TB R/O - airborne precautions Patient with fever and tachycardia meeting 2/4 criteria for sepsis 2/2 pneumonia. S/p Vanc and Cefepime.  MRSA swab+.  S/p CTX 2g qd, Doxy 100mg, Azithro 500mg x 2 (d/c with negative urine strep/legionella in case patient has MAC/to prevent resistance)  BCx positive for gram positive rods, possible contamination    - ID consulted, recommend extensive infectious workup for c/o PCP. F/u ID note for all labs ordered  - Induce sputum. Once pt is able to produce sputum, send sputum cultures x3 (AFB, smear/culture, MTB/Rif PCR)  -  CT Non-con pending 3/25  - c/w Vancomycin 1000mg q12h (vancomycin trough: 14.7 this AM  ) (3/23 - )  - Start Cefepime 2g q8h i/s/o immunocompromised state to cover MRSA and pseudomonas (3/24 - )  - TB R/O - airborne precautions Patient with fever and tachycardia meeting 2/4 criteria for sepsis 2/2 pneumonia. S/p Vanc and Cefepime.  MRSA swab+.  S/p CTX 2g qd, Doxy 100mg, Azithro 500mg x 2 (d/c with negative urine strep/legionella in case patient has MAC/to prevent resistance)  BCx positive for gram positive rods, possible contamination    - ID consulted, recommend extensive infectious workup for c/o PCP. F/u ID note for all labs ordered  - F/U RT to induce sputum for AFB x 3   -  CT Non-con ordered  - Reorder Vancomycin 1000mg q12h ( vancomycin trough: 14.7 ) (3/23 - )  - Start Cefepime 2g q8h i/s/o immunocompromised state to cover MRSA and pseudomonas (3/24 - )  - TB R/O- airborne precautions; QuantiFeron pending Patient with fever and tachycardia meeting 2/4 criteria for sepsis 2/2 pneumonia. S/p Vanc and Cefepime.  MRSA swab+.  S/p CTX 2g qd, Doxy 100mg, Azithro 500mg x 2 (d/c with negative urine strep/legionella in case patient has MAC/to prevent resistance)  BCx positive for gram positive rods, possible contamination    - ID consulted, recommend extensive infectious workup for c/o PCP. F/u ID note for all labs ordered  - F/U RT to induce sputum for AFB x 3   -  CT Non-con ordered  - Reorder Vancomycin 1000mg q12h ( vancomycin trough: 14.7 ) (3/23 - )  - Start Cefepime 2g q8h i/s/o immunocompromised state to cover MRSA and pseudomonas (3/24 - )  - TB R/O- airborne precautions; QuantiFeron pending  - Hep C R/O- F/U RNA Viral load

## 2025-03-25 NOTE — PROGRESS NOTE ADULT - PROBLEM SELECTOR PLAN 9
Creatine 1.58 on admission, prior Cr 1.59 (1/2025). Baseline Cr 1.4-1.6. RESOLVED    - continue to trend  - avoid nephrotoxic meds Creatine 1.58 on admission, prior Cr 1.59 (1/2025). Baseline Cr 1.4-1.6. RESOLVED    - continue to trend; 123 (3/25)   - avoid nephrotoxic meds

## 2025-03-26 DIAGNOSIS — K52.9 NONINFECTIVE GASTROENTERITIS AND COLITIS, UNSPECIFIED: ICD-10-CM

## 2025-03-26 LAB
BASOPHILS # BLD AUTO: 0 K/UL — SIGNIFICANT CHANGE UP (ref 0–0.2)
BASOPHILS NFR BLD AUTO: 0 % — SIGNIFICANT CHANGE UP (ref 0–2)
DEPRECATED M TB RPOB XXX QL NAA+PROBE: SIGNIFICANT CHANGE UP
EOSINOPHIL # BLD AUTO: 0.12 K/UL — SIGNIFICANT CHANGE UP (ref 0–0.5)
EOSINOPHIL NFR BLD AUTO: 4.5 % — SIGNIFICANT CHANGE UP (ref 0–6)
GI PCR PANEL: SIGNIFICANT CHANGE UP
HCT VFR BLD CALC: 35.4 % — LOW (ref 39–50)
HGB BLD-MCNC: 12 G/DL — LOW (ref 13–17)
LYMPHOCYTES # BLD AUTO: 0.73 K/UL — LOW (ref 1–3.3)
LYMPHOCYTES # BLD AUTO: 26.4 % — SIGNIFICANT CHANGE UP (ref 13–44)
M TB CMPLX DNA SPT/BRO QL NAA+PROBE: SIGNIFICANT CHANGE UP
M TB CMPLX DNA SPT/BRO QL NAA+PROBE: SIGNIFICANT CHANGE UP
MCHC RBC-ENTMCNC: 32.3 PG — SIGNIFICANT CHANGE UP (ref 27–34)
MCHC RBC-ENTMCNC: 33.9 G/DL — SIGNIFICANT CHANGE UP (ref 32–36)
MCV RBC AUTO: 95.4 FL — SIGNIFICANT CHANGE UP (ref 80–100)
MONOCYTES # BLD AUTO: 0.5 K/UL — SIGNIFICANT CHANGE UP (ref 0–0.9)
MONOCYTES NFR BLD AUTO: 18.2 % — HIGH (ref 2–14)
MTB RIF RES GENE SPT NAA+PROBE: SIGNIFICANT CHANGE UP
NEUTROPHILS # BLD AUTO: 1.36 K/UL — LOW (ref 1.8–7.4)
NEUTROPHILS NFR BLD AUTO: 49.1 % — SIGNIFICANT CHANGE UP (ref 43–77)
PLATELET # BLD AUTO: 145 K/UL — LOW (ref 150–400)
RBC # BLD: 3.71 M/UL — LOW (ref 4.2–5.8)
RBC # FLD: 11.9 % — SIGNIFICANT CHANGE UP (ref 10.3–14.5)
RPR SER-TITR: ABNORMAL
T GONDII IGG SER QL: <3 IU/ML — SIGNIFICANT CHANGE UP
T GONDII IGG SER QL: NEGATIVE — SIGNIFICANT CHANGE UP
T GONDII IGM SER QL: <3 AU/ML — SIGNIFICANT CHANGE UP
T GONDII IGM SER QL: NEGATIVE — SIGNIFICANT CHANGE UP
WBC # BLD: 2.76 K/UL — LOW (ref 3.8–10.5)
WBC # FLD AUTO: 2.76 K/UL — LOW (ref 3.8–10.5)

## 2025-03-26 PROCEDURE — 99232 SBSQ HOSP IP/OBS MODERATE 35: CPT

## 2025-03-26 PROCEDURE — 99233 SBSQ HOSP IP/OBS HIGH 50: CPT | Mod: GC

## 2025-03-26 PROCEDURE — G0545: CPT

## 2025-03-26 PROCEDURE — 71250 CT THORAX DX C-: CPT | Mod: 26

## 2025-03-26 RX ORDER — MELATONIN 5 MG
5 TABLET ORAL AT BEDTIME
Refills: 0 | Status: DISCONTINUED | OUTPATIENT
Start: 2025-03-26 | End: 2025-03-28

## 2025-03-26 RX ORDER — IPRATROPIUM BROMIDE AND ALBUTEROL SULFATE .5; 2.5 MG/3ML; MG/3ML
3 SOLUTION RESPIRATORY (INHALATION) EVERY 8 HOURS
Refills: 0 | Status: DISCONTINUED | OUTPATIENT
Start: 2025-03-26 | End: 2025-03-27

## 2025-03-26 RX ADMIN — Medication 81 MILLIGRAM(S): at 10:20

## 2025-03-26 RX ADMIN — ATOVAQUONE 1500 MILLIGRAM(S): 750 SUSPENSION ORAL at 13:28

## 2025-03-26 RX ADMIN — Medication 650 MILLIGRAM(S): at 17:23

## 2025-03-26 RX ADMIN — Medication 650 MILLIGRAM(S): at 02:37

## 2025-03-26 RX ADMIN — ATORVASTATIN CALCIUM 80 MILLIGRAM(S): 80 TABLET, FILM COATED ORAL at 21:37

## 2025-03-26 RX ADMIN — CEFEPIME 100 MILLIGRAM(S): 2 INJECTION, POWDER, FOR SOLUTION INTRAVENOUS at 06:59

## 2025-03-26 RX ADMIN — Medication 250 MILLIGRAM(S): at 14:05

## 2025-03-26 RX ADMIN — HEPARIN SODIUM 5000 UNIT(S): 1000 INJECTION INTRAVENOUS; SUBCUTANEOUS at 07:00

## 2025-03-26 RX ADMIN — CEFEPIME 100 MILLIGRAM(S): 2 INJECTION, POWDER, FOR SOLUTION INTRAVENOUS at 13:28

## 2025-03-26 RX ADMIN — ARIPIPRAZOLE 5 MILLIGRAM(S): 2 TABLET ORAL at 10:19

## 2025-03-26 RX ADMIN — Medication 10 MILLIGRAM(S): at 10:21

## 2025-03-26 RX ADMIN — Medication 650 MILLIGRAM(S): at 01:37

## 2025-03-26 RX ADMIN — Medication 650 MILLIGRAM(S): at 18:16

## 2025-03-26 RX ADMIN — Medication 4 MILLILITER(S): at 10:20

## 2025-03-26 RX ADMIN — Medication 5 MILLIGRAM(S): at 22:02

## 2025-03-26 RX ADMIN — Medication 650 MILLIGRAM(S): at 10:19

## 2025-03-26 RX ADMIN — CEFEPIME 100 MILLIGRAM(S): 2 INJECTION, POWDER, FOR SOLUTION INTRAVENOUS at 21:36

## 2025-03-26 RX ADMIN — TAMSULOSIN HYDROCHLORIDE 0.4 MILLIGRAM(S): 0.4 CAPSULE ORAL at 21:37

## 2025-03-26 RX ADMIN — HEPARIN SODIUM 5000 UNIT(S): 1000 INJECTION INTRAVENOUS; SUBCUTANEOUS at 13:28

## 2025-03-26 RX ADMIN — Medication 650 MILLIGRAM(S): at 11:10

## 2025-03-26 RX ADMIN — ESCITALOPRAM OXALATE 20 MILLIGRAM(S): 20 TABLET ORAL at 14:05

## 2025-03-26 RX ADMIN — IPRATROPIUM BROMIDE AND ALBUTEROL SULFATE 3 MILLILITER(S): .5; 2.5 SOLUTION RESPIRATORY (INHALATION) at 10:20

## 2025-03-26 RX ADMIN — Medication 1 DOSE(S): at 10:27

## 2025-03-26 RX ADMIN — HEPARIN SODIUM 5000 UNIT(S): 1000 INJECTION INTRAVENOUS; SUBCUTANEOUS at 21:37

## 2025-03-26 RX ADMIN — Medication 1 DOSE(S): at 22:02

## 2025-03-26 RX ADMIN — Medication 250 MILLIGRAM(S): at 01:25

## 2025-03-26 RX ADMIN — CARVEDILOL 3.12 MILLIGRAM(S): 3.12 TABLET, FILM COATED ORAL at 07:00

## 2025-03-26 RX ADMIN — CARVEDILOL 3.12 MILLIGRAM(S): 3.12 TABLET, FILM COATED ORAL at 17:23

## 2025-03-26 NOTE — PHYSICAL THERAPY INITIAL EVALUATION ADULT - GENERAL OBSERVATIONS, REHAB EVAL
PT IE completed. Chart reviewed. Pt received semi-supine, NAD, +IV, +1LO2NC. NIURKA Luu cleared pt for PT.

## 2025-03-26 NOTE — PHYSICAL THERAPY INITIAL EVALUATION ADULT - SITTING BALANCE: STATIC
----- Message from VIDAL Mccray sent at 10/29/2018 10:36 AM EDT -----  Please inform patient:  Your recent pap smear showed mild inflammatory changes only.   These changes occur as a result of mild inflammation in the vagina  When the pap smear shows inflammation, we automatically check for the HPV virus.   This test for HPV returned negative.  It is safe to repeat your pap smear in 1 year.     supervision

## 2025-03-26 NOTE — PROGRESS NOTE ADULT - ASSESSMENT
75 year old male with history of HIV (CD4 160, VL 26 7/2024), CAD (prior MI), T2DM, HFrEF (EF 40-45% 11/2024), recurrent syncope, COPD, depression/anxiety,  prostate cancer s/p radiation (2013), presenting for shortness of breath, found to have sepsis 2/2 pneumonia, pending lab results, CT head non-contrast, and TTE  per with ID. 75 year old male with history of HIV (CD4 160, VL 26 7/2024), CAD (prior MI), T2DM, HFrEF (EF 35-40% 3/16185, previously 40-45%4), recurrent syncope, COPD, depression/anxiety,  prostate cancer s/p radiation (2013), presenting for shortness of breath, found to have sepsis 2/2 pneumonia, pending lab results, and CT head non-contrast per ID. 75 year old male with history of HIV (CD4 160, VL 26 7/2024), CAD (prior MI), T2DM, HFrEF (EF 35-40%), recurrent syncope, COPD, depression/anxiety,  prostate cancer s/p radiation (2013), presenting for shortness of breath, found to have sepsis 2/2 pneumonia, pending lab results, and CT head non-contrast per ID. 75 year old male with history of HIV (CD4 160, VL 26 7/2024), CAD (prior MI), T2DM, HFrEF (EF 35-40%), recurrent syncope, COPD, depression/anxiety,  prostate cancer s/p radiation (2013), presenting for shortness of breath, found to have sepsis 2/2 pneumonia that is now resolved, pending lab results, and CT head non-contrast per ID. 75 year old male with history of HIV (CD4 160, VL 26 7/2024), CAD (prior MI), T2DM, HFrEF (EF 35-40%), recurrent syncope, COPD, depression/anxiety,  prostate cancer s/p radiation (2013), presenting for shortness of breath, found to have sepsis 2/2 pneumonia that is now resolved, pending lab results, and CT chest non-contrast per ID.

## 2025-03-26 NOTE — PHYSICAL THERAPY INITIAL EVALUATION ADULT - ADDITIONAL COMMENTS
Pt reports living in [place] with 5 [facility]. Reports moving into apartment without COLIN. Reports being independent with daily activities without use of DME.

## 2025-03-26 NOTE — PROGRESS NOTE ADULT - SUBJECTIVE AND OBJECTIVE BOX
INFECTIOUS DISEASES CONSULT FOLLOW-UP NOTE    INTERVAL HPI/OVERNIGHT EVENTS:    Pt seen and examined at bedside. MILLY. Afebrile. Cough and SOB unchanged - on and off 2L O2 via NC. Complains of headache and feeling unwell since starting sputum induction.       ROS:   Constitutional, eyes, ENT, cardiovascular, respiratory, gastrointestinal, genitourinary, integumentary, neurological, psychiatric and heme/lymph are otherwise negative other than noted above       ANTIBIOTICS/RELEVANT:    MEDICATIONS  (STANDING):  albuterol/ipratropium for Nebulization 3 milliLiter(s) Nebulizer every 8 hours  ARIPiprazole 5 milliGRAM(s) Oral every 24 hours  aspirin  chewable 81 milliGRAM(s) Oral every 24 hours  atorvastatin 80 milliGRAM(s) Oral at bedtime  atovaquone  Suspension 1500 milliGRAM(s) Oral daily  carvedilol 3.125 milliGRAM(s) Oral every 12 hours  cefepime   IVPB 2000 milliGRAM(s) IV Intermittent every 8 hours  dextrose 5%. 1000 milliLiter(s) (100 mL/Hr) IV Continuous <Continuous>  dextrose 5%. 1000 milliLiter(s) (50 mL/Hr) IV Continuous <Continuous>  dextrose 50% Injectable 25 Gram(s) IV Push once  dextrose 50% Injectable 12.5 Gram(s) IV Push once  dextrose 50% Injectable 25 Gram(s) IV Push once  enalapril 10 milliGRAM(s) Oral every 24 hours  escitalopram 20 milliGRAM(s) Oral every 24 hours  fluticasone propionate/ salmeterol 100-50 MICROgram(s) Diskus 1 Dose(s) Inhalation two times a day  glucagon  Injectable 1 milliGRAM(s) IntraMuscular once  heparin   Injectable 5000 Unit(s) SubCutaneous every 8 hours  insulin lispro (ADMELOG) corrective regimen sliding scale   SubCutaneous three times a day before meals  insulin lispro (ADMELOG) corrective regimen sliding scale   SubCutaneous at bedtime  sodium chloride 7% Inhalation 4 milliLiter(s) Inhalation every 8 hours  tamsulosin 0.4 milliGRAM(s) Oral at bedtime  vancomycin  IVPB 1000 milliGRAM(s) IV Intermittent every 12 hours    MEDICATIONS  (PRN):  acetaminophen     Tablet .. 650 milliGRAM(s) Oral every 6 hours PRN Temp greater or equal to 38C (100.4F), Mild Pain (1 - 3)  aluminum hydroxide/magnesium hydroxide/simethicone Suspension 30 milliLiter(s) Oral every 4 hours PRN Dyspepsia  benzocaine/menthol Lozenge 1 Lozenge Oral three times a day PRN Sore Throat  benzonatate 100 milliGRAM(s) Oral three times a day PRN Cough  dextrose Oral Gel 15 Gram(s) Oral once PRN Blood Glucose LESS THAN 70 milliGRAM(s)/deciliter  melatonin 3 milliGRAM(s) Oral at bedtime PRN Insomnia  ondansetron Injectable 4 milliGRAM(s) IV Push every 8 hours PRN Nausea and/or Vomiting        Vital Signs Last 24 Hrs  T(C): 36.8 (26 Mar 2025 11:11), Max: 36.9 (25 Mar 2025 22:10)  T(F): 98.3 (26 Mar 2025 11:11), Max: 98.4 (25 Mar 2025 22:10)  HR: 63 (26 Mar 2025 11:11) (61 - 76)  BP: 115/51 (26 Mar 2025 11:11) (115/51 - 152/71)  BP(mean): 93 (26 Mar 2025 06:35) (93 - 93)  RR: 17 (26 Mar 2025 11:11) (17 - 20)  SpO2: 98% (26 Mar 2025 11:11) (94% - 98%)    Parameters below as of 26 Mar 2025 11:11  Patient On (Oxygen Delivery Method): room air      PHYSICAL EXAM:  Constitutional: alert, NAD, OOB to chair   Eyes: the sclera and conjunctiva were normal.   ENT: the ears and nose were normal in appearance.   Neck: the appearance of the neck was normal and the neck was supple.   Pulmonary: +NC. Decreased WOB today however still with conversational dyspnea having to stop to take breaths about every 5 words.     Vascular: there was no peripheral edema  Abdomen: soft, non-tender  Neurological: no focal deficits.   Psychiatric: the affect was normal      LABS:                        12.0   2.76  )-----------( 145      ( 26 Mar 2025 05:30 )             35.4     03-25    138  |  105  |  14  ----------------------------<  122[H]  4.1   |  26  |  1.23    Ca    8.7      25 Mar 2025 05:30  Phos  2.5     03-25  Mg     1.8     03-25    TPro  6.9  /  Alb  3.2[L]  /  TBili  0.5  /  DBili  x   /  AST  17  /  ALT  10  /  AlkPhos  78  03-25      Urinalysis Basic - ( 25 Mar 2025 05:30 )    Color: x / Appearance: x / SG: x / pH: x  Gluc: 122 mg/dL / Ketone: x  / Bili: x / Urobili: x   Blood: x / Protein: x / Nitrite: x   Leuk Esterase: x / RBC: x / WBC x   Sq Epi: x / Non Sq Epi: x / Bacteria: x        MICROBIOLOGY:    Urinalysis with Rflx Culture (collected 03-22-25 @ 21:03)    Culture - Blood (collected 03-22-25 @ 21:03)  Source: Blood Blood-Peripheral  Gram Stain (03-24-25 @ 14:21):    Growth in anaerobic bottle: Gram Positive Rods  Final Report (03-25-25 @ 12:22):    Growth in anaerobic bottle: Corynebacterium imitans "Susceptibilities not    performed"    Direct identification is available within approximately 3-5    hours either by Blood Panel Multiplexed PCR or Direct    MALDI-TOF. Details: [email or web address]  Organism: Blood Culture PCR (03-25-25 @ 12:22)  Organism: Blood Culture PCR (03-25-25 @ 12:22)      -  Corynebacterium species: Detec      Method Type: PCR    Culture - Urine (collected 03-22-25 @ 21:03)  Source: Clean Catch Clean Catch (Midstream)  Final Report (03-24-25 @ 07:08):    No growth    Culture - Blood (collected 03-22-25 @ 21:03)  Source: Blood Blood-Peripheral  Preliminary Report (03-26-25 @ 05:00):    No growth at 72 Hours        RADIOLOGY & ADDITIONAL STUDIES:  Reviewed

## 2025-03-26 NOTE — PROGRESS NOTE ADULT - PROBLEM SELECTOR PLAN 1
Patient with fever and tachycardia meeting 2/4 criteria for sepsis 2/2 pneumonia. S/p Vanc and Cefepime.  MRSA swab+.  S/p CTX 2g qd, Doxy 100mg, Azithro 500mg x 2 (d/c with negative urine strep/legionella in case patient has MAC/to prevent resistance)  BCx positive for gram positive rods, possible contamination    - ID consulted, recommend extensive infectious workup for c/o PCP. F/u ID note for all labs ordered  - F/U RT to induce sputum for AFB x 3   -  CT Non-con ordered  - Reorder Vancomycin 1000mg q12h ( vancomycin trough: 14.7 ) (3/23 - )  - Start Cefepime 2g q8h i/s/o immunocompromised state to cover MRSA and pseudomonas (3/24 - )  - TB R/O- airborne precautions; QuantiFeron pending  - Hep C R/O- F/U RNA Viral load Patient with fever and tachycardia meeting 2/4 criteria for sepsis 2/2 pneumonia now resolved. S/p Vanc and Cefepime.  MRSA swab+.  S/p CTX 2g qd, Doxy 100mg, Azithro 500mg x 2 (d/c with negative urine strep/legionella in case patient has MAC/to prevent resistance)  BCx positive for gram positive rods, possible contamination    - ID consulted, recommend extensive infectious workup for c/o PCP. F/u ID note for all labs ordered  - F/u RT to induce sputum for AFB x 2 pending collection; f/u AFB x1 in lab  -  CT Non-con ordered  - Reorder Vancomycin 1000mg q12h ( vancomycin trough: 14.7 ) (3/23 - )  - Start Cefepime 2g q8h i/s/o immunocompromised state to cover MRSA and pseudomonas (3/24 - ) ) in addition to Atovaquone daily  -f/u ID recs   -TB R/O- airborne precautions; QuantiFeron pending, , GC/C, histoplasma Ag, Syphilis and Fungitell   -f/u respiratory therapy to induce sputum for AFB x 2 pending collection; f/u AFB x1 in lab  -Duonebs q8hrs and reassess given d/t wheezing and hx of COPD   - Hep C R/O- F/U RNA Viral load pending

## 2025-03-26 NOTE — PROGRESS NOTE ADULT - ASSESSMENT
75M with HTN, HIV/AIDS (CD4 74, VL 4,516 this admission), CAD (prior MI), T2DM, HFrEF (EF 40-45% 11/2024), recurrent syncope, COPD, depression/anxiety, prostate cancer s/p radiation (2013), hx of hepatitis C treated with harvoni 2015 – NAAT negative 1/2025, Syphilis last treated with 21 days of oral therapy early 2017, presenting for cough and shortness of breath x >1 month admitted for pneumonia. On arrival, febrile 103.5 with WBC 4.43, 81.9% PMNs. CXR with diffuse R lung infiltrates. RVP/COVID negative. Urine strep and legionella antigens negative. S/p Cefepime 2g x 1 3/22,  azithromycin and ceftriaxone. On vancomycin and cefepime. Given low CD4 count and patient's social hx, DDX includes PCP pneumonia (high suspicion), CAP, pulmonary TB, other atypical infections and crack lung. Bcxs 3/22 1/4 bottles growing corynebacterium -- suspect contaminant. Regarding dysphagia - has had for 10 yrs and no e/o thrush on exam, low suspicion for esophageal candidiasis. Afebrile, respiratory symptoms unchanged. , Toxo IgG negative, serum CrAg negative.     Suggest:  -f/u blood cultures 3/22   -obtain CT chest without contrast   -send sputum culture   -induced sputum x 3 for AFB smear/culture   -induced sputum x 2 for MTB/Rif PCR   -induced sputum for PCP PCR   -F/u Fungitell   -F/u RPR titer  -F/u urine/pharyngeal GC   -F/u QuantiFeron   -F/u Histoplasma urine Ag and will f/u serum Ag  -if having diarrhea, send GI PCR   -continue airborne precautions   -Hold home ART   -continue vancomycin 1g IV Q12. Check trough prior to 4th dose (due 1AM 3/27)  -continue cefepime 2g IV Q8   -if patient decompensates, requiring significant O2- start primaquine 30mg PO daily plus clindamycin 900mg IV Q8 plus prednisone 40mg PO BID for empiric coverage of PCP PNA      Team 2 will follow you. Dr. Aguilera will assume care tomorrow.     Case d/w primary team.  Final recommendation pending attending note.    Teresa Miller, Infectious Diseases PA  Please reach out for any questions 9 am-5pm.   For evenings and weekends, please call the ID physician on call.

## 2025-03-26 NOTE — CONSULT NOTE ADULT - ASSESSMENT
75M with HTN, HIV/AIDS (CD4 74, VL 4,516 this admission), CAD (prior MI), T2DM, HFrEF (EF 40-45% 11/2024), recurrent syncope, COPD, depression/anxiety, prostate cancer s/p radiation (2013), hx of hepatitis C treated with harvoni 2015 – NAAT negative 1/2025, Syphilis last treated with 21 days of oral therapy early 2017, presenting for cough and shortness of breath x >1 month admitted for pneumonia. On arrival, febrile 103.5 with WBC 4.43, 81.9% PMNs. CXR with diffuse R lung infiltrates. RVP/COVID negative. Urine strep and legionella antigens negative. S/p Cefepime 2g x 1 3/22. On vancomycin, azithromycin and ceftriaxone. Given low CD4 count and patient's social hx, DDX includes PCP pneumonia (high suspicion), CAP, pulmonary TB, other atypical infections and crack lung. Bcxs 3/22 1/4 bottles growing corynebacterium -- suspect contaminant. Regarding dysphagia - has had for 10 yrs and no e/o thrush on exam, low suspicion for esophageal candidiasis.     Suggest:  -f/u blood cultures 3/22   -obtain CT chest without contrast   -send sputum culture   -induced sputum x 3 for AFB smear/culture   -induced sputum x 3 for MTB/Rif PCR   -induced sputum for PCP PCR   -Fungitell   -LDH   -RPR titer  -urine/pharyngeal GC   -toxo IgG   -QuantiFeron   -serum Crypt Ag   -Histoplasma urine and serum Ag  -if having diarrhea, send GI PCR   -start airborne precautions   -Hold home ART   -stop azithromycin and ceftriaxone   -continue vancomycin 1g IV Q12. Check trough prior to 4th dose (due at 5am britany)   -start cefepime 2g IV Q8   -if patient decompensates, requiring significant O2- start primaquine 30mg PO daily plus clindamycin 900mg IV Q8 plus prednisone 40mg PO BID for empiric coverage of PCP PNA      Team 2 will follow you.    Case d/w primary team.  Final recommendation pending attending note.    Teresa Miller, Infectious Diseases PA  Please reach out for any questions 9 am-5pm.   For evenings and weekends, please call the ID physician on call.      
  I M    75 year old male with history of HIV (CD4 160, VL 26 7/2024), CAD (prior MI), T2DM, HFrEF (EF 40-45% 11/2024), recurrent syncope, COPD, depression/anxiety,  prostate cancer s/p radiation (2013), presenting for shortness of breath, found to have sepsis 2/2 pneumonia, pending lab results, CT head non-contrast, and TTE  per with ID.    Problem/Plan - 1:  ·  Problem: Sepsis.   ·  Plan: Patient with fever and tachycardia meeting 2/4 criteria for sepsis 2/2 pneumonia. S/p Vanc and Cefepime.  MRSA swab+.  S/p CTX 2g qd, Doxy 100mg, Azithro 500mg x 2 (d/c with negative urine strep/legionella in case patient has MAC/to prevent resistance)  BCx positive for gram positive rods, possible contamination    - ID consulted, recommend extensive infectious workup for c/o PCP. F/u ID note for all labs ordered  - F/U RT to induce sputum for AFB x 3   -  CT Non-con ordered  - Reorder Vancomycin 1000mg q12h ( vancomycin trough: 14.7 ) (3/23 - )  - Start Cefepime 2g q8h i/s/o immunocompromised state to cover MRSA and pseudomonas (3/24 - )  - TB R/O- airborne precautions; QuantiFeron pending  - Hep C R/O- F/U RNA Viral load.    Problem/Plan - 2:  ·  Problem: Right lower lobe pneumonia.   ·  Plan: CXR with RLL opacity. S/p Vanc and Cefepime. Reports prior hx of hospitalizations due to pneumonia, most recently last year.   MRSA swab+, urine legionella and strep negative, RVP negative    - C/w abx as above.    Problem/Plan - 3:  ·  Problem: HIV (human immunodeficiency virus infection).   ·  Plan: CD4 160, VL 26 7/2024. On dolutegravir (Tivicay) and emtricitabine-tenofovir (Descovy) and Atovaquone \  CD4 (3/24) - 74, 3.65 VL    - c/w Atovaquone for PCP ppx (has sulfa allergy)   - Hold Tivicay and Descovy. F/U ID recs.    Problem/Plan - 4:  ·  Problem: Heart failure with mildly reduced ejection fraction.   ·  Plan: EF 40-45%. Home med: Carvedilol 3.125mg BID     - Restarted carvedilol 3.125 mg BID d/t high HR: 108 (3/24)  - F/u TTE.    Problem/Plan - 5:  ·  Problem: DM (diabetes mellitus).   ·  Plan: Home med: metformin 500mg, Linagliptin 5mg qd    -Hold metformin   -c/w ISS; AjK6t=1.6%.    Problem/Plan - 6:  ·  Problem: HTN (hypertension).   ·  Plan: Home med: enalapril 10mg  - Restart enalapril 10 mg.    Problem/Plan - 7:  ·  Problem: CAD (coronary artery disease).   ·  Plan: Hx of prior MI, denies stents in heart. On home asa and lipitor 80    - c/w home meds.    Problem/Plan - 8:  ·  Problem: Thrombocytopenia.   ·  Plan: Platelets 120 on admission. Hx of HIV (non-compliant with meds). Prior platelets 120 (1/2025). Likely chronic thrombocytopenia.   - continue to trend platelets; 125 (3/25).    Problem/Plan - 9:  ·  Problem: Chronic kidney disease, unspecified CKD stage.   ·  Plan: Creatine 1.58 on admission, prior Cr 1.59 (1/2025). Baseline Cr 1.4-1.6. RESOLVED    - continue to trend; 123 (3/25)   - avoid nephrotoxic meds.    Problem/Plan - 10:  ·  Problem: COPD, mild.   ·  Plan; Home med- symbicort. No home 02.     - Continue with 2L NC, wean as tolerated  - c/w home med.    Problem/Plan - 11:  ·  Problem: Anxiety and depression.   ·  Plan: - Continue home aripiprazole 5mg qd, escitalopram 20mg qd.    Problem/Plan - 12:  ·  Problem: Preventive measure.   ·  Plan: F: none  E: replete as necessary  N: Carb consistent  DVT: heparin SubQ  Dispo: RMF  f/u PT recs.

## 2025-03-26 NOTE — PROGRESS NOTE ADULT - PROBLEM SELECTOR PLAN 8
Platelets 120 on admission. Hx of HIV (non-compliant with meds). Prior platelets 120 (1/2025). Likely chronic thrombocytopenia.   - continue to trend platelets; 125 (3/25) Platelets 120 on admission. Hx of HIV (non-compliant with meds). Prior platelets 120 (1/2025). Likely chronic thrombocytopenia.   - continue to trend platelets; 145 (3/25)

## 2025-03-26 NOTE — PROGRESS NOTE ADULT - PROBLEM SELECTOR PLAN 11
- Continue home aripiprazole 5mg qd, escitalopram 20mg qd - Continue home aripiprazole 5mg qd, escitalopram 20mg qd            #Diarrhea  -Send GI PCR. Can give Imodium (pt's request) after GI PCR.

## 2025-03-26 NOTE — PROGRESS NOTE ADULT - PROBLEM SELECTOR PLAN 10
Home med- symbicort. No home 02.     - Continue with 2L NC, wean as tolerated  - c/w home med Home med- symbicort. No home 02.     - -Duonebs q8hrs and reassess d/t wheezing   -Continue with 2L NC, wean as tolerated  - c/w home med Home med- symbicort. No home 02.     -Duonebs q8hrs and reassess d/t wheezing   -Continue with 2L NC, wean as tolerated  - c/w home med

## 2025-03-26 NOTE — PROGRESS NOTE ADULT - PROBLEM SELECTOR PLAN 2
CXR with RLL opacity. S/p Vanc and Cefepime. Reports prior hx of hospitalizations due to pneumonia, most recently last year.   MRSA swab+, urine legionella and strep negative, RVP negative    - C/w abx as above CXR with RLL opacity. S/p Vanc and Cefepime. Reports prior hx of hospitalizations due to pneumonia, most recently last year.   MRSA swab+, urine legionella and strep negative, RVP negative  - C/w abx as above CXR with RLL opacity. S/p Vanc and Cefepime. Reports prior hx of hospitalizations due to pneumonia, most recently last year.   MRSA swab+, urine legionella and strep negative, RVP negative  - C/w abx as above  - GI PCR for diarrhea

## 2025-03-26 NOTE — PHYSICAL THERAPY INITIAL EVALUATION ADULT - PERTINENT HX OF CURRENT PROBLEM, REHAB EVAL
75 year old male with history of HIV (CD4 160, VL 26 7/2024), CAD (prior MI), T2DM, HFrEF (EF 40-45% 11/2024), recurrent syncope, COPD, depression/anxiety,  prostate cancer s/p radiation (2013), presenting for shortness of breath, found to have sepsis 2/2 pneumonia, pending lab results, CT head non-contrast, and TTE  per with ID.

## 2025-03-26 NOTE — PROGRESS NOTE ADULT - ATTENDING COMMENTS
Patient seen and examined by me. Case discussed with resident and agree with the resident's findings and plan as documented in the resident's note. 75M h/o HIV/AIDS (CD4 74/12%, VL 4516 3/2025), chronic diarrhea, non-obstructive CAD (last cath 7/2021) and HFrEF LV EF (LVEF 35-40% with LVH and RWMA 9/2021), prior prostate cancer p/w 1-month-history of cough and shortness of breath a/w sepsis 2/2 acute hypoxemic respiratory failure 2/2 pneumonia.    1. Sepsis:   -sepsis resolved likely 2/2 pneumonia  -f/u ID recs  -f/u respiratory therapy to induce sputum for AFB x 2 pending collection; f/u AFB x1 in lab  -f/u CT non-contrast ordered  -c/w Vancomycin 1000mg q12h ( vancomycin trough: 14.7 ) (3/23 - ) and Cefepime 2g q8h i/s/o immunocompromised state to cover MRSA and pseudomonas (3/24 - ) in addition to Atovaquone daily; check vanco trough prior to 4th dose  -f/u ID recs -- ddx includes PCP pneumonia (high suspicion), CAP, pulmonary TB, other atypical infections  -TB R/O- airborne precautions; QuantiFeron pending, GC/C, histoplasma Ag, Syphilis and Fungitell  -will start patient on Duonebs q8hrs and reassess given h/o COPD     2. HIV:  -holding HIV meds  -f/u ID re-whether to resume or not    3. HFrEF:  -TTE reviewed: Left ventricular cavity is normal in size. Left ventricular systolic function is moderately decreased with an ejection fraction visually   estimated at 35 to 40 %. Compared to the transthoracic echocardiogram performed on 9/27/2021, there have been no significant interval changes.    4. r/o Hep C:  -f/u RNA viral load    5. Diabetes type 2:  -holding metformin  -c/w ISS; KcQ2n=4.6%    6. Diarrhea:  -recommend to send GI PCR    7. Dispo:  -f/u PT recommendations   -c/w heparin subq tid

## 2025-03-26 NOTE — CONSULT NOTE ADULT - SUBJECTIVE AND OBJECTIVE BOX
Patient is a 75y old  Male who presents with a chief complaint of pneumonia (26 Mar 2025 07:04)      HPI:  75 year old male with history of HIV (CD4 160, VL 26 7/2024), CAD (prior MI), T2DM, HFrEF (EF 40-45% 11/2024), recurrent syncope, COPD, depression/anxiety,  prostate cancer s/p radiation (2013), presenting for cough and shortness of breath. His symptoms started around a month ago and worsened yesterday causing him to present to the ED. He reports having a dry cough for the past month. Denies mucus production, nausea, vomiting, diarrhea, and chills.  He reports having prior hospitalizations for pneumonia, most recently last year. He lives in an [address] on [address] and is moving this week. He doesn't know his home medications and reports not taking all of them daily.     In the ED  Initial vital signs: T 103.4 degrees F, , /102, Sp02 92% on RA  Labs significant for: Hgb 12.5, Platelet 120, Creatinine 1.58, Glucose 115, Bilirubin 1.4. UA with large blood, 6 WBCs  CXR: RLL opacity  Interventions: 2.5L, Cefepime 2gx1, Tylenol 650mg x1, Vanc 1gx1  (23 Mar 2025 07:53)    PAST MEDICAL & SURGICAL HISTORY:  HIV (human immunodeficiency virus infection)      DM (diabetes mellitus)      HTN (hypertension)      Chronic diarrhea      Hepatitis C      PVD (peripheral vascular disease)      CAD (coronary artery disease)      Thoracic aortic aneurysm      S/P arterial stent  bilat LE        MEDICATIONS  (STANDING):  ARIPiprazole 5 milliGRAM(s) Oral every 24 hours  aspirin  chewable 81 milliGRAM(s) Oral every 24 hours  atorvastatin 80 milliGRAM(s) Oral at bedtime  atovaquone  Suspension 1500 milliGRAM(s) Oral daily  carvedilol 3.125 milliGRAM(s) Oral every 12 hours  cefepime   IVPB 2000 milliGRAM(s) IV Intermittent every 8 hours  dextrose 5%. 1000 milliLiter(s) (100 mL/Hr) IV Continuous <Continuous>  dextrose 5%. 1000 milliLiter(s) (50 mL/Hr) IV Continuous <Continuous>  dextrose 50% Injectable 25 Gram(s) IV Push once  dextrose 50% Injectable 12.5 Gram(s) IV Push once  dextrose 50% Injectable 25 Gram(s) IV Push once  enalapril 10 milliGRAM(s) Oral every 24 hours  escitalopram 20 milliGRAM(s) Oral every 24 hours  fluticasone propionate/ salmeterol 100-50 MICROgram(s) Diskus 1 Dose(s) Inhalation two times a day  glucagon  Injectable 1 milliGRAM(s) IntraMuscular once  heparin   Injectable 5000 Unit(s) SubCutaneous every 8 hours  insulin lispro (ADMELOG) corrective regimen sliding scale   SubCutaneous three times a day before meals  insulin lispro (ADMELOG) corrective regimen sliding scale   SubCutaneous at bedtime  sodium chloride 3%  Inhalation 4 milliLiter(s) Inhalation once  sodium chloride 3%  Inhalation 4 milliLiter(s) Inhalation every 8 hours  tamsulosin 0.4 milliGRAM(s) Oral at bedtime  vancomycin  IVPB 1000 milliGRAM(s) IV Intermittent every 12 hours    MEDICATIONS  (PRN):  acetaminophen     Tablet .. 650 milliGRAM(s) Oral every 6 hours PRN Temp greater or equal to 38C (100.4F), Mild Pain (1 - 3)  aluminum hydroxide/magnesium hydroxide/simethicone Suspension 30 milliLiter(s) Oral every 4 hours PRN Dyspepsia  benzocaine/menthol Lozenge 1 Lozenge Oral three times a day PRN Sore Throat  benzonatate 100 milliGRAM(s) Oral three times a day PRN Cough  dextrose Oral Gel 15 Gram(s) Oral once PRN Blood Glucose LESS THAN 70 milliGRAM(s)/deciliter  melatonin 3 milliGRAM(s) Oral at bedtime PRN Insomnia  ondansetron Injectable 4 milliGRAM(s) IV Push every 8 hours PRN Nausea and/or Vomiting          Home Living Status :  lives alone in an [place] , elevator accessible apartment building          -  Home Services :  none      Baseline Functional Level Prior to Admission :             - ADL's/ IADL's :  independent           - Ambulatory status prior to admission :   walked with no assistive devices          FAMILY HISTORY:      CBC Full  -  ( 26 Mar 2025 05:30 )  WBC Count : 2.76 K/uL  RBC Count : 3.71 M/uL  Hemoglobin : 12.0 g/dL  Hematocrit : 35.4 %  Platelet Count - Automated : 145 K/uL  Mean Cell Volume : 95.4 fl  Mean Cell Hemoglobin : 32.3 pg  Mean Cell Hemoglobin Concentration : 33.9 g/dL  Auto Neutrophil # : x  Auto Lymphocyte # : x  Auto Monocyte # : x  Auto Eosinophil # : x  Auto Basophil # : x  Auto Neutrophil % : x  Auto Lymphocyte % : x  Auto Monocyte % : x  Auto Eosinophil % : x  Auto Basophil % : x      03-25    138  |  105  |  14  ----------------------------<  122[H]  4.1   |  26  |  1.23    Ca    8.7      25 Mar 2025 05:30  Phos  2.5     03-25  Mg     1.8     03-25    TPro  6.9  /  Alb  3.2[L]  /  TBili  0.5  /  DBili  x   /  AST  17  /  ALT  10  /  AlkPhos  78  03-25      Urinalysis Basic - ( 25 Mar 2025 05:30 )    Color: x / Appearance: x / SG: x / pH: x  Gluc: 122 mg/dL / Ketone: x  / Bili: x / Urobili: x   Blood: x / Protein: x / Nitrite: x   Leuk Esterase: x / RBC: x / WBC x   Sq Epi: x / Non Sq Epi: x / Bacteria: x        Radiology :     ACC: [id number] EXAM:  XR CHEST AP OR PA 1V   ORDERED BY: LAVERN GOLDEN     PROCEDURE DATE:  03/22/2025          INTERPRETATION:  Clinical History: Sepsis    Frontal examination chest demonstrates the heart to be within normal   limits in transverse diameter. Diffuse right lung infiltrates noted.   Discoid change left lung base Degenerative changes thoracic spine.   Calcification aortic knob.      IMPRESSION: Right lung infiltrates.          Review of Systems : per HPI         Vital Signs Last 24 Hrs  T(C): 36.4 (26 Mar 2025 06:35), Max: 36.9 (25 Mar 2025 22:10)  T(F): 97.5 (26 Mar 2025 06:35), Max: 98.4 (25 Mar 2025 22:10)  HR: 61 (26 Mar 2025 06:35) (61 - 85)  BP: 132/73 (26 Mar 2025 06:35) (132/73 - 152/71)  BP(mean): 93 (26 Mar 2025 06:35) (93 - 93)  RR: 18 (26 Mar 2025 06:35) (18 - 20)  SpO2: 98% (26 Mar 2025 06:35) (94% - 98%)    Parameters below as of 26 Mar 2025 06:35  Patient On (Oxygen Delivery Method): room air            Physical Exam:  on AIR[place] r/o TB isolation ,  75 y o man lying comfortably in semi Méndez's position , awake , alert , no acute complaints     Head: normocephalic , atraumatic    Eyes: PERRLA , EOMI , no nystagmus , sclera anicteric    ENT / FACE: neg nasal discharge , uvula midline , no oropharyngeal erythema / exudate    Neck: supple , negative JVD , negative carotid bruits , no thyromegaly    Chest: manny rhonchi     Cardiovascular: regular rate and rhythm , neg murmurs / rubs / gallops    Abdomen: soft , non distended , no tenderness to palpation in all 4 quadrants ,  normal bowel sounds     Extremities: WWP , neg cyanosis /clubbing / edema     Neurologic Exam:     Alert and oriented to person , place , date/year , speech fluent w/o dysarthria     Cranial Nerves:           II:                         pupils equal , round and reactive to light , visual fields intact         III/ IV/VI:             extraocular movements intact , neg nystagmus , neg ptosis        V:                        facial sensation intact , V1-3 normal        VII:                      face symmetric , no droop , normal eye closure and smile        VIII:                     hearing intact to finger rub bilaterally        IX and X:             no hoarseness , gag intact , palate/ uvula rise symmetrically        XI:                       SCM / trapezius strength intact bilateral        XII:                      no tongue deviation    Motor Exam:        > 4/5 x 4 extremities , without drift     Sensation:         intact to light touch x 4 extremities                            no neglect or extinction on double simultaneous testing    DTR:           biceps/brachioradialis: equal                            patella/ankle: equal          neg Babinski    Coordination:            Finger to Nose:  neg dysmetria bilaterally        Gait:  not tested         PM&R Impression: admitted for sepsis 2/2 pneumonia    - no acute pathology on CT brain imaging     - deconditioned    - no focal weakness       Recommendations / Plan:       1) Physical / Occupational therapy focusing on therapeutic exercises , equipment evaluation , bed mobility/transfer out of bed evaluation , progressive ambulation with assistive devices prn .    2) Current disposition plan recommendation:    pending functional progress     
INFECTIOUS DISEASES INITIAL CONSULT NOTE    HPI:  75M with HTN, HIV (CD4 74, VL 4,516 this admission), CAD (prior MI), T2DM, HFrEF (EF 40-45% 11/2024), recurrent syncope, COPD, depression/anxiety, prostate cancer s/p radiation (2013), hx of hepatitis C treated with harvoni 2015 – NAAT negative 1/2025, Syphilis last treated with 21 days of oral therapy early 2017, presenting for cough and shortness of breath admitted for pneumonia. ID consulted for pneumonia iso AIDS.   Patient states his dry cough and SOB started at least a month ago - says symptoms have been intermittent for a "long time." He notes this is his 4th episode of pneumonia in the last 3 years. He does not recall ever being told he had PCP pneumonia or pneumonia requiring long courses of antibiotics. He reports chills, ROSADO, and body aches. Has hx of urine and stool incontinence since having had prostate cancer- denies worsening of these symptoms. Also reports sensation of food getting stuck in throat when eating - has had for 10 years; denies pain with swallowing. Denies fevers, weight loss. Regarding HIV, he follows with Dr. Paul Noguera. He takes Tivicay and Descovy as well as Atovaquone for PCP ppx. Reports anaphylaxis to Bactrim requiring epi. He admits to missing doses frequently in the last month since feeling sick. He states he was compliant with doses prior to this past month. He lives in an [place] [address] (has lived there for 5 years) and is moving this week. He denies ever being incarcerated and denies known exposure to TB. He was born and raised in [place], has also lived in [place]. Denies IVDU; does smoke cocaine (last did 3 days PTA). He reports being sexually active recently (MSM, top). Upon arrival, febrile 103.4, , SpO2 92% RA. Labs significant for: Hgb 12.5, Platelet 120, Creatinine 1.58, Glucose 115, Bilirubin 1.4. UA with large blood, 6 WBCs. CXR: diffuse R lung infiltrates. Pt states he is feeling better since admission - less weak and cough improving. He attempted to leave AMA today but became SOB walking to elevator so returned to room.         PAST MEDICAL & SURGICAL HISTORY:  HIV (human immunodeficiency virus infection)      DM (diabetes mellitus)      HTN (hypertension)      Chronic diarrhea      Hepatitis C      PVD (peripheral vascular disease)      CAD (coronary artery disease)      Thoracic aortic aneurysm      S/P arterial stent  bilat LE            Review of Systems:   Constitutional, eyes, ENT, cardiovascular, respiratory, gastrointestinal, genitourinary, integumentary, neurological, psychiatric and heme/lymph are otherwise negative other than noted above       ANTIBIOTICS:  MEDICATIONS  (STANDING):  ARIPiprazole 5 milliGRAM(s) Oral every 24 hours  aspirin  chewable 81 milliGRAM(s) Oral every 24 hours  atorvastatin 80 milliGRAM(s) Oral at bedtime  atovaquone  Suspension 1500 milliGRAM(s) Oral daily  cefepime   IVPB 2000 milliGRAM(s) IV Intermittent every 8 hours  dextrose 5%. 1000 milliLiter(s) (100 mL/Hr) IV Continuous <Continuous>  dextrose 5%. 1000 milliLiter(s) (50 mL/Hr) IV Continuous <Continuous>  dextrose 50% Injectable 25 Gram(s) IV Push once  dextrose 50% Injectable 12.5 Gram(s) IV Push once  dextrose 50% Injectable 25 Gram(s) IV Push once  escitalopram 20 milliGRAM(s) Oral every 24 hours  fluticasone propionate/ salmeterol 100-50 MICROgram(s) Diskus 1 Dose(s) Inhalation two times a day  glucagon  Injectable 1 milliGRAM(s) IntraMuscular once  insulin lispro (ADMELOG) corrective regimen sliding scale   SubCutaneous three times a day before meals  insulin lispro (ADMELOG) corrective regimen sliding scale   SubCutaneous at bedtime  tamsulosin 0.4 milliGRAM(s) Oral at bedtime  vancomycin  IVPB 1000 milliGRAM(s) IV Intermittent once    MEDICATIONS  (PRN):  acetaminophen     Tablet .. 650 milliGRAM(s) Oral every 6 hours PRN Temp greater or equal to 38C (100.4F), Mild Pain (1 - 3)  aluminum hydroxide/magnesium hydroxide/simethicone Suspension 30 milliLiter(s) Oral every 4 hours PRN Dyspepsia  benzocaine/menthol Lozenge 1 Lozenge Oral three times a day PRN Sore Throat  benzonatate 100 milliGRAM(s) Oral three times a day PRN Cough  dextrose Oral Gel 15 Gram(s) Oral once PRN Blood Glucose LESS THAN 70 milliGRAM(s)/deciliter  melatonin 3 milliGRAM(s) Oral at bedtime PRN Insomnia  ondansetron Injectable 4 milliGRAM(s) IV Push every 8 hours PRN Nausea and/or Vomiting      Allergies    shellfish (Unknown)  sulfa drugs (Rash)  strawberry (Rash)  Peaches (Rash)  penicillins (Rash)    Intolerances      SOCIAL HISTORY:  -born and raised in [place]   -lives in [facility] on [address]   -has lived in [place]   -denies etoh or tobacco   -denies IVDU   -smokes cocaine (last used 3d pta)       FAMILY HISTORY:   no FH leading to current infection    Vital Signs Last 24 Hrs  T(C): 36.9 (24 Mar 2025 12:15), Max: 36.9 (24 Mar 2025 06:09)  T(F): 98.5 (24 Mar 2025 12:15), Max: 98.5 (24 Mar 2025 12:15)  HR: 108 (24 Mar 2025 12:16) (80 - 108)  BP: 121/73 (24 Mar 2025 12:15) (121/73 - 150/73)  BP(mean): 97 (23 Mar 2025 23:30) (97 - 99)  RR: 22 (24 Mar 2025 12:16) (17 - 22)  SpO2: 94% (24 Mar 2025 12:16) (94% - 96%)    Parameters below as of 24 Mar 2025 12:16  Patient On (Oxygen Delivery Method): room air          PHYSICAL EXAM:  Constitutional: alert, NAD, resting comfortably in bed   Eyes: the sclera and conjunctiva were normal.   ENT: the ears and nose were normal in appearance. no evidence of thrush   Neck: the appearance of the neck was normal and the neck was supple.   Pulmonary: + retractions. Decreased breath sounds b/l   Heart: heart rate was normal and rhythm regular, normal S1 and S2  Vascular: there was no peripheral edema  Abdomen: soft, non-tender  Neurological: no focal deficits.   Psychiatric: the affect was normal      LABS:                        11.0   2.92  )-----------( 103      ( 24 Mar 2025 05:30 )             33.4     03-24    133[L]  |  100  |  16  ----------------------------<  165[H]  3.8   |  22  |  1.21    Ca    7.9[L]      24 Mar 2025 05:30  Phos  1.8     03-24  Mg     2.0     03-24    TPro  6.2  /  Alb  2.9[L]  /  TBili  0.5  /  DBili  x   /  AST  13  /  ALT  8[L]  /  AlkPhos  57  03-24    PT/INR - ( 22 Mar 2025 21:03 )   PT: 12.3 sec;   INR: 1.05          PTT - ( 22 Mar 2025 21:03 )  PTT:30.0 sec  Urinalysis Basic - ( 24 Mar 2025 05:30 )    Color: x / Appearance: x / SG: x / pH: x  Gluc: 165 mg/dL / Ketone: x  / Bili: x / Urobili: x   Blood: x / Protein: x / Nitrite: x   Leuk Esterase: x / RBC: x / WBC x   Sq Epi: x / Non Sq Epi: x / Bacteria: x        MICROBIOLOGY:    Culture - Urine (collected 03-22-25 @ 21:03)  Source: Clean Catch Clean Catch (Midstream)  Final Report (03-24-25 @ 07:08):    No growth    Culture - Blood (collected 03-22-25 @ 21:03)  Source: Blood Blood-Peripheral  Gram Stain (03-24-25 @ 14:21):    Growth in anaerobic bottle: Gram Positive Rods  Preliminary Report (03-24-25 @ 14:21):    Growth in anaerobic bottle: Gram Positive Rods    Direct identification is available within approximately 3-5    hours either by Blood Panel Multiplexed PCR or Direct    MALDI-TOF. Details: [email or web address]  Organism: Blood Culture PCR (03-24-25 @ 16:47)  Organism: Blood Culture PCR (03-24-25 @ 16:47)      Method Type: PCR      -  Corynebacterium species: Detec    Culture - Blood (collected 03-22-25 @ 21:03)  Source: Blood Blood-Peripheral  Preliminary Report (03-24-25 @ 05:01):    No growth at 24 hours    Urinalysis with Rflx Culture (collected 03-22-25 @ 21:03)        RADIOLOGY & ADDITIONAL STUDIES:  reviewed

## 2025-03-26 NOTE — PROGRESS NOTE ADULT - PROBLEM SELECTOR PLAN 3
CD4 160, VL 26 7/2024. On dolutegravir (Tivicay) and emtricitabine-tenofovir (Descovy) and Atovaquone \\  CD4 (3/24) - 74, 3.65 VL    - c/w Atovaquone for PCP ppx (has sulfa allergy)   - Hold Tivicay and Descovy. F/U ID recs

## 2025-03-26 NOTE — PROGRESS NOTE ADULT - SUBJECTIVE AND OBJECTIVE BOX
*****INCOMPLETE NOTE*****    INTERVAL HPI/OVERNIGHT EVENTS:    SUBJECTIVE: Patient seen and examined at bedside, resting comfortably in bed, and does not appear to be in any acute distress. Patient reports    Vital Signs Last 24 Hrs  T(C): 36.4 (26 Mar 2025 06:35), Max: 36.9 (25 Mar 2025 22:10)  T(F): 97.5 (26 Mar 2025 06:35), Max: 98.4 (25 Mar 2025 22:10)  HR: 61 (26 Mar 2025 06:35) (61 - 85)  BP: 132/73 (26 Mar 2025 06:35) (132/73 - 152/71)  BP(mean): 93 (26 Mar 2025 06:35) (93 - 93)  RR: 18 (26 Mar 2025 06:35) (18 - 20)  SpO2: 98% (26 Mar 2025 06:35) (94% - 98%)    Parameters below as of 26 Mar 2025 06:35  Patient On (Oxygen Delivery Method): room air        PHYSICAL EXAM:  General: in no acute distress  Eyes: EOMI intact bilaterally. Anicteric sclerae, moist conjunctivae  HENT: Moist mucous membranes  Neck: Trachea midline, supple  Lungs: CTA B/L. No wheezes, rales, or rhonchi  Cardiovascular: RRR. No murmurs, rubs, or gallops  Abdomen: Soft, non-tender non-distended; No rebound or guarding  Extremities: WWP, No clubbing, cyanosis or edema  Neurological: Alert and oriented  Skin: Warm and dry. No obvious rash     LABS:                        12.0   2.76  )-----------( 145      ( 26 Mar 2025 05:30 )             35.4     03-25    138  |  105  |  14  ----------------------------<  122[H]  4.1   |  26  |  1.23    Ca    8.7      25 Mar 2025 05:30  Phos  2.5     03-25  Mg     1.8     03-25    TPro  6.9  /  Alb  3.2[L]  /  TBili  0.5  /  DBili  x   /  AST  17  /  ALT  10  /  AlkPhos  78  03-25   *****INCOMPLETE NOTE*****    INTERVAL HPI/OVERNIGHT EVENTS: Nirmala    SUBJECTIVE: Patient seen and examined at bedside, resting comfortably in bed, and does not appear to be in any acute distress. Pt reports SOB, slight wheezing and non-productive cough. Patient reports 4-5 episodes of non-bloody diarrhea per day and that he feels as if he defecates every time he coughs. Denies orthopnea, nausea, vomiting, abdomen pain, fever, chills.    Vital Signs Last 24 Hrs  T(C): 36.4 (26 Mar 2025 06:35), Max: 36.9 (25 Mar 2025 22:10)  T(F): 97.5 (26 Mar 2025 06:35), Max: 98.4 (25 Mar 2025 22:10)  HR: 61 (26 Mar 2025 06:35) (61 - 85)  BP: 132/73 (26 Mar 2025 06:35) (132/73 - 152/71)  BP(mean): 93 (26 Mar 2025 06:35) (93 - 93)  RR: 18 (26 Mar 2025 06:35) (18 - 20)  SpO2: 98% (26 Mar 2025 06:35) (94% - 98%)    Parameters below as of 26 Mar 2025 06:35  Patient On (Oxygen Delivery Method): room air        PHYSICAL EXAM:  General: in no acute distress  Eyes: EOMI intact bilaterally. Anicteric sclerae, moist conjunctivae  HENT: Moist mucous membranes  Neck: Trachea midline, supple  Lungs: Diffuse wheezing present on the R side anteriorly and posteriorly.  Decreased breath sounds B/L.    Cardiovascular: RRR. No murmurs, rubs, or gallops  Abdomen: Soft, non-tender non-distended; No rebound or guarding  Extremities: WWP, No clubbing, cyanosis or edema  Neurological: Alert and oriented  Skin: Warm and dry. No obvious rash     LABS:                        12.0   2.76  )-----------( 145      ( 26 Mar 2025 05:30 )             35.4     03-25    138  |  105  |  14  ----------------------------<  122[H]  4.1   |  26  |  1.23    Ca    8.7      25 Mar 2025 05:30  Phos  2.5     03-25  Mg     1.8     03-25    TPro  6.9  /  Alb  3.2[L]  /  TBili  0.5  /  DBili  x   /  AST  17  /  ALT  10  /  AlkPhos  78  03-25   *****INCOMPLETE NOTE*****    INTERVAL HPI/OVERNIGHT EVENTS: Nirmala    SUBJECTIVE: Patient seen and examined at bedside, resting comfortably in bed, and does not appear to be in any acute distress. Pt reports SOB, slight wheezing and non-productive cough. Patient reports 4-5 episodes of non-bloody diarrhea per day and that he feels as if he defecates every time he coughs. Denies orthopnea, nausea, vomiting, abdomen pain, fever, chills.    Vital Signs Last 24 Hrs  T(C): 36.4 (26 Mar 2025 06:35), Max: 36.9 (25 Mar 2025 22:10)  T(F): 97.5 (26 Mar 2025 06:35), Max: 98.4 (25 Mar 2025 22:10)  HR: 61 (26 Mar 2025 06:35) (61 - 85)  BP: 132/73 (26 Mar 2025 06:35) (132/73 - 152/71)  BP(mean): 93 (26 Mar 2025 06:35) (93 - 93)  RR: 18 (26 Mar 2025 06:35) (18 - 20)  SpO2: 98% (26 Mar 2025 06:35) (94% - 98%)    Parameters below as of 26 Mar 2025 06:35  Patient On (Oxygen Delivery Method): room air        PHYSICAL EXAM:  General: in no acute distress  Eyes: EOMI intact bilaterally. Anicteric sclerae, moist conjunctivae  HENT: Moist mucous membranes  Neck: Trachea midline, supple  Lungs: Diffuse wheezing present on the R side anteriorly and posteriorly.  Decreased breath sounds B/L.    Cardiovascular: RRR. No murmurs, rubs, or gallops  Abdomen: Soft, non-tender non-distended; No rebound or guarding  Extremities: WWP, No clubbing, cyanosis or edema  Neurological: Alert and oriented  Skin: Warm and dry. No obvious rash     LABS:                        12.0   2.76  )-----------( 145 ( 26 Mar 2025 05:30 )             35.4  03-26   Glucose 151    03-25    138  |  105  |  14  ----------------------------<  122[H]  4.1   |  26  |  1.23    Ca    8.7      25 Mar 2025 05:30  Phos  2.5     03-25  Mg     1.8     03-25    TPro  6.9  /  Alb  3.2[L]  /  TBili  0.5  /  DBili  x   /  AST  17  /  ALT  10  /  AlkPhos  78  03-25   *****INCOMPLETE NOTE*****    INTERVAL HPI/OVERNIGHT EVENTS: Nirmala    SUBJECTIVE: Patient seen and examined at bedside, resting comfortably in bed, and does not appear to be in any acute distress. Pt reports SOB, slight wheezing and non-productive cough. Patient reports 4-5 episodes of non-bloody diarrhea per day and that he feels as if he defecates every time he coughs. Denies orthopnea, nausea, vomiting, abdomen pain, fever, chills.    Vital Signs Last 24 Hrs  T(C): 36.4 (26 Mar 2025 06:35), Max: 36.9 (25 Mar 2025 22:10)  T(F): 97.5 (26 Mar 2025 06:35), Max: 98.4 (25 Mar 2025 22:10)  HR: 61 (26 Mar 2025 06:35) (61 - 85)  BP: 132/73 (26 Mar 2025 06:35) (132/73 - 152/71)  BP(mean): 93 (26 Mar 2025 06:35) (93 - 93)  RR: 18 (26 Mar 2025 06:35) (18 - 20)  SpO2: 98% (26 Mar 2025 06:35) (94% - 98%)    Parameters below as of 26 Mar 2025 06:35  Patient On (Oxygen Delivery Method): room air        PHYSICAL EXAM:  General: in no acute distress  Eyes: EOMI intact bilaterally. Anicteric sclerae, moist conjunctivae  HENT: Moist mucous membranes  Neck: Trachea midline, supple  Lungs: Wheezing R side. No rhonchi or rales.  Cardiovascular: RRR. No murmurs, rubs, or gallops  Abdomen: Soft, non-tender non-distended; No rebound or guarding  Extremities: WWP, No clubbing, cyanosis or edema  Neurological: Alert and oriented  Skin: Warm and dry. No obvious rash     LABS:                        12.0   2.76  )-----------( 145 ( 26 Mar 2025 05:30 )             35.4    03-25    138  |  105  |  14  ----------------------------<  122[H]  4.1   |  26  |  1.23    Ca    8.7      25 Mar 2025 05:30  Phos  2.5     03-25  Mg     1.8     03-25    TPro  6.9  /  Alb  3.2[L]  /  TBili  0.5  /  DBili  x   /  AST  17  /  ALT  10  /  AlkPhos  78  03-25   *****INCOMPLETE NOTE*****    INTERVAL HPI/OVERNIGHT EVENTS: Nirmala    SUBJECTIVE: Patient seen and examined at bedside, resting comfortably in bed, and does not appear to be in any acute distress. Pt reports SOB, slight wheezing and non-productive cough. Patient reports 4-5 episodes of non-bloody diarrhea per day and that he feels as if he defecates every time he coughs. Requesting imodium. Denies orthopnea, nausea, vomiting, abdomen pain, fever, chills.    Vital Signs Last 24 Hrs  T(C): 36.4 (26 Mar 2025 06:35), Max: 36.9 (25 Mar 2025 22:10)  T(F): 97.5 (26 Mar 2025 06:35), Max: 98.4 (25 Mar 2025 22:10)  HR: 61 (26 Mar 2025 06:35) (61 - 85)  BP: 132/73 (26 Mar 2025 06:35) (132/73 - 152/71)  BP(mean): 93 (26 Mar 2025 06:35) (93 - 93)  RR: 18 (26 Mar 2025 06:35) (18 - 20)  SpO2: 98% (26 Mar 2025 06:35) (94% - 98%)    Parameters below as of 26 Mar 2025 06:35  Patient On (Oxygen Delivery Method): room air        PHYSICAL EXAM:  General: in no acute distress  Eyes: EOMI intact bilaterally. Anicteric sclerae, moist conjunctivae  HENT: Moist mucous membranes  Neck: Trachea midline, supple  Lungs: Wheezing R side. No rhonchi or rales.  Cardiovascular: RRR. No murmurs, rubs, or gallops  Abdomen: Soft, non-tender non-distended; No rebound or guarding  Extremities: WWP, No clubbing, cyanosis or edema  Neurological: Alert and oriented  Skin: Warm and dry. No obvious rash     LABS:                        12.0   2.76  )-----------( 145 ( 26 Mar 2025 05:30 )             35.4    03-25    138  |  105  |  14  ----------------------------<  122[H]  4.1   |  26  |  1.23    Ca    8.7      25 Mar 2025 05:30  Phos  2.5     03-25  Mg     1.8     03-25    TPro  6.9  /  Alb  3.2[L]  /  TBili  0.5  /  DBili  x   /  AST  17  /  ALT  10  /  AlkPhos  78  03-25   *****INCOMPLETE NOTE*****    INTERVAL HPI/OVERNIGHT EVENTS: Nirmala    SUBJECTIVE: Patient seen and examined at bedside, resting comfortably in bed, and does not appear to be in any acute distress. Pt reports SOB, slight wheezing and non-productive cough. Patient reports 4-5 episodes of non-bloody diarrhea per day and that he feels as if he defecates every time he coughs. Requesting imodium. Denies orthopnea, nausea, vomiting, abdomen pain, fever, chills.    Vital Signs Last 24 Hrs  T(C): 36.4 (26 Mar 2025 06:35), Max: 36.9 (25 Mar 2025 22:10)  T(F): 97.5 (26 Mar 2025 06:35), Max: 98.4 (25 Mar 2025 22:10)  HR: 61 (26 Mar 2025 06:35) (61 - 85)  BP: 132/73 (26 Mar 2025 06:35) (132/73 - 152/71)  BP(mean): 93 (26 Mar 2025 06:35) (93 - 93)  RR: 18 (26 Mar 2025 06:35) (18 - 20)  SpO2: 98% (26 Mar 2025 06:35) (94% - 98%)    Parameters below as of 26 Mar 2025 06:35  Patient On (Oxygen Delivery Method): 2L NC      PHYSICAL EXAM:  General: in no acute distress  Eyes: EOMI intact bilaterally. Anicteric sclerae, moist conjunctivae  HENT: Moist mucous membranes  Neck: Trachea midline, supple  Lungs: Wheezing R side. No rhonchi or rales.  Cardiovascular: RRR. No murmurs, rubs, or gallops  Abdomen: Soft, non-tender non-distended; No rebound or guarding  Extremities: WWP, No clubbing, cyanosis or edema  Neurological: Alert and oriented  Skin: Warm and dry. No obvious rash     LABS:                        12.0   2.76  )-----------( 145 ( 26 Mar 2025 05:30 )             35.4    03-25    138  |  105  |  14  ----------------------------<  122[H]  4.1   |  26  |  1.23    Ca    8.7      25 Mar 2025 05:30  Phos  2.5     03-25  Mg     1.8     03-25    TPro  6.9  /  Alb  3.2[L]  /  TBili  0.5  /  DBili  x   /  AST  17  /  ALT  10  /  AlkPhos  78  03-25

## 2025-03-26 NOTE — PROGRESS NOTE ADULT - PROBLEM SELECTOR PLAN 9
Creatine 1.58 on admission, prior Cr 1.59 (1/2025). Baseline Cr 1.4-1.6. RESOLVED    - continue to trend; 123 (3/25)   - avoid nephrotoxic meds

## 2025-03-26 NOTE — PROGRESS NOTE ADULT - PROBLEM SELECTOR PLAN 4
EF 40-45%. Home med: Carvedilol 3.125mg BID     - Restarted carvedilol 3.125 mg BID d/t high HR: 108 (3/24)  - F/u TTE EF 30-35%. Home med: Carvedilol 3.125mg BID     - Restarted carvedilol 3.125 mg BID d/t high HR: 108 (3/24)  - F/u TTE

## 2025-03-27 LAB
ADD ON TEST-SPECIMEN IN LAB: SIGNIFICANT CHANGE UP
BASOPHILS # BLD AUTO: 0.01 K/UL — SIGNIFICANT CHANGE UP (ref 0–0.2)
BASOPHILS NFR BLD AUTO: 0.4 % — SIGNIFICANT CHANGE UP (ref 0–2)
EOSINOPHIL # BLD AUTO: 0.09 K/UL — SIGNIFICANT CHANGE UP (ref 0–0.5)
EOSINOPHIL NFR BLD AUTO: 3.4 % — SIGNIFICANT CHANGE UP (ref 0–6)
FUNGITELL: <31 PG/ML — SIGNIFICANT CHANGE UP
GAMMA INTERFERON BACKGROUND BLD IA-ACNC: 0.05 IU/ML — SIGNIFICANT CHANGE UP
HCT VFR BLD CALC: 31.5 % — LOW (ref 39–50)
HGB BLD-MCNC: 10.7 G/DL — LOW (ref 13–17)
IMM GRANULOCYTES NFR BLD AUTO: 1.5 % — HIGH (ref 0–0.9)
LYMPHOCYTES # BLD AUTO: 0.78 K/UL — LOW (ref 1–3.3)
LYMPHOCYTES # BLD AUTO: 29.3 % — SIGNIFICANT CHANGE UP (ref 13–44)
M TB IFN-G BLD-IMP: NEGATIVE — SIGNIFICANT CHANGE UP
M TB IFN-G CD4+ BCKGRND COR BLD-ACNC: 0 IU/ML — SIGNIFICANT CHANGE UP
M TB IFN-G CD4+CD8+ BCKGRND COR BLD-ACNC: 0 IU/ML — SIGNIFICANT CHANGE UP
MCHC RBC-ENTMCNC: 31.9 PG — SIGNIFICANT CHANGE UP (ref 27–34)
MCHC RBC-ENTMCNC: 34 G/DL — SIGNIFICANT CHANGE UP (ref 32–36)
MCV RBC AUTO: 94 FL — SIGNIFICANT CHANGE UP (ref 80–100)
MONOCYTES # BLD AUTO: 0.51 K/UL — SIGNIFICANT CHANGE UP (ref 0–0.9)
MONOCYTES NFR BLD AUTO: 19.2 % — HIGH (ref 2–14)
NEUTROPHILS # BLD AUTO: 1.23 K/UL — LOW (ref 1.8–7.4)
NEUTROPHILS NFR BLD AUTO: 46.2 % — SIGNIFICANT CHANGE UP (ref 43–77)
NRBC BLD AUTO-RTO: 0 /100 WBCS — SIGNIFICANT CHANGE UP (ref 0–0)
PLATELET # BLD AUTO: 150 K/UL — SIGNIFICANT CHANGE UP (ref 150–400)
QUANT TB PLUS MITOGEN MINUS NIL: 5.44 IU/ML — SIGNIFICANT CHANGE UP
RBC # BLD: 3.35 M/UL — LOW (ref 4.2–5.8)
RBC # FLD: 11.9 % — SIGNIFICANT CHANGE UP (ref 10.3–14.5)
VANCOMYCIN TROUGH SERPL-MCNC: 18.9 UG/ML — SIGNIFICANT CHANGE UP (ref 10–20)
WBC # BLD: 2.66 K/UL — LOW (ref 3.8–10.5)
WBC # FLD AUTO: 2.66 K/UL — LOW (ref 3.8–10.5)

## 2025-03-27 PROCEDURE — 99232 SBSQ HOSP IP/OBS MODERATE 35: CPT

## 2025-03-27 PROCEDURE — G0545: CPT

## 2025-03-27 PROCEDURE — 99233 SBSQ HOSP IP/OBS HIGH 50: CPT | Mod: GC

## 2025-03-27 RX ORDER — VANCOMYCIN HCL IN 5 % DEXTROSE 1.5G/250ML
PLASTIC BAG, INJECTION (ML) INTRAVENOUS
Refills: 0 | Status: DISCONTINUED | OUTPATIENT
Start: 2025-03-27 | End: 2025-03-27

## 2025-03-27 RX ORDER — ACETAMINOPHEN 500 MG/5ML
1000 LIQUID (ML) ORAL ONCE
Refills: 0 | Status: COMPLETED | OUTPATIENT
Start: 2025-03-27 | End: 2025-03-27

## 2025-03-27 RX ORDER — IPRATROPIUM BROMIDE AND ALBUTEROL SULFATE .5; 2.5 MG/3ML; MG/3ML
3 SOLUTION RESPIRATORY (INHALATION) EVERY 6 HOURS
Refills: 0 | Status: DISCONTINUED | OUTPATIENT
Start: 2025-03-27 | End: 2025-03-28

## 2025-03-27 RX ORDER — VANCOMYCIN HCL IN 5 % DEXTROSE 1.5G/250ML
1000 PLASTIC BAG, INJECTION (ML) INTRAVENOUS EVERY 12 HOURS
Refills: 0 | Status: DISCONTINUED | OUTPATIENT
Start: 2025-03-27 | End: 2025-03-27

## 2025-03-27 RX ORDER — DOLUTEGRAVIR SODIUM 5 MG/1
50 TABLET, FOR SUSPENSION ORAL EVERY 24 HOURS
Refills: 0 | Status: DISCONTINUED | OUTPATIENT
Start: 2025-03-27 | End: 2025-03-28

## 2025-03-27 RX ORDER — VANCOMYCIN HCL IN 5 % DEXTROSE 1.5G/250ML
1000 PLASTIC BAG, INJECTION (ML) INTRAVENOUS ONCE
Refills: 0 | Status: COMPLETED | OUTPATIENT
Start: 2025-03-27 | End: 2025-03-27

## 2025-03-27 RX ORDER — LOPERAMIDE HCL 1 MG/7.5ML
2 SOLUTION ORAL EVERY 24 HOURS
Refills: 0 | Status: DISCONTINUED | OUTPATIENT
Start: 2025-03-27 | End: 2025-03-28

## 2025-03-27 RX ADMIN — CEFEPIME 100 MILLIGRAM(S): 2 INJECTION, POWDER, FOR SOLUTION INTRAVENOUS at 06:38

## 2025-03-27 RX ADMIN — Medication 1 TABLET(S): at 18:06

## 2025-03-27 RX ADMIN — Medication 400 MILLIGRAM(S): at 06:44

## 2025-03-27 RX ADMIN — ESCITALOPRAM OXALATE 20 MILLIGRAM(S): 20 TABLET ORAL at 16:06

## 2025-03-27 RX ADMIN — Medication 250 MILLIGRAM(S): at 02:04

## 2025-03-27 RX ADMIN — LOPERAMIDE HCL 2 MILLIGRAM(S): 1 SOLUTION ORAL at 11:48

## 2025-03-27 RX ADMIN — Medication 81 MILLIGRAM(S): at 09:15

## 2025-03-27 RX ADMIN — ARIPIPRAZOLE 5 MILLIGRAM(S): 2 TABLET ORAL at 09:14

## 2025-03-27 RX ADMIN — Medication 100 MILLIGRAM(S): at 00:22

## 2025-03-27 RX ADMIN — CARVEDILOL 3.12 MILLIGRAM(S): 3.12 TABLET, FILM COATED ORAL at 18:06

## 2025-03-27 RX ADMIN — HEPARIN SODIUM 5000 UNIT(S): 1000 INJECTION INTRAVENOUS; SUBCUTANEOUS at 06:38

## 2025-03-27 RX ADMIN — Medication 4 MILLILITER(S): at 09:14

## 2025-03-27 RX ADMIN — ATOVAQUONE 1500 MILLIGRAM(S): 750 SUSPENSION ORAL at 11:49

## 2025-03-27 RX ADMIN — Medication 10 MILLIGRAM(S): at 11:49

## 2025-03-27 RX ADMIN — Medication 1 DOSE(S): at 21:43

## 2025-03-27 RX ADMIN — HEPARIN SODIUM 5000 UNIT(S): 1000 INJECTION INTRAVENOUS; SUBCUTANEOUS at 21:42

## 2025-03-27 RX ADMIN — CEFEPIME 100 MILLIGRAM(S): 2 INJECTION, POWDER, FOR SOLUTION INTRAVENOUS at 21:42

## 2025-03-27 RX ADMIN — HEPARIN SODIUM 5000 UNIT(S): 1000 INJECTION INTRAVENOUS; SUBCUTANEOUS at 13:33

## 2025-03-27 RX ADMIN — TAMSULOSIN HYDROCHLORIDE 0.4 MILLIGRAM(S): 0.4 CAPSULE ORAL at 21:42

## 2025-03-27 RX ADMIN — Medication 250 MILLIGRAM(S): at 13:44

## 2025-03-27 RX ADMIN — Medication 1 DOSE(S): at 09:15

## 2025-03-27 RX ADMIN — DOLUTEGRAVIR SODIUM 50 MILLIGRAM(S): 5 TABLET, FOR SUSPENSION ORAL at 18:06

## 2025-03-27 RX ADMIN — ATORVASTATIN CALCIUM 80 MILLIGRAM(S): 80 TABLET, FILM COATED ORAL at 21:42

## 2025-03-27 RX ADMIN — Medication 100 MILLIGRAM(S): at 21:42

## 2025-03-27 RX ADMIN — INSULIN LISPRO 2: 100 INJECTION, SOLUTION INTRAVENOUS; SUBCUTANEOUS at 09:14

## 2025-03-27 RX ADMIN — CARVEDILOL 3.12 MILLIGRAM(S): 3.12 TABLET, FILM COATED ORAL at 06:38

## 2025-03-27 RX ADMIN — Medication 5 MILLIGRAM(S): at 21:42

## 2025-03-27 RX ADMIN — Medication 1000 MILLIGRAM(S): at 07:43

## 2025-03-27 RX ADMIN — CEFEPIME 100 MILLIGRAM(S): 2 INJECTION, POWDER, FOR SOLUTION INTRAVENOUS at 13:33

## 2025-03-27 NOTE — PROGRESS NOTE ADULT - SUBJECTIVE AND OBJECTIVE BOX
Physical Medicine and Rehabilitation Progress Note :       Patient is a 75y old  Male who presents with a chief complaint of pneumonia (27 Mar 2025 07:15)      HPI:  75 year old male with history of HIV (CD4 160, VL 26 7/2024), CAD (prior MI), T2DM, HFrEF (EF 40-45% 11/2024), recurrent syncope, COPD, depression/anxiety,  prostate cancer s/p radiation (2013), presenting for cough and shortness of breath. His symptoms started around a month ago and worsened yesterday causing him to present to the ED. He reports having a dry cough for the past month. Denies mucus production, nausea, vomiting, diarrhea, and chills.  He reports having prior hospitalizations for pneumonia, most recently last year. He lives in an [place] on [address] and is moving this week. He doesn't know his home medications and reports not taking all of them daily.     In the ED  Initial vital signs: T 103.4 degrees F, , /102, Sp02 92% on RA  Labs significant for: Hgb 12.5, Platelet 120, Creatinine 1.58, Glucose 115, Bilirubin 1.4. UA with large blood, 6 WBCs  CXR: RLL opacity  Interventions: 2.5L, Cefepime 2gx1, Tylenol 650mg x1, Vanc 1gx1  (23 Mar 2025 07:53)                            10.7   2.66  )-----------( 150      ( 27 Mar 2025 05:30 )             31.5             Vital Signs Last 24 Hrs  T(C): 37 (27 Mar 2025 05:58), Max: 37 (27 Mar 2025 05:58)  T(F): 98.6 (27 Mar 2025 05:58), Max: 98.6 (27 Mar 2025 05:58)  HR: 61 (27 Mar 2025 05:58) (61 - 73)  BP: 148/76 (27 Mar 2025 05:58) (141/83 - 148/76)  BP(mean): 100 (27 Mar 2025 05:58) (100 - 100)  RR: 17 (27 Mar 2025 05:58) (17 - 17)  SpO2: 99% (27 Mar 2025 05:58) (98% - 99%)    Parameters below as of 27 Mar 2025 05:58  Patient On (Oxygen Delivery Method): nasal cannula  O2 Flow (L/min): 2      MEDICATIONS  (STANDING):  ARIPiprazole 5 milliGRAM(s) Oral every 24 hours  aspirin  chewable 81 milliGRAM(s) Oral every 24 hours  atorvastatin 80 milliGRAM(s) Oral at bedtime  atovaquone  Suspension 1500 milliGRAM(s) Oral daily  carvedilol 3.125 milliGRAM(s) Oral every 12 hours  cefepime   IVPB 2000 milliGRAM(s) IV Intermittent every 8 hours  dextrose 5%. 1000 milliLiter(s) (100 mL/Hr) IV Continuous <Continuous>  dextrose 5%. 1000 milliLiter(s) (50 mL/Hr) IV Continuous <Continuous>  dextrose 50% Injectable 25 Gram(s) IV Push once  dextrose 50% Injectable 12.5 Gram(s) IV Push once  dextrose 50% Injectable 25 Gram(s) IV Push once  enalapril 10 milliGRAM(s) Oral every 24 hours  escitalopram 20 milliGRAM(s) Oral every 24 hours  fluticasone propionate/ salmeterol 100-50 MICROgram(s) Diskus 1 Dose(s) Inhalation two times a day  glucagon  Injectable 1 milliGRAM(s) IntraMuscular once  heparin   Injectable 5000 Unit(s) SubCutaneous every 8 hours  insulin lispro (ADMELOG) corrective regimen sliding scale   SubCutaneous three times a day before meals  insulin lispro (ADMELOG) corrective regimen sliding scale   SubCutaneous at bedtime  loperamide 2 milliGRAM(s) Oral every 24 hours  melatonin 5 milliGRAM(s) Oral at bedtime  sodium chloride 7% Inhalation 4 milliLiter(s) Inhalation every 8 hours  tamsulosin 0.4 milliGRAM(s) Oral at bedtime  vancomycin  IVPB 1000 milliGRAM(s) IV Intermittent every 12 hours    MEDICATIONS  (PRN):  albuterol/ipratropium for Nebulization 3 milliLiter(s) Nebulizer every 6 hours PRN Shortness of Breath and/or Wheezing  aluminum hydroxide/magnesium hydroxide/simethicone Suspension 30 milliLiter(s) Oral every 4 hours PRN Dyspepsia  benzocaine/menthol Lozenge 1 Lozenge Oral three times a day PRN Sore Throat  benzonatate 100 milliGRAM(s) Oral three times a day PRN Cough  dextrose Oral Gel 15 Gram(s) Oral once PRN Blood Glucose LESS THAN 70 milliGRAM(s)/deciliter  ondansetron Injectable 4 milliGRAM(s) IV Push every 8 hours PRN Nausea and/or Vomiting      T(C): 37 (03-27-25 @ 05:58), Max: 37 (03-27-25 @ 05:58)  HR: 61 (03-27-25 @ 05:58) (61 - 73)  BP: 148/76 (03-27-25 @ 05:58) (141/83 - 148/76)  RR: 17 (03-27-25 @ 05:58) (17 - 17)  SpO2: 99% (03-27-25 @ 05:58) (98% - 99%)    Physical Exam:   on [place] r/o TB isolation ,  75 y o man lying comfortably in semi Méndez's position , awake , alert , no acute complaints     Head: normocephalic , atraumatic    Eyes: PERRLA , EOMI , no nystagmus , sclera anicteric    ENT / FACE: neg nasal discharge , uvula midline , no oropharyngeal erythema / exudate    Neck: supple , negative JVD , negative carotid bruits , no thyromegaly    Chest: manny rhonchi     Cardiovascular: regular rate and rhythm , neg murmurs / rubs / gallops    Abdomen: soft , non distended , no tenderness to palpation in all 4 quadrants ,  normal bowel sounds     Extremities: WWP , neg cyanosis /clubbing / edema     Neurologic Exam:     Alert and oriented to person , place , date/year , speech fluent w/o dysarthria     Cranial Nerves:           II:                         pupils equal , round and reactive to light , visual fields intact         III/ IV/VI:             extraocular movements intact , neg nystagmus , neg ptosis        V:                        facial sensation intact , V1-3 normal        VII:                      face symmetric , no droop , normal eye closure and smile        VIII:                     hearing intact to finger rub bilaterally        IX and X:             no hoarseness , gag intact , palate/ uvula rise symmetrically        XI:                       SCM / trapezius strength intact bilateral        XII:                      no tongue deviation    Motor Exam:        > 4/5 x 4 extremities , without drift     Sensation:         intact to light touch x 4 extremities                            no neglect or extinction on double simultaneous testing    DTR:           biceps/brachioradialis: equal                            patella/ankle: equal          neg Babinski    Coordination:            Finger to Nose:  neg dysmetria bilaterally      Initial Functional Status Assessment :         Previous Level of Function:     · Ambulation Skills  independent  · Transfer Skills  independent  · ADL Skills  independent  · Work/Leisure Activity  independent  · Additional Comments  Pt reports living in SRO with 5 COLIN. Reports moving into apartment without COLIN. Reports being independent with daily activities without use of DME.    Cognitive Status Examination:   · Orientation  oriented to person, place, time and situation  · Level of Consciousness  alert  · Follows Commands and Answers Questions  100% of the time  · Personal Safety and Judgment  intact    Range of Motion Exam:   · Active Range of Motion Examination  no Active ROM deficits were identified    Manual Muscle Testing:   · Manual Muscle Testing Results  Grossly assessed with functional movement, bilateral UE/LE greater than or equal to 3+/5    Bed Mobility: Rolling/Turning:     · Level of [place]  supervision    Bed Mobility: Sit to Supine:     · Level of [place]  N/T as pt left seated in chair    Bed Mobility: Supine to Sit:     · Level of [place]  supervision  · Physical Assist/Nonphysical Assist  1 person assist; verbal cues    Bed Mobility Analysis:     · Bed Mobility Limitations  impaired ability to control trunk for mobility; decreased ability to use legs for bridging/pushing  · Impairments Contributing to Impaired Bed Mobility  impaired balance; narrow base of support; impaired postural control; decreased strength    Transfer: Sit to Stand:     · Level of [place]  contact guard  · Physical Assist/Nonphysical Assist  1 person assist; verbal cues  · Assistive Device  Handheld Assist    Transfer: Stand to Sit:     · Level of [place]  contact guard  · Physical Assist/Nonphysical Assist  1 person assist; verbal cues    Sit/Stand Transfer Safety Analysis:     · Transfer Safety Concerns Noted  decreased balance during turns; decreased step length; decreased weight-shifting ability  · Impairments Contributing to Impaired Transfers  impaired balance; narrow base of support; impaired postural control; decreased strength    Gait Skills:     · Level of [place]  contact guard  · Physical Assist/Nonphysical Assist  1 person assist; verbal cues  · Gait Distance  40 feet x 1    Gait Analysis:     · Gait Deviations Noted  decreased j carlos; decreased step length; decreased weight-shifting ability  · Impairments Contributing to Gait Deviations  impaired balance; narrow base of support; impaired postural control; decreased strength    Balance Skills Assessment:     · Sitting Balance: Static  supervision  · Sitting Balance: Dynamic  supervision  · Sit-to-Stand Balance  CG  · Standing Balance: Static  CG  · Standing Balance: Dynamic  CG  · Systems Impairment Contributing to Balance Disturbance  musculoskeletal  · Identified Impairments Contributing to Balance Disturbance  decreased strength; impaired postural control    Sensory Examination:   Sensory Examination:    Grossly Intact:   · Gross Sensory Examination  Grossly Intact; Left UE; Right UE; Left LE; Right LE      Clinical Impressions:   · Criteria for Skilled Therapeutic Interventions  impairments found; functional limitations in following categories; risk reduction/prevention; anticipated discharge recommendation  · Impairments Found (describe specific impairments)  posture; gait, locomotion, and balance; muscle strength  · Functional Limitations in Following Categories (describe specific limitations)  self-care; home management; community/leisure  · Risk Reduction/Prevention (Describe Specific Areas of risk reduction/prevention)  risk factors  · Risk Areas  fall        PM&R Impression : as above    Current disposition plan recommendation :    d/c home with home therapy

## 2025-03-27 NOTE — PROGRESS NOTE ADULT - ASSESSMENT
75M with HTN, HIV/AIDS (CD4 74, VL 4,516 this admission), CAD (prior MI), T2DM, HFrEF (EF 40-45% 11/2024), recurrent syncope, COPD, depression/anxiety, prostate cancer s/p radiation (2013), hx of hepatitis C treated with harvoni 2015 – NAAT negative 1/2025, Syphilis last treated with 21 days of oral therapy early 2017, presenting for cough and shortness of breath x >1 month admitted for pneumonia. On arrival, febrile 103.5 with WBC 4.43, 81.9% PMNs. CXR with diffuse R lung infiltrates. RVP/COVID negative. Urine strep and legionella antigens negative. S/p Cefepime 2g x 1 3/22,  azithromycin and ceftriaxone. On vancomycin and cefepime. Given low CD4 count and patient's social hx, DDX includes PCP pneumonia (high suspicion), CAP, pulmonary TB, other atypical infections and crack lung. Bcxs 3/22 1/4 bottles growing corynebacterium -- suspect contaminant. Regarding dysphagia - has had for 10 yrs and no e/o thrush on exam, low suspicion for esophageal candidiasis. Afebrile, respiratory symptoms unchanged. , Toxo IgG negative, serum CrAg negative. Fungitell and QuantiFeron negative. GI PCR negative. Urine GC/Chlamydia negative. RPR titer 1:1 - had treated syphilis in past. Dr. Aguilera discussed CT chest results with radiology -- findings less likely TB or PCP.       Suggest:  -check CMP    -f/u blood cultures 3/22   -send sputum culture, induced sputum x 3 for AFB smear/culture, induced sputum x 2 for MTB/Rif PCR, induced sputum for PCP PCR if able   -F/u Histoplasma urine Ag and will f/u serum Ag  -continue airborne precautions   -wean off supplemental O2 as tolerated.   -Can restart home ART (Tivicay and Descovy)   -hold further doses of vancomycin until CMP results to assess renal function. If no BRENNAN/Cr stable, restart vanc at 750mg IV Q12.    -continue cefepime 2g IV Q8   -continue atovaquone 1500mg QD    Team 2 will follow you.  Case d/w primary team.  Final recommendation pending attending note.    Teresa Miller, Infectious Diseases PA  Please reach out for any questions 9 am-5pm.   For evenings and weekends, please call the ID physician on call.

## 2025-03-27 NOTE — PROGRESS NOTE ADULT - PROBLEM SELECTOR PLAN 1
Patient with fever and tachycardia meeting 2/4 criteria for sepsis 2/2 pneumonia now resolved. S/p Vanc and Cefepime.  MRSA swab+.  S/p CTX 2g qd, Doxy 100mg, Azithro 500mg x 2 (d/c with negative urine strep/legionella in case patient has MAC/to prevent resistance)  BCx positive for gram positive rods, possible contamination    - ID consulted, recommend extensive infectious workup for c/o PCP. F/u ID note for all labs ordered  - F/u RT to induce sputum for AFB x 2 pending collection; f/u AFB x1 in lab  -  CT Non-con ordered  - Reorder Vancomycin 1000mg q12h ( vancomycin trough: 14.7 ) (3/23 - )  - Start Cefepime 2g q8h i/s/o immunocompromised state to cover MRSA and pseudomonas (3/24 - ) ) in addition to Atovaquone daily  -f/u ID recs   -TB R/O- airborne precautions; QuantiFeron pending, , GC/C, histoplasma Ag, Syphilis and Fungitell   -f/u respiratory therapy to induce sputum for AFB x 2 pending collection; f/u AFB x1 in lab  -Duonebs q8hrs and reassess given d/t wheezing and hx of COPD   - Hep C R/O- F/U RNA Viral load pending Patient with fever and tachycardia meeting 2/4 criteria for sepsis 2/2 pneumonia now resolved. S/p Vanc and Cefepime.  MRSA swab+.  S/p CTX 2g qd, Doxy 100mg, Azithro 500mg x 2 (d/c with negative urine strep/legionella in case patient has MAC/to prevent resistance)  BCx positive for gram positive rods, possible contamination  GC/C negative, Syphilis RPR nonreactive, AFB x1 negative   Hep C RNA Viral load negative    - ID consulted, recommend extensive infectious workup for c/o PCP. F/u ID note for all labs ordered  - F/u RT to induce sputum for AFB x 2 pending collection  -  CT Non-con ordered  - Reorder Vancomycin 1000mg q12h ( vancomycin trough: 18.9 ) (3/23 - )  - Start Cefepime 2g q8h i/s/o immunocompromised state to cover MRSA and pseudomonas (3/24 - ) ) in addition to Atovaquone daily  -f/u ID recs   -TB R/O- airborne precautions; QuantiFeron pending, histoplasma Ag,  and Fungitell  -Duonebs q8hrs and reassess given d/t wheezing and hx of COPD Patient with fever and tachycardia meeting 2/4 criteria for sepsis 2/2 pneumonia now resolved. S/p Vanc and Cefepime.  MRSA swab+.  S/p CTX 2g qd, Doxy 100mg, Azithro 500mg x 2 (d/c with negative urine strep/legionella in case patient has MAC/to prevent resistance)  BCx positive for gram positive rods, possible contamination  GC/C negative, Syphilis RPR nonreactive, AFB x1 negative   Hep C RNA Viral load negative    - ID consulted, recommend extensive infectious workup for c/o PCP. F/u ID note for all labs ordered  - F/u RT to induce sputum for AFB x 2 pending collection  - Reorder Vancomycin 1000mg q12h ( vancomycin trough: 18.9 ) (3/23 - )  - Start Cefepime 2g q8h i/s/o immunocompromised state to cover MRSA and pseudomonas (3/24 - ) ) in addition to Atovaquone daily  -f/u ID recs   -TB R/O- airborne precautions; QuantiFeron pending, histoplasma Ag,  and Fungitell  -Duonebs q8hrs and reassess given d/t wheezing and hx of COPD

## 2025-03-27 NOTE — PROGRESS NOTE ADULT - SUBJECTIVE AND OBJECTIVE BOX
INFECTIOUS DISEASES CONSULT FOLLOW-UP NOTE    INTERVAL HPI/OVERNIGHT EVENTS:      ROS:   Constitutional, eyes, ENT, cardiovascular, respiratory, gastrointestinal, genitourinary, integumentary, neurological, psychiatric and heme/lymph are otherwise negative other than noted above       ANTIBIOTICS/RELEVANT:    MEDICATIONS  (STANDING):  ARIPiprazole 5 milliGRAM(s) Oral every 24 hours  aspirin  chewable 81 milliGRAM(s) Oral every 24 hours  atorvastatin 80 milliGRAM(s) Oral at bedtime  atovaquone  Suspension 1500 milliGRAM(s) Oral daily  carvedilol 3.125 milliGRAM(s) Oral every 12 hours  cefepime   IVPB 2000 milliGRAM(s) IV Intermittent every 8 hours  dextrose 5%. 1000 milliLiter(s) (100 mL/Hr) IV Continuous <Continuous>  dextrose 5%. 1000 milliLiter(s) (50 mL/Hr) IV Continuous <Continuous>  dextrose 50% Injectable 25 Gram(s) IV Push once  dextrose 50% Injectable 12.5 Gram(s) IV Push once  dextrose 50% Injectable 25 Gram(s) IV Push once  enalapril 10 milliGRAM(s) Oral every 24 hours  escitalopram 20 milliGRAM(s) Oral every 24 hours  fluticasone propionate/ salmeterol 100-50 MICROgram(s) Diskus 1 Dose(s) Inhalation two times a day  glucagon  Injectable 1 milliGRAM(s) IntraMuscular once  heparin   Injectable 5000 Unit(s) SubCutaneous every 8 hours  insulin lispro (ADMELOG) corrective regimen sliding scale   SubCutaneous three times a day before meals  insulin lispro (ADMELOG) corrective regimen sliding scale   SubCutaneous at bedtime  loperamide 2 milliGRAM(s) Oral every 24 hours  melatonin 5 milliGRAM(s) Oral at bedtime  sodium chloride 7% Inhalation 4 milliLiter(s) Inhalation every 8 hours  tamsulosin 0.4 milliGRAM(s) Oral at bedtime    MEDICATIONS  (PRN):  albuterol/ipratropium for Nebulization 3 milliLiter(s) Nebulizer every 6 hours PRN Shortness of Breath and/or Wheezing  aluminum hydroxide/magnesium hydroxide/simethicone Suspension 30 milliLiter(s) Oral every 4 hours PRN Dyspepsia  benzocaine/menthol Lozenge 1 Lozenge Oral three times a day PRN Sore Throat  benzonatate 100 milliGRAM(s) Oral three times a day PRN Cough  dextrose Oral Gel 15 Gram(s) Oral once PRN Blood Glucose LESS THAN 70 milliGRAM(s)/deciliter  ondansetron Injectable 4 milliGRAM(s) IV Push every 8 hours PRN Nausea and/or Vomiting        Vital Signs Last 24 Hrs  T(C): 36.3 (27 Mar 2025 12:05), Max: 37 (27 Mar 2025 05:58)  T(F): 97.4 (27 Mar 2025 12:05), Max: 98.6 (27 Mar 2025 05:58)  HR: 59 (27 Mar 2025 12:05) (59 - 73)  BP: 129/73 (27 Mar 2025 12:05) (129/73 - 148/76)  BP(mean): 100 (27 Mar 2025 05:58) (100 - 100)  RR: 17 (27 Mar 2025 12:05) (17 - 17)  SpO2: 98% (27 Mar 2025 12:05) (98% - 99%)    Parameters below as of 27 Mar 2025 12:05  Patient On (Oxygen Delivery Method): nasal cannula  O2 Flow (L/min): 2      03-27-25 @ 07:01  -  03-27-25 @ 14:12  --------------------------------------------------------  IN: 0 mL / OUT: 550 mL / NET: -550 mL      PHYSICAL EXAM:  Constitutional: alert, NAD  Eyes: the sclera and conjunctiva were normal.   ENT: the ears and nose were normal in appearance.   Neck: the appearance of the neck was normal and the neck was supple.   Pulmonary: no respiratory distress and lungs were clear to auscultation bilaterally.   Heart: heart rate was normal and rhythm regular, normal S1 and S2  Vascular:. there was no peripheral edema  Abdomen: normal bowel sounds, soft, non-tender  Neurological: no focal deficits.   Psychiatric: the affect was normal        LABS:                        10.7   2.66  )-----------( 150      ( 27 Mar 2025 05:30 )             31.5                 MICROBIOLOGY:      RADIOLOGY & ADDITIONAL STUDIES:  Reviewed INFECTIOUS DISEASES CONSULT FOLLOW-UP NOTE    INTERVAL HPI/OVERNIGHT EVENTS:    Patient seen and examined at bedside. MILLY. Afebrile. On and off 2L O2 via NC. Patient states he is feeling better since he found out his housing would be secured. Feels that cough and SOB improving. He is not tolerating sputum induction and is refusing bronch.     ROS:   Constitutional, eyes, ENT, cardiovascular, respiratory, gastrointestinal, genitourinary, integumentary, neurological, psychiatric and heme/lymph are otherwise negative other than noted above       ANTIBIOTICS/RELEVANT:    MEDICATIONS  (STANDING):  ARIPiprazole 5 milliGRAM(s) Oral every 24 hours  aspirin  chewable 81 milliGRAM(s) Oral every 24 hours  atorvastatin 80 milliGRAM(s) Oral at bedtime  atovaquone  Suspension 1500 milliGRAM(s) Oral daily  carvedilol 3.125 milliGRAM(s) Oral every 12 hours  cefepime   IVPB 2000 milliGRAM(s) IV Intermittent every 8 hours  dextrose 5%. 1000 milliLiter(s) (100 mL/Hr) IV Continuous <Continuous>  dextrose 5%. 1000 milliLiter(s) (50 mL/Hr) IV Continuous <Continuous>  dextrose 50% Injectable 25 Gram(s) IV Push once  dextrose 50% Injectable 12.5 Gram(s) IV Push once  dextrose 50% Injectable 25 Gram(s) IV Push once  enalapril 10 milliGRAM(s) Oral every 24 hours  escitalopram 20 milliGRAM(s) Oral every 24 hours  fluticasone propionate/ salmeterol 100-50 MICROgram(s) Diskus 1 Dose(s) Inhalation two times a day  glucagon  Injectable 1 milliGRAM(s) IntraMuscular once  heparin   Injectable 5000 Unit(s) SubCutaneous every 8 hours  insulin lispro (ADMELOG) corrective regimen sliding scale   SubCutaneous three times a day before meals  insulin lispro (ADMELOG) corrective regimen sliding scale   SubCutaneous at bedtime  loperamide 2 milliGRAM(s) Oral every 24 hours  melatonin 5 milliGRAM(s) Oral at bedtime  sodium chloride 7% Inhalation 4 milliLiter(s) Inhalation every 8 hours  tamsulosin 0.4 milliGRAM(s) Oral at bedtime    MEDICATIONS  (PRN):  albuterol/ipratropium for Nebulization 3 milliLiter(s) Nebulizer every 6 hours PRN Shortness of Breath and/or Wheezing  aluminum hydroxide/magnesium hydroxide/simethicone Suspension 30 milliLiter(s) Oral every 4 hours PRN Dyspepsia  benzocaine/menthol Lozenge 1 Lozenge Oral three times a day PRN Sore Throat  benzonatate 100 milliGRAM(s) Oral three times a day PRN Cough  dextrose Oral Gel 15 Gram(s) Oral once PRN Blood Glucose LESS THAN 70 milliGRAM(s)/deciliter  ondansetron Injectable 4 milliGRAM(s) IV Push every 8 hours PRN Nausea and/or Vomiting        Vital Signs Last 24 Hrs  T(C): 36.3 (27 Mar 2025 12:05), Max: 37 (27 Mar 2025 05:58)  T(F): 97.4 (27 Mar 2025 12:05), Max: 98.6 (27 Mar 2025 05:58)  HR: 59 (27 Mar 2025 12:05) (59 - 73)  BP: 129/73 (27 Mar 2025 12:05) (129/73 - 148/76)  BP(mean): 100 (27 Mar 2025 05:58) (100 - 100)  RR: 17 (27 Mar 2025 12:05) (17 - 17)  SpO2: 98% (27 Mar 2025 12:05) (98% - 99%)    Parameters below as of 27 Mar 2025 12:05  Patient On (Oxygen Delivery Method): nasal cannula  O2 Flow (L/min): 2      03-27-25 @ 07:01  -  03-27-25 @ 14:12  --------------------------------------------------------  IN: 0 mL / OUT: 550 mL / NET: -550 mL      PHYSICAL EXAM:  Constitutional: alert, NAD, laying comfortably in bed   Eyes: the sclera and conjunctiva were normal.   ENT: the ears and nose were normal in appearance.   Neck: the appearance of the neck was normal and the neck was supple.   Pulmonary: +NC. Symmetric chest rise. Less conversational dyspnea noted today.   Heart: Regular rate and rhythm   Vascular: there was no peripheral edema  Abdomen: soft, non-tender  Neurological: no focal deficits.   Psychiatric: the affect was normal        LABS:                        10.7   2.66  )-----------( 150      ( 27 Mar 2025 05:30 )             31.5                 MICROBIOLOGY:    Culture - Acid Fast - Sputum w/Smear (collected 03-25-25 @ 19:58)  Source: Sputum    Culture - Urine (collected 03-22-25 @ 21:03)  Source: Clean Catch Clean Catch (Midstream)  Final Report (03-24-25 @ 07:08):    No growth    Culture - Blood (collected 03-22-25 @ 21:03)  Source: Blood Blood-Peripheral  Gram Stain (03-24-25 @ 14:21):    Growth in anaerobic bottle: Gram Positive Rods  Final Report (03-25-25 @ 12:22):    Growth in anaerobic bottle: Corynebacterium imitans "Susceptibilities not    performed"    Direct identification is available within approximately 3-5    hours either by Blood Panel Multiplexed PCR or Direct    MALDI-TOF. Details: [email or web address]  Organism: Blood Culture PCR (03-25-25 @ 12:22)  Organism: Blood Culture PCR (03-25-25 @ 12:22)      Method Type: PCR      -  Corynebacterium species: Detec    Culture - Blood (collected 03-22-25 @ 21:03)  Source: Blood Blood-Peripheral  Preliminary Report (03-27-25 @ 05:01):    No growth at 4 days    Urinalysis with Rflx Culture (collected 03-22-25 @ 21:03)        RADIOLOGY & ADDITIONAL STUDIES:  Reviewed

## 2025-03-27 NOTE — PROGRESS NOTE ADULT - PROBLEM SELECTOR PLAN 11
- Continue home aripiprazole 5mg qd, escitalopram 20mg qd            #Diarrhea  -Send GI PCR. Can give Imodium (pt's request) after GI PCR. - Continue home aripiprazole 5mg qd, escitalopram 20mg qd            #Diarrhea  -GI PCR negative  -Start Loperamide 2 mg PO q24

## 2025-03-27 NOTE — PROGRESS NOTE ADULT - ATTENDING COMMENTS
Discussed case with ID consult service .  Lower suspicion for PCP or TB at this juncture   Continue treating pneumonia with abx ;   [ ] f/u ID recs re: possible transition to orals  [ ] continue to wean wall oxygen as tolerated      [ ] not having watery diarrhea ; takes imodium every other day as outpatient due to hx of fecal incontinence. very low suspicion for GI infection ; can get imodium every other day (same as his outpatient regimen) while inpatient

## 2025-03-27 NOTE — PROGRESS NOTE ADULT - PROBLEM SELECTOR PLAN 2
CXR with RLL opacity. S/p Vanc and Cefepime. Reports prior hx of hospitalizations due to pneumonia, most recently last year.   MRSA swab+, urine legionella and strep negative, RVP negative  - C/w abx as above CXR with RLL opacity. S/p Vanc and Cefepime. Reports prior hx of hospitalizations due to pneumonia, most recently last year.   MRSA swab+, urine legionella and strep negative, RVP negative  CT Chest shows worsening peribronchial wall thickening, ground-glass opacities, and tree-in-bud nodules in all 5 lobes. likely infectious.   - C/w abx as above

## 2025-03-27 NOTE — PROGRESS NOTE ADULT - PROBLEM SELECTOR PLAN 10
Home med- symbicort. No home 02.     -Duonebs q8hrs and reassess d/t wheezing   -Continue with 2L NC, wean as tolerated  - c/w home med

## 2025-03-27 NOTE — PROGRESS NOTE ADULT - SUBJECTIVE AND OBJECTIVE BOX
INTERVAL HPI/OVERNIGHT EVENTS:  This morning: Patient was seen and examined at bedside.      VITAL SIGNS:  T(F): 98.6 (03-27-25 @ 05:58)  HR: 61 (03-27-25 @ 05:58)  BP: 148/76 (03-27-25 @ 05:58)  RR: 17 (03-27-25 @ 05:58)  SpO2: 99% (03-27-25 @ 05:58)  Wt(kg): --    I/O:      PHYSICAL EXAM:    Constitutional: NAD  HEENT: PERRL, EOMI, sclera non-icteric, neck supple, trachea midline, no masses, no JVD, MMM, good dentition  Respiratory: CTA b/l, good air entry b/l, no wheezing, no rhonchi, no rales, without accessory muscle use and no intercostal retractions  Cardiovascular: RRR, normal S1S2, no M/R/G  Gastrointestinal: abdomen soft, NTND, no masses palpable, BS normal  Extremities: Warm, well perfused, pulses equal bilateral upper and lower extremities, no edema, no clubbing  Neurological: AAOx3, CN Grossly intact  Skin: Normal temperature, warm, dry    MEDICATIONS  (STANDING):  albuterol/ipratropium for Nebulization 3 milliLiter(s) Nebulizer every 8 hours  ARIPiprazole 5 milliGRAM(s) Oral every 24 hours  aspirin  chewable 81 milliGRAM(s) Oral every 24 hours  atorvastatin 80 milliGRAM(s) Oral at bedtime  atovaquone  Suspension 1500 milliGRAM(s) Oral daily  carvedilol 3.125 milliGRAM(s) Oral every 12 hours  cefepime   IVPB 2000 milliGRAM(s) IV Intermittent every 8 hours  dextrose 5%. 1000 milliLiter(s) (100 mL/Hr) IV Continuous <Continuous>  dextrose 5%. 1000 milliLiter(s) (50 mL/Hr) IV Continuous <Continuous>  dextrose 50% Injectable 25 Gram(s) IV Push once  dextrose 50% Injectable 12.5 Gram(s) IV Push once  dextrose 50% Injectable 25 Gram(s) IV Push once  enalapril 10 milliGRAM(s) Oral every 24 hours  escitalopram 20 milliGRAM(s) Oral every 24 hours  fluticasone propionate/ salmeterol 100-50 MICROgram(s) Diskus 1 Dose(s) Inhalation two times a day  glucagon  Injectable 1 milliGRAM(s) IntraMuscular once  heparin   Injectable 5000 Unit(s) SubCutaneous every 8 hours  insulin lispro (ADMELOG) corrective regimen sliding scale   SubCutaneous three times a day before meals  insulin lispro (ADMELOG) corrective regimen sliding scale   SubCutaneous at bedtime  melatonin 5 milliGRAM(s) Oral at bedtime  sodium chloride 7% Inhalation 4 milliLiter(s) Inhalation every 8 hours  tamsulosin 0.4 milliGRAM(s) Oral at bedtime  vancomycin  IVPB 1000 milliGRAM(s) IV Intermittent every 12 hours  vancomycin  IVPB        MEDICATIONS  (PRN):  aluminum hydroxide/magnesium hydroxide/simethicone Suspension 30 milliLiter(s) Oral every 4 hours PRN Dyspepsia  benzocaine/menthol Lozenge 1 Lozenge Oral three times a day PRN Sore Throat  benzonatate 100 milliGRAM(s) Oral three times a day PRN Cough  dextrose Oral Gel 15 Gram(s) Oral once PRN Blood Glucose LESS THAN 70 milliGRAM(s)/deciliter  ondansetron Injectable 4 milliGRAM(s) IV Push every 8 hours PRN Nausea and/or Vomiting      Allergies    shellfish (Unknown)  sulfa drugs (Rash)  strawberry (Rash)  Peaches (Rash)  penicillins (Rash)    Intolerances        LABS:                        12.0   2.76  )-----------( 145      ( 26 Mar 2025 05:30 )             35.4                         RADIOLOGY & ADDITIONAL TESTS:  Reviewed INTERVAL HPI/OVERNIGHT EVENTS: MILLY.  This morning: Patient was seen and examined at bedside. Reports wheezing has improved. Still reports non-bloody diarrhea every time he coughs.  Denies any SOB, fever, chills, nausea, vomiting.     VITAL SIGNS:  T(F): 98.6 (03-27-25 @ 05:58)  HR: 61 (03-27-25 @ 05:58)  BP: 148/76 (03-27-25 @ 05:58)  RR: 17 (03-27-25 @ 05:58)  SpO2: 99% (03-27-25 @ 05:58)  Wt(kg): --    I/O:      PHYSICAL EXAM:    Constitutional: NAD  HEENT: PERRL, EOMI, sclera non-icteric, neck supple, trachea midline, no masses, no JVD, MMM, good dentition  Respiratory: CTA b/l, good air entry b/l, no wheezing, no rhonchi, no rales, without accessory muscle use and no intercostal retractions  Cardiovascular: RRR, normal S1S2, no M/R/G  Gastrointestinal: abdomen soft, NTND, no masses palpable, BS normal  Extremities: Warm, well perfused, pulses equal bilateral upper and lower extremities, no edema, no clubbing  Neurological: AAOx3, CN Grossly intact  Skin: Normal temperature, warm, dry    MEDICATIONS  (STANDING):  albuterol/ipratropium for Nebulization 3 milliLiter(s) Nebulizer every 8 hours  ARIPiprazole 5 milliGRAM(s) Oral every 24 hours  aspirin  chewable 81 milliGRAM(s) Oral every 24 hours  atorvastatin 80 milliGRAM(s) Oral at bedtime  atovaquone  Suspension 1500 milliGRAM(s) Oral daily  carvedilol 3.125 milliGRAM(s) Oral every 12 hours  cefepime   IVPB 2000 milliGRAM(s) IV Intermittent every 8 hours  dextrose 5%. 1000 milliLiter(s) (100 mL/Hr) IV Continuous <Continuous>  dextrose 5%. 1000 milliLiter(s) (50 mL/Hr) IV Continuous <Continuous>  dextrose 50% Injectable 25 Gram(s) IV Push once  dextrose 50% Injectable 12.5 Gram(s) IV Push once  dextrose 50% Injectable 25 Gram(s) IV Push once  enalapril 10 milliGRAM(s) Oral every 24 hours  escitalopram 20 milliGRAM(s) Oral every 24 hours  fluticasone propionate/ salmeterol 100-50 MICROgram(s) Diskus 1 Dose(s) Inhalation two times a day  glucagon  Injectable 1 milliGRAM(s) IntraMuscular once  heparin   Injectable 5000 Unit(s) SubCutaneous every 8 hours  insulin lispro (ADMELOG) corrective regimen sliding scale   SubCutaneous three times a day before meals  insulin lispro (ADMELOG) corrective regimen sliding scale   SubCutaneous at bedtime  melatonin 5 milliGRAM(s) Oral at bedtime  sodium chloride 7% Inhalation 4 milliLiter(s) Inhalation every 8 hours  tamsulosin 0.4 milliGRAM(s) Oral at bedtime  vancomycin  IVPB 1000 milliGRAM(s) IV Intermittent every 12 hours  vancomycin  IVPB        MEDICATIONS  (PRN):  aluminum hydroxide/magnesium hydroxide/simethicone Suspension 30 milliLiter(s) Oral every 4 hours PRN Dyspepsia  benzocaine/menthol Lozenge 1 Lozenge Oral three times a day PRN Sore Throat  benzonatate 100 milliGRAM(s) Oral three times a day PRN Cough  dextrose Oral Gel 15 Gram(s) Oral once PRN Blood Glucose LESS THAN 70 milliGRAM(s)/deciliter  ondansetron Injectable 4 milliGRAM(s) IV Push every 8 hours PRN Nausea and/or Vomiting      Allergies    shellfish (Unknown)  sulfa drugs (Rash)  strawberry (Rash)  Peaches (Rash)  penicillins (Rash)    Intolerances        LABS:                        12.0   2.76  )-----------( 145      ( 26 Mar 2025 05:30 )             35.4                         RADIOLOGY & ADDITIONAL TESTS:  Reviewed INTERVAL HPI/OVERNIGHT EVENTS: MILLY.  This morning: Patient was seen and examined at bedside. Reports wheezing has improved. Still reports non-bloody diarrhea every time he coughs.  Denies any SOB, fever, chills, nausea, vomiting. reports headaches whenever he uses nebulizer treatment    VITAL SIGNS:  T(F): 98.6 (03-27-25 @ 05:58)  HR: 61 (03-27-25 @ 05:58)  BP: 148/76 (03-27-25 @ 05:58)  RR: 17 (03-27-25 @ 05:58)  SpO2: 99% (03-27-25 @ 05:58)  Wt(kg): --    I/O:      PHYSICAL EXAM:    Constitutional: NAD  HEENT: PERRL, EOMI, MMM  Respiratory: CTA b/l  Cardiovascular: RRR, normal S1S2, no M/R/G  Gastrointestinal: abdomen soft, NTND  Extremities: no LE edema  Neurological: AAOx3  Skin: Normal temperature, warm, dry    MEDICATIONS  (STANDING):  albuterol/ipratropium for Nebulization 3 milliLiter(s) Nebulizer every 8 hours  ARIPiprazole 5 milliGRAM(s) Oral every 24 hours  aspirin  chewable 81 milliGRAM(s) Oral every 24 hours  atorvastatin 80 milliGRAM(s) Oral at bedtime  atovaquone  Suspension 1500 milliGRAM(s) Oral daily  carvedilol 3.125 milliGRAM(s) Oral every 12 hours  cefepime   IVPB 2000 milliGRAM(s) IV Intermittent every 8 hours  dextrose 5%. 1000 milliLiter(s) (100 mL/Hr) IV Continuous <Continuous>  dextrose 5%. 1000 milliLiter(s) (50 mL/Hr) IV Continuous <Continuous>  dextrose 50% Injectable 25 Gram(s) IV Push once  dextrose 50% Injectable 12.5 Gram(s) IV Push once  dextrose 50% Injectable 25 Gram(s) IV Push once  enalapril 10 milliGRAM(s) Oral every 24 hours  escitalopram 20 milliGRAM(s) Oral every 24 hours  fluticasone propionate/ salmeterol 100-50 MICROgram(s) Diskus 1 Dose(s) Inhalation two times a day  glucagon  Injectable 1 milliGRAM(s) IntraMuscular once  heparin   Injectable 5000 Unit(s) SubCutaneous every 8 hours  insulin lispro (ADMELOG) corrective regimen sliding scale   SubCutaneous three times a day before meals  insulin lispro (ADMELOG) corrective regimen sliding scale   SubCutaneous at bedtime  melatonin 5 milliGRAM(s) Oral at bedtime  sodium chloride 7% Inhalation 4 milliLiter(s) Inhalation every 8 hours  tamsulosin 0.4 milliGRAM(s) Oral at bedtime  vancomycin  IVPB 1000 milliGRAM(s) IV Intermittent every 12 hours  vancomycin  IVPB        MEDICATIONS  (PRN):  aluminum hydroxide/magnesium hydroxide/simethicone Suspension 30 milliLiter(s) Oral every 4 hours PRN Dyspepsia  benzocaine/menthol Lozenge 1 Lozenge Oral three times a day PRN Sore Throat  benzonatate 100 milliGRAM(s) Oral three times a day PRN Cough  dextrose Oral Gel 15 Gram(s) Oral once PRN Blood Glucose LESS THAN 70 milliGRAM(s)/deciliter  ondansetron Injectable 4 milliGRAM(s) IV Push every 8 hours PRN Nausea and/or Vomiting      Allergies    shellfish (Unknown)  sulfa drugs (Rash)  strawberry (Rash)  Peaches (Rash)  penicillins (Rash)    Intolerances        LABS:                        12.0   2.76  )-----------( 145      ( 26 Mar 2025 05:30 )             35.4                         RADIOLOGY & ADDITIONAL TESTS:  Reviewed

## 2025-03-27 NOTE — PROGRESS NOTE ADULT - ASSESSMENT
I M    75 year old male with history of HIV (CD4 160, VL 26 7/2024), CAD (prior MI), T2DM, HFrEF (EF 35-40%), recurrent syncope, COPD, depression/anxiety,  prostate cancer s/p radiation (2013), presenting for shortness of breath, found to have sepsis 2/2 pneumonia pending TB r/o       Problem/Plan - 1:  ·  Problem: Sepsis.   ·  Plan: Patient with fever and tachycardia meeting 2/4 criteria for sepsis 2/2 pneumonia now resolved. S/p Vanc and Cefepime.  MRSA swab+.  S/p CTX 2g qd, Doxy 100mg, Azithro 500mg x 2 (d/c with negative urine strep/legionella in case patient has MAC/to prevent resistance)  BCx positive for gram positive rods, possible contamination  GC/C negative, Syphilis RPR nonreactive, AFB x1 negative   Hep C RNA Viral load negative    - ID consulted, recommend extensive infectious workup for c/o PCP. F/u ID note for all labs ordered  - F/u RT to induce sputum for AFB x 2 pending collection  - Reorder Vancomycin 1000mg q12h ( vancomycin trough: 18.9 ) (3/23 - )  - Start Cefepime 2g q8h i/s/o immunocompromised state to cover MRSA and pseudomonas (3/24 - ) ) in addition to Atovaquone daily  -f/u ID recs   -TB R/O- airborne precautions; QuantiFeron pending, histoplasma Ag,  and Fungitell  -Duonebs q8hrs and reassess given d/t wheezing and hx of COPD.    Problem/Plan - 2:  ·  Problem: Right lower lobe pneumonia.   ·  Plan: CXR with RLL opacity. S/p Vanc and Cefepime. Reports prior hx of hospitalizations due to pneumonia, most recently last year.   MRSA swab+, urine legionella and strep negative, RVP negative  CT Chest shows worsening peribronchial wall thickening, ground-glass opacities, and tree-in-bud nodules in all 5 lobes. likely infectious.   - C/w abx as above.    Problem/Plan - 3:  ·  Problem: HIV (human immunodeficiency virus infection).   ·  Plan: CD4 160, VL 26 7/2024. On dolutegravir (Tivicay) and emtricitabine-tenofovir (Descovy) and Atovaquone \  CD4 (3/24) - 74, 3.65 VL    - c/w Atovaquone for PCP ppx (has sulfa allergy)   - Hold Tivicay and Descovy. F/U ID recs.    Problem/Plan - 4:  ·  Problem: Heart failure with mildly reduced ejection fraction.   ·  Plan: EF 30-35%. Home med: Carvedilol 3.125mg BID     - Restarted carvedilol 3.125 mg BID d/t high HR: 108 (3/24)  - F/u TTE.    Problem/Plan - 5:  ·  Problem: DM (diabetes mellitus).   ·  Plan: Home med: metformin 500mg, Linagliptin 5mg qd    -Hold metformin   -c/w ISS; MlM4o=2.6%.    Problem/Plan - 6:  ·  Problem: HTN (hypertension).   ·  Plan: Home med: enalapril 10mg  - Restart enalapril 10 mg.    Problem/Plan - 7:  ·  Problem: CAD (coronary artery disease).   ·  Plan: Hx of prior MI, denies stents in heart. On home asa and lipitor 80    - c/w home meds.    Problem/Plan - 8:  ·  Problem: Thrombocytopenia.   ·  Plan: Platelets 120 on admission. Hx of HIV (non-compliant with meds). Prior platelets 120 (1/2025). Likely chronic thrombocytopenia.   - continue to trend platelets; 145 (3/25).    Problem/Plan - 9:  ·  Problem: Chronic kidney disease, unspecified CKD stage.   ·  Plan: Creatine 1.58 on admission, prior Cr 1.59 (1/2025). Baseline Cr 1.4-1.6. RESOLVED    - continue to trend; 123 (3/25)   - avoid nephrotoxic meds.    Problem/Plan - 10:  ·  Problem: COPD, mild.   ·  Plan; Home med- symbicort. No home 02.     -Duonebs q8hrs and reassess d/t wheezing   -Continue with 2L NC, wean as tolerated  - c/w home med.    Problem/Plan - 11:  ·  Problem: Anxiety and depression.   ·  Plan: - Continue home aripiprazole 5mg qd, escitalopram 20mg qd            #Diarrhea  -GI PCR negative  -Start Loperamide 2 mg PO q24.    Problem/Plan - 12:  ·  Problem: Preventive measure.   ·  Plan: F: none  E: replete as necessary  N: Carb consistent  DVT: heparin SubQ  Dispo: RMF  f/u PT recs.

## 2025-03-27 NOTE — PROGRESS NOTE ADULT - PROBLEM SELECTOR PLAN 8
Platelets 120 on admission. Hx of HIV (non-compliant with meds). Prior platelets 120 (1/2025). Likely chronic thrombocytopenia.   - continue to trend platelets; 145 (3/25)

## 2025-03-27 NOTE — PROGRESS NOTE ADULT - ASSESSMENT
75 year old male with history of HIV (CD4 160, VL 26 7/2024), CAD (prior MI), T2DM, HFrEF (EF 35-40%), recurrent syncope, COPD, depression/anxiety,  prostate cancer s/p radiation (2013), presenting for shortness of breath, found to have sepsis 2/2 pneumonia that is now resolved, pending lab results, and CT head non-contrast per ID. 75 year old male with history of HIV (CD4 160, VL 26 7/2024), CAD (prior MI), T2DM, HFrEF (EF 35-40%), recurrent syncope, COPD, depression/anxiety,  prostate cancer s/p radiation (2013), presenting for shortness of breath, found to have sepsis 2/2 pneumonia that is now resolved, pending lab results per ID.  75 year old male with history of HIV (CD4 160, VL 26 7/2024), CAD (prior MI), T2DM, HFrEF (EF 35-40%), recurrent syncope, COPD, depression/anxiety,  prostate cancer s/p radiation (2013), presenting for shortness of breath, found to have sepsis 2/2 pneumonia pending TB r/o

## 2025-03-27 NOTE — PROGRESS NOTE ADULT - PROBLEM SELECTOR PLAN 4
EF 30-35%. Home med: Carvedilol 3.125mg BID     - Restarted carvedilol 3.125 mg BID d/t high HR: 108 (3/24)  - F/u TTE

## 2025-03-28 ENCOUNTER — TRANSCRIPTION ENCOUNTER (OUTPATIENT)
Age: 75
End: 2025-03-28

## 2025-03-28 VITALS
OXYGEN SATURATION: 100 % | SYSTOLIC BLOOD PRESSURE: 147 MMHG | RESPIRATION RATE: 18 BRPM | TEMPERATURE: 98 F | DIASTOLIC BLOOD PRESSURE: 73 MMHG | HEART RATE: 58 BPM

## 2025-03-28 LAB
ANION GAP SERPL CALC-SCNC: 9 MMOL/L — SIGNIFICANT CHANGE UP (ref 5–17)
BASOPHILS # BLD AUTO: 0 K/UL — SIGNIFICANT CHANGE UP (ref 0–0.2)
BASOPHILS NFR BLD AUTO: 0 % — SIGNIFICANT CHANGE UP (ref 0–2)
BUN SERPL-MCNC: 22 MG/DL — SIGNIFICANT CHANGE UP (ref 7–23)
CALCIUM SERPL-MCNC: 8.7 MG/DL — SIGNIFICANT CHANGE UP (ref 8.4–10.5)
CHLORIDE SERPL-SCNC: 104 MMOL/L — SIGNIFICANT CHANGE UP (ref 96–108)
CO2 SERPL-SCNC: 26 MMOL/L — SIGNIFICANT CHANGE UP (ref 22–31)
CREAT SERPL-MCNC: 1.33 MG/DL — HIGH (ref 0.5–1.3)
CULTURE RESULTS: SIGNIFICANT CHANGE UP
EGFR: 56 ML/MIN/1.73M2 — LOW
EGFR: 56 ML/MIN/1.73M2 — LOW
EOSINOPHIL # BLD AUTO: 0.09 K/UL — SIGNIFICANT CHANGE UP (ref 0–0.5)
EOSINOPHIL NFR BLD AUTO: 2.7 % — SIGNIFICANT CHANGE UP (ref 0–6)
GLUCOSE SERPL-MCNC: 142 MG/DL — HIGH (ref 70–99)
HCT VFR BLD CALC: 33.3 % — LOW (ref 39–50)
HGB BLD-MCNC: 11.1 G/DL — LOW (ref 13–17)
LYMPHOCYTES # BLD AUTO: 1.12 K/UL — SIGNIFICANT CHANGE UP (ref 1–3.3)
LYMPHOCYTES # BLD AUTO: 34.5 % — SIGNIFICANT CHANGE UP (ref 13–44)
MAGNESIUM SERPL-MCNC: 1.9 MG/DL — SIGNIFICANT CHANGE UP (ref 1.6–2.6)
MCHC RBC-ENTMCNC: 31.4 PG — SIGNIFICANT CHANGE UP (ref 27–34)
MCHC RBC-ENTMCNC: 33.3 G/DL — SIGNIFICANT CHANGE UP (ref 32–36)
MCV RBC AUTO: 94.1 FL — SIGNIFICANT CHANGE UP (ref 80–100)
MONOCYTES # BLD AUTO: 0.32 K/UL — SIGNIFICANT CHANGE UP (ref 0–0.9)
MONOCYTES NFR BLD AUTO: 9.7 % — SIGNIFICANT CHANGE UP (ref 2–14)
NEUTROPHILS # BLD AUTO: 1.64 K/UL — LOW (ref 1.8–7.4)
NEUTROPHILS NFR BLD AUTO: 50.4 % — SIGNIFICANT CHANGE UP (ref 43–77)
PHOSPHATE SERPL-MCNC: 2.7 MG/DL — SIGNIFICANT CHANGE UP (ref 2.5–4.5)
PLATELET # BLD AUTO: 167 K/UL — SIGNIFICANT CHANGE UP (ref 150–400)
POTASSIUM SERPL-MCNC: 4 MMOL/L — SIGNIFICANT CHANGE UP (ref 3.5–5.3)
POTASSIUM SERPL-SCNC: 4 MMOL/L — SIGNIFICANT CHANGE UP (ref 3.5–5.3)
RBC # BLD: 3.54 M/UL — LOW (ref 4.2–5.8)
RBC # FLD: 11.9 % — SIGNIFICANT CHANGE UP (ref 10.3–14.5)
SODIUM SERPL-SCNC: 139 MMOL/L — SIGNIFICANT CHANGE UP (ref 135–145)
SPECIMEN SOURCE: SIGNIFICANT CHANGE UP
WBC # BLD: 3.25 K/UL — LOW (ref 3.8–10.5)
WBC # FLD AUTO: 3.25 K/UL — LOW (ref 3.8–10.5)

## 2025-03-28 PROCEDURE — 86360 T CELL ABSOLUTE COUNT/RATIO: CPT

## 2025-03-28 PROCEDURE — 99285 EMERGENCY DEPT VISIT HI MDM: CPT

## 2025-03-28 PROCEDURE — 87015 SPECIMEN INFECT AGNT CONCNTJ: CPT

## 2025-03-28 PROCEDURE — 86593 SYPHILIS TEST NON-TREP QUANT: CPT

## 2025-03-28 PROCEDURE — 86355 B CELLS TOTAL COUNT: CPT

## 2025-03-28 PROCEDURE — 97116 GAIT TRAINING THERAPY: CPT

## 2025-03-28 PROCEDURE — 93306 TTE W/DOPPLER COMPLETE: CPT

## 2025-03-28 PROCEDURE — 87591 N.GONORRHOEAE DNA AMP PROB: CPT

## 2025-03-28 PROCEDURE — 94640 AIRWAY INHALATION TREATMENT: CPT

## 2025-03-28 PROCEDURE — 86403 PARTICLE AGGLUT ANTBDY SCRN: CPT

## 2025-03-28 PROCEDURE — 87899 AGENT NOS ASSAY W/OPTIC: CPT

## 2025-03-28 PROCEDURE — 99239 HOSP IP/OBS DSCHRG MGMT >30: CPT | Mod: GC

## 2025-03-28 PROCEDURE — 97161 PT EVAL LOW COMPLEX 20 MIN: CPT

## 2025-03-28 PROCEDURE — 0225U NFCT DS DNA&RNA 21 SARSCOV2: CPT

## 2025-03-28 PROCEDURE — 87637 SARSCOV2&INF A&B&RSV AMP PRB: CPT

## 2025-03-28 PROCEDURE — 87340 HEPATITIS B SURFACE AG IA: CPT

## 2025-03-28 PROCEDURE — 86705 HEP B CORE ANTIBODY IGM: CPT

## 2025-03-28 PROCEDURE — 87077 CULTURE AEROBIC IDENTIFY: CPT

## 2025-03-28 PROCEDURE — 86803 HEPATITIS C AB TEST: CPT

## 2025-03-28 PROCEDURE — 87086 URINE CULTURE/COLONY COUNT: CPT

## 2025-03-28 PROCEDURE — 86704 HEP B CORE ANTIBODY TOTAL: CPT

## 2025-03-28 PROCEDURE — 83735 ASSAY OF MAGNESIUM: CPT

## 2025-03-28 PROCEDURE — G0545: CPT

## 2025-03-28 PROCEDURE — 85610 PROTHROMBIN TIME: CPT

## 2025-03-28 PROCEDURE — 87641 MR-STAPH DNA AMP PROBE: CPT

## 2025-03-28 PROCEDURE — 36415 COLL VENOUS BLD VENIPUNCTURE: CPT

## 2025-03-28 PROCEDURE — 71045 X-RAY EXAM CHEST 1 VIEW: CPT

## 2025-03-28 PROCEDURE — 86777 TOXOPLASMA ANTIBODY: CPT

## 2025-03-28 PROCEDURE — 87536 HIV-1 QUANT&REVRSE TRNSCRPJ: CPT

## 2025-03-28 PROCEDURE — 86706 HEP B SURFACE ANTIBODY: CPT

## 2025-03-28 PROCEDURE — 83036 HEMOGLOBIN GLYCOSYLATED A1C: CPT

## 2025-03-28 PROCEDURE — 80053 COMPREHEN METABOLIC PANEL: CPT

## 2025-03-28 PROCEDURE — 85730 THROMBOPLASTIN TIME PARTIAL: CPT

## 2025-03-28 PROCEDURE — 84100 ASSAY OF PHOSPHORUS: CPT

## 2025-03-28 PROCEDURE — 87040 BLOOD CULTURE FOR BACTERIA: CPT

## 2025-03-28 PROCEDURE — 96368 THER/DIAG CONCURRENT INF: CPT

## 2025-03-28 PROCEDURE — 80048 BASIC METABOLIC PNL TOTAL CA: CPT

## 2025-03-28 PROCEDURE — 87116 MYCOBACTERIA CULTURE: CPT

## 2025-03-28 PROCEDURE — 96365 THER/PROPH/DIAG IV INF INIT: CPT

## 2025-03-28 PROCEDURE — 87206 SMEAR FLUORESCENT/ACID STAI: CPT

## 2025-03-28 PROCEDURE — 87150 DNA/RNA AMPLIFIED PROBE: CPT

## 2025-03-28 PROCEDURE — 87449 NOS EACH ORGANISM AG IA: CPT

## 2025-03-28 PROCEDURE — 87798 DETECT AGENT NOS DNA AMP: CPT

## 2025-03-28 PROCEDURE — 81001 URINALYSIS AUTO W/SCOPE: CPT

## 2025-03-28 PROCEDURE — 80202 ASSAY OF VANCOMYCIN: CPT

## 2025-03-28 PROCEDURE — 84145 PROCALCITONIN (PCT): CPT

## 2025-03-28 PROCEDURE — 86480 TB TEST CELL IMMUN MEASURE: CPT

## 2025-03-28 PROCEDURE — 82247 BILIRUBIN TOTAL: CPT

## 2025-03-28 PROCEDURE — 86359 T CELLS TOTAL COUNT: CPT

## 2025-03-28 PROCEDURE — 82962 GLUCOSE BLOOD TEST: CPT

## 2025-03-28 PROCEDURE — 85025 COMPLETE CBC W/AUTO DIFF WBC: CPT

## 2025-03-28 PROCEDURE — 87507 IADNA-DNA/RNA PROBE TQ 12-25: CPT

## 2025-03-28 PROCEDURE — 71250 CT THORAX DX C-: CPT | Mod: MC

## 2025-03-28 PROCEDURE — 99232 SBSQ HOSP IP/OBS MODERATE 35: CPT

## 2025-03-28 PROCEDURE — 86778 TOXOPLASMA ANTIBODY IGM: CPT

## 2025-03-28 PROCEDURE — 87640 STAPH A DNA AMP PROBE: CPT

## 2025-03-28 PROCEDURE — 86357 NK CELLS TOTAL COUNT: CPT

## 2025-03-28 PROCEDURE — 83605 ASSAY OF LACTIC ACID: CPT

## 2025-03-28 PROCEDURE — 87521 HEPATITIS C PROBE&RVRS TRNSC: CPT

## 2025-03-28 PROCEDURE — 96366 THER/PROPH/DIAG IV INF ADDON: CPT

## 2025-03-28 PROCEDURE — 87385 HISTOPLASMA CAPSUL AG IA: CPT

## 2025-03-28 PROCEDURE — 87556 M.TUBERCULO DNA AMP PROBE: CPT

## 2025-03-28 PROCEDURE — 86709 HEPATITIS A IGM ANTIBODY: CPT

## 2025-03-28 PROCEDURE — 83615 LACTATE (LD) (LDH) ENZYME: CPT

## 2025-03-28 PROCEDURE — 82248 BILIRUBIN DIRECT: CPT

## 2025-03-28 PROCEDURE — 87491 CHLMYD TRACH DNA AMP PROBE: CPT

## 2025-03-28 RX ORDER — DOXYCYCLINE HYCLATE 100 MG
1 TABLET ORAL
Qty: 60 | Refills: 0
Start: 2025-03-28 | End: 2025-04-26

## 2025-03-28 RX ORDER — ENALAPRIL MALEATE 20 MG
1 TABLET ORAL
Qty: 30 | Refills: 0
Start: 2025-03-28 | End: 2025-04-26

## 2025-03-28 RX ORDER — SOD PHOS DI, MONO/K PHOS MONO 250 MG
1 TABLET ORAL ONCE
Refills: 0 | Status: COMPLETED | OUTPATIENT
Start: 2025-03-28 | End: 2025-03-28

## 2025-03-28 RX ORDER — ATORVASTATIN CALCIUM 80 MG/1
1 TABLET, FILM COATED ORAL
Qty: 30 | Refills: 1
Start: 2025-03-28 | End: 2025-05-26

## 2025-03-28 RX ORDER — ACETAMINOPHEN 500 MG/5ML
650 LIQUID (ML) ORAL EVERY 6 HOURS
Refills: 0 | Status: DISCONTINUED | OUTPATIENT
Start: 2025-03-28 | End: 2025-03-28

## 2025-03-28 RX ORDER — BENZONATATE 100 MG
1 CAPSULE ORAL
Qty: 90 | Refills: 0
Start: 2025-03-28 | End: 2025-04-26

## 2025-03-28 RX ORDER — IPRATROPIUM BROMIDE AND ALBUTEROL SULFATE .5; 2.5 MG/3ML; MG/3ML
3 SOLUTION RESPIRATORY (INHALATION)
Qty: 180 | Refills: 0
Start: 2025-03-28 | End: 2025-04-11

## 2025-03-28 RX ORDER — DOLUTEGRAVIR SODIUM 5 MG/1
1 TABLET, FOR SUSPENSION ORAL
Qty: 30 | Refills: 0
Start: 2025-03-28 | End: 2025-04-26

## 2025-03-28 RX ORDER — DOXYCYCLINE HYCLATE 100 MG
1 TABLET ORAL
Qty: 18 | Refills: 0
Start: 2025-03-28 | End: 2025-04-05

## 2025-03-28 RX ORDER — ATOVAQUONE 750 MG/5ML
10 SUSPENSION ORAL
Qty: 300 | Refills: 0
Start: 2025-03-28 | End: 2025-04-26

## 2025-03-28 RX ORDER — CEFPODOXIME PROXETIL 200 MG/1
1 TABLET, FILM COATED ORAL
Qty: 18 | Refills: 0
Start: 2025-03-28 | End: 2025-04-05

## 2025-03-28 RX ADMIN — Medication 650 MILLIGRAM(S): at 10:18

## 2025-03-28 RX ADMIN — Medication 10 MILLIGRAM(S): at 14:44

## 2025-03-28 RX ADMIN — Medication 1 PACKET(S): at 14:27

## 2025-03-28 RX ADMIN — Medication 650 MILLIGRAM(S): at 11:18

## 2025-03-28 RX ADMIN — ESCITALOPRAM OXALATE 20 MILLIGRAM(S): 20 TABLET ORAL at 15:45

## 2025-03-28 RX ADMIN — ATOVAQUONE 1500 MILLIGRAM(S): 750 SUSPENSION ORAL at 15:31

## 2025-03-28 RX ADMIN — CEFEPIME 100 MILLIGRAM(S): 2 INJECTION, POWDER, FOR SOLUTION INTRAVENOUS at 14:26

## 2025-03-28 RX ADMIN — Medication 81 MILLIGRAM(S): at 14:51

## 2025-03-28 RX ADMIN — ARIPIPRAZOLE 5 MILLIGRAM(S): 2 TABLET ORAL at 14:26

## 2025-03-28 RX ADMIN — CEFEPIME 100 MILLIGRAM(S): 2 INJECTION, POWDER, FOR SOLUTION INTRAVENOUS at 05:46

## 2025-03-28 RX ADMIN — CARVEDILOL 3.12 MILLIGRAM(S): 3.12 TABLET, FILM COATED ORAL at 05:46

## 2025-03-28 RX ADMIN — HEPARIN SODIUM 5000 UNIT(S): 1000 INJECTION INTRAVENOUS; SUBCUTANEOUS at 05:46

## 2025-03-28 RX ADMIN — DOLUTEGRAVIR SODIUM 50 MILLIGRAM(S): 5 TABLET, FOR SUSPENSION ORAL at 15:45

## 2025-03-28 RX ADMIN — HEPARIN SODIUM 5000 UNIT(S): 1000 INJECTION INTRAVENOUS; SUBCUTANEOUS at 14:45

## 2025-03-28 RX ADMIN — Medication 1 DOSE(S): at 14:45

## 2025-03-28 RX ADMIN — Medication 1 TABLET(S): at 15:45

## 2025-03-28 RX ADMIN — LOPERAMIDE HCL 2 MILLIGRAM(S): 1 SOLUTION ORAL at 14:29

## 2025-03-28 NOTE — DISCHARGE NOTE NURSING/CASE MANAGEMENT/SOCIAL WORK - FINANCIAL ASSISTANCE
[facility] provides services at a reduced cost to those who are determined to be eligible through [facility]’s financial assistance program. Information regarding [facility]’s financial assistance program can be found by going to [email or web address] or by calling [phone].

## 2025-03-28 NOTE — PROGRESS NOTE ADULT - PROBLEM SELECTOR PROBLEM 2
Right lower lobe pneumonia

## 2025-03-28 NOTE — DISCHARGE NOTE PROVIDER - NSDCCPCAREPLAN_GEN_ALL_CORE_FT
PRINCIPAL DISCHARGE DIAGNOSIS  Diagnosis: Pneumonia  Assessment and Plan of Treatment: You were diagnosed with pneumonia, an infection that inflames air sacs in one or both lungs, which may fill with fluid. With pneumonia, the air sacs may fill with fluid or pus. The infection can be life-threatening to infants, children, and people over 65. Symptoms include cough with phlegm or pus, fever, chills, and difficulty breathing. Antibiotics can treat many forms of pneumonia. Some forms of pneumonia can be prevented by vaccines. You were treated with antibiotics and began to show improvement. Please continue taking your antibiotic 4/6. Please follow up with your primary care physician to check for full resolution of this problem.       PRINCIPAL DISCHARGE DIAGNOSIS  Diagnosis: Pneumonia  Assessment and Plan of Treatment: You were diagnosed with pneumonia, an infection that inflames air sacs in one or both lungs, which may fill with fluid. With pneumonia, the air sacs may fill with fluid or pus. The infection can be life-threatening to infants, children, and people over 65. Symptoms include cough with phlegm or pus, fever, chills, and difficulty breathing. Antibiotics can treat many forms of pneumonia. Some forms of pneumonia can be prevented by vaccines. You were treated with antibiotics and began to show improvement. Please continue taking your antibiotic 4/6. Please follow up with your primary care physician to check for full resolution of this problem.  New medications: Doxycycline 100 mg PO q12 and Cefpodoxime 200 mg PO Q12 (3/24- 4/6)  Labs to follow-up on: Histoplasma Antigen, AFB final report.

## 2025-03-28 NOTE — PROGRESS NOTE ADULT - ASSESSMENT
75 year old male with history of HIV (CD4 160, VL 26 7/2024), CAD (prior MI), T2DM, HFrEF (EF 35-40%), recurrent syncope, COPD, depression/anxiety,  prostate cancer s/p radiation (2013), presenting for shortness of breath, found to have sepsis 2/2 pneumonia pending TB r/o  75 year old male with history of HIV (CD4 160, VL 26 7/2024), CAD (prior MI), T2DM, HFrEF (EF 35-40%), recurrent syncope, COPD, depression/anxiety,  prostate cancer s/p radiation (2013), presenting for shortness of breath, found to have sepsis 2/2 pneumonia pending TB r/o.

## 2025-03-28 NOTE — PROGRESS NOTE ADULT - PROBLEM SELECTOR PROBLEM 4
Heart failure with mildly reduced ejection fraction

## 2025-03-28 NOTE — PROGRESS NOTE ADULT - NS ATTEND AMEND GEN_ALL_CORE FT
Patient feels calm after hearing that he won't lose his apartment.  He thinks breathing is better, saying breathing more comfortable on RA.  Cough improving.  Unable to provide induced sputum after one specimen (AFB smear neg x 1, MTB PCR neg x 1).  WBC 2.66, vanco level 18.9, procal 0.16, fungitell negative.  IGRA negative.  CT chest showed interval worsening peribronchial wall thickening, airway a/w GGO, tree-in-bud noduled in all 5 lobes, likely infectious.  Case d/w Dr. Wing (radiology) - less likely PCP or TB, and more c/f bacterial infection.   HOLD vancomycin until CMP result is back and ensure Cr is stable.  If Cr stable w/o BRENNAN, then reduce vanco to 750mg IV q12h.  Cont cefepime 2g IV q8h. Maintain airborne isolation for now.  If patient can come off NC and continues to improve, then will plan to switch to PO abx and discontinue isolation.  Restart home ART (Descovy 1 tab daily + Tivicay 1 tab daily).  Cont Atovaquone 1500mg PO daily for PCP ppx.    Team 2 will follow you.  Case d/w primary team.    Lisa Aguilera MD, MS  Infectious Disease attending  office phone [phone]  For any questions during evening/weekend/holiday, please page ID on call
75M w/ HIV (reportedly previously adherent to tivicay + descovy + atovaquone but not for the past 1+ month, follows with Dr. Paul Noguera, CD4 74/12%, VL 4.5k), with several hospitalizations at various OSH over the past few years for PNA (unclear agent, he does not think it was PCP but he reports going home on several weeks of PO abx), as well as prior HCV s/p Harvoni in 2015 w/ SVR, active cocaine use d/o (smokes), mood d/o, and remote prostate cancer who presented to ED on 3/22 with a reported one month+ history of cough, SOB and chills. On presentation he was febrile to 103.4, hypoxic req 2L NC, CXR bilateral opacities. Not having much improvement on empiric antibacterial coverage (vanc + cefepime). Would continue for now, continue holding home ART for now, and obtain broad workup as above. If has respiratory decompensation requiring significant O2 would start primaquine 30mg PO daily + clindamycin 900mg IV q8h plus prednisone 40mg PO BID for empiric PCP coverage.
Clinically unchanged today. Still pending all recommended diagnostics. Recommendations as above.
Patient feels well, breathing comfortably on RA.  Reports he was able to walk to bathroom without getting SOB.  Reports mild resolving cough.  Lung clear.  Unlikely TB as patient responded to IV abx.  OK to discontinue isolation.  Discharge patient on doxycycline 100mg PO q12h and cefpodoxime 200mg PO q12h.  Duratoin of abx is 2 weeks (3/24 - 4/6).  f/u PMD Dr. Paul Noguera.  Patient was instructed to return to the hospital should he develops worsening symptoms.  Cont Atovaquone 1500mg PO daily as PCP ppx.  Cont Descovy and Tivicay daily for HIV.    Thank you for your consult.  Please re-consult us or call us with questions.  Case d/w primary team.    Lisa Aguilera MD, MS  Infectious Disease attending  office phone [phone]  For any questions during evening/weekend/holiday, please page ID on call

## 2025-03-28 NOTE — DISCHARGE NOTE PROVIDER - NSDCMRMEDTOKEN_GEN_ALL_CORE_FT
ARIPiprazole 5 mg oral tablet: 1 tab(s) orally once a day  aspirin 81 mg oral delayed release tablet: 1 tab(s) orally once a day  atovaquone 750 mg/5 mL oral suspension: 10 milliliter(s) orally once a day  azithromycin 500 mg oral tablet: 1 tab(s) orally once a day Please take one tablet on 3/25  carvedilol 3.125 mg oral tablet: 1 tab(s) orally 2 times a day  cefpodoxime 200 mg oral tablet: 1 tab(s) orally 2 times a day  cefpodoxime 200 mg oral tablet: 1 tab(s) orally every 12 hours  Descovy 200 mg-25 mg oral tablet: 1 tab(s) orally once a day  doxycycline hyclate 100 mg oral capsule: 1 cap(s) orally 2 times a day  doxycycline monohydrate 100 mg oral tablet: 1 tab(s) orally every 12 hours  escitalopram 20 mg oral tablet: 1 tab(s) orally once a day  tamsulosin 0.4 mg oral capsule: 1 cap(s) orally once a day (at bedtime)  Tivicay 50 mg oral tablet: 1 tab(s) orally once a day   ARIPiprazole 5 mg oral tablet: 1 tab(s) orally once a day  aspirin 81 mg oral delayed release tablet: 1 tab(s) orally once a day  atorvastatin 80 mg oral tablet: 1 tab(s) orally once a day (at bedtime) TAKE 1 tablet once a day  atovaquone 750 mg/5 mL oral suspension: 10 milliliter(s) orally once a day  azithromycin 500 mg oral tablet: 1 tab(s) orally once a day Please take one tablet on 3/25  benzonatate 100 mg oral capsule: 1 cap(s) orally 3 times a day as needed for Cough take 1 cap three times a day  carvedilol 3.125 mg oral tablet: 1 tab(s) orally 2 times a day  cefpodoxime 200 mg oral tablet: 1 tab(s) orally 2 times a day  Descovy 200 mg-25 mg oral tablet: 1 tab(s) orally once a day  doxycycline monohydrate 100 mg oral tablet: 1 tab(s) orally 2 times a day  enalapril 10 mg oral tablet: 1 tab(s) orally once a day take 1 tablet once a day  escitalopram 20 mg oral tablet: 1 tab(s) orally once a day  fluticasone-salmeterol 100 mcg-50 mcg/inh inhalation powder: 1 inhaled 2 times a day  ipratropium-albuterol 0.5 mg-2.5 mg/3 mL inhalation solution: 3 milliliter(s) inhaled every 6 hours as needed for Shortness of Breath and/or Wheezing  tamsulosin 0.4 mg oral capsule: 1 cap(s) orally once a day (at bedtime)  Tivicay 50 mg oral tablet: 1 tab(s) orally once a day   ARIPiprazole 5 mg oral tablet: 1 tab(s) orally once a day  aspirin 81 mg oral delayed release tablet: 1 tab(s) orally once a day  atorvastatin 80 mg oral tablet: 1 tab(s) orally once a day (at bedtime) TAKE 1 tablet once a day  atovaquone 750 mg/5 mL oral suspension: 10 milliliter(s) orally once a day  benzonatate 100 mg oral capsule: 1 cap(s) orally 3 times a day as needed for Cough take 1 cap three times a day  carvedilol 3.125 mg oral tablet: 1 tab(s) orally 2 times a day  cefpodoxime 200 mg oral tablet: 1 tab(s) orally 2 times a day  Descovy 200 mg-25 mg oral tablet: 1 tab(s) orally once a day  doxycycline monohydrate 100 mg oral tablet: 1 tab(s) orally 2 times a day  enalapril 10 mg oral tablet: 1 tab(s) orally once a day take 1 tablet once a day  escitalopram 20 mg oral tablet: 1 tab(s) orally once a day  fluticasone-salmeterol 100 mcg-50 mcg/inh inhalation powder: 1 inhaled 2 times a day  ipratropium-albuterol 0.5 mg-2.5 mg/3 mL inhalation solution: 3 milliliter(s) inhaled every 6 hours as needed for Shortness of Breath and/or Wheezing  tamsulosin 0.4 mg oral capsule: 1 cap(s) orally once a day (at bedtime)  Tivicay 50 mg oral tablet: 1 tab(s) orally once a day

## 2025-03-28 NOTE — DISCHARGE NOTE PROVIDER - ATTENDING DISCHARGE PHYSICAL EXAMINATION:
Gen: sitting upright in bed at time of exam  HEENT: MMM, clear OP  Neck: trachea at midline  CV: RRR, +S1/S2  Pulm: adequate respiratory effort, no increased work of breathing  Abd: soft, NTND  Skin: warm and dry, no new rashes vs prior report  Ext: WWP  Neuro: AOx3, no gross focal neurological deficits  Psych: affect and behavior appropriate    weaned off O2 to room air.   Breathing comfortably on room air. Not short of breath. No diarrhea. Feels well today, eager to leave the hospital.   discussed with ID - low concern for TB given response to antibiotics.   discharging to home on oral abx. Patient will follow up with his PCP (appt is getting rescheduled to closer date)   has supply of atovaquone, descovy, tivicay at home.   doxycyline and cefpodoxime prescribed meds to beds

## 2025-03-28 NOTE — DISCHARGE NOTE NURSING/CASE MANAGEMENT/SOCIAL WORK - PATIENT PORTAL LINK FT
You can access the FollowMyHealth Patient Portal offered by [facility] by registering at the following website: [email or web address]. By joining [facility]’s FollowMyHealth portal, you will also be able to view your health information using other applications (apps) compatible with our system.

## 2025-03-28 NOTE — DISCHARGE NOTE PROVIDER - PROVIDER TOKENS
PROVIDER:[TOKEN:[[id number]],FOLLOWUP:[2 weeks]] PROVIDER:[TOKEN:[[id number]],FOLLOWUP:[2 weeks]]

## 2025-03-28 NOTE — DISCHARGE NOTE PROVIDER - HOSPITAL COURSE
#Discharge: do not delete    SAIDA REZA is a 75y Male with a past medical history of _____    Presented with _____, found to have _____    Problem List/Main Diagnoses (system-based):   #     #     #    Inpatient treatment course:     New medications:   Labs to be followed outpatient:   Exam to be followed outpatient:       LABS & STUDIES:  SARS-CoV-2: Cachorroc (23 Mar 2025 07:20)   #Discharge: do not delete    SAIDA REZA is a 75 year old male with history of HIV (CD4 160, VL 26 7/2024), CAD (prior MI), T2DM, HFrEF (EF 40-45% 11/2024), recurrent syncope, COPD, depression/anxiety,  prostate cancer s/p radiation (2013), presenting for cough and shortness of breath. His symptoms started around a month ago and worsened yesterday causing him to present to the ED. He reports having a dry cough for the past month. Denies mucus production, nausea, vomiting, diarrhea, and chills.  He reports having prior hospitalizations for pneumonia, most recently last year. He lives in an [place] on [address] and is moving this week. He doesn't know his home medications and reports not taking all of them daily.     Problem List/Main Diagnoses (system-based):   #     #     #    Inpatient treatment course:     New medications:   Labs to be followed outpatient:   Exam to be followed outpatient:       LABS & STUDIES:  SARS-CoV-2: Juan Manuelteshakira (23 Mar 2025 07:20)   #Discharge: do not delete    SAIDA REZA is a 75 year old male with history of HIV (CD4 160, VL 26 7/2024), CAD (prior MI), T2DM, HFrEF (EF 40-45% 11/2024), recurrent syncope, COPD, depression/anxiety,  prostate cancer s/p radiation (2013), presenting for cough and shortness of breath. His symptoms started around a month ago and worsened yesterday causing him to present to the ED. He reports having a dry cough for the past month. Denies mucus production, nausea, vomiting, diarrhea, and chills.  He reports having prior hospitalizations for pneumonia, most recently last year. He lives in an [place] on [address] and is moving this week. He doesn't know his home medications and reports not taking all of them daily.     Problem List/Main Diagnoses (system-based):   # Patient with fever and tachycardia meeting 2/4 criteria for sepsis 2/2 pneumonia now resolved. S/p Vanc and Cefepime.  MRSA swab+.  S/p CTX 2g qd, Doxy 100mg, Azithro 500mg x 2 (d/c with negative urine strep/legionella in case patient has MAC/to prevent resistance)  BCx positive for gram positive rods, possible contamination  GC/C negative, Syphilis RPR nonreactive, QuantiFeron negative, Fungitelli negative, AFB x1 negative  Hep C RNA Viral load negative    - ID consulted, recommend extensive infectious workup for c/o PCP. F/u ID note for all labs ordered  - Start Cefepime 2g q8h i/s/o immunocompromised state to cover MRSA and pseudomonas (3/24 - ) ) in addition to Atovaquone daily  - Per ID,  hold Vancomycin doses and evaluate renal function. If no BRENNAN/ Cr stable, restart vanc @ 750 mg IV q12.  -f/u ID recs   -Duonebs q8hrs and reassess given d/t wheezing and hx of COPD.    # Right lower lobe pneumonia.   ·  Plan: CXR with RLL opacity. S/p Vanc and Cefepime. Reports prior hx of hospitalizations due to pneumonia, most recently last year.   MRSA swab+, urine legionella and strep negative, RVP negative  CT Chest shows worsening peribronchial wall thickening, ground-glass opacities, and tree-in-bud nodules in all 5 lobes. likely infectious.   - C/w abx as above.    #HIV (human immunodeficiency virus infection).   ·  Plan: CD4 160, VL 26 7/2024. On dolutegravir (Tivicay) and emtricitabine-tenofovir (Descovy) and Atovaquone \\  CD4 (3/24) - 74, 3.65 VL    - c/w Atovaquone for PCP ppx (has sulfa allergy)   - Per ID, restart home med Tivicay and Descovy.    # Heart failure with mildly reduced ejection fraction.   ·  Plan: EF 30-35%. Home med: Carvedilol 3.125mg BID     - Restarted carvedilol 3.125 mg BID d/t high HR: 108 (3/24).    #Thrombocytopenia.   ·  Plan: Platelets 120 on admission. Hx of HIV (non-compliant with meds). Prior platelets 120 (1/2025). Likely chronic thrombocytopenia.   - continue to trend platelets; 142 (3/28).    #Chronic kidney disease, unspecified CKD stage.   ·  Plan: Creatine 1.58 on admission, prior Cr 1.59 (1/2025). Baseline Cr 1.4-1.6. RESOLVED    - continue to trend; 123 (3/25)   - avoid nephrotoxic meds.      Inpatient treatment course:     New medications:   Labs to be followed outpatient:   Exam to be followed outpatient:       LABS & STUDIES:  SARS-CoV-2: [place] (23 Mar 2025 07:20)   #Discharge: do not delete    SAIDA REZA is a  75M h/o HIV/AIDS (CD4 74/12%, VL 4516 3/2025), chronic diarrhea, non-obstructive CAD (last cath 7/2021) and HFrEF LV EF (LVEF 35-40% with LVH and RWMA 9/2021), prior prostate cancer p/w 1-month-history of cough and shortness of breath a/w sepsis 2/2 acute hypoxemic respiratory failure 2/2 pneumonia.    Problem List/Main Diagnoses (system-based):   # Patient with fever and tachycardia meeting 2/4 criteria for sepsis 2/2 pneumonia now resolved. S/p Vanc and Cefepime.  MRSA swab+.  S/p CTX 2g qd, Doxy 100mg, Azithro 500mg x 2 (d/c with negative urine strep/legionella in case patient has MAC/to prevent resistance)  BCx positive for gram positive rods, possible contamination  GC/C negative, Syphilis RPR nonreactive, QuantiFeron negative, Fungitelli negative, AFB x1 negative  Hep C RNA Viral load negative    - ID consulted, recommend extensive infectious workup for c/o PCP. F/u ID note for all labs ordered  - Start Cefepime 2g q8h i/s/o immunocompromised state to cover MRSA and pseudomonas (3/24 - ) ) in addition to Atovaquone daily  - Per ID,  hold Vancomycin doses and evaluate renal function. If no BRENNAN/ Cr stable, restart vanc @ 750 mg IV q12.  -f/u ID recs   -Duonebs q8hrs and reassess given d/t wheezing and hx of COPD.    # Right lower lobe pneumonia.   ·  Plan: CXR with RLL opacity. S/p Vanc and Cefepime. Reports prior hx of hospitalizations due to pneumonia, most recently last year.   MRSA swab+, urine legionella and strep negative, RVP negative  CT Chest shows worsening peribronchial wall thickening, ground-glass opacities, and tree-in-bud nodules in all 5 lobes. likely infectious.   ddx includes PCP pneumonia (high suspicion), CAP, pulmonary TB, other atypical infections  -TB R/O- airborne precautions; QuantiFeron pending, GC/C, histoplasma Ag, Syphilis and Fungitell  - C/w abx as above.    #HIV (human immunodeficiency virus infection).   ·  Plan: CD4 160, VL 26 7/2024. On dolutegravir (Tivicay) and emtricitabine-tenofovir (Descovy) and Atovaquone \\  CD4 (3/24) - 74, 3.65 VL    - c/w Atovaquone for PCP ppx (has sulfa allergy)   - Per ID, restart home med Tivicay and Descovy.    # Heart failure with mildly reduced ejection fraction.   ·  Plan: EF 30-35%. Home med: Carvedilol 3.125mg BID     - Restarted carvedilol 3.125 mg BID d/t high HR: 108 (3/24).    #Thrombocytopenia.   ·  Plan: Platelets 120 on admission. Hx of HIV (non-compliant with meds). Prior platelets 120 (1/2025). Likely chronic thrombocytopenia.   - continue to trend platelets; 142 (3/28).    #Chronic kidney disease, unspecified CKD stage.   ·  Plan: Creatine 1.58 on admission, prior Cr 1.59 (1/2025). Baseline Cr 1.4-1.6. RESOLVED    - continue to trend; 123 (3/25)   - avoid nephrotoxic meds.      Inpatient treatment course:     New medications:   Labs to be followed outpatient:   Exam to be followed outpatient:       LABS & STUDIES:  SARS-CoV-2: [place] (23 Mar 2025 07:20)   #Discharge: do not delete    SAIDA REZA is a  75M h/o HIV/AIDS (CD4 74/12%, VL 4516 3/2025), chronic diarrhea, non-obstructive CAD (last cath 7/2021) and HFrEF LV EF (LVEF 35-40% with LVH and RWMA 9/2021), prior prostate cancer p/w 1-month-history of cough and shortness of breath a/w sepsis 2/2 acute hypoxemic respiratory failure 2/2 pneumonia. Pt was found have positive MRSA swab and was switched to vanc 1000 mg q12h, Azithro 500 mg x3 for MRSA Pneumoniae coverage. Blood cultures reported Gram + rods but ID believed likely contaminant. Azithro was switched to Cefepime 2g q8h on 3/24. F/U TTE on 3/25 showed 35 to 40% EF with regional wall motion abnormalities and basal and mid inferior wall and inferolateral wall were abnormal. On 3/26, CT chest showed a concern for PCP Pneumonia and indication for TB R/O. Cryptoccocal ag negative, G/C negative, Syphillis RPR nonreactive, QuantiFeron negative, Fungitelli negative, AFB x1 negative, and Hep C RNA viral load negative. Pt is to be d/c with Doxycycline 100 mg PO q12 and Cefpodoxime 200 mg PO Q12 (3/24- 4/6) and is to f/u with PCP.       Problem List/Main Diagnoses (system-based):   # Patient with fever and tachycardia meeting 2/4 criteria for sepsis 2/2 pneumonia now resolved. S/p Vanc and Cefepime.  MRSA swab+.  S/p CTX 2g qd, Doxy 100mg, Azithro 500mg x 2 (d/c with negative urine strep/legionella in case patient has MAC/to prevent resistance)  BCx positive for gram positive rods, possible contamination  GC/C negative, Syphilis RPR nonreactive, QuantiFeron negative, Fungitelli negative, AFB x1 negative  Hep C RNA Viral load negative    - ID consulted, recommend extensive infectious workup for c/o PCP. F/u ID note for all labs ordered  - Start Cefepime 2g q8h i/s/o immunocompromised state to cover MRSA and pseudomonas (3/24 - ) ) in addition to Atovaquone daily  -Duonebs q8hrs and reassess given d/t wheezing and hx of COPD.  - To be d/c on doxycycline 100 mg PO q12 and cefpodoxime 200 mg PO q12.     # Right lower lobe pneumonia.   ·  Plan: CXR with RLL opacity. S/p Vanc and Cefepime. Reports prior hx of hospitalizations due to pneumonia, most recently last year.   MRSA swab+, urine legionella and strep negative, RVP negative  CT Chest shows worsening peribronchial wall thickening, ground-glass opacities, and tree-in-bud nodules in all 5 lobes. likely infectious.   ddx includes PCP pneumonia (high suspicion), CAP, pulmonary TB, other atypical infections.  GC/C negative, Syphilis RPR nonreactive, QuantiFeron negative, Fungitelli negative, AFB x1 negative  -TB R/O- airborne precautions; histoplasma Ag pending   - C/w abx as above.    #HIV (human immunodeficiency virus infection).   ·  Plan: CD4 160, VL 26 7/2024. On dolutegravir (Tivicay) and emtricitabine-tenofovir (Descovy) and Atovaquone \\  CD4 (3/24) - 74, 3.65 VL    - c/w Atovaquone for PCP ppx (has sulfa allergy)   - Per ID, restart home med Tivicay and Descovy.    # Heart failure with mildly reduced ejection fraction.   ·  Plan: EF 30-35%. Home med: Carvedilol 3.125mg BID     - Restarted carvedilol 3.125 mg BID d/t high HR: 108 (3/24).    #Thrombocytopenia.   ·  Plan: Platelets 120 on admission. Hx of HIV (non-compliant with meds). Prior platelets 120 (1/2025). Likely chronic thrombocytopenia.   - continue to trend platelets; 142 (3/28).    #Chronic kidney disease, unspecified CKD stage.   ·  Plan: Creatine 1.58 on admission, prior Cr 1.59 (1/2025). Baseline Cr 1.4-1.6. RESOLVED    - continue to trend; 123 (3/25)   - avoid nephrotoxic meds.        New medications:   Doxycycline 100 mg PO q12 and Cefpodoxime 200 mg PO Q12 (3/24- 4/6)    Labs to follow-up on: Histoplasma Antigen.     LABS & STUDIES:  SARS-CoV-2: Cachorro (23 Mar 2025 07:20)   #Discharge: do not delete    SAIDA REZA is a  75M h/o HIV/AIDS (CD4 74/12%, VL 4516 3/2025), chronic diarrhea, non-obstructive CAD (last cath 7/2021) and HFrEF LV EF (LVEF 35-40% with LVH and RWMA 9/2021), prior prostate cancer p/w 1-month-history of cough and shortness of breath a/w sepsis 2/2 acute hypoxemic respiratory failure 2/2 pneumonia. Pt was found have positive MRSA swab and was switched to vanc 1000 mg q12h, Azithro 500 mg x3 for MRSA Pneumoniae coverage. 1/2 Blood culture bottles grew Gram positive rods likely contaminant per ID. Azithro was switched to Cefepime 2g q8h on 3/24. F/U TTE on 3/25 showed 35 to 40% EF with regional wall motion abnormalities and basal and mid inferior wall and inferolateral wall were abnormal. On 3/26, CT chest showed a concern for PCP Pneumonia and workup sent for TB R/O. Cryptoccocal ag negative, G/C negative, Syphillis RPR nonreactive, QuantiFeron negative, Fungitelli negative, AFB x1 negative, and Hep C RNA viral load negative. Pt is to be d/c with Doxycycline 100 mg PO q12 and Cefpodoxime 200 mg PO Q12 (3/24- 4/6) and is to f/u with PCP.       Problem List/Main Diagnoses (system-based):   # Patient with fever and tachycardia meeting 2/4 criteria for sepsis 2/2 pneumonia now resolved. S/p Vanc and Cefepime.  MRSA swab+.  S/p CTX 2g qd, Doxy 100mg, Azithro 500mg x 2 (d/c with negative urine strep/legionella in case patient has MAC/to prevent resistance)  BCx positive for gram positive rods, possible contamination  GC/C negative, Syphilis RPR nonreactive, QuantiFeron negative, Fungitelli negative, AFB x1 negative  Hep C RNA Viral load negative  Plan  - Extensive infectious workup sent for suspicion of PCP.   - c/w Cefepime 2g q8h i/s/o immunocompromised state to cover MRSA and pseudomonas in addition to Atovaquone daily  -Duonebs q8hrs and reassess given d/t wheezing and hx of COPD.  - After d/c, continue on doxycycline 100 mg PO q12 and cefpodoxime 200 mg PO q12 till 4/6.     # Right lower lobe pneumonia.   ·  Plan: CXR with RLL opacity. S/p Vanc and Cefepime. Reports prior hx of hospitalizations due to pneumonia, most recently last year.   MRSA swab+, urine legionella and strep negative, RVP negative  CT Chest shows worsening peribronchial wall thickening, ground-glass opacities, and tree-in-bud nodules in all 5 lobes. likely infectious.   ddx includes PCP pneumonia (high suspicion), CAP, pulmonary TB, other atypical infections.  GC/C negative, Syphilis RPR nonreactive, QuantiFeron negative, Fungitelli negative, AFB x1 negative  -TB R/O- airborne precautions; histoplasma Ag pending   - C/w abx as above.    #HIV (human immunodeficiency virus infection).   ·  Plan: CD4 160, VL 26 7/2024. On dolutegravir (Tivicay) and emtricitabine-tenofovir (Descovy) and Atovaquone \\  CD4 (3/24) - 74, 3.65 VL  - c/w Atovaquone for PCP ppx (has sulfa allergy)   - Per ID, restart home med Tivicay and Descovy.    # Heart failure with mildly reduced ejection fraction.   ·  Plan: EF 30-35%. Home med: Carvedilol 3.125mg BID   - Restarted carvedilol 3.125 mg BID d/t high HR: 108 (3/24).    #Thrombocytopenia.   ·  Plan: Platelets 120 on admission. Hx of HIV (non-compliant with meds). Prior platelets 120 (1/2025). Likely chronic thrombocytopenia.   - continue to trend platelets; 142 (3/28).    #Chronic kidney disease, unspecified CKD stage.   ·  Plan: Creatine 1.58 on admission, prior Cr 1.59 (1/2025). Baseline Cr 1.4-1.6. RESOLVED  - Cr AT dc 1.33   - avoid nephrotoxic meds.    New medications:   Doxycycline 100 mg PO q12 and Cefpodoxime 200 mg PO Q12 (3/24- 4/6)    Labs to follow-up on: Histoplasma Antigen, AFB final report.     LABS & STUDIES:  SARS-CoV-2: NotDetec (23 Mar 2025 07:20)    PHYSICAL EXAM  General: in no acute distress  Eyes: EOMI intact bilaterally. Anicteric sclerae, moist conjunctivae  HENT: Moist mucous membranes  Neck: Trachea midline, supple  Lungs: CTA B/L. No wheezes, rales, or rhonchi  Cardiovascular: RRR. No murmurs, rubs, or gallops  Abdomen: Soft, non-tender non-distended; No rebound or guarding  Extremities: WWP, No clubbing, cyanosis or edema  Neurological: Alert and oriented  Skin: Warm and dry. No obvious rash

## 2025-03-28 NOTE — PROGRESS NOTE ADULT - PROBLEM SELECTOR PLAN 1
Patient with fever and tachycardia meeting 2/4 criteria for sepsis 2/2 pneumonia now resolved. S/p Vanc and Cefepime.  MRSA swab+.  S/p CTX 2g qd, Doxy 100mg, Azithro 500mg x 2 (d/c with negative urine strep/legionella in case patient has MAC/to prevent resistance)  BCx positive for gram positive rods, possible contamination  GC/C negative, Syphilis RPR nonreactive, AFB x1 negative   Hep C RNA Viral load negative    - ID consulted, recommend extensive infectious workup for c/o PCP. F/u ID note for all labs ordered  - F/u RT to induce sputum for AFB x 2 pending collection  - Reorder Vancomycin 1000mg q12h ( vancomycin trough: 18.9 ) (3/23 - )  - Start Cefepime 2g q8h i/s/o immunocompromised state to cover MRSA and pseudomonas (3/24 - ) ) in addition to Atovaquone daily  -f/u ID recs   -TB R/O- airborne precautions; QuantiFeron pending, histoplasma Ag,  and Fungitell  -Duonebs q8hrs and reassess given d/t wheezing and hx of COPD Patient with fever and tachycardia meeting 2/4 criteria for sepsis 2/2 pneumonia now resolved. S/p Vanc and Cefepime.  MRSA swab+.  S/p CTX 2g qd, Doxy 100mg, Azithro 500mg x 2 (d/c with negative urine strep/legionella in case patient has MAC/to prevent resistance)  BCx positive for gram positive rods, possible contamination  GC/C negative, Syphilis RPR nonreactive, QuantiFeron negative, Fungitelli negative, AFB x1 negative  Hep C RNA Viral load negative    - ID consulted, recommend extensive infectious workup for c/o PCP. F/u ID note for all labs ordered  - F/u RT to induce sputum for AFB x 2 pending collection  - Start Cefepime 2g q8h i/s/o immunocompromised state to cover MRSA and pseudomonas (3/24 - ) ) in addition to Atovaquone daily  - Per ID,  hold Vancomycin doses and evaluate renal function. If no BRENNAN/ Cr stable, restart vanc @ 750 mg IV q12.  -f/u ID recs   -Duonebs q8hrs and reassess given d/t wheezing and hx of COPD Patient with fever and tachycardia meeting 2/4 criteria for sepsis 2/2 pneumonia now resolved. S/p Vanc and Cefepime.  MRSA swab+.  S/p CTX 2g qd, Doxy 100mg, Azithro 500mg x 2 (d/c with negative urine strep/legionella in case patient has MAC/to prevent resistance)  BCx positive for gram positive rods, possible contamination  GC/C negative, Syphilis RPR nonreactive, QuantiFeron negative, Fungitelli negative, AFB x1 negative  Hep C RNA Viral load negative    - ID consulted, recommend extensive infectious workup for c/o PCP. F/u ID note for all labs ordered  - Start Cefepime 2g q8h i/s/o immunocompromised state to cover MRSA and pseudomonas (3/24 - ) ) in addition to Atovaquone daily  - Per ID,  hold Vancomycin doses and evaluate renal function. If no BRENNAN/ Cr stable, restart vanc @ 750 mg IV q12.  -f/u ID recs   -Duonebs q8hrs and reassess given d/t wheezing and hx of COPD

## 2025-03-28 NOTE — PROGRESS NOTE ADULT - TIME BILLING
Managing advanced HIV with PNA
Managing advanced HIV with PNA
PNA, HIV
HIV/AIDS, PNA
Time-based billing (NON-critical care).     More than 50% of the visit was spent counseling and / or coordinating care by the attending physician.      The necessity of the time spent during the encounter on this date of service was due to: documentation in [place], reviewing chart and coordinating care with patient/resident and interdisciplinary staff (such as , social workers, etc) as well as reviewing vitals, laboratory data, radiology, medication list, consultants' recommendations and prior records. Interventions were performed as documented above.
Bedside exam and interview   Reviewed vitals, labs   Discussed patient's plan of care with housestaff   Documentation of encounter  Excludes teaching time and/or separately reported services
Time-based billing (NON-critical care).     More than 50% of the visit was spent counseling and / or coordinating care by the attending physician.      The necessity of the time spent during the encounter on this date of service was due to: documentation in [place], reviewing chart and coordinating care with patient/resident and interdisciplinary staff (such as , social workers, etc) as well as reviewing vitals, laboratory data, radiology, medication list, consultants' recommendations and prior records. Interventions were performed as documented above.

## 2025-03-28 NOTE — PROGRESS NOTE ADULT - SUBJECTIVE AND OBJECTIVE BOX
*****INCOMPLETE NOTE*****    INTERVAL HPI/OVERNIGHT EVENTS:    SUBJECTIVE: Patient seen and examined at bedside, resting comfortably in bed, and does not appear to be in any acute distress. Patient reports    Vital Signs Last 24 Hrs  T(C): 36.4 (28 Mar 2025 05:45), Max: 36.7 (27 Mar 2025 20:45)  T(F): 97.6 (28 Mar 2025 05:45), Max: 98.1 (27 Mar 2025 20:45)  HR: 63 (28 Mar 2025 05:45) (59 - 68)  BP: 136/78 (28 Mar 2025 05:45) (126/62 - 136/78)  BP(mean): --  RR: 18 (28 Mar 2025 05:45) (17 - 18)  SpO2: 95% (28 Mar 2025 05:45) (95% - 98%)    Parameters below as of 28 Mar 2025 05:45  Patient On (Oxygen Delivery Method): room air        PHYSICAL EXAM:  General: in no acute distress  Eyes: EOMI intact bilaterally. Anicteric sclerae, moist conjunctivae  HENT: Moist mucous membranes  Neck: Trachea midline, supple  Lungs: CTA B/L. No wheezes, rales, or rhonchi  Cardiovascular: RRR. No murmurs, rubs, or gallops  Abdomen: Soft, non-tender non-distended; No rebound or guarding  Extremities: WWP, No clubbing, cyanosis or edema  Neurological: Alert and oriented  Skin: Warm and dry. No obvious rash     LABS:                        10.7   2.66  )-----------( 150      ( 27 Mar 2025 05:30 )             31.5     03-28    139  |  104  |  22  ----------------------------<  142[H]  4.0   |  26  |  1.33[H]    Ca    8.7      28 Mar 2025 07:53  Phos  2.7     03-28  Mg     1.9     03-28    TPro  6.3  /  Alb  3.0[L]  /  TBili  0.2  /  DBili  x   /  AST  13  /  ALT  8[L]  /  AlkPhos  54  03-27   *****INCOMPLETE NOTE*****    INTERVAL HPI/OVERNIGHT EVENTS: MILLY    SUBJECTIVE: Patient seen and examined at bedside, resting comfortably in bed, and does not appear to be in any acute distress. Patient reports diarrhea has resolved. Denies SOB, wheezing, fever, chills, nausea, vomiting, abdominal pain.     Vital Signs Last 24 Hrs  T(C): 36.4 (28 Mar 2025 05:45), Max: 36.7 (27 Mar 2025 20:45)  T(F): 97.6 (28 Mar 2025 05:45), Max: 98.1 (27 Mar 2025 20:45)  HR: 63 (28 Mar 2025 05:45) (59 - 68)  BP: 136/78 (28 Mar 2025 05:45) (126/62 - 136/78)  BP(mean): --  RR: 18 (28 Mar 2025 05:45) (17 - 18)  SpO2: 95% (28 Mar 2025 05:45) (95% - 98%)    Parameters below as of 28 Mar 2025 05:45  Patient On (Oxygen Delivery Method): room air        PHYSICAL EXAM:  General: in no acute distress  Eyes: EOMI intact bilaterally. Anicteric sclerae, moist conjunctivae  HENT: Moist mucous membranes  Neck: Trachea midline, supple  Lungs: CTA B/L. No wheezes, rales, or rhonchi  Cardiovascular: RRR. No murmurs, rubs, or gallops  Abdomen: Soft, non-tender non-distended; No rebound or guarding  Extremities: WWP, No clubbing, cyanosis or edema  Neurological: Alert and oriented  Skin: Warm and dry. No obvious rash     LABS:                        11.1   3.25  )-----------( 167      ( 28 Mar 2025 05:30 )            33.3    03-28    139  |  104  |  22  ----------------------------<  142[H]  4.0   |  26  |  1.33[H]    Ca    8.7      28 Mar 2025 07:53  Phos  2.7     03-28  Mg     1.9     03-28    TPro  6.3  /  Alb  3.0[L]  /  TBili  0.2  /  DBili  x   /  AST  13  /  ALT  8[L]  /  AlkPhos  54  03-27   INTERVAL HPI/OVERNIGHT EVENTS: MILLY    SUBJECTIVE: Patient seen and examined at bedside, resting comfortably in bed, and does not appear to be in any acute distress. Patient reports diarrhea has resolved. Denies SOB, wheezing, fever, chills, nausea, vomiting, abdominal pain.     Vital Signs Last 24 Hrs  T(C): 36.4 (28 Mar 2025 05:45), Max: 36.7 (27 Mar 2025 20:45)  T(F): 97.6 (28 Mar 2025 05:45), Max: 98.1 (27 Mar 2025 20:45)  HR: 63 (28 Mar 2025 05:45) (59 - 68)  BP: 136/78 (28 Mar 2025 05:45) (126/62 - 136/78)  BP(mean): --  RR: 18 (28 Mar 2025 05:45) (17 - 18)  SpO2: 95% (28 Mar 2025 05:45) (95% - 98%)    Parameters below as of 28 Mar 2025 05:45  Patient On (Oxygen Delivery Method): room air        PHYSICAL EXAM:  General: in no acute distress  Eyes: EOMI intact bilaterally. Anicteric sclerae, moist conjunctivae  HENT: Moist mucous membranes  Neck: Trachea midline, supple  Lungs: CTA B/L. No wheezes, rales, or rhonchi  Cardiovascular: RRR. No murmurs, rubs, or gallops  Abdomen: Soft, non-tender non-distended; No rebound or guarding  Extremities: WWP, No clubbing, cyanosis or edema  Neurological: Alert and oriented  Skin: Warm and dry. No obvious rash     LABS:                        11.1   3.25  )-----------( 167      ( 28 Mar 2025 05:30 )            33.3    03-28    139  |  104  |  22  ----------------------------<  142[H]  4.0   |  26  |  1.33[H]    Ca    8.7      28 Mar 2025 07:53  Phos  2.7     03-28  Mg     1.9     03-28    TPro  6.3  /  Alb  3.0[L]  /  TBili  0.2  /  DBili  x   /  AST  13  /  ALT  8[L]  /  AlkPhos  54  03-27

## 2025-03-28 NOTE — PROGRESS NOTE ADULT - PROBLEM SELECTOR PLAN 3
CD4 160, VL 26 7/2024. On dolutegravir (Tivicay) and emtricitabine-tenofovir (Descovy) and Atovaquone \\  CD4 (3/24) - 74, 3.65 VL    - c/w Atovaquone for PCP ppx (has sulfa allergy)   - Hold Tivicay and Descovy. F/U ID recs CD4 160, VL 26 7/2024. On dolutegravir (Tivicay) and emtricitabine-tenofovir (Descovy) and Atovaquone \\  CD4 (3/24) - 74, 3.65 VL    - c/w Atovaquone for PCP ppx (has sulfa allergy)   - Per ID, restart home med Tivicay and Descovy.

## 2025-03-28 NOTE — PROGRESS NOTE ADULT - PROVIDER SPECIALTY LIST ADULT
Rehab Medicine
Infectious Disease
Internal Medicine

## 2025-03-28 NOTE — PROGRESS NOTE ADULT - ASSESSMENT
75M with HTN, HIV/AIDS (CD4 74, VL 4,516 this admission), CAD (prior MI), T2DM, HFrEF (EF 40-45% 11/2024), recurrent syncope, COPD, depression/anxiety, prostate cancer s/p radiation (2013), hx of hepatitis C treated with harvoni 2015 – NAAT negative 1/2025, Syphilis last treated with 21 days of oral therapy early 2017, presenting for cough and shortness of breath x >1 month admitted for pneumonia. On arrival, febrile 103.5 with WBC 4.43, 81.9% PMNs. CXR with diffuse R lung infiltrates. RVP/COVID negative. Urine strep and legionella antigens negative. S/p Cefepime 2g x 1 3/22,  azithromycin and ceftriaxone. On vancomycin and cefepime. Given low CD4 count and patient's social hx, DDX includes PCP pneumonia (high suspicion), CAP, pulmonary TB, other atypical infections and crack lung. Bcxs 3/22 1/4 bottles growing corynebacterium -- suspect contaminant. Regarding dysphagia - has had for 10 yrs and no e/o thrush on exam, low suspicion for esophageal candidiasis. Afebrile, respiratory symptoms unchanged. , Toxo IgG negative, serum CrAg negative. Fungitell and QuantiFeron negative. GI PCR negative. Urine GC/Chlamydia negative. RPR titer 1:1 - had treated syphilis in past. Dr. Aguilera discussed CT chest results with radiology -- findings less likely TB or PCP.  Pt is clinically improving on broad spectrum abx - no longer requiring supplemental O2. Suspect bacterial pneumonia -- can transition to PO abx.     Suggest:  -F/u Histoplasma urine Ag and will f/u serum Ag  -can take off airborne isolation   -Continue home ART (Tivicay and Descovy)   -continue atovaquone 1500mg QD  -stop vanc and cefepime   -can discharge with Doxycycline 100mg PO Q12 plus Cefpodoxime 200mg PO Q12 to complete 2 week course (3/24-4/6)  -patient to follow up with PMD     Team 2 will sign off. Thank you for your consultation   Please recall if further ID input is desired   Case d/w primary team.  Final recommendation pending attending note.    Teresa Miller, Infectious Diseases PA  Please reach out for any questions 9 am-5pm.   For evenings and weekends, please call the ID physician on call.

## 2025-03-28 NOTE — PROGRESS NOTE ADULT - PROBLEM SELECTOR PLAN 11
- Continue home aripiprazole 5mg qd, escitalopram 20mg qd            #Diarrhea  -GI PCR negative  -Start Loperamide 2 mg PO q24

## 2025-03-28 NOTE — PROGRESS NOTE ADULT - SUBJECTIVE AND OBJECTIVE BOX
INFECTIOUS DISEASES CONSULT FOLLOW-UP NOTE    INTERVAL HPI/OVERNIGHT EVENTS:    Patient seen and examined at bedside. MILLY. Afebrile. Satting at 95% on RA. Patient states he is feeling better - cough improved, and no longer SOB with exertion.     ROS:   Constitutional, eyes, ENT, cardiovascular, respiratory, gastrointestinal, genitourinary, integumentary, neurological, psychiatric and heme/lymph are otherwise negative other than noted above       ANTIBIOTICS/RELEVANT:    MEDICATIONS  (STANDING):  ARIPiprazole 5 milliGRAM(s) Oral every 24 hours  aspirin  chewable 81 milliGRAM(s) Oral every 24 hours  atorvastatin 80 milliGRAM(s) Oral at bedtime  atovaquone  Suspension 1500 milliGRAM(s) Oral daily  carvedilol 3.125 milliGRAM(s) Oral every 12 hours  cefepime   IVPB 2000 milliGRAM(s) IV Intermittent every 8 hours  dextrose 5%. 1000 milliLiter(s) (100 mL/Hr) IV Continuous <Continuous>  dextrose 5%. 1000 milliLiter(s) (50 mL/Hr) IV Continuous <Continuous>  dextrose 50% Injectable 25 Gram(s) IV Push once  dextrose 50% Injectable 12.5 Gram(s) IV Push once  dextrose 50% Injectable 25 Gram(s) IV Push once  dolutegravir 50 milliGRAM(s) Oral every 24 hours  emtricitabine 200 mG/tenofovir alafenamide 25 mG (DESCOVY) Tablet 1 Tablet(s) Oral every 24 hours  enalapril 10 milliGRAM(s) Oral every 24 hours  escitalopram 20 milliGRAM(s) Oral every 24 hours  fluticasone propionate/ salmeterol 100-50 MICROgram(s) Diskus 1 Dose(s) Inhalation two times a day  glucagon  Injectable 1 milliGRAM(s) IntraMuscular once  heparin   Injectable 5000 Unit(s) SubCutaneous every 8 hours  insulin lispro (ADMELOG) corrective regimen sliding scale   SubCutaneous three times a day before meals  insulin lispro (ADMELOG) corrective regimen sliding scale   SubCutaneous at bedtime  loperamide 2 milliGRAM(s) Oral every 24 hours  melatonin 5 milliGRAM(s) Oral at bedtime  potassium phosphate / sodium phosphate Powder (PHOS-NaK) 1 Packet(s) Oral once  sodium chloride 7% Inhalation 4 milliLiter(s) Inhalation every 8 hours  tamsulosin 0.4 milliGRAM(s) Oral at bedtime    MEDICATIONS  (PRN):  acetaminophen     Tablet .. 650 milliGRAM(s) Oral every 6 hours PRN Temp greater or equal to 38C (100.4F), Mild Pain (1 - 3)  albuterol/ipratropium for Nebulization 3 milliLiter(s) Nebulizer every 6 hours PRN Shortness of Breath and/or Wheezing  aluminum hydroxide/magnesium hydroxide/simethicone Suspension 30 milliLiter(s) Oral every 4 hours PRN Dyspepsia  benzocaine/menthol Lozenge 1 Lozenge Oral three times a day PRN Sore Throat  benzonatate 100 milliGRAM(s) Oral three times a day PRN Cough  dextrose Oral Gel 15 Gram(s) Oral once PRN Blood Glucose LESS THAN 70 milliGRAM(s)/deciliter  ondansetron Injectable 4 milliGRAM(s) IV Push every 8 hours PRN Nausea and/or Vomiting        Vital Signs Last 24 Hrs  T(C): 36.6 (28 Mar 2025 12:00), Max: 36.7 (27 Mar 2025 20:45)  T(F): 97.9 (28 Mar 2025 12:00), Max: 98.1 (27 Mar 2025 20:45)  HR: 58 (28 Mar 2025 12:00) (58 - 68)  BP: 147/73 (28 Mar 2025 12:00) (126/62 - 147/73)  BP(mean): --  RR: 18 (28 Mar 2025 12:00) (18 - 18)  SpO2: 100% (28 Mar 2025 12:00) (95% - 100%)    Parameters below as of 28 Mar 2025 12:00  Patient On (Oxygen Delivery Method): room air        03-27-25 @ 07:01  -  03-28-25 @ 07:00  --------------------------------------------------------  IN: 0 mL / OUT: 1550 mL / NET: -1550 mL      PHYSICAL EXAM:  Constitutional: alert, NAD, sitting at edge of bed.   Eyes: the sclera and conjunctiva were normal.   ENT: the ears and nose were normal in appearance.   Neck: the appearance of the neck was normal and the neck was supple.   Pulmonary: Symmetric chest rise. No conversational dyspnea.   Heart: Regular rate and rhythm   Vascular: there was no peripheral edema  Abdomen: soft, non-tender  Neurological: no focal deficits.   Psychiatric: the affect was normal      LABS:                        11.1   3.25  )-----------( 167      ( 28 Mar 2025 07:53 )             33.3     03-28    139  |  104  |  22  ----------------------------<  142[H]  4.0   |  26  |  1.33[H]    Ca    8.7      28 Mar 2025 07:53  Phos  2.7     03-28  Mg     1.9     03-28    TPro  6.3  /  Alb  3.0[L]  /  TBili  0.2  /  DBili  x   /  AST  13  /  ALT  8[L]  /  AlkPhos  54  03-27      Urinalysis Basic - ( 28 Mar 2025 07:53 )    Color: x / Appearance: x / SG: x / pH: x  Gluc: 142 mg/dL / Ketone: x  / Bili: x / Urobili: x   Blood: x / Protein: x / Nitrite: x   Leuk Esterase: x / RBC: x / WBC x   Sq Epi: x / Non Sq Epi: x / Bacteria: x        MICROBIOLOGY:    Culture - Acid Fast - Sputum w/Smear (collected 03-25-25 @ 19:58)  Source: Sputum    Culture - Urine (collected 03-22-25 @ 21:03)  Source: Clean Catch Clean Catch (Midstream)  Final Report (03-24-25 @ 07:08):    No growth    Culture - Blood (collected 03-22-25 @ 21:03)  Source: Blood Blood-Peripheral  Gram Stain (03-24-25 @ 14:21):    Growth in anaerobic bottle: Gram Positive Rods  Final Report (03-25-25 @ 12:22):    Growth in anaerobic bottle: Corynebacterium imitans "Susceptibilities not    performed"    Direct identification is available within approximately 3-5    hours either by Blood Panel Multiplexed PCR or Direct    MALDI-TOF. Details: [email or web address]  Organism: Blood Culture PCR (03-25-25 @ 12:22)  Organism: Blood Culture PCR (03-25-25 @ 12:22)      Method Type: PCR      -  Corynebacterium species: Detec    Culture - Blood (collected 03-22-25 @ 21:03)  Source: Blood Blood-Peripheral  Final Report (03-28-25 @ 05:00):    No growth at 5 days    Urinalysis with Rflx Culture (collected 03-22-25 @ 21:03)        RADIOLOGY & ADDITIONAL STUDIES:  Reviewed

## 2025-03-28 NOTE — PROGRESS NOTE ADULT - PROBLEM SELECTOR PLAN 12
F: none  E: replete as necessary  N: Carb consistent  DVT: heparin SubQ  Dispo: RMF  f/u PT recs F: none  E: replete as necessary  N: Carb consistent  DVT: heparin SubQ  Dispo: RMF  PT recommends OP PT to maximize functional endurance and

## 2025-03-28 NOTE — PROGRESS NOTE ADULT - PROBLEM SELECTOR PLAN 6
Home med: enalapril 10mg  - Restart enalapril 10 mg. Home med: enalapril 10mg  - C/w enalapril 10 mg.

## 2025-03-28 NOTE — PROGRESS NOTE ADULT - PROBLEM SELECTOR PLAN 4
EF 30-35%. Home med: Carvedilol 3.125mg BID     - Restarted carvedilol 3.125 mg BID d/t high HR: 108 (3/24)  - F/u TTE EF 30-35%. Home med: Carvedilol 3.125mg BID     - Restarted carvedilol 3.125 mg BID d/t high HR: 108 (3/24)

## 2025-03-28 NOTE — PROGRESS NOTE ADULT - PROBLEM SELECTOR PLAN 2
CXR with RLL opacity. S/p Vanc and Cefepime. Reports prior hx of hospitalizations due to pneumonia, most recently last year.   MRSA swab+, urine legionella and strep negative, RVP negative  CT Chest shows worsening peribronchial wall thickening, ground-glass opacities, and tree-in-bud nodules in all 5 lobes. likely infectious.   - C/w abx as above

## 2025-03-28 NOTE — DISCHARGE NOTE PROVIDER - CARE PROVIDER_API CALL
KAMILLE ARRINGTON  [address] [place] [address]  Phone: ([phone]  Fax: ()-  Follow Up Time: 2 weeks   SANDIP BOSE  [address] [place] [address] [id number]  [place] [address]  Phone: ()-  Fax: ()-  Follow Up Time: 2 weeks

## 2025-03-28 NOTE — PROGRESS NOTE ADULT - PROBLEM SELECTOR PLAN 8
Platelets 120 on admission. Hx of HIV (non-compliant with meds). Prior platelets 120 (1/2025). Likely chronic thrombocytopenia.   - continue to trend platelets; 145 (3/25) Platelets 120 on admission. Hx of HIV (non-compliant with meds). Prior platelets 120 (1/2025). Likely chronic thrombocytopenia.   - continue to trend platelets; 142 (3/28)

## 2025-03-30 LAB
H CAPSUL AG SER IA-MCNC: SIGNIFICANT CHANGE UP NG/ML
HISTPL INTERPRETATION: NEGATIVE — SIGNIFICANT CHANGE UP
HISTPL SPECIMEN TYPE: SIGNIFICANT CHANGE UP

## 2025-04-01 ENCOUNTER — EMERGENCY (EMERGENCY)
Facility: HOSPITAL | Age: 75
LOS: 1 days | Discharge: HOME | End: 2025-04-01
Attending: EMERGENCY MEDICINE | Admitting: EMERGENCY MEDICINE
Payer: MEDICARE

## 2025-04-01 VITALS
TEMPERATURE: 98 F | DIASTOLIC BLOOD PRESSURE: 90 MMHG | WEIGHT: 175.05 LBS | OXYGEN SATURATION: 94 % | SYSTOLIC BLOOD PRESSURE: 140 MMHG | HEIGHT: 72 IN | RESPIRATION RATE: 18 BRPM | HEART RATE: 73 BPM

## 2025-04-01 DIAGNOSIS — Z95.9 PRESENCE OF CARDIAC AND VASCULAR IMPLANT AND GRAFT, UNSPECIFIED: Chronic | ICD-10-CM

## 2025-04-01 PROCEDURE — 99284 EMERGENCY DEPT VISIT MOD MDM: CPT | Mod: 57

## 2025-04-01 PROCEDURE — 25600 CLTX DST RDL FX/EPHYS SEP WO: CPT | Mod: 54,RT

## 2025-04-01 PROCEDURE — 99053 MED SERV 10PM-8AM 24 HR FAC: CPT

## 2025-04-01 PROCEDURE — 73090 X-RAY EXAM OF FOREARM: CPT | Mod: 26,RT

## 2025-04-01 RX ORDER — ACETAMINOPHEN 500 MG/5ML
650 LIQUID (ML) ORAL ONCE
Refills: 0 | Status: COMPLETED | OUTPATIENT
Start: 2025-04-01 | End: 2025-04-01

## 2025-04-01 RX ADMIN — Medication 650 MILLIGRAM(S): at 06:07

## 2025-04-01 NOTE — ED ADULT TRIAGE NOTE - CHIEF COMPLAINT QUOTE
BIBEMS for mechanical trip and fall with right wrist/arm pain. denies other injuries, denies head strike.

## 2025-04-01 NOTE — ED PROVIDER NOTE - PATIENT PORTAL LINK FT
You can access the FollowMyHealth Patient Portal offered by [facility] by registering at the following website: [email or web address] By joining [employer]’s FollowMyHealth portal, you will also be able to view your health information using other applications (apps) compatible with our system.

## 2025-04-01 NOTE — ED PROVIDER NOTE - OBJECTIVE STATEMENT
75-year-old male with a past medical history of HIV (CD4 160, VL 26 7/2024), CAD (prior MI), T2DM, HFrEF (EF 40-45% 11/2024), recurrent syncope, COPD, depression/anxiety,  prostate cancer s/p radiation (2013) Presents with right forearm pain status post mechanical fall.  Patient states that he was walking on the street and they were to metal covers on the ground that he tripped over.  Denies head trauma.  Denies other complaints at this time.

## 2025-04-01 NOTE — ED PROVIDER NOTE - NSFOLLOWUPINSTRUCTIONS_ED_ALL_ED_FT
Wrist Fracture Treated With Immobilization  The bones of the hand, showing a wrist fracture.   A wrist fracture is a break or crack in one of the bones of the wrist. A broken wrist is often treated by wearing a cast, splint, or sling (immobilization). These devices hold the broken pieces in place so they can heal.    What are the causes?  This condition may be caused by:  A direct hit on the wrist.  Injury, such as a car crash or falling on an outstretched hand.  Bone conditions, such as osteoporosis.  Falls. This happens more often to older adults.  What increases the risk?  You're more likely to get this condition if:  You do contact sports or high-risk sports such as:  Skiing.  Biking.  Ice skating.  You take steroids.  You've had a fracture before.  You're female.  You're older.  You smoke.  You drink more than three alcoholic beverages a day.  What are the signs or symptoms?  Severe pain that might get worse when gripping, squeezing, or moving your hand or wrist.  Swelling and tenderness.  Bruising.  Not being able to move the wrist or hand normally.  The wrist hanging in an odd position or looking oddly shaped.  Numbness or tingling in the fingers.  How is this diagnosed?  This condition may be diagnosed based on medical history and a physical exam.    You may also have tests such as:  X-rays.  CT scan.  MRI.  How is this treated?  This condition may be treated by:  Wearing a cast, splint for 5 to 6 weeks.  Taking medicine for pain.  Doing exercises.  Follow these instructions at home:  If you have a cast:    Do not put pressure on any part of the cast until it's hard. This may take a few hours.  Do not stick anything inside it to scratch your skin. Doing this can lead to infection.  Check the skin around your cast every day. Tell your health care provider if you see problems.  It's OK to put lotion on dry skin around the cast.  Keep the cast clean and dry.  If you have a splint:    Wear the splint as told by your provider. Take it off only at the times your provider says you can.  Check the skin around it every day.  Loosen the splint if your fingers tingle, are numb, or turn cold and blue.  Keep the splint clean and dry.  Bathing    Do not take baths, swim, or use a hot tub until you're told it's OK. Ask if you can shower.  If your cast or splint isn't waterproof:  Do not let it get wet.  Cover it when you take a bath or shower. Use a cover that doesn't let any water in.  Managing pain, stiffness, and swelling    Bag of ice on a towel on the skin.   Use ice or an ice pack as told.  If you have a splint or sling that you can take off, remove it only as told.  Place a towel between your skin and the ice, or between your cast and the ice.  Leave the ice on for 20 minutes, 2–3 times a day.  If your skin turns red, take off the ice right away to prevent skin damage. The risk of damage is higher if you can't feel pain, heat, or cold.  Move your fingers often to reduce stiffness and swelling.  Raise the injured area above the level of your heart while you are sitting or lying down. Use pillows as needed.  Activity    Do not lift with your injured wrist, or put weight on it, until your provider says it's safe to do so.  Exercise as told.  Ask when it's safe to drive if you have a cast, splint, or sling on your wrist.  Ask what things are safe for you to do at home. Ask when you can go back to work or school.  Medicines    Take your medicines only as told.  You may need to take steps to help treat or prevent trouble pooping (constipation), such as:  Taking medicines to help you poop.  Eating foods high in fiber, like beans, whole grains, and fresh fruits and vegetables.  Drinking more fluids as told.  Ask your provider if it's safe to drive or use machines while taking pain medicine.  General instructions    Do not smoke, vape, or use nicotine or tobacco. Doing this can slow down healing.  Contact a health care provider if:  Your cast or splint is loose or damaged.  You have any new pain, swelling, or bruising.  Your pain, swelling, and bruising do not get better.  You have a fever or chills.  Get help right away if:  Your skin or fingers on your injured arm turn blue or gray.  Your arm feels cold or numb.  You have very bad pain in your injured wrist.  These symptoms may be an emergency. Call 911 right away  Do not wait to see if the symptoms will go away.  Do not drive yourself to the hospital.  This information is not intended to replace advice given to you by your health care provider. Make sure you discuss any questions you have with your health care provider.

## 2025-04-01 NOTE — ED PROVIDER NOTE - CLINICAL SUMMARY MEDICAL DECISION MAKING FREE TEXT BOX
75-year-old male presents status post mechanical fall with right forearm pain.  PE as above.    X-ray, pain control, reassess.

## 2025-04-01 NOTE — ED PROVIDER NOTE - PROGRESS NOTE DETAILS
X-ray with distal radius fracture.  Patient splinted.  Follow-up with Ortho.  Return precautions discussed

## 2025-04-01 NOTE — ED PROVIDER NOTE - PHYSICAL EXAMINATION
Right forearm with tenderness to palpation over mid ulna.  Full range of motion of all joints of right hand.  Full range of motion of right elbow.
Stable

## 2025-04-03 DIAGNOSIS — Z91.018 ALLERGY TO OTHER FOODS: ICD-10-CM

## 2025-04-03 DIAGNOSIS — I50.20 UNSPECIFIED SYSTOLIC (CONGESTIVE) HEART FAILURE: ICD-10-CM

## 2025-04-03 DIAGNOSIS — Z88.0 ALLERGY STATUS TO PENICILLIN: ICD-10-CM

## 2025-04-03 DIAGNOSIS — F32.A DEPRESSION, UNSPECIFIED: ICD-10-CM

## 2025-04-03 DIAGNOSIS — Z91.013 ALLERGY TO SEAFOOD: ICD-10-CM

## 2025-04-03 DIAGNOSIS — Z88.2 ALLERGY STATUS TO SULFONAMIDES: ICD-10-CM

## 2025-04-03 DIAGNOSIS — Z21 ASYMPTOMATIC HUMAN IMMUNODEFICIENCY VIRUS [HIV] INFECTION STATUS: ICD-10-CM

## 2025-04-03 DIAGNOSIS — S52.501A UNSPECIFIED FRACTURE OF THE LOWER END OF RIGHT RADIUS, INITIAL ENCOUNTER FOR CLOSED FRACTURE: ICD-10-CM

## 2025-04-03 DIAGNOSIS — F41.9 ANXIETY DISORDER, UNSPECIFIED: ICD-10-CM

## 2025-04-03 DIAGNOSIS — E11.9 TYPE 2 DIABETES MELLITUS WITHOUT COMPLICATIONS: ICD-10-CM
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
## 2025-04-03 DIAGNOSIS — M79.631 PAIN IN RIGHT FOREARM: ICD-10-CM

## 2025-04-03 DIAGNOSIS — J44.9 CHRONIC OBSTRUCTIVE PULMONARY DISEASE, UNSPECIFIED: ICD-10-CM
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
## 2025-04-04 DIAGNOSIS — B20 HUMAN IMMUNODEFICIENCY VIRUS [HIV] DISEASE: ICD-10-CM

## 2025-04-04 DIAGNOSIS — F32.A DEPRESSION, UNSPECIFIED: ICD-10-CM

## 2025-04-04 DIAGNOSIS — R13.10 DYSPHAGIA, UNSPECIFIED: ICD-10-CM

## 2025-04-04 DIAGNOSIS — K52.9 NONINFECTIVE GASTROENTERITIS AND COLITIS, UNSPECIFIED: ICD-10-CM

## 2025-04-04 DIAGNOSIS — A41.9 SEPSIS, UNSPECIFIED ORGANISM: ICD-10-CM

## 2025-04-04 DIAGNOSIS — J44.9 CHRONIC OBSTRUCTIVE PULMONARY DISEASE, UNSPECIFIED: ICD-10-CM

## 2025-04-04 DIAGNOSIS — D69.6 THROMBOCYTOPENIA, UNSPECIFIED: ICD-10-CM

## 2025-04-04 DIAGNOSIS — J96.01 ACUTE RESPIRATORY FAILURE WITH HYPOXIA: ICD-10-CM

## 2025-04-04 DIAGNOSIS — I50.22 CHRONIC SYSTOLIC (CONGESTIVE) HEART FAILURE: ICD-10-CM

## 2025-04-04 DIAGNOSIS — E11.51 TYPE 2 DIABETES MELLITUS WITH DIABETIC PERIPHERAL ANGIOPATHY WITHOUT GANGRENE: ICD-10-CM

## 2025-04-04 DIAGNOSIS — Z91.018 ALLERGY TO OTHER FOODS: ICD-10-CM

## 2025-04-04 DIAGNOSIS — Z79.82 LONG TERM (CURRENT) USE OF ASPIRIN: ICD-10-CM

## 2025-04-04 DIAGNOSIS — J18.9 PNEUMONIA, UNSPECIFIED ORGANISM: ICD-10-CM

## 2025-04-04 DIAGNOSIS — Z88.2 ALLERGY STATUS TO SULFONAMIDES: ICD-10-CM

## 2025-04-04 DIAGNOSIS — I25.10 ATHEROSCLEROTIC HEART DISEASE OF NATIVE CORONARY ARTERY WITHOUT ANGINA PECTORIS: ICD-10-CM

## 2025-04-04 DIAGNOSIS — Z88.0 ALLERGY STATUS TO PENICILLIN: ICD-10-CM

## 2025-04-04 DIAGNOSIS — Z92.3 PERSONAL HISTORY OF IRRADIATION: ICD-10-CM

## 2025-04-04 DIAGNOSIS — E11.22 TYPE 2 DIABETES MELLITUS WITH DIABETIC CHRONIC KIDNEY DISEASE: ICD-10-CM

## 2025-04-05 ENCOUNTER — EMERGENCY (EMERGENCY)
Facility: HOSPITAL | Age: 75
LOS: 1 days | End: 2025-04-05
Admitting: EMERGENCY MEDICINE
Payer: MEDICARE

## 2025-04-05 VITALS
WEIGHT: 162.92 LBS | HEART RATE: 64 BPM | SYSTOLIC BLOOD PRESSURE: 176 MMHG | RESPIRATION RATE: 18 BRPM | HEIGHT: 72 IN | OXYGEN SATURATION: 95 % | TEMPERATURE: 98 F | DIASTOLIC BLOOD PRESSURE: 89 MMHG

## 2025-04-05 DIAGNOSIS — Z95.9 PRESENCE OF CARDIAC AND VASCULAR IMPLANT AND GRAFT, UNSPECIFIED: Chronic | ICD-10-CM

## 2025-04-05 PROCEDURE — 99284 EMERGENCY DEPT VISIT MOD MDM: CPT

## 2025-04-05 PROCEDURE — 73110 X-RAY EXAM OF WRIST: CPT | Mod: 26,RT

## 2025-04-05 NOTE — ED ADULT TRIAGE NOTE - CHIEF COMPLAINT QUOTE
pt states being seen here for a right wrist injury, went to see PCP & was told to head back to ER for wrist evaluation

## 2025-04-05 NOTE — ED ADULT TRIAGE NOTE - MODE OF ARRIVAL
24 HOUR EVENTS:  - POCUS shows pleural effusions, IVC small but no respiratory variation.   - TBilli elevated, repeat doppler okay  - Diet advanced to clears  - Started on ASA    SUBJECTIVE/ROS:  [ X ] A ten-point review of systems was otherwise negative except as noted.  [ ] Due to altered mental status/intubation, subjective information were not able to be obtained from the patient. History was obtained, to the extent possible, from review of the chart and collateral sources of information.      NEURO  Exam: AOx3. NAD. Follows commands. Moves all extremities. Strength and sensation intact.   Meds: HYDROmorphone  Injectable 0.25 milliGRAM(s) IV Push every 3 hours PRN Severe Breakthrough Pain  oxyCODONE    IR 10 milliGRAM(s) Oral every 6 hours PRN Severe Pain (7 - 10)  oxyCODONE    IR 5 milliGRAM(s) Oral every 4 hours PRN Moderate Pain (4 - 6)    [x] Adequacy of sedation and pain control has been assessed and adjusted      RESPIRATORY  RR: 11 (06-13-23 @ 04:00) (10 - 35)  SpO2: 94% (06-13-23 @ 04:00) (92% - 100%)  Wt(kg): --  Exam: CTA b/l. No murmurs, rubs, gallops appreciated.   Mechanical Ventilation:   ABG - ( 11 Jun 2023 19:57 )  pH: 7.39  /  pCO2: 35    /  pO2: 77    / HCO3: 21    / Base Excess: -3.4  /  SaO2: 98.6    Lactate: x      [ ] Extubation Readiness Assessed  Meds:       CARDIOVASCULAR  HR: 41 (06-13-23 @ 04:00) (41 - 62)  BP: 126/70 (06-13-23 @ 04:00) (125/76 - 157/79)  BP(mean): 92 (06-13-23 @ 04:00) (92 - 110)  ABP: --  ABP(mean): --  Wt(kg): --  CVP(cm H2O): --  VBG - ( 13 Jun 2023 02:25 )  pH: 7.33  /  pCO2: 37    /  pO2: 38    / HCO3: 20    / Base Excess: -5.9  /  SaO2: 58.3   Lactate: 1.3    Exam: S1S2. No murmurs, rubs, gallops appreciated.  Cardiac Rhythm: SInus bradycardia rate 43  Meds:       GI/NUTRITION  Exam: Soft, non-distended, non-tender.   Diet: Clears  Meds: pantoprazole    Tablet 40 milliGRAM(s) Oral before breakfast  polyethylene glycol 3350 17 Gram(s) Oral daily  senna 2 Tablet(s) Oral at bedtime      GENITOURINARY  I&O's Detail    06-11 @ 07:01  -  06-12 @ 07:00  --------------------------------------------------------  IN:    IV PiggyBack: 250 mL    IV PiggyBack: 175 mL    Lactated Ringers: 1800 mL    Oral Fluid: 340 mL  Total IN: 2565 mL    OUT:    Drain (mL): 190 mL    Drain (mL): 285 mL    Drain (mL): 545 mL    Indwelling Catheter - Urethral (mL): 277 mL  Total OUT: 1297 mL    Total NET: 1268 mL      06-12 @ 07:01 - 06-13 @ 04:09  --------------------------------------------------------  IN:    IV PiggyBack: 100 mL    IV PiggyBack: 125 mL    Lactated Ringers: 300 mL    Oral Fluid: 1200 mL  Total IN: 1725 mL    OUT:    Drain (mL): 160 mL    Drain (mL): 340 mL    Drain (mL): 325 mL    Indwelling Catheter - Urethral (mL): 715 mL  Total OUT: 1540 mL    Total NET: 185 mL          06-13    133<L>  |  98  |  56<H>  ----------------------------<  164<H>  3.8   |  18<L>  |  2.61<H>    Ca    8.3<L>      13 Jun 2023 02:47  Phos  4.6     06-13  Mg     2.1     06-13    TPro  4.2<L>  /  Alb  2.7<L>  /  TBili  9.3<H>  /  DBili  x   /  AST  38  /  ALT  6<L>  /  AlkPhos  140<H>  06-13    [ ] Bundy catheter, indication: N/A  Meds: calcium carbonate 1250 mG  + Vitamin D (OsCal 500 + D) 1 Tablet(s) Oral two times a day        HEMATOLOGIC  Meds: aspirin enteric coated 81 milliGRAM(s) Oral daily    [x] VTE Prophylaxis                        7.4    7.49  )-----------( 78       ( 13 Jun 2023 02:47 )             21.5     PT/INR - ( 13 Jun 2023 02:47 )   PT: 12.7 sec;   INR: 1.10 ratio         PTT - ( 13 Jun 2023 02:47 )  PTT:24.9 sec  Transfusion     [ ] PRBC   [ ] Platelets   [ ] FFP   [ ] Cryoprecipitate      INFECTIOUS DISEASES  T(C): 36.3 (06-13-23 @ 03:00), Max: 36.6 (06-12-23 @ 15:00)  Wt(kg): --  WBC Count: 7.49 K/uL (06-13 @ 02:47)  WBC Count: 9.27 K/uL (06-12 @ 21:09)  WBC Count: 10.68 K/uL (06-12 @ 15:10)  WBC Count: 9.39 K/uL (06-12 @ 08:33)    Recent Cultures:  Specimen Source: Abdominal Fl Abdominal Fluid, 06-10 @ 05:42; Results   No growth; Gram Stain:   No polymorphonuclear cells seen per low power field  No organisms seen per oil power field; Organism: --  Specimen Source: Ascites Fl Ascites Fluid, 06-08 @ 18:34; Results   No growth; Gram Stain:   No polymorphonuclear cells seen  No organisms seen  by cytocentrifuge; Organism: --  Specimen Source: .Blood Blood-Peripheral, 06-07 @ 12:35; Results   No Growth Final; Gram Stain: --; Organism: --  Specimen Source: .Blood Blood, 06-06 @ 06:31; Results   No Growth Final; Gram Stain: --; Organism: --    Meds: fluconAZOLE   Tablet 200 milliGRAM(s) Oral daily  mycophenolate mofetil 1000 milliGRAM(s) Oral <User Schedule>  trimethoprim   80 mG/sulfamethoxazole 400 mG 1 Tablet(s) Oral <User Schedule>  valGANciclovir 450 milliGRAM(s) Oral <User Schedule>        ENDOCRINE  Capillary Blood Glucose    Meds: levothyroxine 100 MICROGram(s) Oral daily  methylPREDNISolone sodium succinate Injectable 60 milliGRAM(s) IV Push every 24 hours  methylPREDNISolone sodium succinate Injectable   IV Push         ACCESS DEVICES:  [ X ] Peripheral IV  [ X ] Central Venous Line	[ X ] R	[ ] L	[ X ] IJ	[ ] Fem	[ ] SC	Placed: 6/9  [ ] Arterial Line		[ ] R	[ ] L	[ ] Fem	[ ] Rad	[ ] Ax	Placed:   [ ] PICC:					[ ] Mediport  [ X ] Urinary Catheter, Date Placed: 6/9  [ ] Necessity of urinary, arterial, and venous catheters discussed    OTHER MEDICATIONS:  chlorhexidine 2% Cloths 1 Application(s) Topical <User Schedule>      CODE STATUS: Full    IMAGING: Walk in

## 2025-04-05 NOTE — ED PROVIDER NOTE - CARE PROVIDER_API CALL
Mo Dias Angel  Spine Surgery  [address]  [place] [address]  Phone: [phone]  Fax: [phone]  Follow Up Time:     Orthopedic [facility],   one week  [phone]  Phone: (   )    -  Fax: (   )    -  Follow Up Time:

## 2025-04-05 NOTE — ED PROVIDER NOTE - PATIENT PORTAL LINK FT
You can access the FollowMyHealth Patient Portal offered by [facility] by registering at the following website: [email or web address]. By joining [facility]’s FollowMyHealth portal, you will also be able to view your health information using other applications (apps) compatible with our system.

## 2025-04-05 NOTE — ED PROVIDER NOTE - PROVIDER TOKENS
PROVIDER:[TOKEN:[[id number]]],FREE:[LAST:[[facility]],PHONE:[(   )    -],FAX:[(   )    -],ADDRESS:[one week  [phone]]]

## 2025-04-05 NOTE — ED ADULT NURSE NOTE - OBJECTIVE STATEMENT
Pt presents to ED c/o right wrist pain. Pt states he fell one week ago and landed on his right wrist, came to this ED and found to have a fracture. Pt was given a splint and discharged. States he went to [facility] and was sent here instead. Pt does not have splint on at this time and denies seeing ortho.

## 2025-04-05 NOTE — ED PROVIDER NOTE - CLINICAL SUMMARY MEDICAL DECISION MAKING FREE TEXT BOX
Patient with a history of HIV on antivirals T cells are 160 viral load is 26, coronary disease, diabetes, prostate cancer sent in by his doctor for evaluation.  Patient was seen here 4 days ago for mechanical trip and fall sustaining a right distal radial fracture.  Patient was given a splint at time and told to follow-up with his doctor.  Patient states that his doctor sent him back to the emergency room as he does not have any orthopedic referral sources.  In addition patient removed the splint 2 days ago as he states it was uncomfortable.  Denies any focal weakness or paresthesias.  On exam patient is well-appearing neurovascular intact with good capillary refill and there is tenderness and ecchymosis over the right upper extremity.  Was a dedicated x-rays of the wrist and reapply splint and give Ortho follow-up.

## 2025-04-05 NOTE — ED ADULT NURSE NOTE - NSFALLUNIVINTERV_ED_ALL_ED
Bed/Stretcher in lowest position, wheels locked, appropriate side rails in place/Call bell, personal items and telephone in reach/Instruct patient to call for assistance before getting out of bed/chair/stretcher/Non-slip footwear applied when patient is off stretcher/[place] to call system/Physically safe environment - no spills, clutter or unnecessary equipment/Purposeful proactive rounding/Room/bathroom lighting operational, light cord in reach

## 2025-04-05 NOTE — ED PROVIDER NOTE - PHYSICAL EXAMINATION
Right upper extremity: There is edema over forearm and wrist.  There is ecchymosis.  Tender over the distal radius.  Full range of motion.  Cap refill less than 2 seconds.

## 2025-04-07 DIAGNOSIS — Z88.0 ALLERGY STATUS TO PENICILLIN: ICD-10-CM

## 2025-04-07 DIAGNOSIS — Z91.013 ALLERGY TO SEAFOOD: ICD-10-CM
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
## 2025-04-07 DIAGNOSIS — E11.9 TYPE 2 DIABETES MELLITUS WITHOUT COMPLICATIONS: ICD-10-CM

## 2025-04-07 DIAGNOSIS — I25.10 ATHEROSCLEROTIC HEART DISEASE OF NATIVE CORONARY ARTERY WITHOUT ANGINA PECTORIS: ICD-10-CM

## 2025-04-07 DIAGNOSIS — Z85.46 PERSONAL HISTORY OF MALIGNANT NEOPLASM OF PROSTATE: ICD-10-CM

## 2025-04-07 DIAGNOSIS — M25.531 PAIN IN RIGHT WRIST: ICD-10-CM

## 2025-04-07 DIAGNOSIS — S52.501A UNSPECIFIED FRACTURE OF THE LOWER END OF RIGHT RADIUS, INITIAL ENCOUNTER FOR CLOSED FRACTURE: ICD-10-CM

## 2025-04-07 DIAGNOSIS — Z87.891 PERSONAL HISTORY OF NICOTINE DEPENDENCE: ICD-10-CM
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
## 2025-04-07 DIAGNOSIS — Z88.2 ALLERGY STATUS TO SULFONAMIDES: ICD-10-CM

## 2025-04-07 DIAGNOSIS — Z21 ASYMPTOMATIC HUMAN IMMUNODEFICIENCY VIRUS [HIV] INFECTION STATUS: ICD-10-CM

## 2025-04-07 DIAGNOSIS — Z91.018 ALLERGY TO OTHER FOODS: ICD-10-CM

## 2025-04-14 ENCOUNTER — EMERGENCY (EMERGENCY)
Facility: HOSPITAL | Age: 75
LOS: 1 days | End: 2025-04-14
Admitting: EMERGENCY MEDICINE
Payer: MEDICARE

## 2025-04-14 VITALS
WEIGHT: 179.9 LBS | OXYGEN SATURATION: 99 % | HEIGHT: 72 IN | SYSTOLIC BLOOD PRESSURE: 165 MMHG | DIASTOLIC BLOOD PRESSURE: 77 MMHG | HEART RATE: 84 BPM | RESPIRATION RATE: 18 BRPM | TEMPERATURE: 98 F

## 2025-04-14 DIAGNOSIS — Z95.9 PRESENCE OF CARDIAC AND VASCULAR IMPLANT AND GRAFT, UNSPECIFIED: Chronic | ICD-10-CM

## 2025-04-14 PROCEDURE — 73130 X-RAY EXAM OF HAND: CPT | Mod: 26,RT

## 2025-04-14 PROCEDURE — 99285 EMERGENCY DEPT VISIT HI MDM: CPT

## 2025-04-14 PROCEDURE — 93971 EXTREMITY STUDY: CPT | Mod: 26,RT

## 2025-04-14 PROCEDURE — 73110 X-RAY EXAM OF WRIST: CPT | Mod: 26,RT

## 2025-04-14 RX ORDER — IBUPROFEN 200 MG
600 TABLET ORAL ONCE
Refills: 0 | Status: COMPLETED | OUTPATIENT
Start: 2025-04-14 | End: 2025-04-14

## 2025-04-14 RX ADMIN — Medication 600 MILLIGRAM(S): at 16:34

## 2025-04-14 NOTE — ED ADULT TRIAGE NOTE - NSSEPSISSUSPECTED_ED_A_ED
----- Message from iAram Cox DO sent at 5/16/2024  5:15 PM CDT -----  Urinalysis suggestive of UTI. Treat with oral cefdinir 300 mg twice daily for 7 days. Will make a determination about antibiotic urinary tract infection prophylaxis based on culture result.   No

## 2025-04-14 NOTE — ED PROVIDER NOTE - PROGRESS NOTE DETAILS
Spoke to Dr. Vila, ortho regarding setting up follow-up for pt.   Without a way to contact the patient, can be difficult to reach out to patient to set up follow-up as an outpatient.  Recommends following up at the Ortho clinic up at [facility] on Thursday afternoon.  Spoek with social work about possibly having shelter that he is staying at as a contact. Spoke with pt that he can go to the clinic of thursday afternoon, however if clinic appointment slots are full, pt would likely not be able to be seen. RICE instructions provided to pt including importance of elevated the extremity.

## 2025-04-14 NOTE — ED ADULT TRIAGE NOTE - CHIEF COMPLAINT QUOTE
pt  biba from [facility] c/o bl wrist pain seen in this Ed last week for same, pt has OSH bracelets on states he also went to [facility] has not followed up with ortho

## 2025-04-14 NOTE — ED PROVIDER NOTE - OBJECTIVE STATEMENT
74 y/o M with pmhx of HIV, prostate ca, CAD, DM, depression who presents to the ED c/o right wrist pain. Patient was initially seen in the ED on April 1 to 20, 2025 after a mechanical fall and was found to have a distal radius fracture on the right.  Patient was placed in a volar splint and told to follow-up with Ortho orthopedics however he was not able to as he did not have any referral sources or a way to contact referral sources. Patient was again seen here on April 5 after getting remove the splint due to discomfort and was experiencing worsening pain.  Splint was reapplied and patient was discharged home again with orthopedic follow-up which she was not able to set up.  Patient states that he was seen at an outside hospital recently and was given a Velcro wrist splint which has been providing a little more relief however patient states that over the last few days he has been having worsening swelling and pain to the right wrist. Pt does endorse that he has not been wearing the splint at all times and has not been applying ice or elevating the extremity. Patient has been taking ibuprofen with relief of pain.  Denies fever/chills, extremity paresthesias or weakness, redness around the wrist, inability to move the wrist.  Allergy to sulfa, pcn and shellfish.

## 2025-04-14 NOTE — ED PROVIDER NOTE - CLINICAL SUMMARY MEDICAL DECISION MAKING FREE TEXT BOX
76 y/o M with pmhx of HIV, prostate ca, CAD, DM, depression who presents to the ED c/o right wrist pain. Patient was initially seen in the ED on April 1 to 20, 2025 after a mechanical fall and was found to have a distal radius fracture on the right.  Patient was placed in a volar splint and told to follow-up with Ortho orthopedics however he was not able to as he did not have any referral sources or a way to contact referral sources. Pt was given a Velcro wrist splint which has been providing a little more relief however patient states that over the last few days he has been having worsening swelling and pain to the right wrist. Pt does endorse that he has not been wearing the splint at all times and has not been applying ice or elevating the extremity. Patient has been taking ibuprofen with relief of pain.      Patient currently afebrile, hemodynamically stable, spO2 100%. Based on history and physical, differentials include but are not limited to swelling 2/2 tight splint placement vs dependent edema around the joint. Low suspicion for compartment syndrome -  pulses intact, cap refill less than 2 seconds in all 5 digits of right hand, no paresthesias, compartments are soft. Plan to assess patient for acute pathology as listed above with XR, US. Will administer medications for symptomatic relief, follow-up on results, and reassess.

## 2025-04-14 NOTE — ED PROVIDER NOTE - PATIENT PORTAL LINK FT
You can access the FollowMyHealth Patient Portal offered by [facility] by registering at the following website: [email or web address]. By joining [facility]’s FollowMyHealth portal, you will also be able to view your health information using other applications (apps) compatible with our system.

## 2025-04-14 NOTE — ED PROVIDER NOTE - PHYSICAL EXAMINATION
General: Well appearing in no acute distress, alert and cooperative  Cardiac: Regular rate and regular rhythm, no murmurs  Resp: Unlabored respiratory effort, lungs CTAB, speaking in full sentences, no wheezes  MSK: +tenderness to palpation over entire right wrist with overlying swelling extending up to mid forearm on right. No overlying warmth, erythema, ecchymosis. Cap refill <2 seconds in all five digits in right hand. Radial pulses 2+ and symmetric in b/l UE. Limited flexion and extension of right wrist 2/2 pain. Motor and strength 5/5 with  strength in right hand when compared to left. Sensation grossly intact in all five digits on right hand. Compartments soft. No underlying crepitus.   Skin: Warm and dry, no rashes/abrasions/lacerations  Neuro: AO x 3, moves all extremities symmetrically, Motor strength 5/5 bilateral UE, sensation grossly intact.

## 2025-04-14 NOTE — ED ADULT NURSE NOTE - SUICIDE SCREENING QUESTION 2
Time in 9  Time out 10      Timed units  4 therex                              Time:9-10      S:doing HEP, understands likely tx progression  Pain:continues to decrease in intensity and frequency    O:  r prom er at 0 abd 60   at 45 abd 60     l prom er at 0 abd 60   at 45 abd 90    HEP: reviewed    manual tx: n/a    prom as tolerated-pain free: supine abd, flex, gh circumduction    stretches as tolerated-pain free: er 0, 45 abd    theraband 2 x 20:  n/a    isometrics 2 x 20: n/a    education: soft tissue reorientation    ube: n/a    arom: n/a        pendulums 10 each all 4 directions      A:gh capsular pattern decreasing, proper progression of tx imperative to fix stiffness and protect repair            P: manual tx, stretches  
No

## 2025-04-14 NOTE — ED ADULT TRIAGE NOTE - ISOLATION TYPE:
"Anesthesia Post Evaluation    Patient: Jules Decker    
None

## 2025-04-14 NOTE — ED PROVIDER NOTE - NSFOLLOWUPINSTRUCTIONS_ED_ALL_ED_FT
Keep extremity elevated and in the splint.  Apply cool compresses to affected area for 15 minutes, 3-4 times per day.    For pain: Take Motrin (also sold as Advil or Ibuprofen) 400-600 mg (two or three 200 mg over the counter pills) every 8 hours as needed for moderate pain or fevers-- take with food; do not take for more than 5 consecutive days. Take Tylenol 650mg (Two regular strength 325 mg pills) every 4-6 hours or two extra strength 500mg tablets every 8 hours as needed for pain or fevers. Do not exceed 1000mg at one time or 4000mg in a 24 hour period.    You need to follow-up with an orthopedist to make sure that this is healing appropriately.    You can follow-up at the orthopedic care clinic at [facility] on Thursday afternoon. However appointment cannot be guaranteed if you are walking for the appointment without calling ahead.   Call  to set up an appointment.     You can also follow-up at clinics listed below if you do not have a doctor  [address] [address]  To make an appointment, call [phone]  [facility]  Address: [address] [place] [address]  Appointment Center: [phone] ([phone])    Return to the ED for any new or worsening symptoms.

## 2025-04-14 NOTE — ED ADULT NURSE NOTE - CHIEF COMPLAINT QUOTE
pt  biba from [facility] c/o bl wrist pain seen in this Ed last week for same, pt has OSH bracelets on states he also went to [place] has not followed up with ortho

## 2025-04-16 DIAGNOSIS — Z88.0 ALLERGY STATUS TO PENICILLIN: ICD-10-CM

## 2025-04-16 DIAGNOSIS — E11.9 TYPE 2 DIABETES MELLITUS WITHOUT COMPLICATIONS: ICD-10-CM
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
## 2025-04-16 DIAGNOSIS — I25.10 ATHEROSCLEROTIC HEART DISEASE OF NATIVE CORONARY ARTERY WITHOUT ANGINA PECTORIS: ICD-10-CM

## 2025-04-16 DIAGNOSIS — Z91.018 ALLERGY TO OTHER FOODS: ICD-10-CM
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
## 2025-04-16 DIAGNOSIS — S52.501A UNSPECIFIED FRACTURE OF THE LOWER END OF RIGHT RADIUS, INITIAL ENCOUNTER FOR CLOSED FRACTURE: ICD-10-CM

## 2025-04-16 DIAGNOSIS — Z88.2 ALLERGY STATUS TO SULFONAMIDES: ICD-10-CM

## 2025-04-16 DIAGNOSIS — Z21 ASYMPTOMATIC HUMAN IMMUNODEFICIENCY VIRUS [HIV] INFECTION STATUS: ICD-10-CM

## 2025-04-16 DIAGNOSIS — M25.531 PAIN IN RIGHT WRIST: ICD-10-CM

## 2025-04-16 DIAGNOSIS — F32.A DEPRESSION, UNSPECIFIED: ICD-10-CM

## 2025-05-01 ENCOUNTER — NON-APPOINTMENT (OUTPATIENT)
Age: 75
End: 2025-05-01

## 2025-05-01 ENCOUNTER — APPOINTMENT (OUTPATIENT)
Dept: ORTHOPEDIC SURGERY | Facility: CLINIC | Age: 75
End: 2025-05-01

## 2025-05-01 ENCOUNTER — INPATIENT (INPATIENT)
Facility: HOSPITAL | Age: 75
LOS: 0 days | Discharge: LEFT BEFORE DISCHARGE | End: 2025-05-02
Attending: STUDENT IN AN ORGANIZED HEALTH CARE EDUCATION/TRAINING PROGRAM | Admitting: INTERNAL MEDICINE
Payer: MEDICARE

## 2025-05-01 VITALS
WEIGHT: 160.06 LBS | RESPIRATION RATE: 18 BRPM | HEART RATE: 84 BPM | OXYGEN SATURATION: 95 % | DIASTOLIC BLOOD PRESSURE: 82 MMHG | SYSTOLIC BLOOD PRESSURE: 140 MMHG | TEMPERATURE: 100 F | HEIGHT: 72 IN

## 2025-05-01 DIAGNOSIS — Z95.9 PRESENCE OF CARDIAC AND VASCULAR IMPLANT AND GRAFT, UNSPECIFIED: Chronic | ICD-10-CM

## 2025-05-01 LAB
ALBUMIN SERPL ELPH-MCNC: 2.9 G/DL — LOW (ref 3.4–5)
ALP SERPL-CCNC: 76 U/L — SIGNIFICANT CHANGE UP (ref 40–120)
ALT FLD-CCNC: 16 U/L — SIGNIFICANT CHANGE UP (ref 12–42)
ANION GAP SERPL CALC-SCNC: 9 MMOL/L — SIGNIFICANT CHANGE UP (ref 9–16)
APTT BLD: 30.3 SEC — SIGNIFICANT CHANGE UP (ref 26.1–36.8)
AST SERPL-CCNC: 24 U/L — SIGNIFICANT CHANGE UP (ref 15–37)
BASOPHILS # BLD AUTO: 0.01 K/UL — SIGNIFICANT CHANGE UP (ref 0–0.2)
BASOPHILS NFR BLD AUTO: 0.3 % — SIGNIFICANT CHANGE UP (ref 0–2)
BILIRUB SERPL-MCNC: 0.5 MG/DL — SIGNIFICANT CHANGE UP (ref 0.2–1.2)
BUN SERPL-MCNC: 23 MG/DL — SIGNIFICANT CHANGE UP (ref 7–23)
CALCIUM SERPL-MCNC: 8.1 MG/DL — LOW (ref 8.5–10.5)
CHLORIDE SERPL-SCNC: 107 MMOL/L — SIGNIFICANT CHANGE UP (ref 96–108)
CO2 SERPL-SCNC: 25 MMOL/L — SIGNIFICANT CHANGE UP (ref 22–31)
CREAT SERPL-MCNC: 1.73 MG/DL — HIGH (ref 0.5–1.3)
EGFR: 41 ML/MIN/1.73M2 — LOW
EGFR: 41 ML/MIN/1.73M2 — LOW
EOSINOPHIL # BLD AUTO: 0.02 K/UL — SIGNIFICANT CHANGE UP (ref 0–0.5)
EOSINOPHIL NFR BLD AUTO: 0.7 % — SIGNIFICANT CHANGE UP (ref 0–6)
FLUAV AG NPH QL: SIGNIFICANT CHANGE UP
FLUBV AG NPH QL: SIGNIFICANT CHANGE UP
GLUCOSE SERPL-MCNC: 219 MG/DL — HIGH (ref 70–99)
HCT VFR BLD CALC: 33.2 % — LOW (ref 39–50)
HGB BLD-MCNC: 11.1 G/DL — LOW (ref 13–17)
IMM GRANULOCYTES # BLD AUTO: 0.22 K/UL — HIGH (ref 0–0.07)
IMM GRANULOCYTES NFR BLD AUTO: 7.5 % — HIGH (ref 0–0.9)
IMMATURE PLATELET FRACTION #: 2.8 K/UL — LOW (ref 3.9–12.5)
IMMATURE PLATELET FRACTION %: 3.3 % — SIGNIFICANT CHANGE UP (ref 1.6–7.1)
INR BLD: 1.07 — SIGNIFICANT CHANGE UP (ref 0.85–1.16)
LACTATE BLDV-MCNC: 1.4 MMOL/L — SIGNIFICANT CHANGE UP (ref 0.5–2)
LACTATE BLDV-MCNC: 2.9 MMOL/L — HIGH (ref 0.5–2)
LYMPHOCYTES # BLD AUTO: 0.8 K/UL — LOW (ref 1–3.3)
LYMPHOCYTES NFR BLD AUTO: 27.4 % — SIGNIFICANT CHANGE UP (ref 13–44)
MCHC RBC-ENTMCNC: 32 PG — SIGNIFICANT CHANGE UP (ref 27–34)
MCHC RBC-ENTMCNC: 33.4 G/DL — SIGNIFICANT CHANGE UP (ref 32–36)
MCV RBC AUTO: 95.7 FL — SIGNIFICANT CHANGE UP (ref 80–100)
MONOCYTES # BLD AUTO: 0.32 K/UL — SIGNIFICANT CHANGE UP (ref 0–0.9)
MONOCYTES NFR BLD AUTO: 11 % — SIGNIFICANT CHANGE UP (ref 2–14)
NEUTROPHILS # BLD AUTO: 1.55 K/UL — LOW (ref 1.8–7.4)
NEUTROPHILS NFR BLD AUTO: 53.1 % — SIGNIFICANT CHANGE UP (ref 43–77)
NRBC # BLD AUTO: 0 K/UL — SIGNIFICANT CHANGE UP (ref 0–0)
NRBC # FLD: 0 K/UL — SIGNIFICANT CHANGE UP (ref 0–0)
NRBC BLD AUTO-RTO: 0 /100 WBCS — SIGNIFICANT CHANGE UP (ref 0–0)
PLATELET # BLD AUTO: 84 K/UL — LOW (ref 150–400)
PMV BLD: 10.8 FL — SIGNIFICANT CHANGE UP (ref 7–13)
POTASSIUM SERPL-MCNC: 3.6 MMOL/L — SIGNIFICANT CHANGE UP (ref 3.5–5.3)
POTASSIUM SERPL-SCNC: 3.6 MMOL/L — SIGNIFICANT CHANGE UP (ref 3.5–5.3)
PROT SERPL-MCNC: 6.3 G/DL — LOW (ref 6.4–8.2)
PROTHROM AB SERPL-ACNC: 12.4 SEC — SIGNIFICANT CHANGE UP (ref 9.9–13.4)
RBC # BLD: 3.47 M/UL — LOW (ref 4.2–5.8)
RBC # FLD: 13.5 % — SIGNIFICANT CHANGE UP (ref 10.3–14.5)
RSV RNA NPH QL NAA+NON-PROBE: SIGNIFICANT CHANGE UP
SARS-COV-2 RNA SPEC QL NAA+PROBE: SIGNIFICANT CHANGE UP
SODIUM SERPL-SCNC: 141 MMOL/L — SIGNIFICANT CHANGE UP (ref 132–145)
SOURCE RESPIRATORY: SIGNIFICANT CHANGE UP
WBC # BLD: 2.92 K/UL — LOW (ref 3.8–10.5)
WBC # FLD AUTO: 2.92 K/UL — LOW (ref 3.8–10.5)

## 2025-05-01 PROCEDURE — 71045 X-RAY EXAM CHEST 1 VIEW: CPT | Mod: 26

## 2025-05-01 PROCEDURE — 99285 EMERGENCY DEPT VISIT HI MDM: CPT

## 2025-05-01 RX ORDER — CEFEPIME 2 G/20ML
2000 INJECTION, POWDER, FOR SOLUTION INTRAVENOUS ONCE
Refills: 0 | Status: COMPLETED | OUTPATIENT
Start: 2025-05-01 | End: 2025-05-01

## 2025-05-01 RX ORDER — VANCOMYCIN HCL IN 5 % DEXTROSE 1.5G/250ML
1000 PLASTIC BAG, INJECTION (ML) INTRAVENOUS ONCE
Refills: 0 | Status: COMPLETED | OUTPATIENT
Start: 2025-05-01 | End: 2025-05-01

## 2025-05-01 RX ADMIN — CEFEPIME 2000 MILLIGRAM(S): 2 INJECTION, POWDER, FOR SOLUTION INTRAVENOUS at 20:33

## 2025-05-01 RX ADMIN — Medication 1000 MILLIGRAM(S): at 20:33

## 2025-05-01 RX ADMIN — Medication 1000 MILLILITER(S): at 20:31

## 2025-05-01 RX ADMIN — CEFEPIME 100 MILLIGRAM(S): 2 INJECTION, POWDER, FOR SOLUTION INTRAVENOUS at 17:20

## 2025-05-01 RX ADMIN — Medication 250 MILLIGRAM(S): at 17:20

## 2025-05-01 RX ADMIN — Medication 1000 MILLILITER(S): at 17:20

## 2025-05-01 NOTE — ED PROVIDER NOTE - OBJECTIVE STATEMENT
Patient reports that he was recently discharged from [facility] for pneumonia.  Was discharged on doxycycline.  However he feels like he is getting worse.  Cough is getting worse and he is having chills.  No fever, chest pain, shortness of breath, abdominal pain, nausea, vomiting, diarrhea, dysuria, urgency, frequency.  Patient has a history of HIV but does not know his last viral load or CD4 count.  Reports he does see an HIV doctor and is compliant with his medications.

## 2025-05-01 NOTE — ED ADULT TRIAGE NOTE - CHIEF COMPLAINT QUOTE
BIBA with C/O generalized weakness and cough. Reports D/C from [facility] 2 weeks ago with Doxycycline for PNA. States symptoms have not improved.

## 2025-05-01 NOTE — ED PROVIDER NOTE - PHYSICAL EXAMINATION
Gen: NAD. HEENT: NCAT, mmm   Chest: RRR, nl S1 and S2, no m/r/g. Resp: No respiratory distress. rhonchi b/l lower lowers. no rales or wheezing.  Abd: nl BS, soft, nt/nd. Ext: Warm, dry  Neuro: CN II-XII intact, normal and equal strength, sensation, and reflexes bilaterally, speech clear  Psych: AAOx3

## 2025-05-01 NOTE — ED ADULT NURSE NOTE - NSFALLUNIVINTERV_ED_ALL_ED
Bed/Stretcher in lowest position, wheels locked, appropriate side rails in place/Call bell, personal items and telephone in reach/Instruct patient to call for assistance before getting out of bed/chair/stretcher/Non-slip footwear applied when patient is off stretcher/[place] to call system/Physically safe environment - no spills, clutter or unnecessary equipment/Purposeful proactive rounding/Room/bathroom lighting operational, light cord in reach

## 2025-05-01 NOTE — ED ADULT NURSE NOTE - CCCP TRG CHIEF CMPLNT
How Severe Is Your Skin Lesion?: mild
Has Your Skin Lesion Been Treated?: not been treated
Is This A New Presentation, Or A Follow-Up?: Skin Lesions
cough

## 2025-05-01 NOTE — ED ADULT NURSE NOTE - NS ED NURSE LEVEL OF CONSCIOUSNESS ORIENTATION
Renal Medicine Progress Note            Assessment/Plan:     60 y.o woman with ESRD and severe pulmonary hypertensin, admitted for symptomatic SVT.      # ESRD:               -Lydia with Dr. Hernandez               -3 hrs, ED 73 kg, UF ~ 1 liter,. 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   # HFpEF, III DD. Moderate RV dysfunction. Mod to severe TR.   # HTN  # Anemia  # COPD/2liters O2.      Plan:  # 3 hrs HD (extra tx for UF), 2-3 liters UF as tolerated. Next HD treatment on Monday.         Interval History:     Afebrile. BP is stable. She looks very comfortable eating breakfast while getting HD tx. She likes profile 7 with HD. She complains of cramp, but looking very comfortable and eating breakfast. No other issues with HD tx so far.           Medications and Allergies:       - MEDICATION INSTRUCTIONS for Dialysis Patients -   Does not apply See Admin Instructions     amiodarone  200 mg Oral BID     [START ON 8/2/2021] amiodarone  200 mg Oral Daily     amLODIPine  10 mg Oral Daily     aspirin  81 mg Oral Daily     carvedilol  6.25 mg Oral BID w/meals     famotidine  10 mg Oral Daily     heparin (porcine)  500 Units Hemodialysis Machine OR IV Push Once in dialysis/CRRT     insulin aspart   Subcutaneous TID AC     insulin aspart  1-7 Units Subcutaneous TID AC     insulin aspart  1-5 Units Subcutaneous At Bedtime     insulin detemir  15 Units Subcutaneous At Bedtime     lanthanum  500 mg Oral TID w/meals     sodium chloride (PF)  3 mL Intracatheter Q8H     umeclidinium-vilanterol  1 puff Inhalation Daily        Allergies   Allergen Reactions     Albuterol      shakiness     Aspirin      gi     Byetta [Exenatide]      gi     Clonidine      constipation     Codeine      vomiting     Ezetimibe      muscle symptoms     Hydralazine      headaches     Lisinopril      hyperkalemia      Metformin Hydrochloride      vomiting     Pravachol [Pravastatin Sodium]      muscle pains     Simvastatin      myalgias     Troglitazone             Physical Exam:   Vitals were reviewed   , Blood pressure 126/49, pulse 58, temperature 98.1  F (36.7  C), temperature source Oral, resp. rate 15, weight 77.8 kg (171 lb 8.3 oz), SpO2 99 %, not currently breastfeeding.    Wt Readings from Last 3 Encounters:   07/24/21 77.8 kg (171 lb 8.3 oz)   07/16/21 79.4 kg (175 lb 1.6 oz)   05/27/21 71.7 kg (158 lb)       Intake/Output Summary (Last 24 hours) at 7/24/2021 1042  Last data filed at 7/23/2021 2114  Gross per 24 hour   Intake 0 ml   Output 700 ml   Net -700 ml     GENERAL: NAD  HEENT:  Normocephalic. No gross abnormalities.  Pupils equal.  MMM.   CV: RRR, + murmurs, no clicks, gallops, or rubs.   RESP: Poor airflow. No wheezes.   GI: Abdomen distended, soft, NT  MUSCULOSKELETAL: extremities nl - no gross deformities noted. Trace edema only.   SKIN: no suspicious lesions or rashes, dry to touch  NEURO:  Strength normal and symmetric. A/o x3.           Data:     CBC RESULTS:     Recent Labs   Lab 07/23/21  0531 07/22/21  0130   WBC 5.9 5.6   RBC 3.00* 3.17*   HGB 9.2* 10.1*   HCT 29.4* 30.6*   PLT 90* 97*       Basic Metabolic Panel:  Recent Labs   Lab 07/24/21  0738 07/24/21  0227 07/23/21  2151 07/23/21  1838 07/23/21  1435 07/23/21  1033 07/23/21  0531 07/22/21  0130   NA  --   --   --   --   --   --  132* 135   POTASSIUM  --   --   --   --   --   --  5.1 4.1   CHLORIDE  --   --   --   --   --   --  100 100   CO2  --   --   --   --   --   --  29 26   BUN  --   --   --   --   --   --  40* 28   CR  --   --   --   --   --   --  5.07* 3.90*   * 157* 180* 108* 278* 191* 147* 59*   KANWAL  --   --   --   --   --   --  8.4* 8.7       INRNo lab results found in last 7 days.   Attestation:   I have reviewed today's relevant vital signs, notes, medications, labs and imaging.    Theron Alonso MD  [facility] -  Nephrology  Office phone :[phone]  Pager: [phone]   Oriented - self; Oriented - place; Oriented - time

## 2025-05-01 NOTE — ED ADULT NURSE NOTE - OBJECTIVE STATEMENT
Pt presents to ed stating known PNA - dx on [facility] 2 wk ago, states compliant with doxy. however, not feeling improved and was brought to ED by EMS. Pt states to continued dry cough. admits to smoking crack cocaine. denies tobacco use. Pt is aox4. cast to RUE, old injury. Pt speaks in full sentences. no distress noted.  afebrile rectally.

## 2025-05-01 NOTE — ED PROVIDER NOTE - PROGRESS NOTE DETAILS
No risk alerts present
Patient is resting comfortably, NAD. I consulted with Dr. Martina Beltre, hospitalist attending, who accepted patient to regional medicine.

## 2025-05-01 NOTE — ED ADULT TRIAGE NOTE - IDEAL BODY WEIGHT(KG)
CC: Right knee pain    54 y.o. Male  with a history of Right pain who states that the pain is severe and not responding to any conservative care. Has been having knee pain for past 2 years. Acutely worse last month. Endorses swelling, catching, locking, popping. Pain globally.    Is affecting ADLs. Had 3 shots of synvisc about 6-7 years. 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 Had bilateral knee scopes in early 20s.    Review of Systems   Constitution: Negative. Negative for chills, fever and night sweats.   HENT: Negative for congestion and headaches.    Eyes: Negative for blurred vision, left vision loss and right vision loss.   Cardiovascular: Negative for chest pain and syncope.   Respiratory: Negative for cough and shortness of breath.    Endocrine: Negative for polydipsia, polyphagia and polyuria.   Hematologic/Lymphatic: Negative for bleeding problem. Does not bruise/bleed easily.   Skin: Negative for dry skin, itching and rash.   Musculoskeletal: Negative for falls. Positive for knee pain and muscle weakness.   Gastrointestinal: Negative for abdominal pain and bowel incontinence.   Genitourinary: Negative for bladder incontinence and nocturia.   Neurological: Negative for disturbances in coordination, loss of balance and seizures.   Psychiatric/Behavioral: Negative for depression. The patient does not have insomnia.    Allergic/Immunologic: Negative for hives and persistent infections.     PAST MEDICAL HISTORY:   Past Medical History:   Diagnosis Date    Anxiety     GERD (gastroesophageal reflux disease)     Hyperlipemia     Hypertension     Sleep apnea      PAST SURGICAL HISTORY:   Past Surgical History:   Procedure Laterality Date    BICEPS TENDON REPAIR      ELBOW SURGERY      KNEE SURGERY      ROTATOR CUFF REPAIR      SHOULDER ARTHROSCOPY       FAMILY HISTORY:   Family History   Problem Relation Age of Onset    Diabetes  Father      SOCIAL HISTORY:   Social History     Socioeconomic History    Marital status:      Spouse name: Not on file    Number of children: Not on file    Years of education: Not on file    Highest education level: Not on file   Social Needs    Financial resource strain: Not on file    Food insecurity - worry: Not on file    Food insecurity - inability: Not on file    Transportation needs - medical: Not on file    Transportation needs - non-medical: Not on file   Occupational History    Not on file   Tobacco Use    Smoking status: Former Smoker    Smokeless tobacco: Never Used   Substance and Sexual Activity    Alcohol use: Yes     Comment: social    Drug use: No    Sexual activity: Not on file   Other Topics Concern    Not on file   Social History Narrative    Not on file       MEDICATIONS:   Current Outpatient Medications:     amlodipine (NORVASC) 5 MG tablet, Take 5 mg by mouth once daily. , Disp: , Rfl:     aspirin 81 MG Chew, Take 81 mg by mouth once daily. , Disp: , Rfl:     atorvastatin (LIPITOR) 10 MG tablet, Take 10 mg by mouth once daily. , Disp: , Rfl:     esomeprazole (NEXIUM) 20 MG capsule, Take 20 mg by mouth before breakfast. , Disp: , Rfl:     ketorolac (TORADOL) 10 mg tablet, Take 1 tablet (10 mg total) by mouth every 6 (six) hours., Disp: 21 tablet, Rfl: 1    metaxalone (SKELAXIN) 800 MG tablet, One tablet three times a day, Disp: 60 tablet, Rfl: 0    metaxalone (SKELAXIN) 800 MG tablet, Take 800 mg by mouth 3 (three) times daily., Disp: , Rfl:     montelukast (SINGULAIR) 5 MG chewable tablet, Take 5 mg by mouth every evening. , Disp: , Rfl:     diazepam (VALIUM) 5 MG tablet, Take 1 tablet (5 mg total) by mouth once. May repeat if necessary, Disp: 2 tablet, Rfl: 0    omeprazole (PRILOSEC OTC) 20 MG tablet, Take 1 tablet (20 mg total) by mouth once daily., Disp: 60 tablet, Rfl: 1    pregabalin (LYRICA) 150 MG capsule, Take 1 capsule (150 mg total) by mouth 2  "(two) times daily., Disp: 60 capsule, Rfl: 2  ALLERGIES: Review of patient's allergies indicates:  No Known Allergies    VITAL SIGNS: /85   Pulse 73   Ht 6' 1" (1.854 m)   Wt 134.7 kg (297 lb)   BMI 39.18 kg/m²      PHYSICAL EXAMINATION  VITAL SIGNS: /85   Pulse 73   Ht 6' 1" (1.854 m)   Wt 134.7 kg (297 lb)   BMI 39.18 kg/m²    General:  The patient is alert and oriented x 3.  Mood is pleasant.  Observation of ears, eyes and nose reveal no gross abnormalities.  HEENT: NCAT, sclera nonicteric  Lungs: Respirations are equal and unlabored.    Right KNEE EXAMINATION     OBSERVATION / INSPECTION   Gait:   Nonantalgic   Alignment:  Neutral   Scars:   None   Muscle atrophy: Mild  Effusion:  None   Warmth:  None   Discoloration:   none     TENDERNESS / CREPITUS (T / C):          T / C      T / C   Patella   - / -   Lateral joint line   + / -    Peripatellar medial  -  Medial joint line    + / -    Peripatellar lateral -  Medial plica   - / -    Patellar tendon -   Popliteal fossa  - / -    Quad tendon   -   Gastrocnemius   -   Prepatellar Bursa - / -   Quadricep   -   Tibial tubercle  -  Thigh/hamstring  -   Pes anserine/HS -  Fibula    -   ITB   - / -  Tibia     -   Tib/fib joint  - / -  LCL    -     MFC   - / -   MCL: Proximal  -    LFC   - / -    Distal   -          ROM: (* = pain)  PASSIVE   ACTIVE    Left :   0 / 0 / 110   0 / 0 / 110     Right :    5 / 90    5 /90    Patellofemoral examination:  See above noted areas of tenderness.   Patella position    Subluxation / dislocation: Centered           Sup. / Inf;   Normal   Crepitus (PF):    +   Patellar Mobility:       Medial-lateral:   Normal    Superior-inferior:  Normal    Inferior tilt   Normal    Patellar tendon:  Normal   Lateral tilt:    Normal   J-sign:     None   Patellofemoral grind:   No pain       MENISCAL SIGNS:     Pain on terminal extension:  +  Pain on terminal flexion:  +    LIGAMENT EXAMINATION:  ACL / Lachman:  normal (-1 to " 2mm)    PCL-Post.  drawer: normal 0 to 2mm  MCL- Valgus:  normal 0 to 2mm  LCL- Varus:  normal 0 to 2mm  Pivot shift: normal (Equal)     STRENGTH: (* = with pain) PAINFUL SIDE   Quadricep   5/5   Hamstrin/5    EXTREMITY NEURO-VASCULAR EXAMINATION:   Sensation:  Grossly intact to light touch all dermatomal regions.   Motor Function:  Fully intact motor function at hip, knee, foot and ankle    DTRs;  quadriceps and  achilles 2+.  No clonus and downgoing Babinski.    Vascular status:  DP and PT pulses 2+, brisk capillary refill, symmetric.     Other Findings:       X-rays:  including standing, weight bearing AP and flexion bilateral knees, lateral and merchant views ordered and images reviewed by me show:  Significant tricompartmental arthritis bilaterally     ASSESSMENT:    Right Knee  arthritis  Knee steroid injection  Follow-up as needed    PROCEDURE NOTE: right KNEE INJECTION  After time out was performed, including verification of patient ID, procedure, site and side, availability of information and equipment, review of safety issues, and agreement with consent, the procedure site was marked and the patient was prepped aseptically. A diagnostic and therapeutic injection of 2cc 40 mg kenalog and 1% lidocaine/0.5% sensorcaine was given under sterile technique using a 22g x 1.5 needle into the knee inferolaterally in seated position. 30 cc aspirated.  The patient had no adverse reactions to the medication. Pain decreased. The patient was instructed to apply ice to the joint for 20 minutes and avoid strenuous activities for 24-36 hours following the injection. Patient was warned of possible blood sugar and/or blood pressure changes during that time. Following that time, patient can resume regular  activities.                                                                                                                                                                                                                                                                                                                  PLAN:   MRI Left knee  Hold out of sports until MRI  All questions were answered, pt will contact us for questions or concerns in the interim.     78

## 2025-05-01 NOTE — ED ADULT NURSE NOTE - CHIEF COMPLAINT QUOTE
BIBA with C/O generalized weakness and cough. Reports D/C from [facility] 2 weeks ago with Doxycycline for PNA. States symptoms have not improved.

## 2025-05-02 VITALS
HEART RATE: 71 BPM | TEMPERATURE: 98 F | DIASTOLIC BLOOD PRESSURE: 80 MMHG | SYSTOLIC BLOOD PRESSURE: 142 MMHG | OXYGEN SATURATION: 95 % | RESPIRATION RATE: 18 BRPM

## 2025-05-02 DIAGNOSIS — Z87.01 PERSONAL HISTORY OF PNEUMONIA (RECURRENT): ICD-10-CM

## 2025-05-02 DIAGNOSIS — J18.9 PNEUMONIA, UNSPECIFIED ORGANISM: ICD-10-CM

## 2025-05-02 DIAGNOSIS — F41.9 ANXIETY DISORDER, UNSPECIFIED: ICD-10-CM

## 2025-05-02 DIAGNOSIS — Z29.9 ENCOUNTER FOR PROPHYLACTIC MEASURES, UNSPECIFIED: ICD-10-CM

## 2025-05-02 DIAGNOSIS — N17.9 ACUTE KIDNEY FAILURE, UNSPECIFIED: ICD-10-CM

## 2025-05-02 DIAGNOSIS — I25.10 ATHEROSCLEROTIC HEART DISEASE OF NATIVE CORONARY ARTERY WITHOUT ANGINA PECTORIS: ICD-10-CM

## 2025-05-02 DIAGNOSIS — D69.6 THROMBOCYTOPENIA, UNSPECIFIED: ICD-10-CM

## 2025-05-02 DIAGNOSIS — I50.22 CHRONIC SYSTOLIC (CONGESTIVE) HEART FAILURE: ICD-10-CM

## 2025-05-02 DIAGNOSIS — Z21 ASYMPTOMATIC HUMAN IMMUNODEFICIENCY VIRUS [HIV] INFECTION STATUS: ICD-10-CM

## 2025-05-02 DIAGNOSIS — A41.9 SEPSIS, UNSPECIFIED ORGANISM: ICD-10-CM

## 2025-05-02 DIAGNOSIS — E11.9 TYPE 2 DIABETES MELLITUS WITHOUT COMPLICATIONS: ICD-10-CM

## 2025-05-02 DIAGNOSIS — B20 HUMAN IMMUNODEFICIENCY VIRUS [HIV] DISEASE: ICD-10-CM

## 2025-05-02 DIAGNOSIS — I10 ESSENTIAL (PRIMARY) HYPERTENSION: ICD-10-CM

## 2025-05-02 DIAGNOSIS — J44.9 CHRONIC OBSTRUCTIVE PULMONARY DISEASE, UNSPECIFIED: ICD-10-CM

## 2025-05-02 LAB
ADD ON TEST-SPECIMEN IN LAB: SIGNIFICANT CHANGE UP
ADD ON TEST-SPECIMEN IN LAB: SIGNIFICANT CHANGE UP
ALBUMIN SERPL ELPH-MCNC: 2.9 G/DL — LOW (ref 3.3–5)
ALP SERPL-CCNC: 67 U/L — SIGNIFICANT CHANGE UP (ref 40–120)
ALT FLD-CCNC: 10 U/L — SIGNIFICANT CHANGE UP (ref 10–45)
ANION GAP SERPL CALC-SCNC: 9 MMOL/L — SIGNIFICANT CHANGE UP (ref 5–17)
ANION GAP SERPL CALC-SCNC: 9 MMOL/L — SIGNIFICANT CHANGE UP (ref 5–17)
AST SERPL-CCNC: 14 U/L — SIGNIFICANT CHANGE UP (ref 10–40)
BILIRUB SERPL-MCNC: 0.4 MG/DL — SIGNIFICANT CHANGE UP (ref 0.2–1.2)
BUN SERPL-MCNC: 20 MG/DL — SIGNIFICANT CHANGE UP (ref 7–23)
BUN SERPL-MCNC: 24 MG/DL — HIGH (ref 7–23)
CALCIUM SERPL-MCNC: 7.8 MG/DL — LOW (ref 8.4–10.5)
CALCIUM SERPL-MCNC: 8 MG/DL — LOW (ref 8.4–10.5)
CHLORIDE SERPL-SCNC: 109 MMOL/L — HIGH (ref 96–108)
CHLORIDE SERPL-SCNC: 111 MMOL/L — HIGH (ref 96–108)
CO2 SERPL-SCNC: 23 MMOL/L — SIGNIFICANT CHANGE UP (ref 22–31)
CO2 SERPL-SCNC: 23 MMOL/L — SIGNIFICANT CHANGE UP (ref 22–31)
CREAT ?TM UR-MCNC: 57 MG/DL — SIGNIFICANT CHANGE UP
CREAT SERPL-MCNC: 1.31 MG/DL — HIGH (ref 0.5–1.3)
CREAT SERPL-MCNC: 1.4 MG/DL — HIGH (ref 0.5–1.3)
EGFR: 52 ML/MIN/1.73M2 — LOW
EGFR: 52 ML/MIN/1.73M2 — LOW
EGFR: 57 ML/MIN/1.73M2 — LOW
EGFR: 57 ML/MIN/1.73M2 — LOW
GLUCOSE SERPL-MCNC: 121 MG/DL — HIGH (ref 70–99)
GLUCOSE SERPL-MCNC: 131 MG/DL — HIGH (ref 70–99)
HCT VFR BLD CALC: 33.4 % — LOW (ref 39–50)
HGB BLD-MCNC: 11.1 G/DL — LOW (ref 13–17)
LEGIONELLA AG UR QL: NEGATIVE — SIGNIFICANT CHANGE UP
MAGNESIUM SERPL-MCNC: 1.4 MG/DL — LOW (ref 1.6–2.6)
MCHC RBC-ENTMCNC: 33 PG — SIGNIFICANT CHANGE UP (ref 27–34)
MCHC RBC-ENTMCNC: 33.2 G/DL — SIGNIFICANT CHANGE UP (ref 32–36)
MCV RBC AUTO: 99.4 FL — SIGNIFICANT CHANGE UP (ref 80–100)
NRBC BLD AUTO-RTO: 0 /100 WBCS — SIGNIFICANT CHANGE UP (ref 0–0)
OSMOLALITY UR: 499 MOSM/KG — SIGNIFICANT CHANGE UP (ref 300–900)
PHOSPHATE SERPL-MCNC: 2.6 MG/DL — SIGNIFICANT CHANGE UP (ref 2.5–4.5)
PLATELET # BLD AUTO: 83 K/UL — LOW (ref 150–400)
POTASSIUM SERPL-MCNC: 3.4 MMOL/L — LOW (ref 3.5–5.3)
POTASSIUM SERPL-MCNC: 4.2 MMOL/L — SIGNIFICANT CHANGE UP (ref 3.5–5.3)
POTASSIUM SERPL-SCNC: 3.4 MMOL/L — LOW (ref 3.5–5.3)
POTASSIUM SERPL-SCNC: 4.2 MMOL/L — SIGNIFICANT CHANGE UP (ref 3.5–5.3)
POTASSIUM UR-SCNC: 28 MMOL/L — SIGNIFICANT CHANGE UP
PROT ?TM UR-MCNC: 31 MG/DL — HIGH (ref 0–12)
PROT SERPL-MCNC: 5.6 G/DL — LOW (ref 6–8.3)
PROT/CREAT UR-RTO: 0.5 RATIO — HIGH (ref 0–0.2)
RBC # BLD: 3.36 M/UL — LOW (ref 4.2–5.8)
RBC # FLD: 13.8 % — SIGNIFICANT CHANGE UP (ref 10.3–14.5)
S PNEUM AG UR QL: NEGATIVE — SIGNIFICANT CHANGE UP
SODIUM SERPL-SCNC: 141 MMOL/L — SIGNIFICANT CHANGE UP (ref 135–145)
SODIUM SERPL-SCNC: 143 MMOL/L — SIGNIFICANT CHANGE UP (ref 135–145)
SODIUM UR-SCNC: 151 MMOL/L — SIGNIFICANT CHANGE UP
UUN UR-MCNC: 412 MG/DL — SIGNIFICANT CHANGE UP
WBC # BLD: 2.39 K/UL — LOW (ref 3.8–10.5)
WBC # FLD AUTO: 2.39 K/UL — LOW (ref 3.8–10.5)

## 2025-05-02 PROCEDURE — 99223 1ST HOSP IP/OBS HIGH 75: CPT | Mod: GC

## 2025-05-02 PROCEDURE — G0545: CPT

## 2025-05-02 PROCEDURE — 99223 1ST HOSP IP/OBS HIGH 75: CPT

## 2025-05-02 RX ORDER — ACETAMINOPHEN 500 MG/5ML
650 LIQUID (ML) ORAL EVERY 6 HOURS
Refills: 0 | Status: DISCONTINUED | OUTPATIENT
Start: 2025-05-02 | End: 2025-05-02

## 2025-05-02 RX ORDER — DEXTROSE 50 % IN WATER 50 %
15 SYRINGE (ML) INTRAVENOUS ONCE
Refills: 0 | Status: DISCONTINUED | OUTPATIENT
Start: 2025-05-02 | End: 2025-05-02

## 2025-05-02 RX ORDER — SODIUM CHLORIDE 9 G/1000ML
1000 INJECTION, SOLUTION INTRAVENOUS
Refills: 0 | Status: DISCONTINUED | OUTPATIENT
Start: 2025-05-02 | End: 2025-05-02

## 2025-05-02 RX ORDER — ACETAMINOPHEN 500 MG/5ML
1000 LIQUID (ML) ORAL ONCE
Refills: 0 | Status: COMPLETED | OUTPATIENT
Start: 2025-05-02 | End: 2025-05-02

## 2025-05-02 RX ORDER — ENALAPRIL MALEATE 20 MG
10 TABLET ORAL DAILY
Refills: 0 | Status: DISCONTINUED | OUTPATIENT
Start: 2025-05-02 | End: 2025-05-02

## 2025-05-02 RX ORDER — ESCITALOPRAM OXALATE 20 MG/1
20 TABLET ORAL DAILY
Refills: 0 | Status: DISCONTINUED | OUTPATIENT
Start: 2025-05-02 | End: 2025-05-02

## 2025-05-02 RX ORDER — ASPIRIN 325 MG
81 TABLET ORAL DAILY
Refills: 0 | Status: DISCONTINUED | OUTPATIENT
Start: 2025-05-02 | End: 2025-05-02

## 2025-05-02 RX ORDER — DOLUTEGRAVIR SODIUM 5 MG/1
50 TABLET, FOR SUSPENSION ORAL DAILY
Refills: 0 | Status: DISCONTINUED | OUTPATIENT
Start: 2025-05-02 | End: 2025-05-02

## 2025-05-02 RX ORDER — MAGNESIUM SULFATE 500 MG/ML
2 SYRINGE (ML) INJECTION ONCE
Refills: 0 | Status: COMPLETED | OUTPATIENT
Start: 2025-05-02 | End: 2025-05-02

## 2025-05-02 RX ORDER — CARVEDILOL 3.12 MG/1
3.12 TABLET, FILM COATED ORAL EVERY 12 HOURS
Refills: 0 | Status: DISCONTINUED | OUTPATIENT
Start: 2025-05-02 | End: 2025-05-02

## 2025-05-02 RX ORDER — TAMSULOSIN HYDROCHLORIDE 0.4 MG/1
0.4 CAPSULE ORAL AT BEDTIME
Refills: 0 | Status: DISCONTINUED | OUTPATIENT
Start: 2025-05-02 | End: 2025-05-02

## 2025-05-02 RX ORDER — HEPARIN SODIUM 1000 [USP'U]/ML
5000 INJECTION INTRAVENOUS; SUBCUTANEOUS EVERY 8 HOURS
Refills: 0 | Status: DISCONTINUED | OUTPATIENT
Start: 2025-05-02 | End: 2025-05-02

## 2025-05-02 RX ORDER — INSULIN LISPRO 100 U/ML
INJECTION, SOLUTION INTRAVENOUS; SUBCUTANEOUS
Refills: 0 | Status: DISCONTINUED | OUTPATIENT
Start: 2025-05-02 | End: 2025-05-02

## 2025-05-02 RX ORDER — DOXYCYCLINE HYCLATE 100 MG
100 TABLET ORAL EVERY 12 HOURS
Refills: 0 | Status: DISCONTINUED | OUTPATIENT
Start: 2025-05-02 | End: 2025-05-02

## 2025-05-02 RX ORDER — IPRATROPIUM BROMIDE AND ALBUTEROL SULFATE .5; 2.5 MG/3ML; MG/3ML
3 SOLUTION RESPIRATORY (INHALATION) EVERY 6 HOURS
Refills: 0 | Status: DISCONTINUED | OUTPATIENT
Start: 2025-05-02 | End: 2025-05-02

## 2025-05-02 RX ORDER — DEXTROSE 50 % IN WATER 50 %
25 SYRINGE (ML) INTRAVENOUS ONCE
Refills: 0 | Status: DISCONTINUED | OUTPATIENT
Start: 2025-05-02 | End: 2025-05-02

## 2025-05-02 RX ORDER — DEXTROSE 50 % IN WATER 50 %
12.5 SYRINGE (ML) INTRAVENOUS ONCE
Refills: 0 | Status: DISCONTINUED | OUTPATIENT
Start: 2025-05-02 | End: 2025-05-02

## 2025-05-02 RX ORDER — MAGNESIUM, ALUMINUM HYDROXIDE 200-200 MG
30 TABLET,CHEWABLE ORAL EVERY 4 HOURS
Refills: 0 | Status: DISCONTINUED | OUTPATIENT
Start: 2025-05-02 | End: 2025-05-02

## 2025-05-02 RX ORDER — ONDANSETRON HCL/PF 4 MG/2 ML
4 VIAL (ML) INJECTION EVERY 8 HOURS
Refills: 0 | Status: DISCONTINUED | OUTPATIENT
Start: 2025-05-02 | End: 2025-05-02

## 2025-05-02 RX ORDER — MELATONIN 5 MG
3 TABLET ORAL AT BEDTIME
Refills: 0 | Status: DISCONTINUED | OUTPATIENT
Start: 2025-05-02 | End: 2025-05-02

## 2025-05-02 RX ORDER — ATORVASTATIN CALCIUM 80 MG/1
80 TABLET, FILM COATED ORAL AT BEDTIME
Refills: 0 | Status: DISCONTINUED | OUTPATIENT
Start: 2025-05-02 | End: 2025-05-02

## 2025-05-02 RX ORDER — ARIPIPRAZOLE 2 MG/1
5 TABLET ORAL DAILY
Refills: 0 | Status: DISCONTINUED | OUTPATIENT
Start: 2025-05-02 | End: 2025-05-02

## 2025-05-02 RX ORDER — ATOVAQUONE 750 MG/5ML
1500 SUSPENSION ORAL DAILY
Refills: 0 | Status: DISCONTINUED | OUTPATIENT
Start: 2025-05-02 | End: 2025-05-02

## 2025-05-02 RX ORDER — GLUCAGON 3 MG/1
1 POWDER NASAL ONCE
Refills: 0 | Status: DISCONTINUED | OUTPATIENT
Start: 2025-05-02 | End: 2025-05-02

## 2025-05-02 RX ADMIN — Medication 40 MILLIEQUIVALENT(S): at 10:44

## 2025-05-02 RX ADMIN — Medication 10 MILLIGRAM(S): at 06:44

## 2025-05-02 RX ADMIN — Medication 1000 MILLIGRAM(S): at 13:07

## 2025-05-02 RX ADMIN — HEPARIN SODIUM 5000 UNIT(S): 1000 INJECTION INTRAVENOUS; SUBCUTANEOUS at 06:44

## 2025-05-02 RX ADMIN — ATORVASTATIN CALCIUM 80 MILLIGRAM(S): 80 TABLET, FILM COATED ORAL at 21:22

## 2025-05-02 RX ADMIN — TAMSULOSIN HYDROCHLORIDE 0.4 MILLIGRAM(S): 0.4 CAPSULE ORAL at 18:24

## 2025-05-02 RX ADMIN — CARVEDILOL 3.12 MILLIGRAM(S): 3.12 TABLET, FILM COATED ORAL at 06:44

## 2025-05-02 RX ADMIN — Medication 25 GRAM(S): at 10:45

## 2025-05-02 RX ADMIN — ESCITALOPRAM OXALATE 20 MILLIGRAM(S): 20 TABLET ORAL at 12:10

## 2025-05-02 RX ADMIN — Medication 81 MILLIGRAM(S): at 12:13

## 2025-05-02 RX ADMIN — Medication 400 MILLIGRAM(S): at 12:07

## 2025-05-02 RX ADMIN — DOLUTEGRAVIR SODIUM 50 MILLIGRAM(S): 5 TABLET, FOR SUSPENSION ORAL at 12:12

## 2025-05-02 RX ADMIN — ATOVAQUONE 1500 MILLIGRAM(S): 750 SUSPENSION ORAL at 12:12

## 2025-05-02 RX ADMIN — ARIPIPRAZOLE 5 MILLIGRAM(S): 2 TABLET ORAL at 12:13

## 2025-05-02 RX ADMIN — HEPARIN SODIUM 5000 UNIT(S): 1000 INJECTION INTRAVENOUS; SUBCUTANEOUS at 21:23

## 2025-05-02 RX ADMIN — Medication 3 MILLIGRAM(S): at 21:22

## 2025-05-02 RX ADMIN — Medication 1 TABLET(S): at 12:11

## 2025-05-02 NOTE — H&P ADULT - PROBLEM SELECTOR PLAN 1
Reports prior hx of hospitalizations due to pneumonia, CXR: Lungs clear. No pneumothorax. Unremarkable cardiac silhouette. No acute bone abnormality.  s/p Vanc and Cefepime in the ED. Recently admitted to [facility] and discharge on doxycycline 2 weeks ago, RVP negative. Previous MRSA swab positive.  - f/u procal  - Continue Vancomycin and Cefepime (recent hospitalizations/MRSA/Pseudomonas risk factors)   - urine strep/legionella   - f/u blood cultures  - continue home medication: atovaquone Reports prior hx of hospitalizations due to pneumonia, CXR: Lungs clear. No pneumothorax. Unremarkable cardiac silhouette. No acute bone abnormality.  s/p Vanc and Cefepime in the ED. Recently admitted to [facility] and discharge on doxycycline 2 weeks ago, RVP negative. Previous MRSA swab positive.  - procal 0.16  - sp Vancomycin and Cefepime (recent hospitalizations/MRSA/Pseudomonas risk factors) in ED  - urine strep/legionella, MRSA swab   - f/u blood cultures  - continue home medication: atovaquone

## 2025-05-02 NOTE — H&P ADULT - ASSESSMENT
75 year old male with history of HIV(CD4 74, VL 4516 23/2025), CAD (prior MI), T2DM, HFrEF (EF 40-45% 11/2024), recurrent syncope, COPD, depression/anxiety,  prostate cancer s/p radiation (2013), presenting for worsening cough and shortness of breath. 75 year old male with history of HIV(CD4 74, VL 4516 23/2025), CAD (prior MI), T2DM, HFrEF (EF 40-45% 11/2024), recurrent syncope, COPD, depression/anxiety,  prostate cancer s/p radiation (2013), presenting for worsening cough and shortness of breath, admitted for recurrent pneumonia. 75 year old male with history of HIV(CD4 74, VL 4516 23/2025), CAD (prior MI), T2DM, HFrEF (EF 35-40% 3/2025), recurrent syncope, COPD, depression/anxiety,  prostate cancer s/p radiation (2013), presenting for worsening cough and shortness of breath, admitted for recurrent pneumonia.

## 2025-05-02 NOTE — PROGRESS NOTE ADULT - PROBLEM SELECTOR PLAN 1
Reports prior hx of hospitalizations due to pneumonia, CXR: Lungs clear. No pneumothorax. Unremarkable cardiac silhouette. No acute bone abnormality.  s/p Vanc and Cefepime in the ED. Recently admitted to [facility] and discharge on doxycycline 2 weeks ago, RVP negative. Previous MRSA swab positive.  - f/u procal  - Continue Vancomycin and Cefepime (recent hospitalizations/MRSA/Pseudomonas risk factors)   - urine strep/legionella   - f/u blood cultures  - continue home medication: atovaquone Reports prior hx of hospitalizations due to pneumonia, CXR: Lungs clear. No pneumothorax. Unremarkable cardiac silhouette. No acute bone abnormality.  s/p Vanc and Cefepime in the ED. Recently admitted to [facility] and discharge on doxycycline 2 weeks ago, RVP negative. Previous MRSA swab positive.  - f/u procal  - Duonebs   - urine strep/legionella   - f/u blood cultures  - continue home medication: atovaquone Reports prior hx of hospitalizations due to pneumonia, CXR: Lungs clear. No pneumothorax. Unremarkable cardiac silhouette. No acute bone abnormality.  s/p Vanc and Cefepime in the ED. Recently admitted to [facility] and discharge on doxycycline 2 weeks ago, RVP negative. Previous MRSA swab positive.  - f/u procal  - Continue Vancomycin and Cefepime (recent hospitalizations/MRSA/Pseudomonas risk factors)   - urine strep/legionella   - f/u blood cultures  - continue home medication: atovaquone. Leukopenia and elevated lactate with possible source of infection of pneumonia.   Pancytopenic could be infection vs less likely CHF exacerbation vs less likely COPD exacerbation   - plan as below Leukopenia and elevated lactate with possible source of infection of pneumonia. Lactate improved with fluid resuscitation  Pancytopenia can be secondary to infection vs malignancy (baseline around 4)  Low concern for infection at this time given afebrile, clear CXR  - plan as below  -Monitor off antibiotics at this time  -ID consulted given hx of recurrent pneumonia and recent tx with vanc and cefepime, recs appreciated  -Encourage PO intake

## 2025-05-02 NOTE — CONSULT NOTE ADULT - ASSESSMENT
75 year old male with history of HIV (CD4 74, VL 4516 23/2025), CAD (MI in 2019), T2DM, HFrEF (EF 35-40% 11/2024), recurrent syncope, COPD, depression/anxiety, prostate cancer s/p radiation (2013), recent [facility] admission for PNA (3/23-28/2025) and discharged from [facility] 1 week ago on doxycycline and antibiotic unknown to patient for PNA presented to ED for worsening cough and shortness of breath. In ED, pt was afebrile at 99.8F, WBC 2.92 (in setting of HIV status), Lactate 2.9 and given Cefepime and Vancomycin. Since admission, antibiotics discontinued d/t low concern for infection. Patient continues with dry, persistent cough but remains afebrile at 98.2, WBC 2.39 and Lactate is at 1.4.    Recommendations:  - Start Doxycycline 100 mg PO q12h for 4 total doses to complete antibiotic course from recent admission.  - Continue HIV home medication regimen  - Obtain records from [facility] admission and PCP    Team 1 will sign off at this time. 75 year old male with history of HIV (CD4 74, VL 4516 23/2025), CAD (MI in 2019), T2DM, HFrEF (EF 35-40% 11/2024), recurrent syncope, COPD, depression/anxiety, prostate cancer s/p radiation (2013), recent [facility] admission for PNA (3/23-28/2025) and discharged from [facility] 1 week ago on doxycycline and antibiotic unknown to patient for PNA presented to ED for worsening cough and shortness of breath. In ED, pt was afebrile at 99.8F, WBC 2.92 (in setting of HIV status), Lactate 2.9 and given Cefepime and Vancomycin. Since admission, antibiotics discontinued d/t low concern for infection. Patient continues with dry, persistent cough but remains afebrile at 98.2, WBC 2.39 and Lactate is at 1.4. ID consulted for antibiotic recommendations given patient history of recurrent pneumonia and recent tx with vancomycin and cefepime.    Recommendations:  - Start Doxycycline 100 mg PO q12h for 4 total doses to complete antibiotic course from recent admission.  - Continue HIV home medication regimen  - Obtain records from [facility] admission and PCP    Team 1 will sign off at this time. 75 year old male with history of HIV (CD4 74, VL 4516 23/2025), CAD (MI in 2019), T2DM, HFrEF (EF 35-40% 11/2024), recurrent syncope, COPD, depression/anxiety, prostate cancer s/p radiation (2013), recent [facility] admission for PNA (3/23-28/2025) and discharged from [facility] 1 week ago on doxycycline and antibiotic unknown to patient for PNA presented to ED for worsening cough and shortness of breath. In ED, pt was afebrile at 99.8F, WBC 2.92 (in setting of HIV status), Lactate 2.9 and given Cefepime and Vancomycin. Since admission, antibiotics discontinued d/t low concern for infection. Patient continues with dry, persistent cough but remains afebrile at 98.2, WBC 2.39 and Lactate is at 1.4. ID consulted for antibiotic recommendations given patient history of recurrent pneumonia and recent tx with vancomycin and cefepime.    Recommendations:  - no abx indicated at this time  - cont home ART: Tivicay/Descovy 1 tab daily  - cont PCP ppx: Atovaquone 1500mg PO daily  - Obtain records from [facility] admission and PCP    Team 1 will sign off at this time.

## 2025-05-02 NOTE — H&P ADULT - PROBLEM SELECTOR PLAN 10
History of anxiety/depression. Home medications: aripiprazole 5mg qd, escitalopram 20mg qd  - continue home meds

## 2025-05-02 NOTE — PROGRESS NOTE ADULT - PROBLEM SELECTOR PLAN 8
Patient noted to have BRENNAN with Cr 1.73 over baseline 1.4-1.6. Currently producing urine and with normal bladder scan; s/p 1L  - f/u BMP  - urine studies   - trend Cr  - avoid nephrotoxic drugs, renally dose meds Platelets 84 on admission. Hx of HIV Prior platelets 167 (3/2025). Likely chronic thrombocytopenia.   - continue to trend platelets

## 2025-05-02 NOTE — PROGRESS NOTE ADULT - PROBLEM SELECTOR PLAN 11
F: s/p 1L  E: replete as necessary  N: Carb consistent  DVT: heparin SubQ    Dispo: [facility]. History of anxiety/depression. Home medications: aripiprazole 5mg qd, escitalopram 20mg qd  - continue home meds

## 2025-05-02 NOTE — H&P ADULT - PROBLEM SELECTOR PLAN 7
Platelets 84 on admission. Hx of HIV Prior platelets 167 (3/2025). Likely chronic thrombocytopenia.   - continue to trend platelets

## 2025-05-02 NOTE — CHART NOTE - NSCHARTNOTEFT_GEN_A_CORE
The patient wishes to leave against medical advice.  I have discussed the risks, benefits and alternatives (including the possibility of worsening of disease, pain, permanent disability, and/or death) with the patient.   The patient voices understanding of these risks, benefits, and alternatives and still wishes to sign out against medical advice.    The patient is awake, alert, oriented  x 3 and has demonstrated capacity to refuse/direct care.    I have advised the patient that they can and should return immediately should they develop any worse/different/additional symptoms, or if they change their mind and want to continue their care.     When asked patient how I or the team can be of help at the current moment to help his stay, he stated that he will still be leaving, and that the only help he needs is a metrocard to take the train home. He was given the metrocard available at nursing station.   He stated that he has antiviral medications at home, could not recall the name. Also that he has 2 days of Doxycyline at home.   He provided additional information regarding where he will be staying, contact information, pharmacy of choice where his medications should be sent to in the morning, noted below;   -[facility] [place] [address], room [address] [place] [address]   -: Paul Saeed  -Additional : close alexander Palacio ([phone])  -Pharmacy: [facility] [address]    AMA form placed in chart, and patient given a copy.
INFECTIOUS DISEASES BRIEF NOTE    History obtained from [facility] ID physician.    Patient was admitted to [facility] from 4/16-4/23/25 for chest pain.   TTE showed EF 35%, BNP 4k, seen by cardiologist.  CT chest showed ill defined GGO at R lung field, L LAD, possible underlying PNA.  He was started on CTX/azithro for CAP.  He was continued on home HIV regimen and PCP ppx.  He was discharged with cefpodoxime/doxy for CAP 7 day course (MRSA nasal+) - EOT 4/24/25.    Lisa Aguilera MD, MS  Infectious Disease attending  office phone [phone]  For any questions during evening/weekend/holiday, please page ID on call

## 2025-05-02 NOTE — PROGRESS NOTE ADULT - PROBLEM SELECTOR PLAN 5
Home med: enalapril 10mg  - C/w enalapril 10 mg. Home med: metformin 500mg, Linagliptin 5mg qd. Patient was not restarted on home medications on discharge from [facility] on last admission  -Hold metformin, linagliptin  -c/w ISS;

## 2025-05-02 NOTE — H&P ADULT - PROBLEM SELECTOR PLAN 8
Patient noted to have BRENNAN with Cr 1.73 over baseline 1.4-1.6. Currently producing urine and with normal bladder scan; s/p 1L  - f/u BMP  - urine studies   - trend Cr  - avoid nephrotoxic drugs, renally dose meds

## 2025-05-02 NOTE — H&P ADULT - HISTORY OF PRESENT ILLNESS
75 year old male with history of HIV (CD4 74, VL 4516 23/2025), CAD (prior MI), T2DM, HFrEF (EF 35-40% 11/2024), recurrent syncope, COPD, depression/anxiety,  prostate cancer s/p radiation (2013), recently discharged from [facility] 2 weeks ago on doxycycline for pneumonia, now presenting for worsening cough and shortness of breath. Patient states he also has chills. Denies fever, chest pain, SOB, abdominal pain, nausea, vomiting, diarrhea, dysuria. Patient states he sees an HIV doctor and is compliant with his medications. Of note, patient was hospitalized at [facility] on 3/23-3/28/2025 for Pneumonia and was treated with broad spectrum antibiotics at that time with an extensive workup.     ED Course:  Vitals: T 99.8F, HR 84, /82, SpO2 95% RR 18.  Labs:  WBC 2.92, hGB 11.1, Plt 84, Cr 1.73; Glucose 219; Lactate 2.9  RVP negative.   Imaging: CXR: Lungs clear. No pneumothorax. Unremarkable cardiac silhouette. No acute bone abnormality.  Consults: None   Interventions: Cefepime 2g; Vancomycin 1g; NS 1L   75 year old male with history of HIV (CD4 74, VL 4516 23/2025), CAD (prior MI), T2DM, HFrEF (EF 35-40% 11/2024), recurrent syncope, COPD, depression/anxiety,  prostate cancer s/p radiation (2013), recently discharged from [facility] 2 weeks ago on doxycycline for pneumonia, now presenting for worsening cough and shortness of breath. Patient states he also has chills. Patient was admitted to [facility] for 6 days and treated with doxycycline and a medication that contained penicillin. Per the patient, he had a reaction to the penicillin containing antibiotic, which was then discontinued. Denies fever, chest pain, SOB, abdominal pain, nausea, vomiting, diarrhea, dysuria. Patient states he sees an HIV doctor and is compliant with his medications. Of note, patient was hospitalized at [facility] on 3/23-3/28/2025 for Pneumonia and was treated with broad spectrum antibiotics at that time with an extensive workup.     ED Course:  Vitals: T 99.8F, HR 84, /82, SpO2 95% RR 18.  Labs:  WBC 2.92, hGB 11.1, Plt 84, Cr 1.73; Glucose 219; Lactate 2.9  RVP negative.   Imaging: CXR: Lungs clear. No pneumothorax. Unremarkable cardiac silhouette. No acute bone abnormality.  Consults: None   Interventions: Cefepime 2g; Vancomycin 1g; NS 1L

## 2025-05-02 NOTE — CONSULT NOTE ADULT - SUBJECTIVE AND OBJECTIVE BOX
INFECTIOUS DISEASES INITIAL CONSULT NOTE    HPI:  75 year old male with history of HIV (CD4 74, VL 4516 23/2025), CAD (prior MI), T2DM, HFrEF (EF 35-40% 11/2024), recurrent syncope, COPD, depression/anxiety,  prostate cancer s/p radiation (2013), recently discharged from [facility] 2 weeks ago on doxycycline for pneumonia, now presenting for worsening cough and shortness of breath. Patient states he also has chills. Patient was admitted to [facility] for 6 days and treated with doxycycline and a medication that contained penicillin. Per the patient, he had a reaction to the penicillin containing antibiotic, which was then discontinued. Denies fever, chest pain, SOB, abdominal pain, nausea, vomiting, diarrhea, dysuria. Patient states he sees an HIV doctor and is compliant with his medications. Of note, patient was hospitalized at [facility] on 3/23-3/28/2025 for Pneumonia and was treated with broad spectrum antibiotics at that time with an extensive workup.     ED Course:  Vitals: T 99.8F, HR 84, /82, SpO2 95% RR 18.  Labs:  WBC 2.92, hGB 11.1, Plt 84, Cr 1.73; Glucose 219; Lactate 2.9  RVP negative.   Imaging: CXR: Lungs clear. No pneumothorax. Unremarkable cardiac silhouette. No acute bone abnormality.  Consults: None   Interventions: Cefepime 2g; Vancomycin 1g; NS 1L   (02 May 2025 03:33)      PAST MEDICAL & SURGICAL HISTORY:  HIV (human immunodeficiency virus infection)      DM (diabetes mellitus)      HTN (hypertension)      Chronic diarrhea      Hepatitis C      PVD (peripheral vascular disease)      CAD (coronary artery disease)      Thoracic aortic aneurysm      S/P arterial stent  bilat LE            Review of Systems:   Constitutional, eyes, ENT, cardiovascular, respiratory, gastrointestinal, genitourinary, integumentary, neurological, psychiatric and heme/lymph are otherwise negative other than noted above       ANTIBIOTICS:  MEDICATIONS  (STANDING):  albuterol/ipratropium for Nebulization 3 milliLiter(s) Nebulizer every 6 hours  ARIPiprazole 5 milliGRAM(s) Oral daily  aspirin enteric coated 81 milliGRAM(s) Oral daily  atorvastatin 80 milliGRAM(s) Oral at bedtime  atovaquone  Suspension 1500 milliGRAM(s) Oral daily  carvedilol 3.125 milliGRAM(s) Oral every 12 hours  dextrose 5%. 1000 milliLiter(s) (50 mL/Hr) IV Continuous <Continuous>  dextrose 5%. 1000 milliLiter(s) (100 mL/Hr) IV Continuous <Continuous>  dextrose 50% Injectable 25 Gram(s) IV Push once  dextrose 50% Injectable 12.5 Gram(s) IV Push once  dextrose 50% Injectable 25 Gram(s) IV Push once  dolutegravir 50 milliGRAM(s) Oral daily  emtricitabine 200 mG/tenofovir alafenamide 25 mG (DESCOVY) Tablet 1 Tablet(s) Oral daily  enalapril 10 milliGRAM(s) Oral daily  escitalopram 20 milliGRAM(s) Oral daily  fluticasone propionate/ salmeterol 100-50 MICROgram(s) Diskus 1 Dose(s) Inhalation two times a day  glucagon  Injectable 1 milliGRAM(s) IntraMuscular once  heparin   Injectable 5000 Unit(s) SubCutaneous every 8 hours  insulin lispro (ADMELOG) corrective regimen sliding scale   SubCutaneous Before meals and at bedtime  tamsulosin 0.4 milliGRAM(s) Oral at bedtime    MEDICATIONS  (PRN):  acetaminophen     Tablet .. 650 milliGRAM(s) Oral every 6 hours PRN Temp greater or equal to 38C (100.4F), Mild Pain (1 - 3)  aluminum hydroxide/magnesium hydroxide/simethicone Suspension 30 milliLiter(s) Oral every 4 hours PRN Dyspepsia  dextrose Oral Gel 15 Gram(s) Oral once PRN Blood Glucose LESS THAN 70 milliGRAM(s)/deciliter  melatonin 3 milliGRAM(s) Oral at bedtime PRN Insomnia  ondansetron Injectable 4 milliGRAM(s) IV Push every 8 hours PRN Nausea and/or Vomiting      Allergies    shellfish (Unknown)  sulfa drugs (Rash)  Peaches (Rash)  penicillins (Rash)  strawberry (Rash)    Intolerances        SOCIAL HISTORY:    FAMILY HISTORY:   no FH leading to current infection    Vital Signs Last 24 Hrs  T(C): 36.8 (02 May 2025 08:40), Max: 37.7 (01 May 2025 16:05)  T(F): 98.2 (02 May 2025 08:40), Max: 99.8 (01 May 2025 16:05)  HR: 68 (02 May 2025 08:40) (66 - 84)  BP: 112/69 (02 May 2025 08:40) (112/69 - 150/89)  BP(mean): 83 (02 May 2025 08:40) (83 - 106)  RR: 18 (02 May 2025 08:40) (16 - 18)  SpO2: 95% (02 May 2025 08:40) (95% - 96%)    Parameters below as of 02 May 2025 08:40  Patient On (Oxygen Delivery Method): room air        05-01-25 @ 07:01  -  05-02-25 @ 07:00  --------------------------------------------------------  IN: 0 mL / OUT: 400 mL / NET: -400 mL        PHYSICAL EXAM:  Constitutional: alert, NAD  Eyes: the sclera and conjunctiva were normal.   ENT: the ears and nose were normal in appearance.   Neck: the appearance of the neck was normal and the neck was supple.   Pulmonary: no respiratory distress and lungs were clear to auscultation bilaterally.   Heart: heart rate was normal and rhythm regular, normal S1 and S2  Vascular:. there was no peripheral edema  Abdomen: normal bowel sounds, soft, non-tender  Neurological: no focal deficits.   Psychiatric: the affect was normal      LABS:                        11.1   2.39  )-----------( 83       ( 02 May 2025 08:32 )             33.4     05-02    143  |  111[H]  |  20  ----------------------------<  131[H]  3.4[L]   |  23  |  1.31[H]    Ca    7.8[L]      02 May 2025 08:32  Phos  2.6     05-02  Mg     1.4     05-02    TPro  5.6[L]  /  Alb  2.9[L]  /  TBili  0.4  /  DBili  x   /  AST  14  /  ALT  10  /  AlkPhos  67  05-02    PT/INR - ( 01 May 2025 16:11 )   PT: 12.4 sec;   INR: 1.07          PTT - ( 01 May 2025 16:11 )  PTT:30.3 sec  Urinalysis Basic - ( 02 May 2025 08:32 )    Color: x / Appearance: x / SG: x / pH: x  Gluc: 131 mg/dL / Ketone: x  / Bili: x / Urobili: x   Blood: x / Protein: x / Nitrite: x   Leuk Esterase: x / RBC: x / WBC x   Sq Epi: x / Non Sq Epi: x / Bacteria: x        MICROBIOLOGY:    RADIOLOGY & ADDITIONAL STUDIES:   INFECTIOUS DISEASES INITIAL CONSULT NOTE    HPI:  75 year old male with history of HIV (CD4 74, VL 4516 23/2025), CAD (MI in 2019), T2DM, HFrEF (EF 35-40% 11/2024), recurrent syncope, COPD, depression/anxiety, prostate cancer s/p radiation (2013), recent [facility] admission for PNA (3/23-28/2025) and discharged from [facility] 1 week ago on doxycycline and antibiotic unknown to patient for PNA presented to ED for worsening cough and shortness of breath. Patient reports dry, non-productive cough has continued for several months and "changes in tune," and has worsened, prompting him to return to ED. He states cough persists throughout the day, with exertion and worsens at night. Patient reports he admitted to [facility] for 6 days and treated with doxycycline and a medication that contained penicillin. Per the patient, he had a reaction to the penicillin containing antibiotic, which was then discontinued. Patient also reports he is noncompliant with his 15 prescribed medications due to his recent R wrist fracture.  Denies fever, nasal congestion, chills, fatigue, SOB, abdominal pain, nausea, vomiting, diarrhea, dysuria, recent travel.    ED Course:  Vitals: T 99.8F, HR 84, /82, SpO2 95% RR 18.  Labs:  WBC 2.92, hGB 11.1, Plt 84, Cr 1.73; Glucose 219; Lactate 2.9  RVP negative.   Imaging: CXR: Lungs clear. No pneumothorax. Unremarkable cardiac silhouette. No acute bone abnormality.  Consults: None   Interventions: Cefepime 2g; Vancomycin 1g; NS 1L   (02 May 2025 03:33)      PAST MEDICAL & SURGICAL HISTORY:  HIV (human immunodeficiency virus infection)      DM (diabetes mellitus)      HTN (hypertension)      Chronic diarrhea      Hepatitis C      PVD (peripheral vascular disease)      CAD (coronary artery disease)      Thoracic aortic aneurysm      S/P arterial stent  bilat LE            Review of Systems:   Constitutional, eyes, ENT, cardiovascular, respiratory, gastrointestinal, genitourinary, integumentary, neurological, psychiatric and heme/lymph are otherwise negative other than noted above       ANTIBIOTICS:  MEDICATIONS  (STANDING):  albuterol/ipratropium for Nebulization 3 milliLiter(s) Nebulizer every 6 hours  ARIPiprazole 5 milliGRAM(s) Oral daily  aspirin enteric coated 81 milliGRAM(s) Oral daily  atorvastatin 80 milliGRAM(s) Oral at bedtime  atovaquone  Suspension 1500 milliGRAM(s) Oral daily  carvedilol 3.125 milliGRAM(s) Oral every 12 hours  dextrose 5%. 1000 milliLiter(s) (50 mL/Hr) IV Continuous <Continuous>  dextrose 5%. 1000 milliLiter(s) (100 mL/Hr) IV Continuous <Continuous>  dextrose 50% Injectable 25 Gram(s) IV Push once  dextrose 50% Injectable 12.5 Gram(s) IV Push once  dextrose 50% Injectable 25 Gram(s) IV Push once  dolutegravir 50 milliGRAM(s) Oral daily  emtricitabine 200 mG/tenofovir alafenamide 25 mG (DESCOVY) Tablet 1 Tablet(s) Oral daily  enalapril 10 milliGRAM(s) Oral daily  escitalopram 20 milliGRAM(s) Oral daily  fluticasone propionate/ salmeterol 100-50 MICROgram(s) Diskus 1 Dose(s) Inhalation two times a day  glucagon  Injectable 1 milliGRAM(s) IntraMuscular once  heparin   Injectable 5000 Unit(s) SubCutaneous every 8 hours  insulin lispro (ADMELOG) corrective regimen sliding scale   SubCutaneous Before meals and at bedtime  tamsulosin 0.4 milliGRAM(s) Oral at bedtime    MEDICATIONS  (PRN):  acetaminophen     Tablet .. 650 milliGRAM(s) Oral every 6 hours PRN Temp greater or equal to 38C (100.4F), Mild Pain (1 - 3)  aluminum hydroxide/magnesium hydroxide/simethicone Suspension 30 milliLiter(s) Oral every 4 hours PRN Dyspepsia  dextrose Oral Gel 15 Gram(s) Oral once PRN Blood Glucose LESS THAN 70 milliGRAM(s)/deciliter  melatonin 3 milliGRAM(s) Oral at bedtime PRN Insomnia  ondansetron Injectable 4 milliGRAM(s) IV Push every 8 hours PRN Nausea and/or Vomiting      Allergies    shellfish (Unknown)  sulfa drugs (Rash)  Peaches (Rash)  penicillins (Rash)  strawberry (Rash)    Intolerances        SOCIAL HISTORY: Lives alone in [place].   Confirms use of cocaine (last 5/1) and marijuana every other day. Former smoker of 1/2 PPD from 15 - 27 yo. Denies alcohol use.  MSM, Sexually Active with multiple partners.      FAMILY HISTORY:   no FH leading to current infection    Vital Signs Last 24 Hrs  T(C): 36.8 (02 May 2025 08:40), Max: 37.7 (01 May 2025 16:05)  T(F): 98.2 (02 May 2025 08:40), Max: 99.8 (01 May 2025 16:05)  HR: 68 (02 May 2025 08:40) (66 - 84)  BP: 112/69 (02 May 2025 08:40) (112/69 - 150/89)  BP(mean): 83 (02 May 2025 08:40) (83 - 106)  RR: 18 (02 May 2025 08:40) (16 - 18)  SpO2: 95% (02 May 2025 08:40) (95% - 96%)    Parameters below as of 02 May 2025 08:40  Patient On (Oxygen Delivery Method): room air        05-01-25 @ 07:01  -  05-02-25 @ 07:00  --------------------------------------------------------  IN: 0 mL / OUT: 400 mL / NET: -400 mL        PHYSICAL EXAM:  Constitutional: alert, NAD  Eyes: the sclera and conjunctiva were normal.   ENT: the ears and nose were normal in appearance.   Neck: the appearance of the neck was normal and the neck was supple.   Pulmonary: no respiratory distress and expiratory wheezing in RLL.  Heart: heart rate was normal and rhythm regular, normal S1 and S2  Vascular:. there was no peripheral edema  Abdomen: normal bowel sounds, soft, non-tender  Ext: R wrist in cast  Neurological: no focal deficits.   Psychiatric: the affect was normal      LABS:                        11.1   2.39  )-----------( 83       ( 02 May 2025 08:32 )             33.4     05-02    143  |  111[H]  |  20  ----------------------------<  131[H]  3.4[L]   |  23  |  1.31[H]    Ca    7.8[L]      02 May 2025 08:32  Phos  2.6     05-02  Mg     1.4     05-02    TPro  5.6[L]  /  Alb  2.9[L]  /  TBili  0.4  /  DBili  x   /  AST  14  /  ALT  10  /  AlkPhos  67  05-02    PT/INR - ( 01 May 2025 16:11 )   PT: 12.4 sec;   INR: 1.07          PTT - ( 01 May 2025 16:11 )  PTT:30.3 sec  Urinalysis Basic - ( 02 May 2025 08:32 )    Color: x / Appearance: x / SG: x / pH: x  Gluc: 131 mg/dL / Ketone: x  / Bili: x / Urobili: x   Blood: x / Protein: x / Nitrite: x   Leuk Esterase: x / RBC: x / WBC x   Sq Epi: x / Non Sq Epi: x / Bacteria: x        MICROBIOLOGY: Reviewed    RADIOLOGY & ADDITIONAL STUDIES: Reviewed   INFECTIOUS DISEASES INITIAL CONSULT NOTE    HPI:  75 year old male with history of HIV (CD4 74, VL 4516 23/2025), CAD (MI in 2019), T2DM, HFrEF (EF 35-40% 11/2024), recurrent syncope, COPD, depression/anxiety, prostate cancer s/p radiation (2013), recent [facility] admission for PNA (3/23-28/2025) and discharged from [facility] 1 week ago on doxycycline and antibiotic unknown to patient for PNA presented to ED for worsening cough and shortness of breath. Patient reports dry, non-productive cough has continued for several months and "changes in tune," and has worsened, prompting him to return to ED. He states cough persists throughout the day, with exertion and worsens at night. Patient reports he admitted to [facility] for 6 days and treated with doxycycline and a medication that contained penicillin. Per the patient, he had a reaction to the penicillin containing antibiotic, which was then discontinued. Patient also reports he is noncompliant with his 15 prescribed medications due to his recent R wrist fracture. Denies fever, nasal congestion, chills, fatigue, SOB, abdominal pain, nausea, vomiting, diarrhea, dysuria, recent travel.  ID consulted for antibiotic recommendations given patient history of recurrent pneumonia and recent tx with vancomycin and cefepime.    ED Course:  Vitals: T 99.8F, HR 84, /82, SpO2 95% RR 18.  Labs:  WBC 2.92, hGB 11.1, Plt 84, Cr 1.73; Glucose 219; Lactate 2.9  RVP negative.   Imaging: CXR: Lungs clear. No pneumothorax. Unremarkable cardiac silhouette. No acute bone abnormality.  Consults: None   Interventions: Cefepime 2g; Vancomycin 1g; NS 1L   (02 May 2025 03:33)      PAST MEDICAL & SURGICAL HISTORY:  HIV (human immunodeficiency virus infection)      DM (diabetes mellitus)      HTN (hypertension)      Chronic diarrhea      Hepatitis C      PVD (peripheral vascular disease)      CAD (coronary artery disease)      Thoracic aortic aneurysm      S/P arterial stent  bilat LE            Review of Systems:   Constitutional, eyes, ENT, cardiovascular, respiratory, gastrointestinal, genitourinary, integumentary, neurological, psychiatric and heme/lymph are otherwise negative other than noted above       ANTIBIOTICS:  MEDICATIONS  (STANDING):  albuterol/ipratropium for Nebulization 3 milliLiter(s) Nebulizer every 6 hours  ARIPiprazole 5 milliGRAM(s) Oral daily  aspirin enteric coated 81 milliGRAM(s) Oral daily  atorvastatin 80 milliGRAM(s) Oral at bedtime  atovaquone  Suspension 1500 milliGRAM(s) Oral daily  carvedilol 3.125 milliGRAM(s) Oral every 12 hours  dextrose 5%. 1000 milliLiter(s) (50 mL/Hr) IV Continuous <Continuous>  dextrose 5%. 1000 milliLiter(s) (100 mL/Hr) IV Continuous <Continuous>  dextrose 50% Injectable 25 Gram(s) IV Push once  dextrose 50% Injectable 12.5 Gram(s) IV Push once  dextrose 50% Injectable 25 Gram(s) IV Push once  dolutegravir 50 milliGRAM(s) Oral daily  emtricitabine 200 mG/tenofovir alafenamide 25 mG (DESCOVY) Tablet 1 Tablet(s) Oral daily  enalapril 10 milliGRAM(s) Oral daily  escitalopram 20 milliGRAM(s) Oral daily  fluticasone propionate/ salmeterol 100-50 MICROgram(s) Diskus 1 Dose(s) Inhalation two times a day  glucagon  Injectable 1 milliGRAM(s) IntraMuscular once  heparin   Injectable 5000 Unit(s) SubCutaneous every 8 hours  insulin lispro (ADMELOG) corrective regimen sliding scale   SubCutaneous Before meals and at bedtime  tamsulosin 0.4 milliGRAM(s) Oral at bedtime    MEDICATIONS  (PRN):  acetaminophen     Tablet .. 650 milliGRAM(s) Oral every 6 hours PRN Temp greater or equal to 38C (100.4F), Mild Pain (1 - 3)  aluminum hydroxide/magnesium hydroxide/simethicone Suspension 30 milliLiter(s) Oral every 4 hours PRN Dyspepsia  dextrose Oral Gel 15 Gram(s) Oral once PRN Blood Glucose LESS THAN 70 milliGRAM(s)/deciliter  melatonin 3 milliGRAM(s) Oral at bedtime PRN Insomnia  ondansetron Injectable 4 milliGRAM(s) IV Push every 8 hours PRN Nausea and/or Vomiting      Allergies    shellfish (Unknown)  sulfa drugs (Rash)  Peaches (Rash)  penicillins (Rash)  strawberry (Rash)    Intolerances        SOCIAL HISTORY: Lives alone in [place].   Confirms use of cocaine (last 5/1) and marijuana every other day. Former smoker of 1/2 PPD from 15 - 27 yo. Denies alcohol use.  MSM, Sexually Active with multiple partners.      FAMILY HISTORY:   no FH leading to current infection    Vital Signs Last 24 Hrs  T(C): 36.8 (02 May 2025 08:40), Max: 37.7 (01 May 2025 16:05)  T(F): 98.2 (02 May 2025 08:40), Max: 99.8 (01 May 2025 16:05)  HR: 68 (02 May 2025 08:40) (66 - 84)  BP: 112/69 (02 May 2025 08:40) (112/69 - 150/89)  BP(mean): 83 (02 May 2025 08:40) (83 - 106)  RR: 18 (02 May 2025 08:40) (16 - 18)  SpO2: 95% (02 May 2025 08:40) (95% - 96%)    Parameters below as of 02 May 2025 08:40  Patient On (Oxygen Delivery Method): room air        05-01-25 @ 07:01  -  05-02-25 @ 07:00  --------------------------------------------------------  IN: 0 mL / OUT: 400 mL / NET: -400 mL        PHYSICAL EXAM:  Constitutional: alert, NAD  Eyes: the sclera and conjunctiva were normal.   ENT: the ears and nose were normal in appearance.   Neck: the appearance of the neck was normal and the neck was supple.   Pulmonary: no respiratory distress and expiratory wheezing in RLL.  Heart: heart rate was normal and rhythm regular, normal S1 and S2  Vascular:. there was no peripheral edema  Abdomen: normal bowel sounds, soft, non-tender  Ext: R wrist in cast  Neurological: no focal deficits.   Psychiatric: the affect was normal      LABS:                        11.1   2.39  )-----------( 83       ( 02 May 2025 08:32 )             33.4     05-02    143  |  111[H]  |  20  ----------------------------<  131[H]  3.4[L]   |  23  |  1.31[H]    Ca    7.8[L]      02 May 2025 08:32  Phos  2.6     05-02  Mg     1.4     05-02    TPro  5.6[L]  /  Alb  2.9[L]  /  TBili  0.4  /  DBili  x   /  AST  14  /  ALT  10  /  AlkPhos  67  05-02    PT/INR - ( 01 May 2025 16:11 )   PT: 12.4 sec;   INR: 1.07          PTT - ( 01 May 2025 16:11 )  PTT:30.3 sec  Urinalysis Basic - ( 02 May 2025 08:32 )    Color: x / Appearance: x / SG: x / pH: x  Gluc: 131 mg/dL / Ketone: x  / Bili: x / Urobili: x   Blood: x / Protein: x / Nitrite: x   Leuk Esterase: x / RBC: x / WBC x   Sq Epi: x / Non Sq Epi: x / Bacteria: x        MICROBIOLOGY: Reviewed    RADIOLOGY & ADDITIONAL STUDIES: Reviewed

## 2025-05-02 NOTE — PROGRESS NOTE ADULT - PROBLEM SELECTOR PLAN 3
EF 35-40%. Home med: Carvedilol 3.125mg BID   - c/w carvedilol 3.125 mg BID (CD4 74, VL 4516 23/2025). On dolutegravir (Tivicay) and emtricitabine-tenofovir (Descovy) and Atovaquone   - Recheck CD4 and viral load given poor compliance with home medications   - c/w Atovaquone for PCP ppx (has sulfa allergy)   - c/w home med Tivicay and Descovy

## 2025-05-02 NOTE — H&P ADULT - PROBLEM SELECTOR PLAN 3
EF 30-35%. Home med: Carvedilol 3.125mg BID   - c/w carvedilol 3.125 mg BID EF 35-40%. Home med: Carvedilol 3.125mg BID   - c/w carvedilol 3.125 mg BID EF 35-40%. Home med: Carvedilol 3.125mg BID   - c/w carvedilol 3.125 mg BID  - no signs of volume overload on exam, CXR clear, no s/s of acute HF exacerbation

## 2025-05-02 NOTE — PROGRESS NOTE ADULT - PROBLEM SELECTOR PLAN 10
History of anxiety/depression. Home medications: aripiprazole 5mg qd, escitalopram 20mg qd  - continue home meds Patient with history of COPD. Home med- symbicort. Not on home 02. Patient with good oxygen saturation on RA without wheezes on physical exam making COPD exacerbation less likely.   - Duonebs q8hrs and reassess  wheezing   -On room air   - c/w home med. Patient with history of COPD. Home med- symbicort. Not on home 02. Patient with good oxygen saturation on RA, no wheezing on physical exam, no productive cough. Lower concern for COPD exacerbation at this time  - Duonebs q6hrs standing   -On room air

## 2025-05-02 NOTE — H&P ADULT - NSHPPHYSICALEXAM_GEN_ALL_CORE
T(C): 36.4 (05-02-25 @ 03:24), Max: 37.7 (05-01-25 @ 16:05)  HR: 70 (05-02-25 @ 03:24) (69 - 84)  BP: 147/83 (05-02-25 @ 03:24) (128/73 - 150/89)  RR: 18 (05-02-25 @ 03:24) (16 - 18)  SpO2: 96% (05-02-25 @ 03:24) (95% - 96%)    General: NAD, laying in bed, speaking in full sentences  HEENT: head NC/AT, no conjunctival injection, EOMI, MMM  Neck: supple, no JVD  Cardio: RRR, +S1/S2, no M/R/G  Resp: No repiratory distress, rhonch  Abdo: soft, NT, ND, +bowel sounds x4  Extremities: WWP, no edema/cyanosis/clubbing; NROM x4  Vasc: 2+ radial and DP pulses b/l  Neuro: A&Ox3  Psych: speech non-pressured, thoughts goal-oriented  Skin: dry, intact, no visible jaundice

## 2025-05-02 NOTE — PROGRESS NOTE ADULT - PROBLEM SELECTOR PLAN 12
F: s/p 1L  E: replete as necessary  N: Carb consistent  DVT: heparin SubQ    Dispo: RMF. F: s/p 1L  E: replete as necessary  N: Carb consistent  DVT: heparin SubQ  Dispo: RMF.

## 2025-05-02 NOTE — PATIENT PROFILE ADULT - FALL HARM RISK - HARM RISK INTERVENTIONS

## 2025-05-02 NOTE — PROGRESS NOTE ADULT - PROBLEM SELECTOR PLAN 4
Home med: metformin 500mg, Linagliptin 5mg qd. Patient was not restarted on home medications on discharge from [facility] on last admission  -Hold metformin, linagliptin  -c/w ISS; EF 35-40%. Home med: Carvedilol 3.125mg BID. Patient does not appear to be fluid overloaded, no crackles auscultation and no peripheral edema making HF exacerbation less likely.    - c/w carvedilol 3.125 mg BID

## 2025-05-02 NOTE — PROGRESS NOTE ADULT - PROBLEM SELECTOR PLAN 6
Hx of prior MI, denies stents in heart. On home asa and lipitor 80  - continue home meds Home med: enalapril 10mg  - C/w enalapril 10 mg.

## 2025-05-02 NOTE — H&P ADULT - PROBLEM SELECTOR PLAN 2
(CD4 74, VL 4516 23/2025). On dolutegravir (Tivicay) and emtricitabine-tenofovir (Descovy) and Atovaquone   - c/w Atovaquone for PCP ppx (has sulfa allergy)   - c/w home med Tivicay and Descovy

## 2025-05-02 NOTE — PROGRESS NOTE ADULT - PROBLEM SELECTOR PLAN 9
Patient with history of COPD. Home med- symbicort. Not on home 02.   - Duonebs q8hrs and reassess  wheezing   -On room air   - c/w home med. Patient noted to have BRENNAN with Cr 1.73 over baseline 1.4-1.6. Currently producing urine and with normal bladder scan; s/p 1L  - f/u BMP  - urine studies   - trend Cr  - avoid nephrotoxic drugs, renally dose meds Patient noted to have BRENNAN with Cr 1.73 over baseline 1.4-1.6. Currently producing urine and with normal bladder scan; s/p 1L Cr downtrend to 1.31  - urine studies pending  - trend Cr  - avoid nephrotoxic drugs, renally dose meds

## 2025-05-02 NOTE — PROGRESS NOTE ADULT - PROBLEM SELECTOR PLAN 7
Platelets 84 on admission. Hx of HIV Prior platelets 167 (3/2025). Likely chronic thrombocytopenia.   - continue to trend platelets Hx of prior MI, denies stents in heart. On home asa and lipitor 80  - continue home meds

## 2025-05-02 NOTE — H&P ADULT - PROBLEM SELECTOR PLAN 9
Patient with history of COPD. Home med- symbicort. Not on home 02.   - Duonebs q8hrs and reassess  wheezing   -On room air   - c/w home med. Patient with history of COPD. Home med- symbicort. Not on home 02.   - Duonebs q6 given SOB  - On room air, no wheezing on exam, no sputum production, low suspicion for COPD exacerbation   - c/w home med.

## 2025-05-02 NOTE — H&P ADULT - PROBLEM SELECTOR PLAN 4
Home med: metformin 500mg, Linagliptin 5mg qd. Patient was not restarted on home medications on discharge from [facility] on last admission  -Hold metformin, linagliptin  -c/w ISS;

## 2025-05-02 NOTE — PROGRESS NOTE ADULT - SUBJECTIVE AND OBJECTIVE BOX
OVERNIGHT EVENTS:    SUBJECTIVE / INTERVAL HPI: Patient seen and examined at bedside.     VITAL SIGNS:  Vital Signs Last 24 Hrs  T(C): 36.4 (02 May 2025 05:09), Max: 37.7 (01 May 2025 16:05)  T(F): 97.5 (02 May 2025 05:09), Max: 99.8 (01 May 2025 16:05)  HR: 66 (02 May 2025 05:09) (66 - 84)  BP: 140/89 (02 May 2025 05:09) (128/73 - 150/89)  BP(mean): 106 (02 May 2025 05:09) (106 - 106)  RR: 18 (02 May 2025 05:09) (16 - 18)  SpO2: 96% (02 May 2025 05:09) (95% - 96%)    Parameters below as of 02 May 2025 05:09  Patient On (Oxygen Delivery Method): room air      I&O's Summary    01 May 2025 07:01  -  02 May 2025 07:00  --------------------------------------------------------  IN: 0 mL / OUT: 400 mL / NET: -400 mL        PHYSICAL EXAM:    General: WDWN  HEENT: NC/AT; PERRL, anicteric sclera; MMM  Neck: supple  Cardiovascular: +S1/S2; RRR  Respiratory: CTA B/L; no W/R/R  Gastrointestinal: soft, NT/ND; +BSx4  Extremities: WWP; no edema, clubbing or cyanosis  Vascular: 2+ radial, DP/PT pulses B/L  Neurological: AAOx3; no focal deficits    MEDICATIONS:  MEDICATIONS  (STANDING):  ARIPiprazole 5 milliGRAM(s) Oral daily  aspirin enteric coated 81 milliGRAM(s) Oral daily  atorvastatin 80 milliGRAM(s) Oral at bedtime  atovaquone  Suspension 1500 milliGRAM(s) Oral daily  carvedilol 3.125 milliGRAM(s) Oral every 12 hours  dextrose 5%. 1000 milliLiter(s) (50 mL/Hr) IV Continuous <Continuous>  dextrose 5%. 1000 milliLiter(s) (100 mL/Hr) IV Continuous <Continuous>  dextrose 50% Injectable 25 Gram(s) IV Push once  dextrose 50% Injectable 12.5 Gram(s) IV Push once  dextrose 50% Injectable 25 Gram(s) IV Push once  dolutegravir 50 milliGRAM(s) Oral daily  emtricitabine 200 mG/tenofovir alafenamide 25 mG (DESCOVY) Tablet 1 Tablet(s) Oral daily  enalapril 10 milliGRAM(s) Oral daily  escitalopram 20 milliGRAM(s) Oral daily  fluticasone propionate/ salmeterol 100-50 MICROgram(s) Diskus 1 Dose(s) Inhalation two times a day  glucagon  Injectable 1 milliGRAM(s) IntraMuscular once  heparin   Injectable 5000 Unit(s) SubCutaneous every 8 hours  insulin lispro (ADMELOG) corrective regimen sliding scale   SubCutaneous Before meals and at bedtime  magnesium sulfate  IVPB 2 Gram(s) IV Intermittent once  potassium chloride   Powder 40 milliEquivalent(s) Oral once  tamsulosin 0.4 milliGRAM(s) Oral at bedtime    MEDICATIONS  (PRN):  acetaminophen     Tablet .. 650 milliGRAM(s) Oral every 6 hours PRN Temp greater or equal to 38C (100.4F), Mild Pain (1 - 3)  albuterol/ipratropium for Nebulization 3 milliLiter(s) Nebulizer every 6 hours PRN Shortness of Breath and/or Wheezing  aluminum hydroxide/magnesium hydroxide/simethicone Suspension 30 milliLiter(s) Oral every 4 hours PRN Dyspepsia  dextrose Oral Gel 15 Gram(s) Oral once PRN Blood Glucose LESS THAN 70 milliGRAM(s)/deciliter  melatonin 3 milliGRAM(s) Oral at bedtime PRN Insomnia  ondansetron Injectable 4 milliGRAM(s) IV Push every 8 hours PRN Nausea and/or Vomiting      ALLERGIES:  Allergies    shellfish (Unknown)  sulfa drugs (Rash)  Peaches (Rash)  penicillins (Rash)  strawberry (Rash)    Intolerances        LABS:                        11.1   2.39  )-----------( 83       ( 02 May 2025 08:32 )             33.4     05-02    143  |  111[H]  |  20  ----------------------------<  131[H]  3.4[L]   |  23  |  1.31[H]    Ca    7.8[L]      02 May 2025 08:32  Phos  2.6     05-02  Mg     1.4     05-02    TPro  5.6[L]  /  Alb  2.9[L]  /  TBili  0.4  /  DBili  x   /  AST  14  /  ALT  10  /  AlkPhos  67  05-02    PT/INR - ( 01 May 2025 16:11 )   PT: 12.4 sec;   INR: 1.07          PTT - ( 01 May 2025 16:11 )  PTT:30.3 sec  Urinalysis Basic - ( 02 May 2025 08:32 )    Color: x / Appearance: x / SG: x / pH: x  Gluc: 131 mg/dL / Ketone: x  / Bili: x / Urobili: x   Blood: x / Protein: x / Nitrite: x   Leuk Esterase: x / RBC: x / WBC x   Sq Epi: x / Non Sq Epi: x / Bacteria: x      CAPILLARY BLOOD GLUCOSE      POCT Blood Glucose.: 116 mg/dL (02 May 2025 09:22)      RADIOLOGY & ADDITIONAL TESTS: Reviewed.   OVERNIGHT EVENTS: No acute overnight events.     SUBJECTIVE / INTERVAL HPI: Patient seen and examined at bedside. Patient noted frequent cough that is bothering him. Endorses chest pain and some fecal incontinence that he states are both his baseline since MI/radiation for prostate cancer. He felt that he temporarily improved following discharge from [facility] but symptoms returned within a few days. Describes sensation of not being able to catch his breath. States he is noncompliant with all medications 2/2 inability to open pill bottles due to cast on right wrist.   Denies fever, chills, N/V, dysuria.       VITAL SIGNS:  Vital Signs Last 24 Hrs  T(C): 36.4 (02 May 2025 05:09), Max: 37.7 (01 May 2025 16:05)  T(F): 97.5 (02 May 2025 05:09), Max: 99.8 (01 May 2025 16:05)  HR: 66 (02 May 2025 05:09) (66 - 84)  BP: 140/89 (02 May 2025 05:09) (128/73 - 150/89)  BP(mean): 106 (02 May 2025 05:09) (106 - 106)  RR: 18 (02 May 2025 05:09) (16 - 18)  SpO2: 96% (02 May 2025 05:09) (95% - 96%)    Parameters below as of 02 May 2025 05:09  Patient On (Oxygen Delivery Method): room air      I&O's Summary    01 May 2025 07:01  -  02 May 2025 07:00  --------------------------------------------------------  IN: 0 mL / OUT: 400 mL / NET: -400 mL        PHYSICAL EXAM:    General: Lying comfortably in bed, poorly groomed   HEENT: NC/AT; PERRL, anicteric sclera; slightly dry mucous membranes  Neck: supple  Cardiovascular: +S1/S2; RRR  Respiratory: rhonchi B/L; no wheezes  Gastrointestinal: soft, NT/ND; +BSx4  Extremities: Cast present on right wrist, WWP; no edema, clubbing or cyanosis  Vascular: 2+ radial, DP/PT pulses B/L  Neurological: AAOx3; no focal deficits    MEDICATIONS:  MEDICATIONS  (STANDING):  ARIPiprazole 5 milliGRAM(s) Oral daily  aspirin enteric coated 81 milliGRAM(s) Oral daily  atorvastatin 80 milliGRAM(s) Oral at bedtime  atovaquone  Suspension 1500 milliGRAM(s) Oral daily  carvedilol 3.125 milliGRAM(s) Oral every 12 hours  dextrose 5%. 1000 milliLiter(s) (50 mL/Hr) IV Continuous <Continuous>  dextrose 5%. 1000 milliLiter(s) (100 mL/Hr) IV Continuous <Continuous>  dextrose 50% Injectable 25 Gram(s) IV Push once  dextrose 50% Injectable 12.5 Gram(s) IV Push once  dextrose 50% Injectable 25 Gram(s) IV Push once  dolutegravir 50 milliGRAM(s) Oral daily  emtricitabine 200 mG/tenofovir alafenamide 25 mG (DESCOVY) Tablet 1 Tablet(s) Oral daily  enalapril 10 milliGRAM(s) Oral daily  escitalopram 20 milliGRAM(s) Oral daily  fluticasone propionate/ salmeterol 100-50 MICROgram(s) Diskus 1 Dose(s) Inhalation two times a day  glucagon  Injectable 1 milliGRAM(s) IntraMuscular once  heparin   Injectable 5000 Unit(s) SubCutaneous every 8 hours  insulin lispro (ADMELOG) corrective regimen sliding scale   SubCutaneous Before meals and at bedtime  magnesium sulfate  IVPB 2 Gram(s) IV Intermittent once  potassium chloride   Powder 40 milliEquivalent(s) Oral once  tamsulosin 0.4 milliGRAM(s) Oral at bedtime    MEDICATIONS  (PRN):  acetaminophen     Tablet .. 650 milliGRAM(s) Oral every 6 hours PRN Temp greater or equal to 38C (100.4F), Mild Pain (1 - 3)  albuterol/ipratropium for Nebulization 3 milliLiter(s) Nebulizer every 6 hours PRN Shortness of Breath and/or Wheezing  aluminum hydroxide/magnesium hydroxide/simethicone Suspension 30 milliLiter(s) Oral every 4 hours PRN Dyspepsia  dextrose Oral Gel 15 Gram(s) Oral once PRN Blood Glucose LESS THAN 70 milliGRAM(s)/deciliter  melatonin 3 milliGRAM(s) Oral at bedtime PRN Insomnia  ondansetron Injectable 4 milliGRAM(s) IV Push every 8 hours PRN Nausea and/or Vomiting      ALLERGIES:  Allergies    shellfish (Unknown)  sulfa drugs (Rash)  Peaches (Rash)  penicillins (Rash)  strawberry (Rash)    Intolerances        LABS:                        11.1   2.39  )-----------( 83       ( 02 May 2025 08:32 )             33.4     05-02    143  |  111[H]  |  20  ----------------------------<  131[H]  3.4[L]   |  23  |  1.31[H]    Ca    7.8[L]      02 May 2025 08:32  Phos  2.6     05-02  Mg     1.4     05-02    TPro  5.6[L]  /  Alb  2.9[L]  /  TBili  0.4  /  DBili  x   /  AST  14  /  ALT  10  /  AlkPhos  67  05-02    PT/INR - ( 01 May 2025 16:11 )   PT: 12.4 sec;   INR: 1.07          PTT - ( 01 May 2025 16:11 )  PTT:30.3 sec  Urinalysis Basic - ( 02 May 2025 08:32 )    Color: x / Appearance: x / SG: x / pH: x  Gluc: 131 mg/dL / Ketone: x  / Bili: x / Urobili: x   Blood: x / Protein: x / Nitrite: x   Leuk Esterase: x / RBC: x / WBC x   Sq Epi: x / Non Sq Epi: x / Bacteria: x      CAPILLARY BLOOD GLUCOSE      POCT Blood Glucose.: 116 mg/dL (02 May 2025 09:22)      RADIOLOGY & ADDITIONAL TESTS: Reviewed.

## 2025-05-02 NOTE — PROGRESS NOTE ADULT - ASSESSMENT
75 year old male with history of HIV(CD4 74, VL 4516 23/2025), CAD (prior MI), T2DM, HFrEF (EF 35-40% 3/2025), recurrent syncope, COPD, depression/anxiety,  prostate cancer s/p radiation (2013), presenting for worsening cough and shortness of breath, admitted for recurrent pneumonia.

## 2025-05-02 NOTE — CONSULT NOTE ADULT - ATTENDING COMMENTS
75M h/o HIV/AIDS (CD4=74/12%, VL 4k, on BIC/TAF/FTC in 3/2025), CAD, HFrEF, T2DM, COPD, prostate cancer s/p XRT in 2013, recent admission at [facility] for CAP p/w cough.  History obtained from [facility] ID physician, chart review and patient. Patient was admitted to [facility] from 3/23-3/28/25 for CAP, was treated with vanco/cefepime (extensive work-up neg) then was discharged home with doxy/cefpodoxime x 2 wks course (EOT 4/6). Patient was then admitted to [facility] from 4/16-4/23/25 for chest pain.   TTE showed EF 35%, BNP 4k, seen by cardiologist.  CT chest showed ill defined GGO at R lung field, L LAD, possible underlying PNA.  He was started on CTX/azithro for CAP.  He was continued on home HIV regimen and PCP ppx.  He was discharged with cefpodoxime/doxy for CAP 7 day course (MRSA nasal+) - EOT 4/24/25.  Patient said he has been having chronic cough for many months and currently cough only occur in the evening when he lies down.  He has chronic SOB which is unchanged.  Denied fever/chills, n/v/d, abdominal pain. Upon arrival, he was afebrile, VSS, 95%RA. Lab notable for WBC 2.92, Hgb 11, plt 84, Cr 1.73, lactate 2.9. After fluid, his lactate cleared.  CXR clear. Procal 0.03.  BNP 2536. Patient got vanc/cefepime then stopped since low suspicion for PNA.  ID was consulted for management of HIV/AIDS.  Patient appears comfortable, reports same chronic cough for years and only occur in the evevening, and didn't cough at all during my encounter.  Exam notable for clear lung, S1S2, abdomen soft, NTND.  Patient s/p pneumonia treatment.  Currently do not suspect active PNA.  His cough is a chronic problem and seems mainly occurs in the evening while lying down - could be due to GERD.  Suggest trial of PPI and outpatient pulmonologist evaluation.  Cont Tivicay/Descovy 1 tab daily (home med).  Cont Atovaquone 1500mg PO daily (home med).  f/u PMD.    Thank you for your consult.  Please re-consult us or call us with questions.  Case d/w primary team.    Lisa Aguilera MD, MS  Infectious Disease attending  office phone [phone]  For any questions during evening/weekend/holiday, please page ID on call

## 2025-05-02 NOTE — PROGRESS NOTE ADULT - PROBLEM SELECTOR PLAN 2
(CD4 74, VL 4516 23/2025). On dolutegravir (Tivicay) and emtricitabine-tenofovir (Descovy) and Atovaquone   - c/w Atovaquone for PCP ppx (has sulfa allergy)   - c/w home med Tivicay and Descovy Reports prior hx of hospitalizations due to pneumonia, CXR: Lungs clear. No pneumothorax. Unremarkable cardiac silhouette. No acute bone abnormality.  s/p Vanc and Cefepime in the ED. Recently admitted to [facility] and discharge on doxycycline 2 weeks ago, RVP negative. Previous MRSA swab positive.   - f/u procal   - discontinued Vancomycin and Cefepime 5/2 with close monitoring   - f/u urine strep/legionella   - f/u blood cultures  - continue home medication: atovaquone. Reports prior hx of hospitalizations due to pneumonia, CXR: Lungs clear. No pneumothorax. Unremarkable cardiac silhouette. No acute bone abnormality.  s/p Vanc and Cefepime in the ED. Recently admitted to [facility] and discharge on doxycycline 2 weeks ago, RVP negative. Previous MRSA swab positive.   - f/u procal   - discontinued Vancomycin and Cefepime 5/2 with close monitoring   - f/u urine strep/legionella   - f/u blood cultures  - continue home medication: atovaquone.  -follow up full RVP

## 2025-05-02 NOTE — H&P ADULT - NSHPLABSRESULTS_GEN_ALL_CORE
LABS:                         11.1   2.92  )-----------( 84       ( 01 May 2025 16:11 )             33.2     05-01    141  |  107  |  23  ----------------------------<  219[H]  3.6   |  25  |  1.73[H]    Ca    8.1[L]      01 May 2025 16:11    TPro  6.3[L]  /  Alb  2.9[L]  /  TBili  0.5  /  DBili  x   /  AST  24  /  ALT  16  /  AlkPhos  76  05-01    PT/INR - ( 01 May 2025 16:11 )   PT: 12.4 sec;   INR: 1.07          PTT - ( 01 May 2025 16:11 )  PTT:30.3 sec  Urinalysis Basic - ( 01 May 2025 16:11 )    Color: x / Appearance: x / SG: x / pH: x  Gluc: 219 mg/dL / Ketone: x  / Bili: x / Urobili: x   Blood: x / Protein: x / Nitrite: x   Leuk Esterase: x / RBC: x / WBC x   Sq Epi: x / Non Sq Epi: x / Bacteria: x                RADIOLOGY, EKG & ADDITIONAL TESTS: Reviewed

## 2025-05-02 NOTE — H&P ADULT - ATTENDING COMMENTS
Ptn states w some dyspnea even at rest which is different from baseline, denies F/C, does have chronic chest discomfort which ptn states unchanged from prior.  On exam, mild belly breathing but looks comfortable and speaking in complete sentences, lungs w rhonchi at bases but no crackles or wheezes, no JVD, no LE edema, no abd increased distension from baseline or tenderness.  - monitoring off of abx for now given no fever or white count, procal only 0.16 and clear CXR.   - sp 1x vanc and cefepime in ED, was on doxy outpatient without improvement.    - given known low CD4 count and HIV viral load and multiple hospitalizations for pna, consulting ID in case need more comprehensive infectious workup.  - monitor symptoms, WBC, fever curve off abx, low threshold for CT chest and empiric broad coverage if worsens, f/up pna workup and bcx Ptn states w some dyspnea even at rest which is different from baseline, denies F/C, does have chronic chest discomfort which ptn states unchanged from prior.  On exam, mild belly breathing but looks comfortable and speaking in complete sentences, lungs w rhonchi at bases but no crackles or wheezes, no JVD, no LE edema, no abd increased distension from baseline or tenderness.  - monitoring off of abx for now given no fever or white count, procal only 0.16 and clear CXR.  CT chest to evaluate lung parenchyma given ptn subacute symptoms at home  - sp 1x vanc and cefepime in ED, was on doxy outpatient without improvement.    - given known low CD4 count and HIV viral load and multiple hospitalizations for pna, consulting ID in case need more comprehensive infectious workup.  Ptn not compliant w home meds  - monitor symptoms, WBC, fever curve off abx, low threshold for empiric broad coverage if worsens, f/up pna workup and bcx  - low concern for COPD exacerbation given no wheezing or sputum production on exam, low concern for acute HF exacerbation given ptn appears euvolemic on exam, no crackles in lungs and clear CXR, however can add on BNP given ptn known low EF and repeat TTE if elevated to eval IVC and determine if need maintenance diuresis. Ptn states w some dyspnea even at rest which is different from baseline, denies F/C, does have chronic chest discomfort which ptn states unchanged from prior.  On exam, mild belly breathing but looks comfortable and speaking in complete sentences, lungs w rhonchi at bases but no crackles or wheezes, no JVD, no LE edema, no abd increased distension from baseline or tenderness.  - monitoring off of abx for now given no fever or white count, procal only 0.16 and clear CXR.  CT chest to evaluate lung parenchyma given ptn subacute symptoms at home  - sp 1x vanc and cefepime in ED, was on doxy outpatient without improvement.    - given known low CD4 count and HIV viral load and multiple hospitalizations for pna, consulting ID in case need more comprehensive infectious workup.  Ptn not compliant w home meds  - monitor symptoms, WBC, fever curve off abx, low threshold for empiric broad coverage if worsens, f/up pna workup and bcx  - low concern for COPD exacerbation given no wheezing or sputum production on exam, low concern for acute HF exacerbation given ptn appears euvolemic on exam, no crackles in lungs and clear CXR, however can add on BNP given ptn known low EF and repeat TTE if elevated to eval IVC and determine if need maintenance diuresis.  - BRENNAN now resolved, back to baseline creatinine

## 2025-05-03 LAB
4/8 RATIO: 0.21 RATIO — LOW (ref 0.86–4.14)
ABS CD8: 539 CELLS/UL — SIGNIFICANT CHANGE UP (ref 90–775)
ALBUMIN SERPL ELPH-MCNC: 2.6 G/DL — LOW (ref 3.3–5)
ALP SERPL-CCNC: 218 U/L — HIGH (ref 40–120)
ALT FLD-CCNC: 22 U/L — SIGNIFICANT CHANGE UP (ref 10–45)
ANION GAP SERPL CALC-SCNC: 9 MMOL/L — SIGNIFICANT CHANGE UP (ref 5–17)
AST SERPL-CCNC: 14 U/L — SIGNIFICANT CHANGE UP (ref 10–40)
BASOPHILS # BLD AUTO: 0.03 K/UL — SIGNIFICANT CHANGE UP (ref 0–0.2)
BASOPHILS NFR BLD AUTO: 0.5 % — SIGNIFICANT CHANGE UP (ref 0–2)
BILIRUB SERPL-MCNC: 0.3 MG/DL — SIGNIFICANT CHANGE UP (ref 0.2–1.2)
BUN SERPL-MCNC: 13 MG/DL — SIGNIFICANT CHANGE UP (ref 7–23)
CALCIUM SERPL-MCNC: 7.8 MG/DL — LOW (ref 8.4–10.5)
CD3 BLASTS SPEC-ACNC: 683 CELLS/UL — SIGNIFICANT CHANGE UP (ref 396–2024)
CD3 BLASTS SPEC-ACNC: 83 % — SIGNIFICANT CHANGE UP (ref 58–84)
CD4 %: 14 % — LOW (ref 30–56)
CD8 %: 66 % — HIGH (ref 11–43)
CHLORIDE SERPL-SCNC: 97 MMOL/L — SIGNIFICANT CHANGE UP (ref 96–108)
CO2 SERPL-SCNC: 22 MMOL/L — SIGNIFICANT CHANGE UP (ref 22–31)
CREAT SERPL-MCNC: 0.57 MG/DL — SIGNIFICANT CHANGE UP (ref 0.5–1.3)
EGFR: 102 ML/MIN/1.73M2 — SIGNIFICANT CHANGE UP
EGFR: 102 ML/MIN/1.73M2 — SIGNIFICANT CHANGE UP
EOSINOPHIL # BLD AUTO: 0 K/UL — SIGNIFICANT CHANGE UP (ref 0–0.5)
EOSINOPHIL NFR BLD AUTO: 0 % — SIGNIFICANT CHANGE UP (ref 0–6)
GLUCOSE SERPL-MCNC: 92 MG/DL — SIGNIFICANT CHANGE UP (ref 70–99)
HCT VFR BLD CALC: 29.7 % — LOW (ref 39–50)
HGB BLD-MCNC: 10.1 G/DL — LOW (ref 13–17)
HIV-1 VIRAL LOAD RESULT: ABNORMAL
HIV1 RNA # SERPL NAA+PROBE: ABNORMAL COPIES/ML
HIV1 RNA SER-IMP: SIGNIFICANT CHANGE UP
HIV1 RNA SERPL NAA+PROBE-ACNC: ABNORMAL
HIV1 RNA SERPL NAA+PROBE-LOG#: ABNORMAL LG COP/ML
IMM GRANULOCYTES NFR BLD AUTO: 0.9 % — SIGNIFICANT CHANGE UP (ref 0–0.9)
LYMPHOCYTES # BLD AUTO: 0.68 K/UL — LOW (ref 1–3.3)
LYMPHOCYTES # BLD AUTO: 10.2 % — LOW (ref 13–44)
MAGNESIUM SERPL-MCNC: 1.9 MG/DL — SIGNIFICANT CHANGE UP (ref 1.6–2.6)
MCHC RBC-ENTMCNC: 31.3 PG — SIGNIFICANT CHANGE UP (ref 27–34)
MCHC RBC-ENTMCNC: 34 G/DL — SIGNIFICANT CHANGE UP (ref 32–36)
MCV RBC AUTO: 92 FL — SIGNIFICANT CHANGE UP (ref 80–100)
MONOCYTES # BLD AUTO: 0.58 K/UL — SIGNIFICANT CHANGE UP (ref 0–0.9)
MONOCYTES NFR BLD AUTO: 8.7 % — SIGNIFICANT CHANGE UP (ref 2–14)
NEUTROPHILS # BLD AUTO: 5.3 K/UL — SIGNIFICANT CHANGE UP (ref 1.8–7.4)
NEUTROPHILS NFR BLD AUTO: 79.7 % — HIGH (ref 43–77)
NRBC BLD AUTO-RTO: 0 /100 WBCS — SIGNIFICANT CHANGE UP (ref 0–0)
PHOSPHATE SERPL-MCNC: 2.7 MG/DL — SIGNIFICANT CHANGE UP (ref 2.5–4.5)
PLATELET # BLD AUTO: 297 K/UL — SIGNIFICANT CHANGE UP (ref 150–400)
POTASSIUM SERPL-MCNC: 3.8 MMOL/L — SIGNIFICANT CHANGE UP (ref 3.5–5.3)
POTASSIUM SERPL-SCNC: 3.8 MMOL/L — SIGNIFICANT CHANGE UP (ref 3.5–5.3)
PROCALCITONIN SERPL-MCNC: 0.05 NG/ML — SIGNIFICANT CHANGE UP (ref 0.02–0.1)
PROT SERPL-MCNC: 5.7 G/DL — LOW (ref 6–8.3)
RBC # BLD: 3.23 M/UL — LOW (ref 4.2–5.8)
RBC # FLD: 14.4 % — SIGNIFICANT CHANGE UP (ref 10.3–14.5)
SODIUM SERPL-SCNC: 128 MMOL/L — LOW (ref 135–145)
T-CELL CD4 SUBSET PNL BLD: 113 CELLS/UL — LOW (ref 325–1251)
VIABLE CELLS NFR SPEC: SIGNIFICANT CHANGE UP
WBC # BLD: 6.65 K/UL — SIGNIFICANT CHANGE UP (ref 3.8–10.5)
WBC # FLD AUTO: 6.65 K/UL — SIGNIFICANT CHANGE UP (ref 3.8–10.5)

## 2025-05-03 PROCEDURE — 99285 EMERGENCY DEPT VISIT HI MDM: CPT | Mod: 25

## 2025-05-03 PROCEDURE — 86359 T CELLS TOTAL COUNT: CPT

## 2025-05-03 PROCEDURE — 87536 HIV-1 QUANT&REVRSE TRNSCRPJ: CPT

## 2025-05-03 PROCEDURE — 84133 ASSAY OF URINE POTASSIUM: CPT

## 2025-05-03 PROCEDURE — 36415 COLL VENOUS BLD VENIPUNCTURE: CPT

## 2025-05-03 PROCEDURE — 83735 ASSAY OF MAGNESIUM: CPT

## 2025-05-03 PROCEDURE — 0225U NFCT DS DNA&RNA 21 SARSCOV2: CPT

## 2025-05-03 PROCEDURE — 80048 BASIC METABOLIC PNL TOTAL CA: CPT

## 2025-05-03 PROCEDURE — 84156 ASSAY OF PROTEIN URINE: CPT

## 2025-05-03 PROCEDURE — 85610 PROTHROMBIN TIME: CPT

## 2025-05-03 PROCEDURE — 87637 SARSCOV2&INF A&B&RSV AMP PRB: CPT

## 2025-05-03 PROCEDURE — 83605 ASSAY OF LACTIC ACID: CPT

## 2025-05-03 PROCEDURE — 85025 COMPLETE CBC W/AUTO DIFF WBC: CPT

## 2025-05-03 PROCEDURE — 96365 THER/PROPH/DIAG IV INF INIT: CPT

## 2025-05-03 PROCEDURE — 96368 THER/DIAG CONCURRENT INF: CPT

## 2025-05-03 PROCEDURE — 85730 THROMBOPLASTIN TIME PARTIAL: CPT

## 2025-05-03 PROCEDURE — 84300 ASSAY OF URINE SODIUM: CPT

## 2025-05-03 PROCEDURE — 87899 AGENT NOS ASSAY W/OPTIC: CPT

## 2025-05-03 PROCEDURE — 71045 X-RAY EXAM CHEST 1 VIEW: CPT

## 2025-05-03 PROCEDURE — 82962 GLUCOSE BLOOD TEST: CPT

## 2025-05-03 PROCEDURE — 83880 ASSAY OF NATRIURETIC PEPTIDE: CPT

## 2025-05-03 PROCEDURE — 85027 COMPLETE CBC AUTOMATED: CPT

## 2025-05-03 PROCEDURE — 82570 ASSAY OF URINE CREATININE: CPT

## 2025-05-03 PROCEDURE — 84145 PROCALCITONIN (PCT): CPT

## 2025-05-03 PROCEDURE — 84540 ASSAY OF URINE/UREA-N: CPT

## 2025-05-03 PROCEDURE — 96366 THER/PROPH/DIAG IV INF ADDON: CPT

## 2025-05-03 PROCEDURE — 86360 T CELL ABSOLUTE COUNT/RATIO: CPT

## 2025-05-03 PROCEDURE — 84100 ASSAY OF PHOSPHORUS: CPT

## 2025-05-03 PROCEDURE — 87040 BLOOD CULTURE FOR BACTERIA: CPT

## 2025-05-03 PROCEDURE — 83935 ASSAY OF URINE OSMOLALITY: CPT

## 2025-05-03 PROCEDURE — 80053 COMPREHEN METABOLIC PANEL: CPT

## 2025-05-07 ENCOUNTER — EMERGENCY (EMERGENCY)
Facility: HOSPITAL | Age: 75
LOS: 1 days | End: 2025-05-07
Attending: EMERGENCY MEDICINE | Admitting: EMERGENCY MEDICINE
Payer: COMMERCIAL

## 2025-05-07 VITALS
WEIGHT: 169.98 LBS | TEMPERATURE: 98 F | OXYGEN SATURATION: 96 % | HEART RATE: 80 BPM | RESPIRATION RATE: 15 BRPM | DIASTOLIC BLOOD PRESSURE: 94 MMHG | HEIGHT: 72 IN | SYSTOLIC BLOOD PRESSURE: 180 MMHG

## 2025-05-07 DIAGNOSIS — R35.0 FREQUENCY OF MICTURITION: ICD-10-CM

## 2025-05-07 DIAGNOSIS — Z95.9 PRESENCE OF CARDIAC AND VASCULAR IMPLANT AND GRAFT, UNSPECIFIED: Chronic | ICD-10-CM

## 2025-05-07 DIAGNOSIS — R82.998 OTHER ABNORMAL FINDINGS IN URINE: ICD-10-CM

## 2025-05-07 DIAGNOSIS — Z91.018 ALLERGY TO OTHER FOODS: ICD-10-CM

## 2025-05-07 DIAGNOSIS — R35.89 OTHER POLYURIA: ICD-10-CM

## 2025-05-07 DIAGNOSIS — R82.71 BACTERIURIA: ICD-10-CM

## 2025-05-07 DIAGNOSIS — Z88.2 ALLERGY STATUS TO SULFONAMIDES: ICD-10-CM

## 2025-05-07 DIAGNOSIS — Z21 ASYMPTOMATIC HUMAN IMMUNODEFICIENCY VIRUS [HIV] INFECTION STATUS: ICD-10-CM

## 2025-05-07 DIAGNOSIS — Z88.0 ALLERGY STATUS TO PENICILLIN: ICD-10-CM

## 2025-05-07 LAB
ALBUMIN SERPL ELPH-MCNC: 2.5 G/DL — LOW (ref 3.4–5)
ALP SERPL-CCNC: 81 U/L — SIGNIFICANT CHANGE UP (ref 40–120)
ALT FLD-CCNC: 22 U/L — SIGNIFICANT CHANGE UP (ref 12–42)
ANION GAP SERPL CALC-SCNC: 10 MMOL/L — SIGNIFICANT CHANGE UP (ref 9–16)
APPEARANCE UR: CLEAR — SIGNIFICANT CHANGE UP
AST SERPL-CCNC: 26 U/L — SIGNIFICANT CHANGE UP (ref 15–37)
BILIRUB SERPL-MCNC: 0.3 MG/DL — SIGNIFICANT CHANGE UP (ref 0.2–1.2)
BILIRUB UR-MCNC: NEGATIVE — SIGNIFICANT CHANGE UP
BUN SERPL-MCNC: 30 MG/DL — HIGH (ref 7–23)
CALCIUM SERPL-MCNC: 8 MG/DL — LOW (ref 8.5–10.5)
CHLORIDE SERPL-SCNC: 103 MMOL/L — SIGNIFICANT CHANGE UP (ref 96–108)
CO2 SERPL-SCNC: 26 MMOL/L — SIGNIFICANT CHANGE UP (ref 22–31)
COLOR SPEC: YELLOW — SIGNIFICANT CHANGE UP
CREAT SERPL-MCNC: 1.49 MG/DL — HIGH (ref 0.5–1.3)
CULTURE RESULTS: SIGNIFICANT CHANGE UP
CULTURE RESULTS: SIGNIFICANT CHANGE UP
DIFF PNL FLD: ABNORMAL
EGFR: 49 ML/MIN/1.73M2 — LOW
EGFR: 49 ML/MIN/1.73M2 — LOW
GLUCOSE SERPL-MCNC: 213 MG/DL — HIGH (ref 70–99)
GLUCOSE UR QL: 250 MG/DL
HCT VFR BLD CALC: 34.6 % — LOW (ref 39–50)
HGB BLD-MCNC: 11.4 G/DL — LOW (ref 13–17)
IMMATURE PLATELET FRACTION #: 2.8 K/UL — LOW (ref 3.9–12.5)
IMMATURE PLATELET FRACTION %: 2.9 % — SIGNIFICANT CHANGE UP (ref 1.6–7.1)
KETONES UR-MCNC: NEGATIVE MG/DL — SIGNIFICANT CHANGE UP
LEUKOCYTE ESTERASE UR-ACNC: NEGATIVE — SIGNIFICANT CHANGE UP
MCHC RBC-ENTMCNC: 32.5 PG — SIGNIFICANT CHANGE UP (ref 27–34)
MCHC RBC-ENTMCNC: 32.9 G/DL — SIGNIFICANT CHANGE UP (ref 32–36)
MCV RBC AUTO: 98.6 FL — SIGNIFICANT CHANGE UP (ref 80–100)
NITRITE UR-MCNC: NEGATIVE — SIGNIFICANT CHANGE UP
NRBC # BLD AUTO: 0 K/UL — SIGNIFICANT CHANGE UP (ref 0–0)
NRBC # FLD: 0 K/UL — SIGNIFICANT CHANGE UP (ref 0–0)
NRBC BLD AUTO-RTO: 0 /100 WBCS — SIGNIFICANT CHANGE UP (ref 0–0)
PH UR: 6.5 — SIGNIFICANT CHANGE UP (ref 5–8)
PLATELET # BLD AUTO: 98 K/UL — LOW (ref 150–400)
PMV BLD: 10.8 FL — SIGNIFICANT CHANGE UP (ref 7–13)
POTASSIUM SERPL-MCNC: 3.5 MMOL/L — SIGNIFICANT CHANGE UP (ref 3.5–5.3)
POTASSIUM SERPL-SCNC: 3.5 MMOL/L — SIGNIFICANT CHANGE UP (ref 3.5–5.3)
PROT SERPL-MCNC: 6.1 G/DL — LOW (ref 6.4–8.2)
PROT UR-MCNC: 100 MG/DL
RBC # BLD: 3.51 M/UL — LOW (ref 4.2–5.8)
RBC # FLD: 13.6 % — SIGNIFICANT CHANGE UP (ref 10.3–14.5)
SODIUM SERPL-SCNC: 139 MMOL/L — SIGNIFICANT CHANGE UP (ref 132–145)
SP GR SPEC: 1.01 — SIGNIFICANT CHANGE UP (ref 1–1.03)
SPECIMEN SOURCE: SIGNIFICANT CHANGE UP
SPECIMEN SOURCE: SIGNIFICANT CHANGE UP
UROBILINOGEN FLD QL: 0.2 MG/DL — SIGNIFICANT CHANGE UP (ref 0.2–1)
WBC # BLD: 2.46 K/UL — LOW (ref 3.8–10.5)
WBC # FLD AUTO: 2.46 K/UL — LOW (ref 3.8–10.5)

## 2025-05-07 PROCEDURE — 99284 EMERGENCY DEPT VISIT MOD MDM: CPT

## 2025-05-07 PROCEDURE — 74176 CT ABD & PELVIS W/O CONTRAST: CPT | Mod: 26

## 2025-05-07 RX ORDER — LEVOFLOXACIN 25 MG/ML
1 SOLUTION ORAL
Qty: 4 | Refills: 0
Start: 2025-05-07 | End: 2025-05-16

## 2025-05-07 RX ORDER — LEVOFLOXACIN 25 MG/ML
1 SOLUTION ORAL
Qty: 4 | Refills: 0
Start: 2025-05-07 | End: 2025-05-13

## 2025-05-07 NOTE — ED PROVIDER NOTE - IV ALTEPLASE INCLUSION HIDDEN
Preventive Care Visit  [facility]  Sep 23, 2024    Assessment & Plan   12 year old 7 month old, here for preventive care.    Encounter for routine child health examination w/o abnormal findings  Normal growth and development.  - BEHAVIORAL/EMOTIONAL ASSESSMENT (43237)  - SCREENING TEST, PURE TONE, AIR ONLY  - SCREENING, VISUAL ACUITY, QUANTITATIVE, BILAT  - HPV, IM (9-26 YRS) - Gardasil 9  - INFLUENZA VACCINE, SPLIT VIRUS, TRIVALENT,PF (FLUZONE)  - PRIMARY CARE FOLLOW-UP SCHEDULING      Growth      Normal height and weight    Immunizations   Appropriate vaccinations were ordered.  Patient/Parent(s) declined some/all vaccines today.  Covid-19  Immunizations Administered       Name Date Dose VIS Date Route    HPV9 9/23/24  3:34 PM 0.5 mL 08/06/2021, Given Today Intramuscular    Influenza, Split Virus, Trivalent, Pf (Fluzone\Fluarix) 9/23/24  3:35 PM 0.5 mL 08/06/2021,Given Today Intramuscular          Anticipatory Guidance    Reviewed age appropriate anticipatory guidance.           Referrals/Ongoing Specialty Care  None  Verbal Dental Referral: Patient has established dental home        Subjective   Cristy is presenting for the following:  Well Child and Imm/Inj      Cristy  has been doing well. Mother has some concerns about low milk intake.         9/23/2024     7:01 AM   Additional Questions   Accompanied by Mother   Questions for today's visit No   Surgery, major illness, or injury since last physical No           9/23/2024   Social   Lives with Parent(s)    Sibling(s)   Recent potential stressors (!) PARENTAL DIVORCE    (!) DIFFICULTIES BETWEEN PARENTS   History of trauma No   Family Hx of mental health challenges (!) YES   Lack of transportation has limited access to appts/meds No   Do you have housing? (Housing is defined as stable permanent housing and does not include staying ouside in a car, in a tent, in an abandoned building, in an overnight shelter, or  "couch-surfing.) Yes   Are you worried about losing your housing? No            9/23/2024     2:57 PM   Health Risks/Safety   Where does your adolescent sit in the car? Back seat   Does your adolescent always wear a seat belt? Yes   Helmet use? Yes   Do you have guns/firearms in the home? No         9/23/2024     2:57 PM   TB Screening   Was your adolescent born outside of the [place]? No         9/23/2024     2:57 PM   TB Screening: Consider immunosuppression as a risk factor for TB   Recent TB infection or positive TB test in family/close contacts No   Recent travel outside [place] (child/family/close contacts) No   Recent residence in high-risk group setting (correctional facility/health care facility/homeless shelter/refugee camp) No          9/23/2024     2:57 PM   Dyslipidemia   FH: premature cardiovascular disease No, these conditions are not present in the patient's biologic parents or grandparents   FH: hyperlipidemia No   Personal risk factors for heart disease NO diabetes, high blood pressure, obesity, smokes cigarettes, kidney problems, heart or kidney transplant, history of Kawasaki disease with an aneurysm, lupus, rheumatoid arthritis, or HIV     No results for input(s): \"CHOL\", \"HDL\", \"LDL\", \"TRIG\", \"CHOLHDLRATIO\" in the last 14669 hours.        9/23/2024     2:57 PM   Sudden Cardiac Arrest and Sudden Cardiac Death Screening   History of syncope/seizure No   History of exercise-related chest pain or shortness of breath No   FH: premature death (sudden/unexpected or other) attributable to heart diseases No   FH: cardiomyopathy, ion channelopothy, Marfan syndrome, or arrhythmia No         9/23/2024     2:57 PM   Dental Screening   Has your adolescent seen a dentist? Yes   When was the last visit? (!) OVER 1 YEAR AGO   Has your adolescent had cavities in the last 3 years? No   Has your adolescent s parent(s), caregiver, or sibling(s) had any cavities in the last 2 years?  No         9/23/2024   Diet "   Do you have questions about your adolescent's eating?  (!) YES   What questions do you have?  she drinks no milk   Do you have questions about your adolescent's height or weight? (!) YES   Please specify: will she keep getting taller   What does your adolescent regularly drink? Water   How often does your family eat meals together? Most days   Servings of fruits/vegetables per day (!) 3-4   At least 3 servings of food or beverages that have calcium each day? Yes   In past 12 months, concerned food might run out No   In past 12 months, food has run out/couldn't afford more No              9/23/2024   Activity   Days per week of moderate/strenuous exercise 2 days   What does your adolescent do for exercise?  run   What activities is your adolescent involved with?  flute          9/23/2024     2:57 PM   Media Use   Hours per day of screen time (for entertainment) 2   Screen in bedroom (!) YES         9/23/2024     2:57 PM   Sleep   Does your adolescent have any trouble with sleep? No   Daytime sleepiness/naps No         9/23/2024     2:57 PM   School   School concerns (!) MATH   Grade in school 7th Grade   Current school [employer]   School absences (>2 days/mo) No         9/23/2024     2:57 PM   Vision/Hearing   Vision or hearing concerns No concerns         9/23/2024     2:57 PM   Development / Social-Emotional Screen   Developmental concerns No     Psycho-Social/Depression - PSC-17 required for C&TC through age 18  General screening:  Electronic PSC       9/23/2024     2:58 PM   PSC SCORES   Inattentive / Hyperactive Symptoms Subtotal 1   Externalizing Symptoms Subtotal 7 (At Risk)   Internalizing Symptoms Subtotal 9 (At Risk)   PSC - 17 Total Score 17 (Positive)       Follow up:  no follow up necessary  Teen Screen    Teen Screen completed and addressed with patient.        9/23/2024     2:57 PM   AMB WCC MENSES SECTION   What are your adolescent's periods like?  Regular          Objective     Exam  /64   Pulse  "86   Temp 98.1  F (36.7  C) (Tympanic)   Resp 17   Ht 1.6 m (5' 3\")   Wt 48.1 kg (106 lb)   LMP  (LMP Unknown)   SpO2 98%   BMI 18.78 kg/m    76 %ile (Z= 0.69) based on CDC (Girls, 2-20 Years) Stature-for-age data based on Stature recorded on 9/23/2024.  66 %ile (Z= 0.40) based on CDC (Girls, 2-20 Years) weight-for-age data using vitals from 9/23/2024.  55 %ile (Z= 0.11) based on CDC (Girls, 2-20 Years) BMI-for-age based on BMI available as of 9/23/2024.  Blood pressure %sylvia are 77% systolic and 52% diastolic based on the 2017 AAP Clinical Practice Guideline. This reading is in the normal blood pressure range.    Physical Exam  GENERAL: Active, alert, in no acute distress.  SKIN: Clear. No significant rash, abnormal pigmentation or lesions  HEAD: Normocephalic  EYES: Pupils equal, round, reactive, Extraocular muscles intact. Normal conjunctivae.  EARS: Normal canals. Tympanic membranes are normal; gray and translucent.  NOSE: Normal without discharge.  MOUTH/THROAT: Clear. No oral lesions. Teeth without obvious abnormalities.  NECK: Supple, no masses.  No thyromegaly.  LYMPH NODES: No adenopathy  LUNGS: Clear. No rales, rhonchi, wheezing or retractions  HEART: Regular rhythm. Normal S1/S2. No murmurs. Normal pulses.  ABDOMEN: Soft, non-tender, not distended, no masses or hepatosplenomegaly. Bowel sounds normal.   NEUROLOGIC: No focal findings. Cranial nerves grossly intact: DTR's normal. Normal gait, strength and tone  BACK: Spine is straight, no scoliosis.  EXTREMITIES: Full range of motion, no deformities  : Exam declined by parent/patient.  Reason for decline: Patient/Parental preference        Signed Electronically by: Nicole Hill MD    " show

## 2025-05-07 NOTE — ED PROVIDER NOTE - CARE PLAN
1 Principal Discharge DX:	Calcium oxalate crystals in urine  Secondary Diagnosis:	Polyuria  Secondary Diagnosis:	Bacteria in urine

## 2025-05-07 NOTE — ED PROVIDER NOTE - PROVIDER TOKENS
PROVIDER:[TOKEN:[[id number]]],PROVIDER:[TOKEN:[[id number]]],PROVIDER:[TOKEN:[[id number]]]

## 2025-05-07 NOTE — ED ADULT NURSE NOTE - CAS EDN DISCHARGE ASSESSMENT
PROVIDER:[TOKEN:[[id number]]],PROVIDER:[TOKEN:[[id number]]],FREE:[LAST:[your primary are physician],PHONE:[(   )    -],FAX:[(   )    -],ADDRESS:[in 1-2 weeks,]]
Alert and oriented to person, place and time

## 2025-05-07 NOTE — ED PROVIDER NOTE - NSFOLLOWUPINSTRUCTIONS_ED_ALL_ED_FT
Follow up with your primary care doctor or clinics listed below if you do not have a doctor  [address] [address]  To make an appointment, call [phone]  [facility]  Address: [address] [place] [address]  Appointment Center: [phone] ([phone])    Or see HIV primary care Retroviral clinic and urology clinic and orthopedics at [facility].      Take Levaquin 750mg every 2 days for a week.

## 2025-05-07 NOTE — ED PROVIDER NOTE - NSFOLLOWUPCLINICS_GEN_ALL_ED_FT
[facility]  HIV/AIDS Research & Treatment  [address] [place] [address]  Phone: [phone]  Fax:     [facility] Urology Clinic  Urology  [address], 3rd [place] [address]  Phone: [phone]  Fax:     [facility]  Orthopedics  .  [place]   Phone: [phone]  Fax:

## 2025-05-07 NOTE — ED PROVIDER NOTE - NSCAREINITIATED _GEN_ER
CHIEF COMPLAINT:  Chief Complaint   Patient presents with   • Follow-up     IFG, HTN, and elevated bilirubin.  Has not had any recent labs.  Pt. states he has no questions/concerns at this time.       The patient is unaccompanied.    SUBJECTIVE:  Per Benton is a 75 year old man who presents with follow-up     Continues on lisinopril for hypertension.    He will do colonoscopy.    REVIEW OF SYSTEMS:  Review of Systems   Constitutional: Negative for chills and fever.   Respiratory: Negative for cough, shortness of breath and wheezing.    Cardiovascular: Negative for chest pain and palpitations.   Gastrointestinal: Negative for abdominal pain, constipation, diarrhea, nausea and vomiting.       MEDICATIONS:  Current Outpatient Medications   Medication   • lisinopril (ZESTRIL) 10 MG tablet     No current facility-administered medications for this visit.        ALLERGIES:  ALLERGIES: no known allergies.    PROBLEM LIST:    Patient Active Problem List   Diagnosis   • Gilbert's syndrome   • Essential hypertension   • Type 2 diabetes mellitus without complication, without long-term current use of insulin (CMS/[facility])   • Overweight (BMI 25.0-29.9)   • Colon cancer screening   • Cough             OBJECTIVE:    PHYSICAL EXAM:  Visit Vitals  /84 (BP Location: [facility] [place], Patient Position: Sitting, Cuff Size: Regular)   Pulse 88   Temp 97.8 °F (36.6 °C) (Oral)   Resp 16   Ht 5' 8\" (1.727 m)   Wt 83.9 kg (185 lb)   SpO2 96%   BMI 28.13 kg/m²       Physical Exam   Constitutional: He is oriented to person, place, and time. No distress.   Cardiovascular: Normal rate, regular rhythm and normal heart sounds. Exam reveals no gallop and no friction rub.   No murmur heard.  Pulmonary/Chest: Effort normal and breath sounds normal. No respiratory distress. He has no wheezes.   Abdominal: Soft. Bowel sounds are normal. He exhibits no distension and no mass. There is no tenderness.   Neurological: He is alert and oriented to person, place,  and time. No cranial nerve deficit or sensory deficit. He exhibits normal muscle tone.   Skin: Skin is warm and dry. No rash noted.   Vitals reviewed.      LABORATORY TESTS:  No visits with results within 1 Month(s) from this visit.   Latest known visit with results is:   Office Visit on 05/01/2019   Component Date Value   • WBC 05/01/2019 6.3    • RBC 05/01/2019 5.28    • HGB 05/01/2019 15.3    • HCT 05/01/2019 48.2    • MCV 05/01/2019 91.3    • MCH 05/01/2019 29.0    • MCHC 05/01/2019 31.7*   • RDW-CV 05/01/2019 12.3    • PLT 05/01/2019 183    • NRBC 05/01/2019 0    • DIFF TYPE 05/01/2019 AUTOMATED DIFFERENTIAL    • Neutrophil 05/01/2019 68    • LYMPH 05/01/2019 21    • MONO 05/01/2019 6    • EOSIN 05/01/2019 4    • BASO 05/01/2019 1    • Percent Immature Granulo* 05/01/2019 0    • Absolute Neutrophil 05/01/2019 4.3    • Absolute Lymph 05/01/2019 1.3    • Absolute Mono 05/01/2019 0.4    • Absolute Eos 05/01/2019 0.3    • Absolute Baso 05/01/2019 0.0    • Absolute Immature Granul* 05/01/2019 0.0    • Hemoglobin A1C 05/01/2019 6.5*   • Blood Smear Review Patho* 05/01/2019 Differential count and morphology verified by microscopic review.    • Pathologist 05/01/2019 Reviewed by Paco Jay M.D.    • RETIC ABS 05/01/2019 96    • RETIC COUNT 05/01/2019 1.8    • IMMATURE RETIC FRACT 05/01/2019 4.7    • GGT 05/01/2019 28    • LDH 05/01/2019 167    • Fasting Status 05/01/2019 UNKNOWN    • Sodium 05/01/2019 142    • Potassium 05/01/2019 4.1    • Chloride 05/01/2019 109*   • Carbon Dioxide 05/01/2019 25    • Anion Gap 05/01/2019 12    • Glucose 05/01/2019 217*   • BUN 05/01/2019 13    • Creatinine 05/01/2019 0.84    • GFR Estimate,  Am* 05/01/2019 >90    • GFR Estimate, Non Carolyn* 05/01/2019 86    • BUN/Creatinine Ratio 05/01/2019 16    • CALCIUM 05/01/2019 8.7    • TOTAL BILIRUBIN 05/01/2019 1.9*   • AST/SGOT 05/01/2019 24    • ALT/SGPT 05/01/2019 28    • ALK PHOSPHATASE 05/01/2019 80    • TOTAL PROTEIN 05/01/2019  7.0    • Albumin 05/01/2019 4.1    • GLOBULIN 05/01/2019 2.9    • A/G Ratio, Serum 05/01/2019 1.4    • DIRECT BILIRUBIN 05/01/2019 0.3*         ASSESSMENT/PLAN:  Essential hypertension  BP great  Continue lisinopril  - lisinopril (ZESTRIL) 10 MG tablet; Take 1 tablet by mouth daily.  Dispense: 90 tablet; Refill: 0  - COMPREHENSIVE METABOLIC PANEL  - COMPREHENSIVE METABOLIC PANEL; Future    Type 2 diabetes mellitus without complication, without long-term current use of insulin (CMS/[facility])  A1c 6.5%, so no medication needed at this time  Recheck today  Lipids ordered for future  Will need eye exam in future  - GLYCOHEMOGLOBIN  - CBC & AUTO DIFFERENTIAL  - COMPREHENSIVE METABOLIC PANEL  - MICROALBUMIN URINE RANDOM  - CBC & AUTO DIFFERENTIAL; Future  - GLYCOHEMOGLOBIN; Future  - COMPREHENSIVE METABOLIC PANEL; Future  - LIPID PANEL WITH REFLEX; Future    Gilbert's syndrome  Discussed with pt   - COMPREHENSIVE METABOLIC PANEL  - COMPREHENSIVE METABOLIC PANEL; Future    Overweight (BMI 25.0-29.9)  BMI Readings from Last 1 Encounters:   08/06/19 28.13 kg/m²     Wt Readings from Last 3 Encounters:   08/06/19 83.9 kg (185 lb)   05/01/19 83.4 kg (183 lb 12.8 oz)   10/04/18 83.7 kg (184 lb 8 oz)     The importance of weight loss was discussed. I encouraged the patient to follow a healthy diet and to exercise.      Colon cancer screening  Pt will do colonoscopy      Orders Placed This Encounter   • Glycohemoglobin   • CBC & Auto Differential   • Comprehensive Metabolic Panel   • Microalbumin Urine Random   • CBC & Auto Differential   • Glycohemoglobin   • Comprehensive Metabolic Panel   • Lipid Panel with Reflex   • lisinopril (ZESTRIL) 10 MG tablet       Return in about 3 months (around 11/6/2019) for recheck diabetes mellitus, recheck blood pressure.    Henok Perdomo MD  8/6/2019     IFG (impaired fasting glucose)  Check A1c today  Likely has diabetes mellitus, type 2 - last A1c was 6.8%.     Overweight (BMI 25.0-29.9)      BMI  Readings from Last 1 Encounters:   05/01/19 27.95 kg/m²          Wt Readings from Last 3 Encounters:   05/01/19 83.4 kg (183 lb 12.8 oz)   10/04/18 83.7 kg (184 lb 8 oz)   09/04/18 84.8 kg (187 lb)      The importance of weight loss was discussed. I encouraged the patient to follow a healthy diet and to exercise.        Elevated bilirubin  Likely Gilbert's  Check workup today     Colon cancer screening  Refer to Dr. Rollins  - SERVICE TO GASTROENTEROLOGY     Essential hypertension  Resume lisinopril  Recheck 3 months  - lisinopril (ZESTRIL) 10 MG tablet; Take 1 tablet by mouth daily.  Dispense: 90 tablet; Refill: 0     Need for vaccination  PCV13 today  PPSV23 5/2020  - PNEUMOCOCCAL CONJUGATE 13 VALENT VACC(PREVNAR-13)     Nico Gage(Attending)

## 2025-05-07 NOTE — ED PROVIDER NOTE - PATIENT PORTAL LINK FT
You can access the FollowMyHealth Patient Portal offered by [facility] by registering at the following website: [email or web address]. By joining [employer]’s FollowMyHealth portal, you will also be able to view your health information using other applications (apps) compatible with our system.

## 2025-05-07 NOTE — ED PROVIDER NOTE - CLINICAL SUMMARY MEDICAL DECISION MAKING FREE TEXT BOX
Patient is a 75-year-old male complaining of urinary frequency for the past few days.  Patient states he was recently admitted to [facility] for pneumonia but has not yet been able to  his antibiotic, patient does not have a phone and is unsure if the pharmacy has received the prescription.  Patient also states he has broken right arm for the past few weeks and patient does present with a cast on the arm and states that he is supposed to have an appointment at  [facility] orthopedics but has yet not yet followed up with them.  Case discussed with Ai Brand from patient's scheduling who states that without a phone number it is difficult to schedule these appointments but she has provided him with her direct line so that she can further assist him making follow-up appointments.  Prior chart reviewed and patient also has decreased creatinine clearance as well as HIV.  Will provide HIV primary care clinic information for him at [facility] with Dr. Bertrand  and will prescribe Levaquin 750 mg  every 2 days because of his decreased creatinine clearance,  for bacteria in his urine while urine culture is pending.    Levaquin is also a good choice for community-acquired pneumonia in the patient with a penicillin allergy.    Patient has no abdominal pain or flank pain at this time and does not have dysuria, just urinary frequency.  CT renal survey shows calcifications in renal system.  Will also refer to urology.

## 2025-05-07 NOTE — ED PROVIDER NOTE - CARE PROVIDER_API CALL
Jodi Bertrand.  Internal Medicine  [address] [address]  Phone: [phone]  Fax: [phone]  Follow Up Time:     Truman Vazquez  Orthopaedic Surgery  [address] [place] [address], [address]  [place] [address]  Phone: [phone]  Fax: [phone]  Follow Up Time:     Sy Joseph  Orthopaedic Sports Medicine  [address] [place] [address], [address]  [place] [address]  Phone: [phone]  Fax: [phone]  Follow Up Time:

## 2025-05-07 NOTE — ED PROVIDER NOTE - CARE PROVIDERS DIRECT ADDRESSES
[email or web address]

## 2025-05-07 NOTE — ED ADULT TRIAGE NOTE - CHIEF COMPLAINT QUOTE
Pt reports urinary frequency x2 days. Denies hematuria, denies pain, denies fever, denies back pain, denies n/v. Of note pt was recently seen at [facility] and diagnosed with pneumonia but has not filled his abx yet.

## 2025-05-07 NOTE — ED PROVIDER NOTE - OBJECTIVE STATEMENT
Patient is a 75-year-old male complaining of urinary frequency for the past few days.  Patient states he was recently admitted to [facility] for pneumonia but has not yet been able to  his antibiotic, patient does not have a phone and is unsure if the pharmacy has received the prescription.  Patient also states he has broken right arm for the past few weeks and patient does present with a cast on the arm and states that he is supposed to have an appointment at  [facility] orthopedics but has yet not yet followed up with them.  Case discussed with Ai Brand from patient's scheduling who states that without a phone number it is difficult to schedule these appointments but she has provided him with her direct line so that she can further assist him making follow-up appointments.  Prior chart reviewed and patient also has decreased creatinine clearance as well as HIV.  Will provide HIV primary care clinic information for him at [facility] with Dr. Bertrand  and will prescribe Levaquin 750 mg  every 2 days because of his decreased creatinine clearance,  for bacteria in his urine while urine culture is pending.    Levaquin is also a good choice for community-acquired pneumonia in the patient with a penicillin allergy.    Patient has no abdominal pain or flank pain at this time and does not have dysuria, just urinary frequency.

## 2025-05-07 NOTE — ED ADULT NURSE NOTE - CHIEF COMPLAINT QUOTE
Pt reports urinary frequency x2 days. Denies hematuria, denies pain, denies fever, denies back pain, denies n/v. Of note pt was recently seen at [facility] and diagnosed with pneumonia but has not filled his abx yet.

## 2025-05-07 NOTE — ED ADULT NURSE NOTE - OBJECTIVE STATEMENT
Patient complaining of urinary frequency, foul odor urine and increased thirst for 2 days. Denies hematuria, pain with urination, dysuria, fever, back pain, nausea, voming. Of note pt was recently seen at [facility] and diagnosed with pneumonia but has not filled his abx yet. Patient complaining of urinary frequency, foul odor urine and increased thirst for 2 days. Denies hematuria, pain with urination, dysuria, fever, back pain, nausea, vomiting.

## 2025-05-09 ENCOUNTER — EMERGENCY (EMERGENCY)
Facility: HOSPITAL | Age: 75
LOS: 1 days | End: 2025-05-09
Admitting: EMERGENCY MEDICINE
Payer: COMMERCIAL

## 2025-05-09 VITALS
DIASTOLIC BLOOD PRESSURE: 98 MMHG | RESPIRATION RATE: 17 BRPM | OXYGEN SATURATION: 98 % | HEART RATE: 84 BPM | SYSTOLIC BLOOD PRESSURE: 160 MMHG | HEIGHT: 72 IN | TEMPERATURE: 98 F

## 2025-05-09 DIAGNOSIS — Z21 ASYMPTOMATIC HUMAN IMMUNODEFICIENCY VIRUS [HIV] INFECTION STATUS: ICD-10-CM

## 2025-05-09 DIAGNOSIS — J44.9 CHRONIC OBSTRUCTIVE PULMONARY DISEASE, UNSPECIFIED: ICD-10-CM

## 2025-05-09 DIAGNOSIS — R35.0 FREQUENCY OF MICTURITION: ICD-10-CM

## 2025-05-09 DIAGNOSIS — Z85.46 PERSONAL HISTORY OF MALIGNANT NEOPLASM OF PROSTATE: ICD-10-CM

## 2025-05-09 DIAGNOSIS — I50.9 HEART FAILURE, UNSPECIFIED: ICD-10-CM

## 2025-05-09 DIAGNOSIS — Z91.018 ALLERGY TO OTHER FOODS: ICD-10-CM

## 2025-05-09 DIAGNOSIS — Z88.0 ALLERGY STATUS TO PENICILLIN: ICD-10-CM

## 2025-05-09 DIAGNOSIS — I25.10 ATHEROSCLEROTIC HEART DISEASE OF NATIVE CORONARY ARTERY WITHOUT ANGINA PECTORIS: ICD-10-CM

## 2025-05-09 DIAGNOSIS — Z88.2 ALLERGY STATUS TO SULFONAMIDES: ICD-10-CM

## 2025-05-09 DIAGNOSIS — I25.2 OLD MYOCARDIAL INFARCTION: ICD-10-CM

## 2025-05-09 DIAGNOSIS — F41.9 ANXIETY DISORDER, UNSPECIFIED: ICD-10-CM

## 2025-05-09 DIAGNOSIS — Z95.9 PRESENCE OF CARDIAC AND VASCULAR IMPLANT AND GRAFT, UNSPECIFIED: Chronic | ICD-10-CM

## 2025-05-09 LAB
APPEARANCE UR: CLEAR — SIGNIFICANT CHANGE UP
BILIRUB UR-MCNC: NEGATIVE — SIGNIFICANT CHANGE UP
COLOR SPEC: YELLOW — SIGNIFICANT CHANGE UP
DIFF PNL FLD: ABNORMAL
GLUCOSE UR QL: 250 MG/DL
KETONES UR-MCNC: NEGATIVE MG/DL — SIGNIFICANT CHANGE UP
LEUKOCYTE ESTERASE UR-ACNC: NEGATIVE — SIGNIFICANT CHANGE UP
NITRITE UR-MCNC: NEGATIVE — SIGNIFICANT CHANGE UP
PH UR: 6.5 — SIGNIFICANT CHANGE UP (ref 5–8)
PROT UR-MCNC: 100 MG/DL
SP GR SPEC: 1.01 — SIGNIFICANT CHANGE UP (ref 1–1.03)
UROBILINOGEN FLD QL: 0.2 MG/DL — SIGNIFICANT CHANGE UP (ref 0.2–1)

## 2025-05-09 PROCEDURE — 99284 EMERGENCY DEPT VISIT MOD MDM: CPT

## 2025-05-09 NOTE — ED PROVIDER NOTE - CLINICAL SUMMARY MEDICAL DECISION MAKING FREE TEXT BOX
75 year old male with history of HIV (CD4 74, VL 4516 23/2025), CAD (prior MI), T2DM, HFrEF (EF 35-40% 11/2024), recurrent syncope, COPD, depression/anxiety,  prostate cancer s/p radiation (2013) Now coming in with increased urinary frequency.  Already on Levaquin since the previous day.  Patient appears well no distress.  Will send urine culture and urinalysis.  Patient already on antibiotics will advise to stay on until cultures return

## 2025-05-09 NOTE — ED PROVIDER NOTE - PHYSICAL EXAMINATION
Physical Exam  GEN: Awake, alert, non-toxic appearing, NCAT  EYES: PERRL, full EOMI,  ENT: External inspection normal, normal voice, no oropharyngeal ulcerations/lesions/swelling  HEAD: atraumatic  NECK: FROM neck, supple, no meningismus, trachea midline, no JVD  SKIN: Color normal for race, warm and dry, no rash  MSK: cast on right wrist   NEURO: Oriented x3, CN 2-12 grossly intact, normal motor, normal sensory

## 2025-05-09 NOTE — ED ADULT NURSE NOTE - OBJECTIVE STATEMENT
reports "urinating on self" for several days now. seen here yesterday to have urine tested, but reports leaving prior to results.

## 2025-05-09 NOTE — ED ADULT NURSE NOTE - CAS TRG GENERAL NORM CIRC DET
Strong peripheral pulses/Capillary refill less/equal to 2 seconds Otezla Counseling: The side effects of Otezla were discussed with the patient, including but not limited to worsening or new depression, weight loss, diarrhea, nausea, upper respiratory tract infection, and headache. Patient instructed to call the office should any adverse effect occur.  The patient verbalized understanding of the proper use and possible adverse effects of Otezla.  All the patient's questions and concerns were addressed.

## 2025-05-09 NOTE — ED PROVIDER NOTE - PATIENT PORTAL LINK FT
You can access the FollowMyHealth Patient Portal offered by [facility] by registering at the following website: [email or web address]. By joining [employer]’s FollowMyHealth portal, you will also be able to view your health information using other applications (apps) compatible with our system.

## 2025-05-09 NOTE — ED ADULT TRIAGE NOTE - CHIEF COMPLAINT QUOTE
pt BIBA from street "he urinated on himself"; was seen in ED 2 days ago and treated for pna and uti; di dnot take levofloxacin abx as prescribed

## 2025-05-09 NOTE — ED PROVIDER NOTE - OBJECTIVE STATEMENT
75 year old male with history of HIV (CD4 74, VL 4516 23/2025), CAD (prior MI), T2DM, HFrEF (EF 35-40% 11/2024), recurrent syncope, COPD, depression/anxiety,  prostate cancer s/p radiation (2013)Now coming in stating he is having creased urinary frequency.  Currently on Levaquin.  Endorses recent admissions for pneumonia.  Patient appears well no acute distress.  Denies nausea, vomiting, fevers, chills.

## 2025-05-10 ENCOUNTER — EMERGENCY (EMERGENCY)
Facility: HOSPITAL | Age: 75
LOS: 1 days | End: 2025-05-10
Attending: EMERGENCY MEDICINE | Admitting: EMERGENCY MEDICINE
Payer: COMMERCIAL

## 2025-05-10 VITALS
OXYGEN SATURATION: 94 % | SYSTOLIC BLOOD PRESSURE: 163 MMHG | HEART RATE: 80 BPM | TEMPERATURE: 98 F | HEIGHT: 72 IN | DIASTOLIC BLOOD PRESSURE: 73 MMHG | RESPIRATION RATE: 16 BRPM

## 2025-05-10 DIAGNOSIS — Z95.9 PRESENCE OF CARDIAC AND VASCULAR IMPLANT AND GRAFT, UNSPECIFIED: Chronic | ICD-10-CM

## 2025-05-10 LAB
APPEARANCE UR: CLEAR — SIGNIFICANT CHANGE UP
BILIRUB UR-MCNC: NEGATIVE — SIGNIFICANT CHANGE UP
COLOR SPEC: YELLOW — SIGNIFICANT CHANGE UP
DIFF PNL FLD: ABNORMAL
GLUCOSE UR QL: 500 MG/DL
KETONES UR-MCNC: NEGATIVE MG/DL — SIGNIFICANT CHANGE UP
LEUKOCYTE ESTERASE UR-ACNC: NEGATIVE — SIGNIFICANT CHANGE UP
NITRITE UR-MCNC: NEGATIVE — SIGNIFICANT CHANGE UP
PH UR: 7 — SIGNIFICANT CHANGE UP (ref 5–8)
PROT UR-MCNC: 100 MG/DL
SP GR SPEC: 1.01 — SIGNIFICANT CHANGE UP (ref 1–1.03)
UROBILINOGEN FLD QL: 0.2 MG/DL — SIGNIFICANT CHANGE UP (ref 0.2–1)

## 2025-05-10 PROCEDURE — 99284 EMERGENCY DEPT VISIT MOD MDM: CPT

## 2025-05-10 NOTE — ED PROVIDER NOTE - CLINICAL SUMMARY MEDICAL DECISION MAKING FREE TEXT BOX
Patient with HIV, diabetes, CHF, recently treated for pneumonia, currently prescribed Levaquin for pneumonia and possible UTI, here for dose of Levaquin as he has been unable to fill his Levaquin prescription, also requesting assistance with transportation to his Ortho appointment today to have cast on right upper extremity removed.  On exam, patient is afebrile, vital signs are stable.  Patient is comfortable appearing in no acute distress.  No respiratory distress.  Occasional dry cough.  UA negative for UTI or ketonuria suggestive of DKA.  Suspect more likely underlying prostate issue related to urinary frequency and incontinence.  Patient provided with urology referral.  I showed patient how to open his pill bottles with the cast in place, and he was able to do so on his own.  He should not have any further issues opening his medications or taking his medications at home.  Patient will have his cast removed today anyways by orthopedics.  Patient given dose of Levaquin.  Given printed out copy of prescription and instructed to take it to [place] pharmacy if needed.  Transportation provided for patient to his orthopedic appointment.  Stable for DC.

## 2025-05-10 NOTE — ED PROVIDER NOTE - OBJECTIVE STATEMENT
Patient with history of HIV/AIDS, CAD, DM 2, CHF (EF 35 to 40%), depression/anxiety, prostate CA status post radiation in 2013, admitted 2 weeks ago for pneumonia, signed out AMA, seen twice since then for urinary frequency, currently on Levaquin to cover both pneumonia and UTI, presents with persistent urinary frequency.  Patient states that he received a dose of Levaquin in this ED yesterday, went to fill his Levaquin prescription, but was told it cost $17 which he could not afford.  Patient would like another dose of the Levaquin today.  Patient broke his wrist recently and has a cast on his right upper extremity, which he states has been preventing him from being able to open his bottles of medication at home.  States that no one at his [facility] will help him open his medications and he feels very frustrated by this.  States he has an appointment today at [place] and needs assistance with transportation to get there.  No dysuria.  Has persistent mild cough, but does not feel short of breath.  Feels like his cough has been improving.  States he used to see a urologist at [facility], but has not followed up with them recently.

## 2025-05-10 NOTE — ED ADULT NURSE NOTE - NSFALLUNIVINTERV_ED_ALL_ED
Bed/Stretcher in lowest position, wheels locked, appropriate side rails in place/Call bell, personal items and telephone in reach/Instruct patient to call for assistance before getting out of bed/chair/stretcher/Non-slip footwear applied when patient is off stretcher/Daisy to call system/Physically safe environment - no spills, clutter or unnecessary equipment/Purposeful proactive rounding/Room/bathroom lighting operational, light cord in reach

## 2025-05-10 NOTE — ED PROVIDER NOTE - PHYSICAL EXAMINATION
Constitutional: awake and alert, in no acute distress  HEENT: head normocephalic and atraumatic. moist mucous membranes  Eyes: extraocular movements intact, normal conjunctiva  Neck: supple, normal ROM  Cardiovascular: regular rate   Pulmonary: no respiratory distress  Gastrointestinal: abdomen flat and nondistended  Skin: warm, dry, normal for ethnicity  Musculoskeletal: cast to R forearm/ wrist  Neurological: oriented x4, no focal neurologic deficit.   Psychiatric: calm and cooperative

## 2025-05-10 NOTE — ED PROVIDER NOTE - CARE PROVIDER_API CALL
Arun Andrade  Urology  [address]  [place] [address]  Phone: [phone]  Fax: [phone]  Follow Up Time:

## 2025-05-10 NOTE — ED PROVIDER NOTE - PATIENT PORTAL LINK FT
You can access the FollowMyHealth Patient Portal offered by [facility] by registering at the following website: [email or web address]. By joining [employer]’s FollowMyHealth portal, you will also be able to view your health information using other applications (apps) compatible with our system.

## 2025-05-10 NOTE — ED ADULT TRIAGE NOTE - CHIEF COMPLAINT QUOTE
Pt BIBA c/o urinary incontinence x4 days. Pt also states has not taken insulin in 2 weeks. BGL on scene 199.

## 2025-05-10 NOTE — ED ADULT NURSE NOTE - CHIEF COMPLAINT
The patient is a 75y Male complaining of urinary incontinence, hx DM. Dc from [facility] and states he has been unable to care for self at home with taking medications/follow up with arm injury, A&Ox4.

## 2025-05-11 ENCOUNTER — EMERGENCY (EMERGENCY)
Facility: HOSPITAL | Age: 75
LOS: 1 days | End: 2025-05-11
Attending: STUDENT IN AN ORGANIZED HEALTH CARE EDUCATION/TRAINING PROGRAM | Admitting: STUDENT IN AN ORGANIZED HEALTH CARE EDUCATION/TRAINING PROGRAM
Payer: MEDICARE

## 2025-05-11 ENCOUNTER — EMERGENCY (EMERGENCY)
Facility: HOSPITAL | Age: 75
LOS: 1 days | End: 2025-05-11
Attending: EMERGENCY MEDICINE | Admitting: EMERGENCY MEDICINE
Payer: COMMERCIAL

## 2025-05-11 VITALS
OXYGEN SATURATION: 95 % | TEMPERATURE: 97 F | WEIGHT: 173.06 LBS | SYSTOLIC BLOOD PRESSURE: 180 MMHG | DIASTOLIC BLOOD PRESSURE: 103 MMHG | RESPIRATION RATE: 18 BRPM | HEIGHT: 72 IN | HEART RATE: 80 BPM

## 2025-05-11 VITALS
TEMPERATURE: 98 F | HEART RATE: 64 BPM | DIASTOLIC BLOOD PRESSURE: 73 MMHG | OXYGEN SATURATION: 95 % | SYSTOLIC BLOOD PRESSURE: 146 MMHG | RESPIRATION RATE: 16 BRPM | HEIGHT: 72 IN

## 2025-05-11 VITALS
HEART RATE: 81 BPM | RESPIRATION RATE: 18 BRPM | OXYGEN SATURATION: 95 % | DIASTOLIC BLOOD PRESSURE: 98 MMHG | TEMPERATURE: 97 F | SYSTOLIC BLOOD PRESSURE: 178 MMHG

## 2025-05-11 VITALS
HEIGHT: 72 IN | HEART RATE: 79 BPM | SYSTOLIC BLOOD PRESSURE: 172 MMHG | DIASTOLIC BLOOD PRESSURE: 101 MMHG | RESPIRATION RATE: 15 BRPM | OXYGEN SATURATION: 95 % | TEMPERATURE: 98 F | WEIGHT: 169.98 LBS

## 2025-05-11 DIAGNOSIS — S52.501A UNSPECIFIED FRACTURE OF THE LOWER END OF RIGHT RADIUS, INITIAL ENCOUNTER FOR CLOSED FRACTURE: ICD-10-CM

## 2025-05-11 DIAGNOSIS — Z88.2 ALLERGY STATUS TO SULFONAMIDES: ICD-10-CM

## 2025-05-11 DIAGNOSIS — R62.7 ADULT FAILURE TO THRIVE: ICD-10-CM

## 2025-05-11 DIAGNOSIS — F32.A DEPRESSION, UNSPECIFIED: ICD-10-CM

## 2025-05-11 DIAGNOSIS — I25.10 ATHEROSCLEROTIC HEART DISEASE OF NATIVE CORONARY ARTERY WITHOUT ANGINA PECTORIS: ICD-10-CM

## 2025-05-11 DIAGNOSIS — R32 UNSPECIFIED URINARY INCONTINENCE: ICD-10-CM

## 2025-05-11 DIAGNOSIS — Z88.0 ALLERGY STATUS TO PENICILLIN: ICD-10-CM

## 2025-05-11 DIAGNOSIS — B20 HUMAN IMMUNODEFICIENCY VIRUS [HIV] DISEASE: ICD-10-CM

## 2025-05-11 DIAGNOSIS — I50.9 HEART FAILURE, UNSPECIFIED: ICD-10-CM

## 2025-05-11 DIAGNOSIS — E11.9 TYPE 2 DIABETES MELLITUS WITHOUT COMPLICATIONS: ICD-10-CM

## 2025-05-11 DIAGNOSIS — Z95.9 PRESENCE OF CARDIAC AND VASCULAR IMPLANT AND GRAFT, UNSPECIFIED: Chronic | ICD-10-CM

## 2025-05-11 DIAGNOSIS — Z91.018 ALLERGY TO OTHER FOODS: ICD-10-CM

## 2025-05-11 DIAGNOSIS — R35.0 FREQUENCY OF MICTURITION: ICD-10-CM

## 2025-05-11 LAB
ANION GAP SERPL CALC-SCNC: 6 MMOL/L — SIGNIFICANT CHANGE UP (ref 5–17)
APPEARANCE UR: CLEAR — SIGNIFICANT CHANGE UP
BASOPHILS # BLD AUTO: 0 K/UL — SIGNIFICANT CHANGE UP (ref 0–0.2)
BASOPHILS NFR BLD AUTO: 0 % — SIGNIFICANT CHANGE UP (ref 0–2)
BILIRUB UR-MCNC: NEGATIVE — SIGNIFICANT CHANGE UP
BUN SERPL-MCNC: 24 MG/DL — HIGH (ref 7–23)
CALCIUM SERPL-MCNC: 8.4 MG/DL — SIGNIFICANT CHANGE UP (ref 8.4–10.5)
CHLORIDE SERPL-SCNC: 107 MMOL/L — SIGNIFICANT CHANGE UP (ref 96–108)
CO2 SERPL-SCNC: 29 MMOL/L — SIGNIFICANT CHANGE UP (ref 22–31)
COLOR SPEC: YELLOW — SIGNIFICANT CHANGE UP
CREAT SERPL-MCNC: 1.33 MG/DL — HIGH (ref 0.5–1.3)
CULTURE RESULTS: SIGNIFICANT CHANGE UP
DIFF PNL FLD: ABNORMAL
EGFR: 56 ML/MIN/1.73M2 — LOW
EGFR: 56 ML/MIN/1.73M2 — LOW
EOSINOPHIL # BLD AUTO: 0.09 K/UL — SIGNIFICANT CHANGE UP (ref 0–0.5)
EOSINOPHIL NFR BLD AUTO: 2.7 % — SIGNIFICANT CHANGE UP (ref 0–6)
FLUAV AG NPH QL: SIGNIFICANT CHANGE UP
FLUBV AG NPH QL: SIGNIFICANT CHANGE UP
GLUCOSE SERPL-MCNC: 99 MG/DL — SIGNIFICANT CHANGE UP (ref 70–99)
GLUCOSE UR QL: NEGATIVE MG/DL — SIGNIFICANT CHANGE UP
HCT VFR BLD CALC: 34.1 % — LOW (ref 39–50)
HGB BLD-MCNC: 11.6 G/DL — LOW (ref 13–17)
KETONES UR-MCNC: NEGATIVE MG/DL — SIGNIFICANT CHANGE UP
LEGIONELLA AG UR QL: NEGATIVE — SIGNIFICANT CHANGE UP
LEUKOCYTE ESTERASE UR-ACNC: NEGATIVE — SIGNIFICANT CHANGE UP
LYMPHOCYTES # BLD AUTO: 0.74 K/UL — LOW (ref 1–3.3)
LYMPHOCYTES # BLD AUTO: 22.1 % — SIGNIFICANT CHANGE UP (ref 13–44)
MCHC RBC-ENTMCNC: 33.8 PG — SIGNIFICANT CHANGE UP (ref 27–34)
MCHC RBC-ENTMCNC: 34 G/DL — SIGNIFICANT CHANGE UP (ref 32–36)
MCV RBC AUTO: 99.4 FL — SIGNIFICANT CHANGE UP (ref 80–100)
MONOCYTES # BLD AUTO: 0.3 K/UL — SIGNIFICANT CHANGE UP (ref 0–0.9)
MONOCYTES NFR BLD AUTO: 8.9 % — SIGNIFICANT CHANGE UP (ref 2–14)
NEUTROPHILS # BLD AUTO: 2.1 K/UL — SIGNIFICANT CHANGE UP (ref 1.8–7.4)
NEUTROPHILS NFR BLD AUTO: 62.8 % — SIGNIFICANT CHANGE UP (ref 43–77)
NITRITE UR-MCNC: NEGATIVE — SIGNIFICANT CHANGE UP
PH UR: 7.5 — SIGNIFICANT CHANGE UP (ref 5–8)
PLATELET # BLD AUTO: 126 K/UL — LOW (ref 150–400)
POTASSIUM SERPL-MCNC: 4.4 MMOL/L — SIGNIFICANT CHANGE UP (ref 3.5–5.3)
POTASSIUM SERPL-SCNC: 4.4 MMOL/L — SIGNIFICANT CHANGE UP (ref 3.5–5.3)
PROT UR-MCNC: 100 MG/DL
RBC # BLD: 3.43 M/UL — LOW (ref 4.2–5.8)
RBC # FLD: 13.3 % — SIGNIFICANT CHANGE UP (ref 10.3–14.5)
RSV RNA NPH QL NAA+NON-PROBE: SIGNIFICANT CHANGE UP
S PNEUM AG UR QL: NEGATIVE — SIGNIFICANT CHANGE UP
SARS-COV-2 RNA SPEC QL NAA+PROBE: SIGNIFICANT CHANGE UP
SODIUM SERPL-SCNC: 142 MMOL/L — SIGNIFICANT CHANGE UP (ref 135–145)
SOURCE RESPIRATORY: SIGNIFICANT CHANGE UP
SP GR SPEC: 1.01 — SIGNIFICANT CHANGE UP (ref 1–1.03)
SPECIMEN SOURCE: SIGNIFICANT CHANGE UP
UROBILINOGEN FLD QL: 0.2 MG/DL — SIGNIFICANT CHANGE UP (ref 0.2–1)
WBC # BLD: 3.35 K/UL — LOW (ref 3.8–10.5)
WBC # FLD AUTO: 3.35 K/UL — LOW (ref 3.8–10.5)

## 2025-05-11 PROCEDURE — 99285 EMERGENCY DEPT VISIT HI MDM: CPT

## 2025-05-11 PROCEDURE — 73090 X-RAY EXAM OF FOREARM: CPT

## 2025-05-11 PROCEDURE — 81001 URINALYSIS AUTO W/SCOPE: CPT

## 2025-05-11 PROCEDURE — 71046 X-RAY EXAM CHEST 2 VIEWS: CPT

## 2025-05-11 PROCEDURE — 73070 X-RAY EXAM OF ELBOW: CPT

## 2025-05-11 PROCEDURE — 99284 EMERGENCY DEPT VISIT MOD MDM: CPT | Mod: 25

## 2025-05-11 PROCEDURE — 99053 MED SERV 10PM-8AM 24 HR FAC: CPT

## 2025-05-11 PROCEDURE — 71046 X-RAY EXAM CHEST 2 VIEWS: CPT | Mod: 26

## 2025-05-11 PROCEDURE — 73110 X-RAY EXAM OF WRIST: CPT

## 2025-05-11 PROCEDURE — 73110 X-RAY EXAM OF WRIST: CPT | Mod: 26,RT,77

## 2025-05-11 PROCEDURE — 87637 SARSCOV2&INF A&B&RSV AMP PRB: CPT

## 2025-05-11 PROCEDURE — 76775 US EXAM ABDO BACK WALL LIM: CPT | Mod: 26

## 2025-05-11 PROCEDURE — 73070 X-RAY EXAM OF ELBOW: CPT | Mod: 26,RT

## 2025-05-11 PROCEDURE — 87040 BLOOD CULTURE FOR BACTERIA: CPT

## 2025-05-11 PROCEDURE — 36415 COLL VENOUS BLD VENIPUNCTURE: CPT

## 2025-05-11 PROCEDURE — 87899 AGENT NOS ASSAY W/OPTIC: CPT

## 2025-05-11 PROCEDURE — 73110 X-RAY EXAM OF WRIST: CPT | Mod: 26,RT

## 2025-05-11 PROCEDURE — 85025 COMPLETE CBC W/AUTO DIFF WBC: CPT

## 2025-05-11 PROCEDURE — 80048 BASIC METABOLIC PNL TOTAL CA: CPT

## 2025-05-11 PROCEDURE — 73090 X-RAY EXAM OF FOREARM: CPT | Mod: 26,RT

## 2025-05-11 NOTE — CONSULT NOTE ADULT - SUBJECTIVE AND OBJECTIVE BOX
Orthopaedic Surgery Consult Note    For Surgeon: Arun    HPI:  75yMale s/p L DRF on 4/14 now s/p short arm cast presents with left hand pain and for check up. No interval falls/injuries. States hes been having discomfort using the extremity.        Allergies    shellfish (Unknown)  penicillins (Rash)  strawberry (Rash)  sulfa drugs (Rash)  Peaches (Rash)    Intolerances      PAST MEDICAL & SURGICAL HISTORY:  HIV (human immunodeficiency virus infection)      DM (diabetes mellitus)      HTN (hypertension)      Chronic diarrhea      Hepatitis C      PVD (peripheral vascular disease)      CAD (coronary artery disease)      Thoracic aortic aneurysm      S/P arterial stent  bilat LE        MEDICATIONS  (STANDING):    MEDICATIONS  (PRN):      Vital Signs Last 24 Hrs  T(C): 36.3 (11 May 2025 12:01), Max: 36.4 (11 May 2025 07:36)  T(F): 97.3 (11 May 2025 12:01), Max: 97.5 (11 May 2025 07:36)  HR: 80 (11 May 2025 12:01) (79 - 80)  BP: 180/103 (11 May 2025 12:01) (172/101 - 180/103)  BP(mean): --  RR: 18 (11 May 2025 12:01) (15 - 18)  SpO2: 95% (11 May 2025 12:01) (95% - 95%)    Parameters below as of 11 May 2025 12:01  Patient On (Oxygen Delivery Method): room air        Physical Exam:  LUE  short arm cast in place  Skin intact, mildly swollen distal hand  AIn/PIN/Ulnar intact  SILT  Mild pain with passive stretch                          11.6   3.35  )-----------( 126      ( 11 May 2025 12:15 )             34.1     05-11    142  |  107  |  24[H]  ----------------------------<  99  4.4   |  29  |  1.33[H]    Ca    8.4      11 May 2025 12:15        Imaging:   XR showing short arm cast in place, with distal radius fx appreciated  No new fxs/dislocations    A/P: 75yMale with R DRF s/p SAC  - Pain control  - encourage elevation  - Not concerns for compartments at this tie  - Fu in resident clinic this coming thursday for cast removal and transition to wrist brace    -Discussed with Dr. Dias    Ortho Pager [phone]

## 2025-05-11 NOTE — ED PROVIDER NOTE - PROGRESS NOTE DETAILS
Notified by lab of thrombocytopenia and borderline elevated MCV, however multiple previous similar levels.  In conjunction with presentation of patient, concern for acute process ?bleed ?DIC below threshold for further laboratory testing at this time. Patient now complaining about R hand pain and swelling starting while in ED.  Case discussed with ortho team, pending evaluation. S/p orthopedics evaluation with plan for outpatient followup, no acute intervention.  Will refer patient to PCP for followup of subacute lung findings.  SW s/p evaluation of patient with care coordination and Health Home referral.  Plan to discharge home with return precautions and outpatient followup.

## 2025-05-11 NOTE — ED ADULT NURSE NOTE - CHIEF COMPLAINT QUOTE
Pt BIBEMS from [facility] for urinary incontinence x 5 days. Pt has right wrist fracture in splint. Pt sent in for ortho consult and social work. Hx HIV, DM, CAD.

## 2025-05-11 NOTE — CHART NOTE - NSCHARTNOTEFT_GEN_A_CORE
Pt BIBEMS from [facility] for urinary incontinence x 5 days. Pt has right wrist fracture in splint. Pt sent in for ortho consult and social work. Hx HIV, DM, CAD, urinary incontinence.  SW met with pt at the bedside, AAOX4, speech coherent and pt able to participate in d/c planning discussion.  Pt is cooperative, pleasant and forthcoming with information.  Pt states he resides at the [facility] and that he has been approved for an apt at [facility].  Pt states that he is scheduled to move at the end of May.  Pt also reports that he has already started the HHA service with [facility] Nurse agency to start at the [facility] pending determination of days/hours of service.  Pt states he attends [facility] for his primary care with Dr. Paul Noguera and Mental Health Therapy with Kady Bernstein.  Pt states he has an appointmet scheduled for 5/21/25 10:00am and 11:00am respectively.  Pt states since his right wrist fx on splint, he has been experiencing difficulty with self care and opending his bill bottle to take his meds.  Pt states he has missed taking his meds.  Pt will discuss with his PCP and pharmacist at the [facility] for pill box for easier access to his meds.  Pt states he does not have any home care or HHA at the [facility].  Pt states he goes out to buy ready food, does not cook.  Pt report independent with ambulation without AD at baseline.  Pt states he was raised in foster care, learned of his 2 living sisters but he does not have any relationship with them.  Pt denies caregiver but provides his friend of 28 years, Aaron Palacio as his emergency contact.      Pt states he contracted HIV in 2000 "sex, drugs and rockin roll."   Pt state he has hx of snorting, smoking drugs but never IVDU.  Pt admits to taking HAART meds but missed multiple doses.  Pt states he will f/u with his PCP on 5/21/25 and review all of his meds and SW reminded pt to bring all of his d/c papers from [facility].  Pt verbalized understanding.  SW also discussed Home Health and pt quickly agreed to the referral to Home Health for community f/u.    SW referred pt to [facility] via [email or web address].  Per the ED attending, pt is for d/c from ED.  Pt provided with clothes and car service [facility]    Aaron Palacio - friend  T: [phone]

## 2025-05-11 NOTE — ED PROVIDER NOTE - NSFOLLOWUPCLINICS_GEN_ALL_ED_FT
[facility]  Family Medicine  [address]  [place], [place] [address]  Phone: [phone]  Fax:

## 2025-05-11 NOTE — ED PROVIDER NOTE - PROGRESS NOTE DETAILS
Calm
Patient's postvoid residual is about 130, I suspect he is experiencing some overflow incontinence but does not quite need a Kulkarni.  His x-ray shows good alignment.  He is excepted for transfer to the [place] ED for orthopedics consult as well as social work evaluation.

## 2025-05-11 NOTE — ED ADULT TRIAGE NOTE - CHIEF COMPLAINT QUOTE
Pt brought in by EMS with complaint of urinary incontinence X 5 days. States he was here yesterday for a similar complaint. Pt states he has not been able to take his medications due to having a splint on his right arm. States he is on flomax, descovy, eliquis, lexapro, abilify, and an antibiotics for pneumonia.

## 2025-05-11 NOTE — ED PROVIDER NOTE - PHYSICAL EXAMINATION
General: comfortable, resting in ED  HEENT: atraumatic, no eye erythema or discharge  Pulm: no cyanosis, no added work of breathing  Cardiac: no pallor, intact peripheral pulse  GI: no abdominal distension   (with RN Luke): good rectal tone, no blood in rectal vault  Neuro: alert, conversant, 5/5 ankle plantarflexion bilaterally, intact sensation to bilateral lower extremities  Psych: neutral affect, cooperative  Msk: cast on right arm with no distal finger erythema/edema/cyanosis  Skin: no erythema or rash

## 2025-05-11 NOTE — ED ADULT TRIAGE NOTE - CHIEF COMPLAINT QUOTE
Pt BIBEMS from [facility] for urinary incontinence x 5 days. Pt has right wrist fracture in splint. Pt sent in for ortho consult and social work. Hx HIV, DM, CAD.

## 2025-05-11 NOTE — ED PROVIDER NOTE - NSFOLLOWUPINSTRUCTIONS_ED_ALL_ED_FT
Your chest X-ray results are not normal (bilateral hilar enhancement).  Although it is safe for you to leave the emergency department, you should follow up with your primary care doctor as soon as possible to continue your care.  Bring these results with you.    Follow up in orthopedics clinic this Thursday 5/15 at noon:  [address] [place] [address]    ---    Arm Fracture in Adults    WHAT YOU NEED TO KNOW:    What is an arm fracture? An arm fracture is a crack or break in one or more of the bones in your arm.  Adult Arm Bones    What are the different types of arm fractures?    Nondisplaced means the bone cracked or broke but stayed in place.    Displaced means the 2 ends of the broken bone .    Comminuted means the bone cracked or broke into several pieces.    Open means the broken bone went through your skin.  What are the signs and symptoms of an arm fracture?    Arm and shoulder pain    Swelling and bruising    Abnormal arm position or shape    Severe pain when you move your arm    Weakness or numbness in your arm, hand, or fingers  How is an arm fracture diagnosed? Your healthcare provider will ask about your injury and examine you. An x-ray may show the type of fracture you have. You may need more than one x-ray, or another x-ray after several days have passed.    How is an arm fracture treated? Treatment will depend on what kind of fracture you have, and how bad it is. You may need any of the following:    A support device, such as a brace, cast, or splint may be needed to hold your broken bones in place. It will decrease your arm movement and allow the bones to heal.    NSAIDs, such as ibuprofen, help decrease swelling, pain, and fever. This medicine is available with or without a doctor's order. NSAIDs can cause stomach bleeding or kidney problems in certain people. If you take blood thinner medicine, always ask your healthcare provider if NSAIDs are safe for you. Always read the medicine label and follow directions.    Acetaminophen decreases pain and fever. It is available without a doctor's order. Ask how much to take and how often to take it. Follow directions. Read the labels of all other medicines you are using to see if they also contain acetaminophen, or ask your doctor or pharmacist. Acetaminophen can cause liver damage if not taken correctly.    Prescription pain medicine may be given. Ask your healthcare provider how to take this medicine safely. Some prescription pain medicines contain acetaminophen. Do not take other medicines that contain acetaminophen without talking to your healthcare provider. Too much acetaminophen may cause liver damage. Prescription pain medicine may cause constipation. Ask your healthcare provider how to prevent or treat constipation.    Closed reduction may be done to put your bones back into the correct position without surgery.    Open reduction surgery may be needed to put your bones back into the correct position. An incision is made and the bones are put back in the correct position. This may include the use of wires, pins, plates, or screws.  How can I manage my symptoms?    Elevate your arm above the level of your heart as often as you can. This will help decrease swelling and pain. Prop your arm on pillows or blankets to keep it elevated comfortably.        Apply ice on your arm for 15 to 20 minutes every hour or as directed. Use an ice pack, or put crushed ice in a plastic bag. Cover it with a towel. Ice helps prevent tissue damage and decreases swelling and pain.    Rest your arm as much as possible. Ask your healthcare provider when you can put pressure or weight on your arm. Also ask when you can return to sports or exercise.    Go to physical therapy as directed. A physical therapist teaches you exercises to help improve movement and strength, and to decrease pain.  When should I seek immediate care?    The pain in your injured arm does not get better or gets worse, even after you rest and take medicine.    Your injured arm, hand, or fingers feel numb.    Your arm is swollen, red, and feels warm.    Your skin over the fracture is swollen, cold, or pale.    You cannot move your arm, hand, or fingers.  When should I call my doctor?    You have a fever.    Your brace or splint becomes wet, damaged, or comes off.    You have questions or concerns about your injury, treatment, or care.  CARE AGREEMENT:    You have the right to help plan your care. Learn about your health condition and how it may be treated. Discuss treatment options with your healthcare providers to decide what care you want to receive. You always have the right to refuse treatment.

## 2025-05-11 NOTE — ED PROVIDER NOTE - PHYSICAL EXAMINATION
VITAL SIGNS: I have reviewed nursing notes and confirm.  CONST: Well-developed; well-nourished; No apparent distress.  ENT: No nasal discharge; airway clear.  EYES: Sclera clear. Pupils round and symmetrical bilaterally.  RESP: Breathing comfortably; speaking in full sentences.   MSK: R hand swollen, in short arm cast  NEURO: Alert, oriented. Speech is fluent and appropriate. Moving all extremities appropriately. No gross motor or sensory abnormalities.  SKIN: Skin is normal temperature; no diaphoresis; no pallor.  PSYCH: Cooperative, Low health literacy, low problem-solving skills

## 2025-05-11 NOTE — ED PROVIDER NOTE - NSICDXPASTMEDICALHX_GEN_ALL_CORE_FT
used
PAST MEDICAL HISTORY:  CAD (coronary artery disease)     Chronic diarrhea     DM (diabetes mellitus)     Hepatitis C     HIV (human immunodeficiency virus infection)     HTN (hypertension)     PVD (peripheral vascular disease)     Thoracic aortic aneurysm

## 2025-05-11 NOTE — ED ADULT NURSE NOTE - OBJECTIVE STATEMENT
75 M   BIBEMS from [place] ED c/o urinary frequency. Pt with R arm cast having difficulties making it to his orthopedics appointment. Pt has been to [place] ED numerous times for similar complaints. Transferred to [facility]. Pt A&Ox4, respirations even and unlabored, skin color WDL warm and dry. No acute distress observed.

## 2025-05-11 NOTE — ED PROVIDER NOTE - PATIENT PORTAL LINK FT
You can access the FollowMyHealth Patient Portal offered by [facility] by registering at the following website: [email or web address]. By joining [employer]’s FollowMyHealth portal, you will also be able to view your health information using other applications (apps) compatible with our system.

## 2025-05-11 NOTE — ED PROVIDER NOTE - OBJECTIVE STATEMENT
with history of HIV/AIDS, CAD, DM 2, CHF (EF 35 to 40%), depression/anxiety, prostate CA status post radiation in 2013, admitted 2 weeks ago for pneumonia, signed out AMA, seen twice since then for urinary frequency, currently on Levaquin to cover both pneumonia and UTI, presents with persistent urinary frequency. 75-year-old male with history of HIV/AIDS, CAD, DM 2, CHF (EF 35 to 40%), depression/anxiety, prostate CA status post radiation in 2013, admitted 2 weeks ago for pneumonia, signed out AMA, seen twice since then for urinary frequency, currently on Levaquin to cover both pneumonia and UTI, presents with persistent urinary frequency. He reports urinating every 15 minutes.  He has been here 3 days in a row for the same symptoms.  He says that he has gone through all of his diapers.  He has also been getting dosed with Levaquin daily because he says the pharmacy did not cover his Levaquin prescription.  He is also not been able to take any of his chronic medications secondary to his hand cast.  This includes prostate medications and HIV medications.  He has been in a cast for some time.  The original injury appears to have been April 1.  He is also been unable to coordinate getting himself to orthopedics.    When he was seen yesterday, nursing apparently cleaned him up and even put him in a cab to go to an orthopedic appointment before noon.

## 2025-05-11 NOTE — ED PROVIDER NOTE - OBJECTIVE STATEMENT
As per patient and past medical records, 75-year-old male with history of HIV/AIDS, CAD, DM 2, CHF (EF 35 to 40%), depression/anxiety, prostate CA status post radiation in 2013, admitted 2 weeks ago for pneumonia, signed out AMA, seen twice since then for urinary frequency, currently on Levaquin to cover both pneumonia and UTI, now with continued urinary frequency, seen at [facility] multiple times mentioning he had trouble taking his medications 2/2 R arm cast (placed 7 weeks ago). Was not successful seeing outpatient orthopedics.    Has also mentioned having some urinary incontinence.  Has no fecal incontinence.  Has history of cancer.  No IVDU.  No trouble walking.  No numbness to groin or legs.

## 2025-05-11 NOTE — ED PROVIDER NOTE - CLINICAL SUMMARY MEDICAL DECISION MAKING FREE TEXT BOX
# arm fracture: complicating treatment of patient's infections 2/2 inability to open bottles with splint.  Will obtain imaging for interval followup.  Plan to discuss cause with ortho consult team.  No evidence of cast complication ?bleeding ?infection ?ischemia on exam.  Case discussed with case management team, pending recommendations.    # urinary incontinence: possibly 2/2 known UTI.  Despite history of cancer, patient ambulatory with no lower extremity neuro symptoms or deficits, also with good rectal tone, concern for impending spinal cord compromise below threshold for XR, CT, or MRI.  Patient evaluated via bladder scan at [facility] with volume of residual ~100mL, risks of kaufman > benefits in this patient.

## 2025-05-11 NOTE — ED PROVIDER NOTE - CARE PLAN
1 Principal Discharge DX:	Urinary incontinence  Secondary Diagnosis:	Wrist fracture  Secondary Diagnosis:	Adult failure to thrive

## 2025-05-12 ENCOUNTER — INPATIENT (INPATIENT)
Facility: HOSPITAL | Age: 75
LOS: 6 days | Discharge: NURSING, REHAB OR LONG-TERM CARE | End: 2025-05-19
Attending: INTERNAL MEDICINE | Admitting: HOSPITALIST
Payer: MEDICARE

## 2025-05-12 DIAGNOSIS — I50.22 CHRONIC SYSTOLIC (CONGESTIVE) HEART FAILURE: ICD-10-CM

## 2025-05-12 DIAGNOSIS — S52.502A UNSPECIFIED FRACTURE OF THE LOWER END OF LEFT RADIUS, INITIAL ENCOUNTER FOR CLOSED FRACTURE: ICD-10-CM

## 2025-05-12 DIAGNOSIS — I25.10 ATHEROSCLEROTIC HEART DISEASE OF NATIVE CORONARY ARTERY WITHOUT ANGINA PECTORIS: ICD-10-CM

## 2025-05-12 DIAGNOSIS — B20 HUMAN IMMUNODEFICIENCY VIRUS [HIV] DISEASE: ICD-10-CM

## 2025-05-12 DIAGNOSIS — E11.9 TYPE 2 DIABETES MELLITUS WITHOUT COMPLICATIONS: ICD-10-CM

## 2025-05-12 DIAGNOSIS — Z29.9 ENCOUNTER FOR PROPHYLACTIC MEASURES, UNSPECIFIED: ICD-10-CM

## 2025-05-12 DIAGNOSIS — D69.6 THROMBOCYTOPENIA, UNSPECIFIED: ICD-10-CM

## 2025-05-12 DIAGNOSIS — Z95.9 PRESENCE OF CARDIAC AND VASCULAR IMPLANT AND GRAFT, UNSPECIFIED: Chronic | ICD-10-CM

## 2025-05-12 DIAGNOSIS — S52.501A UNSPECIFIED FRACTURE OF THE LOWER END OF RIGHT RADIUS, INITIAL ENCOUNTER FOR CLOSED FRACTURE: ICD-10-CM

## 2025-05-12 DIAGNOSIS — I10 ESSENTIAL (PRIMARY) HYPERTENSION: ICD-10-CM
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
## 2025-05-12 DIAGNOSIS — R32 UNSPECIFIED URINARY INCONTINENCE: ICD-10-CM

## 2025-05-12 DIAGNOSIS — N17.9 ACUTE KIDNEY FAILURE, UNSPECIFIED: ICD-10-CM

## 2025-05-12 DIAGNOSIS — J44.9 CHRONIC OBSTRUCTIVE PULMONARY DISEASE, UNSPECIFIED: ICD-10-CM

## 2025-05-12 LAB
A1C WITH ESTIMATED AVERAGE GLUCOSE RESULT: 5.1 % — SIGNIFICANT CHANGE UP (ref 4–5.6)
ADD ON TEST-SPECIMEN IN LAB: SIGNIFICANT CHANGE UP
ALBUMIN SERPL ELPH-MCNC: 2.5 G/DL — LOW (ref 3.4–5)
ALP SERPL-CCNC: 68 U/L — SIGNIFICANT CHANGE UP (ref 40–120)
ALT FLD-CCNC: 18 U/L — SIGNIFICANT CHANGE UP (ref 12–42)
ANION GAP SERPL CALC-SCNC: 5 MMOL/L — LOW (ref 9–16)
APPEARANCE UR: CLEAR — SIGNIFICANT CHANGE UP
AST SERPL-CCNC: 29 U/L — SIGNIFICANT CHANGE UP (ref 15–37)
BASOPHILS # BLD AUTO: 0.01 K/UL — SIGNIFICANT CHANGE UP (ref 0–0.2)
BASOPHILS NFR BLD AUTO: 0.3 % — SIGNIFICANT CHANGE UP (ref 0–2)
BILIRUB SERPL-MCNC: 0.4 MG/DL — SIGNIFICANT CHANGE UP (ref 0.2–1.2)
BILIRUB UR-MCNC: NEGATIVE — SIGNIFICANT CHANGE UP
BUN SERPL-MCNC: 27 MG/DL — HIGH (ref 7–23)
CALCIUM SERPL-MCNC: 8.1 MG/DL — LOW (ref 8.5–10.5)
CHLORIDE SERPL-SCNC: 107 MMOL/L — SIGNIFICANT CHANGE UP (ref 96–108)
CO2 SERPL-SCNC: 26 MMOL/L — SIGNIFICANT CHANGE UP (ref 22–31)
COLOR SPEC: YELLOW — SIGNIFICANT CHANGE UP
CREAT ?TM UR-MCNC: 72 MG/DL — SIGNIFICANT CHANGE UP
CREAT SERPL-MCNC: 1.51 MG/DL — HIGH (ref 0.5–1.3)
DIFF PNL FLD: ABNORMAL
EGFR: 48 ML/MIN/1.73M2 — LOW
EGFR: 48 ML/MIN/1.73M2 — LOW
EOSINOPHIL # BLD AUTO: 0.09 K/UL — SIGNIFICANT CHANGE UP (ref 0–0.5)
EOSINOPHIL NFR BLD AUTO: 2.5 % — SIGNIFICANT CHANGE UP (ref 0–6)
ESTIMATED AVERAGE GLUCOSE: 100 MG/DL — SIGNIFICANT CHANGE UP (ref 68–114)
GLUCOSE SERPL-MCNC: 122 MG/DL — HIGH (ref 70–99)
GLUCOSE UR QL: NEGATIVE MG/DL — SIGNIFICANT CHANGE UP
HCT VFR BLD CALC: 33 % — LOW (ref 39–50)
HGB BLD-MCNC: 10.7 G/DL — LOW (ref 13–17)
IMM GRANULOCYTES # BLD AUTO: 0.01 K/UL — SIGNIFICANT CHANGE UP (ref 0–0.07)
IMM GRANULOCYTES NFR BLD AUTO: 0.3 % — SIGNIFICANT CHANGE UP (ref 0–0.9)
KETONES UR QL: NEGATIVE MG/DL — SIGNIFICANT CHANGE UP
LEUKOCYTE ESTERASE UR-ACNC: NEGATIVE — SIGNIFICANT CHANGE UP
LYMPHOCYTES # BLD AUTO: 1.09 K/UL — SIGNIFICANT CHANGE UP (ref 1–3.3)
LYMPHOCYTES NFR BLD AUTO: 30.9 % — SIGNIFICANT CHANGE UP (ref 13–44)
MCHC RBC-ENTMCNC: 31.9 PG — SIGNIFICANT CHANGE UP (ref 27–34)
MCHC RBC-ENTMCNC: 32.4 G/DL — SIGNIFICANT CHANGE UP (ref 32–36)
MCV RBC AUTO: 98.5 FL — SIGNIFICANT CHANGE UP (ref 80–100)
MONOCYTES # BLD AUTO: 0.31 K/UL — SIGNIFICANT CHANGE UP (ref 0–0.9)
MONOCYTES NFR BLD AUTO: 8.8 % — SIGNIFICANT CHANGE UP (ref 2–14)
NEUTROPHILS # BLD AUTO: 2.02 K/UL — SIGNIFICANT CHANGE UP (ref 1.8–7.4)
NEUTROPHILS NFR BLD AUTO: 57.2 % — SIGNIFICANT CHANGE UP (ref 43–77)
NITRITE UR-MCNC: NEGATIVE — SIGNIFICANT CHANGE UP
NRBC # BLD AUTO: 0 K/UL — SIGNIFICANT CHANGE UP (ref 0–0)
NRBC # FLD: 0 K/UL — SIGNIFICANT CHANGE UP (ref 0–0)
NRBC BLD AUTO-RTO: 0 /100 WBCS — SIGNIFICANT CHANGE UP (ref 0–0)
PCP SPEC-MCNC: SIGNIFICANT CHANGE UP
PH UR: 7.5 — SIGNIFICANT CHANGE UP (ref 5–8)
PLATELET # BLD AUTO: 111 K/UL — LOW (ref 150–400)
PMV BLD: 10.1 FL — SIGNIFICANT CHANGE UP (ref 7–13)
POTASSIUM SERPL-MCNC: 3.9 MMOL/L — SIGNIFICANT CHANGE UP (ref 3.5–5.3)
POTASSIUM SERPL-SCNC: 3.9 MMOL/L — SIGNIFICANT CHANGE UP (ref 3.5–5.3)
POTASSIUM UR-SCNC: 34 MMOL/L — SIGNIFICANT CHANGE UP
PROT ?TM UR-MCNC: 92 MG/DL — HIGH (ref 0–12)
PROT SERPL-MCNC: 6 G/DL — LOW (ref 6.4–8.2)
PROT UR-MCNC: 100 MG/DL
PROT/CREAT UR-RTO: 1.3 RATIO — HIGH (ref 0–0.2)
RBC # BLD: 3.35 M/UL — LOW (ref 4.2–5.8)
RBC # FLD: 13.3 % — SIGNIFICANT CHANGE UP (ref 10.3–14.5)
SODIUM SERPL-SCNC: 138 MMOL/L — SIGNIFICANT CHANGE UP (ref 132–145)
SODIUM UR-SCNC: 151 MMOL/L — SIGNIFICANT CHANGE UP
SP GR SPEC: 1.01 — SIGNIFICANT CHANGE UP (ref 1–1.03)
TROPONIN T, HIGH SENSITIVITY RESULT: 38 NG/L — SIGNIFICANT CHANGE UP (ref 0–51)
UROBILINOGEN FLD QL: 1 MG/DL — SIGNIFICANT CHANGE UP (ref 0.2–1)
UUN UR-MCNC: 452 MG/DL — SIGNIFICANT CHANGE UP
WBC # BLD: 3.53 K/UL — LOW (ref 3.8–10.5)
WBC # FLD AUTO: 3.53 K/UL — LOW (ref 3.8–10.5)

## 2025-05-12 PROCEDURE — 99223 1ST HOSP IP/OBS HIGH 75: CPT

## 2025-05-12 PROCEDURE — 72125 CT NECK SPINE W/O DYE: CPT | Mod: 26

## 2025-05-12 PROCEDURE — 70450 CT HEAD/BRAIN W/O DYE: CPT | Mod: 26

## 2025-05-12 PROCEDURE — 93010 ELECTROCARDIOGRAM REPORT: CPT

## 2025-05-12 PROCEDURE — 73130 X-RAY EXAM OF HAND: CPT | Mod: 26,RT

## 2025-05-12 PROCEDURE — 71045 X-RAY EXAM CHEST 1 VIEW: CPT | Mod: 26

## 2025-05-12 PROCEDURE — 73090 X-RAY EXAM OF FOREARM: CPT | Mod: 26,RT

## 2025-05-12 PROCEDURE — 73110 X-RAY EXAM OF WRIST: CPT | Mod: 26,RT

## 2025-05-12 RX ORDER — GLUCAGON 3 MG/1
1 POWDER NASAL ONCE
Refills: 0 | Status: DISCONTINUED | OUTPATIENT
Start: 2025-05-12 | End: 2025-05-19

## 2025-05-12 RX ORDER — ACETAMINOPHEN 500 MG/5ML
650 LIQUID (ML) ORAL EVERY 6 HOURS
Refills: 0 | Status: DISCONTINUED | OUTPATIENT
Start: 2025-05-12 | End: 2025-05-17

## 2025-05-12 RX ORDER — ENALAPRIL MALEATE 20 MG
10 TABLET ORAL EVERY 24 HOURS
Refills: 0 | Status: DISCONTINUED | OUTPATIENT
Start: 2025-05-12 | End: 2025-05-14

## 2025-05-12 RX ORDER — ASPIRIN 325 MG
81 TABLET ORAL EVERY 24 HOURS
Refills: 0 | Status: DISCONTINUED | OUTPATIENT
Start: 2025-05-12 | End: 2025-05-19

## 2025-05-12 RX ORDER — ACETAMINOPHEN 500 MG/5ML
650 LIQUID (ML) ORAL EVERY 6 HOURS
Refills: 0 | Status: DISCONTINUED | OUTPATIENT
Start: 2025-05-12 | End: 2025-05-15

## 2025-05-12 RX ORDER — SODIUM CHLORIDE 9 G/1000ML
1000 INJECTION, SOLUTION INTRAVENOUS
Refills: 0 | Status: DISCONTINUED | OUTPATIENT
Start: 2025-05-12 | End: 2025-05-19

## 2025-05-12 RX ORDER — MAGNESIUM, ALUMINUM HYDROXIDE 200-200 MG
30 TABLET,CHEWABLE ORAL EVERY 4 HOURS
Refills: 0 | Status: DISCONTINUED | OUTPATIENT
Start: 2025-05-12 | End: 2025-05-14

## 2025-05-12 RX ORDER — CARVEDILOL 3.12 MG/1
3.12 TABLET, FILM COATED ORAL EVERY 12 HOURS
Refills: 0 | Status: DISCONTINUED | OUTPATIENT
Start: 2025-05-12 | End: 2025-05-14

## 2025-05-12 RX ORDER — APIXABAN 2.5 MG/1
1 TABLET, FILM COATED ORAL
Refills: 0 | DISCHARGE

## 2025-05-12 RX ORDER — ESCITALOPRAM OXALATE 20 MG/1
20 TABLET ORAL EVERY 24 HOURS
Refills: 0 | Status: DISCONTINUED | OUTPATIENT
Start: 2025-05-12 | End: 2025-05-19

## 2025-05-12 RX ORDER — IPRATROPIUM BROMIDE AND ALBUTEROL SULFATE .5; 2.5 MG/3ML; MG/3ML
3 SOLUTION RESPIRATORY (INHALATION) ONCE
Refills: 0 | Status: COMPLETED | OUTPATIENT
Start: 2025-05-12 | End: 2025-05-12

## 2025-05-12 RX ORDER — ENALAPRIL MALEATE 20 MG
1 TABLET ORAL
Refills: 0 | DISCHARGE

## 2025-05-12 RX ORDER — BUDESONIDE AND FORMOTEROL FUMARATE DIHYDRATE 80; 4.5 UG/1; UG/1
2 AEROSOL RESPIRATORY (INHALATION)
Refills: 0 | DISCHARGE

## 2025-05-12 RX ORDER — ARIPIPRAZOLE 2 MG/1
5 TABLET ORAL EVERY 24 HOURS
Refills: 0 | Status: DISCONTINUED | OUTPATIENT
Start: 2025-05-12 | End: 2025-05-19

## 2025-05-12 RX ORDER — GABAPENTIN 400 MG/1
300 CAPSULE ORAL
Refills: 0 | DISCHARGE

## 2025-05-12 RX ORDER — LIDOCAINE HYDROCHLORIDE 20 MG/ML
1 JELLY TOPICAL ONCE
Refills: 0 | Status: COMPLETED | OUTPATIENT
Start: 2025-05-12 | End: 2025-05-12

## 2025-05-12 RX ORDER — HEPARIN SODIUM 1000 [USP'U]/ML
5000 INJECTION INTRAVENOUS; SUBCUTANEOUS EVERY 8 HOURS
Refills: 0 | Status: DISCONTINUED | OUTPATIENT
Start: 2025-05-12 | End: 2025-05-19

## 2025-05-12 RX ORDER — DEXTROSE 50 % IN WATER 50 %
12.5 SYRINGE (ML) INTRAVENOUS ONCE
Refills: 0 | Status: DISCONTINUED | OUTPATIENT
Start: 2025-05-12 | End: 2025-05-19

## 2025-05-12 RX ORDER — DEXTROSE 50 % IN WATER 50 %
25 SYRINGE (ML) INTRAVENOUS ONCE
Refills: 0 | Status: DISCONTINUED | OUTPATIENT
Start: 2025-05-12 | End: 2025-05-19

## 2025-05-12 RX ORDER — IPRATROPIUM BROMIDE AND ALBUTEROL SULFATE .5; 2.5 MG/3ML; MG/3ML
3 SOLUTION RESPIRATORY (INHALATION) EVERY 6 HOURS
Refills: 0 | Status: DISCONTINUED | OUTPATIENT
Start: 2025-05-12 | End: 2025-05-19

## 2025-05-12 RX ORDER — ONDANSETRON HCL/PF 4 MG/2 ML
4 VIAL (ML) INJECTION EVERY 8 HOURS
Refills: 0 | Status: DISCONTINUED | OUTPATIENT
Start: 2025-05-12 | End: 2025-05-14

## 2025-05-12 RX ORDER — ACETAMINOPHEN 500 MG/5ML
650 LIQUID (ML) ORAL EVERY 6 HOURS
Refills: 0 | Status: DISCONTINUED | OUTPATIENT
Start: 2025-05-12 | End: 2025-05-14

## 2025-05-12 RX ORDER — INSULIN LISPRO 100 U/ML
INJECTION, SOLUTION INTRAVENOUS; SUBCUTANEOUS AT BEDTIME
Refills: 0 | Status: DISCONTINUED | OUTPATIENT
Start: 2025-05-12 | End: 2025-05-12

## 2025-05-12 RX ORDER — MELATONIN 5 MG
3 TABLET ORAL AT BEDTIME
Refills: 0 | Status: DISCONTINUED | OUTPATIENT
Start: 2025-05-12 | End: 2025-05-14

## 2025-05-12 RX ORDER — ATOVAQUONE 750 MG/5ML
750 SUSPENSION ORAL EVERY 24 HOURS
Refills: 0 | Status: DISCONTINUED | OUTPATIENT
Start: 2025-05-12 | End: 2025-05-13

## 2025-05-12 RX ORDER — ACETAMINOPHEN 500 MG/5ML
975 LIQUID (ML) ORAL ONCE
Refills: 0 | Status: COMPLETED | OUTPATIENT
Start: 2025-05-12 | End: 2025-05-12

## 2025-05-12 RX ORDER — MELATONIN 5 MG
5 TABLET ORAL AT BEDTIME
Refills: 0 | Status: DISCONTINUED | OUTPATIENT
Start: 2025-05-12 | End: 2025-05-19

## 2025-05-12 RX ORDER — INSULIN LISPRO 100 U/ML
INJECTION, SOLUTION INTRAVENOUS; SUBCUTANEOUS
Refills: 0 | Status: DISCONTINUED | OUTPATIENT
Start: 2025-05-12 | End: 2025-05-19

## 2025-05-12 RX ORDER — DEXTROSE 50 % IN WATER 50 %
15 SYRINGE (ML) INTRAVENOUS ONCE
Refills: 0 | Status: DISCONTINUED | OUTPATIENT
Start: 2025-05-12 | End: 2025-05-19

## 2025-05-12 RX ORDER — TAMSULOSIN HYDROCHLORIDE 0.4 MG/1
0.4 CAPSULE ORAL AT BEDTIME
Refills: 0 | Status: DISCONTINUED | OUTPATIENT
Start: 2025-05-12 | End: 2025-05-19

## 2025-05-12 RX ORDER — ATORVASTATIN CALCIUM 80 MG/1
80 TABLET, FILM COATED ORAL AT BEDTIME
Refills: 0 | Status: DISCONTINUED | OUTPATIENT
Start: 2025-05-12 | End: 2025-05-19

## 2025-05-12 RX ADMIN — IPRATROPIUM BROMIDE AND ALBUTEROL SULFATE 3 MILLILITER(S): .5; 2.5 SOLUTION RESPIRATORY (INHALATION) at 01:10

## 2025-05-12 RX ADMIN — LIDOCAINE HYDROCHLORIDE 1 PATCH: 20 JELLY TOPICAL at 01:10

## 2025-05-12 RX ADMIN — Medication 1 DOSE(S): at 17:52

## 2025-05-12 RX ADMIN — Medication 975 MILLIGRAM(S): at 01:09

## 2025-05-12 RX ADMIN — Medication 5 MILLIGRAM(S): at 21:46

## 2025-05-12 RX ADMIN — HEPARIN SODIUM 5000 UNIT(S): 1000 INJECTION INTRAVENOUS; SUBCUTANEOUS at 21:46

## 2025-05-12 RX ADMIN — Medication 81 MILLIGRAM(S): at 15:56

## 2025-05-12 RX ADMIN — Medication 650 MILLIGRAM(S): at 15:56

## 2025-05-12 RX ADMIN — ATORVASTATIN CALCIUM 80 MILLIGRAM(S): 80 TABLET, FILM COATED ORAL at 21:46

## 2025-05-12 RX ADMIN — Medication 975 MILLIGRAM(S): at 03:40

## 2025-05-12 RX ADMIN — ESCITALOPRAM OXALATE 20 MILLIGRAM(S): 20 TABLET ORAL at 15:56

## 2025-05-12 RX ADMIN — Medication 10 MILLIGRAM(S): at 16:01

## 2025-05-12 RX ADMIN — LIDOCAINE HYDROCHLORIDE 1 PATCH: 20 JELLY TOPICAL at 15:43

## 2025-05-12 RX ADMIN — TAMSULOSIN HYDROCHLORIDE 0.4 MILLIGRAM(S): 0.4 CAPSULE ORAL at 21:46

## 2025-05-12 RX ADMIN — CARVEDILOL 3.12 MILLIGRAM(S): 3.12 TABLET, FILM COATED ORAL at 15:55

## 2025-05-12 RX ADMIN — Medication 650 MILLIGRAM(S): at 22:27

## 2025-05-12 RX ADMIN — Medication 650 MILLIGRAM(S): at 21:46

## 2025-05-12 RX ADMIN — ARIPIPRAZOLE 5 MILLIGRAM(S): 2 TABLET ORAL at 15:55

## 2025-05-12 RX ADMIN — Medication 650 MILLIGRAM(S): at 17:50

## 2025-05-12 RX ADMIN — ATOVAQUONE 750 MILLIGRAM(S): 750 SUSPENSION ORAL at 21:46

## 2025-05-12 NOTE — H&P ADULT - PROBLEM SELECTOR PLAN 2
fall yday, head CT neg  - pt  - orthostatics Pt reporting fall yesterday, fell onto right arm, reporting head strike. CT head negative. Per review of previous records, pt has had multiple falls over many years, often had workup initiated, and would AMA prior to completion of workup.  yday, head CT neg  - pt  - orthostatics Pt reporting fall yesterday, fell onto right arm, reporting head strike. CT head negative. Per review of previous records, pt has had multiple falls over many years, often had workup initiated, and would AMA prior to completion of workup. States that his falls have been occuring since MI in 2019, after which he became dizzy with rapid movements. Head CT neg for acute changes, and central causes of vertigo. Seems that patient may have been on eliquis, unclear why, does not know if he has hx of DVT.  - PT eval  - f/u orthostatics  - vertigo workup with ivy-hallpike and epley maneuver  - f/u ecg  - dc eliquis as pt with frequent falls Pt reporting fall yesterday, fell onto right arm, reporting head strike. CT head negative. Per review of previous records, pt has had multiple falls over many years, often had workup initiated, and would AMA prior to completion of workup. States that his falls have been occuring since MI in 2019, after which he became dizzy with rapid movements. Head CT neg for acute changes, and central causes of vertigo. Seems that patient may have been on eliquis, unclear why, does not know if he has hx of DVT.  - PT eval  - f/u orthostatics  - vertigo workup with ivy-hallpike and epley maneuver  - f/u ecg  - troponin trend  - dc eliquis as pt with frequent falls Pt reporting fall yesterday, fell onto right arm, reporting head strike. CT head negative. Per review of previous records, pt has had multiple falls over many years, often had workup initiated, and would AMA prior to completion of workup. States that his falls have been occuring since MI in 2019, after which he became dizzy with rapid movements. Head CT neg for acute changes, and central causes of vertigo. Seems that patient may have been on eliquis, unclear why, does not know if he has hx of DVT. [place] hallpike/epley negative.  - PT eval  - f/u orthostatics  - tele monitoring  - f/u ecg  - troponin trend  - dc eliquis as pt with frequent falls

## 2025-05-12 NOTE — H&P ADULT - TIME BILLING

## 2025-05-12 NOTE — H&P ADULT - PROBLEM SELECTOR PLAN 11
History of anxiety/depression. Home medications: aripiprazole 5mg qd, escitalopram 20mg qd  - continue home meds. History of anxiety/depression. Home medications: aripiprazole 5mg qd, escitalopram 20mg qd  - continue home meds

## 2025-05-12 NOTE — H&P ADULT - NSHPLABSRESULTS_GEN_ALL_CORE
.  LABS:                         10.7   3.53  )-----------( 111      ( 12 May 2025 02:33 )             33.0     05-12    138  |  107  |  27[H]  ----------------------------<  122[H]  3.9   |  26  |  1.51[H]    Ca    8.1[L]      12 May 2025 02:33    TPro  6.0[L]  /  Alb  2.5[L]  /  TBili  0.4  /  DBili  x   /  AST  29  /  ALT  18  /  AlkPhos  68  05-12      Urinalysis Basic - ( 12 May 2025 02:33 )    Color: x / Appearance: x / SG: x / pH: x  Gluc: 122 mg/dL / Ketone: x  / Bili: x / Urobili: x   Blood: x / Protein: x / Nitrite: x   Leuk Esterase: x / RBC: x / WBC x   Sq Epi: x / Non Sq Epi: x / Bacteria: x                RADIOLOGY, EKG & ADDITIONAL TESTS: Reviewed.

## 2025-05-12 NOTE — H&P ADULT - PROBLEM SELECTOR PLAN 9
Platelets 84 on admission. Hx of HIV Prior platelets 167 (3/2025). Likely chronic thrombocytopenia.   - continue to trend platelets. Plt 111 on admission, per review of previous labs, has thrombocytopenia at baseline. No petechiae, purpura, ecchymoses, no signs of active bleeding. Likely chronic iso HIV.   - trend CBC  - active T&S

## 2025-05-12 NOTE — H&P ADULT - PROBLEM SELECTOR PLAN 6
EF 35-40%. Home med: Carvedilol 3.125mg BID   - c/w carvedilol 3.125 mg BID  - no signs of volume overload on exam, CXR clear, no s/s of acute HF exacerbation EF 35-40%. Home med: Carvedilol 3.125mg BID. No signs of volume overload on exam, CXR clear, no s/s of acute HF exacerbation  - c/w carvedilol 3.125 mg BID  - EF 35-40%. Home med: Carvedilol 3.125mg BID. No signs of volume overload on exam, CXR clear, no s/s of acute HF exacerbation  - c/w carvedilol 3.125 mg BID

## 2025-05-12 NOTE — ED PROVIDER NOTE - CLINICAL SUMMARY MEDICAL DECISION MAKING FREE TEXT BOX
75-year-old male with history of HIV/AIDS, CAD, DM 2, CHF (EF 35 to 40%), afib on Eliquis, depression/anxiety, prostate CA status post radiation in 2013, admitted 2 weeks ago for pneumonia, signed out AMA, seen several time since then for urinary frequency, currently on Levaquin to cover both pneumonia and UTI, right wrist fracture in April currently with a cast presents with Right wrist pain and neck pain after a mechanical fall earlier today.  Patient says that he was making food when he lost his balance and fell forward into the wall hitting his hand and right wrist.  Patient then fell to the ground and says that he landed on his right arm.  Patient says he has not taken his medications including his Eliquis and HIV medications for the past 2 weeks because he is unable to open the bottles due to his cast.  Denies headache, nausea, vomiting, increased chest pain or shortness of breath above baseline, abdominal pain.  Was seen here earlier today for urinary symptoms and said that he was unable to get orthopedic follow-up.  Patient was transferred to [facility] where he saw orthopedic surgery and social work.  P edema of the right hand was noted and orthopedic surgery said the patient needs to keep the hand elevated.  He is supposed to come to their clinic on Thursday to have the cast removed.  Vitals nonactionable.  On exam: Patient sitting comfortable in bed in no acute distress.  Heart regular rate.  Lungs bilateral breath sounds.  Abdomen soft and nontender.  Pelvis stable.  No midline cervical spine tenderness to palpation.  Has bilateral paraspinal cervical tenderness to palpation.  Right hand edematous with no erythema, sensation to light touch and motor intact.  Differential diagnosis includes but not limited to: Contusion, displacement of the right wrist fracture, intracranial hemorrhage.  Plan: Brain and C-spine imaging, x-rays of the chest and right arm, Tylenol.  Will reassess.  Likely discharge with planned Ortho follow-up.

## 2025-05-12 NOTE — ED PROVIDER NOTE - ATTENDING APP SHARED VISIT CONTRIBUTION OF CARE
75-year-old male with a history of HIV/AIDS, coronary artery disease, type 2 diabetes, congestive heart failure, atrial fibrillation on apixaban, depression, anxiety, and a past prostate cancer diagnosis was admitted after a fall. He presented with right wrist and neck pain following a mechanical fall at home, where he hit his hand and fell forward. He has not taken his prescribed medications, including apixaban and HIV medications, for two weeks due to difficulty opening medication bottles with his cast. Imaging revealed no acute intracranial hemorrhage or cervical spine fracture but showed severe multilevel canal stenosis from C3/C4 through C6/C7, with no acute changes in the cervical spine. X-rays confirmed an impacted distal radial metaphyseal fracture and an ulnar styloid fracture. The patient was previously treated for pneumonia and urinary tract infection. He requires hospitalization for further care and follow-up with orthopedic surgery for cast management.    I saw and evaluated the patient. I discussed the case with the SAMANTA and agree with the findings and helped develop the plan of care as documented in the SAMANTA's note. I agree with the findings and plan of care as documented in the SAMANTA's note.

## 2025-05-12 NOTE — H&P ADULT - PROBLEM SELECTOR PLAN 2
(CD4 74, VL 4516 23/2025). On dolutegravir (Tivicay) and emtricitabine-tenofovir (Descovy) and Atovaquone   - c/w Atovaquone for PCP ppx (has sulfa allergy)   - c/w home med Tivicay and Descovy.

## 2025-05-12 NOTE — H&P ADULT - PROBLEM SELECTOR PLAN 4
Patient noted to have Cr 1.51, based on review of previous hospitalizations, baseline seems to be 1.2-1.5. Currently producing urine, with no electrolyte abnormalities. Unclear if this is BRENNAN vs CKD vs elevated creatinine baseline due to tivicay as this medication is known to elevate Cr.   - f/u cystatin C  - f/u urine studies  - trend BMP  - avoid nephrotoxic drugs, renally dose meds

## 2025-05-12 NOTE — H&P ADULT - HISTORY OF PRESENT ILLNESS
HPI:  75-year-old male with history of HIV/AIDS, CAD, DM 2, CHF (EF 35 to 40%), depression/anxiety, prostate CA status post radiation in 2013, admitted 2 weeks ago for pneumonia, signed out AMA, seen twice since then for urinary frequency, currently on Levaquin to cover both pneumonia and UTI, now with continued urinary frequency, seen at [facility] multiple times mentioning he had trouble taking his medications 2/2 R arm cast (placed 7 weeks ago). Patient says that he was making food when he lost his balance and fell forward into the wall hitting his hand and right wrist.  Patient then fell to the ground and says that he landed on his right arm.  Patient says he has not taken his medications including his Eliquis and HIV medications for the past 2 weeks because he is unable to open the bottles due to his cast.  Was not successful seeing outpatient orthopedics. Has also mentioned having some urinary incontinence.  Has no fecal incontinence.  Has history of cancer.  No IVDU.  No trouble walking.  No numbness to groin or legs.      In the ED,  VS: T 98.3F, HR 64, /73, RR  16, SpO2  95% RA  Labs: Hb 10.7, Plt 111, BUN 27, Cr 1.51, GFR48,   UA: protein 100, otherwise negative  RVP: negative  CXR: negative  XR wrist: status post splinting for fracture of distal radius with satisfactory anatomic alignment  CT head: no gross acute intracranial hemorrhage, extra-axial collection, or calvarial fracture, chronic microvascular changes  CT c-spine: no acute fx or malalignment, degenerative changes with severe multilevel canal stenosis, most notable C5-C6  Orders: none

## 2025-05-12 NOTE — H&P ADULT - PROBLEM SELECTOR PLAN 8
Patient with history of COPD. Home med- symbicort. Not on home 02.   - Duonebs q6 given SOB  - On room air, no wheezing on exam, no sputum production, low suspicion for COPD exacerbation   - c/w home med.

## 2025-05-12 NOTE — H&P ADULT - PROBLEM SELECTOR PLAN 9
History of anxiety/depression. Home medications: aripiprazole 5mg qd, escitalopram 20mg qd  - continue home meds.

## 2025-05-12 NOTE — H&P ADULT - PROBLEM SELECTOR PLAN 1
L F on 4/14 now s/p short arm cast. Per ortho, no concern for compartment at this time.   follow up in resident clinic this coming thursday for cast removal and transition to wrist brace  - elevation  - pain control.  - pt/ot Pt with R distal radial fracture 7 in early April, splinted and then placed in short cast by ortho. States he fell on the hand yesterday and has some pain. Also has had some swelling of the hand for a few days. Patient was unable to make his ortho follow ups outpatient. Ortho consulted in ED, they have no concern for compartment at this time.   - elevation for swelling  - pain control   --> tylenol 650mg q6 standing  - PT/OT  - f/u 4/15 with ortho for cast removal and transition to wrist brace

## 2025-05-12 NOTE — ED PROVIDER NOTE - COVID-19  TEST TYPE
Bedside and Verbal shift change report given to ROB Andino (oncoming nurse) by Dale Alfredo RN (offgoing nurse). Report included the following information SBAR and Kardex. MOLECULAR PCR

## 2025-05-12 NOTE — H&P ADULT - ATTENDING COMMENTS
Patient seen and examined by me. Case discussed with resident and agree with the resident's findings and plan as documented in the resident's note. 75M h/o HIV/AIDS, CAD, T2DM, CHF (EF35-40%), depression/anxiety, prostate ca s/p radiation in 2013, recent admission for recurrent PNA (sign out AMA), R distal radius fracture w/ cast immobilization, urinary incontinence and frequent falls p/w inability to care for self (take medications on his own), urinary incontinence, dizziness and fall.    1. Right distal radius fracture:  -c/w elevation for swelling  -pain control with tylenol 650mg q6 standing  -PT/OT  -f/u with Orthopedic team 5/15/25 in clinic for possible cast removal     2. Frequent falls r/o syncope:  -patient with unwitnessed falls -- must rule out syncope and place on tele for 24hr monitoring  -f/u EKG, check troponin (previous echo from March 2025 - mod LVF, EF 35-40%)  -f/u vertigo workup with ivy-hallpike and epley maneuver  -d/c eliquis as pt with frequent falls; s/p fall yesterday with head strike -- CT head negative  -f/u PT eval  -f/u orthostatics    3. Urinary incontinence:  -UA negative for UTI  -f/u bladder scan to assess for overflow incontinence  -resume flomax 0.4 mg qd  -will need urology follow up at discharge    4. BRENNAN vs. CKD:  -baseline seems to be 1.2-1.5 -- currently at 1.51  -f/u cystatin C  -f/u urine studies  -trend BMP  -avoid nephrotoxic drugs, renally dose meds    5. HIV/AIDS (last QF1=095 in May 2nd):  -off meds for 2-3 weeks as he is unable to open pill bottles due to hand fracture  -c/w Atovaquone for PCP ppx (has sulfa allergy)   -f/u viral load / CD4 today  -hold med Tivicay and Descovy until viral load/CD4  -curbside ID afterwards     6. Chronic CHF:  -EF 35-40% from March 2025  -c/w carvedilol 3.125 mg BID  -no signs of volume overload on exam, CXR clear, no s/s of acute HF exacerbation    7. HTN/CAD:  -c/w enalapril 10 mg  -c/w asa 81mg and lipitor 80    Rest of plan as above.

## 2025-05-12 NOTE — H&P ADULT - PROBLEM SELECTOR PLAN 12
Plan:  F: s/p 2L NS in the ED   E: replete K<4, Mg<2  N: regular  VTE Prophylaxis: None  GI: home pepcid  C: Full Code  D: [facility] Plan:  F: PO   E: replete K<4, Mg<2  N: cc diet  VTE Prophylaxis: heparin  C: Full Code  D: F

## 2025-05-12 NOTE — H&P ADULT - ASSESSMENT
75M hx HIV/AIDS, CAD, T2DM, CHF (EF35-40%), depression/anxiety, prostate ca s/p radiation in 2013, recent admission for recurrent PNA (sign out AMA), R distal radius fracture w/ cast immobilization, urinary incontinence, frequent falls presenting for multiple complaints. Admitted for inability to take medications on his own, urinary incontinence, dizziness.

## 2025-05-12 NOTE — H&P ADULT - NSHPPHYSICALEXAM_GEN_ALL_CORE
.  VITAL SIGNS:  T(F): 98.1 (05-12-25 @ 07:35), Max: 98.3 (05-11-25 @ 23:50)  HR: 76 (05-12-25 @ 07:35) (64 - 85)  BP: 161/72 (05-12-25 @ 07:35) (143/86 - 180/103)  BP(mean): --  RR: 18 (05-12-25 @ 07:35) (16 - 18)  SpO2: 94% (05-12-25 @ 07:35) (94% - 97%)    PHYSICAL EXAM:  Constitutional: resting comfortably in bed; NAD  HEENT: NC/AT, PERRL, EOMI, anicteric sclera, MMM  Neck: supple; no JVD or thyromegaly  Respiratory: unlabored breathing, CTA B/L; no Rhonchi/Crackles, no retractions or use of accessory muscles   Cardiac: +S1/S2; RRR; no M/R/G  Gastrointestinal: soft, NT/ND; no rebound or guarding; +BSx4  Extremities: WWP, no clubbing or cyanosis; no peripheral edema  Musculoskeletal: NROM x4; no joint swelling, tenderness or erythema  Vascular: 2+ radial, DP/PT pulses B/L  Dermatologic: skin warm, dry and intact; no rashes, wounds, or scars  Neurologic: AAOx3; CNII-XII grossly intact; no focal deficits  Psychiatric: affect and characteristics of appearance, verbalizations, behaviors are appropriate, denies SI/HI/AH/VH .  VITAL SIGNS:  T(F): 98.1 (05-12-25 @ 07:35), Max: 98.3 (05-11-25 @ 23:50)  HR: 76 (05-12-25 @ 07:35) (64 - 85)  BP: 161/72 (05-12-25 @ 07:35) (143/86 - 180/103)  BP(mean): --  RR: 18 (05-12-25 @ 07:35) (16 - 18)  SpO2: 94% (05-12-25 @ 07:35) (94% - 97%)    PHYSICAL EXAM:  Constitutional: resting comfortably in bed; NAD  HEENT: NC/AT, PERRL, EOMI, anicteric sclera, MMM  Neck: supple; no JVD or thyromegaly  Respiratory: unlabored breathing, CTA B/L; no Rhonchi/Crackles, no retractions or use of accessory muscles   Cardiac: +S1/S2; RRR; no M/R/G  Gastrointestinal: soft, NT/ND; no rebound or guarding; +BSx4  Extremities: WWP, no clubbing or cyanosis; no peripheral edema  Musculoskeletal: R hand/wrist immobilized in short cast, swelling of fingers, intact sensation  Vascular: 2+ radial, DP/PT pulses B/L  Dermatologic: skin warm, dry and intact; no rashes, wounds, or scars  Neurologic: AAOx3; CNII-XII grossly intact; no focal deficits  Psychiatric: affect and characteristics of appearance, verbalizations, behaviors are appropriate, denies SI/HI/AH/VH

## 2025-05-12 NOTE — H&P ADULT - PROBLEM SELECTOR PLAN 8
Home med: enalapril 10mg  - C/w enalapril 10 mg.    #CAD  Hx of prior MI, denies stents in heart. On home asa and lipitor 80  - continue home meds. Home med: enalapril 10mg  - c/w enalapril 10 mg    #CAD  Hx of prior MI, denies stents in heart. On home asa 81mg and lipitor 80  - c/w home meds Home med: enalapril 10mg, coreg 3.125mg BID  - c/w home meds    #CAD  Hx of prior MI, denies stents in heart. On home asa 81mg and lipitor 80  - c/w home meds

## 2025-05-12 NOTE — ED ADULT NURSE REASSESSMENT NOTE - NS ED NURSE REASSESS COMMENT FT1
received patient from previous RN. patient resting comfortably in stretcher, no signs of distress noted. awaiting admission

## 2025-05-12 NOTE — H&P ADULT - HISTORY OF PRESENT ILLNESS
HPI:  75-year-old male with history of HIV/AIDS, CAD, DM 2, CHF (EF 35 to 40%), depression/anxiety, prostate CA status post radiation in 2013, admitted 2 weeks ago for pneumonia, signed out AMA, seen twice since then for urinary frequency, currently on Levaquin to cover both pneumonia and UTI, now with continued urinary frequency, seen at [facility] multiple times mentioning he had trouble taking his medications 2/2 R arm cast (placed 7 weeks ago). Patient says that he was making food when he lost his balance and fell forward into the wall hitting his hand and right wrist.  Patient then fell to the ground and says that he landed on his right arm.  Patient says he has not taken his medications including his Eliquis and HIV medications for the past 2 weeks because he is unable to open the bottles due to his cast.  Was not successful seeing outpatient orthopedics. Has also mentioned having some urinary incontinence.  Has no fecal incontinence.  Has history of cancer.  No IVDU.  No trouble walking.  No numbness to groin or legs.      In the ED,  VS: T 98.3F, HR 64, /73, RR  16, SpO2  95% RA  Labs: Hb 10.7, Plt 111, BUN 27, Cr 1.51, GFR48,   UA: protein 100, otherwise negative  RVP: negative  CXR: negative  XR wrist: status post splinting for fracture of distal radius with satisfactory anatomic alignment  CT head: no gross acute intracranial hemorrhage, extra-axial collection, or calvarial fracture, chronic microvascular changes  CT c-spine: no acute fx or malalignment, degenerative changes with severe multilevel canal stenosis, most notable C5-C6  Orders: none HPI:  75M hx HIV/AIDS, CAD, T2DM, CHF (EF35-40%), depression/anxiety, prostate ca s/p radiation in 2013, recent admission for recurrent PNA (sign out AMA), R distal radius fracture w/ cast immobilization, urinary incontinence, frequent falls presenting for multiple complaints. Patient states that yesterday he fell and hit his head. States that he has had frequent falls from dizziness after MI in 2019, and multiple evaluations/workup for his dizziness and falls. Pt also states that he has R hand pain since yesterday as he hit his hand during the fall. Pt also has had some swelling of R hand in the cast for past few weeks, limited ROM due to swelling, however intact sensation. He has not been able to take his home medications for 2-3 weeks due to cast immobilization. When asked about prior to 3 weeks ago, pt was able to take medications despite the cast, however has had difficulty with pill bottles due to hand swelling. Finally, patient reports urinary incontinence, stating that he is going multiple times a day and unable to control his bladder function. Per outpatient review, patient has had this complaint for many years, however, he states it has only been for the past 5 days. Denies F/C, CP, SOB, dysuria, urinary retention, urgency.       In the ED,  VS: T 98.3F, HR 64, /73, RR  16, SpO2  95% RA  Labs: Hb 10.7, Plt 111, BUN 27, Cr 1.51, GFR48,   UA: protein 100, otherwise negative  RVP: negative  CXR: negative  XR wrist: status post splinting for fracture of distal radius with satisfactory anatomic alignment  CT head: no gross acute intracranial hemorrhage, extra-axial collection, or calvarial fracture, chronic microvascular changes  CT c-spine: no acute fx or malalignment, degenerative changes with severe multilevel canal stenosis, most notable C5-C6  Orders: none HPI:  75M hx HIV/AIDS, CAD, T2DM, CHF (EF35-40%), depression/anxiety, prostate ca s/p radiation in 2013, recent admission for recurrent PNA (sign out AMA), R distal radius fracture w/ cast immobilization, urinary incontinence, frequent falls presenting for multiple complaints. Patient states that yesterday he fell and hit his head. States that he has had frequent falls from dizziness after MI in 2019, and multiple evaluations/workup for his dizziness and falls. Pt also states that he has R hand pain since yesterday as he hit his hand during the fall. Pt also has had some swelling of R hand in the cast for past few weeks, limited ROM due to swelling, however intact sensation. He has not been able to take his home medications for 2-3 weeks due to cast immobilization. When asked about prior to 3 weeks ago, pt was able to take medications despite the cast, however has had difficulty with pill bottles due to hand swelling. Finally, patient reports urinary incontinence, stating that he is going multiple times a day and unable to control his bladder function. Per outpatient review, patient has had this complaint for many years, however, he states it has only been for the past 5 days. Denies F/C, CP, SOB, dysuria, urinary retention, urgency.       In the ED,  VS: T 98.3F, HR 64, /73, RR  16, SpO2  95% RA  Labs: Hb 10.7, Plt 111, BUN 27, Cr 1.51, GFR48,   UA: protein 100, otherwise negative  RVP: negative  CXR: negative  XR wrist: status post splinting for fracture of distal radius with satisfactory anatomic alignment  CT head: no gross acute intracranial hemorrhage, extra-axial collection, or calvarial fracture, chronic microvascular changes  CT c-spine: no acute fx or malalignment, degenerative changes with severe multilevel canal stenosis, most notable C5-C6  Orders: levaquin 750mg x1, tylenol, duoneb x1, lidocaine patch

## 2025-05-12 NOTE — H&P ADULT - PROBLEM SELECTOR PLAN 1
L DRF on 4/14 now s/p short arm cast. Per ortho, no concern for compartment at this time.   follow up in resident clinic this coming thursday for cast removal and transition to wrist brace  - elevation  - pain control

## 2025-05-12 NOTE — H&P ADULT - PROBLEM SELECTOR PLAN 3
Reports urinary incontinence x5 days, however has had this in the past per prior record review. UA today negative for UTI. Pt not on any medications that could cause frequency, BG wnl. Home med flomax 0.4mg which he has not taken in a few weeks.  - f/u bladder scan to assess for overflow incontinence  - flomax 0.4 mg qd  - will need urology follow up at discharge

## 2025-05-12 NOTE — H&P ADULT - PROBLEM SELECTOR PLAN 7
Home med: metformin 500mg, Linagliptin 5mg qd. Patient was not restarted on home medications on discharge from [facility] on last admission  -Hold metformin, linagliptin  -c/w ISS; Home med: metformin 500mg, linagliptin 5mg qd.   - f/u AM a1c  - hold home meds  - mISS  - carb controlled diet Home med per prior chart review: metformin 500mg, linagliptin 5mg qd.   - f/u AM a1c  - hold home meds  - mISS  - carb controlled diet Home med per prior chart review: metformin 500mg, linagliptin 5mg qd.   - hold home meds  - f/u AM a1c  - mISS  - carb controlled diet

## 2025-05-12 NOTE — H&P ADULT - NSICDXFAMHXNEG_GEN_ALL
Population Health Chart Review & Patient Outreach Details      Additional [facility] Notes:               Updates Requested / Reviewed:      Updated Care Coordination Note         Health Maintenance Topics Overdue:      VBHM Score: 0     Patient is not due for any topics at this time.    Shingles/Zoster Vaccine  RSV Vaccine                  Health Maintenance Topic(s) Outreach Outcomes & Actions Taken:    Lab(s) - Outreach Outcomes & Actions Taken  : External Records Uploaded & Care Team Updated if Applicable     atrial fibrillation

## 2025-05-12 NOTE — ED PROVIDER NOTE - CADM POA PRESS ULCER
ANTICOAGULATION  MANAGEMENT    Assessment     Today's INR result of 1.50 is Subtherapeutic (goal INR of 2.0 - 2.5)        Warfarin taken as previously instructed    - reported not missing any doses.    No new diet changes affecting INR    No new medication/supplements affecting INR     Continues to tolerate warfarin with YES reported s/s of bleeding   - from rectum that last for one hour, then stopped.    Previous INR was Subtherapeutic at 1.60 on 1/27/2020.    Consulted pharmacist, Sandie Galaviz, PharmD.    Plan:     Spoke with Jaclyn regarding INR result and instructed:    1.  Recommended Jaclyn f/u again with her doctor re:  Rectal bleeding.    Warfarin Dosing Instructions: (evenings. has 5mg tabs)   - per Sandie Galaviz, PharmD - advised to take 7.5mg warfarin dose on 1/30.   - Jaclyn reported taking 7.5mg warfarin dose, as instructed.   - Change warfarin dose to 7.5 mg daily on Mon/Thurs; and 5 mg daily rest of week.   - (change of 6.7%)    Instructed patient to follow up no later than:  Scheduled on Thurs. 2/6/2020 @ [facility].    Education provided: importance of consistent vitamin K intake, target INR goal and significance of current INR result, importance of taking warfarin as instructed, monitoring for bleeding signs and symptoms and when to seek medical attention/emergency care    Jaclyn verbalizes understanding and agrees to warfarin dosing plan.    Instructed to call the [facility] for any changes, questions or concerns. (#[phone])   ?   Dory Montenegro RN    Subjective/Objective:      Jaclyn MEDINA Sai, a 87 y.o. female is on warfarin.     Jaclyn reports:     Home warfarin dose: template incorrect; verbally confirmed home dose with Jaclyn and updated on anticoagulation calendar - she did not have her papers that states her dosing instruction.  But has her written doses with her warfarin.     Missed doses: No     Medication changes:  No     S/S of bleeding or thromboembolism:  Yes:  Reported  "\"bleeding from the rectum noted after sitting in her chair and that was after BM, which last one hour then stopped.\"     - rectal bleeding does not happen very often.   - again, she reported she's had a long history of hemorrhoids after 7 children.  She was informed surgery would be painful and there was nothing they could do at that time.  Jaclyn decided to forgo surgical procedure.     New Injury or illness:  No     Changes in diet or alcohol consumption:  No     Upcoming surgery, procedure or cardioversion:  No    Anticoagulation Episode Summary     Current INR goal:      TTR:   49.6 % (1 y)   Next INR check:   2/6/2020   INR from last check:   1.50! (1/30/2020)   Goal at result date:   2.0-3.0   Weekly max warfarin dose:      Target end date:   Indefinite   INR check location:      Preferred lab:      Send INR reminders to:   [facility], [place], operated by [facility]    Indications    Other acute pulmonary embolism without acute cor pulmonale (H) (Resolved) [I26.99]           Comments:   Goal range 2.0 - 2.5 per Dr. Cardona, 12/12/18         Anticoagulation Care Providers     Provider Role Specialty Phone number    Robin Catherine MD Referring Internal Medicine [phone]        " No

## 2025-05-12 NOTE — H&P ADULT - PROBLEM SELECTOR PLAN 10
Patient with history of COPD. Home med- symbicort. Not on home 02.   - Duonebs q6 given SOB  - On room air, no wheezing on exam, no sputum production, low suspicion for COPD exacerbation   - c/w home med. Patient with history of COPD. On home symbicort inhaler, however reports that he does not take it because it makes him short of breath. Given duonebs x3 in ED.   - continue with home med  - duonebs PRN Patient with history of COPD. On home symbicort inhaler, however reports that he does not take it because it makes him short of breath and has not picked up from pharmacy. Given duonebs x3 in ED, currently without wheezing on physical exam. Does not appear to be in exacerbation, no increased cough, shortness of breath, sputum production.  - continue with home med  - duonebs PRN

## 2025-05-12 NOTE — PATIENT PROFILE ADULT - FALL HARM RISK - HARM RISK INTERVENTIONS

## 2025-05-12 NOTE — H&P ADULT - PROBLEM SELECTOR PLAN 7
Platelets 84 on admission. Hx of HIV Prior platelets 167 (3/2025). Likely chronic thrombocytopenia.   - continue to trend platelets.

## 2025-05-12 NOTE — H&P ADULT - PROBLEM SELECTOR PLAN 4
Home med: metformin 500mg, Linagliptin 5mg qd. Patient was not restarted on home medications on discharge from [facility] on last admission  -Hold metformin, linagliptin  -c/w ISS;

## 2025-05-12 NOTE — H&P ADULT - PROBLEM SELECTOR PLAN 5
(CD4 74, VL 4516 23/2025). On dolutegravir (Tivicay) and emtricitabine-tenofovir (Descovy) and Atovaquone   - c/w Atovaquone for PCP ppx (has sulfa allergy)   - c/w home med Tivicay and Descovy. (CD4 74, VL 4516 23/2025). On dolutegravir (Tivicay) and emtricitabine-tenofovir (Descovy) and Atovaquone. Previously took these medications regularly, however has not taken in 2-3 weeks as he is unable to open pill bottles due to hand fracture.  - c/w Atovaquone for PCP ppx (has sulfa allergy)   - c/w home med Tivicay and Descovy  - f/u AM viral load (CD4 74, VL 4516 23/2025). On dolutegravir (Tivicay) and emtricitabine-tenofovir (Descovy) and Atovaquone. Previously took these medications regularly, however has not taken in 2-3 weeks as he is unable to open pill bottles due to hand fracture.  - c/w Atovaquone for PCP ppx (has sulfa allergy)   - c/w home med Tivicay and Descovy after obtaining baseline CD4 / viral load  - f/u AM viral load

## 2025-05-12 NOTE — ED ADULT NURSE NOTE - NSFALLHARMRISKINTERV_ED_ALL_ED

## 2025-05-12 NOTE — H&P ADULT - NSHPPHYSICALEXAM_GEN_ALL_CORE
.  VITAL SIGNS:  T(F): 98.1 (05-12-25 @ 06:50), Max: 98.3 (05-11-25 @ 23:50)  HR: 68 (05-12-25 @ 06:50) (64 - 85)  BP: 143/86 (05-12-25 @ 06:50) (143/86 - 180/103)  BP(mean): --  RR: 16 (05-12-25 @ 06:50) (16 - 18)  SpO2: 97% (05-12-25 @ 06:50) (95% - 97%)    PHYSICAL EXAM:  Constitutional: resting comfortably in bed; NAD  HEENT: NC/AT, PERRL, EOMI, anicteric sclera, MMM  Neck: supple; no JVD or thyromegaly  Respiratory: unlabored breathing, CTA B/L; no Rhonchi/Crackles, no retractions or use of accessory muscles   Cardiac: +S1/S2; RRR; no M/R/G  Gastrointestinal: soft, NT/ND; no rebound or guarding; +BSx4  Extremities: WWP, no clubbing or cyanosis; no peripheral edema  Musculoskeletal: NROM x4; no joint swelling, tenderness or erythema  Vascular: 2+ radial, DP/PT pulses B/L  Dermatologic: skin warm, dry and intact; no rashes, wounds, or scars  Neurologic: AAOx3; CNII-XII grossly intact; no focal deficits  Psychiatric: affect and characteristics of appearance, verbalizations, behaviors are appropriate, denies SI/HI/AH/VH

## 2025-05-12 NOTE — H&P ADULT - PROBLEM SELECTOR PLAN 3
EF 35-40%. Home med: Carvedilol 3.125mg BID   - c/w carvedilol 3.125 mg BID  - no signs of volume overload on exam, CXR clear, no s/s of acute HF exacerbation.

## 2025-05-13 DIAGNOSIS — E11.9 TYPE 2 DIABETES MELLITUS WITHOUT COMPLICATIONS: ICD-10-CM

## 2025-05-13 DIAGNOSIS — Z91.018 ALLERGY TO OTHER FOODS: ICD-10-CM

## 2025-05-13 DIAGNOSIS — Z88.0 ALLERGY STATUS TO PENICILLIN: ICD-10-CM

## 2025-05-13 DIAGNOSIS — R32 UNSPECIFIED URINARY INCONTINENCE: ICD-10-CM

## 2025-05-13 DIAGNOSIS — I50.9 HEART FAILURE, UNSPECIFIED: ICD-10-CM

## 2025-05-13 DIAGNOSIS — Z21 ASYMPTOMATIC HUMAN IMMUNODEFICIENCY VIRUS [HIV] INFECTION STATUS: ICD-10-CM

## 2025-05-13 DIAGNOSIS — Z88.2 ALLERGY STATUS TO SULFONAMIDES: ICD-10-CM

## 2025-05-13 DIAGNOSIS — I25.10 ATHEROSCLEROTIC HEART DISEASE OF NATIVE CORONARY ARTERY WITHOUT ANGINA PECTORIS: ICD-10-CM

## 2025-05-13 DIAGNOSIS — F19.90 OTHER PSYCHOACTIVE SUBSTANCE USE, UNSPECIFIED, UNCOMPLICATED: ICD-10-CM

## 2025-05-13 LAB
4/8 RATIO: 0.22 RATIO — LOW (ref 0.86–4.14)
ABS CD8: 517 CELLS/UL — SIGNIFICANT CHANGE UP (ref 90–775)
ANION GAP SERPL CALC-SCNC: 5 MMOL/L — SIGNIFICANT CHANGE UP (ref 5–17)
BUN SERPL-MCNC: 25 MG/DL — HIGH (ref 7–23)
CALCIUM SERPL-MCNC: 8.5 MG/DL — SIGNIFICANT CHANGE UP (ref 8.4–10.5)
CD3 BLASTS SPEC-ACNC: 665 CELLS/UL — SIGNIFICANT CHANGE UP (ref 396–2024)
CD3 BLASTS SPEC-ACNC: 86 % — HIGH (ref 58–84)
CD4 %: 15 % — LOW (ref 30–56)
CD8 %: 67 % — HIGH (ref 11–43)
CHLORIDE SERPL-SCNC: 104 MMOL/L — SIGNIFICANT CHANGE UP (ref 96–108)
CO2 SERPL-SCNC: 27 MMOL/L — SIGNIFICANT CHANGE UP (ref 22–31)
CREAT SERPL-MCNC: 1.27 MG/DL — SIGNIFICANT CHANGE UP (ref 0.5–1.3)
CREAT SERPL-MCNC: 1.49 MG/DL — HIGH (ref 0.5–1.3)
CYSTATIN C SERPL-MCNC: 1.34 MG/L — SIGNIFICANT CHANGE UP (ref 0.82–1.52)
EGFR: 49 ML/MIN/1.73M2 — LOW
EGFR: 49 ML/MIN/1.73M2 — LOW
EGFR: 59 ML/MIN/1.73M2 — LOW
EGFR: 59 ML/MIN/1.73M2 — LOW
EGFRCR-CYS SERPLBLD CKD-EPI 2021: 51 ML/MIN/1.73M2 — LOW
GFR/BSA.PRED SERPLBLD CYS-BASED-ARV: 50 ML/MIN/1.73M2 — LOW
GLUCOSE SERPL-MCNC: 180 MG/DL — HIGH (ref 70–99)
HCT VFR BLD CALC: 36.4 % — LOW (ref 39–50)
HGB BLD-MCNC: 12.1 G/DL — LOW (ref 13–17)
MCHC RBC-ENTMCNC: 33.1 PG — SIGNIFICANT CHANGE UP (ref 27–34)
MCHC RBC-ENTMCNC: 33.2 G/DL — SIGNIFICANT CHANGE UP (ref 32–36)
MCV RBC AUTO: 99.5 FL — SIGNIFICANT CHANGE UP (ref 80–100)
NRBC BLD AUTO-RTO: 0 /100 WBCS — SIGNIFICANT CHANGE UP (ref 0–0)
PLATELET # BLD AUTO: 118 K/UL — LOW (ref 150–400)
POTASSIUM SERPL-MCNC: 4.7 MMOL/L — SIGNIFICANT CHANGE UP (ref 3.5–5.3)
POTASSIUM SERPL-SCNC: 4.7 MMOL/L — SIGNIFICANT CHANGE UP (ref 3.5–5.3)
RBC # BLD: 3.66 M/UL — LOW (ref 4.2–5.8)
RBC # FLD: 13.2 % — SIGNIFICANT CHANGE UP (ref 10.3–14.5)
SODIUM SERPL-SCNC: 136 MMOL/L — SIGNIFICANT CHANGE UP (ref 135–145)
T-CELL CD4 SUBSET PNL BLD: 115 CELLS/UL — LOW (ref 325–1251)
VIABLE CELLS NFR SPEC: SIGNIFICANT CHANGE UP
WBC # BLD: 3 K/UL — LOW (ref 3.8–10.5)
WBC # FLD AUTO: 3 K/UL — LOW (ref 3.8–10.5)

## 2025-05-13 PROCEDURE — 99221 1ST HOSP IP/OBS SF/LOW 40: CPT

## 2025-05-13 PROCEDURE — 99222 1ST HOSP IP/OBS MODERATE 55: CPT

## 2025-05-13 PROCEDURE — 99233 SBSQ HOSP IP/OBS HIGH 50: CPT | Mod: GC

## 2025-05-13 PROCEDURE — G0545: CPT

## 2025-05-13 RX ORDER — DOLUTEGRAVIR SODIUM 5 MG/1
50 TABLET, FOR SUSPENSION ORAL EVERY 24 HOURS
Refills: 0 | Status: DISCONTINUED | OUTPATIENT
Start: 2025-05-13 | End: 2025-05-19

## 2025-05-13 RX ORDER — SPIRONOLACTONE 25 MG
25 TABLET ORAL EVERY 24 HOURS
Refills: 0 | Status: DISCONTINUED | OUTPATIENT
Start: 2025-05-13 | End: 2025-05-19

## 2025-05-13 RX ORDER — BENZONATATE 100 MG
100 CAPSULE ORAL EVERY 8 HOURS
Refills: 0 | Status: DISCONTINUED | OUTPATIENT
Start: 2025-05-13 | End: 2025-05-19

## 2025-05-13 RX ORDER — ATOVAQUONE 750 MG/5ML
1500 SUSPENSION ORAL EVERY 24 HOURS
Refills: 0 | Status: DISCONTINUED | OUTPATIENT
Start: 2025-05-13 | End: 2025-05-19

## 2025-05-13 RX ADMIN — HEPARIN SODIUM 5000 UNIT(S): 1000 INJECTION INTRAVENOUS; SUBCUTANEOUS at 05:15

## 2025-05-13 RX ADMIN — Medication 650 MILLIGRAM(S): at 01:08

## 2025-05-13 RX ADMIN — Medication 10 MILLIGRAM(S): at 17:43

## 2025-05-13 RX ADMIN — INSULIN LISPRO 2: 100 INJECTION, SOLUTION INTRAVENOUS; SUBCUTANEOUS at 11:45

## 2025-05-13 RX ADMIN — Medication 5 MILLIGRAM(S): at 22:38

## 2025-05-13 RX ADMIN — HEPARIN SODIUM 5000 UNIT(S): 1000 INJECTION INTRAVENOUS; SUBCUTANEOUS at 22:38

## 2025-05-13 RX ADMIN — Medication 81 MILLIGRAM(S): at 11:20

## 2025-05-13 RX ADMIN — Medication 100 MILLIGRAM(S): at 11:19

## 2025-05-13 RX ADMIN — DOLUTEGRAVIR SODIUM 50 MILLIGRAM(S): 5 TABLET, FOR SUSPENSION ORAL at 18:28

## 2025-05-13 RX ADMIN — Medication 1 TABLET(S): at 18:28

## 2025-05-13 RX ADMIN — Medication 650 MILLIGRAM(S): at 23:00

## 2025-05-13 RX ADMIN — Medication 650 MILLIGRAM(S): at 02:08

## 2025-05-13 RX ADMIN — HEPARIN SODIUM 5000 UNIT(S): 1000 INJECTION INTRAVENOUS; SUBCUTANEOUS at 15:06

## 2025-05-13 RX ADMIN — ESCITALOPRAM OXALATE 20 MILLIGRAM(S): 20 TABLET ORAL at 11:20

## 2025-05-13 RX ADMIN — ARIPIPRAZOLE 5 MILLIGRAM(S): 2 TABLET ORAL at 11:20

## 2025-05-13 RX ADMIN — TAMSULOSIN HYDROCHLORIDE 0.4 MILLIGRAM(S): 0.4 CAPSULE ORAL at 22:37

## 2025-05-13 RX ADMIN — ATORVASTATIN CALCIUM 80 MILLIGRAM(S): 80 TABLET, FILM COATED ORAL at 22:38

## 2025-05-13 RX ADMIN — CARVEDILOL 3.12 MILLIGRAM(S): 3.12 TABLET, FILM COATED ORAL at 17:43

## 2025-05-13 RX ADMIN — Medication 650 MILLIGRAM(S): at 05:15

## 2025-05-13 RX ADMIN — Medication 650 MILLIGRAM(S): at 12:12

## 2025-05-13 RX ADMIN — Medication 25 MILLIGRAM(S): at 17:43

## 2025-05-13 RX ADMIN — Medication 650 MILLIGRAM(S): at 22:38

## 2025-05-13 RX ADMIN — CARVEDILOL 3.12 MILLIGRAM(S): 3.12 TABLET, FILM COATED ORAL at 05:15

## 2025-05-13 RX ADMIN — Medication 650 MILLIGRAM(S): at 11:12

## 2025-05-13 RX ADMIN — INSULIN LISPRO 2: 100 INJECTION, SOLUTION INTRAVENOUS; SUBCUTANEOUS at 22:32

## 2025-05-13 RX ADMIN — Medication 650 MILLIGRAM(S): at 06:15

## 2025-05-13 NOTE — CONSULT NOTE ADULT - ATTENDING COMMENTS
Patient is a 74 yo Male with Pmhx of Substance Use Disorder, HIV/AIDS, NOCAD, NICM with EF: 35-40% since 2021, Reported '' Illegular HR'' for which he was started on Eliquis, DM, depression/anxiety, prostate ca s/p radiation in 2013, recent admission for recurrent PNA (sign out AMA), R distal radius fracture w/ cast immobilization, urinary incontinence, frequent falls who presented with multiple complaint including inability to take medications on his own, urinary incontinence, and dizziness. Cardiology is consulted for CV optimization and for evaluation of Syncope    Review of Studies:  - ECG 05/12/2025: NSR, AnteriorSeptal Q wave, Poor R wave progression  - TTE 03/25/2025:. Moderate left ventricular hypertrophy. Left ventricular cavity is normal in size. Left ventricular systolic function is moderately decreased with an ejection fraction visually estimated at 35 to 40 %. Regional wall motion abnormalities present. Basal and mid inferior wall and basal and mid inferolateral wall are abnormal. The left ventricular diastolic function is indeterminate.. Normal right ventricular cavity size, with normal wall thickness, and normal right ventricular systolic function. Normal left and right atrial size. Mild mitral regurgitation.  - [facility] 07/2021: Mild NOCAD; 30% dLCx; 30% pLAD;    # NICM; Chronic HF (EF 35-40%)  # NOCAD  # Polysubttance Abuse  # HIV/AIDS  # ? Afib    - Patient seen and examined at bedside. All records reviewed  - He was hospitalized at [facility] in 07/2021 with similar presentation and admitted for Syncope evaluation. At that time he was documented to have NICM with EF around 40%.   - Patient underwent ischemic eval with [facility] showing NOCAD with 30% pLAD and dLCx disease. He did not receive any intervention. NICM was deemed secondary to Polysubstance abuse and patient was started on GDMT with which he has not been fully compliant  - Event monitor was placed prior to discharge however patient did not return it. Data was therefore not gathered  - He reports following with a PCP/Cardiologist on [place] who diagnosed him with irregular HR and started him on Eliquis. He reports missing doses of his medications frequently but has not been able to take any of them since breaking his arm requiring a cast  - ROS is notable for lightheadedness and fatigue when going from sitting to standing position quickly; When he consumes too much cocaine and doesn't eat.  - Tele reviewed showing NSR, Couplets, Blocked PACS but no evidence of Afib thus far  - Echo reviewed, grossly similar to that of prior Hospitalization. Patient has mildly reduced LV function with normal RV function and no significant valvular Heart Disease  - Clinically he is warm, well perfused and compensated in NAD. JVP is around 5-6 cm and there is no lower extremity edema  - At this time do not believe patient's ''syncope or near syncope'' to be arrhythmogenic in nature. Suspect large component of active Cocaine use and poor PO intake and possible vagal component. Orthostatics done on admission were positive with >10 point difference in Diastolic BP  - Patient's cardiomyopathy is chronic and he is well compensated from that standpoint  - Can continue with Coreg 3.125 mg po BID and Enalapril 10 mg po QD with strict holding parameters provided orthostasis is resolved  - Would initiate spironolactone 25 mg po Qd as well  - From an atrial Fibrillation standpoint, no clear documentation of Afib on Tele however patient is adamant he has been on Eliquis. CHADVASC is 4 placing patient at higher thromboembolic risk. If bleeding risk deemed low without fear of recurrent traumatic falls, would resume Home Eliquis 5 mg po BID if diagnosis of Afib confirmed by PCP   - Ultimately patient is not interested in Cocaine use cessation. He is firm that he will continue to use. Counseled on CV side effects of cocaine and we discussed its impact on his Cardiomyopathy. He verbalized understanding  - Cardiology will continue to follow with you, please call with any questions

## 2025-05-13 NOTE — OCCUPATIONAL THERAPY INITIAL EVALUATION ADULT - GENERAL OBSERVATIONS, REHAB EVAL
Pt's RN Graciela aware of intent to eval/tx; cleared Pt. Pt received in supine - +heplock, telemetry, R hand splint, bed alarm, 02 NC 2L. Pt agreeable to eval. PT Mimi present.

## 2025-05-13 NOTE — CONSULT NOTE ADULT - ASSESSMENT
I M    75M hx HIV/AIDS, CAD, T2DM, CHF (EF35-40%), depression/anxiety, prostate ca s/p radiation in 2013, recent admission for recurrent PNA (sign out AMA), R distal radius fracture w/ cast immobilization, urinary incontinence, frequent falls presenting for multiple complaints. Admitted for inability to take medications on his own, urinary incontinence, dizziness.     Problem/Plan - 1:  ·  Problem: Distal radius fracture, right.   ·  Plan: Pt with R distal radial fracture 7 in early April, splinted and then placed in short cast by ortho. States he fell on the hand yesterday and has some pain. Also has had some swelling of the hand for a few days. Patient was unable to make his ortho follow ups outpatient. Ortho consulted in ED, they have no concern for compartment at this time.   - elevation for swelling  - pain control   --> tylenol 650mg q6 standing  - PT/OT  - f/u 4/15 with ortho for cast removal and transition to wrist brace.    Problem/Plan - 2:  ·  Problem: Fall, accidental.   ·  Plan: Pt reporting fall yesterday, fell onto right arm, reporting head strike. CT head negative. Per review of previous records, pt has had multiple falls over many years, often had workup initiated, and would AMA prior to completion of workup. States that his falls have been occuring since MI in 2019, after which he became dizzy with rapid movements. Head CT neg for acute changes, and central causes of vertigo. Seems that patient may have been on eliquis, unclear why, does not know if he has hx of DVT. Jose hallpike/epley negative.  - PT eval  - f/u orthostatics  - tele monitoring  - f/u ecg  - troponin trend  - dc eliquis as pt with frequent falls.    Problem/Plan - 3:  ·  Problem: Urinary incontinence.   ·  Plan: Reports urinary incontinence x5 days, however has had this in the past per prior record review. UA today negative for UTI. Pt not on any medications that could cause frequency, BG wnl. Home med flomax 0.4mg which he has not taken in a few weeks.  - f/u bladder scan to assess for overflow incontinence  - flomax 0.4 mg qd  - will need urology follow up at discharge.    Problem/Plan - 4:  ·  Problem: BRENNAN (acute kidney injury).   ·  Plan: Patient noted to have Cr 1.51, based on review of previous hospitalizations, baseline seems to be 1.2-1.5. Currently producing urine, with no electrolyte abnormalities. Unclear if this is BRENNAN vs CKD vs elevated creatinine baseline due to tivicay as this medication is known to elevate Cr.   - f/u cystatin C  - f/u urine studies  - trend BMP  - avoid nephrotoxic drugs, renally dose meds.    Problem/Plan - 5:  ·  Problem: Human immunodeficiency virus (HIV).   ·  Plan: (CD4 74, VL 4516 23/2025). On dolutegravir (Tivicay) and emtricitabine-tenofovir (Descovy) and Atovaquone. Previously took these medications regularly, however has not taken in 2-3 weeks as he is unable to open pill bottles due to hand fracture.  - c/w Atovaquone for PCP ppx (has sulfa allergy)   - c/w home med Tivicay and Descovy after obtaining baseline CD4 / viral load  - f/u AM viral load.    Problem/Plan - 6:  ·  Problem: Chronic systolic congestive heart failure.   ·  Plan: EF 35-40%. Home med: Carvedilol 3.125mg BID. No signs of volume overload on exam, CXR clear, no s/s of acute HF exacerbation  - c/w carvedilol 3.125 mg BID.    Problem/Plan - 7:  ·  Problem: Type 2 diabetes mellitus.   ·  Plan: Home med per prior chart review: metformin 500mg, linagliptin 5mg qd.   - hold home meds  - f/u AM a1c  - mISS  - carb controlled diet.    Problem/Plan - 8:  ·  Problem: Hypertension.   ·  Plan: Home med: enalapril 10mg, coreg 3.125mg BID  - c/w home meds    #CAD  Hx of prior MI, denies stents in heart. On home asa 81mg and lipitor 80  - c/w home meds.    Problem/Plan - 9:  ·  Problem: Thrombocytopenia.   ·  Plan: Plt 111 on admission, per review of previous labs, has thrombocytopenia at baseline. No petechiae, purpura, ecchymoses, no signs of active bleeding. Likely chronic iso HIV.   - trend CBC  - active T&S.    Problem/Plan - 10:  ·  Problem: COPD without exacerbation.   ·  Plan; Patient with history of COPD. On home symbicort inhaler, however reports that he does not take it because it makes him short of breath and has not picked up from pharmacy. Given duonebs x3 in ED, currently without wheezing on physical exam. Does not appear to be in exacerbation, no increased cough, shortness of breath, sputum production.  - continue with home med  - duonebs PRN.    Problem/Plan - 11:  ·  Problem: Anxiety and depression.   ·  Plan: History of anxiety/depression. Home medications: aripiprazole 5mg qd, escitalopram 20mg qd  - continue home meds.    Problem/Plan - 12:  ·  Problem: Prophylactic measure.   ·  Plan: Plan:  F: PO   E: replete K<4, Mg<2  N: cc diet  VTE Prophylaxis: heparin  C: Full Code  D: RMF.      
75M hx HIV/AIDS, CAD, T2DM, CHF, depression/anxiety, prostate ca s/p radiation in 2013, recent admission for recurrent PNA (sign out AMA), R distal radius fracture w/ cast immobilization (6 weeks ago), chronic urinary incontinence, frequent falls presenting for fall with head strike. Patient afebrile without evidence of active infection. Has been off HAART for 6 weeks due to inability to open pill bottles 2/2 R wrist fracture. Reports compliance prior to this. HIV VL 3/23 was 4,500 --more recently VL <30 on 5/2 demonstrating patient's overall adherence to treatment regimen.     Suggest:  -Ok to restart home Tivicay and Descovy   -adjust atovaquone to 1500mg PO daily for PCP ppx   -please ask pharmacy to give patient blister packs as this will be easier for patient to open   -patient will follow up with his HIV provider, Dr. Noguera     Team 2 will sign off. Thank you for your consultation   Please recall if further input is desired   Case d/w primary team.  Final recommendation pending attending note.    Teresa Miller, Infectious Diseases PA  Please reach out for any questions 9 am-5pm.   For evenings and weekends, please call the ID physician on call.        
75M hx HIV/AIDS, CAD (nonobstructive on [facility] 2021), Afib, T2DM, CHF (EF 35-40%), depression/anxiety, prostate ca s/p radiation in 2013, recent admission for recurrent PNA (sign out AMA), R distal radius fracture w/ cast immobilization, urinary incontinence, frequent falls who presents with presyncope and fall.     REVIEW OF STUDIES  ECG 5/12/25: NSR, anteroseptal Q waves, nonspecific ST changes   TTE 3/25/25: LVEF 40% w/RWMA: The basal and mid inferior wall and basal and mid inferolateral wall are akinetic (RCA). RV normal size/function. Mild MR.    [facility] 7/28/21: Indication: Stable angina. R dominant. LM normal. MLAD 30%. LCx MLI. RCA mild atherosclerosis. No stents visualized      #Presyncope  DDx includes othostasis (+ on admission), drug use, and possibly vasovagal. Low suspicion for arrhythmogenic etiology\  -Repeat orthostatics today    #HFrEF  -Volume: Euvolemic on exam but would repeat orthostatics today  -GDMT: Continue coreg 3.125mg ID and enalapril 10mg QD. Start spironolactone 25mg QD.     #Afib  -Patient reports history and states he was on eliquis. Not noted on tele so far.  -CHADSVASC is 4  -Obtain collateral from PCP. If confirmed, would resume eliquis.     #Non-obstructive CAD  -Continue aspirin  -Continue Lipitor 80mg

## 2025-05-13 NOTE — CONSULT NOTE ADULT - NS ATTEND AMEND GEN_ALL_CORE FT
75M w/ HIV on tivicay + descovy + atovaquone and following with Dr. Noguera, with ongoing issues with intermittent medication non-adherence (last VL UD 5/2025 but in 3/2025 VL 4.5k, CD4 115/15% in 5/2025), prior tx for syphilis, HCV s/p Harvoni in 2015 with SVR, active cocaine use d/o (smokes), mood d/o, with multiple recent tx for bacterial PNA improved with typical abx coverage (in 3/2025, 4/2025), now admitted on 5/12 after fell and struck head. Afebrile since arrival, CXR clear, no infectious sx. ID consulted for guidance on ART management, as patient reports not being able to take his ART for the past ~3 weeks due to R radial fracture from another fall in 4/2025. Of note, patient’s falls are chronic for many years, frequently leaving AMA prior to workup. Recommend resume patient’s tivicay + descovy + atovaquone, and coordinating with CM/pharmacy for patient to receive medications in blister paks so he can manage taking his medications while only has use of one arm. Pt already has follow up arranged with Dr. Noguera within the next two weeks.

## 2025-05-13 NOTE — PROGRESS NOTE ADULT - SUBJECTIVE AND OBJECTIVE BOX
Patient is a 75y old  Male who presents with a chief complaint of     HOSPITAL COURSE:     OVERNIGHT EVENTS:    SUBJECTIVE:     ROS: otherwise negative      T(C): 36.5 (25 @ 09:04), Max: 36.5 (25 @ 22:22)  HR: 79 (25 @ 10:48) (63 - 85)  BP: 163/76 (25 @ 10:48) (123/68 - 163/76)  RR: 18 (25 @ 08:36) (16 - 20)  SpO2: 95% (25 @ 10:48) (93% - 98%)  Wt(kg): --Vital Signs Last 24 Hrs  T(C): 36.5 (13 May 2025 09:04), Max: 36.5 (12 May 2025 22:22)  T(F): 97.7 (13 May 2025 09:04), Max: 97.7 (12 May 2025 22:22)  HR: 79 (13 May 2025 10:48) (63 - 85)  BP: 163/76 (13 May 2025 10:48) (123/68 - 163/76)  BP(mean): 108 (13 May 2025 10:48) (89 - 118)  RR: 18 (13 May 2025 08:36) (16 - 20)  SpO2: 95% (13 May 2025 10:48) (93% - 98%)    Parameters below as of 13 May 2025 10:48  Patient On (Oxygen Delivery Method): nasal cannula  O2 Flow (L/min): 2      PHYSICAL EXAM:  Constitutional: resting comfortably in bed; NAD  Head: NC/AT  Eyes: PERRL, EOMI, anicteric sclera  ENT: no nasal discharge; MMM  Neck: supple; no JVD or thyromegaly  Respiratory: CTA B/L; no W/R/R, no retractions  Cardiac: +S1/S2; RRR; no M/R/G  Gastrointestinal: soft, NT/ND; no rebound or guarding; +BSx4  Back: spine midline, no bony tenderness or step-offs; no CVAT B/L  Extremities: WWP, no clubbing or cyanosis; no peripheral edema. Capillary refill <2 sec  Musculoskeletal: NROM x4; no joint swelling, tenderness or erythema  Vascular: 2+ radial, DP/PT pulses B/L  Dermatologic: skin warm, dry and intact; no rashes, wounds, or scars  Lymphatic: no submandibular or cervical LAD  Neurologic: AAOx3; CNII-XII grossly intact; no focal deficits  Psychiatric: affect and characteristics of appearance, verbalizations, behaviors are appropriate    LABS:                        10.7   3.53  )-----------( 111      ( 12 May 2025 02:33 )             33.0         x   |  x   |  x   ----------------------------<  x   x    |  x   |  1.49[H]    Ca    8.1[L]      12 May 2025 02:33    TPro  6.0[L]  /  Alb  2.5[L]  /  TBili  0.4  /  DBili  x   /  AST  29  /  ALT  18  /  AlkPhos  68  05-12        Urinalysis Basic - ( 12 May 2025 21:36 )    Color: Yellow / Appearance: Clear / S.015 / pH: x  Gluc: x / Ketone: x  / Bili: Negative / Urobili: 1.0 mg/dL   Blood: x / Protein: 100 mg/dL / Nitrite: Negative   Leuk Esterase: Negative / RBC: 9 /HPF / WBC 1 /HPF   Sq Epi: x / Non Sq Epi: 1 /HPF / Bacteria: Negative /HPF      CAPILLARY BLOOD GLUCOSE      POCT Blood Glucose.: 179 mg/dL (13 May 2025 11:33)  POCT Blood Glucose.: 124 mg/dL (13 May 2025 06:51)  POCT Blood Glucose.: 139 mg/dL (12 May 2025 22:20)  POCT Blood Glucose.: 139 mg/dL (12 May 2025 17:53)        Urinalysis Basic - ( 12 May 2025 21:36 )    Color: Yellow / Appearance: Clear / S.015 / pH: x  Gluc: x / Ketone: x  / Bili: Negative / Urobili: 1.0 mg/dL   Blood: x / Protein: 100 mg/dL / Nitrite: Negative   Leuk Esterase: Negative / RBC: 9 /HPF / WBC 1 /HPF   Sq Epi: x / Non Sq Epi: 1 /HPF / Bacteria: Negative /HPF        MEDICATIONS  (STANDING):  acetaminophen     Tablet .. 650 milliGRAM(s) Oral every 6 hours  ARIPiprazole 5 milliGRAM(s) Oral every 24 hours  aspirin  chewable 81 milliGRAM(s) Oral every 24 hours  atorvastatin 80 milliGRAM(s) Oral at bedtime  atovaquone  Suspension 750 milliGRAM(s) Oral every 24 hours  carvedilol 3.125 milliGRAM(s) Oral every 12 hours  dextrose 5%. 1000 milliLiter(s) (50 mL/Hr) IV Continuous <Continuous>  dextrose 5%. 1000 milliLiter(s) (100 mL/Hr) IV Continuous <Continuous>  dextrose 50% Injectable 25 Gram(s) IV Push once  dextrose 50% Injectable 12.5 Gram(s) IV Push once  dextrose 50% Injectable 25 Gram(s) IV Push once  enalapril 10 milliGRAM(s) Oral every 24 hours  escitalopram 20 milliGRAM(s) Oral every 24 hours  fluticasone propionate/ salmeterol 250-50 MICROgram(s) Diskus 1 Dose(s) Inhalation two times a day  glucagon  Injectable 1 milliGRAM(s) IntraMuscular once  heparin   Injectable 5000 Unit(s) SubCutaneous every 8 hours  insulin lispro (ADMELOG) corrective regimen sliding scale   SubCutaneous Before meals and at bedtime  tamsulosin 0.4 milliGRAM(s) Oral at bedtime    MEDICATIONS  (PRN):  acetaminophen     Tablet .. 650 milliGRAM(s) Oral every 6 hours PRN Temp greater or equal to 38C (100.4F), Mild Pain (1 - 3)  albuterol/ipratropium for Nebulization 3 milliLiter(s) Nebulizer every 6 hours PRN Wheezing  benzonatate 100 milliGRAM(s) Oral every 8 hours PRN Cough  dextrose Oral Gel 15 Gram(s) Oral once PRN Blood Glucose LESS THAN 70 milliGRAM(s)/deciliter  hydrocodone/homatropine Syrup 5 milliLiter(s) Oral every 6 hours PRN Cough  melatonin 5 milliGRAM(s) Oral at bedtime PRN Sleep      RADIOLOGY & ADDITIONAL TESTS: Reviewed   Patient is a 75y old  Male who presents with a chief complaint of     OVERNIGHT EVENTS: naeon    SUBJECTIVE: Patient feels well, states that his fall was mechanical. Has R hand pain and swelling. Denies fevers, nausea, vomiting, diarrhea, chills, chest pain, shortness of breath.     ROS: otherwise negative      T(C): 36.5 (25 @ 09:04), Max: 36.5 (25 @ 22:22)  HR: 79 (25 @ 10:48) (63 - 85)  BP: 163/76 (25 @ 10:48) (123/68 - 163/76)  RR: 18 (25 @ 08:36) (16 - 20)  SpO2: 95% (25 @ 10:48) (93% - 98%)  Wt(kg): --Vital Signs Last 24 Hrs  T(C): 36.5 (13 May 2025 09:04), Max: 36.5 (12 May 2025 22:22)  T(F): 97.7 (13 May 2025 09:04), Max: 97.7 (12 May 2025 22:22)  HR: 79 (13 May 2025 10:48) (63 - 85)  BP: 163/76 (13 May 2025 10:48) (123/68 - 163/76)  BP(mean): 108 (13 May 2025 10:48) (89 - 118)  RR: 18 (13 May 2025 08:36) (16 - 20)  SpO2: 95% (13 May 2025 10:48) (93% - 98%)    Parameters below as of 13 May 2025 10:48  Patient On (Oxygen Delivery Method): nasal cannula  O2 Flow (L/min): 2      PHYSICAL EXAM:  Constitutional: resting comfortably in bed; NAD, disheveled  HEENT: NC/AT, PERRL, EOMI, anicteric sclera, MMM  Neck: supple; no JVD or thyromegaly  Respiratory: unlabored breathing, CTA B/L; no Rhonchi/Crackles, no retractions or use of accessory muscles   Cardiac: +S1/S2; RRR; no M/R/G  Gastrointestinal: soft, NT/ND; no rebound or guarding; +BSx4  Extremities: WWP, no clubbing or cyanosis; no peripheral edema  Musculoskeletal: R hand/wrist immobilized in short cast, swelling of fingers, intact sensation  Vascular: 2+ radial, DP/PT pulses B/L  Dermatologic: skin warm, dry and intact; no rashes, wounds, or scars  Neurologic: AAOx3; CNII-XII grossly intact; no focal deficits  Psychiatric: affect and characteristics of appearance, verbalizations, behaviors are appropriate, denies SI/HI/AH/VH    LABS:                        10.7   3.53  )-----------( 111      ( 12 May 2025 02:33 )             33.0         x   |  x   |  x   ----------------------------<  x   x    |  x   |  1.49[H]    Ca    8.1[L]      12 May 2025 02:33    TPro  6.0[L]  /  Alb  2.5[L]  /  TBili  0.4  /  DBili  x   /  AST  29  /  ALT  18  /  AlkPhos  68  12        Urinalysis Basic - ( 12 May 2025 21:36 )    Color: Yellow / Appearance: Clear / S.015 / pH: x  Gluc: x / Ketone: x  / Bili: Negative / Urobili: 1.0 mg/dL   Blood: x / Protein: 100 mg/dL / Nitrite: Negative   Leuk Esterase: Negative / RBC: 9 /HPF / WBC 1 /HPF   Sq Epi: x / Non Sq Epi: 1 /HPF / Bacteria: Negative /HPF      CAPILLARY BLOOD GLUCOSE      POCT Blood Glucose.: 179 mg/dL (13 May 2025 11:33)  POCT Blood Glucose.: 124 mg/dL (13 May 2025 06:51)  POCT Blood Glucose.: 139 mg/dL (12 May 2025 22:20)  POCT Blood Glucose.: 139 mg/dL (12 May 2025 17:53)        Urinalysis Basic - ( 12 May 2025 21:36 )    Color: Yellow / Appearance: Clear / S.015 / pH: x  Gluc: x / Ketone: x  / Bili: Negative / Urobili: 1.0 mg/dL   Blood: x / Protein: 100 mg/dL / Nitrite: Negative   Leuk Esterase: Negative / RBC: 9 /HPF / WBC 1 /HPF   Sq Epi: x / Non Sq Epi: 1 /HPF / Bacteria: Negative /HPF        MEDICATIONS  (STANDING):  acetaminophen     Tablet .. 650 milliGRAM(s) Oral every 6 hours  ARIPiprazole 5 milliGRAM(s) Oral every 24 hours  aspirin  chewable 81 milliGRAM(s) Oral every 24 hours  atorvastatin 80 milliGRAM(s) Oral at bedtime  atovaquone  Suspension 750 milliGRAM(s) Oral every 24 hours  carvedilol 3.125 milliGRAM(s) Oral every 12 hours  dextrose 5%. 1000 milliLiter(s) (50 mL/Hr) IV Continuous <Continuous>  dextrose 5%. 1000 milliLiter(s) (100 mL/Hr) IV Continuous <Continuous>  dextrose 50% Injectable 25 Gram(s) IV Push once  dextrose 50% Injectable 12.5 Gram(s) IV Push once  dextrose 50% Injectable 25 Gram(s) IV Push once  enalapril 10 milliGRAM(s) Oral every 24 hours  escitalopram 20 milliGRAM(s) Oral every 24 hours  fluticasone propionate/ salmeterol 250-50 MICROgram(s) Diskus 1 Dose(s) Inhalation two times a day  glucagon  Injectable 1 milliGRAM(s) IntraMuscular once  heparin   Injectable 5000 Unit(s) SubCutaneous every 8 hours  insulin lispro (ADMELOG) corrective regimen sliding scale   SubCutaneous Before meals and at bedtime  tamsulosin 0.4 milliGRAM(s) Oral at bedtime    MEDICATIONS  (PRN):  acetaminophen     Tablet .. 650 milliGRAM(s) Oral every 6 hours PRN Temp greater or equal to 38C (100.4F), Mild Pain (1 - 3)  albuterol/ipratropium for Nebulization 3 milliLiter(s) Nebulizer every 6 hours PRN Wheezing  benzonatate 100 milliGRAM(s) Oral every 8 hours PRN Cough  dextrose Oral Gel 15 Gram(s) Oral once PRN Blood Glucose LESS THAN 70 milliGRAM(s)/deciliter  hydrocodone/homatropine Syrup 5 milliLiter(s) Oral every 6 hours PRN Cough  melatonin 5 milliGRAM(s) Oral at bedtime PRN Sleep      RADIOLOGY & ADDITIONAL TESTS: Reviewed   Patient is a 75y old  Male who presents with a chief complaint of     HOSPITAL COURSE: Patient presented s/p multiple falls, urinary incontinence, and poor ability to care for himself. He uses crack cocaine and marijuana and had a fall with headstrike. Given the fall was unwitnessed, admitted to tele for further monitoring while pending workup. Found to have BRENNAN and orthostatics were positive, consistent with history of poor PO intake/dehydration on admission. HIV medications initially held given reports of nonadherence given hand injury leading to difficulty opening bottles, but restarted per ID as viral load is undetectable lowering threshold for IRIS. Tele monitoring showing ectopy/bigeminy but no evidence of afib. Pending possible TTE and cards evaluation prior to stepdown to [facility] if medically clear. Also pending further workup from SW, nutrition, and SBIRT at this time.     OVERNIGHT EVENTS: naeon    SUBJECTIVE: Patient feels well, states that his fall was mechanical. Has R hand pain and swelling. Denies fevers, nausea, vomiting, diarrhea, chills, chest pain, shortness of breath.     ROS: otherwise negative      T(C): 36.5 (25 @ 09:04), Max: 36.5 (25 @ 22:22)  HR: 79 (25 @ 10:48) (63 - 85)  BP: 163/76 (25 @ 10:48) (123/68 - 163/76)  RR: 18 (25 @ 08:36) (16 - 20)  SpO2: 95% (25 @ 10:48) (93% - 98%)  Wt(kg): --Vital Signs Last 24 Hrs  T(C): 36.5 (13 May 2025 09:04), Max: 36.5 (12 May 2025 22:22)  T(F): 97.7 (13 May 2025 09:04), Max: 97.7 (12 May 2025 22:22)  HR: 79 (13 May 2025 10:48) (63 - 85)  BP: 163/76 (13 May 2025 10:48) (123/68 - 163/76)  BP(mean): 108 (13 May 2025 10:48) (89 - 118)  RR: 18 (13 May 2025 08:36) (16 - 20)  SpO2: 95% (13 May 2025 10:48) (93% - 98%)    Parameters below as of 13 May 2025 10:48  Patient On (Oxygen Delivery Method): nasal cannula  O2 Flow (L/min): 2      PHYSICAL EXAM:  Constitutional: resting comfortably in bed; NAD, disheveled  HEENT: NC/AT, PERRL, EOMI, anicteric sclera, MMM  Neck: supple; no JVD or thyromegaly  Respiratory: unlabored breathing, CTA B/L; no Rhonchi/Crackles, no retractions or use of accessory muscles   Cardiac: +S1/S2; RRR; no M/R/G  Gastrointestinal: soft, NT/ND; no rebound or guarding; +BSx4  Extremities: WWP, no clubbing or cyanosis; no peripheral edema  Musculoskeletal: R hand/wrist immobilized in short cast, swelling of fingers, intact sensation  Vascular: 2+ radial, DP/PT pulses B/L  Dermatologic: skin warm, dry and intact; no rashes, wounds, or scars  Neurologic: AAOx3; CNII-XII grossly intact; no focal deficits  Psychiatric: affect and characteristics of appearance, verbalizations, behaviors are appropriate, denies SI/HI/AH/VH    LABS:                        10.7   3.53  )-----------( 111      ( 12 May 2025 02:33 )             33.0     05-12    x   |  x   |  x   ----------------------------<  x   x    |  x   |  1.49[H]    Ca    8.1[L]      12 May 2025 02:33    TPro  6.0[L]  /  Alb  2.5[L]  /  TBili  0.4  /  DBili  x   /  AST  29  /  ALT  18  /  AlkPhos  68  05-12        Urinalysis Basic - ( 12 May 2025 21:36 )    Color: Yellow / Appearance: Clear / S.015 / pH: x  Gluc: x / Ketone: x  / Bili: Negative / Urobili: 1.0 mg/dL   Blood: x / Protein: 100 mg/dL / Nitrite: Negative   Leuk Esterase: Negative / RBC: 9 /HPF / WBC 1 /HPF   Sq Epi: x / Non Sq Epi: 1 /HPF / Bacteria: Negative /HPF      CAPILLARY BLOOD GLUCOSE      POCT Blood Glucose.: 179 mg/dL (13 May 2025 11:33)  POCT Blood Glucose.: 124 mg/dL (13 May 2025 06:51)  POCT Blood Glucose.: 139 mg/dL (12 May 2025 22:20)  POCT Blood Glucose.: 139 mg/dL (12 May 2025 17:53)        Urinalysis Basic - ( 12 May 2025 21:36 )    Color: Yellow / Appearance: Clear / S.015 / pH: x  Gluc: x / Ketone: x  / Bili: Negative / Urobili: 1.0 mg/dL   Blood: x / Protein: 100 mg/dL / Nitrite: Negative   Leuk Esterase: Negative / RBC: 9 /HPF / WBC 1 /HPF   Sq Epi: x / Non Sq Epi: 1 /HPF / Bacteria: Negative /HPF        MEDICATIONS  (STANDING):  acetaminophen     Tablet .. 650 milliGRAM(s) Oral every 6 hours  ARIPiprazole 5 milliGRAM(s) Oral every 24 hours  aspirin  chewable 81 milliGRAM(s) Oral every 24 hours  atorvastatin 80 milliGRAM(s) Oral at bedtime  atovaquone  Suspension 750 milliGRAM(s) Oral every 24 hours  carvedilol 3.125 milliGRAM(s) Oral every 12 hours  dextrose 5%. 1000 milliLiter(s) (50 mL/Hr) IV Continuous <Continuous>  dextrose 5%. 1000 milliLiter(s) (100 mL/Hr) IV Continuous <Continuous>  dextrose 50% Injectable 25 Gram(s) IV Push once  dextrose 50% Injectable 12.5 Gram(s) IV Push once  dextrose 50% Injectable 25 Gram(s) IV Push once  enalapril 10 milliGRAM(s) Oral every 24 hours  escitalopram 20 milliGRAM(s) Oral every 24 hours  fluticasone propionate/ salmeterol 250-50 MICROgram(s) Diskus 1 Dose(s) Inhalation two times a day  glucagon  Injectable 1 milliGRAM(s) IntraMuscular once  heparin   Injectable 5000 Unit(s) SubCutaneous every 8 hours  insulin lispro (ADMELOG) corrective regimen sliding scale   SubCutaneous Before meals and at bedtime  tamsulosin 0.4 milliGRAM(s) Oral at bedtime    MEDICATIONS  (PRN):  acetaminophen     Tablet .. 650 milliGRAM(s) Oral every 6 hours PRN Temp greater or equal to 38C (100.4F), Mild Pain (1 - 3)  albuterol/ipratropium for Nebulization 3 milliLiter(s) Nebulizer every 6 hours PRN Wheezing  benzonatate 100 milliGRAM(s) Oral every 8 hours PRN Cough  dextrose Oral Gel 15 Gram(s) Oral once PRN Blood Glucose LESS THAN 70 milliGRAM(s)/deciliter  hydrocodone/homatropine Syrup 5 milliLiter(s) Oral every 6 hours PRN Cough  melatonin 5 milliGRAM(s) Oral at bedtime PRN Sleep      RADIOLOGY & ADDITIONAL TESTS: Reviewed

## 2025-05-13 NOTE — PROGRESS NOTE ADULT - PROBLEM SELECTOR PLAN 1
Pt with R distal radial fracture 7 in early April, splinted and then placed in short cast by ortho. States he fell on the hand yesterday and has some pain. Also has had some swelling of the hand for a few days. Patient was unable to make his ortho follow ups outpatient. Ortho consulted in ED, they have no concern for compartment at this time.   - elevation for swelling  - pain control   --> tylenol 650mg q6 standing  - PT/OT  - f/u 4/15 with ortho for cast removal and transition to wrist brace Pt with R distal radial fracture 7 in early April, splinted and then placed in short cast by ortho. States he fell on the hand yesterday and has some pain. Also has had some swelling of the hand for a few days. Patient was unable to make his ortho follow ups outpatient. Ortho consulted in ED, they have no concern for compartment at this time.   - elevation for swelling  - pain control   --> tylenol 650mg q6 standing  - PT/OT  - f/u 5/15 with ortho outpt for cast removal and transition to wrist brace

## 2025-05-13 NOTE — PROGRESS NOTE ADULT - PROBLEM SELECTOR PLAN 5
(CD4 74, VL 4516 23/2025). On dolutegravir (Tivicay) and emtricitabine-tenofovir (Descovy) and Atovaquone. Previously took these medications regularly, however has not taken in 2-3 weeks as he is unable to open pill bottles due to hand fracture.  - c/w Atovaquone for PCP ppx (has sulfa allergy)   - c/w home med Tivicay and Descovy after obtaining baseline CD4 / viral load  - f/u AM viral load (CD4 74, VL 4516 23/2025). On dolutegravir (Tivicay) and emtricitabine-tenofovir (Descovy) and Atovaquone. Previously took these medications regularly, however has not taken in 2-3 weeks as he is unable to open pill bottles due to hand fracture. Viral load undetectable.   - c/w Atovaquone for PCP ppx (has sulfa allergy)   - c/w home med Tivicay and Descovy per ID

## 2025-05-13 NOTE — PROGRESS NOTE ADULT - PROBLEM SELECTOR PLAN 10
Patient with history of COPD. On home symbicort inhaler, however reports that he does not take it because it makes him short of breath and has not picked up from pharmacy. Given duonebs x3 in ED, currently without wheezing on physical exam. Does not appear to be in exacerbation, no increased cough, shortness of breath, sputum production.  - continue with home med  - duonebs PRN

## 2025-05-13 NOTE — CONSULT NOTE ADULT - SUBJECTIVE AND OBJECTIVE BOX
INFECTIOUS DISEASES INITIAL CONSULT NOTE    HPI:  75M hx HIV/AIDS, CAD, T2DM, CHF, depression/anxiety, prostate ca s/p radiation in 2013, recent admission for recurrent PNA (sign out AMA), R distal radius fracture w/ cast immobilization (6 weeks ago), chronic urinary incontinence, frequent falls presenting for multiple complaints. Patient states that yesterday he fell and hit his head. ID consulted for HAART management.  States that he has had frequent falls from dizziness after MI in 2019, and multiple evaluations/workup for his dizziness and falls. Pt also states that he has R hand pain since yesterday as he hit his hand during the fall. Pt also has had some swelling of R hand in the cast for past few weeks, limited ROM due to swelling, however intact sensation. He has not been able to take his home medications for 6 weeks due to cast immobilization. Patient states he has been unable to open his pill bottles due to cast. He states prior to injuring wrist, he was taking medications at directed. He follows with Dr. Noguera and has follow up appointment on 5/24. Finally, patient reports urinary incontinence, stating that he is going multiple times a day and unable to control his bladder function. Denies headaches, fevers,  CP, SOB, dysuria, urinary retention, urgency.       In the ED,  VS: T 98.3F, HR 64, /73, RR  16, SpO2  95% RA  Labs: Hb 10.7, Plt 111, BUN 27, Cr 1.51, GFR48,   UA: protein 100, otherwise negative  RVP: negative  CXR: negative  XR wrist: status post splinting for fracture of distal radius with satisfactory anatomic alignment  CT head: no gross acute intracranial hemorrhage, extra-axial collection, or calvarial fracture, chronic microvascular changes  CT c-spine: no acute fx or malalignment, degenerative changes with severe multilevel canal stenosis, most notable C5-C6      PAST MEDICAL & SURGICAL HISTORY:  HIV (human immunodeficiency virus infection)      DM (diabetes mellitus)      HTN (hypertension)      Chronic diarrhea      Hepatitis C      PVD (peripheral vascular disease)      CAD (coronary artery disease)      Thoracic aortic aneurysm      S/P arterial stent  bilat LE            Review of Systems:   Constitutional, eyes, ENT, cardiovascular, respiratory, gastrointestinal, genitourinary, integumentary, neurological, psychiatric and heme/lymph are otherwise negative other than noted above       ANTIBIOTICS:  MEDICATIONS  (STANDING):  acetaminophen     Tablet .. 650 milliGRAM(s) Oral every 6 hours  ARIPiprazole 5 milliGRAM(s) Oral every 24 hours  aspirin  chewable 81 milliGRAM(s) Oral every 24 hours  atorvastatin 80 milliGRAM(s) Oral at bedtime  atovaquone  Suspension 750 milliGRAM(s) Oral every 24 hours  carvedilol 3.125 milliGRAM(s) Oral every 12 hours  dextrose 5%. 1000 milliLiter(s) (50 mL/Hr) IV Continuous <Continuous>  dextrose 5%. 1000 milliLiter(s) (100 mL/Hr) IV Continuous <Continuous>  dextrose 50% Injectable 25 Gram(s) IV Push once  dextrose 50% Injectable 12.5 Gram(s) IV Push once  dextrose 50% Injectable 25 Gram(s) IV Push once  enalapril 10 milliGRAM(s) Oral every 24 hours  escitalopram 20 milliGRAM(s) Oral every 24 hours  fluticasone propionate/ salmeterol 250-50 MICROgram(s) Diskus 1 Dose(s) Inhalation two times a day  glucagon  Injectable 1 milliGRAM(s) IntraMuscular once  heparin   Injectable 5000 Unit(s) SubCutaneous every 8 hours  insulin lispro (ADMELOG) corrective regimen sliding scale   SubCutaneous Before meals and at bedtime  tamsulosin 0.4 milliGRAM(s) Oral at bedtime    MEDICATIONS  (PRN):  acetaminophen     Tablet .. 650 milliGRAM(s) Oral every 6 hours PRN Temp greater or equal to 38C (100.4F), Mild Pain (1 - 3)  albuterol/ipratropium for Nebulization 3 milliLiter(s) Nebulizer every 6 hours PRN Wheezing  benzonatate 100 milliGRAM(s) Oral every 8 hours PRN Cough  dextrose Oral Gel 15 Gram(s) Oral once PRN Blood Glucose LESS THAN 70 milliGRAM(s)/deciliter  hydrocodone/homatropine Syrup 5 milliLiter(s) Oral every 6 hours PRN Cough  melatonin 5 milliGRAM(s) Oral at bedtime PRN Sleep      Allergies    shellfish (Unknown)  sulfa drugs (Rash)  strawberry (Rash)  Peaches (Rash)  penicillins (Rash)    Intolerances        SOCIAL HISTORY:  Lives alone in [facility].   Confirms use of cocaine and marijuana every other day. Former smoker of 1/2 PPD from 15 - 27 yo. Denies alcohol use.  MSM, Sexually Active with multiple partners.      FAMILY HISTORY:  No pertinent family history in first degree relatives     no FH leading to current infection    Vital Signs Last 24 Hrs  T(C): 37.1 (13 May 2025 14:40), Max: 37.1 (13 May 2025 14:40)  T(F): 98.8 (13 May 2025 14:40), Max: 98.8 (13 May 2025 14:40)  HR: 62 (13 May 2025 11:21) (62 - 85)  BP: 126/95 (13 May 2025 11:21) (123/68 - 163/76)  BP(mean): 108 (13 May 2025 11:21) (89 - 118)  RR: 18 (13 May 2025 11:21) (16 - 20)  SpO2: 96% (13 May 2025 11:21) (93% - 98%)    Parameters below as of 13 May 2025 11:21  Patient On (Oxygen Delivery Method): nasal cannula  O2 Flow (L/min): 2      05-12-25 @ 07:01  -  05-13-25 @ 07:00  --------------------------------------------------------  IN: 250 mL / OUT: 700 mL / NET: -450 mL    05-13-25 @ 07:01  -  05-13-25 @ 15:35  --------------------------------------------------------  IN: 675 mL / OUT: 425 mL / NET: 250 mL        PHYSICAL EXAM:  Constitutional: alert, NAD  Eyes: the sclera and conjunctiva were normal.   ENT: the ears and nose were normal in appearance. No evidence of thrush  Neck: the appearance of the neck was normal and the neck was supple.   Pulmonary: no respiratory distress and symmetric chest rise   Vascular:. there was no peripheral edema  Abdomen: soft, non-tender  Neurological: no focal deficits.   Psychiatric: the affect was normal      LABS:                        12.1   3.00  )-----------( 118      ( 13 May 2025 11:59 )             36.4     05-13    136  |  104  |  25[H]  ----------------------------<  180[H]  4.7   |  27  |  1.27    Ca    8.5      13 May 2025 11:59    TPro  6.0[L]  /  Alb  2.5[L]  /  TBili  0.4  /  DBili  x   /  AST  29  /  ALT  18  /  AlkPhos  68  05-12      Urinalysis Basic - ( 13 May 2025 11:59 )    Color: x / Appearance: x / SG: x / pH: x  Gluc: 180 mg/dL / Ketone: x  / Bili: x / Urobili: x   Blood: x / Protein: x / Nitrite: x   Leuk Esterase: x / RBC: x / WBC x   Sq Epi: x / Non Sq Epi: x / Bacteria: x        MICROBIOLOGY:    RADIOLOGY & ADDITIONAL STUDIES:  
  Patient is a 75y old  Male who presents with a chief complaint of     HPI:  HPI:  75M hx HIV/AIDS, CAD, T2DM, CHF (EF35-40%), depression/anxiety, prostate ca s/p radiation in , recent admission for recurrent PNA (sign out AMA), R distal radius fracture w/ cast immobilization, urinary incontinence, frequent falls presenting for multiple complaints. Patient states that yesterday he fell and hit his head. States that he has had frequent falls from dizziness after MI in 2019, and multiple evaluations/workup for his dizziness and falls. Pt also states that he has R hand pain since yesterday as he hit his hand during the fall. Pt also has had some swelling of R hand in the cast for past few weeks, limited ROM due to swelling, however intact sensation. He has not been able to take his home medications for 2-3 weeks due to cast immobilization. When asked about prior to 3 weeks ago, pt was able to take medications despite the cast, however has had difficulty with pill bottles due to hand swelling. Finally, patient reports urinary incontinence, stating that he is going multiple times a day and unable to control his bladder function. Per outpatient review, patient has had this complaint for many years, however, he states it has only been for the past 5 days. Denies F/C, CP, SOB, dysuria, urinary retention, urgency.       In the ED,  VS: T 98.3F, HR 64, /73, RR  16, SpO2  95% RA  Labs: Hb 10.7, Plt 111, BUN 27, Cr 1.51, GFR48,   UA: protein 100, otherwise negative  RVP: negative  CXR: negative  XR wrist: status post splinting for fracture of distal radius with satisfactory anatomic alignment  CT head: no gross acute intracranial hemorrhage, extra-axial collection, or calvarial fracture, chronic microvascular changes  CT c-spine: no acute fx or malalignment, degenerative changes with severe multilevel canal stenosis, most notable C5-C6  Orders: levaquin 750mg x1, tylenol, duoneb x1, lidocaine patch (12 May 2025 09:13)    PAST MEDICAL & SURGICAL HISTORY:  HIV (human immunodeficiency virus infection)      DM (diabetes mellitus)      HTN (hypertension)      Chronic diarrhea      Hepatitis C      PVD (peripheral vascular disease)      CAD (coronary artery disease)      Thoracic aortic aneurysm      S/P arterial stent  bilat LE        MEDICATIONS  (STANDING):  acetaminophen     Tablet .. 650 milliGRAM(s) Oral every 6 hours  ARIPiprazole 5 milliGRAM(s) Oral every 24 hours  aspirin  chewable 81 milliGRAM(s) Oral every 24 hours  atorvastatin 80 milliGRAM(s) Oral at bedtime  atovaquone  Suspension 750 milliGRAM(s) Oral every 24 hours  carvedilol 3.125 milliGRAM(s) Oral every 12 hours  dextrose 5%. 1000 milliLiter(s) (50 mL/Hr) IV Continuous <Continuous>  dextrose 5%. 1000 milliLiter(s) (100 mL/Hr) IV Continuous <Continuous>  dextrose 50% Injectable 25 Gram(s) IV Push once  dextrose 50% Injectable 12.5 Gram(s) IV Push once  dextrose 50% Injectable 25 Gram(s) IV Push once  enalapril 10 milliGRAM(s) Oral every 24 hours  escitalopram 20 milliGRAM(s) Oral every 24 hours  fluticasone propionate/ salmeterol 250-50 MICROgram(s) Diskus 1 Dose(s) Inhalation two times a day  glucagon  Injectable 1 milliGRAM(s) IntraMuscular once  heparin   Injectable 5000 Unit(s) SubCutaneous every 8 hours  insulin lispro (ADMELOG) corrective regimen sliding scale   SubCutaneous Before meals and at bedtime  tamsulosin 0.4 milliGRAM(s) Oral at bedtime    MEDICATIONS  (PRN):  acetaminophen     Tablet .. 650 milliGRAM(s) Oral every 6 hours PRN Temp greater or equal to 38C (100.4F), Mild Pain (1 - 3)  albuterol/ipratropium for Nebulization 3 milliLiter(s) Nebulizer every 6 hours PRN Wheezing  dextrose Oral Gel 15 Gram(s) Oral once PRN Blood Glucose LESS THAN 70 milliGRAM(s)/deciliter  melatonin 5 milliGRAM(s) Oral at bedtime PRN Sleep          Home Living Status :  lives alone in an elevator accessible apartment building , plans to move to [facility] end of May 2025          -  Home Services :  none       Baseline Functional Level Prior to Admission :             - ADL's/ IADL's :  independent          - Ambulatory status prior to admission :   walked with no assistive devices          FAMILY HISTORY:  No pertinent family history in first degree relatives        CBC Full  -  ( 12 May 2025 02:33 )  WBC Count : 3.53 K/uL  RBC Count : 3.35 M/uL  Hemoglobin : 10.7 g/dL  Hematocrit : 33.0 %  Platelet Count - Automated : 111 K/uL  Mean Cell Volume : 98.5 fl  Mean Cell Hemoglobin : 31.9 pg  Mean Cell Hemoglobin Concentration : 32.4 g/dL  Auto Neutrophil # : 2.02 K/uL  Auto Lymphocyte # : 1.09 K/uL  Auto Monocyte # : 0.31 K/uL  Auto Eosinophil # : 0.09 K/uL  Auto Basophil # : 0.01 K/uL  Auto Neutrophil % : 57.2 %  Auto Lymphocyte % : 30.9 %  Auto Monocyte % : 8.8 %  Auto Eosinophil % : 2.5 %  Auto Basophil % : 0.3 %          x   |  x   |  x   ----------------------------<  x   x    |  x   |  1.49[H]    Ca    8.1[L]      12 May 2025 02:33    TPro  6.0[L]  /  Alb  2.5[L]  /  TBili  0.4  /  DBili  x   /  AST  29  /  ALT  18  /  AlkPhos  68  -      Urinalysis Basic - ( 12 May 2025 21:36 )    Color: Yellow / Appearance: Clear / S.015 / pH: x  Gluc: x / Ketone: x  / Bili: Negative / Urobili: 1.0 mg/dL   Blood: x / Protein: 100 mg/dL / Nitrite: Negative   Leuk Esterase: Negative / RBC: 9 /HPF / WBC 1 /HPF   Sq Epi: x / Non Sq Epi: 1 /HPF / Bacteria: Negative /HPF        Radiology :       ACC: [id number] EXAM:  CT CERVICAL SPINE   ORDERED BY: TAYLOR PARIS     ACC: [id number] EXAM:  CT BRAIN   ORDERED BY: TAYLOR PARIS     PROCEDURE DATE:  2025          INTERPRETATION:  CLINICAL INFORMATION: Trauma fall    COMPARISON: CT head 2025.    CONTRAST:  IV Contrast: None      TECHNIQUE: A noncontrast head CT and a noncontrast cervical spine CT were   performed. The head CT was performed with contiguous 5 mm transaxial   images from the skull base to vertex. Images were reviewed in brain,   stroke, subdural and bone windows.  The cervical spine CT was performed with transaxial thin section images   from the occiput to the T2 level.  Coronal and sagittal reformatted   images were created from the transaxial source data.  Images were   reviewed in bone and soft tissue windows.    FINDINGS:  CT head:  There is motion artifact present which degrades image quality.  INTRA-AXIAL: No gross acute hemorrhage, vasogenic edema or signs for   acute territorial infarct. Stable patchy and confluent areas of   low-attenuation in bihemispheric white matter.  EXTRA-AXIAL: No mass or collection. Basal cisterns are clear.  VENTRICLES: Stable in size and configuration demonstrating involutional   changes.  VISUALIZED PARANASAL SINUSES:Clear.  MASTOID AIR CELLS: Clear.  CALVARIUM: Intact.  SOFT TISSUES: Unremarkable.  ORBITS: Unremarkable.  MISCELLANEOUS: None.    CT cervical spine:  OSSEOUS STRAIGHTENED LORDOSIS WITH FOCAL REVERSAL, APEX AT C5/C6. Mild   anterolisthesis of C3 on C4 and C7 on T1. Multilevel facet alignment is   preserved and the atlantodental interval is within normal limits.  INVERTEBRAL DISCS: Intervertebral disc space narrowing with disc   osteophyte complexes and focal ossification of the posterior longitudinal   ligament at C5/C6. Multilevel facet arthropathy. No endplate erosive   changes or large central disc herniation.  SPINAL CANAL: Multilevel severe canal stenosis from C3/C4 through C6/C7,   most severe at C5/C6.  EXTRASPINAL TISSUES: No prevertebral edema.  MISCELLANEOUS: Lung apices are clear.    IMPRESSION:  Head CT: Mildly motion degraded exam. No gross acute intracranial   hemorrhage, extra-axial collection or calvarial fracture. Chronic   microvascular changes.    Cervical spine CT: No acute cervical spine fracture or traumatic   malalignment. Degenerative changes with severe multilevel canal stenosis,   most notable C5/C6.            Review of Systems : per HPI         Vital Signs Last 24 Hrs  T(C): 36.4 (13 May 2025 05:15), Max: 36.5 (12 May 2025 22:22)  T(F): 97.6 (13 May 2025 05:15), Max: 97.7 (12 May 2025 22:22)  HR: 65 (13 May 2025 05:04) (63 - 85)  BP: 136/74 (13 May 2025 05:04) (136/74 - 158/91)  BP(mean): 96 (13 May 2025 05:04) (96 - 118)  RR: 20 (13 May 2025 05:04) (16 - 20)  SpO2: 98% (13 May 2025 05:04) (93% - 98%)    Parameters below as of 13 May 2025 05:04  Patient On (Oxygen Delivery Method): room air            Physical Exam:  75 y o man lying comfortably in semi Méndez's position , awake , alert , no acute complaints     Head: normocephalic , atraumatic    Eyes: PERRLA , EOMI , no nystagmus , sclera anicteric    ENT / FACE: neg nasal discharge , uvula midline , no oropharyngeal erythema / exudate    Neck: supple , negative JVD , negative carotid bruits , no thyromegaly    Chest: CTA bilaterally     Cardiovascular: regular rate and rhythm , neg murmurs / rubs / gallops    Abdomen: soft , non distended , no tenderness to palpation in all 4 quadrants ,  normal bowel sounds     Extremities: WWP , neg cyanosis /clubbing / edema     Musculoskeletal: : R hand/wrist immobilized in short cast , swolllen fingers     Neurologic Exam:     Alert and oriented to person , place , date/year , speech fluent w/o dysarthria     Cranial Nerves:           II:                         pupils equal , round and reactive to light , visual fields intact         III/ IV/VI:             extraocular movements intact , neg nystagmus , neg ptosis        V:                        facial sensation intact , V1-3 normal        VII:                      face symmetric , no droop , normal eye closure and smile        VIII:                     hearing intact to finger rub bilaterally        IX and X:             no hoarseness , gag intact , palate/ uvula rise symmetrically        XI:                       SCM / trapezius strength intact bilateral        XII:                      no tongue deviation    Motor Exam:        > 4/5 x 4 extremities , R wrist/hand not tested 2/2 cast      Sensation:         intact to light touch x 4 extremities            nneg Babinski      Coordination:            Finger to Nose:  neg dysmetria bilaterally           Heel to Shin:    neg dysmetria bilaterally          EDUAR intact bilaterally      Gait:  not tested         PM&R Impression: admitted for falls ,  ecent admission for recurrent PNA (sign out AMA), R distal radius fracture w/ cast immobilization    - no acute pathology on CT brain/C spine imaging     - deconditioned    - no focal neurologic deficits       Recommendations / Plan:       1) Physical / Occupational therapy focusing on therapeutic exercises , equipment evaluation , bed mobility/transfer out of bed evaluation , progressive ambulation with assistive devices prn .    2) Current disposition plan recommendation:    pending functional progress     
  HPI:  75M hx HIV/AIDS, CAD, T2DM, CHF (EF35-40%), depression/anxiety, prostate ca s/p radiation in 2013, recent admission for recurrent PNA (sign out AMA), R distal radius fracture w/ cast immobilization, urinary incontinence, frequent falls presenting for multiple complaints. Patient states that yesterday he fell and hit his head. States that he has had frequent falls from dizziness after MI in 2019, and multiple evaluations/workup for his dizziness and falls. Pt also states that he has R hand pain since yesterday as he hit his hand during the fall. Pt also has had some swelling of R hand in the cast for past few weeks, limited ROM due to swelling, however intact sensation. He has not been able to take his home medications for 2-3 weeks due to cast immobilization. When asked about prior to 3 weeks ago, pt was able to take medications despite the cast, however has had difficulty with pill bottles due to hand swelling. Finally, patient reports urinary incontinence, stating that he is going multiple times a day and unable to control his bladder function. Per outpatient review, patient has had this complaint for many years, however, he states it has only been for the past 5 days. Denies F/C, CP, SOB, dysuria, urinary retention, urgency.       In the ED,  VS: T 98.3F, HR 64, /73, RR  16, SpO2  95% RA  Labs: Hb 10.7, Plt 111, BUN 27, Cr 1.51, GFR48,   UA: protein 100, otherwise negative  RVP: negative  CXR: negative  XR wrist: status post splinting for fracture of distal radius with satisfactory anatomic alignment  CT head: no gross acute intracranial hemorrhage, extra-axial collection, or calvarial fracture, chronic microvascular changes  CT c-spine: no acute fx or malalignment, degenerative changes with severe multilevel canal stenosis, most notable C5-C6  Orders: levaquin 750mg x1, tylenol, duoneb x1, lidocaine patch (12 May 2025 09:13)    Pt states he had a fall 3 weeks ago and broke his R wrist. Since then, he has been unable to take medications on his own and has no one to help him. Pt endorses frequent dizziness which is exacerbated when he uses drugs but also present when he does not. The dizziness primarily occurs when he stands up from a seated position. Denies feelings of warmth, abdominal discomfort, or nausea preceding the events. States he was diagnosed with Afib by his Cardiologist who works on [employer], and states that he was prescribed eliquis. Uses drugs 3x per week. Currently lives in a shelter.       ROS: A 10-point review of systems was otherwise negative.    PAST MEDICAL & SURGICAL HISTORY:  HIV (human immunodeficiency virus infection)      DM (diabetes mellitus)      HTN (hypertension)      Chronic diarrhea      Hepatitis C      PVD (peripheral vascular disease)      CAD (coronary artery disease)      Thoracic aortic aneurysm      S/P arterial stent  shireen BRYANT          SOCIAL HISTORY:  FAMILY HISTORY:  No pertinent family history in first degree relatives        ALLERGIES: 	  shellfish (Unknown)  sulfa drugs (Rash)  strawberry (Rash)  Peaches (Rash)  penicillins (Rash)            MEDICATIONS:  acetaminophen     Tablet .. 650 milliGRAM(s) Oral every 6 hours PRN  acetaminophen     Tablet .. 650 milliGRAM(s) Oral every 6 hours  albuterol/ipratropium for Nebulization 3 milliLiter(s) Nebulizer every 6 hours PRN  ARIPiprazole 5 milliGRAM(s) Oral every 24 hours  aspirin  chewable 81 milliGRAM(s) Oral every 24 hours  atorvastatin 80 milliGRAM(s) Oral at bedtime  atovaquone  Suspension 1500 milliGRAM(s) Oral every 24 hours  benzonatate 100 milliGRAM(s) Oral every 8 hours PRN  carvedilol 3.125 milliGRAM(s) Oral every 12 hours  dextrose 5%. 1000 milliLiter(s) IV Continuous <Continuous>  dextrose 5%. 1000 milliLiter(s) IV Continuous <Continuous>  dextrose 50% Injectable 25 Gram(s) IV Push once  dextrose 50% Injectable 12.5 Gram(s) IV Push once  dextrose 50% Injectable 25 Gram(s) IV Push once  dextrose Oral Gel 15 Gram(s) Oral once PRN  dolutegravir 50 milliGRAM(s) Oral every 24 hours  emtricitabine 200 mG/tenofovir alafenamide 25 mG (DESCOVY) Tablet 1 Tablet(s) Oral every 24 hours  enalapril 10 milliGRAM(s) Oral every 24 hours  escitalopram 20 milliGRAM(s) Oral every 24 hours  fluticasone propionate/ salmeterol 250-50 MICROgram(s) Diskus 1 Dose(s) Inhalation two times a day  glucagon  Injectable 1 milliGRAM(s) IntraMuscular once  heparin   Injectable 5000 Unit(s) SubCutaneous every 8 hours  hydrocodone/homatropine Syrup 5 milliLiter(s) Oral every 6 hours PRN  insulin lispro (ADMELOG) corrective regimen sliding scale   SubCutaneous Before meals and at bedtime  melatonin 5 milliGRAM(s) Oral at bedtime PRN  tamsulosin 0.4 milliGRAM(s) Oral at bedtime      HOME MEDICATIONS:  ARIPiprazole 5 mg oral tablet: 1 tab(s) orally once a day  carvedilol 3.125 mg oral tablet: 1 tab(s) orally 2 times a day  enalapril 10 mg oral tablet: 1 tab(s) orally once a day  escitalopram 20 mg oral tablet: 1 tab(s) orally once a day  gabapentin 300 mg oral tablet: 300 milligram(s) orally 2 times a day  Symbicort 160 mcg-4.5 mcg/inh inhalation aerosol: 2 puff(s) inhaled once a day      PHYSICAL EXAM:      I/O Summary 24H    IN: 250 mL / OUT: 700 mL / NET: -450 mL        T(F): 98.8 (05-13-25 @ 14:40), Max: 98.8 (05-13-25 @ 14:40)  HR: 68 (05-13-25 @ 16:14) (62 - 83)  BP: 134/74 (05-13-25 @ 16:14) (123/68 - 163/76)  BP(mean): 93 (05-13-25 @ 16:14) (89 - 118)  ABP: --  ABP(mean): --  RR: 18 (05-13-25 @ 16:14) (16 - 20)  SpO2: 98% (05-13-25 @ 16:14) (93% - 98%)  CVP(mm Hg): --    GEN: Awake, comfortable. NAD.   HEENT: NCAT, PERRL, EOMI. Mucosa moist. No JVD.   RESP: CTA b/l  CV: RRR, normal s1/s2. No m/r/g.  ABD: Soft, NTND. BS+  EXT: Warm. No edema, clubbing, or cyanosis.   NEURO: AAOx3. No focal deficits.      	  LABS:	 	    Cardiac Markers   05-12-25 @ 16:00: HS Trop T 38    / CK x     / CKMB x              CBC 05-13-25 @ 11:59                        12.1   3.00  )-----------( 118                   36.4     Hgb trend: 12.1 <-- , 10.7 <-- , 11.6 <--   WBC trend: 3.00 <-- , 3.53 <-- , 3.35 <--     CMP 05-13-25 @ 11:59    136  |  104  |  25[H]  ----------------------------<  180[H]  4.7   |  27  |  1.27    Ca    8.5      05-13-25 @ 11:59    TPro  6.0[L]  /  Alb  2.5[L]  /  TBili  0.4  /  DBili  x   /  AST  29  /  ALT  18  /  AlkPhos  68     05-12    Serum Cr (eGFR) trend: 1.27 (59) <-- , 1.49 (49) <-- , 1.51 (48) <-- , 1.33 (56) <--     proBNP:   Lipid Profile:   HgA1c:   TSH:     TELEMETRY: 	    ECG:  	  RADIOLOGY:   ECHO:  STRESS:  CATH:

## 2025-05-13 NOTE — PROGRESS NOTE ADULT - PROBLEM SELECTOR PLAN 7
Home med per prior chart review: metformin 500mg, linagliptin 5mg qd.   - hold home meds  - f/u AM a1c  - mISS  - carb controlled diet Home med per prior chart review: metformin 500mg, linagliptin 5mg qd. A1c wnl this admission.   - hold home meds  - mISS  - carb controlled diet

## 2025-05-13 NOTE — OCCUPATIONAL THERAPY INITIAL EVALUATION ADULT - MD ORDER
Recurrent PNA  Frequent falls, dizziness, +difficulty taking his pain medications  H/o R distal radius fracture w/ cast immobilization  H/o Human immunodeficiency virus (HIV), Chronic systolic congestive heart failure.

## 2025-05-13 NOTE — OCCUPATIONAL THERAPY INITIAL EVALUATION ADULT - RANGE OF MOTION EXAMINATION, UPPER EXTREMITY
LUE AROM, PROM at WFL, WNL; R shoulder and elbow ROM limited (+pain); R wrist n/t (splint), able to move digits

## 2025-05-13 NOTE — PROGRESS NOTE ADULT - PROBLEM SELECTOR PLAN 6
EF 35-40%. Home med: Carvedilol 3.125mg BID. No signs of volume overload on exam, CXR clear, no s/s of acute HF exacerbation  - c/w carvedilol 3.125 mg BID EF 35-40%. Home med: Carvedilol 3.125mg BID. No signs of volume overload on exam, CXR clear, no s/s of acute HF exacerbation  - c/w carvedilol 3.125 mg BID  - f/u cards recs, may need TTE

## 2025-05-13 NOTE — PHYSICAL THERAPY INITIAL EVALUATION ADULT - GAIT DEVIATIONS NOTED, PT EVAL
slow speed, forward flexed posture/decreased j carlos/decreased step length/decreased weight-shifting ability

## 2025-05-13 NOTE — PROGRESS NOTE ADULT - PROBLEM SELECTOR PLAN 2
Pt reporting fall yesterday, fell onto right arm, reporting head strike. CT head negative. Per review of previous records, pt has had multiple falls over many years, often had workup initiated, and would AMA prior to completion of workup. States that his falls have been occuring since MI in 2019, after which he became dizzy with rapid movements. Head CT neg for acute changes, and central causes of vertigo. Seems that patient may have been on eliquis, unclear why, does not know if he has hx of DVT. [place] hallpike/epley negative.  - PT eval  - f/u orthostatics  - tele monitoring  - f/u ecg  - troponin trend  - dc eliquis as pt with frequent falls #polysubstance use disorder  Pt reporting fall yesterday, fell onto right arm, reporting head strike. CT head negative. Per review of previous records, pt has had multiple falls over many years, often had workup initiated, and would AMA prior to completion of workup. States that his falls have been occuring since MI in 2019, after which he became dizzy with rapid movements. Head CT neg for acute changes, and central causes of vertigo. Seems that patient may have been on eliquis, unclear why, does not know if he has hx of DVT. [place] hallpike/epley negative. Positive orthostatics. Trops clear.   - PT eval  - tele monitoring while syncope workup ongoing, pending cards recs may regionalize  - possible TTE per cards  - telemetry showing ectopy but no evidence of afib, although patient is on eliquis   - dc eliquis as pt with frequent falls and unclear hx of afib  - SBIRT consult  - nutrition consult  - SW consult

## 2025-05-13 NOTE — PROGRESS NOTE ADULT - PROBLEM SELECTOR PLAN 4
Strong peripheral pulses Patient noted to have Cr 1.51, based on review of previous hospitalizations, baseline seems to be 1.2-1.5. Currently producing urine, with no electrolyte abnormalities. Unclear if this is BRENNAN vs CKD vs elevated creatinine baseline due to tivicay as this medication is known to elevate Cr.   - f/u cystatin C  - f/u urine studies  - trend BMP  - avoid nephrotoxic drugs, renally dose meds Patient noted to have Cr 1.51, based on review of previous hospitalizations, baseline seems to be 1.2-1.5. Currently producing urine, with no electrolyte abnormalities. Unclear if this is BRENNAN vs CKD. Likely pre-renal etiology given orthostatics and patient admitting poor PO intake. Continue to monitor BRENNAN.   - trend BMP, monitor Cr  - avoid nephrotoxic drugs, renally dose meds  - nutrition consult

## 2025-05-13 NOTE — PHYSICAL THERAPY INITIAL EVALUATION ADULT - ADDITIONAL COMMENTS
Patient lives alone in a shelter with elevator access. PTA he was independent with all ADLs/IADLs with no AD use. Patient with hx multiple falls, however endorses it is not safe in his area to utilize a cane or walker.

## 2025-05-14 DIAGNOSIS — F12.90 CANNABIS USE, UNSPECIFIED, UNCOMPLICATED: ICD-10-CM

## 2025-05-14 DIAGNOSIS — I50.22 CHRONIC SYSTOLIC (CONGESTIVE) HEART FAILURE: ICD-10-CM

## 2025-05-14 DIAGNOSIS — R35.0 FREQUENCY OF MICTURITION: ICD-10-CM

## 2025-05-14 DIAGNOSIS — Z88.0 ALLERGY STATUS TO PENICILLIN: ICD-10-CM

## 2025-05-14 DIAGNOSIS — S52.592A OTHER FRACTURES OF LOWER END OF LEFT RADIUS, INITIAL ENCOUNTER FOR CLOSED FRACTURE: ICD-10-CM

## 2025-05-14 DIAGNOSIS — E11.9 TYPE 2 DIABETES MELLITUS WITHOUT COMPLICATIONS: ICD-10-CM

## 2025-05-14 DIAGNOSIS — Z79.84 LONG TERM (CURRENT) USE OF ORAL HYPOGLYCEMIC DRUGS: ICD-10-CM

## 2025-05-14 DIAGNOSIS — Z88.2 ALLERGY STATUS TO SULFONAMIDES: ICD-10-CM

## 2025-05-14 DIAGNOSIS — R06.02 SHORTNESS OF BREATH: ICD-10-CM

## 2025-05-14 DIAGNOSIS — Z53.29 PROCEDURE AND TREATMENT NOT CARRIED OUT BECAUSE OF PATIENT'S DECISION FOR OTHER REASONS: ICD-10-CM

## 2025-05-14 DIAGNOSIS — F41.9 ANXIETY DISORDER, UNSPECIFIED: ICD-10-CM

## 2025-05-14 DIAGNOSIS — J44.9 CHRONIC OBSTRUCTIVE PULMONARY DISEASE, UNSPECIFIED: ICD-10-CM

## 2025-05-14 DIAGNOSIS — I25.10 ATHEROSCLEROTIC HEART DISEASE OF NATIVE CORONARY ARTERY WITHOUT ANGINA PECTORIS: ICD-10-CM

## 2025-05-14 DIAGNOSIS — D69.6 THROMBOCYTOPENIA, UNSPECIFIED: ICD-10-CM
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
## 2025-05-14 DIAGNOSIS — N17.9 ACUTE KIDNEY FAILURE, UNSPECIFIED: ICD-10-CM

## 2025-05-14 DIAGNOSIS — Z21 ASYMPTOMATIC HUMAN IMMUNODEFICIENCY VIRUS [HIV] INFECTION STATUS: ICD-10-CM

## 2025-05-14 DIAGNOSIS — Z79.51 LONG TERM (CURRENT) USE OF INHALED STEROIDS: ICD-10-CM

## 2025-05-14 DIAGNOSIS — I71.20 THORACIC AORTIC ANEURYSM, WITHOUT RUPTURE, UNSPECIFIED: ICD-10-CM

## 2025-05-14 DIAGNOSIS — Z85.46 PERSONAL HISTORY OF MALIGNANT NEOPLASM OF PROSTATE: ICD-10-CM
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
## 2025-05-14 DIAGNOSIS — B20 HUMAN IMMUNODEFICIENCY VIRUS [HIV] DISEASE: ICD-10-CM

## 2025-05-14 DIAGNOSIS — Z91.018 ALLERGY TO OTHER FOODS: ICD-10-CM

## 2025-05-14 DIAGNOSIS — I50.9 HEART FAILURE, UNSPECIFIED: ICD-10-CM

## 2025-05-14 DIAGNOSIS — I13.0 HYPERTENSIVE HEART AND CHRONIC KIDNEY DISEASE WITH HEART FAILURE AND STAGE 1 THROUGH STAGE 4 CHRONIC KIDNEY DISEASE, OR UNSPECIFIED CHRONIC KIDNEY DISEASE: ICD-10-CM

## 2025-05-14 DIAGNOSIS — Z79.899 OTHER LONG TERM (CURRENT) DRUG THERAPY: ICD-10-CM

## 2025-05-14 DIAGNOSIS — E11.22 TYPE 2 DIABETES MELLITUS WITH DIABETIC CHRONIC KIDNEY DISEASE: ICD-10-CM

## 2025-05-14 DIAGNOSIS — Z79.82 LONG TERM (CURRENT) USE OF ASPIRIN: ICD-10-CM

## 2025-05-14 DIAGNOSIS — I25.2 OLD MYOCARDIAL INFARCTION: ICD-10-CM

## 2025-05-14 DIAGNOSIS — Z92.3 PERSONAL HISTORY OF IRRADIATION: ICD-10-CM

## 2025-05-14 DIAGNOSIS — E11.51 TYPE 2 DIABETES MELLITUS WITH DIABETIC PERIPHERAL ANGIOPATHY WITHOUT GANGRENE: ICD-10-CM

## 2025-05-14 DIAGNOSIS — F14.90 COCAINE USE, UNSPECIFIED, UNCOMPLICATED: ICD-10-CM

## 2025-05-14 DIAGNOSIS — Z87.891 PERSONAL HISTORY OF NICOTINE DEPENDENCE: ICD-10-CM

## 2025-05-14 LAB
ALBUMIN SERPL ELPH-MCNC: 3.2 G/DL — LOW (ref 3.3–5)
ALP SERPL-CCNC: 66 U/L — SIGNIFICANT CHANGE UP (ref 40–120)
ALT FLD-CCNC: 14 U/L — SIGNIFICANT CHANGE UP (ref 10–45)
ANION GAP SERPL CALC-SCNC: 7 MMOL/L — SIGNIFICANT CHANGE UP (ref 5–17)
ANION GAP SERPL CALC-SCNC: 8 MMOL/L — SIGNIFICANT CHANGE UP (ref 5–17)
AST SERPL-CCNC: 22 U/L — SIGNIFICANT CHANGE UP (ref 10–40)
BILIRUB SERPL-MCNC: 0.5 MG/DL — SIGNIFICANT CHANGE UP (ref 0.2–1.2)
BUN SERPL-MCNC: 31 MG/DL — HIGH (ref 7–23)
BUN SERPL-MCNC: 34 MG/DL — HIGH (ref 7–23)
CALCIUM SERPL-MCNC: 8.3 MG/DL — LOW (ref 8.4–10.5)
CALCIUM SERPL-MCNC: 8.3 MG/DL — LOW (ref 8.4–10.5)
CHLORIDE SERPL-SCNC: 105 MMOL/L — SIGNIFICANT CHANGE UP (ref 96–108)
CHLORIDE SERPL-SCNC: 106 MMOL/L — SIGNIFICANT CHANGE UP (ref 96–108)
CO2 SERPL-SCNC: 24 MMOL/L — SIGNIFICANT CHANGE UP (ref 22–31)
CO2 SERPL-SCNC: 26 MMOL/L — SIGNIFICANT CHANGE UP (ref 22–31)
CREAT SERPL-MCNC: 1.56 MG/DL — HIGH (ref 0.5–1.3)
CREAT SERPL-MCNC: 1.58 MG/DL — HIGH (ref 0.5–1.3)
EGFR: 45 ML/MIN/1.73M2 — LOW
EGFR: 45 ML/MIN/1.73M2 — LOW
EGFR: 46 ML/MIN/1.73M2 — LOW
EGFR: 46 ML/MIN/1.73M2 — LOW
GLUCOSE SERPL-MCNC: 101 MG/DL — HIGH (ref 70–99)
GLUCOSE SERPL-MCNC: 156 MG/DL — HIGH (ref 70–99)
HCT VFR BLD CALC: 35 % — LOW (ref 39–50)
HGB BLD-MCNC: 11.7 G/DL — LOW (ref 13–17)
HIV-1 VIRAL LOAD RESULT: ABNORMAL
HIV1 RNA # SERPL NAA+PROBE: 82 — SIGNIFICANT CHANGE UP
HIV1 RNA SER-IMP: SIGNIFICANT CHANGE UP
HIV1 RNA SERPL NAA+PROBE-ACNC: ABNORMAL
HIV1 RNA SERPL NAA+PROBE-LOG#: 1.91 — SIGNIFICANT CHANGE UP
MAGNESIUM SERPL-MCNC: 1.7 MG/DL — SIGNIFICANT CHANGE UP (ref 1.6–2.6)
MCHC RBC-ENTMCNC: 33.1 PG — SIGNIFICANT CHANGE UP (ref 27–34)
MCHC RBC-ENTMCNC: 33.4 G/DL — SIGNIFICANT CHANGE UP (ref 32–36)
MCV RBC AUTO: 98.9 FL — SIGNIFICANT CHANGE UP (ref 80–100)
NRBC BLD AUTO-RTO: 0 /100 WBCS — SIGNIFICANT CHANGE UP (ref 0–0)
PHOSPHATE SERPL-MCNC: 3.5 MG/DL — SIGNIFICANT CHANGE UP (ref 2.5–4.5)
PLATELET # BLD AUTO: 105 K/UL — LOW (ref 150–400)
POTASSIUM SERPL-MCNC: 4 MMOL/L — SIGNIFICANT CHANGE UP (ref 3.5–5.3)
POTASSIUM SERPL-MCNC: 4.3 MMOL/L — SIGNIFICANT CHANGE UP (ref 3.5–5.3)
POTASSIUM SERPL-SCNC: 4 MMOL/L — SIGNIFICANT CHANGE UP (ref 3.5–5.3)
POTASSIUM SERPL-SCNC: 4.3 MMOL/L — SIGNIFICANT CHANGE UP (ref 3.5–5.3)
PROT SERPL-MCNC: 6.3 G/DL — SIGNIFICANT CHANGE UP (ref 6–8.3)
RBC # BLD: 3.54 M/UL — LOW (ref 4.2–5.8)
RBC # FLD: 13.1 % — SIGNIFICANT CHANGE UP (ref 10.3–14.5)
SODIUM SERPL-SCNC: 138 MMOL/L — SIGNIFICANT CHANGE UP (ref 135–145)
SODIUM SERPL-SCNC: 138 MMOL/L — SIGNIFICANT CHANGE UP (ref 135–145)
WBC # BLD: 2.94 K/UL — LOW (ref 3.8–10.5)
WBC # FLD AUTO: 2.94 K/UL — LOW (ref 3.8–10.5)

## 2025-05-14 PROCEDURE — 99233 SBSQ HOSP IP/OBS HIGH 50: CPT | Mod: GC

## 2025-05-14 PROCEDURE — 99233 SBSQ HOSP IP/OBS HIGH 50: CPT

## 2025-05-14 RX ORDER — CARVEDILOL 3.12 MG/1
6.25 TABLET, FILM COATED ORAL EVERY 12 HOURS
Refills: 0 | Status: DISCONTINUED | OUTPATIENT
Start: 2025-05-14 | End: 2025-05-14

## 2025-05-14 RX ORDER — ENALAPRIL MALEATE 20 MG
10 TABLET ORAL EVERY 24 HOURS
Refills: 0 | Status: DISCONTINUED | OUTPATIENT
Start: 2025-05-14 | End: 2025-05-19

## 2025-05-14 RX ORDER — HYDROMORPHONE/SOD CHLOR,ISO/PF 2 MG/10 ML
0.5 SYRINGE (ML) INJECTION ONCE
Refills: 0 | Status: DISCONTINUED | OUTPATIENT
Start: 2025-05-14 | End: 2025-05-14

## 2025-05-14 RX ORDER — CARVEDILOL 3.12 MG/1
6.25 TABLET, FILM COATED ORAL EVERY 12 HOURS
Refills: 0 | Status: DISCONTINUED | OUTPATIENT
Start: 2025-05-14 | End: 2025-05-19

## 2025-05-14 RX ORDER — CARVEDILOL 3.12 MG/1
3.12 TABLET, FILM COATED ORAL ONCE
Refills: 0 | Status: COMPLETED | OUTPATIENT
Start: 2025-05-14 | End: 2025-05-14

## 2025-05-14 RX ORDER — OXYCODONE HYDROCHLORIDE 30 MG/1
2.5 TABLET ORAL ONCE
Refills: 0 | Status: DISCONTINUED | OUTPATIENT
Start: 2025-05-14 | End: 2025-05-14

## 2025-05-14 RX ORDER — ACETAMINOPHEN 500 MG/5ML
1000 LIQUID (ML) ORAL ONCE
Refills: 0 | Status: COMPLETED | OUTPATIENT
Start: 2025-05-14 | End: 2025-05-14

## 2025-05-14 RX ADMIN — Medication 5 MILLIGRAM(S): at 21:28

## 2025-05-14 RX ADMIN — Medication 400 MILLIGRAM(S): at 00:33

## 2025-05-14 RX ADMIN — CARVEDILOL 3.12 MILLIGRAM(S): 3.12 TABLET, FILM COATED ORAL at 06:00

## 2025-05-14 RX ADMIN — DOLUTEGRAVIR SODIUM 50 MILLIGRAM(S): 5 TABLET, FOR SUSPENSION ORAL at 14:56

## 2025-05-14 RX ADMIN — INSULIN LISPRO 4: 100 INJECTION, SOLUTION INTRAVENOUS; SUBCUTANEOUS at 21:54

## 2025-05-14 RX ADMIN — OXYCODONE HYDROCHLORIDE 2.5 MILLIGRAM(S): 30 TABLET ORAL at 01:30

## 2025-05-14 RX ADMIN — TAMSULOSIN HYDROCHLORIDE 0.4 MILLIGRAM(S): 0.4 CAPSULE ORAL at 21:24

## 2025-05-14 RX ADMIN — Medication 650 MILLIGRAM(S): at 21:25

## 2025-05-14 RX ADMIN — HEPARIN SODIUM 5000 UNIT(S): 1000 INJECTION INTRAVENOUS; SUBCUTANEOUS at 21:25

## 2025-05-14 RX ADMIN — ATOVAQUONE 1500 MILLIGRAM(S): 750 SUSPENSION ORAL at 16:42

## 2025-05-14 RX ADMIN — Medication 650 MILLIGRAM(S): at 16:41

## 2025-05-14 RX ADMIN — Medication 0.5 MILLIGRAM(S): at 07:20

## 2025-05-14 RX ADMIN — ATORVASTATIN CALCIUM 80 MILLIGRAM(S): 80 TABLET, FILM COATED ORAL at 21:24

## 2025-05-14 RX ADMIN — ARIPIPRAZOLE 5 MILLIGRAM(S): 2 TABLET ORAL at 14:54

## 2025-05-14 RX ADMIN — OXYCODONE HYDROCHLORIDE 2.5 MILLIGRAM(S): 30 TABLET ORAL at 00:43

## 2025-05-14 RX ADMIN — HEPARIN SODIUM 5000 UNIT(S): 1000 INJECTION INTRAVENOUS; SUBCUTANEOUS at 06:31

## 2025-05-14 RX ADMIN — Medication 1000 MILLIGRAM(S): at 01:30

## 2025-05-14 RX ADMIN — CARVEDILOL 6.25 MILLIGRAM(S): 3.12 TABLET, FILM COATED ORAL at 21:24

## 2025-05-14 RX ADMIN — Medication 0.5 MILLIGRAM(S): at 06:00

## 2025-05-14 RX ADMIN — Medication 1 TABLET(S): at 14:55

## 2025-05-14 RX ADMIN — CARVEDILOL 3.12 MILLIGRAM(S): 3.12 TABLET, FILM COATED ORAL at 16:42

## 2025-05-14 RX ADMIN — Medication 650 MILLIGRAM(S): at 17:09

## 2025-05-14 RX ADMIN — ESCITALOPRAM OXALATE 20 MILLIGRAM(S): 20 TABLET ORAL at 14:54

## 2025-05-14 RX ADMIN — Medication 10 MILLIGRAM(S): at 16:42

## 2025-05-14 RX ADMIN — Medication 81 MILLIGRAM(S): at 14:55

## 2025-05-14 RX ADMIN — Medication 650 MILLIGRAM(S): at 13:09

## 2025-05-14 RX ADMIN — Medication 650 MILLIGRAM(S): at 12:23

## 2025-05-14 RX ADMIN — Medication 25 MILLIGRAM(S): at 16:41

## 2025-05-14 RX ADMIN — HEPARIN SODIUM 5000 UNIT(S): 1000 INJECTION INTRAVENOUS; SUBCUTANEOUS at 15:41

## 2025-05-14 RX ADMIN — INSULIN LISPRO 4: 100 INJECTION, SOLUTION INTRAVENOUS; SUBCUTANEOUS at 07:19

## 2025-05-14 NOTE — PROGRESS NOTE ADULT - PROBLEM SELECTOR PLAN 3
Reports urinary incontinence x5 days, however has had this in the past per prior record review. UA today negative for UTI. Pt not on any medications that could cause frequency, BG wnl. Home med flomax 0.4mg which he has not taken in a few weeks.  - f/u bladder scan to assess for overflow incontinence  - flomax 0.4 mg qd  - will need urology follow up at discharge Reports urinary incontinence x5 days, however has had this in the past per prior record review. UA today negative for UTI. Pt not on any medications that could cause frequency, BG wnl. Home med flomax 0.4mg which he has not taken in a few weeks.  - flomax 0.4 mg qd  - will need urology follow up at discharge

## 2025-05-14 NOTE — PROGRESS NOTE ADULT - PROBLEM SELECTOR PLAN 6
EF 35-40%. Home med: Carvedilol 3.125mg BID. No signs of volume overload on exam, CXR clear, no s/s of acute HF exacerbation  - c/w carvedilol 3.125 mg BID  - f/u cards recs, may need TTE EF 35-40%. Home med: Carvedilol 3.125mg BID. No signs of volume overload on exam, CXR clear, no s/s of acute HF exacerbation  - c/w carvedilol 3.125 mg BID  - c/w enalapril 10mg qd  - started spironolactone 25 qd 5/13 per cards recs

## 2025-05-14 NOTE — PROGRESS NOTE ADULT - ASSESSMENT
I M    75M hx HIV/AIDS, CAD, T2DM, CHF (EF35-40%), depression/anxiety, prostate ca s/p radiation in 2013, recent admission for recurrent PNA (sign out AMA), R distal radius fracture w/ cast immobilization, urinary incontinence, frequent falls presenting for multiple complaints. Admitted for inability to take medications on his own, urinary incontinence, dizziness.       Problem/Plan - 1:  ·  Problem: Distal radius fracture, right.   ·  Plan: Pt with R distal radial fracture 7 in early April, splinted and then placed in short cast by ortho. States he fell on the hand yesterday and has some pain. Also has had some swelling of the hand for a few days. Patient was unable to make his ortho follow ups outpatient. Ortho consulted in ED, they have no concern for compartment at this time.   - elevation for swelling  - pain control: tylenol 650mg q6 standing  - PT/OT  - f/u 5/15 with ortho for cast removal and transition to wrist brace.    Problem/Plan - 2:  ·  Problem: Fall, accidental.   ·  Plan: #polysubstance use disorder  Pt reporting fall yesterday, fell onto right arm, reporting head strike. CT head negative. Per review of previous records, pt has had multiple falls over many years, often had workup initiated, and would AMA prior to completion of workup. States that his falls have been occuring since MI in 2019, after which he became dizzy with rapid movements. Head CT neg for acute changes, and central causes of vertigo. Seems that patient may have been on eliquis, unclear why, does not know if he has hx of DVT. [place] hallpike/epley negative. Positive orthostatics. Trops clear.   - PT eval  - step down to [facility] after cards clearance  - telemetry showing ectopy but no evidence of afib  - dc eliquis as pt with frequent falls and unclear hx of afib  - SBIRT consult  - nutrition consult  - SW consult.    Problem/Plan - 3:  ·  Problem: Urinary incontinence.   ·  Plan: Reports urinary incontinence x5 days, however has had this in the past per prior record review. UA today negative for UTI. Pt not on any medications that could cause frequency, BG wnl. Home med flomax 0.4mg which he has not taken in a few weeks.  - flomax 0.4 mg qd  - will need urology follow up at discharge.    Problem/Plan - 4:  ·  Problem: BRENNAN (acute kidney injury).   ·  Plan: Patient noted to have Cr 1.51, based on review of previous hospitalizations, baseline seems to be 1.2-1.5. Currently producing urine, with no electrolyte abnormalities. Unclear if this is BRENNAN vs CKD. Likely pre-renal etiology given orthostatics and patient admitting poor PO intake. Continue to monitor BRENNAN.   - trend BMP, monitor Cr  - avoid nephrotoxic drugs, renally dose meds  - nutrition consult.    Problem/Plan - 5:  ·  Problem: Human immunodeficiency virus (HIV).   ·  Plan: (CD4 74, VL 4516 23/2025). On dolutegravir (Tivicay) and emtricitabine-tenofovir (Descovy) and Atovaquone. Previously took these medications regularly, however has not taken in 2-3 weeks as he is unable to open pill bottles due to hand fracture. Viral load undetectable.   - c/w Atovaquone for PCP ppx (has sulfa allergy)   - c/w home med Tivicay and Descovy per ID.    Problem/Plan - 6:  ·  Problem: Chronic systolic congestive heart failure.   ·  Plan: EF 35-40%. Home med: Carvedilol 3.125mg BID. No signs of volume overload on exam, CXR clear, no s/s of acute HF exacerbation  - c/w carvedilol 3.125 mg BID  - c/w enalapril 10mg qd  - started spironolactone 25 qd 5/13 per cards recs.    Problem/Plan - 7:  ·  Problem: Type 2 diabetes mellitus.   ·  Plan: Home med per prior chart review: metformin 500mg, linagliptin 5mg qd. A1c wnl this admission.   - hold home meds  - mISS  - carb controlled diet.    Problem/Plan - 8:  ·  Problem: Hypertension.   ·  Plan: Home med: enalapril 10mg, coreg 3.125mg BID  - c/w home meds    #CAD  Hx of prior MI, denies stents in heart. On home asa 81mg and lipitor 80  - c/w home meds.    Problem/Plan - 9:  ·  Problem: Thrombocytopenia.   ·  Plan: Plt 111 on admission, per review of previous labs, has thrombocytopenia at baseline. No petechiae, purpura, ecchymoses, no signs of active bleeding. Likely chronic iso HIV.   - trend CBC  - active T&S.    Problem/Plan - 10:  ·  Problem: COPD without exacerbation.   ·  Plan; Patient with history of COPD. On home symbicort inhaler, however reports that he does not take it because it makes him short of breath and has not picked up from pharmacy. Given duonebs x3 in ED, currently without wheezing on physical exam. Does not appear to be in exacerbation, no increased cough, shortness of breath, sputum production.  - continue with home med  - duonebs PRN.    Problem/Plan - 11:  ·  Problem: Anxiety and depression.   ·  Plan: History of anxiety/depression. Home medications: aripiprazole 5mg qd, escitalopram 20mg qd  - continue home meds.    Problem/Plan - 12:  ·  Problem: Prophylactic measure.   ·  Plan: Plan:  F: PO   E: replete K<4, Mg<2  N: cc diet  VTE Prophylaxis: heparin  C: Full Code  D: RMF.

## 2025-05-14 NOTE — PROGRESS NOTE ADULT - SUBJECTIVE AND OBJECTIVE BOX
Patient seen and examined at bedside. Per patient, short arm cast was applied on 4/14/25 at [facility] during his admission here, however patient not admitted on that date, only seen in ED per documentation. Upon chart review, patient had recent admission to [facility] the same week (4/16-4/23) for PNA, possible cast was placed during admission there. Patient states he did not have outpatient follow up. He also states he has been lifting some things with his right hand.     Xrays reviewed by Dr. Dias on 5/12/25. Short arm cast with proper padding and intact. Continue with cast immobilization and non weightbearing of right upper extremity until 6 weeks in cast week of (5/28/25). If patient discharged prior to then, patient to follow up outpatient in Orthopedic resident clinic on Thursday 5/29. Encouraged patient to continue to elevate right upper extremity, continue wiggling fingers as much as possible to prevent stiffness. Do not lift anything with right hand.

## 2025-05-14 NOTE — PROGRESS NOTE ADULT - PROBLEM SELECTOR PLAN 4
Patient noted to have Cr 1.51, based on review of previous hospitalizations, baseline seems to be 1.2-1.5. Currently producing urine, with no electrolyte abnormalities. Unclear if this is BRENNAN vs CKD. Likely pre-renal etiology given orthostatics and patient admitting poor PO intake. Continue to monitor BRENNAN.   - trend BMP, monitor Cr  - avoid nephrotoxic drugs, renally dose meds  - nutrition consult

## 2025-05-14 NOTE — PROGRESS NOTE ADULT - SUBJECTIVE AND OBJECTIVE BOX
Physical Medicine and Rehabilitation Progress Note :       Patient is a 75y old  Male who presents with a chief complaint of fall with head strike (13 May 2025 15:34)      HPI:  HPI:  75M hx HIV/AIDS, CAD, T2DM, CHF (EF35-40%), depression/anxiety, prostate ca s/p radiation in 2013, recent admission for recurrent PNA (sign out AMA), R distal radius fracture w/ cast immobilization, urinary incontinence, frequent falls presenting for multiple complaints. Patient states that yesterday he fell and hit his head. States that he has had frequent falls from dizziness after MI in 2019, and multiple evaluations/workup for his dizziness and falls. Pt also states that he has R hand pain since yesterday as he hit his hand during the fall. Pt also has had some swelling of R hand in the cast for past few weeks, limited ROM due to swelling, however intact sensation. He has not been able to take his home medications for 2-3 weeks due to cast immobilization. When asked about prior to 3 weeks ago, pt was able to take medications despite the cast, however has had difficulty with pill bottles due to hand swelling. Finally, patient reports urinary incontinence, stating that he is going multiple times a day and unable to control his bladder function. Per outpatient review, patient has had this complaint for many years, however, he states it has only been for the past 5 days. Denies F/C, CP, SOB, dysuria, urinary retention, urgency.       In the ED,  VS: T 98.3F, HR 64, /73, RR  16, SpO2  95% RA  Labs: Hb 10.7, Plt 111, BUN 27, Cr 1.51, GFR48,   UA: protein 100, otherwise negative  RVP: negative  CXR: negative  XR wrist: status post splinting for fracture of distal radius with satisfactory anatomic alignment  CT head: no gross acute intracranial hemorrhage, extra-axial collection, or calvarial fracture, chronic microvascular changes  CT c-spine: no acute fx or malalignment, degenerative changes with severe multilevel canal stenosis, most notable C5-C6  Orders: levaquin 750mg x1, tylenol, duoneb x1, lidocaine patch (12 May 2025 09:13)                            11.7   2.94  )-----------( 105      ( 14 May 2025 06:40 )             35.0       05-14    138  |  106  |  34[H]  ----------------------------<  156[H]  4.3   |  24  |  1.58[H]    Ca    8.3[L]      14 May 2025 06:40  Phos  3.5     05-14  Mg     1.7     05-14    TPro  6.3  /  Alb  3.2[L]  /  TBili  0.5  /  DBili  x   /  AST  22  /  ALT  14  /  AlkPhos  66  05-14    Vital Signs Last 24 Hrs  T(C): 36.6 (14 May 2025 10:11), Max: 37.2 (14 May 2025 00:57)  T(F): 97.9 (14 May 2025 10:11), Max: 98.9 (14 May 2025 00:57)  HR: 69 (14 May 2025 09:04) (63 - 78)  BP: 125/68 (14 May 2025 09:04) (93/52 - 175/96)  BP(mean): 90 (14 May 2025 09:04) (68 - 129)  RR: 22 (14 May 2025 09:04) (16 - 23)  SpO2: 94% (14 May 2025 09:04) (91% - 98%)    Parameters below as of 14 May 2025 09:04  Patient On (Oxygen Delivery Method): room air        MEDICATIONS  (STANDING):  acetaminophen     Tablet .. 650 milliGRAM(s) Oral every 6 hours  ARIPiprazole 5 milliGRAM(s) Oral every 24 hours  aspirin  chewable 81 milliGRAM(s) Oral every 24 hours  atorvastatin 80 milliGRAM(s) Oral at bedtime  atovaquone  Suspension 1500 milliGRAM(s) Oral every 24 hours  carvedilol 3.125 milliGRAM(s) Oral every 12 hours  dextrose 5%. 1000 milliLiter(s) (50 mL/Hr) IV Continuous <Continuous>  dextrose 5%. 1000 milliLiter(s) (100 mL/Hr) IV Continuous <Continuous>  dextrose 50% Injectable 25 Gram(s) IV Push once  dextrose 50% Injectable 12.5 Gram(s) IV Push once  dextrose 50% Injectable 25 Gram(s) IV Push once  dolutegravir 50 milliGRAM(s) Oral every 24 hours  emtricitabine 200 mG/tenofovir alafenamide 25 mG (DESCOVY) Tablet 1 Tablet(s) Oral every 24 hours  enalapril 10 milliGRAM(s) Oral every 24 hours  escitalopram 20 milliGRAM(s) Oral every 24 hours  fluticasone propionate/ salmeterol 250-50 MICROgram(s) Diskus 1 Dose(s) Inhalation two times a day  glucagon  Injectable 1 milliGRAM(s) IntraMuscular once  heparin   Injectable 5000 Unit(s) SubCutaneous every 8 hours  insulin lispro (ADMELOG) corrective regimen sliding scale   SubCutaneous Before meals and at bedtime  spironolactone 25 milliGRAM(s) Oral every 24 hours  tamsulosin 0.4 milliGRAM(s) Oral at bedtime    MEDICATIONS  (PRN):  acetaminophen     Tablet .. 650 milliGRAM(s) Oral every 6 hours PRN Temp greater or equal to 38C (100.4F), Mild Pain (1 - 3)  albuterol/ipratropium for Nebulization 3 milliLiter(s) Nebulizer every 6 hours PRN Wheezing  benzonatate 100 milliGRAM(s) Oral every 8 hours PRN Cough  dextrose Oral Gel 15 Gram(s) Oral once PRN Blood Glucose LESS THAN 70 milliGRAM(s)/deciliter  hydrocodone/homatropine Syrup 5 milliLiter(s) Oral every 6 hours PRN Cough  melatonin 5 milliGRAM(s) Oral at bedtime PRN Sleep      T(C): 36.6 (05-14-25 @ 10:11), Max: 37.2 (05-14-25 @ 00:57)  HR: 69 (05-14-25 @ 09:04) (63 - 78)  BP: 125/68 (05-14-25 @ 09:04) (93/52 - 175/96)  RR: 22 (05-14-25 @ 09:04) (16 - 23)  SpO2: 94% (05-14-25 @ 09:04) (91% - 98%)    Physical Exam:   75 y o man lying comfortably in semi Méndez's position , awake , alert , no acute complaints     Head: normocephalic , atraumatic    Eyes: PERRLA , EOMI , no nystagmus , sclera anicteric    ENT / FACE: neg nasal discharge , uvula midline , no oropharyngeal erythema / exudate    Neck: supple , negative JVD , negative carotid bruits , no thyromegaly    Chest: CTA bilaterally     Cardiovascular: regular rate and rhythm , neg murmurs / rubs / gallops    Abdomen: soft , non distended , no tenderness to palpation in all 4 quadrants ,  normal bowel sounds     Extremities: WWP , neg cyanosis /clubbing / edema     Musculoskeletal: : R hand/wrist immobilized in short cast , swolllen fingers     Neurologic Exam:     Alert and oriented to person , place , date/year , speech fluent w/o dysarthria     Cranial Nerves:           II:                         pupils equal , round and reactive to light , visual fields intact         III/ IV/VI:             extraocular movements intact , neg nystagmus , neg ptosis        V:                        facial sensation intact , V1-3 normal        VII:                      face symmetric , no droop , normal eye closure and smile        VIII:                     hearing intact to finger rub bilaterally        IX and X:             no hoarseness , gag intact , palate/ uvula rise symmetrically        XI:                       SCM / trapezius strength intact bilateral        XII:                      no tongue deviation    Motor Exam:        > 4/5 x 4 extremities , R wrist/hand not tested 2/2 cast      Sensation:         intact to light touch x 4 extremities            nneg Babinski      Coordination:            Finger to Nose:  neg dysmetria bilaterally           Heel to Shin:    neg dysmetria bilaterally          EDUAR intact bilaterally      Initial Functional Status Assessment :       Previous Level of Function:     · Ambulation Skills  independent  · Transfer Skills  independent  · ADL Skills  independent  · Work/Leisure Activity  independent  · Additional Comments  Patient lives alone in a shelter with elevator access. PTA he was independent with all ADLs/IADLs with no AD use. Patient with hx multiple falls, however endorses it is not safe in his area to utilize a cane or walker.    Cognitive Status Examination:   · Orientation  oriented to person, place, time and situation  · Level of Consciousness  alert  · Follows Commands and Answers Questions  100% of the time  · Personal Safety and Judgment  intact    Range of Motion Exam:   · Range of Motion Examination  bilateral upper extremity ROM was WFL (within functional limits); bilateral lower extremity ROM was WFL (within functional limits)    Manual Muscle Testing:   · Manual Muscle Testing Results  BLE >4+/5, BUE >4/5 except R wrist strength deferred 2/2 immobilization    Bed Mobility: Sit to Supine:     · Level of [place]  contact guard    Bed Mobility: Supine to Sit:     · Level of [place]  contact guard    Bed Mobility Analysis:     · Bed Mobility Limitations  decreased ability to use arms for pushing/pulling  · Impairments Contributing to Impaired Bed Mobility  pain; impaired postural control    Transfer: Sit to Stand:     · Level of [place]  contact guard  · Physical Assist/Nonphysical Assist  1 person assist  · Weight-Bearing Restrictions  weight-bearing as tolerated; RUE  · Assistive Device  portable tele    Transfer: Stand to Sit:     · Level of [place]  contact guard  · Physical Assist/Nonphysical Assist  1 person assist  · Weight-Bearing Restrictions  RUE; weight-bearing as tolerated  · Assistive Device  portable tele    Sit/Stand Transfer Safety Analysis:     · Transfer Safety Concerns Noted  decreased balance during turns; decreased weight-shifting ability; decreased step length  · Impairments Contributing to Impaired Transfers  impaired balance; decreased strength; impaired postural control    Gait Skills:     · Level of [place]  contact guard  · Physical Assist/Nonphysical Assist  1 person assist  · Assistive Device  portable tele  · Gait Distance  50 feet; x2    Gait Analysis:     · Gait Pattern Used  moderately unsteady, no acute LOB  · Gait Deviations Noted  decreased weight-shifting ability; decreased step length; decreased j carlos; slow speed, forward flexed posture  · Impairments Contributing to Gait Deviations  impaired balance; decreased strength; impaired postural control    Balance Skills Assessment:     · Sitting Balance: Static  good balance  · Sitting Balance: Dynamic  good balance  · Standing Balance: Static  fair plus  · Standing Balance: Dynamic  fair balance    Clinical Impressions:   · Criteria for Skilled Therapeutic Interventions  impairments found; functional limitations in following categories; rehab potential; therapy frequency; anticipated discharge recommendation  · Impairments Found (describe specific impairments)  aerobic capacity/endurance; gait, locomotion, and balance; gross motor; posture  · Functional Limitations in Following Categories (describe specific limitations)  self-care; community/leisure; home management    Fine Motor Coordination:   · Left Hand, Manipulation of Objects  normal performance  · Right Hand, Manipulation of Objects  severe impairment    Upper Body Dressing Training:     · Level of [place]  moderate assist (50% patients effort)  · Physical Assist/Nonphysical Assist  1 person assist; verbal cues    Lower Body Dressing Training:     · Level of [place]  moderate assist (50% patients effort); w/ comp tech edu  · Physical Assist/Nonphysical Assist  1 person assist; verbal cues    Toilet Hygiene Training:     · Level of [place]  minimum assist (75% patients effort)  · Physical Assist/Nonphysical Assist  1 person assist; verbal cues    Grooming Training:     · Level of [place]  minimum assist (75% patients effort)  · Physical Assist/Nonphysical Assist  1 person assist; verbal cues    Eating/Self-Feeding Training:     · Level of [place]  supervision  · Physical Assist/Nonphysical Assist  supervision; verbal cues        PM&R Impression : as above    Current disposition plan recommendation :    subacute rehab placement

## 2025-05-14 NOTE — PROGRESS NOTE ADULT - PROBLEM SELECTOR PLAN 2
#polysubstance use disorder  Pt reporting fall yesterday, fell onto right arm, reporting head strike. CT head negative. Per review of previous records, pt has had multiple falls over many years, often had workup initiated, and would AMA prior to completion of workup. States that his falls have been occuring since MI in 2019, after which he became dizzy with rapid movements. Head CT neg for acute changes, and central causes of vertigo. Seems that patient may have been on eliquis, unclear why, does not know if he has hx of DVT. Jose hallpike/epley negative. Positive orthostatics. Trops clear.   - PT eval  - tele monitoring while syncope workup ongoing, pending cards recs may regionalize  - possible TTE per cards  - telemetry showing ectopy but no evidence of afib, although patient is on eliquis   - dc eliquis as pt with frequent falls and unclear hx of afib  - SBIRT consult  - nutrition consult  - SW consult #polysubstance use disorder  Pt reporting fall yesterday, fell onto right arm, reporting head strike. CT head negative. Per review of previous records, pt has had multiple falls over many years, often had workup initiated, and would AMA prior to completion of workup. States that his falls have been occuring since MI in 2019, after which he became dizzy with rapid movements. Head CT neg for acute changes, and central causes of vertigo. Seems that patient may have been on eliquis, unclear why, does not know if he has hx of DVT. Jose hallpike/epley negative. Positive orthostatics. Trops clear.   - PT eval  - step down to [facility] after cards clearance  - telemetry showing ectopy but no evidence of afib  - dc eliquis as pt with frequent falls and unclear hx of afib  - SBIRT consult  - nutrition consult  - SW consult

## 2025-05-14 NOTE — PROGRESS NOTE ADULT - SUBJECTIVE AND OBJECTIVE BOX
Patient is a 75y old  Male who presents with a chief complaint of     HOSPITAL COURSE: Patient presented s/p multiple falls, urinary incontinence, and poor ability to care for himself. He uses crack cocaine and marijuana and had a fall with headstrike. Given the fall was unwitnessed, admitted to tele for further monitoring while pending workup. Found to have BRENNAN and orthostatics were positive, consistent with history of poor PO intake/dehydration on admission. HIV medications initially held given reports of nonadherence given hand injury leading to difficulty opening bottles, but restarted per ID as viral load is undetectable lowering threshold for IRIS. Tele monitoring showing ectopy/bigeminy but no evidence of afib. Pending possible TTE and cards evaluation prior to stepdown to [facility] if medically clear. Also pending further workup from SW, nutrition, and SBIRT at this time.     OVERNIGHT EVENTS:     SUBJECTIVE: Patient     ROS: otherwise negative      T(C): 36.5 (25 @ 09:04), Max: 36.5 (25 @ 22:22)  HR: 79 (25 @ 10:48) (63 - 85)  BP: 163/76 (25 @ 10:48) (123/68 - 163/76)  RR: 18 (25 @ 08:36) (16 - 20)  SpO2: 95% (25 @ 10:48) (93% - 98%)  Wt(kg): --Vital Signs Last 24 Hrs  T(C): 36.5 (13 May 2025 09:04), Max: 36.5 (12 May 2025 22:22)  T(F): 97.7 (13 May 2025 09:04), Max: 97.7 (12 May 2025 22:22)  HR: 79 (13 May 2025 10:48) (63 - 85)  BP: 163/76 (13 May 2025 10:48) (123/68 - 163/76)  BP(mean): 108 (13 May 2025 10:48) (89 - 118)  RR: 18 (13 May 2025 08:36) (16 - 20)  SpO2: 95% (13 May 2025 10:48) (93% - 98%)    Parameters below as of 13 May 2025 10:48  Patient On (Oxygen Delivery Method): nasal cannula  O2 Flow (L/min): 2      PHYSICAL EXAM:  Constitutional: resting comfortably in bed; NAD, disheveled  HEENT: NC/AT, PERRL, EOMI, anicteric sclera, MMM  Neck: supple; no JVD or thyromegaly  Respiratory: unlabored breathing, CTA B/L; no Rhonchi/Crackles, no retractions or use of accessory muscles   Cardiac: +S1/S2; RRR; no M/R/G  Gastrointestinal: soft, NT/ND; no rebound or guarding; +BSx4  Extremities: WWP, no clubbing or cyanosis; no peripheral edema  Musculoskeletal: R hand/wrist immobilized in short cast, swelling of fingers, intact sensation  Vascular: 2+ radial, DP/PT pulses B/L  Dermatologic: skin warm, dry and intact; no rashes, wounds, or scars  Neurologic: AAOx3; CNII-XII grossly intact; no focal deficits  Psychiatric: affect and characteristics of appearance, verbalizations, behaviors are appropriate, denies SI/HI/AH/VH    LABS:                        10.7   3.53  )-----------( 111      ( 12 May 2025 02:33 )             33.0     05-12    x   |  x   |  x   ----------------------------<  x   x    |  x   |  1.49[H]    Ca    8.1[L]      12 May 2025 02:33    TPro  6.0[L]  /  Alb  2.5[L]  /  TBili  0.4  /  DBili  x   /  AST  29  /  ALT  18  /  AlkPhos  68  05-12        Urinalysis Basic - ( 12 May 2025 21:36 )    Color: Yellow / Appearance: Clear / S.015 / pH: x  Gluc: x / Ketone: x  / Bili: Negative / Urobili: 1.0 mg/dL   Blood: x / Protein: 100 mg/dL / Nitrite: Negative   Leuk Esterase: Negative / RBC: 9 /HPF / WBC 1 /HPF   Sq Epi: x / Non Sq Epi: 1 /HPF / Bacteria: Negative /HPF      CAPILLARY BLOOD GLUCOSE      POCT Blood Glucose.: 179 mg/dL (13 May 2025 11:33)  POCT Blood Glucose.: 124 mg/dL (13 May 2025 06:51)  POCT Blood Glucose.: 139 mg/dL (12 May 2025 22:20)  POCT Blood Glucose.: 139 mg/dL (12 May 2025 17:53)        Urinalysis Basic - ( 12 May 2025 21:36 )    Color: Yellow / Appearance: Clear / S.015 / pH: x  Gluc: x / Ketone: x  / Bili: Negative / Urobili: 1.0 mg/dL   Blood: x / Protein: 100 mg/dL / Nitrite: Negative   Leuk Esterase: Negative / RBC: 9 /HPF / WBC 1 /HPF   Sq Epi: x / Non Sq Epi: 1 /HPF / Bacteria: Negative /HPF        MEDICATIONS  (STANDING):  acetaminophen     Tablet .. 650 milliGRAM(s) Oral every 6 hours  ARIPiprazole 5 milliGRAM(s) Oral every 24 hours  aspirin  chewable 81 milliGRAM(s) Oral every 24 hours  atorvastatin 80 milliGRAM(s) Oral at bedtime  atovaquone  Suspension 750 milliGRAM(s) Oral every 24 hours  carvedilol 3.125 milliGRAM(s) Oral every 12 hours  dextrose 5%. 1000 milliLiter(s) (50 mL/Hr) IV Continuous <Continuous>  dextrose 5%. 1000 milliLiter(s) (100 mL/Hr) IV Continuous <Continuous>  dextrose 50% Injectable 25 Gram(s) IV Push once  dextrose 50% Injectable 12.5 Gram(s) IV Push once  dextrose 50% Injectable 25 Gram(s) IV Push once  enalapril 10 milliGRAM(s) Oral every 24 hours  escitalopram 20 milliGRAM(s) Oral every 24 hours  fluticasone propionate/ salmeterol 250-50 MICROgram(s) Diskus 1 Dose(s) Inhalation two times a day  glucagon  Injectable 1 milliGRAM(s) IntraMuscular once  heparin   Injectable 5000 Unit(s) SubCutaneous every 8 hours  insulin lispro (ADMELOG) corrective regimen sliding scale   SubCutaneous Before meals and at bedtime  tamsulosin 0.4 milliGRAM(s) Oral at bedtime    MEDICATIONS  (PRN):  acetaminophen     Tablet .. 650 milliGRAM(s) Oral every 6 hours PRN Temp greater or equal to 38C (100.4F), Mild Pain (1 - 3)  albuterol/ipratropium for Nebulization 3 milliLiter(s) Nebulizer every 6 hours PRN Wheezing  benzonatate 100 milliGRAM(s) Oral every 8 hours PRN Cough  dextrose Oral Gel 15 Gram(s) Oral once PRN Blood Glucose LESS THAN 70 milliGRAM(s)/deciliter  hydrocodone/homatropine Syrup 5 milliLiter(s) Oral every 6 hours PRN Cough  melatonin 5 milliGRAM(s) Oral at bedtime PRN Sleep      RADIOLOGY & ADDITIONAL TESTS: Reviewed   Patient is a 75y old  Male who presents with a chief complaint of     HOSPITAL COURSE: Patient presented s/p multiple falls, urinary incontinence, and poor ability to care for himself. He uses crack cocaine and marijuana and had a fall with headstrike. Given the fall was unwitnessed, admitted to tele for further monitoring while pending workup. Found to have BRENNAN and orthostatics were positive, consistent with history of poor PO intake/dehydration on admission. HIV medications initially held given reports of nonadherence given hand injury leading to difficulty opening bottles, but restarted per ID as viral load is undetectable lowering threshold for IRIS. Tele monitoring showing ectopy/bigeminy but no evidence of afib. Pending KAN.    OVERNIGHT EVENTS: 19 beats of Vtach, asymptomatic, EKG non-ischemic. Pt complaining of 10/10 arm pain, given ofirmev, oxy 2.5, and dilaudid 0.5 for pain control. Also had an episode of sinus felipe overnight.    SUBJECTIVE: Patient reports 10/10 R arm pain however is sitting comfortably in bed and does not appear to be uncomfortable or in pain. Patient denies other acute complaints.    ROS: otherwise negative      T(C): 36.5 (25 @ 09:04), Max: 36.5 (25 @ 22:22)  HR: 79 (25 @ 10:48) (63 - 85)  BP: 163/76 (25 @ 10:48) (123/68 - 163/76)  RR: 18 (25 @ 08:36) (16 - 20)  SpO2: 95% (25 @ 10:48) (93% - 98%)  Wt(kg): --Vital Signs Last 24 Hrs  T(C): 36.5 (13 May 2025 09:04), Max: 36.5 (12 May 2025 22:22)  T(F): 97.7 (13 May 2025 09:04), Max: 97.7 (12 May 2025 22:22)  HR: 79 (13 May 2025 10:48) (63 - 85)  BP: 163/76 (13 May 2025 10:48) (123/68 - 163/76)  BP(mean): 108 (13 May 2025 10:48) (89 - 118)  RR: 18 (13 May 2025 08:36) (16 - 20)  SpO2: 95% (13 May 2025 10:48) (93% - 98%)    Parameters below as of 13 May 2025 10:48  Patient On (Oxygen Delivery Method): nasal cannula  O2 Flow (L/min): 2      PHYSICAL EXAM:  Constitutional: resting comfortably in bed; NAD, disheveled  HEENT: NC/AT, PERRL, EOMI, anicteric sclera, MMM  Neck: supple; no JVD or thyromegaly  Respiratory: unlabored breathing, CTA B/L; no Rhonchi/Crackles, no retractions or use of accessory muscles   Cardiac: +S1/S2; RRR; no M/R/G  Gastrointestinal: soft, NT/ND; no rebound or guarding; +BSx4  Extremities: WWP, no clubbing or cyanosis; no peripheral edema  Musculoskeletal: R hand/wrist immobilized in short cast, swelling of fingers, intact sensation  Vascular: 2+ radial, DP/PT pulses B/L  Dermatologic: skin warm, dry and intact; no rashes, wounds, or scars  Neurologic: AAOx3; CNII-XII grossly intact; no focal deficits  Psychiatric: affect and characteristics of appearance, verbalizations, behaviors are appropriate, denies SI/HI/AH/VH    LABS:                        10.7   3.53  )-----------( 111      ( 12 May 2025 02:33 )             33.0     05-12    x   |  x   |  x   ----------------------------<  x   x    |  x   |  1.49[H]    Ca    8.1[L]      12 May 2025 02:33    TPro  6.0[L]  /  Alb  2.5[L]  /  TBili  0.4  /  DBili  x   /  AST  29  /  ALT  18  /  AlkPhos  68  05-12        Urinalysis Basic - ( 12 May 2025 21:36 )    Color: Yellow / Appearance: Clear / S.015 / pH: x  Gluc: x / Ketone: x  / Bili: Negative / Urobili: 1.0 mg/dL   Blood: x / Protein: 100 mg/dL / Nitrite: Negative   Leuk Esterase: Negative / RBC: 9 /HPF / WBC 1 /HPF   Sq Epi: x / Non Sq Epi: 1 /HPF / Bacteria: Negative /HPF      CAPILLARY BLOOD GLUCOSE      POCT Blood Glucose.: 179 mg/dL (13 May 2025 11:33)  POCT Blood Glucose.: 124 mg/dL (13 May 2025 06:51)  POCT Blood Glucose.: 139 mg/dL (12 May 2025 22:20)  POCT Blood Glucose.: 139 mg/dL (12 May 2025 17:53)        Urinalysis Basic - ( 12 May 2025 21:36 )    Color: Yellow / Appearance: Clear / S.015 / pH: x  Gluc: x / Ketone: x  / Bili: Negative / Urobili: 1.0 mg/dL   Blood: x / Protein: 100 mg/dL / Nitrite: Negative   Leuk Esterase: Negative / RBC: 9 /HPF / WBC 1 /HPF   Sq Epi: x / Non Sq Epi: 1 /HPF / Bacteria: Negative /HPF        MEDICATIONS  (STANDING):  acetaminophen     Tablet .. 650 milliGRAM(s) Oral every 6 hours  ARIPiprazole 5 milliGRAM(s) Oral every 24 hours  aspirin  chewable 81 milliGRAM(s) Oral every 24 hours  atorvastatin 80 milliGRAM(s) Oral at bedtime  atovaquone  Suspension 750 milliGRAM(s) Oral every 24 hours  carvedilol 3.125 milliGRAM(s) Oral every 12 hours  dextrose 5%. 1000 milliLiter(s) (50 mL/Hr) IV Continuous <Continuous>  dextrose 5%. 1000 milliLiter(s) (100 mL/Hr) IV Continuous <Continuous>  dextrose 50% Injectable 25 Gram(s) IV Push once  dextrose 50% Injectable 12.5 Gram(s) IV Push once  dextrose 50% Injectable 25 Gram(s) IV Push once  enalapril 10 milliGRAM(s) Oral every 24 hours  escitalopram 20 milliGRAM(s) Oral every 24 hours  fluticasone propionate/ salmeterol 250-50 MICROgram(s) Diskus 1 Dose(s) Inhalation two times a day  glucagon  Injectable 1 milliGRAM(s) IntraMuscular once  heparin   Injectable 5000 Unit(s) SubCutaneous every 8 hours  insulin lispro (ADMELOG) corrective regimen sliding scale   SubCutaneous Before meals and at bedtime  tamsulosin 0.4 milliGRAM(s) Oral at bedtime    MEDICATIONS  (PRN):  acetaminophen     Tablet .. 650 milliGRAM(s) Oral every 6 hours PRN Temp greater or equal to 38C (100.4F), Mild Pain (1 - 3)  albuterol/ipratropium for Nebulization 3 milliLiter(s) Nebulizer every 6 hours PRN Wheezing  benzonatate 100 milliGRAM(s) Oral every 8 hours PRN Cough  dextrose Oral Gel 15 Gram(s) Oral once PRN Blood Glucose LESS THAN 70 milliGRAM(s)/deciliter  hydrocodone/homatropine Syrup 5 milliLiter(s) Oral every 6 hours PRN Cough  melatonin 5 milliGRAM(s) Oral at bedtime PRN Sleep      RADIOLOGY & ADDITIONAL TESTS: Reviewed

## 2025-05-14 NOTE — PROGRESS NOTE ADULT - PROBLEM SELECTOR PLAN 1
Pt with R distal radial fracture 7 in early April, splinted and then placed in short cast by ortho. States he fell on the hand yesterday and has some pain. Also has had some swelling of the hand for a few days. Patient was unable to make his ortho follow ups outpatient. Ortho consulted in ED, they have no concern for compartment at this time.   - elevation for swelling  - pain control   --> tylenol 650mg q6 standing  - PT/OT  - f/u 5/15 with ortho outpt for cast removal and transition to wrist brace Pt with R distal radial fracture 7 in early April, splinted and then placed in short cast by ortho. States he fell on the hand yesterday and has some pain. Also has had some swelling of the hand for a few days. Patient was unable to make his ortho follow ups outpatient. Ortho consulted in ED, they have no concern for compartment at this time.   - elevation for swelling  - pain control: tylenol 650mg q6 standing  - PT/OT  - f/u 5/15 with ortho for cast removal and transition to wrist brace

## 2025-05-14 NOTE — PROGRESS NOTE ADULT - SUBJECTIVE AND OBJECTIVE BOX
INTERVAL EVENTS:  -States that he still feels dizzy while lying in bed  -Orthostatic VS negative today    Cardiac medications administered in the last 48h:  carvedilol: 3.125 milliGRAM(s) Oral (05-14-25 @ 06:00)  spironolactone: 25 milliGRAM(s) Oral (05-13-25 @ 17:43)  enalapril: 10 milliGRAM(s) Oral (05-13-25 @ 17:43)  carvedilol: 3.125 milliGRAM(s) Oral (05-13-25 @ 17:43)  carvedilol: 3.125 milliGRAM(s) Oral (05-13-25 @ 05:15)  enalapril: 10 milliGRAM(s) Oral (05-12-25 @ 16:01)  carvedilol: 3.125 milliGRAM(s) Oral (05-12-25 @ 15:55)      MEDICATIONS  (STANDING):  acetaminophen     Tablet .. 650 milliGRAM(s) Oral every 6 hours  ARIPiprazole 5 milliGRAM(s) Oral every 24 hours  aspirin  chewable 81 milliGRAM(s) Oral every 24 hours  atorvastatin 80 milliGRAM(s) Oral at bedtime  atovaquone  Suspension 1500 milliGRAM(s) Oral every 24 hours  carvedilol 3.125 milliGRAM(s) Oral every 12 hours  dextrose 5%. 1000 milliLiter(s) (50 mL/Hr) IV Continuous <Continuous>  dextrose 5%. 1000 milliLiter(s) (100 mL/Hr) IV Continuous <Continuous>  dextrose 50% Injectable 25 Gram(s) IV Push once  dextrose 50% Injectable 12.5 Gram(s) IV Push once  dextrose 50% Injectable 25 Gram(s) IV Push once  dolutegravir 50 milliGRAM(s) Oral every 24 hours  emtricitabine 200 mG/tenofovir alafenamide 25 mG (DESCOVY) Tablet 1 Tablet(s) Oral every 24 hours  enalapril 10 milliGRAM(s) Oral every 24 hours  escitalopram 20 milliGRAM(s) Oral every 24 hours  fluticasone propionate/ salmeterol 250-50 MICROgram(s) Diskus 1 Dose(s) Inhalation two times a day  glucagon  Injectable 1 milliGRAM(s) IntraMuscular once  heparin   Injectable 5000 Unit(s) SubCutaneous every 8 hours  insulin lispro (ADMELOG) corrective regimen sliding scale   SubCutaneous Before meals and at bedtime  spironolactone 25 milliGRAM(s) Oral every 24 hours  tamsulosin 0.4 milliGRAM(s) Oral at bedtime    MEDICATIONS  (PRN):  acetaminophen     Tablet .. 650 milliGRAM(s) Oral every 6 hours PRN Temp greater or equal to 38C (100.4F), Mild Pain (1 - 3)  albuterol/ipratropium for Nebulization 3 milliLiter(s) Nebulizer every 6 hours PRN Wheezing  benzonatate 100 milliGRAM(s) Oral every 8 hours PRN Cough  dextrose Oral Gel 15 Gram(s) Oral once PRN Blood Glucose LESS THAN 70 milliGRAM(s)/deciliter  hydrocodone/homatropine Syrup 5 milliLiter(s) Oral every 6 hours PRN Cough  melatonin 5 milliGRAM(s) Oral at bedtime PRN Sleep      VITALS 24H  T(F): 97.9 (05-14-25 @ 10:11), Max: 98.9 (05-14-25 @ 00:57)  HR: 69 (05-14-25 @ 09:04) (63 - 78)  BP: 125/68 (05-14-25 @ 09:04) (93/52 - 175/96)  BP(mean): 90 (05-14-25 @ 09:04) (68 - 129)  ABP: --  ABP(mean): --  RR: 22 (05-14-25 @ 09:04) (16 - 23)  SpO2: 94% (05-14-25 @ 09:04) (91% - 98%)  CVP(mm Hg): --    I/O Detail 24H    13 May 2025 07:01  -  14 May 2025 07:00  --------------------------------------------------------  IN:    Oral Fluid: 675 mL  Total IN: 675 mL    OUT:    Voided (mL): 945 mL  Total OUT: 945 mL    Total NET: -270 mL      14 May 2025 07:01  -  14 May 2025 12:21  --------------------------------------------------------  IN:    Oral Fluid: 340 mL  Total IN: 340 mL    OUT:  Total OUT: 0 mL    Total NET: 340 mL          PHYSICAL EXAM:  GEN: NAD  HEENT: EOMI   RESP: CTA b/l  CV: RRR. Normal S1/S2. No m/r/g.  ABD: soft, non-distended  EXT: No edema   NEURO: alert and attentive    LABS:  CBC 05-14-25 @ 06:40                        11.7   2.94  )-----------( 105                   35.0     Hgb trend: 11.7 <-- , 12.1 <-- , 10.7 <--   WBC trend: 2.94 <-- , 3.00 <-- , 3.53 <--       CMP 05-14-25 @ 06:40    138  |  106  |  34[H]  ----------------------------<  156[H]  4.3   |  24  |  1.58[H]    Ca    8.3[L]      05-14-25 @ 06:40  Phos  3.5     05-14  Mg     1.7     05-14    TPro  6.3  /  Alb  3.2[L]  /  TBili  0.5  /  DBili  x   /  AST  22  /  ALT  14  /  AlkPhos  66     05-14    Serum Cr (eGFR) trend: 1.58 (45) <-- , 1.56 (46) <-- , 1.27 (59) <-- , 1.49 (49) <-- , 1.51 (48) <--           Cardiac Markers   05-12-25 @ 16:00: HS Trop T 38    / CK x     / CKMB x

## 2025-05-14 NOTE — PROGRESS NOTE ADULT - ASSESSMENT
75M hx HIV/AIDS, CAD (nonobstructive on [facility] 2021), Afib, T2DM, CHF (EF 35-40%), depression/anxiety, prostate ca s/p radiation in 2013, recent admission for recurrent PNA (sign out AMA), R distal radius fracture w/ cast immobilization, urinary incontinence, frequent falls who presents with presyncope and fall.     REVIEW OF STUDIES  ECG 5/12/25: NSR, anteroseptal Q waves, nonspecific ST changes   TTE 3/25/25: LVEF 40% w/RWMA: The basal and mid inferior wall and basal and mid inferolateral wall are akinetic (RCA). RV normal size/function. Mild MR.    [facility] 7/28/21: Indication: Stable angina. R dominant. LM normal. MLAD 30%. LCx MLI. RCA mild atherosclerosis. No stents visualized      #Presyncope  DDx includes othostasis (+ on admission), drug use, and possibly vasovagal. Low suspicion for arrhythmogenic etiology  Orthostatic hypotension has resolved    #HFrEF  -Volume: Euvolemic on exam but would repeat orthostatics today  -GDMT: Noted to have 19 beats of NSVT on tele. In setting of negative orthostatics today would increase coreg to 6.25mg BID. Continue enalapril 10mg QD. C/w spironolactone 25mg QD.     #Afib  -Patient reports history and states he was on eliquis. Not noted on tele so far.  -CHADSVASC is 4  -Obtain collateral from PCP. If confirmed, would resume eliquis.     #Non-obstructive CAD  -Continue aspirin  -Continue Lipitor 80mg

## 2025-05-14 NOTE — PROGRESS NOTE ADULT - PROBLEM SELECTOR PLAN 7
Home med per prior chart review: metformin 500mg, linagliptin 5mg qd. A1c wnl this admission.   - hold home meds  - mISS  - carb controlled diet

## 2025-05-14 NOTE — PROGRESS NOTE ADULT - PROBLEM SELECTOR PLAN 5
(CD4 74, VL 4516 23/2025). On dolutegravir (Tivicay) and emtricitabine-tenofovir (Descovy) and Atovaquone. Previously took these medications regularly, however has not taken in 2-3 weeks as he is unable to open pill bottles due to hand fracture. Viral load undetectable.   - c/w Atovaquone for PCP ppx (has sulfa allergy)   - c/w home med Tivicay and Descovy per ID

## 2025-05-15 LAB
ANION GAP SERPL CALC-SCNC: 11 MMOL/L — SIGNIFICANT CHANGE UP (ref 5–17)
BUN SERPL-MCNC: 33 MG/DL — HIGH (ref 7–23)
CALCIUM SERPL-MCNC: 8.5 MG/DL — SIGNIFICANT CHANGE UP (ref 8.4–10.5)
CHLORIDE SERPL-SCNC: 104 MMOL/L — SIGNIFICANT CHANGE UP (ref 96–108)
CO2 SERPL-SCNC: 22 MMOL/L — SIGNIFICANT CHANGE UP (ref 22–31)
CREAT SERPL-MCNC: 1.5 MG/DL — HIGH (ref 0.5–1.3)
EGFR: 48 ML/MIN/1.73M2 — LOW
EGFR: 48 ML/MIN/1.73M2 — LOW
GLUCOSE SERPL-MCNC: 176 MG/DL — HIGH (ref 70–99)
HCT VFR BLD CALC: 35.3 % — LOW (ref 39–50)
HGB BLD-MCNC: 11.6 G/DL — LOW (ref 13–17)
MAGNESIUM SERPL-MCNC: 1.7 MG/DL — SIGNIFICANT CHANGE UP (ref 1.6–2.6)
MCHC RBC-ENTMCNC: 32.2 PG — SIGNIFICANT CHANGE UP (ref 27–34)
MCHC RBC-ENTMCNC: 32.9 G/DL — SIGNIFICANT CHANGE UP (ref 32–36)
MCV RBC AUTO: 98.1 FL — SIGNIFICANT CHANGE UP (ref 80–100)
NRBC BLD AUTO-RTO: 0 /100 WBCS — SIGNIFICANT CHANGE UP (ref 0–0)
PHOSPHATE SERPL-MCNC: 3.6 MG/DL — SIGNIFICANT CHANGE UP (ref 2.5–4.5)
PLATELET # BLD AUTO: 115 K/UL — LOW (ref 150–400)
POTASSIUM SERPL-MCNC: 4.4 MMOL/L — SIGNIFICANT CHANGE UP (ref 3.5–5.3)
POTASSIUM SERPL-SCNC: 4.4 MMOL/L — SIGNIFICANT CHANGE UP (ref 3.5–5.3)
RBC # BLD: 3.6 M/UL — LOW (ref 4.2–5.8)
RBC # FLD: 12.7 % — SIGNIFICANT CHANGE UP (ref 10.3–14.5)
SODIUM SERPL-SCNC: 137 MMOL/L — SIGNIFICANT CHANGE UP (ref 135–145)
WBC # BLD: 3.01 K/UL — LOW (ref 3.8–10.5)
WBC # FLD AUTO: 3.01 K/UL — LOW (ref 3.8–10.5)

## 2025-05-15 PROCEDURE — 99233 SBSQ HOSP IP/OBS HIGH 50: CPT | Mod: GC

## 2025-05-15 PROCEDURE — 99232 SBSQ HOSP IP/OBS MODERATE 35: CPT

## 2025-05-15 RX ORDER — MAGNESIUM SULFATE 500 MG/ML
2 SYRINGE (ML) INJECTION ONCE
Refills: 0 | Status: COMPLETED | OUTPATIENT
Start: 2025-05-15 | End: 2025-05-15

## 2025-05-15 RX ORDER — SOD PHOS DI, MONO/K PHOS MONO 250 MG
1 TABLET ORAL ONCE
Refills: 0 | Status: COMPLETED | OUTPATIENT
Start: 2025-05-15 | End: 2025-05-15

## 2025-05-15 RX ORDER — ISOSORBIDE MONONITRATE 60 MG/1
30 TABLET, EXTENDED RELEASE ORAL EVERY 24 HOURS
Refills: 0 | Status: DISCONTINUED | OUTPATIENT
Start: 2025-05-15 | End: 2025-05-19

## 2025-05-15 RX ORDER — OXYCODONE HYDROCHLORIDE 30 MG/1
2.5 TABLET ORAL ONCE
Refills: 0 | Status: DISCONTINUED | OUTPATIENT
Start: 2025-05-15 | End: 2025-05-15

## 2025-05-15 RX ADMIN — INSULIN LISPRO 4: 100 INJECTION, SOLUTION INTRAVENOUS; SUBCUTANEOUS at 21:57

## 2025-05-15 RX ADMIN — HEPARIN SODIUM 5000 UNIT(S): 1000 INJECTION INTRAVENOUS; SUBCUTANEOUS at 06:16

## 2025-05-15 RX ADMIN — CARVEDILOL 6.25 MILLIGRAM(S): 3.12 TABLET, FILM COATED ORAL at 09:17

## 2025-05-15 RX ADMIN — CARVEDILOL 6.25 MILLIGRAM(S): 3.12 TABLET, FILM COATED ORAL at 21:58

## 2025-05-15 RX ADMIN — Medication 650 MILLIGRAM(S): at 09:17

## 2025-05-15 RX ADMIN — TAMSULOSIN HYDROCHLORIDE 0.4 MILLIGRAM(S): 0.4 CAPSULE ORAL at 21:58

## 2025-05-15 RX ADMIN — ATORVASTATIN CALCIUM 80 MILLIGRAM(S): 80 TABLET, FILM COATED ORAL at 21:58

## 2025-05-15 RX ADMIN — HEPARIN SODIUM 5000 UNIT(S): 1000 INJECTION INTRAVENOUS; SUBCUTANEOUS at 13:34

## 2025-05-15 RX ADMIN — ISOSORBIDE MONONITRATE 30 MILLIGRAM(S): 60 TABLET, EXTENDED RELEASE ORAL at 18:24

## 2025-05-15 RX ADMIN — Medication 1 TABLET(S): at 17:37

## 2025-05-15 RX ADMIN — ESCITALOPRAM OXALATE 20 MILLIGRAM(S): 20 TABLET ORAL at 14:32

## 2025-05-15 RX ADMIN — Medication 1 DOSE(S): at 17:39

## 2025-05-15 RX ADMIN — Medication 25 GRAM(S): at 17:39

## 2025-05-15 RX ADMIN — Medication 650 MILLIGRAM(S): at 10:00

## 2025-05-15 RX ADMIN — Medication 5 MILLIGRAM(S): at 21:59

## 2025-05-15 RX ADMIN — Medication 650 MILLIGRAM(S): at 06:16

## 2025-05-15 RX ADMIN — Medication 25 MILLIGRAM(S): at 18:00

## 2025-05-15 RX ADMIN — Medication 10 MILLIGRAM(S): at 17:37

## 2025-05-15 RX ADMIN — Medication 650 MILLIGRAM(S): at 03:14

## 2025-05-15 RX ADMIN — Medication 650 MILLIGRAM(S): at 21:58

## 2025-05-15 RX ADMIN — Medication 650 MILLIGRAM(S): at 17:44

## 2025-05-15 RX ADMIN — Medication 81 MILLIGRAM(S): at 14:32

## 2025-05-15 RX ADMIN — DOLUTEGRAVIR SODIUM 50 MILLIGRAM(S): 5 TABLET, FOR SUSPENSION ORAL at 17:36

## 2025-05-15 RX ADMIN — Medication 1 PACKET(S): at 17:37

## 2025-05-15 RX ADMIN — ARIPIPRAZOLE 5 MILLIGRAM(S): 2 TABLET ORAL at 14:32

## 2025-05-15 RX ADMIN — Medication 650 MILLIGRAM(S): at 07:16

## 2025-05-15 RX ADMIN — HEPARIN SODIUM 5000 UNIT(S): 1000 INJECTION INTRAVENOUS; SUBCUTANEOUS at 21:58

## 2025-05-15 RX ADMIN — INSULIN LISPRO 2: 100 INJECTION, SOLUTION INTRAVENOUS; SUBCUTANEOUS at 13:31

## 2025-05-15 RX ADMIN — ATOVAQUONE 1500 MILLIGRAM(S): 750 SUSPENSION ORAL at 18:25

## 2025-05-15 NOTE — PROGRESS NOTE ADULT - SUBJECTIVE AND OBJECTIVE BOX
INTERVAL EVENTS:    Cardiac medications administered in the last 48h:  carvedilol: 6.25 milliGRAM(s) Oral (05-15-25 @ 09:17)  carvedilol: 6.25 milliGRAM(s) Oral (05-14-25 @ 21:24)  enalapril: 10 milliGRAM(s) Oral (05-14-25 @ 16:42)  carvedilol: 3.125 milliGRAM(s) Oral (05-14-25 @ 16:42)  spironolactone: 25 milliGRAM(s) Oral (05-14-25 @ 16:41)  carvedilol: 3.125 milliGRAM(s) Oral (05-14-25 @ 06:00)  spironolactone: 25 milliGRAM(s) Oral (05-13-25 @ 17:43)  enalapril: 10 milliGRAM(s) Oral (05-13-25 @ 17:43)  carvedilol: 3.125 milliGRAM(s) Oral (05-13-25 @ 17:43)      MEDICATIONS  (STANDING):  acetaminophen     Tablet .. 650 milliGRAM(s) Oral every 6 hours  ARIPiprazole 5 milliGRAM(s) Oral every 24 hours  aspirin  chewable 81 milliGRAM(s) Oral every 24 hours  atorvastatin 80 milliGRAM(s) Oral at bedtime  atovaquone  Suspension 1500 milliGRAM(s) Oral every 24 hours  carvedilol 6.25 milliGRAM(s) Oral every 12 hours  dextrose 5%. 1000 milliLiter(s) (100 mL/Hr) IV Continuous <Continuous>  dextrose 5%. 1000 milliLiter(s) (50 mL/Hr) IV Continuous <Continuous>  dextrose 50% Injectable 25 Gram(s) IV Push once  dextrose 50% Injectable 12.5 Gram(s) IV Push once  dextrose 50% Injectable 25 Gram(s) IV Push once  dolutegravir 50 milliGRAM(s) Oral every 24 hours  emtricitabine 200 mG/tenofovir alafenamide 25 mG (DESCOVY) Tablet 1 Tablet(s) Oral every 24 hours  enalapril 10 milliGRAM(s) Oral every 24 hours  escitalopram 20 milliGRAM(s) Oral every 24 hours  fluticasone propionate/ salmeterol 250-50 MICROgram(s) Diskus 1 Dose(s) Inhalation two times a day  glucagon  Injectable 1 milliGRAM(s) IntraMuscular once  heparin   Injectable 5000 Unit(s) SubCutaneous every 8 hours  insulin lispro (ADMELOG) corrective regimen sliding scale   SubCutaneous Before meals and at bedtime  spironolactone 25 milliGRAM(s) Oral every 24 hours  tamsulosin 0.4 milliGRAM(s) Oral at bedtime    MEDICATIONS  (PRN):  acetaminophen     Tablet .. 650 milliGRAM(s) Oral every 6 hours PRN Temp greater or equal to 38C (100.4F), Mild Pain (1 - 3)  albuterol/ipratropium for Nebulization 3 milliLiter(s) Nebulizer every 6 hours PRN Wheezing  benzonatate 100 milliGRAM(s) Oral every 8 hours PRN Cough  dextrose Oral Gel 15 Gram(s) Oral once PRN Blood Glucose LESS THAN 70 milliGRAM(s)/deciliter  hydrocodone/homatropine Syrup 5 milliLiter(s) Oral every 6 hours PRN Cough  melatonin 5 milliGRAM(s) Oral at bedtime PRN Sleep      VITALS 24H  T(F): 98.1 (05-15-25 @ 09:16), Max: 98.6 (05-14-25 @ 20:33)  HR: 88 (05-15-25 @ 09:16) (68 - 88)  BP: 150/80 (05-15-25 @ 09:16) (119/68 - 150/80)  BP(mean): 85 (05-15-25 @ 06:03) (85 - 118)  ABP: --  ABP(mean): --  RR: 17 (05-15-25 @ 09:16) (17 - 22)  SpO2: 94% (05-15-25 @ 09:16) (93% - 98%)  CVP(mm Hg): --    I/O Detail 24H    14 May 2025 07:01  -  15 May 2025 07:00  --------------------------------------------------------  IN:    Oral Fluid: 620 mL  Total IN: 620 mL    OUT:  Total OUT: 0 mL    Total NET: 620 mL          PHYSICAL EXAM:  GEN: NAD  HEENT: EOMI   RESP: CTA b/l  CV: RRR. Normal S1/S2. No m/r/g.  ABD: soft, non-distended  EXT: No edema   NEURO: alert and attentive    LABS:  CBC 05-14-25 @ 06:40                        11.7   2.94  )-----------( 105                   35.0     Hgb trend: 11.7 <-- , 12.1 <--   WBC trend: 2.94 <-- , 3.00 <--       CMP 05-14-25 @ 06:40    138  |  106  |  34[H]  ----------------------------<  156[H]  4.3   |  24  |  1.58[H]    Phos  3.5     05-14  Mg     1.7     05-14    TPro  6.3  /  Alb  3.2[L]  /  TBili  0.5  /  DBili  x   /  AST  22  /  ALT  14  /  AlkPhos  66     05-14    Serum Cr (eGFR) trend: 1.58 (45) <-- , 1.56 (46) <-- , 1.27 (59) <-- , 1.49 (49) <--           Cardiac Markers      INTERVAL EVENTS:  -No CP, SOB, or dizziness today  -Patient was moved off tele    Cardiac medications administered in the last 48h:  carvedilol: 6.25 milliGRAM(s) Oral (05-15-25 @ 09:17)  carvedilol: 6.25 milliGRAM(s) Oral (05-14-25 @ 21:24)  enalapril: 10 milliGRAM(s) Oral (05-14-25 @ 16:42)  carvedilol: 3.125 milliGRAM(s) Oral (05-14-25 @ 16:42)  spironolactone: 25 milliGRAM(s) Oral (05-14-25 @ 16:41)  carvedilol: 3.125 milliGRAM(s) Oral (05-14-25 @ 06:00)  spironolactone: 25 milliGRAM(s) Oral (05-13-25 @ 17:43)  enalapril: 10 milliGRAM(s) Oral (05-13-25 @ 17:43)  carvedilol: 3.125 milliGRAM(s) Oral (05-13-25 @ 17:43)      MEDICATIONS  (STANDING):  acetaminophen     Tablet .. 650 milliGRAM(s) Oral every 6 hours  ARIPiprazole 5 milliGRAM(s) Oral every 24 hours  aspirin  chewable 81 milliGRAM(s) Oral every 24 hours  atorvastatin 80 milliGRAM(s) Oral at bedtime  atovaquone  Suspension 1500 milliGRAM(s) Oral every 24 hours  carvedilol 6.25 milliGRAM(s) Oral every 12 hours  dextrose 5%. 1000 milliLiter(s) (100 mL/Hr) IV Continuous <Continuous>  dextrose 5%. 1000 milliLiter(s) (50 mL/Hr) IV Continuous <Continuous>  dextrose 50% Injectable 25 Gram(s) IV Push once  dextrose 50% Injectable 12.5 Gram(s) IV Push once  dextrose 50% Injectable 25 Gram(s) IV Push once  dolutegravir 50 milliGRAM(s) Oral every 24 hours  emtricitabine 200 mG/tenofovir alafenamide 25 mG (DESCOVY) Tablet 1 Tablet(s) Oral every 24 hours  enalapril 10 milliGRAM(s) Oral every 24 hours  escitalopram 20 milliGRAM(s) Oral every 24 hours  fluticasone propionate/ salmeterol 250-50 MICROgram(s) Diskus 1 Dose(s) Inhalation two times a day  glucagon  Injectable 1 milliGRAM(s) IntraMuscular once  heparin   Injectable 5000 Unit(s) SubCutaneous every 8 hours  insulin lispro (ADMELOG) corrective regimen sliding scale   SubCutaneous Before meals and at bedtime  spironolactone 25 milliGRAM(s) Oral every 24 hours  tamsulosin 0.4 milliGRAM(s) Oral at bedtime    MEDICATIONS  (PRN):  acetaminophen     Tablet .. 650 milliGRAM(s) Oral every 6 hours PRN Temp greater or equal to 38C (100.4F), Mild Pain (1 - 3)  albuterol/ipratropium for Nebulization 3 milliLiter(s) Nebulizer every 6 hours PRN Wheezing  benzonatate 100 milliGRAM(s) Oral every 8 hours PRN Cough  dextrose Oral Gel 15 Gram(s) Oral once PRN Blood Glucose LESS THAN 70 milliGRAM(s)/deciliter  hydrocodone/homatropine Syrup 5 milliLiter(s) Oral every 6 hours PRN Cough  melatonin 5 milliGRAM(s) Oral at bedtime PRN Sleep      VITALS 24H  T(F): 98.1 (05-15-25 @ 09:16), Max: 98.6 (05-14-25 @ 20:33)  HR: 88 (05-15-25 @ 09:16) (68 - 88)  BP: 150/80 (05-15-25 @ 09:16) (119/68 - 150/80)  BP(mean): 85 (05-15-25 @ 06:03) (85 - 118)  ABP: --  ABP(mean): --  RR: 17 (05-15-25 @ 09:16) (17 - 22)  SpO2: 94% (05-15-25 @ 09:16) (93% - 98%)  CVP(mm Hg): --    I/O Detail 24H    14 May 2025 07:01  -  15 May 2025 07:00  --------------------------------------------------------  IN:    Oral Fluid: 620 mL  Total IN: 620 mL    OUT:  Total OUT: 0 mL    Total NET: 620 mL          PHYSICAL EXAM:  GEN: NAD  HEENT: EOMI   RESP: CTA b/l  CV: RRR. Normal S1/S2. No m/r/g.  ABD: soft, non-distended  EXT: No edema   NEURO: alert and attentive    LABS:  CBC 05-14-25 @ 06:40                        11.7   2.94  )-----------( 105                   35.0     Hgb trend: 11.7 <-- , 12.1 <--   WBC trend: 2.94 <-- , 3.00 <--       CMP 05-14-25 @ 06:40    138  |  106  |  34[H]  ----------------------------<  156[H]  4.3   |  24  |  1.58[H]    Phos  3.5     05-14  Mg     1.7     05-14    TPro  6.3  /  Alb  3.2[L]  /  TBili  0.5  /  DBili  x   /  AST  22  /  ALT  14  /  AlkPhos  66     05-14    Serum Cr (eGFR) trend: 1.58 (45) <-- , 1.56 (46) <-- , 1.27 (59) <-- , 1.49 (49) <--           Cardiac Markers

## 2025-05-15 NOTE — PROGRESS NOTE ADULT - PROBLEM SELECTOR PLAN 2
#polysubstance use disorder  Pt reporting fall yesterday, fell onto right arm, reporting head strike. CT head negative. Per review of previous records, pt has had multiple falls over many years, often had workup initiated, and would AMA prior to completion of workup. States that his falls have been occuring since MI in 2019, after which he became dizzy with rapid movements. Head CT neg for acute changes, and central causes of vertigo. Seems that patient may have been on eliquis, unclear why, does not know if he has hx of DVT. Jose hallpike/epley negative. Positive orthostatics. Trops clear.   - PT eval  - step down to [facility] after cards clearance  - telemetry showing ectopy but no evidence of afib  - dc eliquis as pt with frequent falls and unclear hx of afib  - SBIRT consult  - nutrition consult  - SW consult Pt with R distal radial fracture 7 in early April, splinted and then placed in short cast by ortho. States he fell on the hand yesterday and has some pain. Also has had some swelling of the hand for a few days. Patient was unable to make his ortho follow ups outpatient. Ortho consulted in ED, they have no concern for compartment at this time.   - elevation for swelling  - pain control: tylenol 650mg q6 standing  - PT/OT  - ortho will remove cast either in hospital or at resident clinic on 4/28-4/29, closer to when pt has had cast for 6 weeks

## 2025-05-15 NOTE — PROGRESS NOTE ADULT - PROBLEM SELECTOR PLAN 1
Pt with R distal radial fracture 7 in early April, splinted and then placed in short cast by ortho. States he fell on the hand yesterday and has some pain. Also has had some swelling of the hand for a few days. Patient was unable to make his ortho follow ups outpatient. Ortho consulted in ED, they have no concern for compartment at this time.   - elevation for swelling  - pain control: tylenol 650mg q6 standing  - PT/OT  - f/u 5/15 with ortho for cast removal and transition to wrist brace #polysubstance use disorder  Pt reporting fall yesterday, fell onto right arm, reporting head strike. CT head negative. Per review of previous records, pt has had multiple falls over many years, often had workup initiated, and would AMA prior to completion of workup. States that his falls have been occuring since MI in 2019, after which he became dizzy with rapid movements. Head CT neg for acute changes, and central causes of vertigo. Seems that patient may have been on eliquis, unclear why, does not know if he has hx of DVT. Jose hallpike/epley negative. Positive orthostatics. Trops clear.   - PT eval  - telemetry showing ectopy but no evidence of afib  - confirm with PCP whether pt has afib and should be on eliquis or not  - SBIRT consult  - nutrition consult  - SW consult

## 2025-05-15 NOTE — PROGRESS NOTE ADULT - PROBLEM SELECTOR PLAN 6
EF 35-40%. Home med: Carvedilol 3.125mg BID. No signs of volume overload on exam, CXR clear, no s/s of acute HF exacerbation  - c/w carvedilol 3.125 mg BID  - c/w enalapril 10mg qd  - started spironolactone 25 qd 5/13 per cards recs

## 2025-05-15 NOTE — PROGRESS NOTE ADULT - PROBLEM SELECTOR PLAN 4
Patient noted to have Cr 1.51, based on review of previous hospitalizations, baseline seems to be 1.2-1.5. Currently producing urine, with no electrolyte abnormalities. Unclear if this is BRENNAN vs CKD. Likely pre-renal etiology given orthostatics and patient admitting poor PO intake. Continue to monitor BRENNAN.   - trend BMP, monitor Cr  - avoid nephrotoxic drugs, renally dose meds  - nutrition consult Patient noted to have Cr 1.51, based on review of previous hospitalizations, baseline seems to be 1.2-1.5. Currently producing urine, with no electrolyte abnormalities. Unclear if this is BRENNAN vs CKD. Likely pre-renal etiology given orthostatics and patient admitting poor PO intake. Continue to monitor BRENNAN.   - trend BMP, monitor Cr  - avoid nephrotoxic drugs, renally dose meds  - nutrition consulted, appreciate recs

## 2025-05-15 NOTE — PROGRESS NOTE ADULT - SUBJECTIVE AND OBJECTIVE BOX
OVERNIGHT EVENTS:    SUBJECTIVE / INTERVAL HPI: Patient seen and examined at bedside.     VITAL SIGNS:  Vital Signs Last 24 Hrs  T(C): 36.6 (15 May 2025 06:03), Max: 37 (14 May 2025 20:33)  T(F): 97.9 (15 May 2025 06:03), Max: 98.6 (14 May 2025 20:33)  HR: 78 (15 May 2025 06:03) (68 - 78)  BP: 119/68 (15 May 2025 06:03) (119/68 - 147/90)  BP(mean): 85 (15 May 2025 06:03) (85 - 118)  RR: 17 (15 May 2025 06:03) (17 - 22)  SpO2: 98% (15 May 2025 06:03) (93% - 98%)    Parameters below as of 15 May 2025 06:03  Patient On (Oxygen Delivery Method): room air      I&O's Summary    14 May 2025 07:01  -  15 May 2025 07:00  --------------------------------------------------------  IN: 620 mL / OUT: 0 mL / NET: 620 mL        PHYSICAL EXAM:  General: WDWN  HEENT: NC/AT; PERRL, anicteric sclera; MMM  Neck: supple  Cardiovascular: +S1/S2; RRR  Respiratory: CTA B/L; no W/R/R  Gastrointestinal: soft, NT/ND; +BSx4  Extremities: WWP; no edema, clubbing or cyanosis  Vascular: 2+ radial, DP/PT pulses B/L  Neurological: AAOx3; no focal deficits    MEDICATIONS:  MEDICATIONS  (STANDING):  acetaminophen     Tablet .. 650 milliGRAM(s) Oral every 6 hours  ARIPiprazole 5 milliGRAM(s) Oral every 24 hours  aspirin  chewable 81 milliGRAM(s) Oral every 24 hours  atorvastatin 80 milliGRAM(s) Oral at bedtime  atovaquone  Suspension 1500 milliGRAM(s) Oral every 24 hours  carvedilol 6.25 milliGRAM(s) Oral every 12 hours  dextrose 5%. 1000 milliLiter(s) (50 mL/Hr) IV Continuous <Continuous>  dextrose 5%. 1000 milliLiter(s) (100 mL/Hr) IV Continuous <Continuous>  dextrose 50% Injectable 25 Gram(s) IV Push once  dextrose 50% Injectable 12.5 Gram(s) IV Push once  dextrose 50% Injectable 25 Gram(s) IV Push once  dolutegravir 50 milliGRAM(s) Oral every 24 hours  emtricitabine 200 mG/tenofovir alafenamide 25 mG (DESCOVY) Tablet 1 Tablet(s) Oral every 24 hours  enalapril 10 milliGRAM(s) Oral every 24 hours  escitalopram 20 milliGRAM(s) Oral every 24 hours  fluticasone propionate/ salmeterol 250-50 MICROgram(s) Diskus 1 Dose(s) Inhalation two times a day  glucagon  Injectable 1 milliGRAM(s) IntraMuscular once  heparin   Injectable 5000 Unit(s) SubCutaneous every 8 hours  insulin lispro (ADMELOG) corrective regimen sliding scale   SubCutaneous Before meals and at bedtime  oxyCODONE    IR 2.5 milliGRAM(s) Oral once  spironolactone 25 milliGRAM(s) Oral every 24 hours  tamsulosin 0.4 milliGRAM(s) Oral at bedtime    MEDICATIONS  (PRN):  acetaminophen     Tablet .. 650 milliGRAM(s) Oral every 6 hours PRN Temp greater or equal to 38C (100.4F), Mild Pain (1 - 3)  albuterol/ipratropium for Nebulization 3 milliLiter(s) Nebulizer every 6 hours PRN Wheezing  benzonatate 100 milliGRAM(s) Oral every 8 hours PRN Cough  dextrose Oral Gel 15 Gram(s) Oral once PRN Blood Glucose LESS THAN 70 milliGRAM(s)/deciliter  hydrocodone/homatropine Syrup 5 milliLiter(s) Oral every 6 hours PRN Cough  melatonin 5 milliGRAM(s) Oral at bedtime PRN Sleep      ALLERGIES:  Allergies    shellfish (Unknown)  sulfa drugs (Rash)  strawberry (Rash)  Peaches (Rash)  penicillins (Rash)    Intolerances        LABS:                        11.7   2.94  )-----------( 105      ( 14 May 2025 06:40 )             35.0     05-14    138  |  106  |  34[H]  ----------------------------<  156[H]  4.3   |  24  |  1.58[H]    Ca    8.3[L]      14 May 2025 06:40  Phos  3.5     05-14  Mg     1.7     05-14    TPro  6.3  /  Alb  3.2[L]  /  TBili  0.5  /  DBili  x   /  AST  22  /  ALT  14  /  AlkPhos  66  05-14      Urinalysis Basic - ( 14 May 2025 06:40 )    Color: x / Appearance: x / SG: x / pH: x  Gluc: 156 mg/dL / Ketone: x  / Bili: x / Urobili: x   Blood: x / Protein: x / Nitrite: x   Leuk Esterase: x / RBC: x / WBC x   Sq Epi: x / Non Sq Epi: x / Bacteria: x      CAPILLARY BLOOD GLUCOSE      POCT Blood Glucose.: 226 mg/dL (14 May 2025 21:41)      RADIOLOGY & ADDITIONAL TESTS: Reviewed.   OVERNIGHT EVENTS: had some hand pain, given tylenol    SUBJECTIVE / INTERVAL HPI: Patient seen and examined at bedside. Reports some pain in hand due to irritation from cast but has no complaints at this time.     VITAL SIGNS:  Vital Signs Last 24 Hrs  T(C): 36.6 (15 May 2025 06:03), Max: 37 (14 May 2025 20:33)  T(F): 97.9 (15 May 2025 06:03), Max: 98.6 (14 May 2025 20:33)  HR: 78 (15 May 2025 06:03) (68 - 78)  BP: 119/68 (15 May 2025 06:03) (119/68 - 147/90)  BP(mean): 85 (15 May 2025 06:03) (85 - 118)  RR: 17 (15 May 2025 06:03) (17 - 22)  SpO2: 98% (15 May 2025 06:03) (93% - 98%)    Parameters below as of 15 May 2025 06:03  Patient On (Oxygen Delivery Method): room air      I&O's Summary    14 May 2025 07:01  -  15 May 2025 07:00  --------------------------------------------------------  IN: 620 mL / OUT: 0 mL / NET: 620 mL        PHYSICAL EXAM:  Constitutional: resting comfortably in bed; NAD, disheveled  HEENT: NC/AT, PERRL, EOMI, anicteric sclera, MMM  Neck: supple; no JVD or thyromegaly  Respiratory: unlabored breathing, CTA B/L; no Rhonchi/Crackles, no retractions or use of accessory muscles   Cardiac: +S1/S2; RRR; no M/R/G  Gastrointestinal: soft, NT/ND; no rebound or guarding; +BSx4  Extremities: WWP, no clubbing or cyanosis; no peripheral edema  Musculoskeletal: R hand/wrist immobilized in short cast, swelling of fingers, intact sensation  Vascular: 2+ radial, DP/PT pulses B/L  Dermatologic: skin warm, dry and intact; no rashes, wounds, or scars  Neurologic: AAOx3; CNII-XII grossly intact; no focal deficits  Psychiatric: affect and characteristics of appearance, verbalizations, behaviors are appropriate, denies SI/HI/AH/VH      MEDICATIONS:  MEDICATIONS  (STANDING):  acetaminophen     Tablet .. 650 milliGRAM(s) Oral every 6 hours  ARIPiprazole 5 milliGRAM(s) Oral every 24 hours  aspirin  chewable 81 milliGRAM(s) Oral every 24 hours  atorvastatin 80 milliGRAM(s) Oral at bedtime  atovaquone  Suspension 1500 milliGRAM(s) Oral every 24 hours  carvedilol 6.25 milliGRAM(s) Oral every 12 hours  dextrose 5%. 1000 milliLiter(s) (50 mL/Hr) IV Continuous <Continuous>  dextrose 5%. 1000 milliLiter(s) (100 mL/Hr) IV Continuous <Continuous>  dextrose 50% Injectable 25 Gram(s) IV Push once  dextrose 50% Injectable 12.5 Gram(s) IV Push once  dextrose 50% Injectable 25 Gram(s) IV Push once  dolutegravir 50 milliGRAM(s) Oral every 24 hours  emtricitabine 200 mG/tenofovir alafenamide 25 mG (DESCOVY) Tablet 1 Tablet(s) Oral every 24 hours  enalapril 10 milliGRAM(s) Oral every 24 hours  escitalopram 20 milliGRAM(s) Oral every 24 hours  fluticasone propionate/ salmeterol 250-50 MICROgram(s) Diskus 1 Dose(s) Inhalation two times a day  glucagon  Injectable 1 milliGRAM(s) IntraMuscular once  heparin   Injectable 5000 Unit(s) SubCutaneous every 8 hours  insulin lispro (ADMELOG) corrective regimen sliding scale   SubCutaneous Before meals and at bedtime  oxyCODONE    IR 2.5 milliGRAM(s) Oral once  spironolactone 25 milliGRAM(s) Oral every 24 hours  tamsulosin 0.4 milliGRAM(s) Oral at bedtime    MEDICATIONS  (PRN):  acetaminophen     Tablet .. 650 milliGRAM(s) Oral every 6 hours PRN Temp greater or equal to 38C (100.4F), Mild Pain (1 - 3)  albuterol/ipratropium for Nebulization 3 milliLiter(s) Nebulizer every 6 hours PRN Wheezing  benzonatate 100 milliGRAM(s) Oral every 8 hours PRN Cough  dextrose Oral Gel 15 Gram(s) Oral once PRN Blood Glucose LESS THAN 70 milliGRAM(s)/deciliter  hydrocodone/homatropine Syrup 5 milliLiter(s) Oral every 6 hours PRN Cough  melatonin 5 milliGRAM(s) Oral at bedtime PRN Sleep      ALLERGIES:  Allergies    shellfish (Unknown)  sulfa drugs (Rash)  strawberry (Rash)  Peaches (Rash)  penicillins (Rash)    Intolerances        LABS:                        11.7   2.94  )-----------( 105      ( 14 May 2025 06:40 )             35.0     05-14    138  |  106  |  34[H]  ----------------------------<  156[H]  4.3   |  24  |  1.58[H]    Ca    8.3[L]      14 May 2025 06:40  Phos  3.5     05-14  Mg     1.7     05-14    TPro  6.3  /  Alb  3.2[L]  /  TBili  0.5  /  DBili  x   /  AST  22  /  ALT  14  /  AlkPhos  66  05-14      Urinalysis Basic - ( 14 May 2025 06:40 )    Color: x / Appearance: x / SG: x / pH: x  Gluc: 156 mg/dL / Ketone: x  / Bili: x / Urobili: x   Blood: x / Protein: x / Nitrite: x   Leuk Esterase: x / RBC: x / WBC x   Sq Epi: x / Non Sq Epi: x / Bacteria: x      CAPILLARY BLOOD GLUCOSE  POCT Blood Glucose.: 226 mg/dL (14 May 2025 21:41)      RADIOLOGY & ADDITIONAL TESTS: Reviewed.

## 2025-05-15 NOTE — PROGRESS NOTE ADULT - PROBLEM SELECTOR PLAN 3
Reports urinary incontinence x5 days, however has had this in the past per prior record review. UA today negative for UTI. Pt not on any medications that could cause frequency, BG wnl. Home med flomax 0.4mg which he has not taken in a few weeks.  - flomax 0.4 mg qd  - will need urology follow up at discharge

## 2025-05-15 NOTE — PROGRESS NOTE ADULT - ASSESSMENT
75M hx HIV/AIDS, CAD (nonobstructive on [facility] 2021), Afib, T2DM, CHF (EF 35-40%), depression/anxiety, prostate ca s/p radiation in 2013, recent admission for recurrent PNA (sign out AMA), R distal radius fracture w/ cast immobilization, urinary incontinence, frequent falls who presents with presyncope and fall.     REVIEW OF STUDIES  ECG 5/12/25: NSR, anteroseptal Q waves, nonspecific ST changes   TTE 3/25/25: LVEF 40% w/RWMA: The basal and mid inferior wall and basal and mid inferolateral wall are akinetic (RCA). RV normal size/function. Mild MR.    [facility] 7/28/21: Indication: Stable angina. R dominant. LM normal. MLAD 30%. LCx MLI. RCA mild atherosclerosis. No stents visualized      #Presyncope  DDx includes othostasis (+ on admission), drug use, and possibly vasovagal. Low suspicion for arrhythmogenic etiology  Orthostatic hypotension has resolved    #HFrEF  -Volume: Euvolemic on exam but would repeat orthostatics today  -GDMT: Noted to have 19 beats of NSVT on tele. In setting of negative orthostatics today would increase coreg to 6.25mg BID. Continue enalapril 10mg QD. C/w spironolactone 25mg QD.     #Afib  -Patient reports history and states he was on eliquis. Not noted on tele so far.  -CHADSVASC is 4  -Obtain collateral from PCP. If confirmed, would resume eliquis.     #Non-obstructive CAD  -Continue aspirin  -Continue Lipitor 80mg 75M hx HIV/AIDS, CAD (nonobstructive on [facility] 2021), Afib, T2DM, CHF (EF 35-40%), depression/anxiety, prostate ca s/p radiation in 2013, recent admission for recurrent PNA (sign out AMA), R distal radius fracture w/ cast immobilization, urinary incontinence, frequent falls who presents with presyncope and fall.     REVIEW OF STUDIES  ECG 5/12/25: NSR, anteroseptal Q waves, nonspecific ST changes   TTE 3/25/25: LVEF 40% w/RWMA: The basal and mid inferior wall and basal and mid inferolateral wall are akinetic (RCA). RV normal size/function. Mild MR.    [facility] 7/28/21: Indication: Stable angina. R dominant. LM normal. MLAD 30%. LCx MLI. RCA mild atherosclerosis. No stents visualized      #Presyncope  DDx includes othostasis (+ on admission), drug use, and possibly vasovagal. Low suspicion for arrhythmogenic etiology  Orthostatic hypotension has resolved    #HFrEF  -Volume: Euvolemic on exam   -GDMT: C/w coreg 6.25mg BID. Continue enalapril 10mg QD. C/w spironolactone 25mg QD.     #HTN  -Given Cr elevation (unclear baseline, may be CKD), would favor adding Imdur 30mg QD today rather than increasing RAASi    #Afib  -Patient reports history and states he was on eliquis. Not noted on tele so far.  -CHADSVASC is 4  -Obtain collateral from PCP. If confirmed, would resume eliquis.     #Non-obstructive CAD  -Continue aspirin  -Continue Lipitor 80mg

## 2025-05-16 ENCOUNTER — TRANSCRIPTION ENCOUNTER (OUTPATIENT)
Age: 75
End: 2025-05-16

## 2025-05-16 LAB
ANION GAP SERPL CALC-SCNC: 8 MMOL/L — SIGNIFICANT CHANGE UP (ref 5–17)
BUN SERPL-MCNC: 34 MG/DL — HIGH (ref 7–23)
CALCIUM SERPL-MCNC: 8.2 MG/DL — LOW (ref 8.4–10.5)
CHLORIDE SERPL-SCNC: 106 MMOL/L — SIGNIFICANT CHANGE UP (ref 96–108)
CO2 SERPL-SCNC: 24 MMOL/L — SIGNIFICANT CHANGE UP (ref 22–31)
CREAT SERPL-MCNC: 1.59 MG/DL — HIGH (ref 0.5–1.3)
CULTURE RESULTS: SIGNIFICANT CHANGE UP
CULTURE RESULTS: SIGNIFICANT CHANGE UP
EGFR: 45 ML/MIN/1.73M2 — LOW
EGFR: 45 ML/MIN/1.73M2 — LOW
GLUCOSE SERPL-MCNC: 140 MG/DL — HIGH (ref 70–99)
HCT VFR BLD CALC: 32.7 % — LOW (ref 39–50)
HGB BLD-MCNC: 10.9 G/DL — LOW (ref 13–17)
MAGNESIUM SERPL-MCNC: 1.8 MG/DL — SIGNIFICANT CHANGE UP (ref 1.6–2.6)
MCHC RBC-ENTMCNC: 33.2 PG — SIGNIFICANT CHANGE UP (ref 27–34)
MCHC RBC-ENTMCNC: 33.3 G/DL — SIGNIFICANT CHANGE UP (ref 32–36)
MCV RBC AUTO: 99.7 FL — SIGNIFICANT CHANGE UP (ref 80–100)
NRBC BLD AUTO-RTO: 0 /100 WBCS — SIGNIFICANT CHANGE UP (ref 0–0)
PHOSPHATE SERPL-MCNC: 3.3 MG/DL — SIGNIFICANT CHANGE UP (ref 2.5–4.5)
PLATELET # BLD AUTO: 109 K/UL — LOW (ref 150–400)
POTASSIUM SERPL-MCNC: 4.1 MMOL/L — SIGNIFICANT CHANGE UP (ref 3.5–5.3)
POTASSIUM SERPL-SCNC: 4.1 MMOL/L — SIGNIFICANT CHANGE UP (ref 3.5–5.3)
RBC # BLD: 3.28 M/UL — LOW (ref 4.2–5.8)
RBC # FLD: 12.9 % — SIGNIFICANT CHANGE UP (ref 10.3–14.5)
SODIUM SERPL-SCNC: 138 MMOL/L — SIGNIFICANT CHANGE UP (ref 135–145)
SPECIMEN SOURCE: SIGNIFICANT CHANGE UP
SPECIMEN SOURCE: SIGNIFICANT CHANGE UP
WBC # BLD: 2.89 K/UL — LOW (ref 3.8–10.5)
WBC # FLD AUTO: 2.89 K/UL — LOW (ref 3.8–10.5)

## 2025-05-16 PROCEDURE — 99233 SBSQ HOSP IP/OBS HIGH 50: CPT

## 2025-05-16 RX ORDER — MAGNESIUM SULFATE 500 MG/ML
2 SYRINGE (ML) INJECTION ONCE
Refills: 0 | Status: COMPLETED | OUTPATIENT
Start: 2025-05-16 | End: 2025-05-16

## 2025-05-16 RX ORDER — LOPERAMIDE HCL 1 MG/7.5ML
2 SOLUTION ORAL EVERY 8 HOURS
Refills: 0 | Status: DISCONTINUED | OUTPATIENT
Start: 2025-05-16 | End: 2025-05-19

## 2025-05-16 RX ADMIN — TAMSULOSIN HYDROCHLORIDE 0.4 MILLIGRAM(S): 0.4 CAPSULE ORAL at 21:54

## 2025-05-16 RX ADMIN — Medication 25 GRAM(S): at 10:20

## 2025-05-16 RX ADMIN — ISOSORBIDE MONONITRATE 30 MILLIGRAM(S): 60 TABLET, EXTENDED RELEASE ORAL at 18:43

## 2025-05-16 RX ADMIN — ATOVAQUONE 1500 MILLIGRAM(S): 750 SUSPENSION ORAL at 18:44

## 2025-05-16 RX ADMIN — ARIPIPRAZOLE 5 MILLIGRAM(S): 2 TABLET ORAL at 11:47

## 2025-05-16 RX ADMIN — Medication 650 MILLIGRAM(S): at 22:30

## 2025-05-16 RX ADMIN — Medication 10 MILLIGRAM(S): at 18:44

## 2025-05-16 RX ADMIN — LOPERAMIDE HCL 2 MILLIGRAM(S): 1 SOLUTION ORAL at 16:33

## 2025-05-16 RX ADMIN — Medication 650 MILLIGRAM(S): at 05:16

## 2025-05-16 RX ADMIN — INSULIN LISPRO 2: 100 INJECTION, SOLUTION INTRAVENOUS; SUBCUTANEOUS at 11:46

## 2025-05-16 RX ADMIN — Medication 650 MILLIGRAM(S): at 21:54

## 2025-05-16 RX ADMIN — Medication 1 DOSE(S): at 05:16

## 2025-05-16 RX ADMIN — Medication 25 MILLIGRAM(S): at 18:44

## 2025-05-16 RX ADMIN — Medication 650 MILLIGRAM(S): at 18:44

## 2025-05-16 RX ADMIN — ATORVASTATIN CALCIUM 80 MILLIGRAM(S): 80 TABLET, FILM COATED ORAL at 21:54

## 2025-05-16 RX ADMIN — CARVEDILOL 6.25 MILLIGRAM(S): 3.12 TABLET, FILM COATED ORAL at 21:54

## 2025-05-16 RX ADMIN — HEPARIN SODIUM 5000 UNIT(S): 1000 INJECTION INTRAVENOUS; SUBCUTANEOUS at 05:16

## 2025-05-16 RX ADMIN — Medication 81 MILLIGRAM(S): at 11:50

## 2025-05-16 RX ADMIN — Medication 100 MILLIGRAM(S): at 02:42

## 2025-05-16 RX ADMIN — Medication 5 MILLIGRAM(S): at 21:54

## 2025-05-16 RX ADMIN — CARVEDILOL 6.25 MILLIGRAM(S): 3.12 TABLET, FILM COATED ORAL at 10:21

## 2025-05-16 RX ADMIN — ESCITALOPRAM OXALATE 20 MILLIGRAM(S): 20 TABLET ORAL at 11:46

## 2025-05-16 RX ADMIN — Medication 650 MILLIGRAM(S): at 10:20

## 2025-05-16 RX ADMIN — DOLUTEGRAVIR SODIUM 50 MILLIGRAM(S): 5 TABLET, FOR SUSPENSION ORAL at 18:43

## 2025-05-16 RX ADMIN — Medication 1 TABLET(S): at 18:43

## 2025-05-16 RX ADMIN — HEPARIN SODIUM 5000 UNIT(S): 1000 INJECTION INTRAVENOUS; SUBCUTANEOUS at 11:46

## 2025-05-16 RX ADMIN — HEPARIN SODIUM 5000 UNIT(S): 1000 INJECTION INTRAVENOUS; SUBCUTANEOUS at 21:54

## 2025-05-16 NOTE — DISCHARGE NOTE PROVIDER - NSDCFUADDAPPT_GEN_ALL_CORE_FT
(1) PCP    (2) Dr. Joseph Ibarra  [facility], 3rd Floor  [address] [place] [address], 3rd Floor, [place] [address]  [phone]    (3) Urology    (4) Ortho (1) PCP    (2) Please follow up with Dr. Joseph Ibarra (CARDIOLOGY) on May 28, 2025 at 9:30 AM as scheduled. At this appointment, it is important to discuss holter monitor with your provider.   Location: [facility] at [facility], 3rd [address], 3rd [place] [address]  Phone: [phone]    (3) Urology    (4) Ortho (1) Please follow up with your PCP, Dr. Paul Noguera as scheduled.    (2) Please follow up with Dr. Joseph Ibarra (CARDIOLOGY) on May 28, 2025 at 9:30 AM as scheduled. At this appointment, it is important to discuss holter monitor with your provider.   Location: [facility] at [facility], 3rd [address], 3rd [place] [address]  Phone: [phone]    (3) Please follow up with (UROLOGY) on May 29, 2025 at 11:30am as scheduled for your urinary incontinence.   Location: [address] [place] [address], 5th Floor  [place] [address]  Phone: [phone]    (4) Please go to [facility] to get your cast removed on 5/29 at any time after 12pm (you can go after your urology appointment).  Location: [address] [place] [address]  **This clinic is open 12pm onward on Thursdays. The phone number is [phone], should you have any questions. For the cast removal, you do not need an appointment, you are able to just walk in.

## 2025-05-16 NOTE — DISCHARGE NOTE PROVIDER - CARE PROVIDER_API CALL
Joseph Ibarra  Cardiovascular Disease  [address] [place] [address], [address] 3  [place] [address]  Phone: [phone]  Fax: [phone]  Scheduled Appointment: 05/28/2025 09:30 AM

## 2025-05-16 NOTE — DISCHARGE NOTE PROVIDER - HOSPITAL COURSE
#Discharge: do not delete    Hospital Course:  75M hx HIV/AIDS, CAD, T2DM, CHF (EF35-40%), depression/anxiety, prostate ca s/p radiation in 2013, recent admission for recurrent PNA (sign out AMA), R distal radius fracture w/ cast immobilization, urinary incontinence, frequent falls presenting for multiple complaints. Admitted for inability to take medications on his own, urinary incontinence, dizziness.     Hospital course (by problem):  shania: brouhght in trintillex 20mg am, vilazidone 40mg, buspirone 9am and PM meds   jakobsone: went up on heparin  strougo: zyprexa 2.5      #falls  Pt reporting fall yesterday, fell onto right arm, reporting head strike. CT head negative. Per review of previous records, pt has had multiple falls over many years, often had workup initiated, and would AMA prior to completion of workup. States that his falls have been occuring since MI in 2019, after which he became dizzy with rapid movements. Head CT neg for acute changes, and central causes of vertigo. Seems that patient may have been on eliquis, unclear why, does not know if he has hx of DVT. [place] hallpike/epley negative. Positive orthostatics. Trops clear.   - dc to [facility]    #Distal radius fracture, right.   Pt with R distal radial fracture 7 in early April, splinted and then placed in short cast by ortho. States he fell on the hand yesterday and has some pain. Also has had some swelling of the hand for a few days. Patient was unable to make his ortho follow ups outpatient. Ortho consulted in ED, they have no concern for compartment at this time.   - elevation for swelling  - ortho will remove cast 4/28-4/29, closer to when pt has had cast for 6 weeks    #urinary incontinence   Reports urinary incontinence x5 days, however has had this in the past per prior record review. UA today negative for UTI. Pt not on any medications that could cause frequency, BG wnl. Home med flomax 0.4mg which he has not taken in a few weeks.  - continue flomax 0.4 mg qd  - urology follow up outpatient    #BRENNAN   Patient noted to have Cr 1.51, based on review of previous hospitalizations, baseline seems to be 1.2-1.5. Currently producing urine, with no electrolyte abnormalities. Unclear if this is BRENNAN vs CKD. Likely pre-renal etiology given orthostatics and patient admitting poor PO intake. Continue to monitor BRENNAN. Cr increased 1.5-->1.8 on 5/18, unclear etiology, however improved back to baseline   - resolved, NTD    #HIV  (CD4 74, VL 4516 23/2025). On dolutegravir (Tivicay) and emtricitabine-tenofovir (Descovy) and Atovaquone. Previously took these medications regularly, however has not taken in 2-3 weeks as he is unable to open pill bottles due to hand fracture. Viral load undetectable.   - c/w Atovaquone   - c/w home med Tivicay and Descovy per ID.    #chronic systolic CHF  EF 35-40%. Home med: Carvedilol 3.125mg BID. No signs of volume overload on exam, CXR clear, no s/s of acute HF exacerbation  - c/w carvedilol 12.5mg BID  - c/w enalapril 10mg qd  - started spironolactone 25 qd 5/13 per cards recs  - outpatient cardiology follow up    #Type 2 diabetes mellitus.   Home med per prior chart review: metformin 500mg, linagliptin 5mg qd. A1c 5.1% this admission.  - PCP follow up    #HTN  Home med: enalapril 10mg, coreg 3.125mg BID  - c/w home meds    #CAD  Hx of prior MI, denies stents in heart. On home asa 81mg and lipitor 80  - c/w home meds.    #COPD without exacerbation  Patient with history of COPD. On home symbicort inhaler, however reports that he does not take it because it makes him short of breath and has not picked up from pharmacy. Given duonebs x3 in ED, currently without wheezing on physical exam. Does not appear to be in exacerbation, no increased cough, shortness of breath, sputum production.  - initially had continued with home med however pt asked for it to be dc because it did not work to help his symptoms and made him short of breath      #Anxiety and depression.   History of anxiety/depression. Home medications: aripiprazole 5mg qd, escitalopram 20mg qd  - continue home meds.      Patient was discharged to: [facility]    New medications: --  Changes to old medications: --  Medications that were stopped: --    Items to follow up as outpatient:   - PCP  - urology  - orthopedics  - cardiology    Physical exam at the time of discharge: NAD, AOx3, RRR, CTAB, R hand/wrist immobilized in short cast, swelling of fingers, intact sensation, no LE edema    Patient was medically optimized, stable and ready for discharge. Plan of care and return precautions were discussed with the patient who verbally stated understanding. On the day of discharge, the patient was seen and examined. Symptoms improved. Vital signs are stable. Labs and imaging reviewed. Patient is medically optimized and hemodynamically stable. Return precautions discussed, medication teach back done, and importance of physician follow up emphasized. The patient verbalized understanding.   #Discharge: do not delete    Hospital Course:  75M hx HIV/AIDS, CAD, T2DM, CHF (EF35-40%), depression/anxiety, prostate ca s/p radiation in 2013, recent admission for recurrent PNA (sign out AMA), R distal radius fracture w/ cast immobilization, urinary incontinence, frequent falls presenting for multiple complaints. Admitted for inability to take medications on his own, urinary incontinence, dizziness.     Hospital course (by problem):  shania: brouhght in trintillex 20mg am, vilazidone 40mg, buspirone 9am and PM meds   jakobsone: went up on heparin  strougo: zyprexa 2.5      #falls  Pt reporting fall yesterday, fell onto right arm, reporting head strike. CT head negative. Per review of previous records, pt has had multiple falls over many years, often had workup initiated, and would AMA prior to completion of workup. States that his falls have been occuring since MI in 2019, after which he became dizzy with rapid movements. Head CT neg for acute changes, and central causes of vertigo. Seems that patient may have been on eliquis, unclear why, does not know if he has hx of DVT. [place] hallpike/epley negative. Positive orthostatics. Trops clear.   - dc to [facility]    #Distal radius fracture, right.   Pt with R distal radial fracture 7 in early April, splinted and then placed in short cast by ortho. States he fell on the hand yesterday and has some pain. Also has had some swelling of the hand for a few days. Patient was unable to make his ortho follow ups outpatient. Ortho consulted in ED, they have no concern for compartment at this time.   - elevation for swelling  - ortho will remove cast 4/28-4/29, closer to when pt has had cast for 6 weeks    #urinary incontinence   Reports urinary incontinence x5 days, however has had this in the past per prior record review. UA today negative for UTI. Pt not on any medications that could cause frequency, BG wnl. Home med flomax 0.4mg which he has not taken in a few weeks.  - continue flomax 0.4 mg qd  - urology follow up outpatient    #BRENNAN   Patient noted to have Cr 1.51, based on review of previous hospitalizations, baseline seems to be 1.2-1.5. Currently producing urine, with no electrolyte abnormalities. Unclear if this is BRENNAN vs CKD. Likely pre-renal etiology given orthostatics and patient admitting poor PO intake. Continue to monitor BRENNAN. Cr increased 1.5-->1.8 on 5/18, unclear etiology, however improved back to baseline   - resolved, NTD    #HIV  (CD4 74, VL 4516 23/2025). On dolutegravir (Tivicay) and emtricitabine-tenofovir (Descovy) and Atovaquone. Previously took these medications regularly, however has not taken in 2-3 weeks as he is unable to open pill bottles due to hand fracture. Viral load undetectable.   - c/w Atovaquone   - c/w home med Tivicay and Descovy per ID.    #chronic systolic CHF  EF 35-40%. Home med: Carvedilol 3.125mg BID. No signs of volume overload on exam, CXR clear, no s/s of acute HF exacerbation  - c/w carvedilol 12.5mg BID  - c/w enalapril 10mg qd  - started spironolactone 25 qd 5/13 per cards recs  - outpatient cardiology follow up    #Type 2 diabetes mellitus.   Home med per prior chart review: metformin 500mg, linagliptin 5mg qd. A1c 5.1% this admission.  - PCP follow up    #HTN  Home med: enalapril 10mg, coreg 3.125mg BID  - c/w home meds    #CAD  Hx of prior MI, denies stents in heart. On home asa 81mg and lipitor 80  - c/w home meds.    #COPD without exacerbation  Patient with history of COPD. On home symbicort inhaler, however reports that he does not take it because it makes him short of breath and has not picked up from pharmacy. Given duonebs x3 in ED, currently without wheezing on physical exam. Does not appear to be in exacerbation, no increased cough, shortness of breath, sputum production.  - initially had continued with home med however pt asked for it to be dc because it did not work to help his symptoms and made him short of breath    #AFib  unclear if pt has history of AFib, eliquis seen on surescripts, however, not part of med rec with patient's pharmacy. No AFib seen while pt on tele.  - advised pt to have discussion with PCP and cardiologist about resuming AC as appropriate, whether or not he needs holter monitor, etc.    #Anxiety and depression.   History of anxiety/depression. Home medications: aripiprazole 5mg qd, escitalopram 20mg qd  - continue home meds.      Patient was discharged to: [facility]    New medications: --  Changes to old medications: --  Medications that were stopped: --    Items to follow up as outpatient:   - PCP  - urology  - orthopedics  - cardiology    Physical exam at the time of discharge: NAD, AOx3, RRR, CTAB, R hand/wrist immobilized in short cast, swelling of fingers, intact sensation, no LE edema    Patient was medically optimized, stable and ready for discharge. Plan of care and return precautions were discussed with the patient who verbally stated understanding. On the day of discharge, the patient was seen and examined. Symptoms improved. Vital signs are stable. Labs and imaging reviewed. Patient is medically optimized and hemodynamically stable. Return precautions discussed, medication teach back done, and importance of physician follow up emphasized. The patient verbalized understanding.

## 2025-05-16 NOTE — PROGRESS NOTE ADULT - PROBLEM SELECTOR PLAN 4
Patient noted to have Cr 1.51, based on review of previous hospitalizations, baseline seems to be 1.2-1.5. Currently producing urine, with no electrolyte abnormalities. Unclear if this is BRENNAN vs CKD. Likely pre-renal etiology given orthostatics and patient admitting poor PO intake. Continue to monitor BRENNAN.   - trend BMP, monitor Cr  - avoid nephrotoxic drugs, renally dose meds  - nutrition consulted, appreciate recs

## 2025-05-16 NOTE — PROGRESS NOTE ADULT - PROBLEM SELECTOR PLAN 2
Pt with R distal radial fracture 7 in early April, splinted and then placed in short cast by ortho. States he fell on the hand yesterday and has some pain. Also has had some swelling of the hand for a few days. Patient was unable to make his ortho follow ups outpatient. Ortho consulted in ED, they have no concern for compartment at this time.   - elevation for swelling  - pain control: tylenol 650mg q6 standing  - PT/OT  - ortho will remove cast either in hospital or at resident clinic on 4/28-4/29, closer to when pt has had cast for 6 weeks

## 2025-05-16 NOTE — DISCHARGE NOTE PROVIDER - NSDCFUSCHEDAPPT_GEN_ALL_CORE_FT
Joseph Ibarar  [facility]  [facility] [address]  Scheduled Appointment: 05/28/2025     Joseph Ibarra  [facility]  HEARTVASC [address]  Scheduled Appointment: 05/28/2025    Carlos Robles  [facility]  UROLOGY [address]  Scheduled Appointment: 05/29/2025     [facility] On license of [facility]  ORTHOSURG  [address]  Scheduled Appointment: 06/05/2025

## 2025-05-16 NOTE — DISCHARGE NOTE PROVIDER - NSDCCPCAREPLAN_GEN_ALL_CORE_FT
PRINCIPAL DISCHARGE DIAGNOSIS  Diagnosis: Fall, accidental  Assessment and Plan of Treatment: You were admitted for evaluation of falls. It seems that your falls are not coming from a cardiac cause which is very reassuring, however, physical therapy's evaluation of you was that you need further care at a sub acute rehabilitation facility to get stronger. Please make sure that you are getting stronger at subacute rehab as well as making your appointments.     PRINCIPAL DISCHARGE DIAGNOSIS  Diagnosis: Fall, accidental  Assessment and Plan of Treatment: You were admitted for evaluation of falls. It seems that your falls are not coming from a cardiac cause which is very reassuring, however, physical therapy's evaluation of you was that you need further care at a sub acute rehabilitation facility to get stronger. Please make sure that you are getting stronger at subacute rehab as well as making your appointments.      SECONDARY DISCHARGE DIAGNOSES  Diagnosis: Atrial fibrillation  Assessment and Plan of Treatment: There was some uncertainty as to whether or not you have atrial fibrillation. While you were in the hospital, there were a few days that your heart was monitored and you never had episodes of AFib then. We suggest that you follow up with Dr. Noguera and discuss with him whether or not you were on anticoagulation (or a blood thinner) for AFib and whether or not you should continue it as it seems that Dr. Noguera would have more documentation regarding your medical history.  We also advise that at your Cardiology appointment, you bring up to the cardiologist the idea of wearing a holter monitor and whether that would be indicated in your case.

## 2025-05-16 NOTE — DISCHARGE NOTE PROVIDER - NSDCMRMEDTOKEN_GEN_ALL_CORE_FT
Today the [facility] data was reviewed by myself and there is no evidence of aberrant behavior.      Dated 4/17     ARIPiprazole 5 mg oral tablet: 1 tab(s) orally once a day  aspirin 81 mg oral delayed release tablet: 1 tab(s) orally once a day  atorvastatin 80 mg oral tablet: 1 tab(s) orally once a day (at bedtime) TAKE 1 tablet once a day  atovaquone 750 mg/5 mL oral suspension: 10 milliliter(s) orally once a day  carvedilol 3.125 mg oral tablet: 1 tab(s) orally 2 times a day  Descovy 200 mg-25 mg oral tablet: 1 tab(s) orally once a day  enalapril 10 mg oral tablet: 1 tab(s) orally once a day  escitalopram 20 mg oral tablet: 1 tab(s) orally once a day  gabapentin 300 mg oral tablet: 300 milligram(s) orally 2 times a day  Symbicort 160 mcg-4.5 mcg/inh inhalation aerosol: 2 puff(s) inhaled once a day  tamsulosin 0.4 mg oral capsule: 1 cap(s) orally once a day (at bedtime)  Tivicay 50 mg oral tablet: 1 tab(s) orally once a day   acetaminophen 325 mg oral tablet: 3 tab(s) orally every 6 hours As needed Moderate Pain (4 - 6)  ARIPiprazole 5 mg oral tablet: 1 tab(s) orally once a day  aspirin 81 mg oral delayed release tablet: 1 tab(s) orally once a day  atorvastatin 80 mg oral tablet: 1 tab(s) orally once a day (at bedtime) TAKE 1 tablet once a day  atovaquone 750 mg/5 mL oral suspension: 10 milliliter(s) orally once a day  carvedilol 3.125 mg oral tablet: 1 tab(s) orally 2 times a day  Descovy 200 mg-25 mg oral tablet: 1 tab(s) orally once a day  enalapril 10 mg oral tablet: 1 tab(s) orally once a day  escitalopram 20 mg oral tablet: 1 tab(s) orally once a day  gabapentin 300 mg oral tablet: 300 milligram(s) orally 2 times a day  isosorbide mononitrate 30 mg oral tablet, extended release: 1 tab(s) orally every 24 hours  spironolactone 25 mg oral tablet: 1 tab(s) orally every 24 hours  Symbicort 160 mcg-4.5 mcg/inh inhalation aerosol: 2 puff(s) inhaled once a day  tamsulosin 0.4 mg oral capsule: 1 cap(s) orally once a day (at bedtime)  Tivicay 50 mg oral tablet: 1 tab(s) orally once a day

## 2025-05-16 NOTE — DISCHARGE NOTE PROVIDER - ATTENDING DISCHARGE PHYSICAL EXAMINATION:
75M with PMH of HIV/AIDS, DM2, CAD, HFrEF, depression and anxiety, presenting with recurrent falls.  Now medically ready for discharge.     NAD, sitting up in bed  ncat, mmm  no mrg  ctab, no wheeze  soft, ntnd  R arm in cast  speech fluent, no acute deficits noted    - agree with documentation above  - medically ready for discharge to [facility] today  - has scheduled PCP follow up on 5/21 - can address eliquis dosing at that time - team unable to confirm with office after multiple attempts to contact PCP  - outpatient orthopedic follow up     Time spent on the encounter excludes teaching and separately reported services 75M with PMH of HIV/AIDS, DM2, CAD, HFrEF, depression and anxiety, presenting with recurrent falls.  Now medically ready for discharge.     NAD, sitting up in bed  ncat, mmm  no mrg  ctab, no wheeze  soft, ntnd  R arm in cast  speech fluent, no acute deficits noted    - agree with documentation above  - medically ready for discharge to [facility] today  - has scheduled PCP follow up on 5/21 - can address eliquis dosing at that time - team unable to confirm with office after multiple attempts to contact PCP  - outpatient orthopedic follow up     fall and dizziness likely due to both cocaine use and orthostatic hypotension.     Time spent on the encounter excludes teaching and separately reported services

## 2025-05-16 NOTE — PROGRESS NOTE ADULT - ASSESSMENT
75M hx HIV/AIDS, CAD (nonobstructive on [facility] 2021), Afib, T2DM, CHF (EF 35-40%), depression/anxiety, prostate ca s/p radiation in 2013, recent admission for recurrent PNA (sign out AMA), R distal radius fracture w/ cast immobilization, urinary incontinence, frequent falls who presents with presyncope and fall.     REVIEW OF STUDIES  ECG 5/12/25: NSR, anteroseptal Q waves, nonspecific ST changes   TTE 3/25/25: LVEF 40% w/RWMA: The basal and mid inferior wall and basal and mid inferolateral wall are akinetic (RCA). RV normal size/function. Mild MR.    [facility] 7/28/21: Indication: Stable angina. R dominant. LM normal. MLAD 30%. LCx MLI. RCA mild atherosclerosis. No stents visualized      #Presyncope  DDx includes othostasis (+ on admission), drug use, and possibly vasovagal. Low suspicion for arrhythmogenic etiology  Orthostatic hypotension has resolved    #HFrEF  -Volume: Euvolemic on exam   -GDMT: If SBP consistently > 140, increase coreg to 12.5mg BID. Continue enalapril 10mg QD. C/w spironolactone 25mg QD.     #HTN  -C/w Imdur    #Afib  -Patient reports history and states he was on eliquis. Not not in house  -[facility] is 4  -Obtain collateral from PCP. If confirmed, would resume eliquis.     #Non-obstructive CAD  -Continue aspirin  -Continue Lipitor 80mg    Patient has medicare and can follow up with a [facility] cardiologist. His new housing will be on [address] He should follow-up with:  Dr. Joseph Ibarra  [facility] at [facility], 3rd Floor  [address] [place] [address], 3rd Floor, [place] [address]  [phone]

## 2025-05-16 NOTE — DISCHARGE NOTE PROVIDER - NSCORESITESY/N_GEN_A_CORE_RD
No Detail Level: Zone Note Text (......Xxx Chief Complaint.): This diagnosis correlates with the Other (Free Text): Patient will observe lesion

## 2025-05-16 NOTE — PROGRESS NOTE ADULT - PROBLEM SELECTOR PLAN 1
#polysubstance use disorder  Pt reporting fall yesterday, fell onto right arm, reporting head strike. CT head negative. Per review of previous records, pt has had multiple falls over many years, often had workup initiated, and would AMA prior to completion of workup. States that his falls have been occuring since MI in 2019, after which he became dizzy with rapid movements. Head CT neg for acute changes, and central causes of vertigo. Seems that patient may have been on eliquis, unclear why, does not know if he has hx of DVT. [place] hallpike/epley negative. Positive orthostatics. Trops clear.   - PT eval  - telemetry showing ectopy but no evidence of afib  - confirm with PCP whether pt has afib and should be on eliquis or not  - SBIRT consult  - nutrition consult  - SW consult #polysubstance use disorder  Pt reporting fall yesterday, fell onto right arm, reporting head strike. CT head negative. Per review of previous records, pt has had multiple falls over many years, often had workup initiated, and would AMA prior to completion of workup. States that his falls have been occuring since MI in 2019, after which he became dizzy with rapid movements. Head CT neg for acute changes, and central causes of vertigo. Seems that patient may have been on eliquis, unclear why, does not know if he has hx of DVT. [place] hallpike/epley negative. Positive orthostatics. Trops clear.   - PT eval  - telemetry showing ectopy but no evidence of afib  - nutrition consult  - SW consult  - outpatient cardiology follow up

## 2025-05-16 NOTE — PROGRESS NOTE ADULT - SUBJECTIVE AND OBJECTIVE BOX
OVERNIGHT EVENTS:    SUBJECTIVE / INTERVAL HPI: Patient seen and examined at bedside.     VITAL SIGNS:  Vital Signs Last 24 Hrs  T(C): 36.4 (16 May 2025 05:52), Max: 36.9 (15 May 2025 20:42)  T(F): 97.6 (16 May 2025 05:52), Max: 98.4 (15 May 2025 20:42)  HR: 55 (16 May 2025 05:52) (55 - 88)  BP: 117/75 (16 May 2025 05:52) (117/65 - 150/80)  BP(mean): 89 (16 May 2025 05:52) (82 - 89)  RR: 18 (16 May 2025 05:52) (17 - 18)  SpO2: 92% (16 May 2025 05:52) (92% - 94%)    Parameters below as of 16 May 2025 05:52  Patient On (Oxygen Delivery Method): room air      I&O's Summary      PHYSICAL EXAM:  General: WDWN  HEENT: NC/AT; PERRL, anicteric sclera; MMM  Neck: supple  Cardiovascular: +S1/S2; RRR  Respiratory: CTA B/L; no W/R/R  Gastrointestinal: soft, NT/ND; +BSx4  Extremities: WWP; no edema, clubbing or cyanosis  Vascular: 2+ radial, DP/PT pulses B/L  Neurological: AAOx3; no focal deficits    MEDICATIONS:  MEDICATIONS  (STANDING):  acetaminophen     Tablet .. 650 milliGRAM(s) Oral every 6 hours  ARIPiprazole 5 milliGRAM(s) Oral every 24 hours  aspirin  chewable 81 milliGRAM(s) Oral every 24 hours  atorvastatin 80 milliGRAM(s) Oral at bedtime  atovaquone  Suspension 1500 milliGRAM(s) Oral every 24 hours  carvedilol 6.25 milliGRAM(s) Oral every 12 hours  dextrose 5%. 1000 milliLiter(s) (50 mL/Hr) IV Continuous <Continuous>  dextrose 5%. 1000 milliLiter(s) (100 mL/Hr) IV Continuous <Continuous>  dextrose 50% Injectable 25 Gram(s) IV Push once  dextrose 50% Injectable 12.5 Gram(s) IV Push once  dextrose 50% Injectable 25 Gram(s) IV Push once  dolutegravir 50 milliGRAM(s) Oral every 24 hours  emtricitabine 200 mG/tenofovir alafenamide 25 mG (DESCOVY) Tablet 1 Tablet(s) Oral every 24 hours  enalapril 10 milliGRAM(s) Oral every 24 hours  escitalopram 20 milliGRAM(s) Oral every 24 hours  fluticasone propionate/ salmeterol 250-50 MICROgram(s) Diskus 1 Dose(s) Inhalation two times a day  glucagon  Injectable 1 milliGRAM(s) IntraMuscular once  heparin   Injectable 5000 Unit(s) SubCutaneous every 8 hours  insulin lispro (ADMELOG) corrective regimen sliding scale   SubCutaneous Before meals and at bedtime  isosorbide   mononitrate ER Tablet (IMDUR) 30 milliGRAM(s) Oral every 24 hours  spironolactone 25 milliGRAM(s) Oral every 24 hours  tamsulosin 0.4 milliGRAM(s) Oral at bedtime    MEDICATIONS  (PRN):  albuterol/ipratropium for Nebulization 3 milliLiter(s) Nebulizer every 6 hours PRN Wheezing  benzonatate 100 milliGRAM(s) Oral every 8 hours PRN Cough  dextrose Oral Gel 15 Gram(s) Oral once PRN Blood Glucose LESS THAN 70 milliGRAM(s)/deciliter  hydrocodone/homatropine Syrup 5 milliLiter(s) Oral every 6 hours PRN Cough  melatonin 5 milliGRAM(s) Oral at bedtime PRN Sleep      ALLERGIES:  Allergies    shellfish (Unknown)  sulfa drugs (Rash)  strawberry (Rash)  Peaches (Rash)  penicillins (Rash)    Intolerances        LABS:                        10.9   2.89  )-----------( 109      ( 16 May 2025 06:30 )             32.7     05-16    138  |  106  |  34[H]  ----------------------------<  140[H]  4.1   |  24  |  1.59[H]    Ca    8.2[L]      16 May 2025 06:30  Phos  3.3     05-16  Mg     1.8     05-16        Urinalysis Basic - ( 16 May 2025 06:30 )    Color: x / Appearance: x / SG: x / pH: x  Gluc: 140 mg/dL / Ketone: x  / Bili: x / Urobili: x   Blood: x / Protein: x / Nitrite: x   Leuk Esterase: x / RBC: x / WBC x   Sq Epi: x / Non Sq Epi: x / Bacteria: x      CAPILLARY BLOOD GLUCOSE      POCT Blood Glucose.: 233 mg/dL (15 May 2025 21:37)      RADIOLOGY & ADDITIONAL TESTS: Reviewed.   OVERNIGHT EVENTS: no overnight events    SUBJECTIVE / INTERVAL HPI: Patient seen and examined at bedside. States he still has some hand pain, denies any other symptoms including palpitations, chest pain, sob, f/c.    VITAL SIGNS:  Vital Signs Last 24 Hrs  T(C): 36.4 (16 May 2025 05:52), Max: 36.9 (15 May 2025 20:42)  T(F): 97.6 (16 May 2025 05:52), Max: 98.4 (15 May 2025 20:42)  HR: 55 (16 May 2025 05:52) (55 - 88)  BP: 117/75 (16 May 2025 05:52) (117/65 - 150/80)  BP(mean): 89 (16 May 2025 05:52) (82 - 89)  RR: 18 (16 May 2025 05:52) (17 - 18)  SpO2: 92% (16 May 2025 05:52) (92% - 94%)    Parameters below as of 16 May 2025 05:52  Patient On (Oxygen Delivery Method): room air      I&O's Summary      PHYSICAL EXAM:  Constitutional: resting comfortably in bed; NAD, disheveled  HEENT: NC/AT, PERRL, EOMI, anicteric sclera, MMM  Neck: supple; no JVD or thyromegaly  Respiratory: unlabored breathing, CTA B/L; no Rhonchi/Crackles, no retractions or use of accessory muscles   Cardiac: +S1/S2; RRR; no M/R/G  Gastrointestinal: soft, NT/ND; no rebound or guarding; +BSx4  Extremities: WWP, no clubbing or cyanosis; no peripheral edema  Musculoskeletal: R hand/wrist immobilized in short cast, swelling of fingers, intact sensation  Vascular: 2+ radial, DP/PT pulses B/L  Dermatologic: skin warm, dry and intact; no rashes, wounds, or scars  Neurologic: AAOx3; CNII-XII grossly intact; no focal deficits  Psychiatric: affect and characteristics of appearance, verbalizations, behaviors are appropriate, denies SI/HI/AH/VH    MEDICATIONS:  MEDICATIONS  (STANDING):  acetaminophen     Tablet .. 650 milliGRAM(s) Oral every 6 hours  ARIPiprazole 5 milliGRAM(s) Oral every 24 hours  aspirin  chewable 81 milliGRAM(s) Oral every 24 hours  atorvastatin 80 milliGRAM(s) Oral at bedtime  atovaquone  Suspension 1500 milliGRAM(s) Oral every 24 hours  carvedilol 6.25 milliGRAM(s) Oral every 12 hours  dextrose 5%. 1000 milliLiter(s) (50 mL/Hr) IV Continuous <Continuous>  dextrose 5%. 1000 milliLiter(s) (100 mL/Hr) IV Continuous <Continuous>  dextrose 50% Injectable 25 Gram(s) IV Push once  dextrose 50% Injectable 12.5 Gram(s) IV Push once  dextrose 50% Injectable 25 Gram(s) IV Push once  dolutegravir 50 milliGRAM(s) Oral every 24 hours  emtricitabine 200 mG/tenofovir alafenamide 25 mG (DESCOVY) Tablet 1 Tablet(s) Oral every 24 hours  enalapril 10 milliGRAM(s) Oral every 24 hours  escitalopram 20 milliGRAM(s) Oral every 24 hours  fluticasone propionate/ salmeterol 250-50 MICROgram(s) Diskus 1 Dose(s) Inhalation two times a day  glucagon  Injectable 1 milliGRAM(s) IntraMuscular once  heparin   Injectable 5000 Unit(s) SubCutaneous every 8 hours  insulin lispro (ADMELOG) corrective regimen sliding scale   SubCutaneous Before meals and at bedtime  isosorbide   mononitrate ER Tablet (IMDUR) 30 milliGRAM(s) Oral every 24 hours  spironolactone 25 milliGRAM(s) Oral every 24 hours  tamsulosin 0.4 milliGRAM(s) Oral at bedtime    MEDICATIONS  (PRN):  albuterol/ipratropium for Nebulization 3 milliLiter(s) Nebulizer every 6 hours PRN Wheezing  benzonatate 100 milliGRAM(s) Oral every 8 hours PRN Cough  dextrose Oral Gel 15 Gram(s) Oral once PRN Blood Glucose LESS THAN 70 milliGRAM(s)/deciliter  hydrocodone/homatropine Syrup 5 milliLiter(s) Oral every 6 hours PRN Cough  melatonin 5 milliGRAM(s) Oral at bedtime PRN Sleep      ALLERGIES:  Allergies    shellfish (Unknown)  sulfa drugs (Rash)  strawberry (Rash)  Peaches (Rash)  penicillins (Rash)    Intolerances        LABS:                        10.9   2.89  )-----------( 109      ( 16 May 2025 06:30 )             32.7     05-16    138  |  106  |  34[H]  ----------------------------<  140[H]  4.1   |  24  |  1.59[H]    Ca    8.2[L]      16 May 2025 06:30  Phos  3.3     05-16  Mg     1.8     05-16        Urinalysis Basic - ( 16 May 2025 06:30 )    Color: x / Appearance: x / SG: x / pH: x  Gluc: 140 mg/dL / Ketone: x  / Bili: x / Urobili: x   Blood: x / Protein: x / Nitrite: x   Leuk Esterase: x / RBC: x / WBC x   Sq Epi: x / Non Sq Epi: x / Bacteria: x      CAPILLARY BLOOD GLUCOSE      POCT Blood Glucose.: 233 mg/dL (15 May 2025 21:37)      RADIOLOGY & ADDITIONAL TESTS: Reviewed.

## 2025-05-16 NOTE — PROGRESS NOTE ADULT - SUBJECTIVE AND OBJECTIVE BOX
INTERVAL EVENTS:    Cardiac medications administered in the last 48h:  carvedilol: 6.25 milliGRAM(s) Oral (05-16-25 @ 10:21)  carvedilol: 6.25 milliGRAM(s) Oral (05-15-25 @ 21:58)  isosorbide   mononitrate ER Tablet (IMDUR): 30 milliGRAM(s) Oral (05-15-25 @ 18:24)  spironolactone: 25 milliGRAM(s) Oral (05-15-25 @ 18:00)  enalapril: 10 milliGRAM(s) Oral (05-15-25 @ 17:37)  carvedilol: 6.25 milliGRAM(s) Oral (05-15-25 @ 09:17)  carvedilol: 6.25 milliGRAM(s) Oral (05-14-25 @ 21:24)  enalapril: 10 milliGRAM(s) Oral (05-14-25 @ 16:42)  carvedilol: 3.125 milliGRAM(s) Oral (05-14-25 @ 16:42)  spironolactone: 25 milliGRAM(s) Oral (05-14-25 @ 16:41)      MEDICATIONS  (STANDING):  acetaminophen     Tablet .. 650 milliGRAM(s) Oral every 6 hours  ARIPiprazole 5 milliGRAM(s) Oral every 24 hours  aspirin  chewable 81 milliGRAM(s) Oral every 24 hours  atorvastatin 80 milliGRAM(s) Oral at bedtime  atovaquone  Suspension 1500 milliGRAM(s) Oral every 24 hours  carvedilol 6.25 milliGRAM(s) Oral every 12 hours  dextrose 5%. 1000 milliLiter(s) (50 mL/Hr) IV Continuous <Continuous>  dextrose 5%. 1000 milliLiter(s) (100 mL/Hr) IV Continuous <Continuous>  dextrose 50% Injectable 25 Gram(s) IV Push once  dextrose 50% Injectable 12.5 Gram(s) IV Push once  dextrose 50% Injectable 25 Gram(s) IV Push once  dolutegravir 50 milliGRAM(s) Oral every 24 hours  emtricitabine 200 mG/tenofovir alafenamide 25 mG (DESCOVY) Tablet 1 Tablet(s) Oral every 24 hours  enalapril 10 milliGRAM(s) Oral every 24 hours  escitalopram 20 milliGRAM(s) Oral every 24 hours  fluticasone propionate/ salmeterol 250-50 MICROgram(s) Diskus 1 Dose(s) Inhalation two times a day  glucagon  Injectable 1 milliGRAM(s) IntraMuscular once  heparin   Injectable 5000 Unit(s) SubCutaneous every 8 hours  insulin lispro (ADMELOG) corrective regimen sliding scale   SubCutaneous Before meals and at bedtime  isosorbide   mononitrate ER Tablet (IMDUR) 30 milliGRAM(s) Oral every 24 hours  spironolactone 25 milliGRAM(s) Oral every 24 hours  tamsulosin 0.4 milliGRAM(s) Oral at bedtime    MEDICATIONS  (PRN):  albuterol/ipratropium for Nebulization 3 milliLiter(s) Nebulizer every 6 hours PRN Wheezing  benzonatate 100 milliGRAM(s) Oral every 8 hours PRN Cough  dextrose Oral Gel 15 Gram(s) Oral once PRN Blood Glucose LESS THAN 70 milliGRAM(s)/deciliter  hydrocodone/homatropine Syrup 5 milliLiter(s) Oral every 6 hours PRN Cough  melatonin 5 milliGRAM(s) Oral at bedtime PRN Sleep      VITALS 24H  T(F): 97.8 (05-16-25 @ 10:20), Max: 98.4 (05-15-25 @ 20:42)  HR: 69 (05-16-25 @ 10:20) (55 - 80)  BP: 132/72 (05-16-25 @ 10:20) (117/65 - 146/78)  BP(mean): 89 (05-16-25 @ 05:52) (82 - 89)  ABP: --  ABP(mean): --  RR: 18 (05-16-25 @ 10:20) (18 - 18)  SpO2: 94% (05-16-25 @ 10:20) (92% - 94%)  CVP(mm Hg): --    I/O Detail 24H      PHYSICAL EXAM:  GEN: NAD  HEENT: EOMI   RESP: CTA b/l  CV: RRR. Normal S1/S2. No m/r/g.  ABD: soft, non-distended  EXT: No edema   NEURO: alert and attentive    LABS:  CBC 05-16-25 @ 06:30                        10.9   2.89  )-----------( 109                   32.7     Hgb trend: 10.9 <-- , 11.6 <-- , 11.7 <--   WBC trend: 2.89 <-- , 3.01 <-- , 2.94 <--       CMP 05-16-25 @ 06:30    138  |  106  |  34[H]  ----------------------------<  140[H]  4.1   |  24  |  1.59[H]    Ca    8.2[L]      05-16-25 @ 06:30  Phos  3.3     05-16  Mg     1.8     05-16      Serum Cr (eGFR) trend: 1.59 (45) <-- , 1.50 (48) <-- , 1.58 (45) <-- , 1.56 (46) <--           Cardiac Markers

## 2025-05-16 NOTE — PROGRESS NOTE ADULT - PROBLEM SELECTOR PLAN 3
likely prerenal 2/2 to hypovolemia due to anemia  - resolved, cr back to baseline of 1.1 (from 2.2 on admit)  - s/p 3 units prbcs  - monitor bmp   - avoid nephrotoxic medications, hold home torsemide Reports urinary incontinence x5 days, however has had this in the past per prior record review. UA today negative for UTI. Pt not on any medications that could cause frequency, BG wnl. Home med flomax 0.4mg which he has not taken in a few weeks.  - flomax 0.4 mg qd  - will need urology follow up at discharge

## 2025-05-17 LAB
ANION GAP SERPL CALC-SCNC: 11 MMOL/L — SIGNIFICANT CHANGE UP (ref 5–17)
BUN SERPL-MCNC: 32 MG/DL — HIGH (ref 7–23)
CALCIUM SERPL-MCNC: 8.9 MG/DL — SIGNIFICANT CHANGE UP (ref 8.4–10.5)
CHLORIDE SERPL-SCNC: 104 MMOL/L — SIGNIFICANT CHANGE UP (ref 96–108)
CO2 SERPL-SCNC: 22 MMOL/L — SIGNIFICANT CHANGE UP (ref 22–31)
CREAT SERPL-MCNC: 1.55 MG/DL — HIGH (ref 0.5–1.3)
EGFR: 46 ML/MIN/1.73M2 — LOW
EGFR: 46 ML/MIN/1.73M2 — LOW
GLUCOSE SERPL-MCNC: 124 MG/DL — HIGH (ref 70–99)
HCT VFR BLD CALC: 35.5 % — LOW (ref 39–50)
HGB BLD-MCNC: 12 G/DL — LOW (ref 13–17)
MAGNESIUM SERPL-MCNC: 1.9 MG/DL — SIGNIFICANT CHANGE UP (ref 1.6–2.6)
MCHC RBC-ENTMCNC: 33.1 PG — SIGNIFICANT CHANGE UP (ref 27–34)
MCHC RBC-ENTMCNC: 33.8 G/DL — SIGNIFICANT CHANGE UP (ref 32–36)
MCV RBC AUTO: 98.1 FL — SIGNIFICANT CHANGE UP (ref 80–100)
NRBC BLD AUTO-RTO: 0 /100 WBCS — SIGNIFICANT CHANGE UP (ref 0–0)
PHOSPHATE SERPL-MCNC: 3.7 MG/DL — SIGNIFICANT CHANGE UP (ref 2.5–4.5)
PLATELET # BLD AUTO: 125 K/UL — LOW (ref 150–400)
POTASSIUM SERPL-MCNC: 4.3 MMOL/L — SIGNIFICANT CHANGE UP (ref 3.5–5.3)
POTASSIUM SERPL-SCNC: 4.3 MMOL/L — SIGNIFICANT CHANGE UP (ref 3.5–5.3)
RBC # BLD: 3.62 M/UL — LOW (ref 4.2–5.8)
RBC # FLD: 12.7 % — SIGNIFICANT CHANGE UP (ref 10.3–14.5)
SODIUM SERPL-SCNC: 137 MMOL/L — SIGNIFICANT CHANGE UP (ref 135–145)
WBC # BLD: 3.02 K/UL — LOW (ref 3.8–10.5)
WBC # FLD AUTO: 3.02 K/UL — LOW (ref 3.8–10.5)

## 2025-05-17 PROCEDURE — 99233 SBSQ HOSP IP/OBS HIGH 50: CPT

## 2025-05-17 RX ORDER — MAGNESIUM SULFATE 500 MG/ML
1 SYRINGE (ML) INJECTION ONCE
Refills: 0 | Status: COMPLETED | OUTPATIENT
Start: 2025-05-17 | End: 2025-05-17

## 2025-05-17 RX ORDER — ACETAMINOPHEN 500 MG/5ML
975 LIQUID (ML) ORAL EVERY 6 HOURS
Refills: 0 | Status: DISCONTINUED | OUTPATIENT
Start: 2025-05-17 | End: 2025-05-19

## 2025-05-17 RX ORDER — ACETAMINOPHEN 500 MG/5ML
975 LIQUID (ML) ORAL EVERY 6 HOURS
Refills: 0 | Status: DISCONTINUED | OUTPATIENT
Start: 2025-05-17 | End: 2025-05-17

## 2025-05-17 RX ORDER — ACETAMINOPHEN 500 MG/5ML
1000 LIQUID (ML) ORAL ONCE
Refills: 0 | Status: COMPLETED | OUTPATIENT
Start: 2025-05-17 | End: 2025-05-17

## 2025-05-17 RX ADMIN — DOLUTEGRAVIR SODIUM 50 MILLIGRAM(S): 5 TABLET, FOR SUSPENSION ORAL at 16:56

## 2025-05-17 RX ADMIN — Medication 1 TABLET(S): at 17:11

## 2025-05-17 RX ADMIN — Medication 1 DOSE(S): at 06:09

## 2025-05-17 RX ADMIN — CARVEDILOL 6.25 MILLIGRAM(S): 3.12 TABLET, FILM COATED ORAL at 10:34

## 2025-05-17 RX ADMIN — Medication 81 MILLIGRAM(S): at 13:55

## 2025-05-17 RX ADMIN — Medication 5 MILLIGRAM(S): at 22:28

## 2025-05-17 RX ADMIN — Medication 650 MILLIGRAM(S): at 11:25

## 2025-05-17 RX ADMIN — HEPARIN SODIUM 5000 UNIT(S): 1000 INJECTION INTRAVENOUS; SUBCUTANEOUS at 13:56

## 2025-05-17 RX ADMIN — INSULIN LISPRO 2: 100 INJECTION, SOLUTION INTRAVENOUS; SUBCUTANEOUS at 13:38

## 2025-05-17 RX ADMIN — HEPARIN SODIUM 5000 UNIT(S): 1000 INJECTION INTRAVENOUS; SUBCUTANEOUS at 06:08

## 2025-05-17 RX ADMIN — Medication 650 MILLIGRAM(S): at 06:08

## 2025-05-17 RX ADMIN — ARIPIPRAZOLE 5 MILLIGRAM(S): 2 TABLET ORAL at 13:55

## 2025-05-17 RX ADMIN — Medication 975 MILLIGRAM(S): at 17:57

## 2025-05-17 RX ADMIN — Medication 650 MILLIGRAM(S): at 07:00

## 2025-05-17 RX ADMIN — HEPARIN SODIUM 5000 UNIT(S): 1000 INJECTION INTRAVENOUS; SUBCUTANEOUS at 22:28

## 2025-05-17 RX ADMIN — TAMSULOSIN HYDROCHLORIDE 0.4 MILLIGRAM(S): 0.4 CAPSULE ORAL at 22:28

## 2025-05-17 RX ADMIN — ATOVAQUONE 1500 MILLIGRAM(S): 750 SUSPENSION ORAL at 16:55

## 2025-05-17 RX ADMIN — Medication 100 GRAM(S): at 11:35

## 2025-05-17 RX ADMIN — Medication 25 MILLIGRAM(S): at 17:57

## 2025-05-17 RX ADMIN — Medication 10 MILLIGRAM(S): at 18:58

## 2025-05-17 RX ADMIN — Medication 400 MILLIGRAM(S): at 10:32

## 2025-05-17 RX ADMIN — CARVEDILOL 6.25 MILLIGRAM(S): 3.12 TABLET, FILM COATED ORAL at 22:28

## 2025-05-17 RX ADMIN — ESCITALOPRAM OXALATE 20 MILLIGRAM(S): 20 TABLET ORAL at 13:56

## 2025-05-17 RX ADMIN — ISOSORBIDE MONONITRATE 30 MILLIGRAM(S): 60 TABLET, EXTENDED RELEASE ORAL at 16:55

## 2025-05-17 RX ADMIN — ATORVASTATIN CALCIUM 80 MILLIGRAM(S): 80 TABLET, FILM COATED ORAL at 22:27

## 2025-05-17 NOTE — PROGRESS NOTE ADULT - PROBLEM SELECTOR PLAN 2
Following patient from Palliative Care. Worked with Unit RN to assist family and patient with Zoom meeting. Followed with family afterwards, along with ICU MD, to provide medical update and discussion. Family wants to continue with Full Code and are hopeful patient will wean and improve.    Palliative Care will continue to follow.    Hetal Theodore, [facility]  /[facility]  [facility]  [phone]     Pt with R distal radial fracture 7 in early April, splinted and then placed in short cast by ortho. States he fell on the hand yesterday and has some pain. Also has had some swelling of the hand for a few days. Patient was unable to make his ortho follow ups outpatient. Ortho consulted in ED, they have no concern for compartment at this time.   - elevation for swelling  - pain control: tylenol 650mg q6 standing  - PT/OT  - ortho will remove cast either in hospital or at resident clinic on 4/28-4/29, closer to when pt has had cast for 6 weeks

## 2025-05-17 NOTE — PROGRESS NOTE ADULT - PROBLEM SELECTOR PLAN 1
#polysubstance use disorder  Pt reporting fall yesterday, fell onto right arm, reporting head strike. CT head negative. Per review of previous records, pt has had multiple falls over many years, often had workup initiated, and would AMA prior to completion of workup. States that his falls have been occuring since MI in 2019, after which he became dizzy with rapid movements. Head CT neg for acute changes, and central causes of vertigo. Seems that patient may have been on eliquis, unclear why, does not know if he has hx of DVT. Jose hallpike/epley negative. Positive orthostatics. Trops clear.   - PT eval recommending KAN  - telemetry showing ectopy but no evidence of afib  - nutrition consult  - SW consult  - outpatient cardiology follow up

## 2025-05-17 NOTE — PROGRESS NOTE ADULT - SUBJECTIVE AND OBJECTIVE BOX
Patient is a 75y old  Male who presents with a chief complaint of fall with head strike (13 May 2025 15:34)      SUBJECTIVE / OVERNIGHT EVENTS:  No acute events overnight.     MEDICATIONS  (STANDING):  acetaminophen     Tablet .. 650 milliGRAM(s) Oral every 6 hours  ARIPiprazole 5 milliGRAM(s) Oral every 24 hours  aspirin  chewable 81 milliGRAM(s) Oral every 24 hours  atorvastatin 80 milliGRAM(s) Oral at bedtime  atovaquone  Suspension 1500 milliGRAM(s) Oral every 24 hours  carvedilol 6.25 milliGRAM(s) Oral every 12 hours  dextrose 5%. 1000 milliLiter(s) (100 mL/Hr) IV Continuous <Continuous>  dextrose 5%. 1000 milliLiter(s) (50 mL/Hr) IV Continuous <Continuous>  dextrose 50% Injectable 25 Gram(s) IV Push once  dextrose 50% Injectable 12.5 Gram(s) IV Push once  dextrose 50% Injectable 25 Gram(s) IV Push once  dolutegravir 50 milliGRAM(s) Oral every 24 hours  emtricitabine 200 mG/tenofovir alafenamide 25 mG (DESCOVY) Tablet 1 Tablet(s) Oral every 24 hours  enalapril 10 milliGRAM(s) Oral every 24 hours  escitalopram 20 milliGRAM(s) Oral every 24 hours  fluticasone propionate/ salmeterol 250-50 MICROgram(s) Diskus 1 Dose(s) Inhalation two times a day  glucagon  Injectable 1 milliGRAM(s) IntraMuscular once  heparin   Injectable 5000 Unit(s) SubCutaneous every 8 hours  insulin lispro (ADMELOG) corrective regimen sliding scale   SubCutaneous Before meals and at bedtime  isosorbide   mononitrate ER Tablet (IMDUR) 30 milliGRAM(s) Oral every 24 hours  spironolactone 25 milliGRAM(s) Oral every 24 hours  tamsulosin 0.4 milliGRAM(s) Oral at bedtime    MEDICATIONS  (PRN):  albuterol/ipratropium for Nebulization 3 milliLiter(s) Nebulizer every 6 hours PRN Wheezing  benzonatate 100 milliGRAM(s) Oral every 8 hours PRN Cough  dextrose Oral Gel 15 Gram(s) Oral once PRN Blood Glucose LESS THAN 70 milliGRAM(s)/deciliter  hydrocodone/homatropine Syrup 5 milliLiter(s) Oral every 6 hours PRN Cough  loperamide 2 milliGRAM(s) Oral every 8 hours PRN Diarrhea  melatonin 5 milliGRAM(s) Oral at bedtime PRN Sleep      CAPILLARY BLOOD GLUCOSE      POCT Blood Glucose.: 125 mg/dL (17 May 2025 08:45)  POCT Blood Glucose.: 122 mg/dL (16 May 2025 17:23)    I&O's Summary      PHYSICAL EXAM:  Vital Signs Last 24 Hrs  T(C): 36.4 (17 May 2025 05:44), Max: 36.9 (16 May 2025 21:06)  T(F): 97.5 (17 May 2025 05:44), Max: 98.4 (16 May 2025 21:06)  HR: 62 (17 May 2025 05:44) (55 - 70)  BP: 129/80 (17 May 2025 05:44) (105/65 - 137/74)  BP(mean): --  RR: 16 (17 May 2025 05:44) (15 - 18)  SpO2: 91% (17 May 2025 05:44) (91% - 92%)    Parameters below as of 17 May 2025 05:44  Patient On (Oxygen Delivery Method): room air      GENERAL: NAD, well-developed  HEAD:  Atraumatic, Normocephalic  EYES: EOMI, PERRLA, conjunctiva and sclera clear  NECK: Supple, No JVD  CHEST/LUNG: Clear to auscultation bilaterally; No wheeze  HEART: Regular rate and rhythm; No murmurs, rubs, or gallops  ABDOMEN: Soft, Nontender, Nondistended; Bowel sounds present  EXTREMITIES:  Right forearm in cast  PSYCH: AAOx3  NEUROLOGY: non-focal  SKIN: No rashes or lesions    LABS:                        12.0   3.02  )-----------( 125      ( 17 May 2025 08:11 )             35.5     05-17    137  |  104  |  32[H]  ----------------------------<  124[H]  4.3   |  22  |  1.55[H]    Ca    8.9      17 May 2025 08:11  Phos  3.7     05-17  Mg     1.9     05-17            Urinalysis Basic - ( 17 May 2025 08:11 )    Color: x / Appearance: x / SG: x / pH: x  Gluc: 124 mg/dL / Ketone: x  / Bili: x / Urobili: x   Blood: x / Protein: x / Nitrite: x   Leuk Esterase: x / RBC: x / WBC x   Sq Epi: x / Non Sq Epi: x / Bacteria: x        RADIOLOGY & ADDITIONAL TESTS:    Imaging Personally Reviewed:    Consultant(s) Notes Reviewed:      Care Discussed with Consultants/Other Providers:

## 2025-05-18 LAB
ANION GAP SERPL CALC-SCNC: 10 MMOL/L — SIGNIFICANT CHANGE UP (ref 5–17)
APPEARANCE UR: CLEAR — SIGNIFICANT CHANGE UP
BASOPHILS # BLD AUTO: 0 K/UL — SIGNIFICANT CHANGE UP (ref 0–0.2)
BASOPHILS NFR BLD AUTO: 0 % — SIGNIFICANT CHANGE UP (ref 0–2)
BILIRUB UR-MCNC: NEGATIVE — SIGNIFICANT CHANGE UP
BUN SERPL-MCNC: 40 MG/DL — HIGH (ref 7–23)
CALCIUM SERPL-MCNC: 9 MG/DL — SIGNIFICANT CHANGE UP (ref 8.4–10.5)
CHLORIDE SERPL-SCNC: 106 MMOL/L — SIGNIFICANT CHANGE UP (ref 96–108)
CO2 SERPL-SCNC: 23 MMOL/L — SIGNIFICANT CHANGE UP (ref 22–31)
COLOR SPEC: YELLOW — SIGNIFICANT CHANGE UP
CREAT ?TM UR-MCNC: 44 MG/DL — SIGNIFICANT CHANGE UP
CREAT SERPL-MCNC: 1.82 MG/DL — HIGH (ref 0.5–1.3)
DIFF PNL FLD: ABNORMAL
EGFR: 38 ML/MIN/1.73M2 — LOW
EGFR: 38 ML/MIN/1.73M2 — LOW
EOSINOPHIL # BLD AUTO: 0.07 K/UL — SIGNIFICANT CHANGE UP (ref 0–0.5)
EOSINOPHIL NFR BLD AUTO: 2 % — SIGNIFICANT CHANGE UP (ref 0–6)
GLUCOSE SERPL-MCNC: 124 MG/DL — HIGH (ref 70–99)
GLUCOSE UR QL: NEGATIVE MG/DL — SIGNIFICANT CHANGE UP
HCT VFR BLD CALC: 38.1 % — LOW (ref 39–50)
HGB BLD-MCNC: 12.6 G/DL — LOW (ref 13–17)
IMM GRANULOCYTES NFR BLD AUTO: 0.3 % — SIGNIFICANT CHANGE UP (ref 0–0.9)
KETONES UR QL: NEGATIVE MG/DL — SIGNIFICANT CHANGE UP
LEUKOCYTE ESTERASE UR-ACNC: NEGATIVE — SIGNIFICANT CHANGE UP
LYMPHOCYTES # BLD AUTO: 1.37 K/UL — SIGNIFICANT CHANGE UP (ref 1–3.3)
LYMPHOCYTES # BLD AUTO: 39.4 % — SIGNIFICANT CHANGE UP (ref 13–44)
MAGNESIUM SERPL-MCNC: 1.9 MG/DL — SIGNIFICANT CHANGE UP (ref 1.6–2.6)
MCHC RBC-ENTMCNC: 32.9 PG — SIGNIFICANT CHANGE UP (ref 27–34)
MCHC RBC-ENTMCNC: 33.1 G/DL — SIGNIFICANT CHANGE UP (ref 32–36)
MCV RBC AUTO: 99.5 FL — SIGNIFICANT CHANGE UP (ref 80–100)
MONOCYTES # BLD AUTO: 0.43 K/UL — SIGNIFICANT CHANGE UP (ref 0–0.9)
MONOCYTES NFR BLD AUTO: 12.4 % — SIGNIFICANT CHANGE UP (ref 2–14)
NEUTROPHILS # BLD AUTO: 1.6 K/UL — LOW (ref 1.8–7.4)
NEUTROPHILS NFR BLD AUTO: 45.9 % — SIGNIFICANT CHANGE UP (ref 43–77)
NITRITE UR-MCNC: NEGATIVE — SIGNIFICANT CHANGE UP
NRBC BLD AUTO-RTO: 0 /100 WBCS — SIGNIFICANT CHANGE UP (ref 0–0)
OSMOLALITY UR: 446 MOSM/KG — SIGNIFICANT CHANGE UP (ref 300–900)
PH UR: 7 — SIGNIFICANT CHANGE UP (ref 5–8)
PHOSPHATE SERPL-MCNC: 4.3 MG/DL — SIGNIFICANT CHANGE UP (ref 2.5–4.5)
PLATELET # BLD AUTO: 127 K/UL — LOW (ref 150–400)
POTASSIUM SERPL-MCNC: 4.8 MMOL/L — SIGNIFICANT CHANGE UP (ref 3.5–5.3)
POTASSIUM SERPL-SCNC: 4.8 MMOL/L — SIGNIFICANT CHANGE UP (ref 3.5–5.3)
PROT ?TM UR-MCNC: 23 MG/DL — HIGH (ref 0–12)
PROT UR-MCNC: 30 MG/DL
PROT/CREAT UR-RTO: 0.5 RATIO — HIGH (ref 0–0.2)
RBC # BLD: 3.83 M/UL — LOW (ref 4.2–5.8)
RBC # FLD: 12.8 % — SIGNIFICANT CHANGE UP (ref 10.3–14.5)
SODIUM SERPL-SCNC: 139 MMOL/L — SIGNIFICANT CHANGE UP (ref 135–145)
SODIUM UR-SCNC: 96 MMOL/L — SIGNIFICANT CHANGE UP
SP GR SPEC: 1.01 — SIGNIFICANT CHANGE UP (ref 1–1.03)
UROBILINOGEN FLD QL: 1 MG/DL — SIGNIFICANT CHANGE UP (ref 0.2–1)
UUN UR-MCNC: 504 MG/DL — SIGNIFICANT CHANGE UP
WBC # BLD: 3.48 K/UL — LOW (ref 3.8–10.5)
WBC # FLD AUTO: 3.48 K/UL — LOW (ref 3.8–10.5)

## 2025-05-18 PROCEDURE — 99233 SBSQ HOSP IP/OBS HIGH 50: CPT

## 2025-05-18 RX ADMIN — HEPARIN SODIUM 5000 UNIT(S): 1000 INJECTION INTRAVENOUS; SUBCUTANEOUS at 21:58

## 2025-05-18 RX ADMIN — ARIPIPRAZOLE 5 MILLIGRAM(S): 2 TABLET ORAL at 14:05

## 2025-05-18 RX ADMIN — ATORVASTATIN CALCIUM 80 MILLIGRAM(S): 80 TABLET, FILM COATED ORAL at 21:58

## 2025-05-18 RX ADMIN — Medication 25 MILLIGRAM(S): at 18:21

## 2025-05-18 RX ADMIN — Medication 81 MILLIGRAM(S): at 13:48

## 2025-05-18 RX ADMIN — TAMSULOSIN HYDROCHLORIDE 0.4 MILLIGRAM(S): 0.4 CAPSULE ORAL at 21:58

## 2025-05-18 RX ADMIN — HEPARIN SODIUM 5000 UNIT(S): 1000 INJECTION INTRAVENOUS; SUBCUTANEOUS at 13:25

## 2025-05-18 RX ADMIN — Medication 975 MILLIGRAM(S): at 03:21

## 2025-05-18 RX ADMIN — ISOSORBIDE MONONITRATE 30 MILLIGRAM(S): 60 TABLET, EXTENDED RELEASE ORAL at 15:38

## 2025-05-18 RX ADMIN — ATOVAQUONE 1500 MILLIGRAM(S): 750 SUSPENSION ORAL at 15:36

## 2025-05-18 RX ADMIN — DOLUTEGRAVIR SODIUM 50 MILLIGRAM(S): 5 TABLET, FOR SUSPENSION ORAL at 15:36

## 2025-05-18 RX ADMIN — ESCITALOPRAM OXALATE 20 MILLIGRAM(S): 20 TABLET ORAL at 14:05

## 2025-05-18 RX ADMIN — HEPARIN SODIUM 5000 UNIT(S): 1000 INJECTION INTRAVENOUS; SUBCUTANEOUS at 05:51

## 2025-05-18 RX ADMIN — Medication 1 TABLET(S): at 15:37

## 2025-05-18 NOTE — PROGRESS NOTE ADULT - PROBLEM SELECTOR PLAN 4
Patient noted to have Cr 1.51, based on review of previous hospitalizations, baseline seems to be 1.2-1.5. Currently producing urine, with no electrolyte abnormalities. Unclear if this is BRENNAN vs CKD. Likely pre-renal etiology given orthostatics and patient admitting poor PO intake. Continue to monitor BRENNAN. Cr increased 1.5-->1.8 on 5/18, unclear etiology.  - trend BMP, monitor Cr  - avoid nephrotoxic drugs, renally dose meds  - f/u urine studies  - nutrition consulted, appreciate recs

## 2025-05-18 NOTE — PROGRESS NOTE ADULT - PROBLEM SELECTOR PLAN 6
EF 35-40%. Home med: Carvedilol 3.125mg BID. No signs of volume overload on exam, CXR clear, no s/s of acute HF exacerbation  - c/w carvedilol 3.125 mg BID, increase if BP systolic consistently > 140  - c/w enalapril 10mg qd  - started spironolactone 25 qd 5/13 per cards recs

## 2025-05-18 NOTE — PROGRESS NOTE ADULT - SUBJECTIVE AND OBJECTIVE BOX
OVERNIGHT EVENTS:    SUBJECTIVE / INTERVAL HPI: Patient seen and examined at bedside.     VITAL SIGNS:  Vital Signs Last 24 Hrs  T(C): 36.3 (18 May 2025 05:17), Max: 36.8 (17 May 2025 20:52)  T(F): 97.4 (18 May 2025 05:17), Max: 98.2 (17 May 2025 20:52)  HR: 60 (18 May 2025 05:17) (60 - 60)  BP: 147/90 (18 May 2025 05:17) (129/75 - 147/90)  BP(mean): 109 (18 May 2025 05:17) (93 - 109)  RR: 18 (18 May 2025 05:17) (18 - 18)  SpO2: 96% (18 May 2025 05:17) (95% - 96%)    Parameters below as of 18 May 2025 05:17  Patient On (Oxygen Delivery Method): room air      I&O's Summary    17 May 2025 07:01  -  18 May 2025 07:00  --------------------------------------------------------  IN: 0 mL / OUT: 800 mL / NET: -800 mL        PHYSICAL EXAM:  General: WDWN  HEENT: NC/AT; PERRL, anicteric sclera; MMM  Neck: supple  Cardiovascular: +S1/S2; RRR  Respiratory: CTA B/L; no W/R/R  Gastrointestinal: soft, NT/ND; +BSx4  Extremities: WWP; no edema, clubbing or cyanosis  Vascular: 2+ radial, DP/PT pulses B/L  Neurological: AAOx3; no focal deficits    MEDICATIONS:  MEDICATIONS  (STANDING):  ARIPiprazole 5 milliGRAM(s) Oral every 24 hours  aspirin  chewable 81 milliGRAM(s) Oral every 24 hours  atorvastatin 80 milliGRAM(s) Oral at bedtime  atovaquone  Suspension 1500 milliGRAM(s) Oral every 24 hours  carvedilol 6.25 milliGRAM(s) Oral every 12 hours  dextrose 5%. 1000 milliLiter(s) (100 mL/Hr) IV Continuous <Continuous>  dextrose 5%. 1000 milliLiter(s) (50 mL/Hr) IV Continuous <Continuous>  dextrose 50% Injectable 25 Gram(s) IV Push once  dextrose 50% Injectable 12.5 Gram(s) IV Push once  dextrose 50% Injectable 25 Gram(s) IV Push once  dolutegravir 50 milliGRAM(s) Oral every 24 hours  emtricitabine 200 mG/tenofovir alafenamide 25 mG (DESCOVY) Tablet 1 Tablet(s) Oral every 24 hours  enalapril 10 milliGRAM(s) Oral every 24 hours  escitalopram 20 milliGRAM(s) Oral every 24 hours  fluticasone propionate/ salmeterol 250-50 MICROgram(s) Diskus 1 Dose(s) Inhalation two times a day  glucagon  Injectable 1 milliGRAM(s) IntraMuscular once  heparin   Injectable 5000 Unit(s) SubCutaneous every 8 hours  insulin lispro (ADMELOG) corrective regimen sliding scale   SubCutaneous Before meals and at bedtime  isosorbide   mononitrate ER Tablet (IMDUR) 30 milliGRAM(s) Oral every 24 hours  spironolactone 25 milliGRAM(s) Oral every 24 hours  tamsulosin 0.4 milliGRAM(s) Oral at bedtime    MEDICATIONS  (PRN):  acetaminophen     Tablet .. 975 milliGRAM(s) Oral every 6 hours PRN Moderate Pain (4 - 6)  albuterol/ipratropium for Nebulization 3 milliLiter(s) Nebulizer every 6 hours PRN Wheezing  benzonatate 100 milliGRAM(s) Oral every 8 hours PRN Cough  dextrose Oral Gel 15 Gram(s) Oral once PRN Blood Glucose LESS THAN 70 milliGRAM(s)/deciliter  hydrocodone/homatropine Syrup 5 milliLiter(s) Oral every 6 hours PRN Cough  loperamide 2 milliGRAM(s) Oral every 8 hours PRN Diarrhea  melatonin 5 milliGRAM(s) Oral at bedtime PRN Sleep      ALLERGIES:  Allergies    shellfish (Unknown)  sulfa drugs (Rash)  strawberry (Rash)  Peaches (Rash)  penicillins (Rash)    Intolerances        LABS:                        12.6   3.48  )-----------( 127      ( 18 May 2025 06:29 )             38.1     05-18    139  |  106  |  40[H]  ----------------------------<  124[H]  4.8   |  23  |  1.82[H]    Ca    9.0      18 May 2025 06:29  Phos  4.3     05-18  Mg     1.9     05-18        Urinalysis Basic - ( 18 May 2025 06:29 )    Color: x / Appearance: x / SG: x / pH: x  Gluc: 124 mg/dL / Ketone: x  / Bili: x / Urobili: x   Blood: x / Protein: x / Nitrite: x   Leuk Esterase: x / RBC: x / WBC x   Sq Epi: x / Non Sq Epi: x / Bacteria: x      CAPILLARY BLOOD GLUCOSE      POCT Blood Glucose.: 119 mg/dL (18 May 2025 08:28)      RADIOLOGY & ADDITIONAL TESTS: Reviewed.   OVERNIGHT EVENTS: no overnight events    SUBJECTIVE / INTERVAL HPI: Patient seen and examined at bedside. Feels well, has no complaints at this time including hand pain, chest pain, SOB, f/c.    VITAL SIGNS:  Vital Signs Last 24 Hrs  T(C): 36.3 (18 May 2025 05:17), Max: 36.8 (17 May 2025 20:52)  T(F): 97.4 (18 May 2025 05:17), Max: 98.2 (17 May 2025 20:52)  HR: 60 (18 May 2025 05:17) (60 - 60)  BP: 147/90 (18 May 2025 05:17) (129/75 - 147/90)  BP(mean): 109 (18 May 2025 05:17) (93 - 109)  RR: 18 (18 May 2025 05:17) (18 - 18)  SpO2: 96% (18 May 2025 05:17) (95% - 96%)    Parameters below as of 18 May 2025 05:17  Patient On (Oxygen Delivery Method): room air      I&O's Summary    17 May 2025 07:01  -  18 May 2025 07:00  --------------------------------------------------------  IN: 0 mL / OUT: 800 mL / NET: -800 mL        PHYSICAL EXAM:  Constitutional: resting comfortably in bed; NAD, disheveled  HEENT: NC/AT, PERRL, EOMI, anicteric sclera, MMM  Neck: supple; no JVD or thyromegaly  Respiratory: unlabored breathing, CTA B/L; no Rhonchi/Crackles, no retractions or use of accessory muscles   Cardiac: +S1/S2; RRR; no M/R/G  Gastrointestinal: soft, NT/ND; no rebound or guarding; +BSx4  Extremities: WWP, no clubbing or cyanosis; no peripheral edema  Musculoskeletal: R hand/wrist immobilized in short cast, swelling of fingers, intact sensation  Vascular: 2+ radial, DP/PT pulses B/L  Dermatologic: skin warm, dry and intact; no rashes, wounds, or scars  Neurologic: AAOx3; CNII-XII grossly intact; no focal deficits  Psychiatric: affect and characteristics of appearance, verbalizations, behaviors are appropriate, denies SI/HI/AH/VH      MEDICATIONS:  MEDICATIONS  (STANDING):  ARIPiprazole 5 milliGRAM(s) Oral every 24 hours  aspirin  chewable 81 milliGRAM(s) Oral every 24 hours  atorvastatin 80 milliGRAM(s) Oral at bedtime  atovaquone  Suspension 1500 milliGRAM(s) Oral every 24 hours  carvedilol 6.25 milliGRAM(s) Oral every 12 hours  dextrose 5%. 1000 milliLiter(s) (100 mL/Hr) IV Continuous <Continuous>  dextrose 5%. 1000 milliLiter(s) (50 mL/Hr) IV Continuous <Continuous>  dextrose 50% Injectable 25 Gram(s) IV Push once  dextrose 50% Injectable 12.5 Gram(s) IV Push once  dextrose 50% Injectable 25 Gram(s) IV Push once  dolutegravir 50 milliGRAM(s) Oral every 24 hours  emtricitabine 200 mG/tenofovir alafenamide 25 mG (DESCOVY) Tablet 1 Tablet(s) Oral every 24 hours  enalapril 10 milliGRAM(s) Oral every 24 hours  escitalopram 20 milliGRAM(s) Oral every 24 hours  fluticasone propionate/ salmeterol 250-50 MICROgram(s) Diskus 1 Dose(s) Inhalation two times a day  glucagon  Injectable 1 milliGRAM(s) IntraMuscular once  heparin   Injectable 5000 Unit(s) SubCutaneous every 8 hours  insulin lispro (ADMELOG) corrective regimen sliding scale   SubCutaneous Before meals and at bedtime  isosorbide   mononitrate ER Tablet (IMDUR) 30 milliGRAM(s) Oral every 24 hours  spironolactone 25 milliGRAM(s) Oral every 24 hours  tamsulosin 0.4 milliGRAM(s) Oral at bedtime    MEDICATIONS  (PRN):  acetaminophen     Tablet .. 975 milliGRAM(s) Oral every 6 hours PRN Moderate Pain (4 - 6)  albuterol/ipratropium for Nebulization 3 milliLiter(s) Nebulizer every 6 hours PRN Wheezing  benzonatate 100 milliGRAM(s) Oral every 8 hours PRN Cough  dextrose Oral Gel 15 Gram(s) Oral once PRN Blood Glucose LESS THAN 70 milliGRAM(s)/deciliter  hydrocodone/homatropine Syrup 5 milliLiter(s) Oral every 6 hours PRN Cough  loperamide 2 milliGRAM(s) Oral every 8 hours PRN Diarrhea  melatonin 5 milliGRAM(s) Oral at bedtime PRN Sleep      ALLERGIES:  Allergies    shellfish (Unknown)  sulfa drugs (Rash)  strawberry (Rash)  Peaches (Rash)  penicillins (Rash)    Intolerances        LABS:                        12.6   3.48  )-----------( 127      ( 18 May 2025 06:29 )             38.1     05-18    139  |  106  |  40[H]  ----------------------------<  124[H]  4.8   |  23  |  1.82[H]    Ca    9.0      18 May 2025 06:29  Phos  4.3     05-18  Mg     1.9     05-18        Urinalysis Basic - ( 18 May 2025 06:29 )    Color: x / Appearance: x / SG: x / pH: x  Gluc: 124 mg/dL / Ketone: x  / Bili: x / Urobili: x   Blood: x / Protein: x / Nitrite: x   Leuk Esterase: x / RBC: x / WBC x   Sq Epi: x / Non Sq Epi: x / Bacteria: x      CAPILLARY BLOOD GLUCOSE      POCT Blood Glucose.: 119 mg/dL (18 May 2025 08:28)      RADIOLOGY & ADDITIONAL TESTS: Reviewed.

## 2025-05-18 NOTE — PROGRESS NOTE ADULT - PROBLEM SELECTOR PLAN 1
#polysubstance use disorder  Pt reporting fall yesterday, fell onto right arm, reporting head strike. CT head negative. Per review of previous records, pt has had multiple falls over many years, often had workup initiated, and would AMA prior to completion of workup. States that his falls have been occuring since MI in 2019, after which he became dizzy with rapid movements. Head CT neg for acute changes, and central causes of vertigo. Seems that patient may have been on eliquis, unclear why, does not know if he has hx of DVT. Jose hallpike/epley negative. Positive orthostatics. Trops clear.   - PT eval recommending KAN, pending placement  - telemetry showing ectopy but no evidence of afib  - nutrition consult  - SW consult  - outpatient cardiology follow up

## 2025-05-19 ENCOUNTER — TRANSCRIPTION ENCOUNTER (OUTPATIENT)
Age: 75
End: 2025-05-19

## 2025-05-19 VITALS — SYSTOLIC BLOOD PRESSURE: 146 MMHG | DIASTOLIC BLOOD PRESSURE: 92 MMHG | HEART RATE: 69 BPM

## 2025-05-19 LAB
ANION GAP SERPL CALC-SCNC: 15 MMOL/L — SIGNIFICANT CHANGE UP (ref 5–17)
BUN SERPL-MCNC: 36 MG/DL — HIGH (ref 7–23)
CALCIUM SERPL-MCNC: 9.3 MG/DL — SIGNIFICANT CHANGE UP (ref 8.4–10.5)
CHLORIDE SERPL-SCNC: 101 MMOL/L — SIGNIFICANT CHANGE UP (ref 96–108)
CO2 SERPL-SCNC: 19 MMOL/L — LOW (ref 22–31)
CREAT SERPL-MCNC: 1.56 MG/DL — HIGH (ref 0.5–1.3)
EGFR: 46 ML/MIN/1.73M2 — LOW
EGFR: 46 ML/MIN/1.73M2 — LOW
GLUCOSE SERPL-MCNC: 131 MG/DL — HIGH (ref 70–99)
HCT VFR BLD CALC: 40.4 % — SIGNIFICANT CHANGE UP (ref 39–50)
HGB BLD-MCNC: 13.7 G/DL — SIGNIFICANT CHANGE UP (ref 13–17)
MAGNESIUM SERPL-MCNC: 1.7 MG/DL — SIGNIFICANT CHANGE UP (ref 1.6–2.6)
MCHC RBC-ENTMCNC: 32.8 PG — SIGNIFICANT CHANGE UP (ref 27–34)
MCHC RBC-ENTMCNC: 33.9 G/DL — SIGNIFICANT CHANGE UP (ref 32–36)
MCV RBC AUTO: 96.7 FL — SIGNIFICANT CHANGE UP (ref 80–100)
NRBC BLD AUTO-RTO: 0 /100 WBCS — SIGNIFICANT CHANGE UP (ref 0–0)
PHOSPHATE SERPL-MCNC: 4 MG/DL — SIGNIFICANT CHANGE UP (ref 2.5–4.5)
PLATELET # BLD AUTO: 129 K/UL — LOW (ref 150–400)
POTASSIUM SERPL-MCNC: 4.6 MMOL/L — SIGNIFICANT CHANGE UP (ref 3.5–5.3)
POTASSIUM SERPL-SCNC: 4.6 MMOL/L — SIGNIFICANT CHANGE UP (ref 3.5–5.3)
RBC # BLD: 4.18 M/UL — LOW (ref 4.2–5.8)
RBC # FLD: 12.7 % — SIGNIFICANT CHANGE UP (ref 10.3–14.5)
SODIUM SERPL-SCNC: 135 MMOL/L — SIGNIFICANT CHANGE UP (ref 135–145)
WBC # BLD: 3.92 K/UL — SIGNIFICANT CHANGE UP (ref 3.8–10.5)
WBC # FLD AUTO: 3.92 K/UL — SIGNIFICANT CHANGE UP (ref 3.8–10.5)

## 2025-05-19 PROCEDURE — 97535 SELF CARE MNGMENT TRAINING: CPT

## 2025-05-19 PROCEDURE — 85027 COMPLETE CBC AUTOMATED: CPT

## 2025-05-19 PROCEDURE — 80048 BASIC METABOLIC PNL TOTAL CA: CPT

## 2025-05-19 PROCEDURE — 86359 T CELLS TOTAL COUNT: CPT

## 2025-05-19 PROCEDURE — 82610 CYSTATIN C: CPT

## 2025-05-19 PROCEDURE — 85025 COMPLETE CBC W/AUTO DIFF WBC: CPT

## 2025-05-19 PROCEDURE — 84540 ASSAY OF URINE/UREA-N: CPT

## 2025-05-19 PROCEDURE — 73090 X-RAY EXAM OF FOREARM: CPT

## 2025-05-19 PROCEDURE — 80307 DRUG TEST PRSMV CHEM ANLYZR: CPT

## 2025-05-19 PROCEDURE — 81001 URINALYSIS AUTO W/SCOPE: CPT

## 2025-05-19 PROCEDURE — 82962 GLUCOSE BLOOD TEST: CPT

## 2025-05-19 PROCEDURE — 97116 GAIT TRAINING THERAPY: CPT

## 2025-05-19 PROCEDURE — 99285 EMERGENCY DEPT VISIT HI MDM: CPT

## 2025-05-19 PROCEDURE — 83036 HEMOGLOBIN GLYCOSYLATED A1C: CPT

## 2025-05-19 PROCEDURE — 97530 THERAPEUTIC ACTIVITIES: CPT

## 2025-05-19 PROCEDURE — 84156 ASSAY OF PROTEIN URINE: CPT

## 2025-05-19 PROCEDURE — 36415 COLL VENOUS BLD VENIPUNCTURE: CPT

## 2025-05-19 PROCEDURE — 84133 ASSAY OF URINE POTASSIUM: CPT

## 2025-05-19 PROCEDURE — 83735 ASSAY OF MAGNESIUM: CPT

## 2025-05-19 PROCEDURE — 94640 AIRWAY INHALATION TREATMENT: CPT

## 2025-05-19 PROCEDURE — 71045 X-RAY EXAM CHEST 1 VIEW: CPT

## 2025-05-19 PROCEDURE — 82570 ASSAY OF URINE CREATININE: CPT

## 2025-05-19 PROCEDURE — 73110 X-RAY EXAM OF WRIST: CPT

## 2025-05-19 PROCEDURE — 70450 CT HEAD/BRAIN W/O DYE: CPT

## 2025-05-19 PROCEDURE — 73130 X-RAY EXAM OF HAND: CPT

## 2025-05-19 PROCEDURE — 93005 ELECTROCARDIOGRAM TRACING: CPT

## 2025-05-19 PROCEDURE — 99239 HOSP IP/OBS DSCHRG MGMT >30: CPT

## 2025-05-19 PROCEDURE — 80053 COMPREHEN METABOLIC PANEL: CPT

## 2025-05-19 PROCEDURE — 86360 T CELL ABSOLUTE COUNT/RATIO: CPT

## 2025-05-19 PROCEDURE — 82565 ASSAY OF CREATININE: CPT

## 2025-05-19 PROCEDURE — 97161 PT EVAL LOW COMPLEX 20 MIN: CPT

## 2025-05-19 PROCEDURE — 72125 CT NECK SPINE W/O DYE: CPT

## 2025-05-19 PROCEDURE — 99232 SBSQ HOSP IP/OBS MODERATE 35: CPT

## 2025-05-19 PROCEDURE — 96374 THER/PROPH/DIAG INJ IV PUSH: CPT

## 2025-05-19 PROCEDURE — 97165 OT EVAL LOW COMPLEX 30 MIN: CPT

## 2025-05-19 PROCEDURE — 87536 HIV-1 QUANT&REVRSE TRNSCRPJ: CPT

## 2025-05-19 PROCEDURE — 84300 ASSAY OF URINE SODIUM: CPT

## 2025-05-19 PROCEDURE — 83935 ASSAY OF URINE OSMOLALITY: CPT

## 2025-05-19 PROCEDURE — 84100 ASSAY OF PHOSPHORUS: CPT

## 2025-05-19 PROCEDURE — 84484 ASSAY OF TROPONIN QUANT: CPT

## 2025-05-19 RX ORDER — ISOSORBIDE MONONITRATE 60 MG/1
1 TABLET, EXTENDED RELEASE ORAL
Qty: 0 | Refills: 0 | DISCHARGE
Start: 2025-05-19

## 2025-05-19 RX ORDER — SPIRONOLACTONE 25 MG
1 TABLET ORAL
Qty: 0 | Refills: 0 | DISCHARGE
Start: 2025-05-19

## 2025-05-19 RX ORDER — MAGNESIUM SULFATE 500 MG/ML
2 SYRINGE (ML) INJECTION ONCE
Refills: 0 | Status: COMPLETED | OUTPATIENT
Start: 2025-05-19 | End: 2025-05-19

## 2025-05-19 RX ORDER — CARVEDILOL 3.12 MG/1
12.5 TABLET, FILM COATED ORAL EVERY 12 HOURS
Refills: 0 | Status: DISCONTINUED | OUTPATIENT
Start: 2025-05-19 | End: 2025-05-19

## 2025-05-19 RX ORDER — ACETAMINOPHEN 500 MG/5ML
3 LIQUID (ML) ORAL
Qty: 0 | Refills: 0 | DISCHARGE
Start: 2025-05-19

## 2025-05-19 RX ADMIN — ISOSORBIDE MONONITRATE 30 MILLIGRAM(S): 60 TABLET, EXTENDED RELEASE ORAL at 16:32

## 2025-05-19 RX ADMIN — Medication 975 MILLIGRAM(S): at 12:20

## 2025-05-19 RX ADMIN — HEPARIN SODIUM 5000 UNIT(S): 1000 INJECTION INTRAVENOUS; SUBCUTANEOUS at 05:59

## 2025-05-19 RX ADMIN — Medication 1 TABLET(S): at 16:32

## 2025-05-19 RX ADMIN — DOLUTEGRAVIR SODIUM 50 MILLIGRAM(S): 5 TABLET, FOR SUSPENSION ORAL at 16:32

## 2025-05-19 RX ADMIN — ESCITALOPRAM OXALATE 20 MILLIGRAM(S): 20 TABLET ORAL at 13:55

## 2025-05-19 RX ADMIN — Medication 975 MILLIGRAM(S): at 13:20

## 2025-05-19 RX ADMIN — HEPARIN SODIUM 5000 UNIT(S): 1000 INJECTION INTRAVENOUS; SUBCUTANEOUS at 13:55

## 2025-05-19 RX ADMIN — CARVEDILOL 6.25 MILLIGRAM(S): 3.12 TABLET, FILM COATED ORAL at 06:00

## 2025-05-19 RX ADMIN — INSULIN LISPRO 2: 100 INJECTION, SOLUTION INTRAVENOUS; SUBCUTANEOUS at 12:16

## 2025-05-19 RX ADMIN — Medication 25 GRAM(S): at 11:38

## 2025-05-19 RX ADMIN — Medication 81 MILLIGRAM(S): at 13:55

## 2025-05-19 RX ADMIN — ARIPIPRAZOLE 5 MILLIGRAM(S): 2 TABLET ORAL at 13:55

## 2025-05-19 RX ADMIN — Medication 10 MILLIGRAM(S): at 16:32

## 2025-05-19 RX ADMIN — ATOVAQUONE 1500 MILLIGRAM(S): 750 SUSPENSION ORAL at 16:32

## 2025-05-19 NOTE — PROGRESS NOTE ADULT - PROVIDER SPECIALTY LIST ADULT
Cardiology
Cardiology
Internal Medicine
Rehab Medicine
Cardiology
Internal Medicine
Orthopedics
Cardiology
Internal Medicine
Hospitalist

## 2025-05-19 NOTE — DIETITIAN INITIAL EVALUATION ADULT - WEIGHT (KG)
78.5 Problem: Skin/Tissue Integrity  Goal: Absence of new skin breakdown  Description: 1. Monitor for areas of redness and/or skin breakdown  2. Assess vascular access sites hourly  3. Every 4-6 hours minimum:  Change oxygen saturation probe site  4. Every 4-6 hours:  If on nasal continuous positive airway pressure, respiratory therapy assess nares and determine need for appliance change or resting period. 11/7/2023 0039 by Pancho Ludwig RN  Outcome: Progressing  11/6/2023 1346 by Pat Judd RN  Outcome: Progressing     Problem: Safety - Adult  Goal: Free from fall injury  11/7/2023 0039 by Pancho Ludwig RN  Outcome: Progressing  11/6/2023 1346 by Pat Judd RN  Outcome: Progressing     Problem: ABCDS Injury Assessment  Goal: Absence of physical injury  11/7/2023 0039 by Pancho Ludwig RN  Outcome: Progressing  11/6/2023 1346 by Pat Judd RN  Outcome: Progressing     Problem: Confusion  Goal: Confusion, delirium, dementia, or psychosis is improved or at baseline  Description: INTERVENTIONS:  1. Assess for possible contributors to thought disturbance, including medications, impaired vision or hearing, underlying metabolic abnormalities, dehydration, psychiatric diagnoses, and notify attending LIP  2. [place] high risk fall precautions, as indicated  3. Provide frequent short contacts to provide reality reorientation, refocusing and direction  4. Decrease environmental stimuli, including noise as appropriate  5. Monitor and intervene to maintain adequate nutrition, hydration, elimination, sleep and activity  6. If unable to ensure safety without constant attention obtain sitter and review sitter guidelines with assigned personnel  7.  Initiate Psychosocial CNS and Spiritual Care consult, as indicated  11/6/2023 1346 by Pat Judd RN  Outcome: Progressing     Problem: Pain  Goal: Verbalizes/displays adequate comfort level or baseline comfort level  11/6/2023 1346 by Maite Gallegos

## 2025-05-19 NOTE — DIETITIAN INITIAL EVALUATION ADULT - PROBLEM SELECTOR PLAN 8
Home med: enalapril 10mg, coreg 3.125mg BID  - c/w home meds    #CAD  Hx of prior MI, denies stents in heart. On home asa 81mg and lipitor 80  - c/w home meds

## 2025-05-19 NOTE — PROGRESS NOTE ADULT - ATTENDING COMMENTS
Patient is a 74 yo Male with Pmhx of Substance Use Disorder, HIV/AIDS, NOCAD, NICM with EF: 35-40% since 2021, Reported '' Illegular HR'' for which he was started on Eliquis, DM, depression/anxiety, prostate ca s/p radiation in 2013, recent admission for recurrent PNA (sign out AMA), R distal radius fracture w/ cast immobilization, urinary incontinence, frequent falls who presented with multiple complaint including inability to take medications on his own, urinary incontinence, and dizziness. Cardiology is consulted for CV optimization and for evaluation of Syncope    Review of Studies:  - ECG 05/12/2025: NSR, AnteriorSeptal Q wave, Poor R wave progression  - TTE 03/25/2025:. Moderate left ventricular hypertrophy. Left ventricular cavity is normal in size. Left ventricular systolic function is moderately decreased with an ejection fraction visually estimated at 35 to 40 %. Regional wall motion abnormalities present. Basal and mid inferior wall and basal and mid inferolateral wall are abnormal. The left ventricular diastolic function is indeterminate.. Normal right ventricular cavity size, with normal wall thickness, and normal right ventricular systolic function. Normal left and right atrial size. Mild mitral regurgitation.  - [facility] 07/2021: Mild NOCAD; 30% dLCx; 30% pLAD;    # NICM; Chronic HF (EF 35-40%)  # NOCAD  # Polysubttance Abuse  # HIV/AIDS  # ? Afib    - Patient seen and examined at bedside. All records reviewed  - He was hospitalized at [facility] in 07/2021 with similar presentation and admitted for Syncope evaluation. At that time he was documented to have NICM with EF around 40%.   - Patient underwent ischemic eval with [facility] showing NOCAD with 30% pLAD and dLCx disease. He did not receive any intervention. NICM was deemed secondary to Polysubstance abuse and patient was started on GDMT with which he has not been fully compliant  - Event monitor was placed prior to discharge however patient did not return it. Data was therefore not gathered  - He reports following with a PCP/Cardiologist on [place] who diagnosed him with irregular HR and started him on Eliquis. He reports missing doses of his medications frequently but has not been able to take any of them since breaking his arm requiring a cast  - ROS is notable for lightheadedness and fatigue when going from sitting to standing position quickly; When he consumes too much cocaine and doesn't eat. Today patient reports improved but persistent mild dizziness even while laying down  - Tele reviewed showing NSR, Couplets, Blocked PACS, 17 Beats of NSVT but no evidence of Afib thus far  - Echo reviewed, grossly similar to that of prior Hospitalization. Patient has mildly reduced LV function with normal RV function and no significant valvular Heart Disease  - Clinically he is warm, well perfused and compensated in NAD. JVP is around 5-6 cm and there is no lower extremity edema  - At this time do not believe patient's ''syncope or near syncope'' to be arrhythmogenic in nature. Suspect large component of active Cocaine use and poor PO intake and possible vagal component. Orthostatics done on admission were positive with >10 point difference in Diastolic BP  - Patient's cardiomyopathy is chronic and he is well compensated from that standpoint  - As Orhtostatics have resolved, continue with GDMT  - Cont with Coreg to 6.25 mg po BID and uptitrate to 12.5 mg po BID if SBP persistently >140/90  - Cont Enalapril 10 mg po QD with strict holding parameters  - Cont spironolactone 25 mg po Qd as well; Continue with Imdur 30 mg po Qd  - From an atrial Fibrillation standpoint, no clear documentation of Afib on Tele however patient is adamant he has been on Eliquis. CHADVASC is 4 placing patient at higher thromboembolic risk. If bleeding risk deemed low without fear of recurrent traumatic falls, would resume Home Eliquis 5 mg po BID if diagnosis of Afib confirmed by PCP   - Ultimately patient is not interested in Cocaine use cessation. He is firm that he will continue to use. Counseled on CV side effects of cocaine and we discussed its impact on his Cardiomyopathy. He verbalized understanding  - Cardiology will continue to follow with you, please call with any questions.    - Please start anticipating outpatient follow up with Dr. Joseph Ibarra [facility], 3rd Floor  [address] [place] [address], [place] [address] [phone]
Patient is a 76 yo Male with Pmhx of Substance Use Disorder, HIV/AIDS, NOCAD, NICM with EF: 35-40% since 2021, Reported '' Illegular HR'' for which he was started on Eliquis, DM, depression/anxiety, prostate ca s/p radiation in 2013, recent admission for recurrent PNA (sign out AMA), R distal radius fracture w/ cast immobilization, urinary incontinence, frequent falls who presented with multiple complaint including inability to take medications on his own, urinary incontinence, and dizziness. Cardiology was consulted for CV optimization and for evaluation of Syncope    Review of Studies:  - ECG 05/12/2025: NSR, AnteriorSeptal Q wave, Poor R wave progression  - TTE 03/25/2025:. Moderate left ventricular hypertrophy. Left ventricular cavity is normal in size. Left ventricular systolic function is moderately decreased with an ejection fraction visually estimated at 35 to 40 %. Regional wall motion abnormalities present. Basal and mid inferior wall and basal and mid inferolateral wall are abnormal. The left ventricular diastolic function is indeterminate.. Normal right ventricular cavity size, with normal wall thickness, and normal right ventricular systolic function. Normal left and right atrial size. Mild mitral regurgitation.  - [facility] 07/2021: Mild NOCAD; 30% dLCx; 30% pLAD;    # NICM; Chronic HF (EF 35-40%)  # NOCAD  # Polysubttance Abuse  # HIV/AIDS  # ? Afib    - Patient seen and examined at bedside. All records reviewed  - He was hospitalized at [facility] in 07/2021 with similar presentation and admitted for Syncope evaluation. At that time he was documented to have NICM with EF around 40%.   - Patient underwent ischemic eval with [facility] showing NOCAD with 30% pLAD and dLCx disease. He did not receive any intervention. NICM was deemed secondary to Polysubstance abuse and patient was started on GDMT with which he has not been fully compliant  - Event monitor was placed prior to discharge however patient did not return it. Data was therefore not gathered  - He reports following with a PCP/Cardiologist on [place] who diagnosed him with irregular HR and started him on Eliquis. He reports missing doses of his medications frequently but has not been able to take any of them since breaking his arm requiring a cast  - ROS is notable for lightheadedness and fatigue when going from sitting to standing position quickly; When he consumes too much cocaine and doesn't eat. Patient reports feeling overall improved today  - On Tele this hospitalization, patient was in NSR, Couplets, Blocked PACS, 17 Beats of NSVT but no evidence of Afib   - Echo reviewed, grossly similar to that of prior Hospitalization. Patient has mildly reduced LV function with normal RV function and no significant valvular Heart Disease  - Clinically he is warm, well perfused and compensated in NAD. JVP is around 5-6 cm and there is no lower extremity edema  - At this time do not believe patient's ''syncope or near syncope'' to be arrhythmogenic in nature. Suspect large component of active Cocaine use and poor PO intake and possible vagal component. Orthostatics done on admission were positive with >10 point difference in Diastolic BP  - Patient's cardiomyopathy is chronic and he is well compensated from that standpoint  - Cont with Coreg to 12.5mg po BID , Cont Enalapril 10 mg po QD with strict holding parameters  - Cont spironolactone 25 mg po Qd as well; Continue with Imdur 30 mg po Qd  - From an atrial Fibrillation standpoint, no clear documentation of Afib on Tele however patient is adamant he has been on Eliquis. CHADVASC is 4 placing patient at higher thromboembolic risk. If bleeding risk deemed low without fear of recurrent traumatic falls, would resume Home Eliquis 5 mg po BID if diagnosis of Afib confirmed by PCP   - Primary team unable to confirm Hx of Afib with PCP however patient has scheduled follow up appointment with him within 1 week of DC  - Ultimately patient is not interested in Cocaine use cessation. He is firm that he will continue to use. Counseled on CV side effects of cocaine and we discussed its impact on his Cardiomyopathy. He verbalized understanding  - Cardiology will continue to follow with you, please call with any questions.    - Please ensure outpatient follow up with Dr. Joseph Ibarra [facility], 3rd Floor  [address] [place] [address], [address] [place] [address] [phone]   - Please call cardiology with any questions
Patient seen and examined.  Agree with resident note as above.  Meds, labs and vitals all reviewed.  Patient is a 75M hx HIV/AIDS, CAD, T2DM, CHF (EF35-40%), depression/anxiety, prostate ca s/p radiation in 2013, recent admission for recurrent PNA (signed out AMA), R distal radius fracture w/ cast immobilization, urinary incontinence, frequent falls presenting for multiple complaints including inability to take medications on his own, urinary incontinence, dizziness.  Awaiting [facility] placement.  Noted to have up-trending creatinine this morning.  Bladder scan, urine lytes sent. On no new medications.   Patient is on Spironolactone and Enalapril. Monitor, especially if renal function worsens.
Patient is a 74 yo Male with Pmhx of Substance Use Disorder, HIV/AIDS, NOCAD, NICM with EF: 35-40% since 2021, Reported '' Illegular HR'' for which he was started on Eliquis, DM, depression/anxiety, prostate ca s/p radiation in 2013, recent admission for recurrent PNA (sign out AMA), R distal radius fracture w/ cast immobilization, urinary incontinence, frequent falls who presented with multiple complaint including inability to take medications on his own, urinary incontinence, and dizziness. Cardiology is consulted for CV optimization and for evaluation of Syncope    Review of Studies:  - ECG 05/12/2025: NSR, AnteriorSeptal Q wave, Poor R wave progression  - TTE 03/25/2025:. Moderate left ventricular hypertrophy. Left ventricular cavity is normal in size. Left ventricular systolic function is moderately decreased with an ejection fraction visually estimated at 35 to 40 %. Regional wall motion abnormalities present. Basal and mid inferior wall and basal and mid inferolateral wall are abnormal. The left ventricular diastolic function is indeterminate.. Normal right ventricular cavity size, with normal wall thickness, and normal right ventricular systolic function. Normal left and right atrial size. Mild mitral regurgitation.  - [facility] 07/2021: Mild NOCAD; 30% dLCx; 30% pLAD;    # NICM; Chronic HF (EF 35-40%)  # NOCAD  # Polysubttance Abuse  # HIV/AIDS  # ? Afib    - Patient seen and examined at bedside. All records reviewed  - He was hospitalized at [facility] in 07/2021 with similar presentation and admitted for Syncope evaluation. At that time he was documented to have NICM with EF around 40%.   - Patient underwent ischemic eval with [facility] showing NOCAD with 30% pLAD and dLCx disease. He did not receive any intervention. NICM was deemed secondary to Polysubstance abuse and patient was started on GDMT with which he has not been fully compliant  - Event monitor was placed prior to discharge however patient did not return it. Data was therefore not gathered  - He reports following with a PCP/Cardiologist on [place] who diagnosed him with irregular HR and started him on Eliquis. He reports missing doses of his medications frequently but has not been able to take any of them since breaking his arm requiring a cast  - ROS is notable for lightheadedness and fatigue when going from sitting to standing position quickly; When he consumes too much cocaine and doesn't eat. Today patient reports improved but persistent mild dizziness even while laying down  - Tele reviewed showing NSR, Couplets, Blocked PACS, 17 Beats of NSVT but no evidence of Afib thus far  - Echo reviewed, grossly similar to that of prior Hospitalization. Patient has mildly reduced LV function with normal RV function and no significant valvular Heart Disease  - Clinically he is warm, well perfused and compensated in NAD. JVP is around 5-6 cm and there is no lower extremity edema  - Orthostatics today were negative  - At this time do not believe patient's ''syncope or near syncope'' to be arrhythmogenic in nature. Suspect large component of active Cocaine use and poor PO intake and possible vagal component. Orthostatics done on admission were positive with >10 point difference in Diastolic BP  - Patient's cardiomyopathy is chronic and he is well compensated from that standpoint  - Would increase Coreg to 6.25 mg po BID and cont Enalapril 10 mg po QD with strict holding parameters  - Cont spironolactone 25 mg po Qd as well  - From an atrial Fibrillation standpoint, no clear documentation of Afib on Tele however patient is adamant he has been on Eliquis. CHADVASC is 4 placing patient at higher thromboembolic risk. If bleeding risk deemed low without fear of recurrent traumatic falls, would resume Home Eliquis 5 mg po BID if diagnosis of Afib confirmed by PCP   - Ultimately patient is not interested in Cocaine use cessation. He is firm that he will continue to use. Counseled on CV side effects of cocaine and we discussed its impact on his Cardiomyopathy. He verbalized understanding  - Cardiology will continue to follow with you, please call with any questions.
Pt. seen and examined by me earlier today; I have read Dr. Logan's note, I agree w/ his findings and plan of care as documented; case d/w Cardiology team; Pt. reports he fells well today, c/o R arm pain controlled w/ Tylenol, occasional cough; Pt. otherwise denies F/C, CP, SOB, palpitations; Pt. confirms he never lost consciousness; Pt. reports he has been unable to take his home meds for several months, and recent fracture/cast has made it difficult for home to complete ADLs; VSS; exam per PGY-1, Pt. comfortable on exam, OOB to chair, A&Ox3, appears euvolemic, irregular S1S2, no murmur, R arm in cast; labs and images reviewed, pancytopenia stable at baseline, sCr improved but also at baseline; etiology of fall likely mechanical by hx., Utox (+) cocaine and THC possibly contributing; given cardiac hx, Cardiology consulted, on telemetry for now, may need additional work-up (although has reportedly had an extensive syncope work-up in the past); PO intake encouraged, will consult PT/OT, CM/SW/SBIRT, and Nutrition; given prolonged noncompliance w/ ARVs, CD4 <200, will consult ID, f/u viral load
Patient seen and examined at bedside on 05/16/2025.  I have read the resident's note above and agree with plan of care as documented above.   Patient reports frequent falls, as well as frequent bouts of heart palpitations, some of which lead to falls/"passing out". Reports had a holter monitor a few years ago but forgot to return it and have it read. plan to as cardiology about another attempt at holter monitor. Resident team attempting to connect with PCP to determine history of afib. has PCP appointment coming up. will hold his eliquis on dc, and patient will discuss with pcp. no afib seen on tele during this admission.     DISPO: [facility]  Discussed with Housestaff.
Pt. seen and examined by me earlier today; I have read Dr. Pradhan's note, I agree w/ her findings and plan of care as documented; will try to obtain collateral info from PCP re: reported A-fib diagnosis; holding DOAC for now, given recurrent falls and no e/o A-fib on telemetry so far this admission; cont. GDMT titration per Cardiology recs; cont. ARVs and Mepron ppx (sulfa allergy noted) per ID recs; appreciate Ortho input, will cont. cast immobilization and NWB of RUE until 5/28/25, per recs; will arrange outpatient Ortho f/u 5/29 @ resident clinic; Pt. is medically ready for d/c to [facility]
Pt. seen and examined by me earlier today; I have read Dr. Bean's note, I agree w/ her findings and plan of care as documented; cont. GDMT titration per Cardiology recs; will try to obtain collateral info from PCP re: reported A-fib diagnosis; holding DOAC for now, given recurrent falls and no e/o A-fib on telemetry so far this admission; cont. ARVs and Mepron ppx (sulfa allergy noted) per ID recs; appreciate Ortho input, will cont. cast immobilization and NWB of RUE until 5/28/25, per recs; will arrange outpatient Ortho f/u 5/29 @ resident clinic; no need for further telemetry monitoring; Pt. is medically ready for d/c to [facility]

## 2025-05-19 NOTE — DIETITIAN INITIAL EVALUATION ADULT - OTHER CALCULATIONS
Estimated needs based on dosing wt as within %  pounds / 80.9 kilograms (97%). Needs adjusted for age, HF, COPD, and HIV.  Defer fluids to team.

## 2025-05-19 NOTE — DIETITIAN INITIAL EVALUATION ADULT - PROBLEM SELECTOR PLAN 2
Pt reporting fall yesterday, fell onto right arm, reporting head strike. CT head negative. Per review of previous records, pt has had multiple falls over many years, often had workup initiated, and would AMA prior to completion of workup. States that his falls have been occuring since MI in 2019, after which he became dizzy with rapid movements. Head CT neg for acute changes, and central causes of vertigo. Seems that patient may have been on eliquis, unclear why, does not know if he has hx of DVT. [place]ale hallpike/epley negative.  - PT eval  - f/u orthostatics  - tele monitoring  - f/u ecg  - troponin trend  - dc eliquis as pt with frequent falls

## 2025-05-19 NOTE — PROGRESS NOTE ADULT - PROBLEM SELECTOR PLAN 10
Patient with history of COPD. On home symbicort inhaler, however reports that he does not take it because it makes him short of breath and has not picked up from pharmacy. Given duonebs x3 in ED, currently without wheezing on physical exam. Does not appear to be in exacerbation, no increased cough, shortness of breath, sputum production.  - continue with home med  - duonebs PRN Patient with history of COPD. On home symbicort inhaler, however reports that he does not take it because it makes him short of breath and has not picked up from pharmacy. Given duonebs x3 in ED, currently without wheezing on physical exam. Does not appear to be in exacerbation, no increased cough, shortness of breath, sputum production.  - initially had continued with home med however pt asked for it to be dc because it did not work to help his symptoms and made him short of breath  - markel PRN

## 2025-05-19 NOTE — PROGRESS NOTE ADULT - PROBLEM SELECTOR PLAN 9
Plt 111 on admission, per review of previous labs, has thrombocytopenia at baseline. No petechiae, purpura, ecchymoses, no signs of active bleeding. Likely chronic iso HIV.   - trend CBC  - active T&S

## 2025-05-19 NOTE — PROGRESS NOTE ADULT - PROBLEM SELECTOR PROBLEM 4
BRENNAN (acute kidney injury)

## 2025-05-19 NOTE — DIETITIAN INITIAL EVALUATION ADULT - OTHER INFO
75M with PMH of HIV/AIDS, CAD, T2DM, CHF (EF35-40%), COPD, depression/anxiety, prostate ca status post radiation in 2013, recent admission for recurrent PNA (sign out AMA), R distal radius fracture with cast immobilization, urinary incontinence, and frequent falls presenting for multiple complaints, admitted for inability to take medications on his own, urinary incontinence, dizziness.     Pt seen on 7WO for assessment. Labs and medication orders reviewed. Electrolytes WNL, BUN/Cr 36/1.56 <high>, POC blood glucose (5/18-5/19) 115-168, HgbA1c 5.1% (5/12), ABS CD4 (5/12) 115 <low>. On Consistent Carbohydrate diet. Pt resting comfortably in bed on visit this AM. Reports good appetite and intake, denies issues procuring adequate food/meals PTA. RD observed pt completing breakfast tray of eggs, turkey sausage, and potatoes. Pt denies difficulty chewing/swallowing. Reports UBW ~175 pounds consistent with admission wt 173 pounds / 78.5 kilograms, endorses wt stability. Pt denies nausea/vomiting/diarrhea/constipation, last BM yesterday. No abdominal distension/discomfort noted, no pain reported. Pt confirms strawberry, shellfish, and peach allergies - documented in chart; notes concern for strawberry garnish on tray yesterday - RD communicated to dietary management, manager check added to all meal trays, Food and  to provide service recovery. No Moravian/ethnic/cultural food preferences noted. No pressure injuries documented, 2+ right arm edema noted, Kishor score 20. See nutrition recommendations. RD to remain available.

## 2025-05-19 NOTE — DISCHARGE NOTE NURSING/CASE MANAGEMENT/SOCIAL WORK - FINANCIAL ASSISTANCE
[facility] provides services at a reduced cost to those who are determined to be eligible through [facility]’s financial assistance program. Information regarding [facility]’s financial assistance program can be found by going to [email or web address] or by calling [phone].

## 2025-05-19 NOTE — PROGRESS NOTE ADULT - TIME BILLING
As above
Time inclusive of chart review, medication ordering, discussion with primary team, clinical documentation, and communication with family/caregiver
Bedside exam and interview.  Reviewed of laboratory data, radiology results, consultants' recommendations.   Discussion with patient/caregivers/housestaff and interdisciplinary staff (such as , social workers, etc).  Documentation of encounter.  Interventions were performed as documented above.  Excludes teaching time and/or separately reported services.
coordination of care; preparing to see Pt.; interviewing Pt.; examining Pt.; reviewing labs and images; documentation in [place]; d/w Medicine resident on rounds
Bedside exam and interview.  Reviewed of laboratory data, radiology results, consultants' recommendations.   Discussion with patient/caregivers/housestaff and interdisciplinary staff (such as , social workers, etc).  Documentation of encounter.  Interventions were performed as documented above.  Excludes teaching time and/or separately reported services.
coordination of care; preparing to see Pt.; interviewing Pt.; examining Pt.; reviewing labs and images; documentation in [place]; d/w Medicine resident on rounds
coordination of care; preparing to see Pt.; interviewing Pt.; examining Pt.; reviewing labs and images; documentation in [place]; d/w Medicine resident on rounds

## 2025-05-19 NOTE — DIETITIAN INITIAL EVALUATION ADULT - ADD RECOMMEND
1. Continue Consistent Carbohydrate diet.   >>Encourage and monitor PO intake. Dexter dietary preferences as able.   2. Monitor GI tolerance, weight trends, labs, and skin integrity.  3. Defer bowel and pain regimens to team.   4. RD to remain available for diet education/intervention prn.

## 2025-05-19 NOTE — DIETITIAN INITIAL EVALUATION ADULT - PERSON TAUGHT/METHOD
Educated on importance of high kcal/protein intake in setting of increased physiological demand for conditions. Encourage continued adequate intake of meals and frequent snacking. Pt aware RD remains available for additional questions/concerns./verbal instruction/patient instructed

## 2025-05-19 NOTE — PROGRESS NOTE ADULT - SUBJECTIVE AND OBJECTIVE BOX
OVERNIGHT EVENTS:    SUBJECTIVE / INTERVAL HPI: Patient seen and examined at bedside.     VITAL SIGNS:  Vital Signs Last 24 Hrs  T(C): 36.5 (19 May 2025 06:50), Max: 36.7 (18 May 2025 09:30)  T(F): 97.7 (19 May 2025 06:50), Max: 98 (18 May 2025 09:30)  HR: 45 (19 May 2025 06:50) (45 - 77)  BP: 162/93 (19 May 2025 06:50) (123/62 - 182/81)  BP(mean): 116 (19 May 2025 06:50) (82 - 116)  RR: 18 (19 May 2025 06:50) (16 - 18)  SpO2: 93% (19 May 2025 06:50) (92% - 93%)    Parameters below as of 19 May 2025 06:50  Patient On (Oxygen Delivery Method): room air      I&O's Summary    18 May 2025 07:01  -  19 May 2025 07:00  --------------------------------------------------------  IN: 0 mL / OUT: 800 mL / NET: -800 mL        PHYSICAL EXAM:  General: WDWN  HEENT: NC/AT; PERRL, anicteric sclera; MMM  Neck: supple  Cardiovascular: +S1/S2; RRR  Respiratory: CTA B/L; no W/R/R  Gastrointestinal: soft, NT/ND; +BSx4  Extremities: WWP; no edema, clubbing or cyanosis  Vascular: 2+ radial, DP/PT pulses B/L  Neurological: AAOx3; no focal deficits    MEDICATIONS:  MEDICATIONS  (STANDING):  ARIPiprazole 5 milliGRAM(s) Oral every 24 hours  aspirin  chewable 81 milliGRAM(s) Oral every 24 hours  atorvastatin 80 milliGRAM(s) Oral at bedtime  atovaquone  Suspension 1500 milliGRAM(s) Oral every 24 hours  carvedilol 6.25 milliGRAM(s) Oral every 12 hours  dextrose 5%. 1000 milliLiter(s) (50 mL/Hr) IV Continuous <Continuous>  dextrose 5%. 1000 milliLiter(s) (100 mL/Hr) IV Continuous <Continuous>  dextrose 50% Injectable 25 Gram(s) IV Push once  dextrose 50% Injectable 12.5 Gram(s) IV Push once  dextrose 50% Injectable 25 Gram(s) IV Push once  dolutegravir 50 milliGRAM(s) Oral every 24 hours  emtricitabine 200 mG/tenofovir alafenamide 25 mG (DESCOVY) Tablet 1 Tablet(s) Oral every 24 hours  enalapril 10 milliGRAM(s) Oral every 24 hours  escitalopram 20 milliGRAM(s) Oral every 24 hours  fluticasone propionate/ salmeterol 250-50 MICROgram(s) Diskus 1 Dose(s) Inhalation two times a day  glucagon  Injectable 1 milliGRAM(s) IntraMuscular once  heparin   Injectable 5000 Unit(s) SubCutaneous every 8 hours  insulin lispro (ADMELOG) corrective regimen sliding scale   SubCutaneous Before meals and at bedtime  isosorbide   mononitrate ER Tablet (IMDUR) 30 milliGRAM(s) Oral every 24 hours  spironolactone 25 milliGRAM(s) Oral every 24 hours  tamsulosin 0.4 milliGRAM(s) Oral at bedtime    MEDICATIONS  (PRN):  acetaminophen     Tablet .. 975 milliGRAM(s) Oral every 6 hours PRN Moderate Pain (4 - 6)  albuterol/ipratropium for Nebulization 3 milliLiter(s) Nebulizer every 6 hours PRN Wheezing  benzonatate 100 milliGRAM(s) Oral every 8 hours PRN Cough  dextrose Oral Gel 15 Gram(s) Oral once PRN Blood Glucose LESS THAN 70 milliGRAM(s)/deciliter  hydrocodone/homatropine Syrup 5 milliLiter(s) Oral every 6 hours PRN Cough  loperamide 2 milliGRAM(s) Oral every 8 hours PRN Diarrhea  melatonin 5 milliGRAM(s) Oral at bedtime PRN Sleep      ALLERGIES:  Allergies    shellfish (Unknown)  sulfa drugs (Rash)  strawberry (Rash)  Peaches (Rash)  penicillins (Rash)    Intolerances        LABS:                        13.7   3.92  )-----------( 129      ( 19 May 2025 05:30 )             40.4     05-19    135  |  101  |  36[H]  ----------------------------<  131[H]  4.6   |  19[L]  |  1.56[H]    Ca    9.3      19 May 2025 05:30  Phos  4.0     05-19  Mg     1.7     05-19        Urinalysis Basic - ( 19 May 2025 05:30 )    Color: x / Appearance: x / SG: x / pH: x  Gluc: 131 mg/dL / Ketone: x  / Bili: x / Urobili: x   Blood: x / Protein: x / Nitrite: x   Leuk Esterase: x / RBC: x / WBC x   Sq Epi: x / Non Sq Epi: x / Bacteria: x      CAPILLARY BLOOD GLUCOSE      POCT Blood Glucose.: 115 mg/dL (18 May 2025 21:40)      RADIOLOGY & ADDITIONAL TESTS: Reviewed.   OVERNIGHT EVENTS: no overnight events    SUBJECTIVE / INTERVAL HPI: Patient seen and examined at bedside. Feeling well with no complaints, occasionally has hand pain that's well controlled with tylenol.    VITAL SIGNS:  Vital Signs Last 24 Hrs  T(C): 36.5 (19 May 2025 06:50), Max: 36.7 (18 May 2025 09:30)  T(F): 97.7 (19 May 2025 06:50), Max: 98 (18 May 2025 09:30)  HR: 45 (19 May 2025 06:50) (45 - 77)  BP: 162/93 (19 May 2025 06:50) (123/62 - 182/81)  BP(mean): 116 (19 May 2025 06:50) (82 - 116)  RR: 18 (19 May 2025 06:50) (16 - 18)  SpO2: 93% (19 May 2025 06:50) (92% - 93%)    Parameters below as of 19 May 2025 06:50  Patient On (Oxygen Delivery Method): room air      I&O's Summary    18 May 2025 07:01  -  19 May 2025 07:00  --------------------------------------------------------  IN: 0 mL / OUT: 800 mL / NET: -800 mL        PHYSICAL EXAM:  Constitutional: resting comfortably in bed; NAD, disheveled  HEENT: NC/AT, PERRL, EOMI, anicteric sclera, MMM  Neck: supple; no JVD or thyromegaly  Respiratory: unlabored breathing, CTA B/L; no Rhonchi/Crackles, no retractions or use of accessory muscles   Cardiac: +S1/S2; RRR; no M/R/G  Gastrointestinal: soft, NT/ND; no rebound or guarding; +BSx4  Extremities: WWP, no clubbing or cyanosis; no peripheral edema  Musculoskeletal: R hand/wrist immobilized in short cast, swelling of fingers, intact sensation  Vascular: 2+ radial, DP/PT pulses B/L  Dermatologic: skin warm, dry and intact; no rashes, wounds, or scars  Neurologic: AAOx3; CNII-XII grossly intact; no focal deficits  Psychiatric: affect and characteristics of appearance, verbalizations, behaviors are appropriate, denies SI/HI/AH/VH    MEDICATIONS:  MEDICATIONS  (STANDING):  ARIPiprazole 5 milliGRAM(s) Oral every 24 hours  aspirin  chewable 81 milliGRAM(s) Oral every 24 hours  atorvastatin 80 milliGRAM(s) Oral at bedtime  atovaquone  Suspension 1500 milliGRAM(s) Oral every 24 hours  carvedilol 6.25 milliGRAM(s) Oral every 12 hours  dextrose 5%. 1000 milliLiter(s) (50 mL/Hr) IV Continuous <Continuous>  dextrose 5%. 1000 milliLiter(s) (100 mL/Hr) IV Continuous <Continuous>  dextrose 50% Injectable 25 Gram(s) IV Push once  dextrose 50% Injectable 12.5 Gram(s) IV Push once  dextrose 50% Injectable 25 Gram(s) IV Push once  dolutegravir 50 milliGRAM(s) Oral every 24 hours  emtricitabine 200 mG/tenofovir alafenamide 25 mG (DESCOVY) Tablet 1 Tablet(s) Oral every 24 hours  enalapril 10 milliGRAM(s) Oral every 24 hours  escitalopram 20 milliGRAM(s) Oral every 24 hours  fluticasone propionate/ salmeterol 250-50 MICROgram(s) Diskus 1 Dose(s) Inhalation two times a day  glucagon  Injectable 1 milliGRAM(s) IntraMuscular once  heparin   Injectable 5000 Unit(s) SubCutaneous every 8 hours  insulin lispro (ADMELOG) corrective regimen sliding scale   SubCutaneous Before meals and at bedtime  isosorbide   mononitrate ER Tablet (IMDUR) 30 milliGRAM(s) Oral every 24 hours  spironolactone 25 milliGRAM(s) Oral every 24 hours  tamsulosin 0.4 milliGRAM(s) Oral at bedtime    MEDICATIONS  (PRN):  acetaminophen     Tablet .. 975 milliGRAM(s) Oral every 6 hours PRN Moderate Pain (4 - 6)  albuterol/ipratropium for Nebulization 3 milliLiter(s) Nebulizer every 6 hours PRN Wheezing  benzonatate 100 milliGRAM(s) Oral every 8 hours PRN Cough  dextrose Oral Gel 15 Gram(s) Oral once PRN Blood Glucose LESS THAN 70 milliGRAM(s)/deciliter  hydrocodone/homatropine Syrup 5 milliLiter(s) Oral every 6 hours PRN Cough  loperamide 2 milliGRAM(s) Oral every 8 hours PRN Diarrhea  melatonin 5 milliGRAM(s) Oral at bedtime PRN Sleep      ALLERGIES:  Allergies    shellfish (Unknown)  sulfa drugs (Rash)  strawberry (Rash)  Peaches (Rash)  penicillins (Rash)    Intolerances        LABS:                        13.7   3.92  )-----------( 129      ( 19 May 2025 05:30 )             40.4     05-19    135  |  101  |  36[H]  ----------------------------<  131[H]  4.6   |  19[L]  |  1.56[H]    Ca    9.3      19 May 2025 05:30  Phos  4.0     05-19  Mg     1.7     05-19        Urinalysis Basic - ( 19 May 2025 05:30 )    Color: x / Appearance: x / SG: x / pH: x  Gluc: 131 mg/dL / Ketone: x  / Bili: x / Urobili: x   Blood: x / Protein: x / Nitrite: x   Leuk Esterase: x / RBC: x / WBC x   Sq Epi: x / Non Sq Epi: x / Bacteria: x      CAPILLARY BLOOD GLUCOSE      POCT Blood Glucose.: 115 mg/dL (18 May 2025 21:40)      RADIOLOGY & ADDITIONAL TESTS: Reviewed.

## 2025-05-19 NOTE — DISCHARGE NOTE NURSING/CASE MANAGEMENT/SOCIAL WORK - NSDCFUADDAPPT_GEN_ALL_CORE_FT
(1) Please follow up with your PCP, Dr. Paul Noguera as scheduled.    (2) Please follow up with Dr. Joseph Ibarra (CARDIOLOGY) on May 28, 2025 at 9:30 AM as scheduled. At this appointment, it is important to discuss holter monitor with your provider.   Location: [facility], [address], [address] [place] [address]  Phone: [phone]    (3) Please follow up with (UROLOGY) on May 29, 2025 at 11:30am as scheduled for your urinary incontinence.   Location: [address] [place] [address], 5th Floor  [place] [address]  Phone: [phone]    (4) Please go to [facility] to get your cast removed on 5/29 at any time after 12pm (you can go after your urology appointment).  Location: [address] [place] [address]  **This clinic is open 12pm onward on Thursdays. The phone number is [phone], should you have any questions. For the cast removal, you do not need an appointment, you are able to just walk in.

## 2025-05-19 NOTE — DIETITIAN INITIAL EVALUATION ADULT - PROBLEM SELECTOR PLAN 5
(CD4 74, VL 4516 23/2025). On dolutegravir (Tivicay) and emtricitabine-tenofovir (Descovy) and Atovaquone. Previously took these medications regularly, however has not taken in 2-3 weeks as he is unable to open pill bottles due to hand fracture.  - c/w Atovaquone for PCP ppx (has sulfa allergy)   - c/w home med Tivicay and Descovy after obtaining baseline CD4 / viral load  - f/u AM viral load

## 2025-05-19 NOTE — DIETITIAN INITIAL EVALUATION ADULT - PROBLEM SELECTOR PLAN 1
Pt with R distal radial fracture 7 in early April, splinted and then placed in short cast by ortho. States he fell on the hand yesterday and has some pain. Also has had some swelling of the hand for a few days. Patient was unable to make his ortho follow ups outpatient. Ortho consulted in ED, they have no concern for compartment at this time.   - elevation for swelling  - pain control   --> tylenol 650mg q6 standing  - PT/OT  - f/u 4/15 with ortho for cast removal and transition to wrist brace

## 2025-05-19 NOTE — PROGRESS NOTE ADULT - PROBLEM SELECTOR PROBLEM 5
Human immunodeficiency virus (HIV)

## 2025-05-19 NOTE — PROGRESS NOTE ADULT - PROBLEM SELECTOR PROBLEM 3
Patient and wife instructed on care of right groin site post arteriogram.  Given written instructions.  Both stated understanding.  
Returned post arteriogram.  Awake and alert.  Puncture site to right groin clear with gauze and tegaderm dressing in place.  Denies any c/o pain.  Wife at bedside.  
Urinary incontinence

## 2025-05-19 NOTE — DISCHARGE NOTE NURSING/CASE MANAGEMENT/SOCIAL WORK - PATIENT PORTAL LINK FT
You can access the FollowMyHealth Patient Portal offered by [facility] by registering at the following website: [email or web address]. By joining [employer]’s FollowMyHealth portal, you will also be able to view your health information using other applications (apps) compatible with our system.

## 2025-05-19 NOTE — DIETITIAN INITIAL EVALUATION ADULT - PROBLEM SELECTOR PLAN 7
Home med per prior chart review: metformin 500mg, linagliptin 5mg qd.   - hold home meds  - f/u AM a1c  - mISS  - carb controlled diet

## 2025-05-19 NOTE — DIETITIAN INITIAL EVALUATION ADULT - PERTINENT LABORATORY DATA
05-19    135  |  101  |  36[H]  ----------------------------<  131[H]  4.6   |  19[L]  |  1.56[H]    Ca    9.3      19 May 2025 05:30  Phos  4.0     05-19  Mg     1.7     05-19    POCT Blood Glucose.: 168 mg/dL (05-19-25 @ 12:08)  A1C with Estimated Average Glucose Result: 5.1 % (05-12-25 @ 16:00)  A1C with Estimated Average Glucose Result: 5.6 % (03-24-25 @ 05:30)

## 2025-05-19 NOTE — DIETITIAN INITIAL EVALUATION ADULT - PERTINENT MEDS FT
MEDICATIONS  (STANDING):  ARIPiprazole 5 milliGRAM(s) Oral every 24 hours  aspirin  chewable 81 milliGRAM(s) Oral every 24 hours  atorvastatin 80 milliGRAM(s) Oral at bedtime  atovaquone  Suspension 1500 milliGRAM(s) Oral every 24 hours  carvedilol 12.5 milliGRAM(s) Oral every 12 hours  dextrose 5%. 1000 milliLiter(s) (50 mL/Hr) IV Continuous <Continuous>  dextrose 5%. 1000 milliLiter(s) (100 mL/Hr) IV Continuous <Continuous>  dextrose 50% Injectable 25 Gram(s) IV Push once  dextrose 50% Injectable 25 Gram(s) IV Push once  dextrose 50% Injectable 12.5 Gram(s) IV Push once  dolutegravir 50 milliGRAM(s) Oral every 24 hours  emtricitabine 200 mG/tenofovir alafenamide 25 mG (DESCOVY) Tablet 1 Tablet(s) Oral every 24 hours  enalapril 10 milliGRAM(s) Oral every 24 hours  escitalopram 20 milliGRAM(s) Oral every 24 hours  fluticasone propionate/ salmeterol 250-50 MICROgram(s) Diskus 1 Dose(s) Inhalation two times a day  glucagon  Injectable 1 milliGRAM(s) IntraMuscular once  heparin   Injectable 5000 Unit(s) SubCutaneous every 8 hours  insulin lispro (ADMELOG) corrective regimen sliding scale   SubCutaneous Before meals and at bedtime  isosorbide   mononitrate ER Tablet (IMDUR) 30 milliGRAM(s) Oral every 24 hours  spironolactone 25 milliGRAM(s) Oral every 24 hours  tamsulosin 0.4 milliGRAM(s) Oral at bedtime    MEDICATIONS  (PRN):  acetaminophen     Tablet .. 975 milliGRAM(s) Oral every 6 hours PRN Moderate Pain (4 - 6)  albuterol/ipratropium for Nebulization 3 milliLiter(s) Nebulizer every 6 hours PRN Wheezing  benzonatate 100 milliGRAM(s) Oral every 8 hours PRN Cough  dextrose Oral Gel 15 Gram(s) Oral once PRN Blood Glucose LESS THAN 70 milliGRAM(s)/deciliter  hydrocodone/homatropine Syrup 5 milliLiter(s) Oral every 6 hours PRN Cough  loperamide 2 milliGRAM(s) Oral every 8 hours PRN Diarrhea  melatonin 5 milliGRAM(s) Oral at bedtime PRN Sleep

## 2025-05-19 NOTE — PROGRESS NOTE ADULT - PROBLEM SELECTOR PLAN 12
Plan:  F: PO   E: replete K<4, Mg<2  N: cc diet  VTE Prophylaxis: heparin  C: Full Code  D: F

## 2025-05-19 NOTE — DIETITIAN INITIAL EVALUATION ADULT - PROBLEM SELECTOR PLAN 6
EF 35-40%. Home med: Carvedilol 3.125mg BID. No signs of volume overload on exam, CXR clear, no s/s of acute HF exacerbation  - c/w carvedilol 3.125 mg BID

## 2025-05-28 ENCOUNTER — APPOINTMENT (OUTPATIENT)
Dept: HEART AND VASCULAR | Facility: CLINIC | Age: 75
End: 2025-05-28

## 2025-05-29 ENCOUNTER — APPOINTMENT (OUTPATIENT)
Dept: UROLOGY | Facility: CLINIC | Age: 75
End: 2025-05-29

## 2025-05-30 ENCOUNTER — NON-APPOINTMENT (OUTPATIENT)
Age: 75
End: 2025-05-30

## 2025-05-30 ENCOUNTER — APPOINTMENT (OUTPATIENT)
Dept: UROLOGY | Facility: CLINIC | Age: 75
End: 2025-05-30
Payer: MEDICARE

## 2025-05-30 VITALS
DIASTOLIC BLOOD PRESSURE: 54 MMHG | HEIGHT: 72 IN | OXYGEN SATURATION: 97 % | WEIGHT: 165 LBS | SYSTOLIC BLOOD PRESSURE: 95 MMHG | TEMPERATURE: 98.7 F | HEART RATE: 95 BPM | BODY MASS INDEX: 22.35 KG/M2

## 2025-05-30 PROCEDURE — 99203 OFFICE O/P NEW LOW 30 MIN: CPT

## 2025-05-30 PROCEDURE — G2211 COMPLEX E/M VISIT ADD ON: CPT

## 2025-05-30 PROCEDURE — 51798 US URINE CAPACITY MEASURE: CPT

## 2025-06-05 ENCOUNTER — OUTPATIENT (OUTPATIENT)
Dept: OUTPATIENT SERVICES | Facility: HOSPITAL | Age: 75
LOS: 1 days | End: 2025-06-05
Payer: MEDICARE

## 2025-06-05 ENCOUNTER — APPOINTMENT (OUTPATIENT)
Dept: ORTHOPEDIC SURGERY | Facility: CLINIC | Age: 75
End: 2025-06-05
Payer: MEDICARE

## 2025-06-05 ENCOUNTER — RESULT REVIEW (OUTPATIENT)
Age: 75
End: 2025-06-05

## 2025-06-05 VITALS
SYSTOLIC BLOOD PRESSURE: 118 MMHG | BODY MASS INDEX: 19.09 KG/M2 | OXYGEN SATURATION: 82 % | WEIGHT: 165 LBS | HEART RATE: 32 BPM | HEIGHT: 78 IN | DIASTOLIC BLOOD PRESSURE: 78 MMHG

## 2025-06-05 DIAGNOSIS — Z95.9 PRESENCE OF CARDIAC AND VASCULAR IMPLANT AND GRAFT, UNSPECIFIED: Chronic | ICD-10-CM

## 2025-06-05 PROBLEM — S52.501A FRACTURE OF RADIUS, DISTAL, RIGHT, CLOSED: Status: ACTIVE | Noted: 2025-06-05

## 2025-06-05 PROCEDURE — 73110 X-RAY EXAM OF WRIST: CPT | Mod: 26,RT

## 2025-06-05 PROCEDURE — 99203 OFFICE O/P NEW LOW 30 MIN: CPT

## 2025-06-05 PROCEDURE — 73110 X-RAY EXAM OF WRIST: CPT

## 2025-06-18 ENCOUNTER — EMERGENCY (EMERGENCY)
Age: 75
LOS: 1 days | End: 2025-06-18
Attending: EMERGENCY MEDICINE | Admitting: EMERGENCY MEDICINE
Payer: MEDICARE

## 2025-06-18 VITALS
HEIGHT: 72 IN | TEMPERATURE: 98 F | RESPIRATION RATE: 14 BRPM | HEART RATE: 66 BPM | DIASTOLIC BLOOD PRESSURE: 76 MMHG | OXYGEN SATURATION: 98 % | SYSTOLIC BLOOD PRESSURE: 135 MMHG

## 2025-06-18 VITALS
SYSTOLIC BLOOD PRESSURE: 132 MMHG | HEART RATE: 64 BPM | RESPIRATION RATE: 16 BRPM | OXYGEN SATURATION: 96 % | DIASTOLIC BLOOD PRESSURE: 74 MMHG | TEMPERATURE: 98 F

## 2025-06-18 DIAGNOSIS — Z95.9 PRESENCE OF CARDIAC AND VASCULAR IMPLANT AND GRAFT, UNSPECIFIED: Chronic | ICD-10-CM

## 2025-06-18 LAB
ALBUMIN SERPL ELPH-MCNC: 3 G/DL — LOW (ref 3.4–5)
ALP SERPL-CCNC: 62 U/L — SIGNIFICANT CHANGE UP (ref 40–120)
ALT FLD-CCNC: 27 U/L — SIGNIFICANT CHANGE UP (ref 12–42)
ANION GAP SERPL CALC-SCNC: 7 MMOL/L — LOW (ref 9–16)
APTT BLD: 29.8 SEC — SIGNIFICANT CHANGE UP (ref 26.1–36.8)
AST SERPL-CCNC: 35 U/L — SIGNIFICANT CHANGE UP (ref 15–37)
BASOPHILS # BLD AUTO: 0.01 K/UL — SIGNIFICANT CHANGE UP (ref 0–0.2)
BASOPHILS NFR BLD AUTO: 0.2 % — SIGNIFICANT CHANGE UP (ref 0–2)
BILIRUB SERPL-MCNC: 0.5 MG/DL — SIGNIFICANT CHANGE UP (ref 0.2–1.2)
BUN SERPL-MCNC: 29 MG/DL — HIGH (ref 7–23)
CALCIUM SERPL-MCNC: 8.5 MG/DL — SIGNIFICANT CHANGE UP (ref 8.5–10.5)
CHLORIDE SERPL-SCNC: 108 MMOL/L — SIGNIFICANT CHANGE UP (ref 96–108)
CO2 SERPL-SCNC: 24 MMOL/L — SIGNIFICANT CHANGE UP (ref 22–31)
CREAT SERPL-MCNC: 1.31 MG/DL — HIGH (ref 0.5–1.3)
EGFR: 57 ML/MIN/1.73M2 — LOW
EGFR: 57 ML/MIN/1.73M2 — LOW
EOSINOPHIL # BLD AUTO: 0.11 K/UL — SIGNIFICANT CHANGE UP (ref 0–0.5)
EOSINOPHIL NFR BLD AUTO: 2.6 % — SIGNIFICANT CHANGE UP (ref 0–6)
GLUCOSE SERPL-MCNC: 147 MG/DL — HIGH (ref 70–99)
HCT VFR BLD CALC: 35.4 % — LOW (ref 39–50)
HGB BLD-MCNC: 12.2 G/DL — LOW (ref 13–17)
IMM GRANULOCYTES # BLD AUTO: 0.01 K/UL — SIGNIFICANT CHANGE UP (ref 0–0.07)
IMM GRANULOCYTES NFR BLD AUTO: 0.2 % — SIGNIFICANT CHANGE UP (ref 0–0.9)
INR BLD: 0.99 — SIGNIFICANT CHANGE UP (ref 0.85–1.16)
LIDOCAIN IGE QN: 33 U/L — SIGNIFICANT CHANGE UP (ref 16–77)
LYMPHOCYTES # BLD AUTO: 0.96 K/UL — LOW (ref 1–3.3)
LYMPHOCYTES NFR BLD AUTO: 23.1 % — SIGNIFICANT CHANGE UP (ref 13–44)
MCHC RBC-ENTMCNC: 32 PG — SIGNIFICANT CHANGE UP (ref 27–34)
MCHC RBC-ENTMCNC: 34.5 G/DL — SIGNIFICANT CHANGE UP (ref 32–36)
MCV RBC AUTO: 92.9 FL — SIGNIFICANT CHANGE UP (ref 80–100)
MONOCYTES # BLD AUTO: 0.35 K/UL — SIGNIFICANT CHANGE UP (ref 0–0.9)
MONOCYTES NFR BLD AUTO: 8.4 % — SIGNIFICANT CHANGE UP (ref 2–14)
NEUTROPHILS # BLD AUTO: 2.72 K/UL — SIGNIFICANT CHANGE UP (ref 1.8–7.4)
NEUTROPHILS NFR BLD AUTO: 65.5 % — SIGNIFICANT CHANGE UP (ref 43–77)
NRBC # BLD AUTO: 0 K/UL — SIGNIFICANT CHANGE UP (ref 0–0)
NRBC # FLD: 0 K/UL — SIGNIFICANT CHANGE UP (ref 0–0)
NRBC BLD AUTO-RTO: 0 /100 WBCS — SIGNIFICANT CHANGE UP (ref 0–0)
NT-PROBNP SERPL-SCNC: 4019 PG/ML — HIGH
PLATELET # BLD AUTO: 102 K/UL — LOW (ref 150–400)
PMV BLD: 9.9 FL — SIGNIFICANT CHANGE UP (ref 7–13)
POTASSIUM SERPL-MCNC: 3.4 MMOL/L — LOW (ref 3.5–5.3)
POTASSIUM SERPL-SCNC: 3.4 MMOL/L — LOW (ref 3.5–5.3)
PROT SERPL-MCNC: 6.5 G/DL — SIGNIFICANT CHANGE UP (ref 6.4–8.2)
PROTHROM AB SERPL-ACNC: 11.6 SEC — SIGNIFICANT CHANGE UP (ref 9.9–13.4)
RBC # BLD: 3.81 M/UL — LOW (ref 4.2–5.8)
RBC # FLD: 12.4 % — SIGNIFICANT CHANGE UP (ref 10.3–14.5)
SODIUM SERPL-SCNC: 139 MMOL/L — SIGNIFICANT CHANGE UP (ref 132–145)
TROPONIN I, HIGH SENSITIVITY RESULT: 38.3 NG/L — SIGNIFICANT CHANGE UP
WBC # BLD: 4.16 K/UL — SIGNIFICANT CHANGE UP (ref 3.8–10.5)
WBC # FLD AUTO: 4.16 K/UL — SIGNIFICANT CHANGE UP (ref 3.8–10.5)

## 2025-06-18 PROCEDURE — 99285 EMERGENCY DEPT VISIT HI MDM: CPT | Mod: 25

## 2025-06-18 PROCEDURE — 70450 CT HEAD/BRAIN W/O DYE: CPT | Mod: 26

## 2025-06-18 PROCEDURE — 29125 APPL SHORT ARM SPLINT STATIC: CPT | Mod: RT

## 2025-06-18 PROCEDURE — 71045 X-RAY EXAM CHEST 1 VIEW: CPT | Mod: 26

## 2025-06-18 PROCEDURE — 73110 X-RAY EXAM OF WRIST: CPT | Mod: 26,RT

## 2025-06-18 RX ORDER — ACETAMINOPHEN 500 MG/5ML
975 LIQUID (ML) ORAL ONCE
Refills: 0 | Status: COMPLETED | OUTPATIENT
Start: 2025-06-18 | End: 2025-06-18

## 2025-06-20 DIAGNOSIS — F41.9 ANXIETY DISORDER, UNSPECIFIED: ICD-10-CM

## 2025-06-20 DIAGNOSIS — M25.531 PAIN IN RIGHT WRIST: ICD-10-CM

## 2025-06-20 DIAGNOSIS — Z91.013 ALLERGY TO SEAFOOD: ICD-10-CM

## 2025-06-20 DIAGNOSIS — Z88.2 ALLERGY STATUS TO SULFONAMIDES: ICD-10-CM

## 2025-06-20 DIAGNOSIS — I49.3 VENTRICULAR PREMATURE DEPOLARIZATION: ICD-10-CM

## 2025-06-20 DIAGNOSIS — Z88.0 ALLERGY STATUS TO PENICILLIN: ICD-10-CM

## 2025-06-20 DIAGNOSIS — Z87.891 PERSONAL HISTORY OF NICOTINE DEPENDENCE: ICD-10-CM

## 2025-06-20 DIAGNOSIS — R07.89 OTHER CHEST PAIN: ICD-10-CM

## 2025-06-20 DIAGNOSIS — Z85.46 PERSONAL HISTORY OF MALIGNANT NEOPLASM OF PROSTATE: ICD-10-CM

## 2025-06-20 DIAGNOSIS — I25.10 ATHEROSCLEROTIC HEART DISEASE OF NATIVE CORONARY ARTERY WITHOUT ANGINA PECTORIS: ICD-10-CM
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
## 2025-06-20 DIAGNOSIS — B20 HUMAN IMMUNODEFICIENCY VIRUS [HIV] DISEASE: ICD-10-CM

## 2025-06-20 DIAGNOSIS — Z91.018 ALLERGY TO OTHER FOODS: ICD-10-CM
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
## 2025-06-20 DIAGNOSIS — E11.9 TYPE 2 DIABETES MELLITUS WITHOUT COMPLICATIONS: ICD-10-CM

## 2025-06-20 DIAGNOSIS — Z87.81 PERSONAL HISTORY OF (HEALED) TRAUMATIC FRACTURE: ICD-10-CM

## 2025-06-20 DIAGNOSIS — R06.02 SHORTNESS OF BREATH: ICD-10-CM

## 2025-06-20 DIAGNOSIS — S52.591A OTHER FRACTURES OF LOWER END OF RIGHT RADIUS, INITIAL ENCOUNTER FOR CLOSED FRACTURE: ICD-10-CM

## 2025-06-20 DIAGNOSIS — F32.A DEPRESSION, UNSPECIFIED: ICD-10-CM

## 2025-06-20 DIAGNOSIS — I50.9 HEART FAILURE, UNSPECIFIED: ICD-10-CM

## 2025-07-03 ENCOUNTER — EMERGENCY (EMERGENCY)
Age: 75
LOS: 1 days | End: 2025-07-03
Attending: EMERGENCY MEDICINE | Admitting: EMERGENCY MEDICINE
Payer: MEDICARE

## 2025-07-03 VITALS
HEIGHT: 72 IN | RESPIRATION RATE: 16 BRPM | WEIGHT: 164.91 LBS | HEART RATE: 68 BPM | TEMPERATURE: 98 F | DIASTOLIC BLOOD PRESSURE: 98 MMHG | SYSTOLIC BLOOD PRESSURE: 147 MMHG | OXYGEN SATURATION: 96 %

## 2025-07-03 VITALS — OXYGEN SATURATION: 96 % | RESPIRATION RATE: 19 BRPM | HEART RATE: 66 BPM | SYSTOLIC BLOOD PRESSURE: 155 MMHG

## 2025-07-03 DIAGNOSIS — Z85.46 PERSONAL HISTORY OF MALIGNANT NEOPLASM OF PROSTATE: ICD-10-CM

## 2025-07-03 DIAGNOSIS — M25.531 PAIN IN RIGHT WRIST: ICD-10-CM

## 2025-07-03 DIAGNOSIS — Z88.0 ALLERGY STATUS TO PENICILLIN: ICD-10-CM

## 2025-07-03 DIAGNOSIS — Z87.81 PERSONAL HISTORY OF (HEALED) TRAUMATIC FRACTURE: ICD-10-CM

## 2025-07-03 DIAGNOSIS — Z21 ASYMPTOMATIC HUMAN IMMUNODEFICIENCY VIRUS [HIV] INFECTION STATUS: ICD-10-CM

## 2025-07-03 DIAGNOSIS — Z88.2 ALLERGY STATUS TO SULFONAMIDES: ICD-10-CM

## 2025-07-03 DIAGNOSIS — Z79.01 LONG TERM (CURRENT) USE OF ANTICOAGULANTS: ICD-10-CM

## 2025-07-03 DIAGNOSIS — Z95.9 PRESENCE OF CARDIAC AND VASCULAR IMPLANT AND GRAFT, UNSPECIFIED: Chronic | ICD-10-CM

## 2025-07-03 DIAGNOSIS — Z91.018 ALLERGY TO OTHER FOODS: ICD-10-CM
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
## 2025-07-03 DIAGNOSIS — I50.9 HEART FAILURE, UNSPECIFIED: ICD-10-CM

## 2025-07-03 DIAGNOSIS — I25.10 ATHEROSCLEROTIC HEART DISEASE OF NATIVE CORONARY ARTERY WITHOUT ANGINA PECTORIS: ICD-10-CM

## 2025-07-03 PROCEDURE — 73110 X-RAY EXAM OF WRIST: CPT | Mod: 26,RT

## 2025-07-03 PROCEDURE — 70450 CT HEAD/BRAIN W/O DYE: CPT | Mod: 26

## 2025-07-03 PROCEDURE — 72125 CT NECK SPINE W/O DYE: CPT | Mod: 26

## 2025-07-03 PROCEDURE — 99284 EMERGENCY DEPT VISIT MOD MDM: CPT

## 2025-07-03 PROCEDURE — 73090 X-RAY EXAM OF FOREARM: CPT | Mod: 26,RT

## 2025-07-03 PROCEDURE — 73130 X-RAY EXAM OF HAND: CPT | Mod: 26,RT

## 2025-07-03 PROCEDURE — 71046 X-RAY EXAM CHEST 2 VIEWS: CPT | Mod: 26

## 2025-07-03 RX ORDER — ACETAMINOPHEN 500 MG/5ML
975 LIQUID (ML) ORAL ONCE
Refills: 0 | Status: COMPLETED | OUTPATIENT
Start: 2025-07-03 | End: 2025-07-03

## 2025-07-07 ENCOUNTER — EMERGENCY (EMERGENCY)
Facility: HOSPITAL | Age: 75
LOS: 1 days | End: 2025-07-07
Attending: EMERGENCY MEDICINE | Admitting: EMERGENCY MEDICINE
Payer: MEDICARE

## 2025-07-07 VITALS
WEIGHT: 175.05 LBS | HEIGHT: 72 IN | RESPIRATION RATE: 18 BRPM | DIASTOLIC BLOOD PRESSURE: 58 MMHG | OXYGEN SATURATION: 99 % | TEMPERATURE: 99 F | HEART RATE: 80 BPM | SYSTOLIC BLOOD PRESSURE: 134 MMHG

## 2025-07-07 VITALS
HEART RATE: 75 BPM | OXYGEN SATURATION: 99 % | RESPIRATION RATE: 16 BRPM | TEMPERATURE: 99 F | DIASTOLIC BLOOD PRESSURE: 60 MMHG | SYSTOLIC BLOOD PRESSURE: 131 MMHG

## 2025-07-07 DIAGNOSIS — I25.10 ATHEROSCLEROTIC HEART DISEASE OF NATIVE CORONARY ARTERY WITHOUT ANGINA PECTORIS: ICD-10-CM

## 2025-07-07 DIAGNOSIS — Z95.9 PRESENCE OF CARDIAC AND VASCULAR IMPLANT AND GRAFT, UNSPECIFIED: Chronic | ICD-10-CM

## 2025-07-07 DIAGNOSIS — Z88.0 ALLERGY STATUS TO PENICILLIN: ICD-10-CM

## 2025-07-07 DIAGNOSIS — Z91.018 ALLERGY TO OTHER FOODS: ICD-10-CM

## 2025-07-07 DIAGNOSIS — Z91.013 ALLERGY TO SEAFOOD: ICD-10-CM

## 2025-07-07 DIAGNOSIS — E11.9 TYPE 2 DIABETES MELLITUS WITHOUT COMPLICATIONS: ICD-10-CM

## 2025-07-07 DIAGNOSIS — Z95.5 PRESENCE OF CORONARY ANGIOPLASTY IMPLANT AND GRAFT: ICD-10-CM

## 2025-07-07 DIAGNOSIS — R06.02 SHORTNESS OF BREATH: ICD-10-CM

## 2025-07-07 LAB
ALBUMIN SERPL ELPH-MCNC: 3 G/DL — LOW (ref 3.4–5)
ALP SERPL-CCNC: 66 U/L — SIGNIFICANT CHANGE UP (ref 40–120)
ALT FLD-CCNC: 15 U/L — SIGNIFICANT CHANGE UP (ref 12–42)
ANION GAP SERPL CALC-SCNC: 9 MMOL/L — SIGNIFICANT CHANGE UP (ref 9–16)
AST SERPL-CCNC: 23 U/L — SIGNIFICANT CHANGE UP (ref 15–37)
BASOPHILS # BLD AUTO: 0.01 K/UL — SIGNIFICANT CHANGE UP (ref 0–0.2)
BASOPHILS NFR BLD AUTO: 0.3 % — SIGNIFICANT CHANGE UP (ref 0–2)
BILIRUB SERPL-MCNC: 0.6 MG/DL — SIGNIFICANT CHANGE UP (ref 0.2–1.2)
BUN SERPL-MCNC: 14 MG/DL — SIGNIFICANT CHANGE UP (ref 7–23)
CALCIUM SERPL-MCNC: 8.5 MG/DL — SIGNIFICANT CHANGE UP (ref 8.5–10.5)
CHLORIDE SERPL-SCNC: 108 MMOL/L — SIGNIFICANT CHANGE UP (ref 96–108)
CO2 SERPL-SCNC: 26 MMOL/L — SIGNIFICANT CHANGE UP (ref 22–31)
CREAT SERPL-MCNC: 1.34 MG/DL — HIGH (ref 0.5–1.3)
EGFR: 55 ML/MIN/1.73M2 — LOW
EGFR: 55 ML/MIN/1.73M2 — LOW
EOSINOPHIL # BLD AUTO: 0.08 K/UL — SIGNIFICANT CHANGE UP (ref 0–0.5)
EOSINOPHIL NFR BLD AUTO: 2.1 % — SIGNIFICANT CHANGE UP (ref 0–6)
GLUCOSE SERPL-MCNC: 128 MG/DL — HIGH (ref 70–99)
HCT VFR BLD CALC: 36.4 % — LOW (ref 39–50)
HGB BLD-MCNC: 12.3 G/DL — LOW (ref 13–17)
IMM GRANULOCYTES # BLD AUTO: 0.01 K/UL — SIGNIFICANT CHANGE UP (ref 0–0.07)
IMM GRANULOCYTES NFR BLD AUTO: 0.3 % — SIGNIFICANT CHANGE UP (ref 0–0.9)
LYMPHOCYTES # BLD AUTO: 0.88 K/UL — LOW (ref 1–3.3)
LYMPHOCYTES NFR BLD AUTO: 22.6 % — SIGNIFICANT CHANGE UP (ref 13–44)
MAGNESIUM SERPL-MCNC: 1.3 MG/DL — LOW (ref 1.6–2.6)
MCHC RBC-ENTMCNC: 31.9 PG — SIGNIFICANT CHANGE UP (ref 27–34)
MCHC RBC-ENTMCNC: 33.8 G/DL — SIGNIFICANT CHANGE UP (ref 32–36)
MCV RBC AUTO: 94.5 FL — SIGNIFICANT CHANGE UP (ref 80–100)
MONOCYTES # BLD AUTO: 0.38 K/UL — SIGNIFICANT CHANGE UP (ref 0–0.9)
MONOCYTES NFR BLD AUTO: 9.8 % — SIGNIFICANT CHANGE UP (ref 2–14)
NEUTROPHILS # BLD AUTO: 2.53 K/UL — SIGNIFICANT CHANGE UP (ref 1.8–7.4)
NEUTROPHILS NFR BLD AUTO: 64.9 % — SIGNIFICANT CHANGE UP (ref 43–77)
NRBC # BLD AUTO: 0 K/UL — SIGNIFICANT CHANGE UP (ref 0–0)
NRBC # FLD: 0 K/UL — SIGNIFICANT CHANGE UP (ref 0–0)
NRBC BLD AUTO-RTO: 0 /100 WBCS — SIGNIFICANT CHANGE UP (ref 0–0)
NT-PROBNP SERPL-SCNC: 6048 PG/ML — HIGH
PLATELET # BLD AUTO: 111 K/UL — LOW (ref 150–400)
PMV BLD: 9.8 FL — SIGNIFICANT CHANGE UP (ref 7–13)
POTASSIUM SERPL-MCNC: 4.1 MMOL/L — SIGNIFICANT CHANGE UP (ref 3.5–5.3)
POTASSIUM SERPL-SCNC: 4.1 MMOL/L — SIGNIFICANT CHANGE UP (ref 3.5–5.3)
PROT SERPL-MCNC: 6.4 G/DL — SIGNIFICANT CHANGE UP (ref 6.4–8.2)
RBC # BLD: 3.85 M/UL — LOW (ref 4.2–5.8)
RBC # FLD: 12.2 % — SIGNIFICANT CHANGE UP (ref 10.3–14.5)
SODIUM SERPL-SCNC: 143 MMOL/L — SIGNIFICANT CHANGE UP (ref 132–145)
TROPONIN I, HIGH SENSITIVITY RESULT: 46.3 NG/L — SIGNIFICANT CHANGE UP
TROPONIN I, HIGH SENSITIVITY RESULT: 48.1 NG/L — SIGNIFICANT CHANGE UP
WBC # BLD: 3.89 K/UL — SIGNIFICANT CHANGE UP (ref 3.8–10.5)
WBC # FLD AUTO: 3.89 K/UL — SIGNIFICANT CHANGE UP (ref 3.8–10.5)

## 2025-07-07 PROCEDURE — 99285 EMERGENCY DEPT VISIT HI MDM: CPT

## 2025-07-07 PROCEDURE — 71045 X-RAY EXAM CHEST 1 VIEW: CPT | Mod: 26

## 2025-07-07 NOTE — ED ADULT NURSE NOTE - OBJECTIVE STATEMENT
74 y/o M here with CP radiating to right arm x3 hours. Pt received 2 SL nitro for a total of 0.8 mg and 324 asa prehospital with no relief. Pt has prehospital IV.

## 2025-07-07 NOTE — ED PROVIDER NOTE - OBJECTIVE STATEMENT
75-year-old male with past medical history HIV, CHF, prostate cancer, CAD s/p PCI x 2, diabetes presents with complaint of chest pain.  Patient reports 3 hours history of chest pain radiating to right upper extremity.  Patient reports chest pain is primarily parasternal and substernal, no specific alleviating or exacerbating factors.  States pain is unchanged.  Endorses shortness of breath.  Denies fever/chills, flulike symptoms, abdominal pain, nausea/vomiting/diarrhea, lightheadedness, dizziness.

## 2025-07-07 NOTE — ED PROVIDER NOTE - PROGRESS NOTE DETAILS
AY: POCUS bedside evaluation demonstrates no pericardial effusion, collapsible IVC with greater than 50% collapsibility on respiration, no dilatation of right ventricle.

## 2025-07-07 NOTE — ED ADULT NURSE NOTE - NSFALLUNIVINTERV_ED_ALL_ED
Bed/Stretcher in lowest position, wheels locked, appropriate side rails in place/Call bell, personal items and telephone in reach/Instruct patient to call for assistance before getting out of bed/chair/stretcher/Non-slip footwear applied when patient is off stretcher/[place] to call system/Physically safe environment - no spills, clutter or unnecessary equipment/Purposeful proactive rounding/Room/bathroom lighting operational, light cord in reach

## 2025-07-07 NOTE — ED PROVIDER NOTE - PATIENT PORTAL LINK FT
You can access the FollowMyHealth Patient Portal offered by [facility] by registering at the following website: [email or web address]. By joining [email or web address]’s FollowMyHealth portal, you will also be able to view your health information using other applications (apps) compatible with our system.

## 2025-07-07 NOTE — ED PROVIDER NOTE - CARE PROVIDER_API CALL
Kaylynn Otto  Interventional Cardiology  [address] [address]  Phone: [phone]  Fax: [phone]  Follow Up Time: 1-3 Days

## 2025-07-07 NOTE — ED ADULT TRIAGE NOTE - BIRTH SEX
Progress NOTE  Date of Service: 2022  Shahnaz Santillan MRN: [id number] [facility]: [id number]     Physical Exam  DOL: 6? Defer: Last Reported Weight? GA: 34 wks 0 d? CGA: 34 wks 6 d   BW: 2598? Place of Service: NICU? Bed Type: Open Crib  Intensive Cardiac and respiratory monitoring, continuous and/or frequent vital sign monitoring  Vitals / Measurements: T: 98? HR: 130? RR: 27? BP: 65/50 (55)? SpO2: 100? ? Head/Neck: Anterior fontanel is soft and flat. No oral lesions. Chest: Clear, equal breath sounds. Good aeration. Comfortable work of breathing   Heart: Regular rate. No murmur. Mucous membranes moist & pink, CFT < 3 seconds   Abdomen: Soft and flat. No hepatosplenomegaly. Normal bowel sounds. Genitalia: Normal external male consistent with gestational age   Extremities: No deformities noted. Normal range of motion for all extremities. Neurologic: Normal tone and activity. Skin: Pink with no rashes, vesicles, or other lesions are noted. Mild erythema toxicum. Medication  Active Medications:  Multivitamins with Iron, Start Date: 2022, Duration: 1      Lab Culture  Active Culture:  Type Date Done Result   Blood 2022 No Growth   Comments final     Respiratory Support:   Type: Room Air? Start Date: 2022? Duration: 7  Health Maintenance  [place] Screening   Screening Date: 2022 Status: Done  Comments: On trophic feeds and clear IVF # [id number]   FEN/Nutrition   Daily Weight (g): -? Dry Weight (g): 1860? Weight Gain Over 7 Days (g): 0   Intake   Prior Enteral (Total Enteral: - mL/kg/d)   Base Feeding: Formula? Subtype Feeding: Similac Special Care HP? Praveen/Oz: 24?Route: PO   Feeds/d: 8? Total (mL): -? Total (mL/kg/d): -  Planned Enteral (Total Enteral: - mL/kg/d)   Base Feeding: Formula? Subtype Feeding: NeoSure? Praveen/Oz: 22? Feeds/d: 8? Total (mL): -? Total (mL/kg/d): -  Output   Number of Voids: 6? Total Output     Stools: 2? Last Stool Date: 2022  Diagnoses  System: FEN/GI   Diagnosis: Nutritional Support starting 2022        [id number] (P70.4) starting 2022           Assessment: Tolerating full po ad carmelina feedings of increased caloric density, voiding and stooling, no new weight today     Plan: Change to Neosure  Daily weight     System: Infectious Disease   Diagnosis: Infectious Screen <= 28D (P00.2) starting 2022 ending 2022 Resolved       Assessment: blood culture negative final     Plan: resolved     System: Gestation   Diagnosis: Late  Infant 34 wks (P07.37) starting 2022        Prematurity 0411-6435 gm (P07.17) starting 2022        Multiple Birth =>Twins (P01.5) starting 2022           Assessment: 6 day old now 29 6/7 week infant. Went to open crib yesterday evening     Plan: Continue PCN care and parental updates. start discharge preparations  NCCC at discharge. System: Hyperbilirubinemia   Diagnosis: At risk for Hyperbilirubinemia starting 2022           Assessment: Mom O+, cord typing is O+, RENE negative. Total bili 4/8 5.2     Plan: bili as needed     System: Orthopedic   Diagnosis: Breech Male (P01.7) starting 2022           Assessment: Stable but abducted hips on exam     Plan: Hip US per AAP recommendation at 46-48 weeks GA  Parent Communication  Lashell Curtis - 2022 11:38  Mother and father updated at the bedside. Discussed feeding changes and requirements for discharge.   Attestation    Authenticated by: Ambrosio Quintana MD   Date/Time: 2022 08:55 Male

## 2025-07-07 NOTE — ED PROVIDER NOTE - NSFOLLOWUPINSTRUCTIONS_ED_ALL_ED_FT
Follow-up with cardiologist- Our primary care and specialty care access coordinators can help you schedule a follow up appointment. You can reach them between Monday to Friday from 9am to 5pm at [phone].    Chest Pain    Chest pain can be caused by many different conditions which may or may not be dangerous. Causes include heartburn, lung infections, heart attack, blood clot in lungs, skin infections, strain or damage to muscle, cartilage, or bones, etc. In addition to a history and physical examination, an electrocardiogram (ECG) or other lab tests may have been performed to determine the cause of your chest pain. Follow up with your primary care provider or with a cardiologist as instructed.     SEEK IMMEDIATE MEDICAL CARE IF YOU HAVE ANY OF THE FOLLOWING SYMPTOMS: worsening chest pain, coughing up blood, unexplained back/neck/jaw pain, severe abdominal pain, dizziness or lightheadedness, fainting, shortness of breath, sweaty or clammy skin, vomiting, or racing heart beat. These symptoms may represent a serious problem that is an emergency. Do not wait to see if the symptoms will go away. Get medical help right away. Call 911 and do not drive yourself to the hospital.

## 2025-07-07 NOTE — ED PROVIDER NOTE - PHYSICAL EXAMINATION
Vital Signs: I have reviewed the initial vital signs.  Constitutional: appears stated age, no acute distress  Eyes: Sclera clear, EOMI.  Cardiovascular: S1 and S2, regular rate, regular rhythm, well-perfused extremities, radial pulses equal and 2+, pedal pulses 2+ and equal  Respiratory: unlabored respiratory effort, clear to auscultation bilaterally no wheezing, rales, or rhonchi  Gastrointestinal:  abdomen soft, non-tender  Musculoskeletal: supple neck, no lower extremity edema, + reproduction of chest pain with pressure to substernal area  Integumentary: warm, dry, no rash  Neurologic: awake, alert, oriented x3, extremities’ motor and sensory functions grossly intact

## 2025-07-07 NOTE — ED PROVIDER NOTE - CLINICAL SUMMARY MEDICAL DECISION MAKING FREE TEXT BOX
75-year-old male with past medical history HIV, CHF, prostate cancer, CAD s/p PCI x 2, diabetes presents with complaint of chest pain.  Patient reports 3 hours history of chest pain radiating to right upper extremity.  Patient reports chest pain is primarily parasternal and substernal, no specific alleviating or exacerbating factors.  States pain is unchanged.  Endorses shortness of breath.  Denies fever/chills, flulike symptoms, abdominal pain, nausea/vomiting/diarrhea, lightheadedness, dizziness.  exam as noted.  Plan: labs, xray chest, EKG.

## 2025-07-07 NOTE — ED ADULT TRIAGE NOTE - CHIEF COMPLAINT QUOTE
Pt BIBA for midsternal CP radiating to right arm x3 hours. Pt received 2 SL nitro for a total of 0.8 mg and 324 asa prehospital with no relief. Pt has prehospital IV.

## 2025-07-07 NOTE — ED ADULT NURSE NOTE - NS ED NURSE LEVEL OF CONSCIOUSNESS ORIENTATION
Oriented - self; Oriented - place; Oriented - time Bcc Histology Text: There were numerous aggregates of basaloid cells.

## 2025-07-07 NOTE — ED PROVIDER NOTE - ATTENDING APP SHARED VISIT CONTRIBUTION OF CARE
I have seen the pt, reviewed all pertinent clinical data, and I agree with the documentation/care/plan executed by ANDREA Majano.

## 2025-07-09 DIAGNOSIS — I50.9 HEART FAILURE, UNSPECIFIED: ICD-10-CM

## 2025-07-09 DIAGNOSIS — Z85.46 PERSONAL HISTORY OF MALIGNANT NEOPLASM OF PROSTATE: ICD-10-CM

## 2025-07-09 DIAGNOSIS — R07.9 CHEST PAIN, UNSPECIFIED: ICD-10-CM

## 2025-07-09 DIAGNOSIS — Z21 ASYMPTOMATIC HUMAN IMMUNODEFICIENCY VIRUS [HIV] INFECTION STATUS: ICD-10-CM

## 2025-07-10 ENCOUNTER — INPATIENT (INPATIENT)
Facility: HOSPITAL | Age: 75
LOS: 1 days | Discharge: HOME | DRG: 563 | End: 2025-07-12
Attending: STUDENT IN AN ORGANIZED HEALTH CARE EDUCATION/TRAINING PROGRAM | Admitting: STUDENT IN AN ORGANIZED HEALTH CARE EDUCATION/TRAINING PROGRAM
Payer: MEDICARE

## 2025-07-10 VITALS
TEMPERATURE: 98 F | WEIGHT: 167.99 LBS | RESPIRATION RATE: 18 BRPM | SYSTOLIC BLOOD PRESSURE: 157 MMHG | DIASTOLIC BLOOD PRESSURE: 91 MMHG | HEIGHT: 72 IN | OXYGEN SATURATION: 98 % | HEART RATE: 97 BPM

## 2025-07-10 DIAGNOSIS — K52.9 NONINFECTIVE GASTROENTERITIS AND COLITIS, UNSPECIFIED: ICD-10-CM

## 2025-07-10 DIAGNOSIS — Z95.9 PRESENCE OF CARDIAC AND VASCULAR IMPLANT AND GRAFT, UNSPECIFIED: Chronic | ICD-10-CM

## 2025-07-10 DIAGNOSIS — R32 UNSPECIFIED URINARY INCONTINENCE: ICD-10-CM

## 2025-07-10 DIAGNOSIS — J44.9 CHRONIC OBSTRUCTIVE PULMONARY DISEASE, UNSPECIFIED: ICD-10-CM

## 2025-07-10 DIAGNOSIS — I10 ESSENTIAL (PRIMARY) HYPERTENSION: ICD-10-CM

## 2025-07-10 DIAGNOSIS — E11.9 TYPE 2 DIABETES MELLITUS WITHOUT COMPLICATIONS: ICD-10-CM

## 2025-07-10 DIAGNOSIS — Z21 ASYMPTOMATIC HUMAN IMMUNODEFICIENCY VIRUS [HIV] INFECTION STATUS: ICD-10-CM

## 2025-07-10 DIAGNOSIS — Z29.9 ENCOUNTER FOR PROPHYLACTIC MEASURES, UNSPECIFIED: ICD-10-CM

## 2025-07-10 DIAGNOSIS — M79.89 OTHER SPECIFIED SOFT TISSUE DISORDERS: ICD-10-CM

## 2025-07-10 DIAGNOSIS — I25.10 ATHEROSCLEROTIC HEART DISEASE OF NATIVE CORONARY ARTERY WITHOUT ANGINA PECTORIS: ICD-10-CM

## 2025-07-10 DIAGNOSIS — F41.9 ANXIETY DISORDER, UNSPECIFIED: ICD-10-CM

## 2025-07-10 LAB
ADD ON TEST-SPECIMEN IN LAB: SIGNIFICANT CHANGE UP
ALBUMIN SERPL ELPH-MCNC: 3.4 G/DL — SIGNIFICANT CHANGE UP (ref 3.3–5)
ALP SERPL-CCNC: 63 U/L — SIGNIFICANT CHANGE UP (ref 40–120)
ALT FLD-CCNC: 12 U/L — SIGNIFICANT CHANGE UP (ref 10–45)
ANION GAP SERPL CALC-SCNC: 12 MMOL/L — SIGNIFICANT CHANGE UP (ref 5–17)
AST SERPL-CCNC: 20 U/L — SIGNIFICANT CHANGE UP (ref 10–40)
BASOPHILS # BLD AUTO: 0.01 K/UL — SIGNIFICANT CHANGE UP (ref 0–0.2)
BASOPHILS NFR BLD AUTO: 0.3 % — SIGNIFICANT CHANGE UP (ref 0–2)
BILIRUB SERPL-MCNC: 0.3 MG/DL — SIGNIFICANT CHANGE UP (ref 0.2–1.2)
BUN SERPL-MCNC: 13 MG/DL — SIGNIFICANT CHANGE UP (ref 7–23)
CALCIUM SERPL-MCNC: 8.5 MG/DL — SIGNIFICANT CHANGE UP (ref 8.4–10.5)
CHLORIDE SERPL-SCNC: 107 MMOL/L — SIGNIFICANT CHANGE UP (ref 96–108)
CK MB CFR SERPL CALC: 5.8 NG/ML — SIGNIFICANT CHANGE UP (ref 0–6.7)
CO2 SERPL-SCNC: 21 MMOL/L — LOW (ref 22–31)
CREAT SERPL-MCNC: 1.37 MG/DL — HIGH (ref 0.5–1.3)
EGFR: 54 ML/MIN/1.73M2 — LOW
EGFR: 54 ML/MIN/1.73M2 — LOW
EOSINOPHIL # BLD AUTO: 0.09 K/UL — SIGNIFICANT CHANGE UP (ref 0–0.5)
EOSINOPHIL NFR BLD AUTO: 3 % — SIGNIFICANT CHANGE UP (ref 0–6)
FLUAV AG NPH QL: SIGNIFICANT CHANGE UP
FLUBV AG NPH QL: SIGNIFICANT CHANGE UP
GLUCOSE SERPL-MCNC: 157 MG/DL — HIGH (ref 70–99)
HCT VFR BLD CALC: 38.6 % — LOW (ref 39–50)
HGB BLD-MCNC: 13.2 G/DL — SIGNIFICANT CHANGE UP (ref 13–17)
IMM GRANULOCYTES # BLD AUTO: 0.01 K/UL — SIGNIFICANT CHANGE UP (ref 0–0.07)
IMM GRANULOCYTES NFR BLD AUTO: 0.3 % — SIGNIFICANT CHANGE UP (ref 0–0.9)
LYMPHOCYTES # BLD AUTO: 0.89 K/UL — LOW (ref 1–3.3)
LYMPHOCYTES NFR BLD AUTO: 29.4 % — SIGNIFICANT CHANGE UP (ref 13–44)
MCHC RBC-ENTMCNC: 32.8 PG — SIGNIFICANT CHANGE UP (ref 27–34)
MCHC RBC-ENTMCNC: 34.2 G/DL — SIGNIFICANT CHANGE UP (ref 32–36)
MCV RBC AUTO: 95.8 FL — SIGNIFICANT CHANGE UP (ref 80–100)
MONOCYTES # BLD AUTO: 0.26 K/UL — SIGNIFICANT CHANGE UP (ref 0–0.9)
MONOCYTES NFR BLD AUTO: 8.6 % — SIGNIFICANT CHANGE UP (ref 2–14)
NEUTROPHILS # BLD AUTO: 1.77 K/UL — LOW (ref 1.8–7.4)
NEUTROPHILS NFR BLD AUTO: 58.4 % — SIGNIFICANT CHANGE UP (ref 43–77)
NRBC # BLD AUTO: 0 K/UL — SIGNIFICANT CHANGE UP (ref 0–0)
NRBC # FLD: 0 K/UL — SIGNIFICANT CHANGE UP (ref 0–0)
NRBC BLD AUTO-RTO: 0 /100 WBCS — SIGNIFICANT CHANGE UP (ref 0–0)
PLATELET # BLD AUTO: 123 K/UL — LOW (ref 150–400)
PMV BLD: 10 FL — SIGNIFICANT CHANGE UP (ref 7–13)
POTASSIUM SERPL-MCNC: 3.8 MMOL/L — SIGNIFICANT CHANGE UP (ref 3.5–5.3)
POTASSIUM SERPL-SCNC: 3.8 MMOL/L — SIGNIFICANT CHANGE UP (ref 3.5–5.3)
PROT SERPL-MCNC: 6.6 G/DL — SIGNIFICANT CHANGE UP (ref 6–8.3)
RBC # BLD: 4.03 M/UL — LOW (ref 4.2–5.8)
RBC # FLD: 12.2 % — SIGNIFICANT CHANGE UP (ref 10.3–14.5)
RSV RNA NPH QL NAA+NON-PROBE: SIGNIFICANT CHANGE UP
SARS-COV-2 RNA SPEC QL NAA+PROBE: SIGNIFICANT CHANGE UP
SODIUM SERPL-SCNC: 140 MMOL/L — SIGNIFICANT CHANGE UP (ref 135–145)
SOURCE RESPIRATORY: SIGNIFICANT CHANGE UP
TROPONIN T, HIGH SENSITIVITY RESULT: 41 NG/L — SIGNIFICANT CHANGE UP (ref 0–51)
WBC # BLD: 3.03 K/UL — LOW (ref 3.8–10.5)
WBC # FLD AUTO: 3.03 K/UL — LOW (ref 3.8–10.5)

## 2025-07-10 PROCEDURE — 73110 X-RAY EXAM OF WRIST: CPT | Mod: 26,RT

## 2025-07-10 PROCEDURE — 82550 ASSAY OF CK (CPK): CPT

## 2025-07-10 PROCEDURE — 80053 COMPREHEN METABOLIC PANEL: CPT

## 2025-07-10 PROCEDURE — 93010 ELECTROCARDIOGRAM REPORT: CPT

## 2025-07-10 PROCEDURE — 82553 CREATINE MB FRACTION: CPT

## 2025-07-10 PROCEDURE — 99223 1ST HOSP IP/OBS HIGH 75: CPT | Mod: GC

## 2025-07-10 PROCEDURE — 71275 CT ANGIOGRAPHY CHEST: CPT | Mod: 26

## 2025-07-10 PROCEDURE — 85025 COMPLETE CBC W/AUTO DIFF WBC: CPT

## 2025-07-10 PROCEDURE — 84484 ASSAY OF TROPONIN QUANT: CPT

## 2025-07-10 PROCEDURE — 99285 EMERGENCY DEPT VISIT HI MDM: CPT

## 2025-07-10 PROCEDURE — 83880 ASSAY OF NATRIURETIC PEPTIDE: CPT

## 2025-07-10 PROCEDURE — 87637 SARSCOV2&INF A&B&RSV AMP PRB: CPT

## 2025-07-10 PROCEDURE — 82962 GLUCOSE BLOOD TEST: CPT

## 2025-07-10 PROCEDURE — 93971 EXTREMITY STUDY: CPT | Mod: 26,RT

## 2025-07-10 PROCEDURE — 36415 COLL VENOUS BLD VENIPUNCTURE: CPT

## 2025-07-10 RX ORDER — SODIUM CHLORIDE 9 G/1000ML
1000 INJECTION, SOLUTION INTRAVENOUS
Refills: 0 | Status: DISCONTINUED | OUTPATIENT
Start: 2025-07-10 | End: 2025-07-12

## 2025-07-10 RX ORDER — CARVEDILOL 3.12 MG/1
3.12 TABLET, FILM COATED ORAL EVERY 12 HOURS
Refills: 0 | Status: DISCONTINUED | OUTPATIENT
Start: 2025-07-10 | End: 2025-07-12

## 2025-07-10 RX ORDER — ATOVAQUONE 750 MG/5ML
1500 SUSPENSION ORAL DAILY
Refills: 0 | Status: DISCONTINUED | OUTPATIENT
Start: 2025-07-10 | End: 2025-07-12

## 2025-07-10 RX ORDER — DOLUTEGRAVIR SODIUM 5 MG/1
50 TABLET, FOR SUSPENSION ORAL DAILY
Refills: 0 | Status: DISCONTINUED | OUTPATIENT
Start: 2025-07-10 | End: 2025-07-12

## 2025-07-10 RX ORDER — DEXTROSE 50 % IN WATER 50 %
15 SYRINGE (ML) INTRAVENOUS ONCE
Refills: 0 | Status: DISCONTINUED | OUTPATIENT
Start: 2025-07-10 | End: 2025-07-12

## 2025-07-10 RX ORDER — ESCITALOPRAM OXALATE 20 MG/1
20 TABLET ORAL DAILY
Refills: 0 | Status: DISCONTINUED | OUTPATIENT
Start: 2025-07-10 | End: 2025-07-12

## 2025-07-10 RX ORDER — ACETAMINOPHEN 500 MG/5ML
975 LIQUID (ML) ORAL EVERY 6 HOURS
Refills: 0 | Status: DISCONTINUED | OUTPATIENT
Start: 2025-07-10 | End: 2025-07-12

## 2025-07-10 RX ORDER — ACETAMINOPHEN 500 MG/5ML
1000 LIQUID (ML) ORAL ONCE
Refills: 0 | Status: COMPLETED | OUTPATIENT
Start: 2025-07-10 | End: 2025-07-10

## 2025-07-10 RX ORDER — DEXTROSE 50 % IN WATER 50 %
25 SYRINGE (ML) INTRAVENOUS ONCE
Refills: 0 | Status: DISCONTINUED | OUTPATIENT
Start: 2025-07-10 | End: 2025-07-12

## 2025-07-10 RX ORDER — INSULIN LISPRO 100 U/ML
INJECTION, SOLUTION INTRAVENOUS; SUBCUTANEOUS
Refills: 0 | Status: DISCONTINUED | OUTPATIENT
Start: 2025-07-10 | End: 2025-07-12

## 2025-07-10 RX ORDER — DEXTROSE 50 % IN WATER 50 %
12.5 SYRINGE (ML) INTRAVENOUS ONCE
Refills: 0 | Status: DISCONTINUED | OUTPATIENT
Start: 2025-07-10 | End: 2025-07-12

## 2025-07-10 RX ORDER — TAMSULOSIN HYDROCHLORIDE 0.4 MG/1
0.4 CAPSULE ORAL AT BEDTIME
Refills: 0 | Status: DISCONTINUED | OUTPATIENT
Start: 2025-07-10 | End: 2025-07-12

## 2025-07-10 RX ORDER — ENALAPRIL MALEATE 20 MG
10 TABLET ORAL DAILY
Refills: 0 | Status: DISCONTINUED | OUTPATIENT
Start: 2025-07-10 | End: 2025-07-12

## 2025-07-10 RX ORDER — ENOXAPARIN SODIUM 100 MG/ML
40 INJECTION SUBCUTANEOUS EVERY 24 HOURS
Refills: 0 | Status: DISCONTINUED | OUTPATIENT
Start: 2025-07-10 | End: 2025-07-12

## 2025-07-10 RX ORDER — IPRATROPIUM BROMIDE AND ALBUTEROL SULFATE .5; 2.5 MG/3ML; MG/3ML
3 SOLUTION RESPIRATORY (INHALATION) ONCE
Refills: 0 | Status: COMPLETED | OUTPATIENT
Start: 2025-07-10 | End: 2025-07-12

## 2025-07-10 RX ORDER — ATORVASTATIN CALCIUM 80 MG/1
80 TABLET, FILM COATED ORAL AT BEDTIME
Refills: 0 | Status: DISCONTINUED | OUTPATIENT
Start: 2025-07-10 | End: 2025-07-12

## 2025-07-10 RX ORDER — MELATONIN 5 MG
5 TABLET ORAL AT BEDTIME
Refills: 0 | Status: DISCONTINUED | OUTPATIENT
Start: 2025-07-10 | End: 2025-07-12

## 2025-07-10 RX ORDER — GLUCAGON 3 MG/1
1 POWDER NASAL ONCE
Refills: 0 | Status: DISCONTINUED | OUTPATIENT
Start: 2025-07-10 | End: 2025-07-12

## 2025-07-10 RX ORDER — OXYCODONE HYDROCHLORIDE 30 MG/1
5 TABLET ORAL EVERY 4 HOURS
Refills: 0 | Status: DISCONTINUED | OUTPATIENT
Start: 2025-07-10 | End: 2025-07-12

## 2025-07-10 RX ORDER — ASPIRIN 325 MG
81 TABLET ORAL DAILY
Refills: 0 | Status: DISCONTINUED | OUTPATIENT
Start: 2025-07-10 | End: 2025-07-12

## 2025-07-10 RX ADMIN — ATOVAQUONE 1500 MILLIGRAM(S): 750 SUSPENSION ORAL at 21:28

## 2025-07-10 RX ADMIN — Medication 975 MILLIGRAM(S): at 18:03

## 2025-07-10 RX ADMIN — CARVEDILOL 3.12 MILLIGRAM(S): 3.12 TABLET, FILM COATED ORAL at 19:38

## 2025-07-10 RX ADMIN — OXYCODONE HYDROCHLORIDE 5 MILLIGRAM(S): 30 TABLET ORAL at 21:19

## 2025-07-10 RX ADMIN — TAMSULOSIN HYDROCHLORIDE 0.4 MILLIGRAM(S): 0.4 CAPSULE ORAL at 21:26

## 2025-07-10 RX ADMIN — ESCITALOPRAM OXALATE 20 MILLIGRAM(S): 20 TABLET ORAL at 19:38

## 2025-07-10 RX ADMIN — Medication 975 MILLIGRAM(S): at 23:30

## 2025-07-10 RX ADMIN — Medication 400 MILLIGRAM(S): at 11:56

## 2025-07-10 RX ADMIN — ENOXAPARIN SODIUM 40 MILLIGRAM(S): 100 INJECTION SUBCUTANEOUS at 18:05

## 2025-07-10 RX ADMIN — Medication 1 TABLET(S): at 21:27

## 2025-07-10 RX ADMIN — DOLUTEGRAVIR SODIUM 50 MILLIGRAM(S): 5 TABLET, FOR SUSPENSION ORAL at 21:28

## 2025-07-10 RX ADMIN — ATORVASTATIN CALCIUM 80 MILLIGRAM(S): 80 TABLET, FILM COATED ORAL at 21:26

## 2025-07-10 RX ADMIN — Medication 10 MILLIGRAM(S): at 19:38

## 2025-07-10 RX ADMIN — Medication 5 MILLIGRAM(S): at 23:29

## 2025-07-10 RX ADMIN — Medication 81 MILLIGRAM(S): at 19:38

## 2025-07-10 RX ADMIN — INSULIN LISPRO 4: 100 INJECTION, SOLUTION INTRAVENOUS; SUBCUTANEOUS at 21:58

## 2025-07-10 RX ADMIN — Medication 1000 MILLILITER(S): at 11:55

## 2025-07-10 RX ADMIN — OXYCODONE HYDROCHLORIDE 5 MILLIGRAM(S): 30 TABLET ORAL at 22:19

## 2025-07-11 ENCOUNTER — TRANSCRIPTION ENCOUNTER (OUTPATIENT)
Age: 75
End: 2025-07-11

## 2025-07-11 LAB
4/8 RATIO: 0.25 RATIO — LOW (ref 0.86–4.14)
A1C WITH ESTIMATED AVERAGE GLUCOSE RESULT: 6 % — HIGH (ref 4–5.6)
ABS CD8: 760 CELLS/UL — SIGNIFICANT CHANGE UP (ref 90–775)
ANION GAP SERPL CALC-SCNC: 10 MMOL/L — SIGNIFICANT CHANGE UP (ref 5–17)
BASOPHILS # BLD AUTO: 0.02 K/UL — SIGNIFICANT CHANGE UP (ref 0–0.2)
BASOPHILS NFR BLD AUTO: 0.7 % — SIGNIFICANT CHANGE UP (ref 0–2)
BUN SERPL-MCNC: 16 MG/DL — SIGNIFICANT CHANGE UP (ref 7–23)
CALCIUM SERPL-MCNC: 8.3 MG/DL — LOW (ref 8.4–10.5)
CD3 BLASTS SPEC-ACNC: 1002 CELLS/UL — SIGNIFICANT CHANGE UP (ref 396–2024)
CD3 BLASTS SPEC-ACNC: 85 % — HIGH (ref 58–84)
CD4 %: 16 % — LOW (ref 30–56)
CD8 %: 65 % — HIGH (ref 11–43)
CHLORIDE SERPL-SCNC: 108 MMOL/L — SIGNIFICANT CHANGE UP (ref 96–108)
CO2 SERPL-SCNC: 21 MMOL/L — LOW (ref 22–31)
CREAT SERPL-MCNC: 1.44 MG/DL — HIGH (ref 0.5–1.3)
EGFR: 51 ML/MIN/1.73M2 — LOW
EGFR: 51 ML/MIN/1.73M2 — LOW
EOSINOPHIL # BLD AUTO: 0.1 K/UL — SIGNIFICANT CHANGE UP (ref 0–0.5)
EOSINOPHIL NFR BLD AUTO: 3.5 % — SIGNIFICANT CHANGE UP (ref 0–6)
ESTIMATED AVERAGE GLUCOSE: 126 MG/DL — HIGH (ref 68–114)
GLUCOSE SERPL-MCNC: 132 MG/DL — HIGH (ref 70–99)
HCT VFR BLD CALC: 39 % — SIGNIFICANT CHANGE UP (ref 39–50)
HGB BLD-MCNC: 12.9 G/DL — LOW (ref 13–17)
HIV-1 VIRAL LOAD RESULT: ABNORMAL
HIV1 RNA # SERPL NAA+PROBE: 103 — SIGNIFICANT CHANGE UP
HIV1 RNA SER-IMP: SIGNIFICANT CHANGE UP
HIV1 RNA SERPL NAA+PROBE-ACNC: ABNORMAL
HIV1 RNA SERPL NAA+PROBE-LOG#: 2.01 — SIGNIFICANT CHANGE UP
IMM GRANULOCYTES # BLD AUTO: 0.01 K/UL — SIGNIFICANT CHANGE UP (ref 0–0.07)
IMM GRANULOCYTES NFR BLD AUTO: 0.4 % — SIGNIFICANT CHANGE UP (ref 0–0.9)
LYMPHOCYTES # BLD AUTO: 1.03 K/UL — SIGNIFICANT CHANGE UP (ref 1–3.3)
LYMPHOCYTES NFR BLD AUTO: 36.4 % — SIGNIFICANT CHANGE UP (ref 13–44)
MAGNESIUM SERPL-MCNC: 1.5 MG/DL — LOW (ref 1.6–2.6)
MCHC RBC-ENTMCNC: 32.3 PG — SIGNIFICANT CHANGE UP (ref 27–34)
MCHC RBC-ENTMCNC: 33.1 G/DL — SIGNIFICANT CHANGE UP (ref 32–36)
MCV RBC AUTO: 97.5 FL — SIGNIFICANT CHANGE UP (ref 80–100)
MONOCYTES # BLD AUTO: 0.29 K/UL — SIGNIFICANT CHANGE UP (ref 0–0.9)
MONOCYTES NFR BLD AUTO: 10.2 % — SIGNIFICANT CHANGE UP (ref 2–14)
NEUTROPHILS # BLD AUTO: 1.38 K/UL — LOW (ref 1.8–7.4)
NEUTROPHILS NFR BLD AUTO: 48.8 % — SIGNIFICANT CHANGE UP (ref 43–77)
NRBC # BLD AUTO: 0 K/UL — SIGNIFICANT CHANGE UP (ref 0–0)
NRBC # FLD: 0 K/UL — SIGNIFICANT CHANGE UP (ref 0–0)
NRBC BLD AUTO-RTO: 0 /100 WBCS — SIGNIFICANT CHANGE UP (ref 0–0)
PHOSPHATE SERPL-MCNC: 3.5 MG/DL — SIGNIFICANT CHANGE UP (ref 2.5–4.5)
PLATELET # BLD AUTO: 111 K/UL — LOW (ref 150–400)
PMV BLD: 10.1 FL — SIGNIFICANT CHANGE UP (ref 7–13)
POTASSIUM SERPL-MCNC: 4 MMOL/L — SIGNIFICANT CHANGE UP (ref 3.5–5.3)
POTASSIUM SERPL-SCNC: 4 MMOL/L — SIGNIFICANT CHANGE UP (ref 3.5–5.3)
RBC # BLD: 4 M/UL — LOW (ref 4.2–5.8)
RBC # FLD: 12.3 % — SIGNIFICANT CHANGE UP (ref 10.3–14.5)
SODIUM SERPL-SCNC: 139 MMOL/L — SIGNIFICANT CHANGE UP (ref 135–145)
T-CELL CD4 SUBSET PNL BLD: 193 CELLS/UL — LOW (ref 325–1251)
VIABLE CELLS NFR SPEC: SIGNIFICANT CHANGE UP
WBC # BLD: 2.83 K/UL — LOW (ref 3.8–10.5)
WBC # FLD AUTO: 2.83 K/UL — LOW (ref 3.8–10.5)

## 2025-07-11 PROCEDURE — 84484 ASSAY OF TROPONIN QUANT: CPT

## 2025-07-11 PROCEDURE — 84100 ASSAY OF PHOSPHORUS: CPT

## 2025-07-11 PROCEDURE — 80053 COMPREHEN METABOLIC PANEL: CPT

## 2025-07-11 PROCEDURE — 87637 SARSCOV2&INF A&B&RSV AMP PRB: CPT

## 2025-07-11 PROCEDURE — 99233 SBSQ HOSP IP/OBS HIGH 50: CPT | Mod: GC

## 2025-07-11 PROCEDURE — 82550 ASSAY OF CK (CPK): CPT

## 2025-07-11 PROCEDURE — 83036 HEMOGLOBIN GLYCOSYLATED A1C: CPT

## 2025-07-11 PROCEDURE — 83880 ASSAY OF NATRIURETIC PEPTIDE: CPT

## 2025-07-11 PROCEDURE — 86360 T CELL ABSOLUTE COUNT/RATIO: CPT

## 2025-07-11 PROCEDURE — 36415 COLL VENOUS BLD VENIPUNCTURE: CPT

## 2025-07-11 PROCEDURE — 86359 T CELLS TOTAL COUNT: CPT

## 2025-07-11 PROCEDURE — 80048 BASIC METABOLIC PNL TOTAL CA: CPT

## 2025-07-11 PROCEDURE — 83735 ASSAY OF MAGNESIUM: CPT

## 2025-07-11 PROCEDURE — 85025 COMPLETE CBC W/AUTO DIFF WBC: CPT

## 2025-07-11 PROCEDURE — 82962 GLUCOSE BLOOD TEST: CPT

## 2025-07-11 PROCEDURE — 87536 HIV-1 QUANT&REVRSE TRNSCRPJ: CPT

## 2025-07-11 PROCEDURE — 93971 EXTREMITY STUDY: CPT

## 2025-07-11 PROCEDURE — 82553 CREATINE MB FRACTION: CPT

## 2025-07-11 RX ORDER — LOPERAMIDE HCL 1 MG/7.5ML
4 SOLUTION ORAL ONCE
Refills: 0 | Status: COMPLETED | OUTPATIENT
Start: 2025-07-11 | End: 2025-07-11

## 2025-07-11 RX ORDER — MAGNESIUM SULFATE 500 MG/ML
2 SYRINGE (ML) INJECTION ONCE
Refills: 0 | Status: COMPLETED | OUTPATIENT
Start: 2025-07-11 | End: 2025-07-11

## 2025-07-11 RX ADMIN — Medication 975 MILLIGRAM(S): at 00:30

## 2025-07-11 RX ADMIN — ENOXAPARIN SODIUM 40 MILLIGRAM(S): 100 INJECTION SUBCUTANEOUS at 17:24

## 2025-07-11 RX ADMIN — Medication 975 MILLIGRAM(S): at 14:33

## 2025-07-11 RX ADMIN — OXYCODONE HYDROCHLORIDE 5 MILLIGRAM(S): 30 TABLET ORAL at 13:33

## 2025-07-11 RX ADMIN — Medication 5 MILLIGRAM(S): at 21:21

## 2025-07-11 RX ADMIN — ESCITALOPRAM OXALATE 20 MILLIGRAM(S): 20 TABLET ORAL at 17:22

## 2025-07-11 RX ADMIN — Medication 1 TABLET(S): at 21:21

## 2025-07-11 RX ADMIN — OXYCODONE HYDROCHLORIDE 5 MILLIGRAM(S): 30 TABLET ORAL at 03:27

## 2025-07-11 RX ADMIN — OXYCODONE HYDROCHLORIDE 5 MILLIGRAM(S): 30 TABLET ORAL at 14:33

## 2025-07-11 RX ADMIN — LOPERAMIDE HCL 4 MILLIGRAM(S): 1 SOLUTION ORAL at 17:21

## 2025-07-11 RX ADMIN — OXYCODONE HYDROCHLORIDE 5 MILLIGRAM(S): 30 TABLET ORAL at 08:18

## 2025-07-11 RX ADMIN — Medication 25 GRAM(S): at 13:33

## 2025-07-11 RX ADMIN — INSULIN LISPRO 2: 100 INJECTION, SOLUTION INTRAVENOUS; SUBCUTANEOUS at 13:09

## 2025-07-11 RX ADMIN — DOLUTEGRAVIR SODIUM 50 MILLIGRAM(S): 5 TABLET, FOR SUSPENSION ORAL at 21:23

## 2025-07-11 RX ADMIN — Medication 975 MILLIGRAM(S): at 13:33

## 2025-07-11 RX ADMIN — LOPERAMIDE HCL 4 MILLIGRAM(S): 1 SOLUTION ORAL at 21:20

## 2025-07-11 RX ADMIN — Medication 975 MILLIGRAM(S): at 07:39

## 2025-07-11 RX ADMIN — TAMSULOSIN HYDROCHLORIDE 0.4 MILLIGRAM(S): 0.4 CAPSULE ORAL at 21:20

## 2025-07-11 RX ADMIN — Medication 10 MILLIGRAM(S): at 06:39

## 2025-07-11 RX ADMIN — OXYCODONE HYDROCHLORIDE 5 MILLIGRAM(S): 30 TABLET ORAL at 02:27

## 2025-07-11 RX ADMIN — Medication 975 MILLIGRAM(S): at 17:22

## 2025-07-11 RX ADMIN — Medication 975 MILLIGRAM(S): at 18:22

## 2025-07-11 RX ADMIN — CARVEDILOL 3.12 MILLIGRAM(S): 3.12 TABLET, FILM COATED ORAL at 17:23

## 2025-07-11 RX ADMIN — Medication 81 MILLIGRAM(S): at 17:22

## 2025-07-11 RX ADMIN — ATOVAQUONE 1500 MILLIGRAM(S): 750 SUSPENSION ORAL at 21:21

## 2025-07-11 RX ADMIN — Medication 975 MILLIGRAM(S): at 06:39

## 2025-07-11 RX ADMIN — INSULIN LISPRO 2: 100 INJECTION, SOLUTION INTRAVENOUS; SUBCUTANEOUS at 22:28

## 2025-07-11 RX ADMIN — CARVEDILOL 3.12 MILLIGRAM(S): 3.12 TABLET, FILM COATED ORAL at 06:39

## 2025-07-11 RX ADMIN — ATORVASTATIN CALCIUM 80 MILLIGRAM(S): 80 TABLET, FILM COATED ORAL at 21:20

## 2025-07-12 ENCOUNTER — TRANSCRIPTION ENCOUNTER (OUTPATIENT)
Age: 75
End: 2025-07-12

## 2025-07-12 VITALS — SYSTOLIC BLOOD PRESSURE: 145 MMHG | HEART RATE: 59 BPM | DIASTOLIC BLOOD PRESSURE: 84 MMHG

## 2025-07-12 DIAGNOSIS — S62.101A FRACTURE OF UNSPECIFIED CARPAL BONE, RIGHT WRIST, INITIAL ENCOUNTER FOR CLOSED FRACTURE: ICD-10-CM

## 2025-07-12 DIAGNOSIS — B20 HUMAN IMMUNODEFICIENCY VIRUS [HIV] DISEASE: ICD-10-CM

## 2025-07-12 DIAGNOSIS — N17.9 ACUTE KIDNEY FAILURE, UNSPECIFIED: ICD-10-CM

## 2025-07-12 LAB
ALBUMIN SERPL ELPH-MCNC: 3.2 G/DL — LOW (ref 3.3–5)
ALP SERPL-CCNC: 54 U/L — SIGNIFICANT CHANGE UP (ref 40–120)
ALT FLD-CCNC: 10 U/L — SIGNIFICANT CHANGE UP (ref 10–45)
ANION GAP SERPL CALC-SCNC: 10 MMOL/L — SIGNIFICANT CHANGE UP (ref 5–17)
ANION GAP SERPL CALC-SCNC: 8 MMOL/L — SIGNIFICANT CHANGE UP (ref 5–17)
AST SERPL-CCNC: 14 U/L — SIGNIFICANT CHANGE UP (ref 10–40)
BASOPHILS # BLD AUTO: 0.01 K/UL — SIGNIFICANT CHANGE UP (ref 0–0.2)
BASOPHILS NFR BLD AUTO: 0.4 % — SIGNIFICANT CHANGE UP (ref 0–2)
BILIRUB SERPL-MCNC: 0.3 MG/DL — SIGNIFICANT CHANGE UP (ref 0.2–1.2)
BUN SERPL-MCNC: 23 MG/DL — SIGNIFICANT CHANGE UP (ref 7–23)
BUN SERPL-MCNC: 23 MG/DL — SIGNIFICANT CHANGE UP (ref 7–23)
CALCIUM SERPL-MCNC: 8.5 MG/DL — SIGNIFICANT CHANGE UP (ref 8.4–10.5)
CALCIUM SERPL-MCNC: 8.8 MG/DL — SIGNIFICANT CHANGE UP (ref 8.4–10.5)
CHLORIDE SERPL-SCNC: 107 MMOL/L — SIGNIFICANT CHANGE UP (ref 96–108)
CHLORIDE SERPL-SCNC: 108 MMOL/L — SIGNIFICANT CHANGE UP (ref 96–108)
CO2 SERPL-SCNC: 22 MMOL/L — SIGNIFICANT CHANGE UP (ref 22–31)
CO2 SERPL-SCNC: 22 MMOL/L — SIGNIFICANT CHANGE UP (ref 22–31)
CREAT SERPL-MCNC: 1.63 MG/DL — HIGH (ref 0.5–1.3)
CREAT SERPL-MCNC: 1.75 MG/DL — HIGH (ref 0.5–1.3)
EGFR: 40 ML/MIN/1.73M2 — LOW
EGFR: 40 ML/MIN/1.73M2 — LOW
EGFR: 44 ML/MIN/1.73M2 — LOW
EGFR: 44 ML/MIN/1.73M2 — LOW
EOSINOPHIL # BLD AUTO: 0.08 K/UL — SIGNIFICANT CHANGE UP (ref 0–0.5)
EOSINOPHIL NFR BLD AUTO: 3.3 % — SIGNIFICANT CHANGE UP (ref 0–6)
GLUCOSE SERPL-MCNC: 101 MG/DL — HIGH (ref 70–99)
GLUCOSE SERPL-MCNC: 120 MG/DL — HIGH (ref 70–99)
HCT VFR BLD CALC: 36.6 % — LOW (ref 39–50)
HGB BLD-MCNC: 12.1 G/DL — LOW (ref 13–17)
IMM GRANULOCYTES # BLD AUTO: 0 K/UL — SIGNIFICANT CHANGE UP (ref 0–0.07)
IMM GRANULOCYTES NFR BLD AUTO: 0 % — SIGNIFICANT CHANGE UP (ref 0–0.9)
IMMATURE PLATELET FRACTION #: 1.5 K/UL — LOW (ref 3.9–12.5)
IMMATURE PLATELET FRACTION %: 1.7 % — SIGNIFICANT CHANGE UP (ref 1.6–7.1)
LYMPHOCYTES # BLD AUTO: 0.89 K/UL — LOW (ref 1–3.3)
LYMPHOCYTES NFR BLD AUTO: 36.3 % — SIGNIFICANT CHANGE UP (ref 13–44)
MAGNESIUM SERPL-MCNC: 1.8 MG/DL — SIGNIFICANT CHANGE UP (ref 1.6–2.6)
MCHC RBC-ENTMCNC: 32.1 PG — SIGNIFICANT CHANGE UP (ref 27–34)
MCHC RBC-ENTMCNC: 33.1 G/DL — SIGNIFICANT CHANGE UP (ref 32–36)
MCV RBC AUTO: 97.1 FL — SIGNIFICANT CHANGE UP (ref 80–100)
MONOCYTES # BLD AUTO: 0.28 K/UL — SIGNIFICANT CHANGE UP (ref 0–0.9)
MONOCYTES NFR BLD AUTO: 11.4 % — SIGNIFICANT CHANGE UP (ref 2–14)
NEUTROPHILS # BLD AUTO: 1.19 K/UL — LOW (ref 1.8–7.4)
NEUTROPHILS NFR BLD AUTO: 48.6 % — SIGNIFICANT CHANGE UP (ref 43–77)
NRBC # BLD AUTO: 0 K/UL — SIGNIFICANT CHANGE UP (ref 0–0)
NRBC # FLD: 0 K/UL — SIGNIFICANT CHANGE UP (ref 0–0)
NRBC BLD AUTO-RTO: 0 /100 WBCS — SIGNIFICANT CHANGE UP (ref 0–0)
PHOSPHATE SERPL-MCNC: 4 MG/DL — SIGNIFICANT CHANGE UP (ref 2.5–4.5)
PLATELET # BLD AUTO: 86 K/UL — LOW (ref 150–400)
PMV BLD: 10 FL — SIGNIFICANT CHANGE UP (ref 7–13)
POTASSIUM SERPL-MCNC: 3.8 MMOL/L — SIGNIFICANT CHANGE UP (ref 3.5–5.3)
POTASSIUM SERPL-MCNC: 4.5 MMOL/L — SIGNIFICANT CHANGE UP (ref 3.5–5.3)
POTASSIUM SERPL-SCNC: 3.8 MMOL/L — SIGNIFICANT CHANGE UP (ref 3.5–5.3)
POTASSIUM SERPL-SCNC: 4.5 MMOL/L — SIGNIFICANT CHANGE UP (ref 3.5–5.3)
PROT SERPL-MCNC: 5.7 G/DL — LOW (ref 6–8.3)
RBC # BLD: 3.77 M/UL — LOW (ref 4.2–5.8)
RBC # FLD: 12.2 % — SIGNIFICANT CHANGE UP (ref 10.3–14.5)
SODIUM SERPL-SCNC: 138 MMOL/L — SIGNIFICANT CHANGE UP (ref 135–145)
SODIUM SERPL-SCNC: 139 MMOL/L — SIGNIFICANT CHANGE UP (ref 135–145)
WBC # BLD: 2.45 K/UL — LOW (ref 3.8–10.5)
WBC # FLD AUTO: 2.45 K/UL — LOW (ref 3.8–10.5)

## 2025-07-12 PROCEDURE — 86359 T CELLS TOTAL COUNT: CPT

## 2025-07-12 PROCEDURE — 94640 AIRWAY INHALATION TREATMENT: CPT

## 2025-07-12 PROCEDURE — 83036 HEMOGLOBIN GLYCOSYLATED A1C: CPT

## 2025-07-12 PROCEDURE — 80053 COMPREHEN METABOLIC PANEL: CPT

## 2025-07-12 PROCEDURE — 87637 SARSCOV2&INF A&B&RSV AMP PRB: CPT

## 2025-07-12 PROCEDURE — 85025 COMPLETE CBC W/AUTO DIFF WBC: CPT

## 2025-07-12 PROCEDURE — 82553 CREATINE MB FRACTION: CPT

## 2025-07-12 PROCEDURE — 83880 ASSAY OF NATRIURETIC PEPTIDE: CPT

## 2025-07-12 PROCEDURE — 82962 GLUCOSE BLOOD TEST: CPT

## 2025-07-12 PROCEDURE — 97165 OT EVAL LOW COMPLEX 30 MIN: CPT

## 2025-07-12 PROCEDURE — 86360 T CELL ABSOLUTE COUNT/RATIO: CPT

## 2025-07-12 PROCEDURE — 73110 X-RAY EXAM OF WRIST: CPT

## 2025-07-12 PROCEDURE — 83735 ASSAY OF MAGNESIUM: CPT

## 2025-07-12 PROCEDURE — 99239 HOSP IP/OBS DSCHRG MGMT >30: CPT | Mod: GC

## 2025-07-12 PROCEDURE — 84484 ASSAY OF TROPONIN QUANT: CPT

## 2025-07-12 PROCEDURE — 93005 ELECTROCARDIOGRAM TRACING: CPT

## 2025-07-12 PROCEDURE — 99285 EMERGENCY DEPT VISIT HI MDM: CPT

## 2025-07-12 PROCEDURE — 96374 THER/PROPH/DIAG INJ IV PUSH: CPT

## 2025-07-12 PROCEDURE — 71275 CT ANGIOGRAPHY CHEST: CPT

## 2025-07-12 PROCEDURE — 82550 ASSAY OF CK (CPK): CPT

## 2025-07-12 PROCEDURE — 84100 ASSAY OF PHOSPHORUS: CPT

## 2025-07-12 PROCEDURE — 80048 BASIC METABOLIC PNL TOTAL CA: CPT

## 2025-07-12 PROCEDURE — 87536 HIV-1 QUANT&REVRSE TRNSCRPJ: CPT

## 2025-07-12 PROCEDURE — 36415 COLL VENOUS BLD VENIPUNCTURE: CPT

## 2025-07-12 PROCEDURE — 93971 EXTREMITY STUDY: CPT

## 2025-07-12 RX ORDER — SODIUM CHLORIDE 9 G/1000ML
500 INJECTION, SOLUTION INTRAVENOUS ONCE
Refills: 0 | Status: COMPLETED | OUTPATIENT
Start: 2025-07-12 | End: 2025-07-12

## 2025-07-12 RX ORDER — OXYCODONE HYDROCHLORIDE 30 MG/1
1 TABLET ORAL
Qty: 10 | Refills: 0
Start: 2025-07-12

## 2025-07-12 RX ADMIN — ESCITALOPRAM OXALATE 20 MILLIGRAM(S): 20 TABLET ORAL at 18:03

## 2025-07-12 RX ADMIN — Medication 975 MILLIGRAM(S): at 01:10

## 2025-07-12 RX ADMIN — Medication 975 MILLIGRAM(S): at 13:36

## 2025-07-12 RX ADMIN — INSULIN LISPRO 2: 100 INJECTION, SOLUTION INTRAVENOUS; SUBCUTANEOUS at 13:32

## 2025-07-12 RX ADMIN — Medication 81 MILLIGRAM(S): at 18:03

## 2025-07-12 RX ADMIN — CARVEDILOL 3.12 MILLIGRAM(S): 3.12 TABLET, FILM COATED ORAL at 18:04

## 2025-07-12 RX ADMIN — OXYCODONE HYDROCHLORIDE 5 MILLIGRAM(S): 30 TABLET ORAL at 13:36

## 2025-07-12 RX ADMIN — IPRATROPIUM BROMIDE AND ALBUTEROL SULFATE 3 MILLILITER(S): .5; 2.5 SOLUTION RESPIRATORY (INHALATION) at 02:16

## 2025-07-12 RX ADMIN — Medication 975 MILLIGRAM(S): at 12:36

## 2025-07-12 RX ADMIN — OXYCODONE HYDROCHLORIDE 5 MILLIGRAM(S): 30 TABLET ORAL at 01:10

## 2025-07-12 RX ADMIN — OXYCODONE HYDROCHLORIDE 5 MILLIGRAM(S): 30 TABLET ORAL at 08:20

## 2025-07-12 RX ADMIN — OXYCODONE HYDROCHLORIDE 5 MILLIGRAM(S): 30 TABLET ORAL at 09:20

## 2025-07-12 RX ADMIN — Medication 975 MILLIGRAM(S): at 18:03

## 2025-07-12 RX ADMIN — OXYCODONE HYDROCHLORIDE 5 MILLIGRAM(S): 30 TABLET ORAL at 00:10

## 2025-07-12 RX ADMIN — SODIUM CHLORIDE 500 MILLILITER(S): 9 INJECTION, SOLUTION INTRAVENOUS at 12:36

## 2025-07-12 RX ADMIN — ENOXAPARIN SODIUM 40 MILLIGRAM(S): 100 INJECTION SUBCUTANEOUS at 18:03

## 2025-07-12 RX ADMIN — CARVEDILOL 3.12 MILLIGRAM(S): 3.12 TABLET, FILM COATED ORAL at 06:04

## 2025-07-12 RX ADMIN — Medication 975 MILLIGRAM(S): at 00:10

## 2025-07-12 RX ADMIN — OXYCODONE HYDROCHLORIDE 5 MILLIGRAM(S): 30 TABLET ORAL at 12:36

## 2025-07-12 RX ADMIN — Medication 10 MILLIGRAM(S): at 06:04

## 2025-07-12 RX ADMIN — Medication 975 MILLIGRAM(S): at 06:05

## 2025-07-12 RX ADMIN — Medication 975 MILLIGRAM(S): at 07:05

## 2025-07-17 ENCOUNTER — APPOINTMENT (OUTPATIENT)
Dept: ORTHOPEDIC SURGERY | Facility: CLINIC | Age: 75
End: 2025-07-17

## 2025-07-17 DIAGNOSIS — K52.9 NONINFECTIVE GASTROENTERITIS AND COLITIS, UNSPECIFIED: ICD-10-CM

## 2025-07-17 DIAGNOSIS — Z91.018 ALLERGY TO OTHER FOODS: ICD-10-CM

## 2025-07-17 DIAGNOSIS — Z88.2 ALLERGY STATUS TO SULFONAMIDES: ICD-10-CM

## 2025-07-17 DIAGNOSIS — Z79.51 LONG TERM (CURRENT) USE OF INHALED STEROIDS: ICD-10-CM

## 2025-07-17 DIAGNOSIS — E11.51 TYPE 2 DIABETES MELLITUS WITH DIABETIC PERIPHERAL ANGIOPATHY WITHOUT GANGRENE: ICD-10-CM

## 2025-07-17 DIAGNOSIS — Z79.82 LONG TERM (CURRENT) USE OF ASPIRIN: ICD-10-CM

## 2025-07-17 DIAGNOSIS — Z91.013 ALLERGY TO SEAFOOD: ICD-10-CM

## 2025-07-17 DIAGNOSIS — Z85.46 PERSONAL HISTORY OF MALIGNANT NEOPLASM OF PROSTATE: ICD-10-CM

## 2025-07-17 DIAGNOSIS — J44.9 CHRONIC OBSTRUCTIVE PULMONARY DISEASE, UNSPECIFIED: ICD-10-CM
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
## 2025-07-17 DIAGNOSIS — I13.0 HYPERTENSIVE HEART AND CHRONIC KIDNEY DISEASE WITH HEART FAILURE AND STAGE 1 THROUGH STAGE 4 CHRONIC KIDNEY DISEASE, OR UNSPECIFIED CHRONIC KIDNEY DISEASE: ICD-10-CM

## 2025-07-17 DIAGNOSIS — Z88.0 ALLERGY STATUS TO PENICILLIN: ICD-10-CM

## 2025-07-17 DIAGNOSIS — F32.A DEPRESSION, UNSPECIFIED: ICD-10-CM

## 2025-07-17 DIAGNOSIS — R32 UNSPECIFIED URINARY INCONTINENCE: ICD-10-CM

## 2025-07-17 DIAGNOSIS — F41.9 ANXIETY DISORDER, UNSPECIFIED: ICD-10-CM

## 2025-07-17 DIAGNOSIS — I25.2 OLD MYOCARDIAL INFARCTION: ICD-10-CM

## 2025-07-17 DIAGNOSIS — I50.22 CHRONIC SYSTOLIC (CONGESTIVE) HEART FAILURE: ICD-10-CM

## 2025-07-17 DIAGNOSIS — M25.531 PAIN IN RIGHT WRIST: ICD-10-CM

## 2025-07-17 DIAGNOSIS — Z86.19 PERSONAL HISTORY OF OTHER INFECTIOUS AND PARASITIC DISEASES: ICD-10-CM

## 2025-07-17 DIAGNOSIS — I25.10 ATHEROSCLEROTIC HEART DISEASE OF NATIVE CORONARY ARTERY WITHOUT ANGINA PECTORIS: ICD-10-CM

## 2025-07-17 DIAGNOSIS — Z92.3 PERSONAL HISTORY OF IRRADIATION: ICD-10-CM

## 2025-07-17 DIAGNOSIS — E11.22 TYPE 2 DIABETES MELLITUS WITH DIABETIC CHRONIC KIDNEY DISEASE: ICD-10-CM

## 2025-07-17 DIAGNOSIS — N17.9 ACUTE KIDNEY FAILURE, UNSPECIFIED: ICD-10-CM

## 2025-07-17 DIAGNOSIS — B20 HUMAN IMMUNODEFICIENCY VIRUS [HIV] DISEASE: ICD-10-CM

## 2025-07-17 DIAGNOSIS — Z95.820 PERIPHERAL VASCULAR ANGIOPLASTY STATUS WITH IMPLANTS AND GRAFTS: ICD-10-CM
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
## 2025-07-17 DIAGNOSIS — N18.9 CHRONIC KIDNEY DISEASE, UNSPECIFIED: ICD-10-CM

## 2025-07-17 DIAGNOSIS — Z59.01 SHELTERED HOMELESSNESS: ICD-10-CM

## 2025-07-17 DIAGNOSIS — Z79.899 OTHER LONG TERM (CURRENT) DRUG THERAPY: ICD-10-CM

## 2025-07-17 DIAGNOSIS — Z11.52 ENCOUNTER FOR SCREENING FOR COVID-19: ICD-10-CM

## 2025-07-17 SDOH — ECONOMIC STABILITY - HOUSING INSECURITY: SHELTERED HOMELESSNESS: Z59.01

## 2025-09-09 ENCOUNTER — EMERGENCY (EMERGENCY)
Facility: HOSPITAL | Age: 75
LOS: 1 days | End: 2025-09-09
Attending: EMERGENCY MEDICINE | Admitting: EMERGENCY MEDICINE
Payer: MEDICARE

## 2025-09-09 VITALS
HEIGHT: 72 IN | WEIGHT: 175.05 LBS | DIASTOLIC BLOOD PRESSURE: 82 MMHG | SYSTOLIC BLOOD PRESSURE: 159 MMHG | HEART RATE: 74 BPM | RESPIRATION RATE: 16 BRPM | OXYGEN SATURATION: 94 % | TEMPERATURE: 99 F

## 2025-09-09 VITALS
HEART RATE: 96 BPM | RESPIRATION RATE: 16 BRPM | SYSTOLIC BLOOD PRESSURE: 152 MMHG | TEMPERATURE: 98 F | OXYGEN SATURATION: 95 % | DIASTOLIC BLOOD PRESSURE: 90 MMHG

## 2025-09-09 DIAGNOSIS — Z95.9 PRESENCE OF CARDIAC AND VASCULAR IMPLANT AND GRAFT, UNSPECIFIED: Chronic | ICD-10-CM

## 2025-09-09 LAB
FLUAV AG NPH QL: SIGNIFICANT CHANGE UP
FLUBV AG NPH QL: SIGNIFICANT CHANGE UP
RSV RNA NPH QL NAA+NON-PROBE: SIGNIFICANT CHANGE UP
SARS-COV-2 RNA SPEC QL NAA+PROBE: SIGNIFICANT CHANGE UP
SOURCE RESPIRATORY: SIGNIFICANT CHANGE UP

## 2025-09-09 PROCEDURE — 99053 MED SERV 10PM-8AM 24 HR FAC: CPT

## 2025-09-09 PROCEDURE — 99285 EMERGENCY DEPT VISIT HI MDM: CPT

## 2025-09-09 PROCEDURE — 71046 X-RAY EXAM CHEST 2 VIEWS: CPT | Mod: 26

## 2025-09-09 RX ORDER — AZITHROMYCIN 250 MG
1 CAPSULE ORAL
Qty: 4 | Refills: 0
Start: 2025-09-09 | End: 2025-09-12

## 2025-09-09 RX ORDER — CEFPODOXIME PROXETIL 200 MG/1
1 TABLET, FILM COATED ORAL
Qty: 28 | Refills: 0
Start: 2025-09-09 | End: 2025-09-22

## 2025-09-09 RX ORDER — ALBUTEROL SULFATE 2.5 MG/3ML
2 VIAL, NEBULIZER (ML) INHALATION
Qty: 1 | Refills: 0
Start: 2025-09-09 | End: 2025-09-15

## 2025-09-09 RX ORDER — CEFPODOXIME PROXETIL 200 MG/1
200 TABLET, FILM COATED ORAL EVERY 12 HOURS
Refills: 0 | Status: ACTIVE | OUTPATIENT
Start: 2025-09-09 | End: 2026-08-08

## 2025-09-09 RX ORDER — PREDNISONE 20 MG/1
60 TABLET ORAL ONCE
Refills: 0 | Status: COMPLETED | OUTPATIENT
Start: 2025-09-09 | End: 2025-09-09

## 2025-09-09 RX ORDER — IPRATROPIUM BROMIDE AND ALBUTEROL SULFATE .5; 2.5 MG/3ML; MG/3ML
3 SOLUTION RESPIRATORY (INHALATION)
Refills: 0 | Status: COMPLETED | OUTPATIENT
Start: 2025-09-09 | End: 2025-09-09

## 2025-09-09 RX ORDER — AZITHROMYCIN 250 MG
500 CAPSULE ORAL ONCE
Refills: 0 | Status: COMPLETED | OUTPATIENT
Start: 2025-09-09 | End: 2025-09-09

## 2025-09-09 RX ORDER — PREDNISONE 20 MG/1
2 TABLET ORAL
Qty: 6 | Refills: 0
Start: 2025-09-09 | End: 2025-09-11

## 2025-09-09 RX ADMIN — IPRATROPIUM BROMIDE AND ALBUTEROL SULFATE 3 MILLILITER(S): .5; 2.5 SOLUTION RESPIRATORY (INHALATION) at 09:27

## 2025-09-09 RX ADMIN — Medication 500 MILLIGRAM(S): at 09:07

## 2025-09-09 RX ADMIN — PREDNISONE 60 MILLIGRAM(S): 20 TABLET ORAL at 09:07

## 2025-09-09 RX ADMIN — IPRATROPIUM BROMIDE AND ALBUTEROL SULFATE 3 MILLILITER(S): .5; 2.5 SOLUTION RESPIRATORY (INHALATION) at 09:47

## 2025-09-09 RX ADMIN — IPRATROPIUM BROMIDE AND ALBUTEROL SULFATE 3 MILLILITER(S): .5; 2.5 SOLUTION RESPIRATORY (INHALATION) at 09:07

## 2025-09-09 RX ADMIN — CEFPODOXIME PROXETIL 200 MILLIGRAM(S): 200 TABLET, FILM COATED ORAL at 09:07

## 2025-09-10 ENCOUNTER — EMERGENCY (EMERGENCY)
Age: 75
LOS: 1 days | End: 2025-09-10
Attending: EMERGENCY MEDICINE | Admitting: EMERGENCY MEDICINE
Payer: MEDICARE

## 2025-09-10 VITALS
HEIGHT: 72 IN | DIASTOLIC BLOOD PRESSURE: 107 MMHG | HEART RATE: 67 BPM | SYSTOLIC BLOOD PRESSURE: 163 MMHG | TEMPERATURE: 98 F | RESPIRATION RATE: 18 BRPM | OXYGEN SATURATION: 95 %

## 2025-09-10 VITALS — HEART RATE: 68 BPM

## 2025-09-10 DIAGNOSIS — Z95.9 PRESENCE OF CARDIAC AND VASCULAR IMPLANT AND GRAFT, UNSPECIFIED: Chronic | ICD-10-CM

## 2025-09-10 PROCEDURE — 99284 EMERGENCY DEPT VISIT MOD MDM: CPT

## 2025-09-10 RX ORDER — ALBUTEROL SULFATE 2.5 MG/3ML
2 VIAL, NEBULIZER (ML) INHALATION
Qty: 1 | Refills: 0
Start: 2025-09-10 | End: 2025-09-16

## 2025-09-10 RX ORDER — AZITHROMYCIN 250 MG
1 CAPSULE ORAL
Qty: 4 | Refills: 0
Start: 2025-09-10 | End: 2025-09-13

## 2025-09-10 RX ORDER — PREDNISONE 20 MG/1
50 TABLET ORAL ONCE
Refills: 0 | Status: COMPLETED | OUTPATIENT
Start: 2025-09-10 | End: 2025-09-10

## 2025-09-10 RX ORDER — PREDNISONE 20 MG/1
2 TABLET ORAL
Qty: 6 | Refills: 0
Start: 2025-09-10 | End: 2025-09-12

## 2025-09-11 DIAGNOSIS — Z88.2 ALLERGY STATUS TO SULFONAMIDES: ICD-10-CM

## 2025-09-11 DIAGNOSIS — R05.1 ACUTE COUGH: ICD-10-CM

## 2025-09-11 DIAGNOSIS — J44.1 CHRONIC OBSTRUCTIVE PULMONARY DISEASE WITH (ACUTE) EXACERBATION: ICD-10-CM

## 2025-09-11 DIAGNOSIS — Z91.018 ALLERGY TO OTHER FOODS: ICD-10-CM

## 2025-09-11 DIAGNOSIS — Z87.891 PERSONAL HISTORY OF NICOTINE DEPENDENCE: ICD-10-CM

## 2025-09-11 DIAGNOSIS — Z88.0 ALLERGY STATUS TO PENICILLIN: ICD-10-CM

## 2025-09-11 DIAGNOSIS — Z91.013 ALLERGY TO SEAFOOD: ICD-10-CM

## 2025-09-12 DIAGNOSIS — J40 BRONCHITIS, NOT SPECIFIED AS ACUTE OR CHRONIC: ICD-10-CM

## 2025-09-12 DIAGNOSIS — Z91.018 ALLERGY TO OTHER FOODS: ICD-10-CM

## 2025-09-12 DIAGNOSIS — Z88.2 ALLERGY STATUS TO SULFONAMIDES: ICD-10-CM

## 2025-09-12 DIAGNOSIS — Z88.0 ALLERGY STATUS TO PENICILLIN: ICD-10-CM
